# Patient Record
Sex: FEMALE | Race: WHITE | NOT HISPANIC OR LATINO
[De-identification: names, ages, dates, MRNs, and addresses within clinical notes are randomized per-mention and may not be internally consistent; named-entity substitution may affect disease eponyms.]

---

## 2019-02-20 PROBLEM — Z00.00 ENCOUNTER FOR PREVENTIVE HEALTH EXAMINATION: Status: ACTIVE | Noted: 2019-02-20

## 2019-03-01 ENCOUNTER — APPOINTMENT (OUTPATIENT)
Dept: SURGERY | Facility: CLINIC | Age: 69
End: 2019-03-01
Payer: MEDICARE

## 2019-03-01 VITALS
OXYGEN SATURATION: 98 % | SYSTOLIC BLOOD PRESSURE: 142 MMHG | TEMPERATURE: 98.1 F | HEART RATE: 87 BPM | RESPIRATION RATE: 15 BRPM | DIASTOLIC BLOOD PRESSURE: 89 MMHG | HEIGHT: 65 IN | WEIGHT: 148 LBS | BODY MASS INDEX: 24.66 KG/M2

## 2019-03-01 DIAGNOSIS — Z63.4 DISAPPEARANCE AND DEATH OF FAMILY MEMBER: ICD-10-CM

## 2019-03-01 PROCEDURE — 99203 OFFICE O/P NEW LOW 30 MIN: CPT

## 2019-03-01 SDOH — SOCIAL STABILITY - SOCIAL INSECURITY: DISSAPEARANCE AND DEATH OF FAMILY MEMBER: Z63.4

## 2019-03-01 NOTE — CONSULT LETTER
[Dear  ___] : Dear ~FABIOLA, [Sincerely,] : Sincerely, [FreeTextEntry2] : Dr. Srinath Corral [FreeTextEntry1] : At your recommendation I saw Flory Belcher in the office on March 1st for evaluation of a possible abdominal wall hernia. She stated that over the past year she has been experiencing episodic right paraumbilical pain which is usually triggered by bending over at the waist. The pain is usually rather sharp but generally alleviated quickly by the patient lying supine. At times she has also noted a right-sided paraumbilical bulge but she denied any her other abdominal symptoms or change in her usual pattern of constipation.\par \par On exam, the abdomen was soft, nondistended and nontender without palpable mass or organomegaly. No groin, umbilical, upper midline or paramedian hernia could be detected and the remainder of the exam was noncontributory.\par \par As I discussed with Ms. Belcher, her history and exam seem most suggestive of an abdominal wall muscle spasm versus a possible right-sided spigelian hernia. Given the negative exam I suggested that she undergo a CT scan of the abdomen and pelvis to search for a hernia. Should one be demonstrated by the scan, elective repair would certainly be advised and should she eventually come to surgery I will update you on her status. Thanks very much for allowing me to participate in this pleasant woman's care. [FreeTextEntry3] : \par \par Cam Fortune M.D., F.A.C.S.

## 2019-03-01 NOTE — PHYSICAL EXAM
[Normal Thyroid] : the thyroid was normal [Respiratory Effort] : normal respiratory effort [Normal Rate and Rhythm] : normal rate and rhythm [Abdominal Aorta] : Normal abdominal aorta [No Rash or Lesion] : No rash or lesion [Oriented to Person] : oriented to person [Oriented to Place] : oriented to place [Oriented to Time] : oriented to time [Anxious] : anxious [de-identified] : In no distress [de-identified] : Normocephalic, atraumatic [de-identified] : No palpable adenopathy [de-identified] : Soft, nondistended, nontender. No palpable mass or organomegaly. No palpable umbilical, upper midline or paraumbilical hernia palpable. [de-identified] : Normal gait; no deformity [de-identified] : No gross sensory or motor deficit [de-identified] : Normal mood and affect though somewhat anxious.

## 2019-03-01 NOTE — HISTORY OF PRESENT ILLNESS
[de-identified] : Flory is a 70 y/o female here for evaluation of umbilical hernia.  [de-identified] : Over the past year patient has been experiencing episodic right-sided periumbilical pain. Symptoms usually begin abruptly, often triggered by bending over at the waist. Generally resolves quickly with recumbency. At times patient has noted a right-sided para umbilical bulge. No other abdominal symptoms; long history of constipation treated with Metamucil.

## 2019-03-07 ENCOUNTER — FORM ENCOUNTER (OUTPATIENT)
Age: 69
End: 2019-03-07

## 2019-03-08 ENCOUNTER — APPOINTMENT (OUTPATIENT)
Dept: CT IMAGING | Facility: CLINIC | Age: 69
End: 2019-03-08
Payer: MEDICARE

## 2019-03-08 ENCOUNTER — OUTPATIENT (OUTPATIENT)
Dept: OUTPATIENT SERVICES | Facility: HOSPITAL | Age: 69
LOS: 1 days | End: 2019-03-08
Payer: MEDICARE

## 2019-03-08 DIAGNOSIS — Z00.8 ENCOUNTER FOR OTHER GENERAL EXAMINATION: ICD-10-CM

## 2019-03-08 PROCEDURE — 74177 CT ABD & PELVIS W/CONTRAST: CPT | Mod: 26

## 2019-03-08 PROCEDURE — 82565 ASSAY OF CREATININE: CPT

## 2019-03-08 PROCEDURE — 74177 CT ABD & PELVIS W/CONTRAST: CPT

## 2019-03-12 ENCOUNTER — APPOINTMENT (OUTPATIENT)
Dept: VASCULAR SURGERY | Facility: CLINIC | Age: 69
End: 2019-03-12
Payer: MEDICARE

## 2019-03-12 VITALS
TEMPERATURE: 98 F | WEIGHT: 148 LBS | BODY MASS INDEX: 24.66 KG/M2 | HEART RATE: 80 BPM | SYSTOLIC BLOOD PRESSURE: 160 MMHG | HEIGHT: 65 IN | DIASTOLIC BLOOD PRESSURE: 96 MMHG

## 2019-03-12 DIAGNOSIS — Z82.49 FAMILY HISTORY OF ISCHEMIC HEART DISEASE AND OTHER DISEASES OF THE CIRCULATORY SYSTEM: ICD-10-CM

## 2019-03-12 DIAGNOSIS — Z87.440 PERSONAL HISTORY OF URINARY (TRACT) INFECTIONS: ICD-10-CM

## 2019-03-12 DIAGNOSIS — Z87.891 PERSONAL HISTORY OF NICOTINE DEPENDENCE: ICD-10-CM

## 2019-03-12 PROCEDURE — 99202 OFFICE O/P NEW SF 15 MIN: CPT

## 2019-03-13 ENCOUNTER — APPOINTMENT (OUTPATIENT)
Dept: CARDIOTHORACIC SURGERY | Facility: CLINIC | Age: 69
End: 2019-03-13

## 2019-03-13 PROBLEM — Z82.49 FAMILY HISTORY OF MYOCARDIAL INFARCTION: Status: ACTIVE | Noted: 2019-03-01

## 2019-03-13 PROBLEM — Z87.891 FORMER SMOKER: Status: ACTIVE | Noted: 2019-03-01

## 2019-03-13 NOTE — PHYSICAL EXAM
[2+] : left 2+ [Ankle Swelling (On Exam)] : not present [Varicose Veins Of Lower Extremities] : not present [] : not present [Abdomen Masses] : No abdominal masses

## 2019-03-13 NOTE — ASSESSMENT
[FreeTextEntry1] : Patient  has   a 8 cm  Distal  Thoracic  aneurysm  extending  into  the  abdomen  Proximal  extent  is  not  seen  Will  need  A CTA  chest abdomen  Pelvis  befor  deciding  on  TEVAR vs  Open  repair   [Aneurysm Surgery] : aneurysm surgery

## 2019-03-15 ENCOUNTER — APPOINTMENT (OUTPATIENT)
Dept: CT IMAGING | Facility: CLINIC | Age: 69
End: 2019-03-15
Payer: MEDICARE

## 2019-03-15 ENCOUNTER — OUTPATIENT (OUTPATIENT)
Dept: OUTPATIENT SERVICES | Facility: HOSPITAL | Age: 69
LOS: 1 days | End: 2019-03-15
Payer: MEDICARE

## 2019-03-15 DIAGNOSIS — Z00.8 ENCOUNTER FOR OTHER GENERAL EXAMINATION: ICD-10-CM

## 2019-03-15 PROCEDURE — 71275 CT ANGIOGRAPHY CHEST: CPT | Mod: 26

## 2019-03-15 PROCEDURE — 71275 CT ANGIOGRAPHY CHEST: CPT

## 2019-03-15 PROCEDURE — 74174 CTA ABD&PLVS W/CONTRAST: CPT | Mod: 26

## 2019-03-15 PROCEDURE — 74174 CTA ABD&PLVS W/CONTRAST: CPT

## 2019-03-18 ENCOUNTER — RESULT REVIEW (OUTPATIENT)
Age: 69
End: 2019-03-18

## 2019-03-18 NOTE — ADDENDUM
[FreeTextEntry1] : I reviewed the CT scan  chest  abdomen and pelvis  Patient  has  a  large  ascending  aortic  aneurysm  DTA   Thoraco abdominal  aneurysm  Not  a good  TEVAR  situation  Patent  also needs  ascending  aneurysm  repaired  I  asked her  to see Dr Briseno  i left  a message  to speak to him  as well Currently he is  in the OR

## 2019-03-21 ENCOUNTER — APPOINTMENT (OUTPATIENT)
Dept: CARDIOTHORACIC SURGERY | Facility: CLINIC | Age: 69
End: 2019-03-21
Payer: MEDICARE

## 2019-03-21 VITALS
WEIGHT: 148 LBS | HEIGHT: 65 IN | HEART RATE: 74 BPM | BODY MASS INDEX: 24.66 KG/M2 | TEMPERATURE: 98.6 F | OXYGEN SATURATION: 99 % | DIASTOLIC BLOOD PRESSURE: 97 MMHG | SYSTOLIC BLOOD PRESSURE: 156 MMHG | RESPIRATION RATE: 16 BRPM

## 2019-03-21 DIAGNOSIS — Z82.49 FAMILY HISTORY OF ISCHEMIC HEART DISEASE AND OTHER DISEASES OF THE CIRCULATORY SYSTEM: ICD-10-CM

## 2019-03-21 PROCEDURE — 99205 OFFICE O/P NEW HI 60 MIN: CPT

## 2019-03-21 PROCEDURE — 99215 OFFICE O/P EST HI 40 MIN: CPT

## 2019-03-21 RX ORDER — ASCORBIC ACID 500 MG
TABLET ORAL
Refills: 0 | Status: COMPLETED | COMMUNITY
End: 2019-03-21

## 2019-03-21 RX ORDER — MULTIVITAMIN
TABLET ORAL DAILY
Refills: 0 | Status: ACTIVE | COMMUNITY

## 2019-03-21 NOTE — DATA REVIEWED
[FreeTextEntry1] : CT abdomen and pelvis with contrast 3/8/19\par \par FINDINGS:\par \par LOWER CHEST: Fusiform aneurysm of the mid and distal thoracic aorta to a \par maximal diameter of 8.1 x 7.3 cm (2:13). The proximal thoracic aorta is \par not imaged on today's study of the abdomen and pelvis. Prominent mural \par thrombus is present. There is tapering of the aorta in the abdomen: At \par the level of the celiac origin measuring 5.1 x 5.5 cm, at the origin of \par the superior mesenteric artery measuring 3.8 x 3.7 cm, at the level of \par the renal hilus measuring 3.3 x 3.2 cm and at the distal abdominal aorta \par measuring 2.8 cm.\par \par LIVER: There is a prominent Riedel's lobe of liver. No discrete hepatic \par mass.\par BILE DUCTS: Normal caliber.\par GALLBLADDER: Within normal limits.\par SPLEEN: Within normal limits.\par PANCREAS: Within normal limits.\par ADRENALS: Within normal limits.\par KIDNEYS/URETERS: Within normal limits.\par \par BLADDER: Within normal limits.\par REPRODUCTIVE ORGANS: The uterus and adnexal regions unremarkable.\par \par BOWEL: No bowel obstruction. No evidence of bowel related inflammation.\par PERITONEUM: No ascites.\par VESSELS:  Within normal limits.\par RETROPERITONEUM: No lymphadenopathy.  \par ABDOMINAL WALL: No evidence of ventral abdominal hernia.\par BONES: Mild to moderate degenerative changes of the spine. No deepti \par destructive bony process.\par \par IMPRESSION:    \par No evidence of ventral abdominal hernia.\par Large fusiform aneurysm of the mid to distal thoracic aorta with a \par maximal diameter of 8.1 x 7.3 cm.\par \par \par

## 2019-03-21 NOTE — ASSESSMENT
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 Symptoms usually begin abruptly, often triggered by bending over at the waist, resolves quickly with recumbency which she went to PCP who referred to general surgery (  ) who sent for CT Scan which revealed Large fusiform aneurysm of the mid to distal thoracic aorta with a maximal diameter of 8.1 x 7.3 cm.She was then referred to vascular surgery (  ) who did the CTA C/A/P  .She presents for evaluation and management of thoracoabdominal aortic aneurysm .Denies ant chest pain, palpitations, back pain , dizziness or pedal edema.\par \par Today on exam, Lungs are clear bilaterally, No peripheral edema noted.\par \par \par  I reviewed the cardiac imaging, medical records and reports with patient and discussed the case.I have reviewed the indications for surgery,and used our webpage [email or web address] <[email or web address]> to illustrate the aorta and anatomy of the heart. I discussed the risks , benefits and alternatives to surgery. Risks included but not limited to  bleeding , stroke, Myocardial Infarction, kidney problems,Blood transfusion ,permanent  pacemaker implantation,  infections and death. I  quoted an operative mortality and complication risks of 8 - 10%. I also discussed the various approaches in detail.I  feel that the patient will benefit and is a candidate for a Aortic root and Transverse arch replacement , Possible Aortic valve Replacement . All questions and concerns were addressed and patient agrees to proceed with  surgery \par \par I have reviewed the patient's medical records, diagnostic images during the time of this office consultation and have made the following recommendation.\par \par Plan:\par 1) Aortic root and Transverse arch replacement , Possible Aortic valve Replacement\par 2) Cardiac Catherization to evaluate coronary arteries \par 3) PFT\par 4) Carotid Duplex\par 5) PSTs\par 6) Echocardiogram\par 7) Increase Norvasc to 10 mg BID for blood pressure management\par 8) No heavy lifting more than 20 lbs.\par \par I have reviewed the risks, benefits and alternatives with the patient . I have answered all questions and the patient would like to proceed with the above plan. \par \par Written by Idris Perez NP, acting as a scribe for Dr.Derek Briseno.\par “The documentation recorded by the scribe accurately reflects the service I personally performed and the decisions made by me.” Signature Junior Briseno MD.\par  \par \par

## 2019-03-21 NOTE — REVIEW OF SYSTEMS
[Negative] : Heme/Lymph [Fever] : no fever [Chills] : no chills [Chest Pain] : no chest pain [Palpitations] : no palpitations [Lower Ext Edema] : no lower extremity edema [Shortness Of Breath] : no shortness of breath [SOB on Exertion] : no shortness of breath during exertion [Dizziness] : no dizziness [Fainting] : no fainting [Anxiety] : no anxiety [Depression] : no depression [Easy Bleeding] : no tendency for easy bleeding [FreeTextEntry9] : Bcak pain to LT lower back

## 2019-03-21 NOTE — PHYSICAL EXAM
[General Appearance - Alert] : alert [General Appearance - In No Acute Distress] : in no acute distress [General Appearance - Well Nourished] : well nourished [General Appearance - Well Developed] : well developed [Sclera] : the sclera and conjunctiva were normal [PERRL With Normal Accommodation] : pupils were equal in size, round, and reactive to light [Outer Ear] : the ears and nose were normal in appearance [Hearing Threshold Finger Rub Not Sevier] : hearing was normal [Both Tympanic Membranes Were Examined] : both tympanic membranes were normal [Neck Appearance] : the appearance of the neck was normal [] : no respiratory distress [Respiration, Rhythm And Depth] : normal respiratory rhythm and effort [Auscultation Breath Sounds / Voice Sounds] : lungs were clear to auscultation bilaterally [Apical Impulse] : the apical impulse was normal [Heart Rate And Rhythm] : heart rate was normal and rhythm regular [Heart Sounds] : normal S1 and S2 [Systolic grade ___/6] : A grade [unfilled]/6 systolic murmur was heard. [Examination Of The Chest] : the chest was normal in appearance [2+] : left 2+ [Breast Appearance] : normal in appearance [Bowel Sounds] : normal bowel sounds [Abdomen Soft] : soft [Abdomen Tenderness] : non-tender [No CVA Tenderness] : no ~M costovertebral angle tenderness [Abnormal Walk] : normal gait [Involuntary Movements] : no involuntary movements were seen [Skin Color & Pigmentation] : normal skin color and pigmentation [Skin Turgor] : normal skin turgor [No Focal Deficits] : no focal deficits [Oriented To Time, Place, And Person] : oriented to person, place, and time [Impaired Insight] : insight and judgment were intact [Affect] : the affect was normal [Mood] : the mood was normal [FreeTextEntry1] : Deferred

## 2019-03-21 NOTE — HISTORY OF PRESENT ILLNESS
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 Symptoms usually begin abruptly, often triggered by bending over at the waist, resolves quickly with recumbency which she went to PCP who referred to general surgery (  ) who sent for CT Scan which revealed Large fusiform aneurysm of the mid to distal thoracic aorta with a maximal diameter of 8.1 x 7.3 cm.She was then referred to vascular surgery (  ) who did the CTA C/A/P  .She presents for evaluation and management of thoracoabdominal aortic aneurysm .Denies ant chest pain, palpitations, back pain , dizziness or pedal edema.\par \par OFF note: She had UTI 1 week ago ,was on Cipro 500 mg BID for  3 days.\par \par CTA chest/abdomen/pelvis 3/15/19 \par Fusiform aneurysmal dilatation of the thoracic and proximal abdominal aorta\par Ascending thoracic aorta 5.8 x 5.6 cm\par Mid aortic arch 3.7 cm\par Mid descending thoracic aorta 4.9 x 4.6 cm\par Distal thoracic aorta 8 x 7.4, intramural thrombus is noted here\par at origin of the celiac artery 5.5 x 5.4 cm\par at the renal hilus 3.4 x 3.2 cm\par \par CT abdomen and pelvis with contrast 3/8/19 revealed:\par No evidence of ventral abdominal hernia.\par 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

## 2019-03-21 NOTE — CONSULT LETTER
[Dear  ___] : Dear  [unfilled], [FreeTextEntry2] : Srinath Corral MD\par [address] [place] [address]\[place] [address] [FreeTextEntry1] : \par \par I had the pleasure of seeing your patient, KENNETH SPRING,in my office today. \par \par We take a multidisciplinary team approach to patient care and consider you, the primary physician, an extension of our team. We will maintain an open line of communication with you throughout your patient's treatment course. \par \par She  is being evaluated for thoracic aortic aneurysm. I have reviewed all of the patient's medical records and diagnostic images at the time of her  office consultation. I have enclosed a copy for your records. \par \par I have reviewed the indications for surgery,and used our webpage [email or web address] <[email or web address]> to illustrate the aorta and anatomy of the heart. The patient meets criteria for surgery. I have recommended that the patient is a candidate for a  Aortic root and Transverse arch replacement , Possible Aortic valve Replacement . \par \par  I will update you on her perioperative status and disposition upon discharge. \par \par I appreciate the opportunity to care for your patient at the [facility] based at [facility]. If there are any questions or concerns, please call me directly at [phone]. \par \par \par \par Sincerely, \par \par \par \par Junior Briseno M.D.\par Professor of Cardiovascular and Thoracic Surgery\par Minimally Invasive Valve Surgeon\par Director of Aortic Surgery, [facility]\par Cell: [phone]\par Email: [email or web address] \par \par [facility]:\par [address], [place] [address]\par Office: [phone]\par Fax: [phone]\par \par [facility]:\par Department of Cardiovascular and Thoracic Surgery\par [address] [place], [place], [address]\par Office: [phone]\par Fax: [phone]\par \par Practice Manager: Ms. Indy Griffith\par Email: [email or web address]\par Phone: [phone]\par \par

## 2019-04-02 ENCOUNTER — OUTPATIENT (OUTPATIENT)
Dept: OUTPATIENT SERVICES | Facility: HOSPITAL | Age: 69
LOS: 1 days | End: 2019-04-02
Payer: MEDICARE

## 2019-04-02 VITALS
WEIGHT: 149.91 LBS | RESPIRATION RATE: 16 BRPM | DIASTOLIC BLOOD PRESSURE: 86 MMHG | TEMPERATURE: 99 F | HEART RATE: 62 BPM | HEIGHT: 65 IN | SYSTOLIC BLOOD PRESSURE: 164 MMHG | OXYGEN SATURATION: 99 %

## 2019-04-02 DIAGNOSIS — S42.302A UNSPECIFIED FRACTURE OF SHAFT OF HUMERUS, LEFT ARM, INITIAL ENCOUNTER FOR CLOSED FRACTURE: Chronic | ICD-10-CM

## 2019-04-02 DIAGNOSIS — I71.9 AORTIC ANEURYSM OF UNSPECIFIED SITE, WITHOUT RUPTURE: ICD-10-CM

## 2019-04-02 DIAGNOSIS — H26.9 UNSPECIFIED CATARACT: Chronic | ICD-10-CM

## 2019-04-02 LAB
ANION GAP SERPL CALC-SCNC: 13 MMOL/L — SIGNIFICANT CHANGE UP (ref 5–17)
BUN SERPL-MCNC: 13 MG/DL — SIGNIFICANT CHANGE UP (ref 7–23)
CALCIUM SERPL-MCNC: 9.4 MG/DL — SIGNIFICANT CHANGE UP (ref 8.4–10.5)
CHLORIDE SERPL-SCNC: 98 MMOL/L — SIGNIFICANT CHANGE UP (ref 96–108)
CO2 SERPL-SCNC: 27 MMOL/L — SIGNIFICANT CHANGE UP (ref 22–31)
CREAT SERPL-MCNC: 0.61 MG/DL — SIGNIFICANT CHANGE UP (ref 0.5–1.3)
GLUCOSE SERPL-MCNC: 96 MG/DL — SIGNIFICANT CHANGE UP (ref 70–99)
HCT VFR BLD CALC: 39.1 % — SIGNIFICANT CHANGE UP (ref 34.5–45)
HGB BLD-MCNC: 13.4 G/DL — SIGNIFICANT CHANGE UP (ref 11.5–15.5)
MCHC RBC-ENTMCNC: 31.8 PG — SIGNIFICANT CHANGE UP (ref 27–34)
MCHC RBC-ENTMCNC: 34.3 GM/DL — SIGNIFICANT CHANGE UP (ref 32–36)
MCV RBC AUTO: 92.6 FL — SIGNIFICANT CHANGE UP (ref 80–100)
PLATELET # BLD AUTO: 230 K/UL — SIGNIFICANT CHANGE UP (ref 150–400)
POTASSIUM SERPL-MCNC: 3.6 MMOL/L — SIGNIFICANT CHANGE UP (ref 3.5–5.3)
POTASSIUM SERPL-SCNC: 3.6 MMOL/L — SIGNIFICANT CHANGE UP (ref 3.5–5.3)
RBC # BLD: 4.22 M/UL — SIGNIFICANT CHANGE UP (ref 3.8–5.2)
RBC # FLD: 12.2 % — SIGNIFICANT CHANGE UP (ref 10.3–14.5)
SODIUM SERPL-SCNC: 138 MMOL/L — SIGNIFICANT CHANGE UP (ref 135–145)
WBC # BLD: 8 K/UL — SIGNIFICANT CHANGE UP (ref 3.8–10.5)
WBC # FLD AUTO: 8 K/UL — SIGNIFICANT CHANGE UP (ref 3.8–10.5)

## 2019-04-02 PROCEDURE — 99152 MOD SED SAME PHYS/QHP 5/>YRS: CPT

## 2019-04-02 PROCEDURE — 85027 COMPLETE CBC AUTOMATED: CPT

## 2019-04-02 PROCEDURE — 93456 R HRT CORONARY ARTERY ANGIO: CPT

## 2019-04-02 PROCEDURE — C1769: CPT

## 2019-04-02 PROCEDURE — 99152 MOD SED SAME PHYS/QHP 5/>YRS: CPT | Mod: GC

## 2019-04-02 PROCEDURE — 93010 ELECTROCARDIOGRAM REPORT: CPT | Mod: 59

## 2019-04-02 PROCEDURE — C1887: CPT

## 2019-04-02 PROCEDURE — 93005 ELECTROCARDIOGRAM TRACING: CPT

## 2019-04-02 PROCEDURE — 99203 OFFICE O/P NEW LOW 30 MIN: CPT

## 2019-04-02 PROCEDURE — 80048 BASIC METABOLIC PNL TOTAL CA: CPT

## 2019-04-02 PROCEDURE — 93456 R HRT CORONARY ARTERY ANGIO: CPT | Mod: 26,GC

## 2019-04-02 PROCEDURE — C1894: CPT

## 2019-04-02 NOTE — H&P CARDIOLOGY - HISTORY OF PRESENT ILLNESS
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 PT recently complained of intermittent right sided periumbilical abdominal pain on and off for 1 year. Symptoms were usually abruptly often triggered by bending over at the waist , pain resolved quickly recumbency. Pt went to PCP then referred to general surgery Dr. Fortune . Pt had CT scan which revealed large fusiform aneurysm of the mid to distal thoracic aorta with a maximal diameter of 8.1 x 7.3 cm. Referred for vascular sx with Dr. Cheema who did the CTA  C/A/P . Pt then referred to Dr. Briseno for evaluation of thoracoabdominal aortic aneurysm/dissection.   Now referred for Right and Left heart Catheterization today with Dr. Andrade. Currently no complaints of any CP no sob no palpitations no lightheadedness or dizziness noted.   < from: CT Angio Abdomen and Pelvis w/ IV Cont (03.15.19 @ 09:28) >  FINDINGS:    VESSELS: Fusiform aneurysmal dilatation of the thoracic and proximal   abdominal aorta. The following measurements are obtained:  Ascending thoracic aorta: 5.8 x 5.6 cm (3:54).  Mid aortic arch: 3.7 cm.  Mid descending thoracic aorta: 4.9 x 4.6 cm (3:61).  Distal thoracic aorta: 8 x 7.4 cm (3:88). Intramural thrombus is noted   here.  At the origin of the celiac artery: 5.5 x 5.4 cm (3:106).  At the renal hilus: 3.4 x 3.2 cm (3:117).  The distal abdominal aorta and iliac arteries are normal in caliber.  The celiac, superior mesenteric, inferior mesenteric and renal arteries   demonstrate patency.  The heart is normal in size.    CHEST:     LUNGS AND LARGE AIRWAYS: Patent central airways. 3 mm left lower lobe   nodule (3:84).  PLEURA: No pleural effusion.    HEART: Heart size is normal. No pericardial effusion.  MEDIASTINUM AND JUMA: No lymphadenopathy.  CHEST WALL AND LOWER NECK: Within normal limits.    ABDOMEN AND PELVIS:    LIVER: Within normal limits.  BILE DUCTS: Normal caliber.  GALLBLADDER: Within normal limits.  SPLEEN: Within normal limits.  PANCREAS: Within normal limits.  ADRENALS: Within normal limits.  KIDNEYS/URETERS: Within normal limits.    BLADDER: Within normal limits.  REPRODUCTIVE ORGANS: The uterus and adnexal regions unremarkable.    BOWEL: No bowel obstruction. Appendix normal. No bowel related   inflammation.  PERITONEUM: No ascites.  VESSELS:  Withinnormal limits.  RETROPERITONEUM: No lymphadenopathy.    ABDOMINAL WALL: Within normal limits.  BONES: Moderate degenerative changes especially of the lower lumbar spine.    IMPRESSION:        Fusiform aneurysmal dilatation of the thoracic and upper abdominal aorta   as noted above.  NEYDA STAPLES M.D., ATTENDING RADIOLOGIST   This document has been electronically signed. Mar 15 2019 12:37PM  < end of copied text >

## 2019-04-02 NOTE — H&P CARDIOLOGY - FAMILY HISTORY
Father  Still living? No  Family history of early CAD, Age at diagnosis: Age Unknown  Family history of coronary artery bypass graft, Age at diagnosis: Age Unknown

## 2019-04-04 ENCOUNTER — OUTPATIENT (OUTPATIENT)
Dept: OUTPATIENT SERVICES | Facility: HOSPITAL | Age: 69
LOS: 1 days | End: 2019-04-04
Payer: MEDICARE

## 2019-04-04 ENCOUNTER — APPOINTMENT (OUTPATIENT)
Dept: CV DIAGNOSITCS | Facility: HOSPITAL | Age: 69
End: 2019-04-04

## 2019-04-04 ENCOUNTER — APPOINTMENT (OUTPATIENT)
Dept: CARDIOTHORACIC SURGERY | Facility: CLINIC | Age: 69
End: 2019-04-04
Payer: MEDICARE

## 2019-04-04 VITALS
HEART RATE: 59 BPM | HEIGHT: 65 IN | DIASTOLIC BLOOD PRESSURE: 88 MMHG | OXYGEN SATURATION: 97 % | RESPIRATION RATE: 16 BRPM | TEMPERATURE: 97 F | WEIGHT: 148.15 LBS | SYSTOLIC BLOOD PRESSURE: 147 MMHG

## 2019-04-04 DIAGNOSIS — Z01.818 ENCOUNTER FOR OTHER PREPROCEDURAL EXAMINATION: ICD-10-CM

## 2019-04-04 DIAGNOSIS — I71.6 THORACOABDOMINAL AORTIC ANEURYSM, WITHOUT RUPTURE: ICD-10-CM

## 2019-04-04 DIAGNOSIS — I71.9 AORTIC ANEURYSM OF UNSPECIFIED SITE, WITHOUT RUPTURE: ICD-10-CM

## 2019-04-04 DIAGNOSIS — Z29.9 ENCOUNTER FOR PROPHYLACTIC MEASURES, UNSPECIFIED: ICD-10-CM

## 2019-04-04 DIAGNOSIS — Z98.49 CATARACT EXTRACTION STATUS, UNSPECIFIED EYE: Chronic | ICD-10-CM

## 2019-04-04 DIAGNOSIS — H26.9 UNSPECIFIED CATARACT: Chronic | ICD-10-CM

## 2019-04-04 DIAGNOSIS — I71.2 THORACIC AORTIC ANEURYSM, WITHOUT RUPTURE: ICD-10-CM

## 2019-04-04 DIAGNOSIS — I10 ESSENTIAL (PRIMARY) HYPERTENSION: ICD-10-CM

## 2019-04-04 DIAGNOSIS — S42.302A UNSPECIFIED FRACTURE OF SHAFT OF HUMERUS, LEFT ARM, INITIAL ENCOUNTER FOR CLOSED FRACTURE: Chronic | ICD-10-CM

## 2019-04-04 PROBLEM — O14.90 UNSPECIFIED PRE-ECLAMPSIA, UNSPECIFIED TRIMESTER: Chronic | Status: ACTIVE | Noted: 2019-04-02

## 2019-04-04 LAB
BLD GP AB SCN SERPL QL: NEGATIVE — SIGNIFICANT CHANGE UP
BUN SERPL-MCNC: 11 MG/DL — SIGNIFICANT CHANGE UP (ref 7–23)
CREAT SERPL-MCNC: 0.6 MG/DL — SIGNIFICANT CHANGE UP (ref 0.5–1.3)
HBA1C BLD-MCNC: 5.2 % — SIGNIFICANT CHANGE UP (ref 4–5.6)
HCT VFR BLD CALC: 40.8 % — SIGNIFICANT CHANGE UP (ref 34.5–45)
HGB BLD-MCNC: 13.5 G/DL — SIGNIFICANT CHANGE UP (ref 11.5–15.5)
MCHC RBC-ENTMCNC: 30.4 PG — SIGNIFICANT CHANGE UP (ref 27–34)
MCHC RBC-ENTMCNC: 33.1 GM/DL — SIGNIFICANT CHANGE UP (ref 32–36)
MCV RBC AUTO: 91.9 FL — SIGNIFICANT CHANGE UP (ref 80–100)
PLATELET # BLD AUTO: 246 K/UL — SIGNIFICANT CHANGE UP (ref 150–400)
RBC # BLD: 4.44 M/UL — SIGNIFICANT CHANGE UP (ref 3.8–5.2)
RBC # FLD: 12.8 % — SIGNIFICANT CHANGE UP (ref 10.3–14.5)
RH IG SCN BLD-IMP: POSITIVE — SIGNIFICANT CHANGE UP
WBC # BLD: 9.02 K/UL — SIGNIFICANT CHANGE UP (ref 3.8–10.5)
WBC # FLD AUTO: 9.02 K/UL — SIGNIFICANT CHANGE UP (ref 3.8–10.5)

## 2019-04-04 PROCEDURE — 93880 EXTRACRANIAL BILAT STUDY: CPT

## 2019-04-04 PROCEDURE — 83036 HEMOGLOBIN GLYCOSYLATED A1C: CPT

## 2019-04-04 PROCEDURE — 85027 COMPLETE CBC AUTOMATED: CPT

## 2019-04-04 PROCEDURE — 86901 BLOOD TYPING SEROLOGIC RH(D): CPT

## 2019-04-04 PROCEDURE — 82565 ASSAY OF CREATININE: CPT

## 2019-04-04 PROCEDURE — 71046 X-RAY EXAM CHEST 2 VIEWS: CPT

## 2019-04-04 PROCEDURE — 84520 ASSAY OF UREA NITROGEN: CPT

## 2019-04-04 PROCEDURE — C8929: CPT

## 2019-04-04 PROCEDURE — 86900 BLOOD TYPING SEROLOGIC ABO: CPT

## 2019-04-04 PROCEDURE — 87641 MR-STAPH DNA AMP PROBE: CPT

## 2019-04-04 PROCEDURE — 93880 EXTRACRANIAL BILAT STUDY: CPT | Mod: 26

## 2019-04-04 PROCEDURE — 71046 X-RAY EXAM CHEST 2 VIEWS: CPT | Mod: 26

## 2019-04-04 PROCEDURE — 94010 BREATHING CAPACITY TEST: CPT

## 2019-04-04 PROCEDURE — G0463: CPT

## 2019-04-04 PROCEDURE — 87640 STAPH A DNA AMP PROBE: CPT

## 2019-04-04 PROCEDURE — 93306 TTE W/DOPPLER COMPLETE: CPT | Mod: 26

## 2019-04-04 PROCEDURE — 86850 RBC ANTIBODY SCREEN: CPT

## 2019-04-04 RX ORDER — CHLORHEXIDINE GLUCONATE 213 G/1000ML
1 SOLUTION TOPICAL ONCE
Qty: 0 | Refills: 0 | Status: DISCONTINUED | OUTPATIENT
Start: 2019-04-08 | End: 2019-04-08

## 2019-04-04 RX ORDER — SODIUM CHLORIDE 9 MG/ML
3 INJECTION INTRAMUSCULAR; INTRAVENOUS; SUBCUTANEOUS EVERY 8 HOURS
Qty: 0 | Refills: 0 | Status: DISCONTINUED | OUTPATIENT
Start: 2019-04-08 | End: 2019-04-08

## 2019-04-04 RX ORDER — LIDOCAINE HCL 20 MG/ML
0.2 VIAL (ML) INJECTION ONCE
Qty: 0 | Refills: 0 | Status: DISCONTINUED | OUTPATIENT
Start: 2019-04-08 | End: 2019-04-08

## 2019-04-04 NOTE — H&P PST ADULT - NSICDXPROBLEM_GEN_ALL_CORE_FT
PROBLEM DIAGNOSES  Problem: Thoracoabdominal aortic aneurysm (TAAA) without rupture  Assessment and Plan: Aortic root and transverse arch replacement, possible aortic valve replacement    Problem: Hypertension  Assessment and Plan: continue blood pressure medications PROBLEM DIAGNOSES  Problem: Prophylactic measure  Assessment and Plan: The Caprini score indicates this patient is at risk for a VTE event (score 3-5).  Most surgical patients in this group would benefit from pharmacologic prophylaxis.  The surgical team will determine the balance between VTE risk and bleeding risk      Problem: Thoracoabdominal aortic aneurysm (TAAA) without rupture  Assessment and Plan: Aortic root and transverse arch replacement, possible aortic valve replacement    Problem: Hypertension  Assessment and Plan: continue blood pressure medications

## 2019-04-04 NOTE — H&P PST ADULT - NSICDXFAMILYHX_GEN_ALL_CORE_FT
FAMILY HISTORY:  Family history of skin cancer, mother    Father  Still living? No  Family history of coronary artery bypass graft, Age at diagnosis: Age Unknown  Family history of early CAD, Age at diagnosis: Age Unknown FAMILY HISTORY:  Family history of skin cancer, mother    Father  Still living? No  Family history of coronary artery bypass graft, Age at diagnosis: Age Unknown

## 2019-04-04 NOTE — H&P PST ADULT - NSICDXPASTMEDICALHX_GEN_ALL_CORE_FT
PAST MEDICAL HISTORY:  Abdominal hernia     Cataract left eye    HTN (hypertension)     Preeclampsia PAST MEDICAL HISTORY:  Cataract bilateral    HTN (hypertension)     Murmur, cardiac     Preeclampsia PAST MEDICAL HISTORY:  Cataract bilateral    HTN (hypertension) controlled    Murmur, cardiac     Preeclampsia

## 2019-04-04 NOTE — H&P PST ADULT - ACTIVITY
able to climb 3 flights of stairs without shortness of breath, walks 3 miles 3 times per week able to climb 3 flights of stairs without shortness of breath, walks 3 miles 3 times per week without shortness of breath or chest pain

## 2019-04-04 NOTE — H&P PST ADULT - NSANTHOSAYNRD_GEN_A_CORE
No. MARIUM screening performed.  STOP BANG Legend: 0-2 = LOW Risk; 3-4 = INTERMEDIATE Risk; 5-8 = HIGH Risk

## 2019-04-04 NOTE — H&P PST ADULT - HISTORY OF PRESENT ILLNESS
This is a 69 year old female that presents at PST for scheduled . Pt complains of 10/10 pain that lasts for 30 seconds and relieved by standing straight or sitting up. Pt went for CT scan on 3/2019 and diagnosed with with thoracoabdominal aortic aneurysm.  CT 3/2019 + aneurysm This is a 69 year old female that presents at [facility] for scheduled for surgery for thoracoabdominal aortic aneurysm . Pt complains of 10/10 abdominal pain that lasts for 30 seconds and relieved by standing straight or sitting up. Patient states pain has been present for one year. Pt went for CT scan on 3/2019 and diagnosed with thoracoabdominal aortic aneurysm. This is a 69 year old female with history of HTN, preeclampsia, c/o abdominal pain, Pt went for CT scan on 3/2019 and diagnosed with thoracoabdominal aortic aneurysm, she presents today for surgery

## 2019-04-04 NOTE — H&P PST ADULT - ASSESSMENT
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, without rupture  aortic aneurysm of unspecified site, without rupture  thoracic  aortic aneurysm without rupture  CAPRINI VTE 2.0 SCORE [CLOT updated 2019]    AGE RELATED RISK FACTORS                                                       MOBILITY RELATED FACTORS  [ ] Age 41-60 years                                            (1 Point)                    [ ] Bed rest                                                        (1 Point)  [xx ] Age: 61-74 years                                           (2 Points)                  [ ] Plaster cast                                                   (2 Points)  [ ] Age= 75 years                                              (3 Points)                    [ ] Bed bound for more than 72 hours                 (2 Points)    DISEASE RELATED RISK FACTORS                                               GENDER SPECIFIC FACTORS  [ ] Edema in the lower extremities                       (1 Point)              [ ] Pregnancy                                                     (1 Point)  [ ] Varicose veins                                               (1 Point)                     [ ] Post-partum < 6 weeks                                   (1 Point)             [ ] BMI > 25 Kg/m2                                            (1 Point)                     [ ] Hormonal therapy  or oral contraception          (1 Point)                 [ ] Sepsis (in the previous month)                        (1 Point)               [ ] History of pregnancy complications                 (1 point)  [ ] Pneumonia or serious lung disease                                               [ ] Unexplained or recurrent                     (1 Point)           (in the previous month)                               (1 Point)  [ ] Abnormal pulmonary function test                     (1 Point)                 SURGERY RELATED RISK FACTORS  [ ] Acute myocardial infarction                              (1 Point)               [ ]  Section                                             (1 Point)  [ ] Congestive heart failure (in the previous month)  (1 Point)      [ ] Minor surgery                                                  (1 Point)   [ ] Inflammatory bowel disease                             (1 Point)               [ ] Arthroscopic surgery                                        (2 Points)  [ ] Central venous access                                      (2 Points)                [xx ] General surgery lasting more than 45 minutes (2 points)  [ ] Malignancy- Present or previous                   (2 Points)                [ ] Elective arthroplasty                                         (5 points)    [ ] Stroke (in the previous month)                          (5 Points)                                                                                                                                                           HEMATOLOGY RELATED FACTORS                                                 TRAUMA RELATED RISK FACTORS  [ ] Prior episodes of VTE                                     (3 Points)                [ ] Fracture of the hip, pelvis, or leg                       (5 Points)  [ ] Positive family history for VTE                         (3 Points)             [ ] Acute spinal cord injury (in the previous month)  (5 Points)  [ ] Prothrombin 19909 A                                     (3 Points)               [ ] Paralysis  (less than 1 month)                             (5 Points)  [ ] Factor V Leiden                                             (3 Points)                  [ ] Multiple Trauma within 1 month                        (5 Points)  [ ] Lupus anticoagulants                                     (3 Points)                                                           [ ] Anticardiolipin antibodies                               (3 Points)                                                       [ ] High homocysteine in the blood                      (3 Points)                                             [ ] Other congenital or acquired thrombophilia      (3 Points)                                                [ ] Heparin induced thrombocytopenia                  (3 Points)                                     Total Score [  4        ]

## 2019-04-04 NOTE — H&P PST ADULT - NSANTHBPHIGHRD_ENT_A_CORE
[facility]  Heart Transplant  Progress Note    Patient Name: Suman Hayden  MRN: [id number]  Admission Date: 7/18/2017  Hospital Length of Stay: 17 days  Attending Physician: Sunny Downing MD  Primary Care Provider: Joe Ernst MD  Principal Problem:Acute on chronic combined systolic and diastolic heart failure    Subjective:     Interval History: patient with NG tube now for ileus, still with nausea     Continuous Infusions:   amiodarone      dextrose 5 % and 0.9 % NaCl 75 mL/hr at 08/04/17 1300    DOBUTamine 5 mcg/kg/min (08/04/17 1300)    epinephrine infusion (NON-TITRATING) Stopped (08/04/17 0900)    furosemide (LASIX) 1 mg/mL infusion (non-titrating) 15 mg/hr (08/04/17 1300)    heparin (porcine) in 5 % dex 400 Units/hr (08/04/17 1300)    nicardipine Stopped (08/02/17 2244)     Scheduled Meds:   amlodipine  5 mg Oral Daily    aspirin  300 mg Rectal Daily    docusate sodium  200 mg Oral QHS    ferrous gluconate  324 mg Oral Daily with breakfast    magnesium oxide  400 mg Oral TID    pantoprazole  40 mg Intravenous Daily    polyethylene glycol  17 g Oral BID    potassium chloride  20 mEq Oral BID    pravastatin  20 mg Oral QHS    sodium chloride 0.9%  10 mL Intravenous Q6H    sodium chloride 0.9%  3 mL Intravenous Q8H     PRN Meds:albumin human 5%, albuterol sulfate, bisacodyl, fentaNYL, hydrALAZINE, magnesium hydroxide 400 mg/5 ml, magnesium sulfate IVPB, ondansetron, oxycodone, oxycodone, potassium chloride, potassium chloride, Flushing PICC Protocol **AND** sodium chloride 0.9% **AND** sodium chloride 0.9%, sodium phosphate IVPB, sodium phosphate IVPB, sodium phosphate IVPB    Review of patient's allergies indicates:  No Known Allergies  Objective:     Vital Signs (Most Recent):  Temp: 97.4 °F (36.3 °C) (08/04/17 0700)  Pulse: 110 (08/04/17 1315)  Resp: (!) 22 (08/04/17 1315)  BP: (!) 85/0 (08/04/17 1100)  SpO2: 98 % (08/04/17 1100) Vital Signs (24h Range):  Temp:  [97.4  °F (36.3 °C)-98.5 °F (36.9 °C)] 97.4 °F (36.3 °C)  Pulse:  [] 110  Resp:  [16-34] 22  SpO2:  [96 %-100 %] 98 %  BP: (78-88)/(0) 85/0  Arterial Line BP: ()/(48-84) 80/64     Weight: 81.2 kg (179 lb 0.2 oz)  Body mass index is 27.22 kg/m².      Intake/Output Summary (Last 24 hours) at 08/04/17 1331  Last data filed at 08/04/17 1300   Gross per 24 hour   Intake             1998 ml   Output             2307 ml   Net             -309 ml       Hemodynamic Parameters:           Physical Exam   Constitutional: He appears well-developed and well-nourished. No distress.   HENT:   Head: Normocephalic and atraumatic.   Eyes: Pupils are equal, round, and reactive to light.   Neck: Normal range of motion. Neck supple. No JVD present.   Cardiovascular: Normal rate and regular rhythm.  Exam reveals no gallop and no friction rub.    No murmur heard.  VAD hum smooth   Pulmonary/Chest: Effort normal. No respiratory distress. He has no wheezes. He has no rales.   Abdominal: Soft. He exhibits distension (mild). There is no tenderness. There is no guarding.   Musculoskeletal: Normal range of motion. He exhibits no edema.   Neurological: He is alert. No cranial nerve deficit. Coordination normal.   Skin: Skin is warm. He is not diaphoretic. No erythema.   Vitals reviewed.      Significant Labs:  CBC:    Recent Labs  Lab 08/04/17  0303   WBC 11.36   RBC 3.48*   HGB 9.8*   HCT 28.4*      MCV 82   MCH 28.2   MCHC 34.5     BNP:    Recent Labs  Lab 08/04/17  0303   BNP 1,381*     CMP:    Recent Labs  Lab 08/04/17  0303 08/04/17  1049   * 94  94   CALCIUM 8.7 8.8  8.8   ALBUMIN 2.9*  --    PROT 6.5  --    * 135*  135*   K 3.7 4.2  4.2   CO2 23 21*  21*    101  101   BUN 45* 48*  48*   CREATININE 2.0* 2.1*  2.1*   ALKPHOS 57  --    ALT 16  --    AST 19  --    BILITOT 1.9*  --       Coagulation:     Recent Labs  Lab 08/04/17  0303 08/04/17  1049   INR 2.9*  --    APTT 59.6* 28.7     LDH:    Recent  Labs  Lab 08/02/17  0310 08/03/17  0400 08/04/17  0303   * 285* 289*     Microbiology:  Microbiology Results (last 7 days)     Procedure Component Value Units Date/Time    Blood culture [[id number]] Collected:  07/24/17 1300    Order Status:  Completed Specimen:  Blood from Peripheral, Hand, Right Updated:  07/29/17 1812     Blood Culture, Routine No growth after 5 days.    Urine culture [[id number]]  (Susceptibility) Collected:  07/24/17 1139    Order Status:  Completed Specimen:  Urine from Urine, Clean Catch Updated:  07/29/17 1506     Urine Culture, Routine --     ENTEROCOCCUS FAECALIS  >100,000 cfu/ml  No other significant isolate      Blood culture [[id number]] Collected:  07/24/17 1100    Order Status:  Completed Specimen:  Blood from Peripheral, Antecubital, Left Updated:  07/29/17 1412     Blood Culture, Routine No growth after 5 days.          I have reviewed all pertinent labs within the past 24 hours.    Estimated Creatinine Clearance: 33 mL/min (based on Cr of 2.1).    Diagnostic Results:  I have reviewed and interpreted all pertinent imaging results/findings within the past 24 hours.    Assessment and Plan:     Atrial fibrillation    - c/w heparin gtt  - c/w amio gtt        LVAD (left ventricular assist device) present    - LVAD HM III  - Speed 5200  - no events        Urine culture positive    - afebrile, treated with IV cefipime         Atrial tachycardia    - [id number] - 3  - c/w amiodarone  - c/w heparin gtt        AICD discharge    - appropriate in the setting of VT aggravated by underlying AT/AFL (earlier during the admission)  - 7/28 - setting of AF undersense - device reprogrammed to VVI 80  - tachy therapy turned off  - on amio gtt  - EP planning to do lead revision once INR theraputic         Hepatitis B core antibody positive since 2012    - will defer liver biopsy as CTS not requiring it  - ID consult cleared the patient for sx  - case discussed with hepatology, low suspicion for liver  involvement        V-tach    - s/p amiodarone reload - now on amio gtt        Hyperlipidemia    - c/w pravastatin        * Acute on chronic combined systolic and diastolic heart failure    - CTS Primary  - s/p LVAD HM III (7/27/17), speed @ 5200 rpm  - chest closure (7/28/17)  - extubated (7/29/17)  - on , epi, lasix 20. Creatinine rising, would consider backing off diuretics.   - off cardene, vasopressin gtt  -Anticoagulation per CTS                  MELISSA Long  Heart Transplant  [facility]   Yes

## 2019-04-05 PROBLEM — I10 ESSENTIAL (PRIMARY) HYPERTENSION: Chronic | Status: ACTIVE | Noted: 2019-04-02

## 2019-04-05 PROBLEM — H26.9 UNSPECIFIED CATARACT: Chronic | Status: ACTIVE | Noted: 2019-04-02

## 2019-04-05 PROBLEM — K46.9 UNSPECIFIED ABDOMINAL HERNIA WITHOUT OBSTRUCTION OR GANGRENE: Chronic | Status: INACTIVE | Noted: 2019-04-02 | Resolved: 2019-04-04

## 2019-04-05 LAB
MRSA PCR RESULT.: SIGNIFICANT CHANGE UP
S AUREUS DNA NOSE QL NAA+PROBE: SIGNIFICANT CHANGE UP

## 2019-04-07 ENCOUNTER — TRANSCRIPTION ENCOUNTER (OUTPATIENT)
Age: 69
End: 2019-04-07

## 2019-04-07 VITALS — HEIGHT: 65 IN | WEIGHT: 147.71 LBS

## 2019-04-08 ENCOUNTER — INPATIENT (INPATIENT)
Facility: HOSPITAL | Age: 69
LOS: 3 days | Discharge: HOME | DRG: 219 | End: 2019-04-12
Attending: THORACIC SURGERY (CARDIOTHORACIC VASCULAR SURGERY) | Admitting: THORACIC SURGERY (CARDIOTHORACIC VASCULAR SURGERY)
Payer: MEDICARE

## 2019-04-08 ENCOUNTER — APPOINTMENT (OUTPATIENT)
Dept: CARDIOTHORACIC SURGERY | Facility: HOSPITAL | Age: 69
End: 2019-04-08

## 2019-04-08 ENCOUNTER — RESULT REVIEW (OUTPATIENT)
Age: 69
End: 2019-04-08

## 2019-04-08 DIAGNOSIS — I71.9 AORTIC ANEURYSM OF UNSPECIFIED SITE, WITHOUT RUPTURE: ICD-10-CM

## 2019-04-08 DIAGNOSIS — I71.6 THORACOABDOMINAL AORTIC ANEURYSM, WITHOUT RUPTURE: ICD-10-CM

## 2019-04-08 DIAGNOSIS — S42.302A UNSPECIFIED FRACTURE OF SHAFT OF HUMERUS, LEFT ARM, INITIAL ENCOUNTER FOR CLOSED FRACTURE: Chronic | ICD-10-CM

## 2019-04-08 DIAGNOSIS — Z98.49 CATARACT EXTRACTION STATUS, UNSPECIFIED EYE: Chronic | ICD-10-CM

## 2019-04-08 LAB
ALBUMIN SERPL ELPH-MCNC: 3.3 G/DL — SIGNIFICANT CHANGE UP (ref 3.3–5)
ALP SERPL-CCNC: 51 U/L — SIGNIFICANT CHANGE UP (ref 40–120)
ALT FLD-CCNC: 17 U/L — SIGNIFICANT CHANGE UP (ref 10–45)
ANION GAP SERPL CALC-SCNC: 12 MMOL/L — SIGNIFICANT CHANGE UP (ref 5–17)
APTT BLD: 28.7 SEC — SIGNIFICANT CHANGE UP (ref 27.5–36.3)
AST SERPL-CCNC: 38 U/L — SIGNIFICANT CHANGE UP (ref 10–40)
BASOPHILS # BLD AUTO: 0 K/UL — SIGNIFICANT CHANGE UP (ref 0–0.2)
BASOPHILS NFR BLD AUTO: 0.2 % — SIGNIFICANT CHANGE UP (ref 0–2)
BILIRUB SERPL-MCNC: 0.5 MG/DL — SIGNIFICANT CHANGE UP (ref 0.2–1.2)
BUN SERPL-MCNC: 12 MG/DL — SIGNIFICANT CHANGE UP (ref 7–23)
CALCIUM SERPL-MCNC: 9.1 MG/DL — SIGNIFICANT CHANGE UP (ref 8.4–10.5)
CHLORIDE SERPL-SCNC: 106 MMOL/L — SIGNIFICANT CHANGE UP (ref 96–108)
CK MB BLD-MCNC: 13.8 % — HIGH (ref 0–3.5)
CK MB CFR SERPL CALC: 34.4 NG/ML — HIGH (ref 0–3.8)
CK SERPL-CCNC: 250 U/L — HIGH (ref 25–170)
CO2 SERPL-SCNC: 23 MMOL/L — SIGNIFICANT CHANGE UP (ref 22–31)
CREAT SERPL-MCNC: 0.53 MG/DL — SIGNIFICANT CHANGE UP (ref 0.5–1.3)
EOSINOPHIL # BLD AUTO: 0.1 K/UL — SIGNIFICANT CHANGE UP (ref 0–0.5)
EOSINOPHIL NFR BLD AUTO: 0.8 % — SIGNIFICANT CHANGE UP (ref 0–6)
FIBRINOGEN PPP-MCNC: 376 MG/DL — SIGNIFICANT CHANGE UP (ref 350–510)
GAS PNL BLDA: SIGNIFICANT CHANGE UP
GLUCOSE SERPL-MCNC: 98 MG/DL — SIGNIFICANT CHANGE UP (ref 70–99)
HCT VFR BLD CALC: 36.1 % — SIGNIFICANT CHANGE UP (ref 34.5–45)
HGB BLD-MCNC: 12 G/DL — SIGNIFICANT CHANGE UP (ref 11.5–15.5)
INR BLD: 1.19 RATIO — HIGH (ref 0.88–1.16)
LYMPHOCYTES # BLD AUTO: 1.8 K/UL — SIGNIFICANT CHANGE UP (ref 1–3.3)
LYMPHOCYTES # BLD AUTO: 13.2 % — SIGNIFICANT CHANGE UP (ref 13–44)
MCHC RBC-ENTMCNC: 30.4 PG — SIGNIFICANT CHANGE UP (ref 27–34)
MCHC RBC-ENTMCNC: 33.1 GM/DL — SIGNIFICANT CHANGE UP (ref 32–36)
MCV RBC AUTO: 91.7 FL — SIGNIFICANT CHANGE UP (ref 80–100)
MONOCYTES # BLD AUTO: 0.8 K/UL — SIGNIFICANT CHANGE UP (ref 0–0.9)
MONOCYTES NFR BLD AUTO: 5.7 % — SIGNIFICANT CHANGE UP (ref 2–14)
NEUTROPHILS # BLD AUTO: 11 K/UL — HIGH (ref 1.8–7.4)
NEUTROPHILS NFR BLD AUTO: 80.1 % — HIGH (ref 43–77)
PLATELET # BLD AUTO: 164 K/UL — SIGNIFICANT CHANGE UP (ref 150–400)
POTASSIUM SERPL-MCNC: 4 MMOL/L — SIGNIFICANT CHANGE UP (ref 3.5–5.3)
POTASSIUM SERPL-SCNC: 4 MMOL/L — SIGNIFICANT CHANGE UP (ref 3.5–5.3)
PROT SERPL-MCNC: 5 G/DL — LOW (ref 6–8.3)
PROTHROM AB SERPL-ACNC: 13.8 SEC — HIGH (ref 10–12.9)
RBC # BLD: 3.94 M/UL — SIGNIFICANT CHANGE UP (ref 3.8–5.2)
RBC # FLD: 12 % — SIGNIFICANT CHANGE UP (ref 10.3–14.5)
RH IG SCN BLD-IMP: POSITIVE — SIGNIFICANT CHANGE UP
SODIUM SERPL-SCNC: 141 MMOL/L — SIGNIFICANT CHANGE UP (ref 135–145)
TROPONIN T, HIGH SENSITIVITY RESULT: 698 NG/L — HIGH (ref 0–51)
WBC # BLD: 13.8 K/UL — HIGH (ref 3.8–10.5)
WBC # FLD AUTO: 13.8 K/UL — HIGH (ref 3.8–10.5)

## 2019-04-08 PROCEDURE — 88305 TISSUE EXAM BY PATHOLOGIST: CPT | Mod: 26

## 2019-04-08 PROCEDURE — 33875 THORACIC AORTIC GRAFT: CPT

## 2019-04-08 PROCEDURE — 88304 TISSUE EXAM BY PATHOLOGIST: CPT | Mod: 26

## 2019-04-08 PROCEDURE — 99291 CRITICAL CARE FIRST HOUR: CPT

## 2019-04-08 PROCEDURE — 88311 DECALCIFY TISSUE: CPT | Mod: 26

## 2019-04-08 PROCEDURE — 33860: CPT | Mod: 22

## 2019-04-08 PROCEDURE — 71045 X-RAY EXAM CHEST 1 VIEW: CPT | Mod: 26

## 2019-04-08 PROCEDURE — 33405 REPLACEMENT AORTIC VALVE OPN: CPT

## 2019-04-08 PROCEDURE — 33875 THORACIC AORTIC GRAFT: CPT | Mod: AS

## 2019-04-08 PROCEDURE — 93010 ELECTROCARDIOGRAM REPORT: CPT

## 2019-04-08 PROCEDURE — 33405 REPLACEMENT AORTIC VALVE OPN: CPT | Mod: AS

## 2019-04-08 PROCEDURE — 33866 AORTIC HEMIARCH GRAFT: CPT | Mod: AS

## 2019-04-08 PROCEDURE — 33860: CPT | Mod: AS

## 2019-04-08 PROCEDURE — 33866 AORTIC HEMIARCH GRAFT: CPT

## 2019-04-08 RX ORDER — CHLORHEXIDINE GLUCONATE 213 G/1000ML
5 SOLUTION TOPICAL EVERY 4 HOURS
Qty: 0 | Refills: 0 | Status: DISCONTINUED | OUTPATIENT
Start: 2019-04-08 | End: 2019-04-09

## 2019-04-08 RX ORDER — ACETAMINOPHEN 500 MG
1000 TABLET ORAL ONCE
Qty: 0 | Refills: 0 | Status: COMPLETED | OUTPATIENT
Start: 2019-04-08 | End: 2019-04-08

## 2019-04-08 RX ORDER — FENTANYL CITRATE 50 UG/ML
25 INJECTION INTRAVENOUS ONCE
Qty: 0 | Refills: 0 | Status: DISCONTINUED | OUTPATIENT
Start: 2019-04-08 | End: 2019-04-08

## 2019-04-08 RX ORDER — SODIUM CHLORIDE 9 MG/ML
1000 INJECTION, SOLUTION INTRAVENOUS ONCE
Qty: 0 | Refills: 0 | Status: COMPLETED | OUTPATIENT
Start: 2019-04-08 | End: 2019-04-08

## 2019-04-08 RX ORDER — METOCLOPRAMIDE HCL 10 MG
10 TABLET ORAL EVERY 8 HOURS
Qty: 0 | Refills: 0 | Status: COMPLETED | OUTPATIENT
Start: 2019-04-08 | End: 2019-04-10

## 2019-04-08 RX ORDER — DEXTROSE 50 % IN WATER 50 %
50 SYRINGE (ML) INTRAVENOUS
Qty: 0 | Refills: 0 | Status: DISCONTINUED | OUTPATIENT
Start: 2019-04-08 | End: 2019-04-10

## 2019-04-08 RX ORDER — MEPERIDINE HYDROCHLORIDE 50 MG/ML
25 INJECTION INTRAMUSCULAR; INTRAVENOUS; SUBCUTANEOUS ONCE
Qty: 0 | Refills: 0 | Status: DISCONTINUED | OUTPATIENT
Start: 2019-04-08 | End: 2019-04-09

## 2019-04-08 RX ORDER — OXYCODONE AND ACETAMINOPHEN 5; 325 MG/1; MG/1
1 TABLET ORAL EVERY 6 HOURS
Qty: 0 | Refills: 0 | Status: DISCONTINUED | OUTPATIENT
Start: 2019-04-08 | End: 2019-04-12

## 2019-04-08 RX ORDER — NOREPINEPHRINE BITARTRATE/D5W 8 MG/250ML
0.05 PLASTIC BAG, INJECTION (ML) INTRAVENOUS
Qty: 8 | Refills: 0 | Status: DISCONTINUED | OUTPATIENT
Start: 2019-04-08 | End: 2019-04-09

## 2019-04-08 RX ORDER — OXYCODONE AND ACETAMINOPHEN 5; 325 MG/1; MG/1
2 TABLET ORAL EVERY 6 HOURS
Qty: 0 | Refills: 0 | Status: DISCONTINUED | OUTPATIENT
Start: 2019-04-08 | End: 2019-04-12

## 2019-04-08 RX ORDER — POTASSIUM CHLORIDE 20 MEQ
10 PACKET (EA) ORAL
Qty: 0 | Refills: 0 | Status: COMPLETED | OUTPATIENT
Start: 2019-04-08 | End: 2019-04-08

## 2019-04-08 RX ORDER — POTASSIUM CHLORIDE 20 MEQ
10 PACKET (EA) ORAL
Qty: 0 | Refills: 0 | Status: DISCONTINUED | OUTPATIENT
Start: 2019-04-08 | End: 2019-04-10

## 2019-04-08 RX ORDER — INSULIN HUMAN 100 [IU]/ML
3 INJECTION, SOLUTION SUBCUTANEOUS
Qty: 100 | Refills: 0 | Status: DISCONTINUED | OUTPATIENT
Start: 2019-04-08 | End: 2019-04-09

## 2019-04-08 RX ORDER — FENTANYL CITRATE 50 UG/ML
50 INJECTION INTRAVENOUS ONCE
Qty: 0 | Refills: 0 | Status: DISCONTINUED | OUTPATIENT
Start: 2019-04-08 | End: 2019-04-08

## 2019-04-08 RX ORDER — SODIUM CHLORIDE 9 MG/ML
1000 INJECTION INTRAMUSCULAR; INTRAVENOUS; SUBCUTANEOUS
Qty: 0 | Refills: 0 | Status: DISCONTINUED | OUTPATIENT
Start: 2019-04-08 | End: 2019-04-10

## 2019-04-08 RX ORDER — DOCUSATE SODIUM 100 MG
100 CAPSULE ORAL THREE TIMES A DAY
Qty: 0 | Refills: 0 | Status: DISCONTINUED | OUTPATIENT
Start: 2019-04-08 | End: 2019-04-12

## 2019-04-08 RX ORDER — ASPIRIN/CALCIUM CARB/MAGNESIUM 324 MG
81 TABLET ORAL DAILY
Qty: 0 | Refills: 0 | Status: DISCONTINUED | OUTPATIENT
Start: 2019-04-08 | End: 2019-04-12

## 2019-04-08 RX ORDER — ALBUMIN HUMAN 25 %
250 VIAL (ML) INTRAVENOUS ONCE
Qty: 0 | Refills: 0 | Status: COMPLETED | OUTPATIENT
Start: 2019-04-08 | End: 2019-04-08

## 2019-04-08 RX ORDER — DEXTROSE 50 % IN WATER 50 %
25 SYRINGE (ML) INTRAVENOUS
Qty: 0 | Refills: 0 | Status: DISCONTINUED | OUTPATIENT
Start: 2019-04-08 | End: 2019-04-10

## 2019-04-08 RX ORDER — PANTOPRAZOLE SODIUM 20 MG/1
40 TABLET, DELAYED RELEASE ORAL DAILY
Qty: 0 | Refills: 0 | Status: DISCONTINUED | OUTPATIENT
Start: 2019-04-08 | End: 2019-04-09

## 2019-04-08 RX ORDER — METOCLOPRAMIDE HCL 10 MG
10 TABLET ORAL EVERY 8 HOURS
Qty: 0 | Refills: 0 | Status: DISCONTINUED | OUTPATIENT
Start: 2019-04-08 | End: 2019-04-08

## 2019-04-08 RX ORDER — SODIUM CHLORIDE 9 MG/ML
1000 INJECTION, SOLUTION INTRAVENOUS
Qty: 0 | Refills: 0 | Status: DISCONTINUED | OUTPATIENT
Start: 2019-04-08 | End: 2019-04-09

## 2019-04-08 RX ORDER — CEFUROXIME AXETIL 250 MG
1500 TABLET ORAL ONCE
Qty: 0 | Refills: 0 | Status: COMPLETED | OUTPATIENT
Start: 2019-04-08 | End: 2019-04-08

## 2019-04-08 RX ORDER — ASPIRIN/CALCIUM CARB/MAGNESIUM 324 MG
300 TABLET ORAL ONCE
Qty: 0 | Refills: 0 | Status: DISCONTINUED | OUTPATIENT
Start: 2019-04-08 | End: 2019-04-09

## 2019-04-08 RX ORDER — CEFUROXIME AXETIL 250 MG
1500 TABLET ORAL EVERY 8 HOURS
Qty: 0 | Refills: 0 | Status: COMPLETED | OUTPATIENT
Start: 2019-04-08 | End: 2019-04-09

## 2019-04-08 RX ORDER — NICARDIPINE HYDROCHLORIDE 30 MG/1
5 CAPSULE, EXTENDED RELEASE ORAL
Qty: 40 | Refills: 0 | Status: DISCONTINUED | OUTPATIENT
Start: 2019-04-08 | End: 2019-04-09

## 2019-04-08 RX ADMIN — Medication 6.42 MICROGRAM(S)/KG/MIN: at 14:56

## 2019-04-08 RX ADMIN — FENTANYL CITRATE 25 MICROGRAM(S): 50 INJECTION INTRAVENOUS at 15:17

## 2019-04-08 RX ADMIN — SODIUM CHLORIDE 4000 MILLILITER(S): 9 INJECTION, SOLUTION INTRAVENOUS at 14:34

## 2019-04-08 RX ADMIN — Medication 125 MILLILITER(S): at 16:06

## 2019-04-08 RX ADMIN — Medication 100 MILLIEQUIVALENT(S): at 18:00

## 2019-04-08 RX ADMIN — FENTANYL CITRATE 25 MICROGRAM(S): 50 INJECTION INTRAVENOUS at 15:47

## 2019-04-08 RX ADMIN — Medication 100 MILLIEQUIVALENT(S): at 18:31

## 2019-04-08 RX ADMIN — PANTOPRAZOLE SODIUM 40 MILLIGRAM(S): 20 TABLET, DELAYED RELEASE ORAL at 14:58

## 2019-04-08 RX ADMIN — Medication 100 MILLIEQUIVALENT(S): at 14:05

## 2019-04-08 RX ADMIN — FENTANYL CITRATE 50 MICROGRAM(S): 50 INJECTION INTRAVENOUS at 20:00

## 2019-04-08 RX ADMIN — Medication 100 MILLIGRAM(S): at 21:22

## 2019-04-08 RX ADMIN — Medication 100 MILLIEQUIVALENT(S): at 13:04

## 2019-04-08 RX ADMIN — Medication 10 MILLIGRAM(S): at 21:22

## 2019-04-08 RX ADMIN — Medication 125 MILLILITER(S): at 14:33

## 2019-04-08 RX ADMIN — FENTANYL CITRATE 25 MICROGRAM(S): 50 INJECTION INTRAVENOUS at 23:20

## 2019-04-08 RX ADMIN — NICARDIPINE HYDROCHLORIDE 25 MG/HR: 30 CAPSULE, EXTENDED RELEASE ORAL at 18:34

## 2019-04-08 RX ADMIN — SODIUM CHLORIDE 10 MILLILITER(S): 9 INJECTION INTRAMUSCULAR; INTRAVENOUS; SUBCUTANEOUS at 14:57

## 2019-04-08 RX ADMIN — Medication 81 MILLIGRAM(S): at 19:28

## 2019-04-08 RX ADMIN — Medication 400 MILLIGRAM(S): at 17:37

## 2019-04-08 RX ADMIN — Medication 100 MILLIGRAM(S): at 23:59

## 2019-04-08 RX ADMIN — FENTANYL CITRATE 50 MICROGRAM(S): 50 INJECTION INTRAVENOUS at 19:52

## 2019-04-08 RX ADMIN — Medication 100 MILLIGRAM(S): at 16:15

## 2019-04-08 RX ADMIN — CHLORHEXIDINE GLUCONATE 5 MILLILITER(S): 213 SOLUTION TOPICAL at 14:54

## 2019-04-08 RX ADMIN — Medication 1000 MILLIGRAM(S): at 18:03

## 2019-04-08 RX ADMIN — Medication 100 MILLIEQUIVALENT(S): at 13:34

## 2019-04-08 RX ADMIN — Medication 10 MILLIGRAM(S): at 14:58

## 2019-04-08 RX ADMIN — INSULIN HUMAN 3 UNIT(S)/HR: 100 INJECTION, SOLUTION SUBCUTANEOUS at 18:33

## 2019-04-08 RX ADMIN — FENTANYL CITRATE 25 MICROGRAM(S): 50 INJECTION INTRAVENOUS at 23:05

## 2019-04-08 NOTE — AIRWAY REMOVAL NOTE  ADULT & PEDS - ARTIFICAL AIRWAY REMOVAL COMMENTS
Written order for extubation verified. The patient was identified by full name and birth date compared to the identification band. Present during the procedure was ileana FRANCO

## 2019-04-08 NOTE — BRIEF OPERATIVE NOTE - NSICDXBRIEFPROCEDURE_GEN_ALL_CORE_FT
PROCEDURES:  Replacement, ascending aorta and hemiarch 08-Apr-2019 12:46:21 28 mm with 8 mm side arm, Gelweave Graft; FET with [place] TAG Stent 37 mm x 15 cm Kadi Scruggs  Replacement of aortic valve with tissue valve 08-Apr-2019 12:45:44 Magna Ease 23mm Kadi Scruggs

## 2019-04-08 NOTE — BRIEF OPERATIVE NOTE - COMMENTS
blood loss inaccurate due to the use of cell saver; moderate hypothermic circulatory arrest with antegrade cerebral perfusion utilized

## 2019-04-08 NOTE — PROGRESS NOTE ADULT - SUBJECTIVE AND OBJECTIVE BOX
KENNETH SPRING   MRN#: [id number]     The patient is a 69y Female who was seen, evaluated, & examined with the CTICU staff on rounds and later in the day with a multidisciplinary care plan formulated & implemented.  All available clinical, laboratory, radiographic, pharmacologic, and electrocardiographic data were reviewed & analyzed.      The patient was in the CTICU in critical condition secondary to persistent cardiopulmonary dysfunction and hemodynamically significant hypovolemia-shock S/P ascending tube graft and frozen elephant truck. ,     Respiratory status required full ventilatory support, the following of ABG’s with A-line monitoring, and continuous pulse oximetry monitoring, an IV Propofol infusion, an IV Dexmedetomidine infusion for support & to evaluate for & prevent further decompensation secondary to persistent cardiopulmonary dysfunction and cardiogenic shock-cardiovascular dysfunction.     Invasive hemodynamic monitoring with a PA catheter & an A-line were required for the following of serial CI’s/MVO2’s & continuous MAP/BP monitoring to ensure adequate cardiovascular support and to evaluate for & help prevent decompensation while receiving intermittent volume expansion and recent cessation of the IV Levophed drip secondary to hemodynamically significant hypovolemia-shock.       Patient required critical care management and I provided 30 minutes of non-continuous care to the patient.  Discussed at length with the CTICU staff and helped coordinate care.

## 2019-04-08 NOTE — BRIEF OPERATIVE NOTE - NSICDXBRIEFPREOP_GEN_ALL_CORE_FT
PRE-OP DIAGNOSIS:  Aortic stenosis 08-Apr-2019 12:50:55  Kadi Scruggs  Descending thoracic aortic aneurysm 08-Apr-2019 12:50:20  Kadi Scruggs  Ascending aortic aneurysm 08-Apr-2019 12:50:07  Kadi Scruggs

## 2019-04-08 NOTE — BRIEF OPERATIVE NOTE - NSICDXBRIEFPOSTOP_GEN_ALL_CORE_FT
POST-OP DIAGNOSIS:  Aortic stenosis 08-Apr-2019 12:50:45  Kadi Scruggs  Descending thoracic aortic aneurysm 08-Apr-2019 12:50:36  Kadi Scruggs  Aneurysm, ascending aorta 08-Apr-2019 12:50:28  Kadi Scruggs

## 2019-04-09 LAB
ANION GAP SERPL CALC-SCNC: 13 MMOL/L — SIGNIFICANT CHANGE UP (ref 5–17)
APTT BLD: 27.4 SEC — LOW (ref 27.5–36.3)
BASOPHILS # BLD AUTO: 0 K/UL — SIGNIFICANT CHANGE UP (ref 0–0.2)
BASOPHILS NFR BLD AUTO: 0 % — SIGNIFICANT CHANGE UP (ref 0–2)
BUN SERPL-MCNC: 11 MG/DL — SIGNIFICANT CHANGE UP (ref 7–23)
CALCIUM SERPL-MCNC: 8.5 MG/DL — SIGNIFICANT CHANGE UP (ref 8.4–10.5)
CHLORIDE SERPL-SCNC: 103 MMOL/L — SIGNIFICANT CHANGE UP (ref 96–108)
CO2 SERPL-SCNC: 23 MMOL/L — SIGNIFICANT CHANGE UP (ref 22–31)
CREAT SERPL-MCNC: 0.4 MG/DL — LOW (ref 0.5–1.3)
EOSINOPHIL # BLD AUTO: 0 K/UL — SIGNIFICANT CHANGE UP (ref 0–0.5)
EOSINOPHIL NFR BLD AUTO: 0.2 % — SIGNIFICANT CHANGE UP (ref 0–6)
GAS PNL BLDA: SIGNIFICANT CHANGE UP
GLUCOSE SERPL-MCNC: 123 MG/DL — HIGH (ref 70–99)
HCT VFR BLD CALC: 33.2 % — LOW (ref 34.5–45)
HGB BLD-MCNC: 11 G/DL — LOW (ref 11.5–15.5)
INR BLD: 1.16 RATIO — SIGNIFICANT CHANGE UP (ref 0.88–1.16)
LYMPHOCYTES # BLD AUTO: 0.6 K/UL — LOW (ref 1–3.3)
LYMPHOCYTES # BLD AUTO: 5.2 % — LOW (ref 13–44)
MCHC RBC-ENTMCNC: 30.6 PG — SIGNIFICANT CHANGE UP (ref 27–34)
MCHC RBC-ENTMCNC: 33 GM/DL — SIGNIFICANT CHANGE UP (ref 32–36)
MCV RBC AUTO: 92.6 FL — SIGNIFICANT CHANGE UP (ref 80–100)
MONOCYTES # BLD AUTO: 1 K/UL — HIGH (ref 0–0.9)
MONOCYTES NFR BLD AUTO: 8.1 % — SIGNIFICANT CHANGE UP (ref 2–14)
NEUTROPHILS # BLD AUTO: 10.6 K/UL — HIGH (ref 1.8–7.4)
NEUTROPHILS NFR BLD AUTO: 86.6 % — HIGH (ref 43–77)
PLATELET # BLD AUTO: 127 K/UL — LOW (ref 150–400)
POTASSIUM SERPL-MCNC: 4 MMOL/L — SIGNIFICANT CHANGE UP (ref 3.5–5.3)
POTASSIUM SERPL-SCNC: 4 MMOL/L — SIGNIFICANT CHANGE UP (ref 3.5–5.3)
PROTHROM AB SERPL-ACNC: 13.4 SEC — HIGH (ref 10–12.9)
RBC # BLD: 3.59 M/UL — LOW (ref 3.8–5.2)
RBC # FLD: 12.1 % — SIGNIFICANT CHANGE UP (ref 10.3–14.5)
SODIUM SERPL-SCNC: 139 MMOL/L — SIGNIFICANT CHANGE UP (ref 135–145)
WBC # BLD: 12.3 K/UL — HIGH (ref 3.8–10.5)
WBC # FLD AUTO: 12.3 K/UL — HIGH (ref 3.8–10.5)

## 2019-04-09 PROCEDURE — 99291 CRITICAL CARE FIRST HOUR: CPT

## 2019-04-09 PROCEDURE — 93010 ELECTROCARDIOGRAM REPORT: CPT

## 2019-04-09 PROCEDURE — 71045 X-RAY EXAM CHEST 1 VIEW: CPT | Mod: 26

## 2019-04-09 RX ORDER — METOPROLOL TARTRATE 50 MG
12.5 TABLET ORAL
Qty: 0 | Refills: 0 | Status: DISCONTINUED | OUTPATIENT
Start: 2019-04-09 | End: 2019-04-09

## 2019-04-09 RX ORDER — SODIUM CHLORIDE 9 MG/ML
3 INJECTION INTRAMUSCULAR; INTRAVENOUS; SUBCUTANEOUS EVERY 8 HOURS
Qty: 0 | Refills: 0 | Status: DISCONTINUED | OUTPATIENT
Start: 2019-04-10 | End: 2019-04-12

## 2019-04-09 RX ORDER — ALBUMIN HUMAN 25 %
250 VIAL (ML) INTRAVENOUS ONCE
Qty: 0 | Refills: 0 | Status: COMPLETED | OUTPATIENT
Start: 2019-04-09 | End: 2019-04-09

## 2019-04-09 RX ORDER — INSULIN LISPRO 100/ML
VIAL (ML) SUBCUTANEOUS
Qty: 0 | Refills: 0 | Status: DISCONTINUED | OUTPATIENT
Start: 2019-04-09 | End: 2019-04-10

## 2019-04-09 RX ORDER — ENOXAPARIN SODIUM 100 MG/ML
40 INJECTION SUBCUTANEOUS DAILY
Qty: 0 | Refills: 0 | Status: DISCONTINUED | OUTPATIENT
Start: 2019-04-09 | End: 2019-04-12

## 2019-04-09 RX ORDER — INSULIN LISPRO 100/ML
VIAL (ML) SUBCUTANEOUS AT BEDTIME
Qty: 0 | Refills: 0 | Status: DISCONTINUED | OUTPATIENT
Start: 2019-04-09 | End: 2019-04-10

## 2019-04-09 RX ORDER — PANTOPRAZOLE SODIUM 20 MG/1
40 TABLET, DELAYED RELEASE ORAL
Qty: 0 | Refills: 0 | Status: DISCONTINUED | OUTPATIENT
Start: 2019-04-09 | End: 2019-04-12

## 2019-04-09 RX ORDER — SODIUM CHLORIDE 9 MG/ML
500 INJECTION INTRAMUSCULAR; INTRAVENOUS; SUBCUTANEOUS ONCE
Qty: 0 | Refills: 0 | Status: COMPLETED | OUTPATIENT
Start: 2019-04-09 | End: 2019-04-09

## 2019-04-09 RX ADMIN — Medication 10 MILLIGRAM(S): at 06:13

## 2019-04-09 RX ADMIN — Medication 10 MILLIGRAM(S): at 13:34

## 2019-04-09 RX ADMIN — Medication 100 MILLIGRAM(S): at 15:33

## 2019-04-09 RX ADMIN — Medication 100 MILLIGRAM(S): at 07:44

## 2019-04-09 RX ADMIN — OXYCODONE AND ACETAMINOPHEN 2 TABLET(S): 5; 325 TABLET ORAL at 16:30

## 2019-04-09 RX ADMIN — Medication 81 MILLIGRAM(S): at 11:39

## 2019-04-09 RX ADMIN — Medication 100 MILLIGRAM(S): at 06:13

## 2019-04-09 RX ADMIN — ENOXAPARIN SODIUM 40 MILLIGRAM(S): 100 INJECTION SUBCUTANEOUS at 11:39

## 2019-04-09 RX ADMIN — OXYCODONE AND ACETAMINOPHEN 1 TABLET(S): 5; 325 TABLET ORAL at 11:00

## 2019-04-09 RX ADMIN — OXYCODONE AND ACETAMINOPHEN 2 TABLET(S): 5; 325 TABLET ORAL at 15:33

## 2019-04-09 RX ADMIN — OXYCODONE AND ACETAMINOPHEN 1 TABLET(S): 5; 325 TABLET ORAL at 09:49

## 2019-04-09 RX ADMIN — Medication 100 MILLIGRAM(S): at 13:34

## 2019-04-09 RX ADMIN — Medication 100 MILLIGRAM(S): at 21:04

## 2019-04-09 RX ADMIN — Medication 100 MILLIGRAM(S): at 23:52

## 2019-04-09 RX ADMIN — OXYCODONE AND ACETAMINOPHEN 1 TABLET(S): 5; 325 TABLET ORAL at 21:42

## 2019-04-09 RX ADMIN — SODIUM CHLORIDE 6000 MILLILITER(S): 9 INJECTION INTRAMUSCULAR; INTRAVENOUS; SUBCUTANEOUS at 20:57

## 2019-04-09 RX ADMIN — Medication 10 MILLIGRAM(S): at 21:04

## 2019-04-09 RX ADMIN — Medication 500 MILLILITER(S): at 11:09

## 2019-04-09 RX ADMIN — PANTOPRAZOLE SODIUM 40 MILLIGRAM(S): 20 TABLET, DELAYED RELEASE ORAL at 07:28

## 2019-04-09 RX ADMIN — OXYCODONE AND ACETAMINOPHEN 1 TABLET(S): 5; 325 TABLET ORAL at 20:55

## 2019-04-09 RX ADMIN — Medication 500 MILLILITER(S): at 12:34

## 2019-04-09 NOTE — PHYSICAL THERAPY INITIAL EVALUATION ADULT - PERTINENT HX OF CURRENT PROBLEM, REHAB EVAL
70 y/o PMH:  HTN, preeclampsia, c/o abdominal pain, Pt went for CT scan on 3/2019 and diagnosed with thoracoabdominal aortic aneurysm. Pt s/p replacement of ascending aorta and hemiarch, and AVR on 4/8/19. CXR 4/8: he heart is normal in size. The lungs are clear.

## 2019-04-09 NOTE — PHYSICAL THERAPY INITIAL EVALUATION ADULT - ADDITIONAL COMMENTS
Patient lives in a co-op. Pt reports there is a ramp outside and she lives on the first floor, no steps.  Patient reports that prior to admission, she was independent in ADLs and did not require assistive devices.

## 2019-04-09 NOTE — PHYSICAL THERAPY INITIAL EVALUATION ADULT - GENERAL OBSERVATIONS, REHAB EVAL
Pt received seated in chair, A&Ox3, +3L O2 via NC, +venodynes, +2 chest tube, +muñoz, +central line, +IVL, +A-line, +cardiac monitor, +external pacer, agreeable to physical therapy jorge álvarez 45 min.

## 2019-04-09 NOTE — PROGRESS NOTE ADULT - SUBJECTIVE AND OBJECTIVE BOX
KENNETH SPRING   MRN#: [id number]     The patient is a 69y Female who was seen, evaluated, & examined with the CTICU staff on rounds and later in the day with a multidisciplinary care plan formulated & implemented.  All available clinical, laboratory, radiographic, pharmacologic, and electrocardiographic data were reviewed & analyzed.      The patient was in the CTICU in critical condition secondary to persistent cardiopulmonary dysfunction and resolved hemodynamically significant hypovolemia-shock.      Respiratory status required supplemental oxygen, the following of ABG’s with A-line monitoring, and continuous pulse oximetry monitoring for support & to evaluate for & prevent further decompensation secondary to persistent cardiopulmonary dysfunction.     Invasive hemodynamic monitoring with an A-line was required for the following of continuous MAP/BP monitoring to ensure adequate cardiovascular support and to evaluate for & help prevent decompensation while receiving intermittent volume expansion secondary to hemodynamically significant hypovolemia-shock.       Patient required critical care management and I provided 30 minutes of non-continuous care to the patient.  Discussed at length with the CTICU staff and helped coordinate care.

## 2019-04-10 DIAGNOSIS — Z95.2 PRESENCE OF PROSTHETIC HEART VALVE: ICD-10-CM

## 2019-04-10 LAB
ANION GAP SERPL CALC-SCNC: 11 MMOL/L — SIGNIFICANT CHANGE UP (ref 5–17)
BASOPHILS # BLD AUTO: 0 K/UL — SIGNIFICANT CHANGE UP (ref 0–0.2)
BASOPHILS NFR BLD AUTO: 0.1 % — SIGNIFICANT CHANGE UP (ref 0–2)
BUN SERPL-MCNC: 15 MG/DL — SIGNIFICANT CHANGE UP (ref 7–23)
CALCIUM SERPL-MCNC: 8.3 MG/DL — LOW (ref 8.4–10.5)
CHLORIDE SERPL-SCNC: 96 MMOL/L — SIGNIFICANT CHANGE UP (ref 96–108)
CO2 SERPL-SCNC: 21 MMOL/L — LOW (ref 22–31)
CREAT SERPL-MCNC: 0.45 MG/DL — LOW (ref 0.5–1.3)
EOSINOPHIL # BLD AUTO: 0 K/UL — SIGNIFICANT CHANGE UP (ref 0–0.5)
EOSINOPHIL NFR BLD AUTO: 0.1 % — SIGNIFICANT CHANGE UP (ref 0–6)
GAS PNL BLDA: SIGNIFICANT CHANGE UP
GLUCOSE SERPL-MCNC: 142 MG/DL — HIGH (ref 70–99)
HCT VFR BLD CALC: 28.4 % — LOW (ref 34.5–45)
HGB BLD-MCNC: 9.8 G/DL — LOW (ref 11.5–15.5)
LYMPHOCYTES # BLD AUTO: 1.1 K/UL — SIGNIFICANT CHANGE UP (ref 1–3.3)
LYMPHOCYTES # BLD AUTO: 8.2 % — LOW (ref 13–44)
MCHC RBC-ENTMCNC: 32.4 PG — SIGNIFICANT CHANGE UP (ref 27–34)
MCHC RBC-ENTMCNC: 34.5 GM/DL — SIGNIFICANT CHANGE UP (ref 32–36)
MCV RBC AUTO: 94 FL — SIGNIFICANT CHANGE UP (ref 80–100)
MONOCYTES # BLD AUTO: 1 K/UL — HIGH (ref 0–0.9)
MONOCYTES NFR BLD AUTO: 7.2 % — SIGNIFICANT CHANGE UP (ref 2–14)
NEUTROPHILS # BLD AUTO: 11.7 K/UL — HIGH (ref 1.8–7.4)
NEUTROPHILS NFR BLD AUTO: 84.4 % — HIGH (ref 43–77)
PLATELET # BLD AUTO: 99 K/UL — LOW (ref 150–400)
POTASSIUM SERPL-MCNC: 4.5 MMOL/L — SIGNIFICANT CHANGE UP (ref 3.5–5.3)
POTASSIUM SERPL-SCNC: 4.5 MMOL/L — SIGNIFICANT CHANGE UP (ref 3.5–5.3)
RBC # BLD: 3.02 M/UL — LOW (ref 3.8–5.2)
RBC # FLD: 12.4 % — SIGNIFICANT CHANGE UP (ref 10.3–14.5)
SODIUM SERPL-SCNC: 128 MMOL/L — LOW (ref 135–145)
WBC # BLD: 13.8 K/UL — HIGH (ref 3.8–10.5)
WBC # FLD AUTO: 13.8 K/UL — HIGH (ref 3.8–10.5)

## 2019-04-10 PROCEDURE — 99233 SBSQ HOSP IP/OBS HIGH 50: CPT

## 2019-04-10 PROCEDURE — 71045 X-RAY EXAM CHEST 1 VIEW: CPT | Mod: 26

## 2019-04-10 PROCEDURE — 93010 ELECTROCARDIOGRAM REPORT: CPT

## 2019-04-10 RX ORDER — SODIUM CHLORIDE 9 MG/ML
250 INJECTION, SOLUTION INTRAVENOUS ONCE
Qty: 0 | Refills: 0 | Status: COMPLETED | OUTPATIENT
Start: 2019-04-10 | End: 2019-04-10

## 2019-04-10 RX ORDER — KETOROLAC TROMETHAMINE 30 MG/ML
15 SYRINGE (ML) INJECTION EVERY 8 HOURS
Qty: 0 | Refills: 0 | Status: DISCONTINUED | OUTPATIENT
Start: 2019-04-10 | End: 2019-04-11

## 2019-04-10 RX ORDER — METOPROLOL TARTRATE 50 MG
25 TABLET ORAL
Qty: 0 | Refills: 0 | Status: DISCONTINUED | OUTPATIENT
Start: 2019-04-10 | End: 2019-04-12

## 2019-04-10 RX ORDER — METOPROLOL TARTRATE 50 MG
12.5 TABLET ORAL EVERY 12 HOURS
Qty: 0 | Refills: 0 | Status: DISCONTINUED | OUTPATIENT
Start: 2019-04-10 | End: 2019-04-10

## 2019-04-10 RX ORDER — KETOROLAC TROMETHAMINE 30 MG/ML
15 SYRINGE (ML) INJECTION ONCE
Qty: 0 | Refills: 0 | Status: DISCONTINUED | OUTPATIENT
Start: 2019-04-10 | End: 2019-04-10

## 2019-04-10 RX ORDER — FENTANYL CITRATE 50 UG/ML
25 INJECTION INTRAVENOUS ONCE
Qty: 0 | Refills: 0 | Status: DISCONTINUED | OUTPATIENT
Start: 2019-04-10 | End: 2019-04-10

## 2019-04-10 RX ADMIN — Medication 100 MILLIGRAM(S): at 05:09

## 2019-04-10 RX ADMIN — Medication 15 MILLIGRAM(S): at 21:45

## 2019-04-10 RX ADMIN — SODIUM CHLORIDE 3 MILLILITER(S): 9 INJECTION INTRAMUSCULAR; INTRAVENOUS; SUBCUTANEOUS at 21:41

## 2019-04-10 RX ADMIN — FENTANYL CITRATE 25 MICROGRAM(S): 50 INJECTION INTRAVENOUS at 05:30

## 2019-04-10 RX ADMIN — Medication 15 MILLIGRAM(S): at 21:46

## 2019-04-10 RX ADMIN — Medication 81 MILLIGRAM(S): at 11:54

## 2019-04-10 RX ADMIN — PANTOPRAZOLE SODIUM 40 MILLIGRAM(S): 20 TABLET, DELAYED RELEASE ORAL at 05:10

## 2019-04-10 RX ADMIN — FENTANYL CITRATE 25 MICROGRAM(S): 50 INJECTION INTRAVENOUS at 05:09

## 2019-04-10 RX ADMIN — Medication 15 MILLIGRAM(S): at 15:38

## 2019-04-10 RX ADMIN — Medication 100 MILLIGRAM(S): at 21:45

## 2019-04-10 RX ADMIN — Medication 10 MILLIGRAM(S): at 05:09

## 2019-04-10 RX ADMIN — Medication 15 MILLIGRAM(S): at 09:30

## 2019-04-10 RX ADMIN — OXYCODONE AND ACETAMINOPHEN 2 TABLET(S): 5; 325 TABLET ORAL at 01:40

## 2019-04-10 RX ADMIN — Medication 15 MILLIGRAM(S): at 16:16

## 2019-04-10 RX ADMIN — ENOXAPARIN SODIUM 40 MILLIGRAM(S): 100 INJECTION SUBCUTANEOUS at 11:54

## 2019-04-10 RX ADMIN — Medication 15 MILLIGRAM(S): at 08:47

## 2019-04-10 RX ADMIN — Medication 12.5 MILLIGRAM(S): at 05:09

## 2019-04-10 RX ADMIN — SODIUM CHLORIDE 1500 MILLILITER(S): 9 INJECTION, SOLUTION INTRAVENOUS at 06:17

## 2019-04-10 RX ADMIN — OXYCODONE AND ACETAMINOPHEN 2 TABLET(S): 5; 325 TABLET ORAL at 00:54

## 2019-04-10 NOTE — PROGRESS NOTE ADULT - SUBJECTIVE AND OBJECTIVE BOX
CRITICAL CARE ATTENDING - CTICU    MEDICATIONS  (STANDING):  aspirin enteric coated 81 milliGRAM(s) Oral daily  dextrose 50% Injectable 50 milliLiter(s) IV Push every 15 minutes  dextrose 50% Injectable 25 milliLiter(s) IV Push every 15 minutes  docusate sodium 100 milliGRAM(s) Oral three times a day  enoxaparin Injectable 40 milliGRAM(s) SubCutaneous daily  insulin lispro (HumaLOG) corrective regimen sliding scale   SubCutaneous three times a day before meals  insulin lispro (HumaLOG) corrective regimen sliding scale   SubCutaneous at bedtime  pantoprazole    Tablet 40 milliGRAM(s) Oral before breakfast  potassium chloride  10 mEq/50 mL IVPB 10 milliEquivalent(s) IV Intermittent every 1 hour  potassium chloride  10 mEq/50 mL IVPB 10 milliEquivalent(s) IV Intermittent every 1 hour  potassium chloride  10 mEq/50 mL IVPB 10 milliEquivalent(s) IV Intermittent every 1 hour  sodium chloride 0.9%. 1000 milliLiter(s) (10 mL/Hr) IV Continuous <Continuous>                                    9.8    13.8  )-----------( 99       ( 10 Apr 2019 01:42 )             28.4       04-10    128<L>  |  96  |  15  ----------------------------<  142<H>  4.5   |  21<L>  |  0.45<L>    Ca    8.3<L>      10 Apr 2019 01:42    TPro  5.0<L>  /  Alb  3.3  /  TBili  0.5  /  DBili  x   /  AST  38  /  ALT  17  /  AlkPhos  51  04-08      PT/INR - ( 2019 00:51 )   PT: 13.4 sec;   INR: 1.16 ratio         PTT - ( 2019 00:51 )  PTT:27.4 sec        Daily     Daily Weight in k (10 Apr 2019 00:00)      - @ 07:01  -  04-10 @ 07:00  --------------------------------------------------------  IN: 2410 mL / OUT: 1070 mL / NET: 1340 mL        Critically Ill patient  : [ ] preoperative ,   [ x] post operative    Requires :  [ x] Arterial Line   [x ] Central Line  [ ] PA catheter  [ ] IABP  [ ] ECMO  [ ] LVAD  [ ] Ventilator  [x ] pacemaker [ ] Impella.    Bedside evaluation , monitoring , treatment of hemodynamics , fluids , IVP/ IVCD meds.        Diagnosis:     Thoraco/Abdominal Aortic Aneurysm    POD 2 AVR / Replacement Asc. Aorta / HemiArch / Elephant trunk    Hypertension    Thrombocytopenia     Hyponatremia     fluid overload     Requires bedside physical therapy, mobilization and total care home care.     CT Drainage                    Discussed with CT surgeon, Physician's Assistant - Nurse Practitioner- Critical care medicine team.   Dicussed at  AM / PM rounds.   Chart, labs , films reviewed.    Total Time: 20 min

## 2019-04-10 NOTE — PROGRESS NOTE ADULT - ASSESSMENT
69 year old female with history of HTN, preeclampsia, c/o abdominal pain, Pt went for CT scan on 3/2019 and diagnosed with thoracoabdominal aortic aneurysm now s/p AVR/AAA repair on 4/9/19  4/10 tx to floor from CTU , #CTx3 to clws,  lopressor 25mg po bid started

## 2019-04-10 NOTE — PROGRESS NOTE ADULT - SUBJECTIVE AND OBJECTIVE BOX
Seen and examined. Resting comfortable. Received from CTU.  VITAL SIGNS    Telemetry:  SR 80-90  Vital Signs Last 24 Hrs  T(C): 37.2 (04-10-19 @ 16:00), Max: 37.5 (04-10-19 @ 04:00)  T(F): 98.9 (04-10-19 @ 16:00), Max: 99.5 (04-10-19 @ 04:00)  HR: 83 (04-10-19 @ 18:00) (76 - 85)  BP: 127/58 (04-10-19 @ 18:00) (83/53 - 127/58)  RR: 32 (04-10-19 @ 18:00) (17 - 33)  SpO2: 97% (04-10-19 @ 18:00) (95% - 100%)             @ 07:01  -  04-10 @ 07:00  --------------------------------------------------------  IN: 2410 mL / OUT: 1070 mL / NET: 1340 mL    04-10 @ 07:01  -  04-10 @ 21:19  --------------------------------------------------------  IN: 310 mL / OUT: 655 mL / NET: -345 mL       Daily     Daily Weight in k (10 Apr 2019 00:00)  Admit Wt: Drug Dosing Weight  Height (cm): 161 (2019 12:30)  Weight (kg): 68.5 (2019 12:30)  BMI (kg/m2): 26.4 (2019 12:30)  BSA (m2): 1.72 (2019 12:30)      CAPILLARY BLOOD GLUCOSE      POCT Blood Glucose.: 130 mg/dL (10 Apr 2019 05:36)          MEDICATIONS  aspirin enteric coated 81 milliGRAM(s) Oral daily  docusate sodium 100 milliGRAM(s) Oral three times a day  enoxaparin Injectable 40 milliGRAM(s) SubCutaneous daily  ketorolac   Injectable 15 milliGRAM(s) IV Push every 8 hours  metoprolol tartrate 25 milliGRAM(s) Oral two times a day  oxyCODONE    5 mG/acetaminophen 325 mG 1 Tablet(s) Oral every 6 hours PRN  oxyCODONE    5 mG/acetaminophen 325 mG 2 Tablet(s) Oral every 6 hours PRN  pantoprazole    Tablet 40 milliGRAM(s) Oral before breakfast  sodium chloride 0.9% lock flush 3 milliLiter(s) IV Push every 8 hours      PHYSICAL EXAM    Subjective: " I feel ok"  Neurology: alert and oriented x 3, nonfocal, no gross deficits  CV : s1s1 reg no MRGS  Sternal Wound :  CDI , Stable to pw to epm   Lungs: CTA, equal chest expansion CTz3 to CLWS no crepitus no leak   Abdomen: soft, nontender, nondistended, positive bowel sounds, last bowel movement 4/8  :    voids  Extremities:   trace  edema,    b/l groins no hematoma, distal pulses  b/l , no calf tenderness b/l , warm and perfused b/l    LABS  0410    128<L>  |  96  |  15  ----------------------------<  142<H>  4.5   |  21<L>  |  0.45<L>    Ca    8.3<L>      10 Apr 2019 01:42                                   9.8    13.8  )-----------( 99       ( 10 Apr 2019 01:42 )             28.4          PT/INR - ( 2019 00:51 )   PT: 13.4 sec;   INR: 1.16 ratio         PTT - ( 2019 00:51 )  PTT:27.4 sec         PAST MEDICAL & SURGICAL HISTORY:  Murmur, cardiac  Cataract: bilateral  Preeclampsia  HTN (hypertension): controlled  S/P cataract surgery  Arm fracture, left:        Physical Therapy Rec:   Home  [  ]   Home w/ PT  [  x]  Rehab  [  ]

## 2019-04-10 NOTE — PROGRESS NOTE ADULT - PROBLEM SELECTOR PLAN 1
Telemetry  oral analesics , toradol for 24h for pain  keep CTx3 as per surgeon  am CXR  am cbc, bmp  lopressor 25mg po bid started   dvt ppx  GI ppx

## 2019-04-11 ENCOUNTER — TRANSCRIPTION ENCOUNTER (OUTPATIENT)
Age: 69
End: 2019-04-11

## 2019-04-11 DIAGNOSIS — Z96.89 PRESENCE OF OTHER SPECIFIED FUNCTIONAL IMPLANTS: ICD-10-CM

## 2019-04-11 LAB
ANION GAP SERPL CALC-SCNC: 12 MMOL/L — SIGNIFICANT CHANGE UP (ref 5–17)
BUN SERPL-MCNC: 11 MG/DL — SIGNIFICANT CHANGE UP (ref 7–23)
CALCIUM SERPL-MCNC: 8.2 MG/DL — LOW (ref 8.4–10.5)
CHLORIDE SERPL-SCNC: 95 MMOL/L — LOW (ref 96–108)
CO2 SERPL-SCNC: 21 MMOL/L — LOW (ref 22–31)
CREAT SERPL-MCNC: 0.38 MG/DL — LOW (ref 0.5–1.3)
GLUCOSE SERPL-MCNC: 117 MG/DL — HIGH (ref 70–99)
HCT VFR BLD CALC: 30.5 % — LOW (ref 34.5–45)
HGB BLD-MCNC: 10.4 G/DL — LOW (ref 11.5–15.5)
MCHC RBC-ENTMCNC: 32 PG — SIGNIFICANT CHANGE UP (ref 27–34)
MCHC RBC-ENTMCNC: 34.3 GM/DL — SIGNIFICANT CHANGE UP (ref 32–36)
MCV RBC AUTO: 93.3 FL — SIGNIFICANT CHANGE UP (ref 80–100)
PLATELET # BLD AUTO: 112 K/UL — LOW (ref 150–400)
POTASSIUM SERPL-MCNC: 3.9 MMOL/L — SIGNIFICANT CHANGE UP (ref 3.5–5.3)
POTASSIUM SERPL-SCNC: 3.9 MMOL/L — SIGNIFICANT CHANGE UP (ref 3.5–5.3)
RBC # BLD: 3.27 M/UL — LOW (ref 3.8–5.2)
RBC # FLD: 12.1 % — SIGNIFICANT CHANGE UP (ref 10.3–14.5)
SODIUM SERPL-SCNC: 128 MMOL/L — LOW (ref 135–145)
WBC # BLD: 12.4 K/UL — HIGH (ref 3.8–10.5)
WBC # FLD AUTO: 12.4 K/UL — HIGH (ref 3.8–10.5)

## 2019-04-11 PROCEDURE — 99024 POSTOP FOLLOW-UP VISIT: CPT

## 2019-04-11 PROCEDURE — 71045 X-RAY EXAM CHEST 1 VIEW: CPT | Mod: 26,76

## 2019-04-11 RX ADMIN — SODIUM CHLORIDE 3 MILLILITER(S): 9 INJECTION INTRAMUSCULAR; INTRAVENOUS; SUBCUTANEOUS at 06:01

## 2019-04-11 RX ADMIN — Medication 81 MILLIGRAM(S): at 12:22

## 2019-04-11 RX ADMIN — SODIUM CHLORIDE 3 MILLILITER(S): 9 INJECTION INTRAMUSCULAR; INTRAVENOUS; SUBCUTANEOUS at 17:58

## 2019-04-11 RX ADMIN — Medication 100 MILLIGRAM(S): at 12:22

## 2019-04-11 RX ADMIN — Medication 15 MILLIGRAM(S): at 05:55

## 2019-04-11 RX ADMIN — Medication 25 MILLIGRAM(S): at 18:47

## 2019-04-11 RX ADMIN — OXYCODONE AND ACETAMINOPHEN 1 TABLET(S): 5; 325 TABLET ORAL at 23:08

## 2019-04-11 RX ADMIN — Medication 100 MILLIGRAM(S): at 22:25

## 2019-04-11 RX ADMIN — Medication 15 MILLIGRAM(S): at 22:54

## 2019-04-11 RX ADMIN — Medication 15 MILLIGRAM(S): at 05:54

## 2019-04-11 RX ADMIN — SODIUM CHLORIDE 3 MILLILITER(S): 9 INJECTION INTRAMUSCULAR; INTRAVENOUS; SUBCUTANEOUS at 22:21

## 2019-04-11 RX ADMIN — ENOXAPARIN SODIUM 40 MILLIGRAM(S): 100 INJECTION SUBCUTANEOUS at 12:22

## 2019-04-11 RX ADMIN — Medication 25 MILLIGRAM(S): at 06:39

## 2019-04-11 RX ADMIN — OXYCODONE AND ACETAMINOPHEN 1 TABLET(S): 5; 325 TABLET ORAL at 22:25

## 2019-04-11 RX ADMIN — PANTOPRAZOLE SODIUM 40 MILLIGRAM(S): 20 TABLET, DELAYED RELEASE ORAL at 06:39

## 2019-04-11 RX ADMIN — Medication 15 MILLIGRAM(S): at 22:24

## 2019-04-11 RX ADMIN — Medication 100 MILLIGRAM(S): at 05:54

## 2019-04-11 NOTE — DISCHARGE NOTE NURSING/CASE MANAGEMENT/SOCIAL WORK - NSDCDPATPORTLINK_GEN_ALL_CORE
You can access the [facility] Patient Portal, offered by [facility], by registering with the following website: [email or web address]

## 2019-04-11 NOTE — PROGRESS NOTE ADULT - ASSESSMENT
69 year old female with history of HTN, preeclampsia, c/o abdominal pain, Pt went for CT scan on 3/2019 and diagnosed with thoracoabdominal aortic aneurysm now s/p AVR/AAA repair on 4/9/19  4/10 tx to floor from CTU , #CTx3 to clws,  lopressor 25mg po bid started  4/11 VSS - tolerating BB - All CT's d/c'd - ck  cxr ambulate  d/c plan home fri/sat as per dr. carranza 69 year old female with history of HTN, preeclampsia, c/o abdominal pain, Pt went for CT scan on 3/2019 and diagnosed with thoracoabdominal aortic aneurysm now s/p AVR/AAA repair on 4/9/19  4/10 tx to floor from CTU , #CTx3 to clws,  lopressor 25mg po bid started  4/11 VSS - tolerating BB - All CT's d/c'd - ck  cxr ambulate  d/c plan home fri  as per dr. carranza

## 2019-04-11 NOTE — PROGRESS NOTE ADULT - SUBJECTIVE AND OBJECTIVE BOX
VITAL SIGNS-Telemetry:  SR 80-90  Vital Signs Last 24 Hrs  T(C): 36.7 (04-11-19 @ 04:59), Max: 37.2 (04-10-19 @ 16:00)  T(F): 98.1 (04-11-19 @ 04:59), Max: 98.9 (04-10-19 @ 16:00)  HR: 89 (04-11-19 @ 04:59) (78 - 89)  BP: 123/80 (04-11-19 @ 04:59) (97/57 - 127/58)  RR: 18 (04-11-19 @ 04:59) (18 - 33)  SpO2: 97% (04-11-19 @ 04:59) (95% - 100%)         04-10 @ 07:01  -  04-11 @ 07:00  --------------------------------------------------------  IN: 430 mL / OUT: 1565 mL / NET: -1135 mL    Daily     Daily     Drains:     MS         [ x ] Drainage:  20/70cc               L Pleural  [ x ]  Drainage:  20/45         MS CT #2  [x ]  Drainage:50/60cc  Pacing Wires        [  ]   Settings:                                  Isolated  [ x ]        PHYSICAL EXAM:  Neurology: alert and oriented x 3, nonfocal, no gross deficits  CV : S1S2  Sternal Wound :  CDI , Stable  Lungs: CTA  Abdomen: soft, nontender, nondistended, positive bowel sounds, last bowel movement       pre-op  Extremities:         aspirin enteric coated 81 milliGRAM(s) Oral daily  docusate sodium 100 milliGRAM(s) Oral three times a day  enoxaparin Injectable 40 milliGRAM(s) SubCutaneous daily  ketorolac   Injectable 15 milliGRAM(s) IV Push every 8 hours  metoprolol tartrate 25 milliGRAM(s) Oral two times a day  oxyCODONE    5 mG/acetaminophen 325 mG 1 Tablet(s) Oral every 6 hours PRN  oxyCODONE    5 mG/acetaminophen 325 mG 2 Tablet(s) Oral every 6 hours PRN  pantoprazole    Tablet 40 milliGRAM(s) Oral before breakfast  sodium chloride 0.9% lock flush 3 milliLiter(s) IV Push every 8 hours    Physical Therapy Rec:   Home  [  ]   Home w/ PT  [  ]  Rehab  [  ]  Discussed with Cardiothoracic Team at AM rounds. VITAL SIGNS-Telemetry:  SR 80-90  Vital Signs Last 24 Hrs  T(C): 36.7 (04-11-19 @ 04:59), Max: 37.2 (04-10-19 @ 16:00)  T(F): 98.1 (04-11-19 @ 04:59), Max: 98.9 (04-10-19 @ 16:00)  HR: 89 (04-11-19 @ 04:59) (78 - 89)  BP: 123/80 (04-11-19 @ 04:59) (97/57 - 127/58)  RR: 18 (04-11-19 @ 04:59) (18 - 33)  SpO2: 97% (04-11-19 @ 04:59) (95% - 100%)         04-10 @ 07:01  -  04-11 @ 07:00  --------------------------------------------------------  IN: 430 mL / OUT: 1565 mL / NET: -1135 mL    Daily     Daily     Drains:     MS         [ x ] Drainage:  20/70cc               L Pleural  [ x ]  Drainage:  20/45         MS CT #2  [x ]  Drainage:50/60cc  Pacing Wires        [  ]   Settings:                                  Isolated  [ x ]        PHYSICAL EXAM:  Neurology: alert and oriented x 3, nonfocal, no gross deficits  CV : S1S2  Sternal Wound :  CDI , Stable  Lungs: CTA  Abdomen: soft, nontender, nondistended, positive bowel sounds, last bowel movement       pre-op  Extremities:    tr. edema, no calf tenderness     aspirin enteric coated 81 milliGRAM(s) Oral daily  docusate sodium 100 milliGRAM(s) Oral three times a day  enoxaparin Injectable 40 milliGRAM(s) SubCutaneous daily  ketorolac   Injectable 15 milliGRAM(s) IV Push every 8 hours  metoprolol tartrate 25 milliGRAM(s) Oral two times a day  oxyCODONE    5 mG/acetaminophen 325 mG 1 Tablet(s) Oral every 6 hours PRN  oxyCODONE    5 mG/acetaminophen 325 mG 2 Tablet(s) Oral every 6 hours PRN  pantoprazole    Tablet 40 milliGRAM(s) Oral before breakfast  sodium chloride 0.9% lock flush 3 milliLiter(s) IV Push every 8 hours    Physical Therapy Rec:   Home  [  ]   Home w/ PT  [ x ]  Rehab  [  ]  Discussed with Cardiothoracic Team at AM rounds.

## 2019-04-12 ENCOUNTER — TRANSCRIPTION ENCOUNTER (OUTPATIENT)
Age: 69
End: 2019-04-12

## 2019-04-12 VITALS — WEIGHT: 163.14 LBS

## 2019-04-12 PROBLEM — R01.1 CARDIAC MURMUR, UNSPECIFIED: Chronic | Status: ACTIVE | Noted: 2019-04-04

## 2019-04-12 LAB
ANION GAP SERPL CALC-SCNC: 11 MMOL/L — SIGNIFICANT CHANGE UP (ref 5–17)
BUN SERPL-MCNC: 9 MG/DL — SIGNIFICANT CHANGE UP (ref 7–23)
CALCIUM SERPL-MCNC: 8.1 MG/DL — LOW (ref 8.4–10.5)
CHLORIDE SERPL-SCNC: 96 MMOL/L — SIGNIFICANT CHANGE UP (ref 96–108)
CO2 SERPL-SCNC: 22 MMOL/L — SIGNIFICANT CHANGE UP (ref 22–31)
CREAT SERPL-MCNC: 0.48 MG/DL — LOW (ref 0.5–1.3)
GLUCOSE SERPL-MCNC: 104 MG/DL — HIGH (ref 70–99)
HCT VFR BLD CALC: 28.4 % — LOW (ref 34.5–45)
HGB BLD-MCNC: 10 G/DL — LOW (ref 11.5–15.5)
MCHC RBC-ENTMCNC: 32.5 PG — SIGNIFICANT CHANGE UP (ref 27–34)
MCHC RBC-ENTMCNC: 35.1 GM/DL — SIGNIFICANT CHANGE UP (ref 32–36)
MCV RBC AUTO: 92.7 FL — SIGNIFICANT CHANGE UP (ref 80–100)
PLATELET # BLD AUTO: 150 K/UL — SIGNIFICANT CHANGE UP (ref 150–400)
POTASSIUM SERPL-MCNC: 3.7 MMOL/L — SIGNIFICANT CHANGE UP (ref 3.5–5.3)
POTASSIUM SERPL-SCNC: 3.7 MMOL/L — SIGNIFICANT CHANGE UP (ref 3.5–5.3)
RBC # BLD: 3.06 M/UL — LOW (ref 3.8–5.2)
RBC # FLD: 12.2 % — SIGNIFICANT CHANGE UP (ref 10.3–14.5)
SODIUM SERPL-SCNC: 129 MMOL/L — LOW (ref 135–145)
WBC # BLD: 10.1 K/UL — SIGNIFICANT CHANGE UP (ref 3.8–10.5)
WBC # FLD AUTO: 10.1 K/UL — SIGNIFICANT CHANGE UP (ref 3.8–10.5)

## 2019-04-12 PROCEDURE — 82962 GLUCOSE BLOOD TEST: CPT

## 2019-04-12 PROCEDURE — 85384 FIBRINOGEN ACTIVITY: CPT

## 2019-04-12 PROCEDURE — 99239 HOSP IP/OBS DSCHRG MGMT >30: CPT | Mod: 24

## 2019-04-12 PROCEDURE — 80053 COMPREHEN METABOLIC PANEL: CPT

## 2019-04-12 PROCEDURE — 84484 ASSAY OF TROPONIN QUANT: CPT

## 2019-04-12 PROCEDURE — 85730 THROMBOPLASTIN TIME PARTIAL: CPT

## 2019-04-12 PROCEDURE — 97116 GAIT TRAINING THERAPY: CPT

## 2019-04-12 PROCEDURE — 82330 ASSAY OF CALCIUM: CPT

## 2019-04-12 PROCEDURE — 83605 ASSAY OF LACTIC ACID: CPT

## 2019-04-12 PROCEDURE — P9047: CPT

## 2019-04-12 PROCEDURE — 88311 DECALCIFY TISSUE: CPT

## 2019-04-12 PROCEDURE — 84132 ASSAY OF SERUM POTASSIUM: CPT

## 2019-04-12 PROCEDURE — 93005 ELECTROCARDIOGRAM TRACING: CPT

## 2019-04-12 PROCEDURE — 86891 AUTOLOGOUS BLOOD OP SALVAGE: CPT

## 2019-04-12 PROCEDURE — 97161 PT EVAL LOW COMPLEX 20 MIN: CPT

## 2019-04-12 PROCEDURE — P9045: CPT

## 2019-04-12 PROCEDURE — 85027 COMPLETE CBC AUTOMATED: CPT

## 2019-04-12 PROCEDURE — 82803 BLOOD GASES ANY COMBINATION: CPT

## 2019-04-12 PROCEDURE — 82435 ASSAY OF BLOOD CHLORIDE: CPT

## 2019-04-12 PROCEDURE — 80048 BASIC METABOLIC PNL TOTAL CA: CPT

## 2019-04-12 PROCEDURE — C1874: CPT

## 2019-04-12 PROCEDURE — 86900 BLOOD TYPING SEROLOGIC ABO: CPT

## 2019-04-12 PROCEDURE — 86901 BLOOD TYPING SEROLOGIC RH(D): CPT

## 2019-04-12 PROCEDURE — C1769: CPT

## 2019-04-12 PROCEDURE — 82947 ASSAY GLUCOSE BLOOD QUANT: CPT

## 2019-04-12 PROCEDURE — 85610 PROTHROMBIN TIME: CPT

## 2019-04-12 PROCEDURE — 71045 X-RAY EXAM CHEST 1 VIEW: CPT

## 2019-04-12 PROCEDURE — 88305 TISSUE EXAM BY PATHOLOGIST: CPT

## 2019-04-12 PROCEDURE — 97530 THERAPEUTIC ACTIVITIES: CPT

## 2019-04-12 PROCEDURE — P9016: CPT

## 2019-04-12 PROCEDURE — 94002 VENT MGMT INPAT INIT DAY: CPT

## 2019-04-12 PROCEDURE — 84295 ASSAY OF SERUM SODIUM: CPT

## 2019-04-12 PROCEDURE — C1889: CPT

## 2019-04-12 PROCEDURE — 82550 ASSAY OF CK (CPK): CPT

## 2019-04-12 PROCEDURE — 85014 HEMATOCRIT: CPT

## 2019-04-12 PROCEDURE — 86923 COMPATIBILITY TEST ELECTRIC: CPT

## 2019-04-12 PROCEDURE — 88304 TISSUE EXAM BY PATHOLOGIST: CPT

## 2019-04-12 PROCEDURE — 82553 CREATINE MB FRACTION: CPT

## 2019-04-12 PROCEDURE — 82565 ASSAY OF CREATININE: CPT

## 2019-04-12 RX ORDER — METOPROLOL TARTRATE 50 MG
1 TABLET ORAL
Qty: 60 | Refills: 0
Start: 2019-04-12 | End: 2019-05-11

## 2019-04-12 RX ORDER — ASPIRIN/CALCIUM CARB/MAGNESIUM 324 MG
1 TABLET ORAL
Qty: 30 | Refills: 0
Start: 2019-04-12 | End: 2019-05-11

## 2019-04-12 RX ORDER — AMLODIPINE BESYLATE 2.5 MG/1
1 TABLET ORAL
Qty: 0 | Refills: 0 | COMMUNITY

## 2019-04-12 RX ORDER — ATENOLOL 25 MG/1
1 TABLET ORAL
Qty: 0 | Refills: 0 | COMMUNITY

## 2019-04-12 RX ORDER — PANTOPRAZOLE SODIUM 20 MG/1
1 TABLET, DELAYED RELEASE ORAL
Qty: 30 | Refills: 0
Start: 2019-04-12 | End: 2019-05-11

## 2019-04-12 RX ORDER — DOCUSATE SODIUM 100 MG
1 CAPSULE ORAL
Qty: 21 | Refills: 0
Start: 2019-04-12 | End: 2019-04-18

## 2019-04-12 RX ADMIN — Medication 25 MILLIGRAM(S): at 06:40

## 2019-04-12 RX ADMIN — OXYCODONE AND ACETAMINOPHEN 1 TABLET(S): 5; 325 TABLET ORAL at 13:05

## 2019-04-12 RX ADMIN — Medication 81 MILLIGRAM(S): at 13:05

## 2019-04-12 RX ADMIN — PANTOPRAZOLE SODIUM 40 MILLIGRAM(S): 20 TABLET, DELAYED RELEASE ORAL at 06:40

## 2019-04-12 RX ADMIN — SODIUM CHLORIDE 3 MILLILITER(S): 9 INJECTION INTRAMUSCULAR; INTRAVENOUS; SUBCUTANEOUS at 06:39

## 2019-04-12 NOTE — DISCHARGE NOTE PROVIDER - CARE PROVIDER_API CALL
Junior Briseno (MD)  Surgery; Thoracic and Cardiac Surgery  [address] [place] [address]  Phone: [phone]  Fax: [phone]  Follow Up Time:

## 2019-04-12 NOTE — DISCHARGE NOTE PROVIDER - NSDCPNSUBOBJ_GEN_ALL_CORE
AxO x3    NSR 70-90    MTI RIVER sternum stable    Lungs CTA on room air    ab soft nontender  + Bm    Voids     B/l le warm well perfused + DP    120/74 76 18 97 36.9                            10.0     10.1  )-----------( 150      ( 12 Apr 2019 07:09 )               28.4 04-12        129<L>  |  96  |  9     ----------------------------<  104<H>    3.7   |  22  |  0.48<L>        Ca    8.1<L>      12 Apr 2019 07:09    danielle then 45 minutes spent on discharge

## 2019-04-12 NOTE — DISCHARGE NOTE PROVIDER - REASON FOR ADMISSION
4/8/19Replacement, ascending aorta and hemiarch 28 mm with 8 mm side arm, Gelweave Graft; FET with [place] TAG Stent 37 mm x 15 cm   Replacement of aortic valve with tissue valve  Magna Ease 23mm

## 2019-04-12 NOTE — DISCHARGE NOTE PROVIDER - NSDCCPCAREPLAN_GEN_ALL_CORE_FT
PRINCIPAL DISCHARGE DIAGNOSIS  Diagnosis: S/P AVR  Assessment and Plan of Treatment: Follow up with Dr Rutherford [facility] on April 24 2019 at 9am  Follow up with Dr Briseno on May 8 2019 at 8 am  at [address]  [phone]  take all medication  as prescibed  daily shower  Follow up with Dr Corral [phone] with 7-10 days of discharge call for appointment  weigh yourelf daily call our offcie for weight gain  greater then 3 lbs in 24 hours  Call our office  for fever chills or any concerns [phone]

## 2019-04-12 NOTE — DISCHARGE NOTE PROVIDER - HOSPITAL COURSE
This is a  69 year old female with history of HTN, preeclampsia, c/o abdominal pain, Pt went for CT scan on 3/2019 and diagnosed with thoracoabdominal aortic aneurysm now s/p AVR/AAA repair on 4/9/19    4/10 tx to floor from CTU , #CTx3 to clws,  lopressor 25mg po bid started    4/11 VSS - tolerating BB - All CT's d/c'd - ck  cxr ambulate    4/12 VSS stable and eager for discharge voiding + BM

## 2019-04-17 ENCOUNTER — APPOINTMENT (OUTPATIENT)
Age: 69
End: 2019-04-17
Payer: MEDICARE

## 2019-04-17 PROCEDURE — 99024 POSTOP FOLLOW-UP VISIT: CPT

## 2019-04-23 VITALS
SYSTOLIC BLOOD PRESSURE: 150 MMHG | HEART RATE: 72 BPM | DIASTOLIC BLOOD PRESSURE: 82 MMHG | OXYGEN SATURATION: 98 % | RESPIRATION RATE: 18 BRPM

## 2019-04-23 NOTE — REVIEW OF SYSTEMS
[Feeling Tired] : feeling tired [Anxiety] : anxiety [de-identified] : MTI- approximated intact [Negative] : Heme/Lymph

## 2019-04-23 NOTE — HISTORY OF PRESENT ILLNESS
[FreeTextEntry1] : FOLLOW YOUR HEART HOME VISIT-[place]\par Home Visit made Rishabh SPRING ] . A  patient of Dr Yovany Briseno   S/P replacement of ASC aorta and AVR   on 4/8.\par Visiting patient for post discharge transitional care management and post surgical follow up. \par \kamran Arrived to her home .  she was accompanied by 2 friends.  she is in no apparent distess but appears a bit nervous.  she states her legs are a little swollen.  she has pizza yesterday and cold cuts today.  she has also been eating "smart ones" which she didn’t realize had high sodium count.\par she denies other symptoms. \par Has noticed also her b/p higher 150-170 /.  \par she had follow up with her cardiologist last evening and metoprolol was inc to 50 BID from 25 BID.\par she began first dose last night.

## 2019-04-23 NOTE — PHYSICAL EXAM
[Sclera] : the sclera and conjunctiva were normal [Neck Appearance] : the appearance of the neck was normal [PERRL With Normal Accommodation] : pupils were equal in size, round, and reactive to light [Heart Sounds] : normal S1 and S2 [] : no respiratory distress [Breast Appearance] : normal in appearance [Abnormal Walk] : normal gait [No CVA Tenderness] : no ~M costovertebral angle tenderness [Bowel Sounds] : normal bowel sounds [No Focal Deficits] : no focal deficits [FreeTextEntry1] : MTI intact [Skin Color & Pigmentation] : normal skin color and pigmentation [Oriented To Time, Place, And Person] : oriented to person, place, and time [Affect] : the affect was normal

## 2019-04-24 ENCOUNTER — APPOINTMENT (OUTPATIENT)
Dept: CARDIOTHORACIC SURGERY | Facility: CLINIC | Age: 69
End: 2019-04-24

## 2019-04-24 ENCOUNTER — NON-APPOINTMENT (OUTPATIENT)
Age: 69
End: 2019-04-24

## 2019-04-24 VITALS
HEIGHT: 65 IN | TEMPERATURE: 99 F | WEIGHT: 145 LBS | OXYGEN SATURATION: 99 % | BODY MASS INDEX: 24.16 KG/M2 | DIASTOLIC BLOOD PRESSURE: 67 MMHG | RESPIRATION RATE: 13 BRPM | HEART RATE: 76 BPM | SYSTOLIC BLOOD PRESSURE: 112 MMHG

## 2019-04-24 RX ORDER — CIPROFLOXACIN HYDROCHLORIDE 250 MG/1
250 TABLET, FILM COATED ORAL
Qty: 6 | Refills: 0 | Status: COMPLETED | COMMUNITY
Start: 2019-03-12 | End: 2019-04-24

## 2019-04-24 RX ORDER — AMLODIPINE BESYLATE 10 MG/1
10 TABLET ORAL DAILY
Qty: 30 | Refills: 0 | Status: COMPLETED | COMMUNITY
End: 2019-04-24

## 2019-04-24 RX ORDER — LISINOPRIL AND HYDROCHLOROTHIAZIDE TABLETS 20; 12.5 MG/1; MG/1
20-12.5 TABLET ORAL DAILY
Refills: 0 | Status: COMPLETED | COMMUNITY
End: 2019-04-24

## 2019-04-24 RX ORDER — ASPIRIN ENTERIC COATED TABLETS 81 MG 81 MG/1
81 TABLET, DELAYED RELEASE ORAL
Refills: 0 | Status: ACTIVE | COMMUNITY
Start: 2019-04-24

## 2019-05-08 ENCOUNTER — APPOINTMENT (OUTPATIENT)
Dept: CARDIOTHORACIC SURGERY | Facility: CLINIC | Age: 69
End: 2019-05-08
Payer: MEDICARE

## 2019-05-08 VITALS
BODY MASS INDEX: 23.66 KG/M2 | WEIGHT: 142 LBS | OXYGEN SATURATION: 100 % | SYSTOLIC BLOOD PRESSURE: 134 MMHG | HEART RATE: 62 BPM | DIASTOLIC BLOOD PRESSURE: 73 MMHG | HEIGHT: 65 IN | TEMPERATURE: 98.6 F | RESPIRATION RATE: 14 BRPM

## 2019-05-08 PROCEDURE — 99024 POSTOP FOLLOW-UP VISIT: CPT

## 2019-05-08 RX ORDER — ESCITALOPRAM OXALATE 10 MG/1
10 TABLET ORAL DAILY
Refills: 0 | Status: COMPLETED | COMMUNITY
Start: 2019-04-24 | End: 2019-05-08

## 2019-05-08 RX ORDER — DOCUSATE SODIUM 100 MG/1
100 CAPSULE ORAL 3 TIMES DAILY
Qty: 30 | Refills: 0 | Status: COMPLETED | COMMUNITY
Start: 2019-04-24 | End: 2019-05-08

## 2019-05-08 RX ORDER — TRIAMTERENE AND HYDROCHLOROTHIAZIDE 37.5; 25 MG/1; MG/1
37.5-25 CAPSULE ORAL DAILY
Refills: 0 | Status: COMPLETED | COMMUNITY
Start: 2019-04-24 | End: 2019-05-08

## 2019-05-29 ENCOUNTER — OUTPATIENT (OUTPATIENT)
Dept: OUTPATIENT SERVICES | Facility: HOSPITAL | Age: 69
LOS: 1 days | End: 2019-05-29
Payer: MEDICARE

## 2019-05-29 ENCOUNTER — APPOINTMENT (OUTPATIENT)
Dept: CT IMAGING | Facility: CLINIC | Age: 69
End: 2019-05-29
Payer: MEDICARE

## 2019-05-29 DIAGNOSIS — Z98.49 CATARACT EXTRACTION STATUS, UNSPECIFIED EYE: Chronic | ICD-10-CM

## 2019-05-29 DIAGNOSIS — Z95.3 PRESENCE OF XENOGENIC HEART VALVE: ICD-10-CM

## 2019-05-29 DIAGNOSIS — Z09 ENCOUNTER FOR FOLLOW-UP EXAMINATION AFTER COMPLETED TREATMENT FOR CONDITIONS OTHER THAN MALIGNANT NEOPLASM: ICD-10-CM

## 2019-05-29 DIAGNOSIS — Z95.828 PRESENCE OF OTHER VASCULAR IMPLANTS AND GRAFTS: ICD-10-CM

## 2019-05-29 DIAGNOSIS — Z98.890 OTHER SPECIFIED POSTPROCEDURAL STATES: ICD-10-CM

## 2019-05-29 DIAGNOSIS — S42.302A UNSPECIFIED FRACTURE OF SHAFT OF HUMERUS, LEFT ARM, INITIAL ENCOUNTER FOR CLOSED FRACTURE: Chronic | ICD-10-CM

## 2019-05-29 PROCEDURE — 71275 CT ANGIOGRAPHY CHEST: CPT | Mod: 26

## 2019-05-29 PROCEDURE — 74174 CTA ABD&PLVS W/CONTRAST: CPT

## 2019-05-29 PROCEDURE — 74174 CTA ABD&PLVS W/CONTRAST: CPT | Mod: 26

## 2019-05-29 PROCEDURE — 82565 ASSAY OF CREATININE: CPT

## 2019-05-29 PROCEDURE — 71275 CT ANGIOGRAPHY CHEST: CPT

## 2019-06-12 ENCOUNTER — APPOINTMENT (OUTPATIENT)
Dept: CARDIOTHORACIC SURGERY | Facility: CLINIC | Age: 69
End: 2019-06-12
Payer: MEDICARE

## 2019-06-12 VITALS
RESPIRATION RATE: 16 BRPM | OXYGEN SATURATION: 94 % | BODY MASS INDEX: 23.66 KG/M2 | HEART RATE: 79 BPM | SYSTOLIC BLOOD PRESSURE: 152 MMHG | TEMPERATURE: 97.9 F | HEIGHT: 65 IN | DIASTOLIC BLOOD PRESSURE: 91 MMHG | WEIGHT: 142 LBS

## 2019-06-12 PROCEDURE — 99024 POSTOP FOLLOW-UP VISIT: CPT

## 2019-06-13 RX ORDER — CHROMIUM 200 MCG
1000 TABLET ORAL DAILY
Refills: 0 | Status: DISCONTINUED | COMMUNITY
Start: 2019-03-21 | End: 2019-06-13

## 2019-06-13 RX ORDER — OXYCODONE AND ACETAMINOPHEN 5; 325 MG/1; MG/1
5-325 TABLET ORAL
Qty: 15 | Refills: 0 | Status: DISCONTINUED | COMMUNITY
Start: 2019-04-24 | End: 2019-06-13

## 2019-06-13 RX ORDER — METOPROLOL TARTRATE 25 MG/1
25 TABLET, FILM COATED ORAL TWICE DAILY
Qty: 60 | Refills: 3 | Status: DISCONTINUED | COMMUNITY
Start: 2019-04-24 | End: 2019-06-13

## 2019-06-13 RX ORDER — LORATADINE 10 MG
17 TABLET,DISINTEGRATING ORAL DAILY
Qty: 1 | Refills: 1 | Status: DISCONTINUED | COMMUNITY
Start: 2019-05-08 | End: 2019-06-13

## 2019-06-13 NOTE — HISTORY OF PRESENT ILLNESS
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 The patient presents for a follow up visit with repeat diagnostic imaging and to discuss surgical planning for descending aorta replacement. \par \par CTA chest, abdomen and pelvis 5/29/19 revealed: \par Status post surgical graft repair of ascending thoracic aorta and stent graft repair of aortic arch and proximal descending thoracic aorta. The grafts are patent. No intimal dissection or mediastinal \par hematoma is present. There is contrast interposed between the stent graft in the aortic wall at both its proximal and distal margins. Aortic caliber is as follows:\par Sinuses of Valsalva 3.2 cm\par Sinotubular junction 2.8 cm\par Ascending thoracic aorta 3.1 cm, previously 5.6 cm\par Aortic arch 4.0 cm\par Descending thoracic aorta 4.6 cm\par Diaphragmatic hiatus 7.4 cm\par HEART: Heart size is normal. Status post aortic valve repair. No \par pericardial effusion.\par MEDIASTINUM AND JUMA: No lymphadenopathy.\par CHEST WALL AND LOWER NECK: Within normal limits.\par \par Patient is recuperating well from surgery. She is ambulating and increasing her activities daily. Patient denies fever, chills, dizziness, syncope, shortness of breath, chest pain, palpitations or peripheral edema.\par

## 2019-06-13 NOTE — CONSULT LETTER
[Dear  ___] : Dear  [unfilled], [DrYovany  ___] : Dr. SHAIKH [FreeTextEntry2] : Cam Fortune MD\par [address], [address]\[place] [address] [FreeTextEntry1] : I had the pleasure of seeing your patient, KENNETH SPRING,in my office today. \par \par We take a multidisciplinary team approach to patient care and consider you, the primary physician, an extension of our team. We will maintain an open line of communication with you throughout your patient's treatment course. \par \par She is being evaluated for thoracic aortic aneurysm. Patient is status post aortic valve replacement, ascending aorta and transverse arch replacement, descending thoracic aortic stent graft with partial coverage of the left subclavian artery with a frozen elephant trunk utilizing a 37 x 150 mm [place] TAG prosthesis on 4/8/19. \par \par I have reviewed all of the patient's medical records and diagnostic images at the time of her office consultation. I have enclosed a copy for your records. \par \par I have reviewed the indications for surgery,and used our webpage [email or web address] <[email or web address]> to illustrate the aorta and anatomy of the heart. The patient meets criteria for surgery. I have recommended that the patient is a candidate for a  descending aorta replacement. \par \par I will discuss with Dr. Valladares in terms of different surgical approaches. My office will contact the patient next week to discuss surgical planning. \par \par I appreciate the opportunity to care for your patient at the [facility] based at [facility]. If there are any questions or concerns, please call me directly at [phone]. \par \par Please see my note below. \par \par Sincerely, \par \par \par \par Junior Briseno M.D.\par Professor of Cardiovascular and Thoracic Surgery\par Minimally Invasive Valve Surgeon\par Director of Aortic Surgery, [facility]\par Cell: [phone]\par Email: [email or web address] \par \par [facility]:\par [address] Floor, [place] [address]\par Office: [phone]\par Fax: [phone]\par \par [facility]:\par Department of Cardiovascular and Thoracic Surgery\par [address] [place] [address], [address]\par Office: [phone]\par Fax: [phone]\par \par Practice Manager: Ms. Indy Griffith\par Email: [email or web address]\par Phone: [phone]\par

## 2019-06-13 NOTE — PROCEDURE
[FreeTextEntry1] : \par Dr. Briseno discussed activity restrictions with the patient, and would advise exercise at a moderate amount with no heavy lifting over one third of body weight, and avoiding heart rates that exceed 140 beats per minute. In addition, every patient should abstain from tobacco abuse and to avoid all illicit drug use, especially stimulants such as cocaine or methamphetamine. Dr. Briseno also counseled regarding maintaining a healthy heart diet, and losing any excessive weight as this also put undue stress on both the aorta and entire cardiovascular system. First degree family members should be screened for bicuspid valve disease, and ascending aortic aneurysms. \par \par

## 2019-06-13 NOTE — ASSESSMENT
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 The patient presents for a follow up visit with repeat diagnostic imaging and to discuss surgical planning for descending aorta replacement. \par \par CTA chest, abdomen and pelvis 5/29/19 revealed: \par Status post surgical graft repair of ascending thoracic aorta and stent graft repair of aortic arch and proximal descending thoracic aorta. The grafts are patent. No intimal dissection or mediastinal \par hematoma is present. There is contrast interposed between the stent graft in the aortic wall at both its proximal and distal margins. Aortic caliber is as follows:\par Sinuses of Valsalva 3.2 cm\par Sinotubular junction 2.8 cm\par Ascending thoracic aorta 3.1 cm, previously 5.6 cm\par Aortic arch 4.0 cm\par Descending thoracic aorta 4.6 cm\par Diaphragmatic hiatus 7.4 cm\par HEART: Heart size is normal. Status post aortic valve repair. No \par pericardial effusion.\par MEDIASTINUM AND JUMA: No lymphadenopathy.\par CHEST WALL AND LOWER NECK: Within normal limits.\par \par I have reviewed the patient's medical records, diagnostic images during the time of this office consultation and have made the following recommendation. I have reviewed the indications for surgery, and used our webpage [email or web address] <[email or web address]> to illustrate the aorta and anatomy of the heart. I have discussed the indications for surgery with the patient. Those indications are the following: size greater than 5.0 cm, symptomatic aneurysms, family history of aortic dissection or aneurysm death with a size greater than 4.5 cm, other necessary cardiac procedures such as coronary artery bypass grafting or valvular disorders with an aneurysm greater than 4.5 cm, or connective tissue disorders with an aneurysm size greater than 4.5 cm. The patient meets the indications.\par \par I had a lengthy discussion with the patient regarding her descending aortic aneurysm and progression. I have recommended that the patient is a candidate for descending aorta replacement. \par \par I have discussed the risks, benefits and alternatives to surgery. I have explained the risks of the surgery, including approximately 20% major mortality or morbidity including paralysis, stroke, infection, bleeding, death, renal failure and heart attack. All questions were addressed and patient agrees to proceed with surgery. \par \par I have recommended the patient that she should recover from recent cardiac surgery and then proceed with the repair of the descending aorta within the month. I will discuss with Dr. Valladares in terms of different surgical approaches. The patient has an appointment with Dr. Valladares next week. My office will contact the patient next week in terms of surgical planning. \par \par 1. Follow up with PCP/Cardiologist.\par 2. Continue current medication regimen.\par 3. Continue to increase activity and walk daily as tolerated. Continue to use incentive spirometer. \par 4. No driving or strenuous activity for six weeks after surgery. Avoid lifting >10 to 15lbs for first two months after surgery. \par 5. Continue to use compression stockings. Keep legs elevated above heart when resting/sitting/sleeping.\par 6. Call MD if you experience fever, fatigue, dizziness, confusion, syncope, shortness of breath, chest pain not relieved with analgesics, increased redness/drainage from incision.\par 7. My office will contact the patient next week in terms of surgical planning for descending aorta replacement.

## 2019-06-13 NOTE — PHYSICAL EXAM
[Sclera] : the sclera and conjunctiva were normal [PERRL With Normal Accommodation] : pupils were equal in size, round, and reactive to light [Neck Appearance] : the appearance of the neck was normal [] : no respiratory distress [Respiration, Rhythm And Depth] : normal respiratory rhythm and effort [Auscultation Breath Sounds / Voice Sounds] : lungs were clear to auscultation bilaterally [Apical Impulse] : the apical impulse was normal [Heart Rate And Rhythm] : heart rate was normal and rhythm regular [Heart Sounds] : normal S1 and S2 [Examination Of The Chest] : the chest was normal in appearance [Systolic grade ___/6] : A grade [unfilled]/6 systolic murmur was heard. [2+] : left 2+ [Bowel Sounds] : normal bowel sounds [Abdomen Soft] : soft [Breast Appearance] : normal in appearance [Abdomen Tenderness] : non-tender [Involuntary Movements] : no involuntary movements were seen [Abnormal Walk] : normal gait [No CVA Tenderness] : no ~M costovertebral angle tenderness [Skin Color & Pigmentation] : normal skin color and pigmentation [Skin Turgor] : normal skin turgor [Oriented To Time, Place, And Person] : oriented to person, place, and time [No Focal Deficits] : no focal deficits [Affect] : the affect was normal [Mood] : the mood was normal [Impaired Insight] : insight and judgment were intact [General Appearance - Well Nourished] : well nourished [General Appearance - In No Acute Distress] : in no acute distress [General Appearance - Alert] : alert [General Appearance - Well Developed] : well developed [Both Tympanic Membranes Were Examined] : both tympanic membranes were normal [Hearing Threshold Finger Rub Not [place]] : hearing was normal [Outer Ear] : the ears and nose were normal in appearance [FreeTextEntry1] : prior sternotomy, well healed scar to midsternum

## 2019-06-13 NOTE — DATA REVIEWED
[FreeTextEntry1] : CTA chest, abdomen and pelvis 5/29/19\par \par FINDINGS:\par \par CHEST: \par \par LUNGS AND LARGE AIRWAYS: Patent central airways. No pulmonary nodules.\par PLEURA: Trace left pleural effusion.\par VESSELS: Status post surgical graft repair of ascending thoracic aorta \par and stent graft repair of aortic arch and proximal descending thoracic \par aorta. The grafts are patent. No intimal dissection or mediastinal \par hematoma is present. There is contrast interposed between the stent graft \par in the aortic wall at both its proximal and distal margins. Aortic \par caliber is as follows:\par Sinuses of Valsalva 3.2 cm\par Sinotubular junction 2.8 cm\par Ascending thoracic aorta 3.1 cm, previously 5.6 cm\par Aortic arch 4.0 cm\par Descending thoracic aorta 4.6 cm\par Diaphragmatic hiatus 7.4 cm\par HEART: Heart size is normal. Status post aortic valve repair. No \par pericardial effusion.\par MEDIASTINUM AND JUMA: No lymphadenopathy.\par CHEST WALL AND LOWER NECK: Within normal limits.\par \par ABDOMEN AND PELVIS:\par \par LIVER: Within normal limits.\par BILE DUCTS: Normal caliber.\par GALLBLADDER: Within normal limits.\par SPLEEN: Within normal limits.\par PANCREAS: Within normal limits.\par ADRENALS: Within normal limits.\par KIDNEYS/URETERS: Within normal limits.\par \par BLADDER: Within normal limits.\par REPRODUCTIVE ORGANS: Uterus and adnexa appear unremarkable.\par \par BOWEL: No bowel obstruction. Appendix not visualized.\par PERITONEUM: No ascites.\par VESSELS:  Extension of descending thoracic aortic aneurysm into the upper \par abdomen. The suprarenal aorta measures 4.6 cm in the infrarenal aorta \par measures 3.2 cm. No intimal dissection is present. Mesenteric and renal \par arteries are patent.\par RETROPERITONEUM: No lymphadenopathy.  \par ABDOMINAL WALL: Within normal limits.\par BONES: Degenerative changes lumbar spine.\par \par IMPRESSION: \par \par Status post surgical graft repair of ascending thoracic aortic aneurysm, \par and stent graft repair of aortic arch and descending thoracic aorta.\par \par Contrast interposed between stent graft and wall of the aorta at both \par proximal and distal ends of the stent.\par \par Interval stability of descending thoracic/abdominal aortic aneurysm \par without intimal dissection.\par

## 2019-06-13 NOTE — END OF VISIT
[FreeTextEntry3] : Written by Chhaya Dickson NP, acting as a scribe for Dr. Junior Briseno.\par “The documentation recorded by the scribe accurately reflects the service I personally performed and the decisions made by me.” Signature, Junior Briseno MD\par \par

## 2019-06-20 ENCOUNTER — APPOINTMENT (OUTPATIENT)
Dept: VASCULAR SURGERY | Facility: CLINIC | Age: 69
End: 2019-06-20
Payer: MEDICARE

## 2019-06-20 VITALS
BODY MASS INDEX: 24.66 KG/M2 | WEIGHT: 148 LBS | DIASTOLIC BLOOD PRESSURE: 121 MMHG | HEIGHT: 65 IN | SYSTOLIC BLOOD PRESSURE: 208 MMHG | HEART RATE: 71 BPM | TEMPERATURE: 97.8 F

## 2019-06-20 VITALS — HEART RATE: 76 BPM | SYSTOLIC BLOOD PRESSURE: 159 MMHG | DIASTOLIC BLOOD PRESSURE: 98 MMHG

## 2019-06-20 PROCEDURE — 99213 OFFICE O/P EST LOW 20 MIN: CPT

## 2019-06-20 NOTE — PHYSICAL EXAM
[2+] : left 2+ [Varicose Veins Of Lower Extremities] : not present [Ankle Swelling (On Exam)] : not present [] : not present

## 2019-06-20 NOTE — HISTORY OF PRESENT ILLNESS
[FreeTextEntry1] : Patient  is  recovering  from  Ascending  aneurysm  repair  done  by Dr Briseno  Has  a known 7.4 cm  TAAA mostly  at the  diaphragmatic  Hiatus  She  is  here to discuss  plans  for  rpair  and options

## 2019-06-20 NOTE — ASSESSMENT
[Aneurysm Surgery] : aneurysm surgery [FreeTextEntry1] : I reviewed  different  options  1.  Debranching  of  all  4 visceral arteries  and  then TEVAR  as  a Hybrid  procedure  2. Open  repair   one  stage   3. Non  approved  FDA  Bench  made Branch  device  .  Risks  procedure   LOS recovery  all  discussed  Paraplegia  and mortality discussed  I spent   3o minutes  to discuss  Patient  and  her son  want  to  have  one  stage  Thoraco abdominal  Aortic  Aneurysm  Repair I will discuss with DR Briseno

## 2019-07-09 ENCOUNTER — APPOINTMENT (OUTPATIENT)
Dept: VASCULAR SURGERY | Facility: CLINIC | Age: 69
End: 2019-07-09

## 2019-08-14 ENCOUNTER — OUTPATIENT (OUTPATIENT)
Dept: OUTPATIENT SERVICES | Facility: HOSPITAL | Age: 69
LOS: 1 days | End: 2019-08-14
Payer: MEDICARE

## 2019-08-14 VITALS
OXYGEN SATURATION: 99 % | WEIGHT: 145.95 LBS | TEMPERATURE: 99 F | DIASTOLIC BLOOD PRESSURE: 64 MMHG | HEIGHT: 65.5 IN | RESPIRATION RATE: 16 BRPM | HEART RATE: 74 BPM | SYSTOLIC BLOOD PRESSURE: 100 MMHG

## 2019-08-14 DIAGNOSIS — I71.6 THORACOABDOMINAL AORTIC ANEURYSM, WITHOUT RUPTURE: ICD-10-CM

## 2019-08-14 DIAGNOSIS — Z98.890 OTHER SPECIFIED POSTPROCEDURAL STATES: Chronic | ICD-10-CM

## 2019-08-14 DIAGNOSIS — Z98.49 CATARACT EXTRACTION STATUS, UNSPECIFIED EYE: Chronic | ICD-10-CM

## 2019-08-14 DIAGNOSIS — I71.6 THORACOABDOMINAL AORTIC ANEURYSM, WITHOUT RUPTURE: Chronic | ICD-10-CM

## 2019-08-14 DIAGNOSIS — Z01.818 ENCOUNTER FOR OTHER PREPROCEDURAL EXAMINATION: ICD-10-CM

## 2019-08-14 DIAGNOSIS — S42.302A UNSPECIFIED FRACTURE OF SHAFT OF HUMERUS, LEFT ARM, INITIAL ENCOUNTER FOR CLOSED FRACTURE: Chronic | ICD-10-CM

## 2019-08-14 DIAGNOSIS — Z29.9 ENCOUNTER FOR PROPHYLACTIC MEASURES, UNSPECIFIED: ICD-10-CM

## 2019-08-14 LAB
ANION GAP SERPL CALC-SCNC: 14 MMOL/L — SIGNIFICANT CHANGE UP (ref 5–17)
BLD GP AB SCN SERPL QL: NEGATIVE — SIGNIFICANT CHANGE UP
BUN SERPL-MCNC: 12 MG/DL — SIGNIFICANT CHANGE UP (ref 7–23)
CALCIUM SERPL-MCNC: 9.9 MG/DL — SIGNIFICANT CHANGE UP (ref 8.4–10.5)
CHLORIDE SERPL-SCNC: 99 MMOL/L — SIGNIFICANT CHANGE UP (ref 96–108)
CO2 SERPL-SCNC: 26 MMOL/L — SIGNIFICANT CHANGE UP (ref 22–31)
CREAT SERPL-MCNC: 0.68 MG/DL — SIGNIFICANT CHANGE UP (ref 0.5–1.3)
GLUCOSE SERPL-MCNC: 69 MG/DL — LOW (ref 70–99)
HCT VFR BLD CALC: 39.7 % — SIGNIFICANT CHANGE UP (ref 34.5–45)
HGB BLD-MCNC: 12.8 G/DL — SIGNIFICANT CHANGE UP (ref 11.5–15.5)
MCHC RBC-ENTMCNC: 27.8 PG — SIGNIFICANT CHANGE UP (ref 27–34)
MCHC RBC-ENTMCNC: 32.2 GM/DL — SIGNIFICANT CHANGE UP (ref 32–36)
MCV RBC AUTO: 86.1 FL — SIGNIFICANT CHANGE UP (ref 80–100)
PLATELET # BLD AUTO: 225 K/UL — SIGNIFICANT CHANGE UP (ref 150–400)
POTASSIUM SERPL-MCNC: 4.1 MMOL/L — SIGNIFICANT CHANGE UP (ref 3.5–5.3)
POTASSIUM SERPL-SCNC: 4.1 MMOL/L — SIGNIFICANT CHANGE UP (ref 3.5–5.3)
RBC # BLD: 4.61 M/UL — SIGNIFICANT CHANGE UP (ref 3.8–5.2)
RBC # FLD: 15.2 % — HIGH (ref 10.3–14.5)
RH IG SCN BLD-IMP: POSITIVE — SIGNIFICANT CHANGE UP
SODIUM SERPL-SCNC: 139 MMOL/L — SIGNIFICANT CHANGE UP (ref 135–145)
WBC # BLD: 7.68 K/UL — SIGNIFICANT CHANGE UP (ref 3.8–10.5)
WBC # FLD AUTO: 7.68 K/UL — SIGNIFICANT CHANGE UP (ref 3.8–10.5)

## 2019-08-14 PROCEDURE — 70450 CT HEAD/BRAIN W/O DYE: CPT | Mod: 26

## 2019-08-14 PROCEDURE — 71046 X-RAY EXAM CHEST 2 VIEWS: CPT | Mod: 26

## 2019-08-14 PROCEDURE — 70450 CT HEAD/BRAIN W/O DYE: CPT

## 2019-08-14 PROCEDURE — 86900 BLOOD TYPING SEROLOGIC ABO: CPT

## 2019-08-14 PROCEDURE — 72100 X-RAY EXAM L-S SPINE 2/3 VWS: CPT | Mod: 26

## 2019-08-14 PROCEDURE — 72100 X-RAY EXAM L-S SPINE 2/3 VWS: CPT

## 2019-08-14 PROCEDURE — 86850 RBC ANTIBODY SCREEN: CPT

## 2019-08-14 PROCEDURE — G0463: CPT

## 2019-08-14 PROCEDURE — 86901 BLOOD TYPING SEROLOGIC RH(D): CPT

## 2019-08-14 PROCEDURE — 71046 X-RAY EXAM CHEST 2 VIEWS: CPT

## 2019-08-14 PROCEDURE — 80048 BASIC METABOLIC PNL TOTAL CA: CPT

## 2019-08-14 PROCEDURE — 85027 COMPLETE CBC AUTOMATED: CPT

## 2019-08-14 RX ORDER — CHLORHEXIDINE GLUCONATE 213 G/1000ML
1 SOLUTION TOPICAL DAILY
Refills: 0 | Status: DISCONTINUED | OUTPATIENT
Start: 2019-08-29 | End: 2019-09-08

## 2019-08-14 NOTE — H&P PST ADULT - HISTORY OF PRESENT ILLNESS
This is a 69 year old female with history of HTN, preeclampsia, c/o abdominal pain, Pt went for CT scan on 3/2019 S/P Ascending  thoracoabdominal aortic aneurysm repair and aortic valve replaced Bioprosthetic valve) 4/8/19. Presents reoperative thoracoabdominal aneurysm repair  (7.4CM TAAA mostly at the diaphragmatic hiatus) 8/29/19.  Pt reports she is being admitted 8/27/19 because the doctor is going to insert a lumbar drain. As per Doctors orders a CT of head without contrast , Lumbar x-ray and Chest X-ray of chest will be completed today.

## 2019-08-14 NOTE — H&P PST ADULT - NSICDXPROBLEM_GEN_ALL_CORE_FT
PROBLEM DIAGNOSES  Problem: Thoracoabdominal aortic aneurysm (TAAA) without rupture  Assessment and Plan: Reoperative thoracoabdominal aneurysm repair   Check CBC, BMP, T&S, Cxray, lumbar xray, CT head without contrast  Continue Labetalol and ASA as ordered   Pt is being admitted the day before surgery     Problem: DVT prophylaxis  Assessment and Plan: The Caprini score indicates this patient is at risk for a VTE event (score 3-5).  Most surgical patients in this group would benefit from pharmacologic prophylaxis.  The surgical team will determine the balance between VTE risk and bleeding risk

## 2019-08-14 NOTE — H&P PST ADULT - ACTIVITY
Swims, able to climb 3 flights of stairs without shortness of breath, walks 3 miles 3 times per week without shortness of breath or chest pain

## 2019-08-14 NOTE — H&P PST ADULT - NSICDXPASTMEDICALHX_GEN_ALL_CORE_FT
PAST MEDICAL HISTORY:  Cataract bilateral    HTN (hypertension) controlled    Intermittent abdominal pain periumbilical    Murmur, cardiac     Preeclampsia     Thoracoabdominal aortic aneurysm (TAAA) without rupture

## 2019-08-14 NOTE — H&P PST ADULT - NSICDXFAMILYHX_GEN_ALL_CORE_FT
FAMILY HISTORY:  Family history of skin cancer, mother    Father  Still living? No  Family history of coronary artery bypass graft, Age at diagnosis: Age Unknown

## 2019-08-14 NOTE — H&P PST ADULT - ASSESSMENT
KASEYI VTE 2.0 SCORE [CLOT updated 2019]    AGE RELATED RISK FACTORS                                                       MOBILITY RELATED FACTORS  [ ] Age 41-60 years                                            (1 Point)                    [ ] Bed rest                                                        (1 Point)  [x ] Age: 61-74 years                                           (2 Points)                  [ ] Plaster cast                                                   (2 Points)  [ ] Age= 75 years                                              (3 Points)                    [ ] Bed bound for more than 72 hours                 (2 Points)    DISEASE RELATED RISK FACTORS                                               GENDER SPECIFIC FACTORS  [ ] Edema in the lower extremities                       (1 Point)              [ ] Pregnancy                                                     (1 Point)  [ ] Varicose veins                                               (1 Point)                     [ ] Post-partum < 6 weeks                                   (1 Point)             [ x] BMI > 25 Kg/m2                                            (1 Point)                     [ ] Hormonal therapy  or oral contraception          (1 Point)                 [ ] Sepsis (in the previous month)                        (1 Point)               [ ] History of pregnancy complications                 (1 point)  [ ] Pneumonia or serious lung disease                                               [ ] Unexplained or recurrent                     (1 Point)           (in the previous month)                               (1 Point)  [ ] Abnormal pulmonary function test                     (1 Point)                 SURGERY RELATED RISK FACTORS  [ ] Acute myocardial infarction                              (1 Point)               [ ]  Section                                             (1 Point)  [ ] Congestive heart failure (in the previous month)  (1 Point)      [ ] Minor surgery                                                  (1 Point)   [ ] Inflammatory bowel disease                             (1 Point)               [ ] Arthroscopic surgery                                        (2 Points)  [ ] Central venous access                                      (2 Points)                [x ] General surgery lasting more than 45 minutes (2 points)  [ ] Malignancy- Present or previous                   (2 Points)                [ ] Elective arthroplasty                                         (5 points)    [ ] Stroke (in the previous month)                          (5 Points)                                                                                                                                                           HEMATOLOGY RELATED FACTORS                                                 TRAUMA RELATED RISK FACTORS  [ ] Prior episodes of VTE                                     (3 Points)                [ ] Fracture of the hip, pelvis, or leg                       (5 Points)  [ ] Positive family history for VTE                         (3 Points)             [ ] Acute spinal cord injury (in the previous month)  (5 Points)  [ ] Prothrombin 01457 A                                     (3 Points)               [ ] Paralysis  (less than 1 month)                             (5 Points)  [ ] Factor V Leiden                                             (3 Points)                  [ ] Multiple Trauma within 1 month                        (5 Points)  [ ] Lupus anticoagulants                                     (3 Points)                                                           [ ] Anticardiolipin antibodies                               (3 Points)                                                       [ ] High homocysteine in the blood                      (3 Points)                                             [ ] Other congenital or acquired thrombophilia      (3 Points)                                                [ ] Heparin induced thrombocytopenia                  (3 Points)                                     Total Score [  5       ]

## 2019-08-28 ENCOUNTER — INPATIENT (INPATIENT)
Facility: HOSPITAL | Age: 69
LOS: 30 days | Discharge: NURSING, REHAB OR LONG-TERM CARE | DRG: 219 | End: 2019-09-28
Attending: THORACIC SURGERY (CARDIOTHORACIC VASCULAR SURGERY) | Admitting: THORACIC SURGERY (CARDIOTHORACIC VASCULAR SURGERY)
Payer: MEDICARE

## 2019-08-28 ENCOUNTER — TRANSCRIPTION ENCOUNTER (OUTPATIENT)
Age: 69
End: 2019-08-28

## 2019-08-28 VITALS
RESPIRATION RATE: 24 BRPM | HEART RATE: 82 BPM | OXYGEN SATURATION: 99 % | DIASTOLIC BLOOD PRESSURE: 87 MMHG | SYSTOLIC BLOOD PRESSURE: 134 MMHG | TEMPERATURE: 97 F

## 2019-08-28 DIAGNOSIS — I71.6 THORACOABDOMINAL AORTIC ANEURYSM, WITHOUT RUPTURE: Chronic | ICD-10-CM

## 2019-08-28 DIAGNOSIS — S42.302A UNSPECIFIED FRACTURE OF SHAFT OF HUMERUS, LEFT ARM, INITIAL ENCOUNTER FOR CLOSED FRACTURE: Chronic | ICD-10-CM

## 2019-08-28 DIAGNOSIS — Z98.49 CATARACT EXTRACTION STATUS, UNSPECIFIED EYE: Chronic | ICD-10-CM

## 2019-08-28 DIAGNOSIS — Z98.890 OTHER SPECIFIED POSTPROCEDURAL STATES: Chronic | ICD-10-CM

## 2019-08-28 DIAGNOSIS — I71.6 THORACOABDOMINAL AORTIC ANEURYSM, WITHOUT RUPTURE: ICD-10-CM

## 2019-08-28 LAB
ALBUMIN SERPL ELPH-MCNC: 4.6 G/DL — SIGNIFICANT CHANGE UP (ref 3.3–5)
ALP SERPL-CCNC: 97 U/L — SIGNIFICANT CHANGE UP (ref 40–120)
ALT FLD-CCNC: 11 U/L — SIGNIFICANT CHANGE UP (ref 10–45)
ANION GAP SERPL CALC-SCNC: 14 MMOL/L — SIGNIFICANT CHANGE UP (ref 5–17)
APTT BLD: 27.9 SEC — SIGNIFICANT CHANGE UP (ref 27.5–36.3)
AST SERPL-CCNC: 15 U/L — SIGNIFICANT CHANGE UP (ref 10–40)
BASOPHILS # BLD AUTO: 0 K/UL — SIGNIFICANT CHANGE UP (ref 0–0.2)
BASOPHILS NFR BLD AUTO: 0.6 % — SIGNIFICANT CHANGE UP (ref 0–2)
BILIRUB DIRECT SERPL-MCNC: 0.1 MG/DL — SIGNIFICANT CHANGE UP (ref 0–0.2)
BILIRUB INDIRECT FLD-MCNC: 0.3 MG/DL — SIGNIFICANT CHANGE UP (ref 0.2–1)
BILIRUB SERPL-MCNC: 0.4 MG/DL — SIGNIFICANT CHANGE UP (ref 0.2–1.2)
BILIRUB SERPL-MCNC: 0.4 MG/DL — SIGNIFICANT CHANGE UP (ref 0.2–1.2)
BUN SERPL-MCNC: 12 MG/DL — SIGNIFICANT CHANGE UP (ref 7–23)
CALCIUM SERPL-MCNC: 9.9 MG/DL — SIGNIFICANT CHANGE UP (ref 8.4–10.5)
CHLORIDE SERPL-SCNC: 97 MMOL/L — SIGNIFICANT CHANGE UP (ref 96–108)
CHOLEST SERPL-MCNC: 178 MG/DL — SIGNIFICANT CHANGE UP (ref 10–199)
CO2 SERPL-SCNC: 26 MMOL/L — SIGNIFICANT CHANGE UP (ref 22–31)
CREAT SERPL-MCNC: 0.53 MG/DL — SIGNIFICANT CHANGE UP (ref 0.5–1.3)
EOSINOPHIL # BLD AUTO: 0.2 K/UL — SIGNIFICANT CHANGE UP (ref 0–0.5)
EOSINOPHIL NFR BLD AUTO: 3 % — SIGNIFICANT CHANGE UP (ref 0–6)
GLUCOSE SERPL-MCNC: 107 MG/DL — HIGH (ref 70–99)
HBA1C BLD-MCNC: 5.4 % — SIGNIFICANT CHANGE UP (ref 4–5.6)
HCT VFR BLD CALC: 40.5 % — SIGNIFICANT CHANGE UP (ref 34.5–45)
HDLC SERPL-MCNC: 58 MG/DL — SIGNIFICANT CHANGE UP
HGB BLD-MCNC: 13.2 G/DL — SIGNIFICANT CHANGE UP (ref 11.5–15.5)
INR BLD: 1.09 RATIO — SIGNIFICANT CHANGE UP (ref 0.88–1.16)
LIPID PNL WITH DIRECT LDL SERPL: 104 MG/DL — HIGH
LYMPHOCYTES # BLD AUTO: 1.6 K/UL — SIGNIFICANT CHANGE UP (ref 1–3.3)
LYMPHOCYTES # BLD AUTO: 23 % — SIGNIFICANT CHANGE UP (ref 13–44)
MCHC RBC-ENTMCNC: 28.5 PG — SIGNIFICANT CHANGE UP (ref 27–34)
MCHC RBC-ENTMCNC: 32.6 GM/DL — SIGNIFICANT CHANGE UP (ref 32–36)
MCV RBC AUTO: 87.3 FL — SIGNIFICANT CHANGE UP (ref 80–100)
MONOCYTES # BLD AUTO: 0.7 K/UL — SIGNIFICANT CHANGE UP (ref 0–0.9)
MONOCYTES NFR BLD AUTO: 10.9 % — SIGNIFICANT CHANGE UP (ref 2–14)
NEUTROPHILS # BLD AUTO: 4.2 K/UL — SIGNIFICANT CHANGE UP (ref 1.8–7.4)
NEUTROPHILS NFR BLD AUTO: 62.4 % — SIGNIFICANT CHANGE UP (ref 43–77)
PLATELET # BLD AUTO: 275 K/UL — SIGNIFICANT CHANGE UP (ref 150–400)
POTASSIUM SERPL-MCNC: 3.7 MMOL/L — SIGNIFICANT CHANGE UP (ref 3.5–5.3)
POTASSIUM SERPL-SCNC: 3.7 MMOL/L — SIGNIFICANT CHANGE UP (ref 3.5–5.3)
PROT SERPL-MCNC: 7.7 G/DL — SIGNIFICANT CHANGE UP (ref 6–8.3)
PROTHROM AB SERPL-ACNC: 12.6 SEC — SIGNIFICANT CHANGE UP (ref 10–12.9)
RBC # BLD: 4.64 M/UL — SIGNIFICANT CHANGE UP (ref 3.8–5.2)
RBC # FLD: 13.9 % — SIGNIFICANT CHANGE UP (ref 10.3–14.5)
SODIUM SERPL-SCNC: 137 MMOL/L — SIGNIFICANT CHANGE UP (ref 135–145)
T3 SERPL-MCNC: 117 NG/DL — SIGNIFICANT CHANGE UP (ref 80–200)
T4 AB SER-ACNC: 8.6 UG/DL — SIGNIFICANT CHANGE UP (ref 4.6–12)
TOTAL CHOLESTEROL/HDL RATIO MEASUREMENT: 3.1 RATIO — LOW (ref 3.3–7.1)
TRIGL SERPL-MCNC: 79 MG/DL — SIGNIFICANT CHANGE UP (ref 10–149)
TSH SERPL-MCNC: 1.24 UIU/ML — SIGNIFICANT CHANGE UP (ref 0.27–4.2)
WBC # BLD: 6.7 K/UL — SIGNIFICANT CHANGE UP (ref 3.8–10.5)
WBC # FLD AUTO: 6.7 K/UL — SIGNIFICANT CHANGE UP (ref 3.8–10.5)

## 2019-08-28 PROCEDURE — 99223 1ST HOSP IP/OBS HIGH 75: CPT

## 2019-08-28 PROCEDURE — 93880 EXTRACRANIAL BILAT STUDY: CPT | Mod: 26

## 2019-08-28 PROCEDURE — 99291 CRITICAL CARE FIRST HOUR: CPT

## 2019-08-28 PROCEDURE — 33877 THORACOABDOMINAL GRAFT: CPT | Mod: 62

## 2019-08-28 RX ORDER — CHLORHEXIDINE GLUCONATE 213 G/1000ML
15 SOLUTION TOPICAL ONCE
Refills: 0 | Status: COMPLETED | OUTPATIENT
Start: 2019-08-28 | End: 2019-08-29

## 2019-08-28 RX ORDER — CEFUROXIME AXETIL 250 MG
1500 TABLET ORAL ONCE
Refills: 0 | Status: COMPLETED | OUTPATIENT
Start: 2019-08-28 | End: 2019-08-29

## 2019-08-28 RX ORDER — PANTOPRAZOLE SODIUM 20 MG/1
40 TABLET, DELAYED RELEASE ORAL DAILY
Refills: 0 | Status: DISCONTINUED | OUTPATIENT
Start: 2019-08-28 | End: 2019-08-29

## 2019-08-28 RX ORDER — LABETALOL HCL 100 MG
200 TABLET ORAL THREE TIMES A DAY
Refills: 0 | Status: DISCONTINUED | OUTPATIENT
Start: 2019-08-28 | End: 2019-08-29

## 2019-08-28 RX ORDER — CHLORHEXIDINE GLUCONATE 213 G/1000ML
1 SOLUTION TOPICAL ONCE
Refills: 0 | Status: DISCONTINUED | OUTPATIENT
Start: 2019-08-28 | End: 2019-08-29

## 2019-08-28 RX ORDER — FENTANYL CITRATE 50 UG/ML
25 INJECTION INTRAVENOUS ONCE
Refills: 0 | Status: DISCONTINUED | OUTPATIENT
Start: 2019-08-28 | End: 2019-08-28

## 2019-08-28 RX ORDER — FENTANYL CITRATE 50 UG/ML
75 INJECTION INTRAVENOUS ONCE
Refills: 0 | Status: DISCONTINUED | OUTPATIENT
Start: 2019-08-28 | End: 2019-08-28

## 2019-08-28 RX ORDER — CEFAZOLIN SODIUM 1 G
2000 VIAL (EA) INJECTION ONCE
Refills: 0 | Status: DISCONTINUED | OUTPATIENT
Start: 2019-08-28 | End: 2019-08-28

## 2019-08-28 RX ORDER — CEFAZOLIN SODIUM 1 G
2000 VIAL (EA) INJECTION ONCE
Refills: 0 | Status: COMPLETED | OUTPATIENT
Start: 2019-08-28 | End: 2019-08-28

## 2019-08-28 RX ADMIN — Medication 200 MILLIGRAM(S): at 21:00

## 2019-08-28 RX ADMIN — FENTANYL CITRATE 75 MICROGRAM(S): 50 INJECTION INTRAVENOUS at 19:55

## 2019-08-28 RX ADMIN — FENTANYL CITRATE 75 MICROGRAM(S): 50 INJECTION INTRAVENOUS at 20:10

## 2019-08-28 RX ADMIN — FENTANYL CITRATE 25 MICROGRAM(S): 50 INJECTION INTRAVENOUS at 17:11

## 2019-08-28 RX ADMIN — Medication 100 MILLIGRAM(S): at 19:55

## 2019-08-28 RX ADMIN — Medication 200 MILLIGRAM(S): at 15:59

## 2019-08-28 RX ADMIN — FENTANYL CITRATE 25 MICROGRAM(S): 50 INJECTION INTRAVENOUS at 17:41

## 2019-08-28 NOTE — PROGRESS NOTE ADULT - SUBJECTIVE AND OBJECTIVE BOX
Patient is  seen  Consent  obtained  patient,s  sister is at bedside  I spoke  to patient,s  son in the  past  After  a long  discussion  about  open  vs  two stage  TEVAR and  debranching  patient  and her  son and  her sister  came  to  a decision of  one stage TAAA repair   Risks  of  paraplegia  risk of  HD dependant  renal failure  risk of  bowel ischemia  , Mortality all  extensively discussed  After the  discussion  Patient  wants to undergo  this  open  TAAA repair with spinal drain  .  Consent  obtained  for me  and Dr Briseno

## 2019-08-28 NOTE — CONSULT NOTE ADULT - ASSESSMENT
69 F admitted electively for a enlarging AAA. Neurosurgery consulted for LD placement   - Discussed with patient risks and benefits of procedure.  LD placed without complication  - Drain per CTX  - Plan for TVAR today

## 2019-08-28 NOTE — PATIENT PROFILE ADULT - NSPROEDAABILITYLEARNOTHER_GEN_A_NUR
Shot for [employer] Control: Care Instructions  Your Care Instructions  The shot is used to prevent pregnancy. You get the shot in your upper arm or rear end (buttocks). The shot gives you a dose of the hormone progestin. The shot is often called by its brand name, Depo-Provera. The shot provides birth control for 3 months at a time. You then need another shot. Follow-up care is a key part of your treatment and safety. Be sure to make and go to all appointments, and call your doctor if you are having problems. It's also a good idea to know your test results and keep a list of the medicines you take. How can you care for yourself at home? How do you use the birth control shot? · If you get the shot during the first 5 days of your normal period, use backup birth control, such as a condom, or don't have intercourse for 24 hours. · If you get the shot more than 5 days after your periods starts, use backup birth control or don't have intercourse for 5 days. · Talk to your doctor about a schedule to get the shot. You need the shot every 3 months. If you are late getting it, you'll need backup birth control. What if you miss or are late for a shot? Always read the label for specific instructions, or call your doctor. Here are some basic guidelines:  · Use backup birth control, such as a condom, or don't have intercourse. Continue using one of these methods until 5 days after you get the missed or late shot. · If you had intercourse, you can use emergency contraception, such as the morning-after pill (Plan B). You can use emergency contraception for up to 5 days after having had sex, but it works best if you take it right away. What else do you need to know? · The shot has side effects. Because the shot protects for 3 months, the side effects may last 3 months. ¨ You may have changes in your period and your period may stop. You may also have spotting or bleeding between periods.   ¨ You may have mood changes, less interest in sex, or weight gain. · The shot may cause bone loss. Talk to your doctor about this and take steps to prevent bone loss, such as getting enough calcium in your diet and exercising regularly. · Check with your doctor before you use any other medicines, including over-the-counter medicines, vitamins, herbal products, and supplements. Birth control hormones may not work as well to prevent pregnancy when combined with other medicines. · The shot doesn't protect against sexually transmitted infections (STIs), such as herpes or HIV/AIDS. If you're not sure whether your sex partner might have an STI, use a condom to protect against infection. When should you call for help? Call your doctor now or seek immediate medical care if:  · You have severe belly pain. Watch closely for changes in your health, and be sure to contact your doctor if:  · You think you may be pregnant. · You have any problems with your birth control. · You feel you may be depressed. · You regularly have spotting. · You think you may have been exposed to or have a sexually transmitted infection. Where can you learn more? Go to http://faustina-gavin.info/. Enter W885 in the search box to learn more about \"Shot for Birth Control: Care Instructions. \"  Current as of: March 16, 2017  Content Version: 11.3  © 5129-5015 [employer], [employer]. Care instructions adapted under license by [employer] (which disclaims liability or warranty for this information). If you have questions about a medical condition or this instruction, always ask your healthcare professional. [place] [address] any warranty or liability for your use of this information. none

## 2019-08-28 NOTE — CONSULT NOTE ADULT - SUBJECTIVE AND OBJECTIVE BOX
p ([id number])     HPI:  This is a 69 year old female with history of HTN, preeclampsia, c/o abdominal pain, Pt went for CT scan on 3/2019 S/P Ascending  thoracoabdominal aortic aneurysm repair and aortic valve replaced Bioprosthetic valve) 4/8/19. Presents reoperative thoracoabdominal aneurysm repair  (7.4CM TAAA mostly at the diaphragmatic hiatus) 8/29/19.  Pt reports she is being admitted 8/27/19 because the doctor is going to insert a lumbar drain. As per Doctors orders a CT of head without contrast , Lumbar x-ray and Chest X-ray of chest will be completed today. (14 Aug 2019 09:12)      Imaging:  none  Exam:  AOx3, FC, PERRL, EOMI, no facial   5/5 throughout, no drift  SILT  no clonus    --Anticoagulation: ASA (stopped 5 days ago)     =====================  PAST MEDICAL HISTORY   Intermittent abdominal pain  Thoracoabdominal aortic aneurysm (TAAA) without rupture  Murmur, cardiac  Cataract  Preeclampsia  HTN (hypertension)    PAST SURGICAL HISTORY   S/P aortic valve repair  Thoracoabdominal aortic aneurysm (TAAA) without rupture  S/P cataract surgery  Bilateral cataracts  Arm fracture, left        MEDICATIONS:  Antibiotics:  cefuroxime  IVPB 1500 milliGRAM(s) IV Intermittent once    Neuro:    Other:  labetalol 200 milliGRAM(s) Oral three times a day  pantoprazole  Injectable 40 milliGRAM(s) IV Push daily      SOCIAL HISTORY:   Occupation:   Marital Status:     FAMILY HISTORY:  Family history of skin cancer  Family history of coronary artery bypass graft (Father)  Family history of early CAD (Father)      ROS: Negative except per HPI    LABS:  PT/INR - ( 28 Aug 2019 16:11 )   PT: 12.6 sec;   INR: 1.09 ratio         PTT - ( 28 Aug 2019 16:11 )  PTT:27.9 sec                        12.3   7.8   )-----------( 266      ( 29 Aug 2019 04:41 )             37.3     08-29    134<L>  |  98  |  11  ----------------------------<  107<H>  3.8   |  23  |  0.52    Ca    9.0      29 Aug 2019 04:41  Phos  3.3     08-29  Mg     1.9     08-29    TPro  6.7  /  Alb  3.8  /  TBili  0.4  /  DBili  x   /  AST  11  /  ALT  9<L>  /  AlkPhos  90  08-29

## 2019-08-28 NOTE — PROGRESS NOTE ADULT - ASSESSMENT
69 yr old female with H/O Thoraco abdominal Aortic aneurysm, Aortic Stenosis/ regurgitation S/P AVR (T), Ascending & hemiarch replacement 4/9/19 now admitted for enlarging thoracoabdominal aortic aneurysm.      Plan Tight BP control, cont Labetalol target BP < 110, HR <60.  A-line placement.  NPO tonight, Type & Screen   Neurosurgery to placed lumbar drain for Open TAAA repair in Am.  Monitor for post spinal drain placement complications.      Hold ASA & DVT prophylaxis  Patient at risk for sudden rupture of aortic aneurysm.   I have spent 30 minutes providing critical care management to this patient.

## 2019-08-28 NOTE — PROGRESS NOTE ADULT - SUBJECTIVE AND OBJECTIVE BOX
7pm-7:30pm     On continuous ICU monitoring     OBJECTIVE:  ICU Vital Signs Last 24 Hrs  T(C): 36.3 (28 Aug 2019 13:30), Max: 36.3 (28 Aug 2019 13:30)  T(F): 97.4 (28 Aug 2019 13:30), Max: 97.4 (28 Aug 2019 13:30)  HR: 72 (28 Aug 2019 17:00) (70 - 82)  BP: 128/83 (28 Aug 2019 17:00) (115/78 - 134/87)  BP(mean): 101 (28 Aug 2019 17:00) (88 - 106)  ABP: --  ABP(mean): --  RR: 40 (28 Aug 2019 17:00) (24 - 40)  SpO2: 95% (28 Aug 2019 17:00) (95% - 100%)        08-28 @ 07:01  -  08-28 @ 19:52  --------------------------------------------------------  IN: 480 mL / OUT: 400 mL / NET: 80 mL      CAPILLARY BLOOD GLUCOSE          PHYSICAL EXAM:        General: WN/WD NAD  Neurology: A&Ox3, nonfocal, BECK x 4  Eyes: PERRLA/ EOMI, Gross vision intact  ENT/Neck: Neck supple, trachea midline, No JVD, Gross hearing intact  Respiratory: CTA B/L no  rales, rhonchi  CV: RRR, S1S2, no murmurs, rubs or gallops  Abdominal: Soft, NT, ND +BS,   Extremities: No edema, + peripheral pulses  Skin: No Rashes, Hematoma, Ecchymosis          HOSPITAL MEDICATIONS:  MEDICATIONS  (STANDING):  ceFAZolin   IVPB 2000 milliGRAM(s) IV Intermittent once  cefuroxime  IVPB 1500 milliGRAM(s) IV Intermittent once  chlorhexidine 0.12% Liquid 15 milliLiter(s) Swish and Spit once  chlorhexidine 4% Liquid 1 Application(s) Topical once  fentaNYL    Injectable 75 MICROGram(s) IV Push once  labetalol 200 milliGRAM(s) Oral three times a day  pantoprazole  Injectable 40 milliGRAM(s) IV Push daily    MEDICATIONS  (PRN):      LABS:                        13.2   6.7   )-----------( 275      ( 28 Aug 2019 14:24 )             40.5     08-28    137  |  97  |  12  ----------------------------<  107<H>  3.7   |  26  |  0.53    Ca    9.9      28 Aug 2019 14:23    TPro  x   /  Alb  x   /  TBili  0.4  /  DBili  x   /  AST  x   /  ALT  x   /  AlkPhos  x   08-28    PT/INR - ( 28 Aug 2019 16:11 )   PT: 12.6 sec;   INR: 1.09 ratio         PTT - ( 28 Aug 2019 16:11 )  PTT:27.9 sec          LIVER FUNCTIONS - ( 28 Aug 2019 14:23 )  Alb: 4.6 g/dL / Pro: 7.7 g/dL / ALK PHOS: 97 U/L / ALT: 11 U/L / AST: 15 U/L / GGT: x           MICROBIOLOGY:     RADIOLOGY:  X Reviewed and interpreted by me

## 2019-08-29 ENCOUNTER — APPOINTMENT (OUTPATIENT)
Dept: VASCULAR SURGERY | Facility: HOSPITAL | Age: 69
End: 2019-08-29

## 2019-08-29 ENCOUNTER — RESULT REVIEW (OUTPATIENT)
Age: 69
End: 2019-08-29

## 2019-08-29 LAB
ALBUMIN SERPL ELPH-MCNC: 3.5 G/DL — SIGNIFICANT CHANGE UP (ref 3.3–5)
ALBUMIN SERPL ELPH-MCNC: 3.8 G/DL — SIGNIFICANT CHANGE UP (ref 3.3–5)
ALP SERPL-CCNC: 44 U/L — SIGNIFICANT CHANGE UP (ref 40–120)
ALP SERPL-CCNC: 90 U/L — SIGNIFICANT CHANGE UP (ref 40–120)
ALT FLD-CCNC: 19 U/L — SIGNIFICANT CHANGE UP (ref 10–45)
ALT FLD-CCNC: 9 U/L — LOW (ref 10–45)
ANION GAP SERPL CALC-SCNC: 13 MMOL/L — SIGNIFICANT CHANGE UP (ref 5–17)
ANION GAP SERPL CALC-SCNC: 18 MMOL/L — HIGH (ref 5–17)
APTT BLD: 34.7 SEC — SIGNIFICANT CHANGE UP (ref 27.5–36.3)
AST SERPL-CCNC: 11 U/L — SIGNIFICANT CHANGE UP (ref 10–40)
AST SERPL-CCNC: 55 U/L — HIGH (ref 10–40)
BASOPHILS # BLD AUTO: 0 K/UL — SIGNIFICANT CHANGE UP (ref 0–0.2)
BASOPHILS NFR BLD AUTO: 0 % — SIGNIFICANT CHANGE UP (ref 0–2)
BILIRUB SERPL-MCNC: 0.4 MG/DL — SIGNIFICANT CHANGE UP (ref 0.2–1.2)
BILIRUB SERPL-MCNC: 1 MG/DL — SIGNIFICANT CHANGE UP (ref 0.2–1.2)
BLD GP AB SCN SERPL QL: NEGATIVE — SIGNIFICANT CHANGE UP
BUN SERPL-MCNC: 11 MG/DL — SIGNIFICANT CHANGE UP (ref 7–23)
BUN SERPL-MCNC: 15 MG/DL — SIGNIFICANT CHANGE UP (ref 7–23)
CALCIUM SERPL-MCNC: 8.2 MG/DL — LOW (ref 8.4–10.5)
CALCIUM SERPL-MCNC: 9 MG/DL — SIGNIFICANT CHANGE UP (ref 8.4–10.5)
CHLORIDE SERPL-SCNC: 101 MMOL/L — SIGNIFICANT CHANGE UP (ref 96–108)
CHLORIDE SERPL-SCNC: 98 MMOL/L — SIGNIFICANT CHANGE UP (ref 96–108)
CK MB BLD-MCNC: 4 % — HIGH (ref 0–3.5)
CK MB CFR SERPL CALC: 53.4 NG/ML — HIGH (ref 0–3.8)
CK SERPL-CCNC: 1324 U/L — HIGH (ref 25–170)
CO2 SERPL-SCNC: 22 MMOL/L — SIGNIFICANT CHANGE UP (ref 22–31)
CO2 SERPL-SCNC: 23 MMOL/L — SIGNIFICANT CHANGE UP (ref 22–31)
CREAT SERPL-MCNC: 0.52 MG/DL — SIGNIFICANT CHANGE UP (ref 0.5–1.3)
CREAT SERPL-MCNC: 1.02 MG/DL — SIGNIFICANT CHANGE UP (ref 0.5–1.3)
EOSINOPHIL # BLD AUTO: 0 K/UL — SIGNIFICANT CHANGE UP (ref 0–0.5)
EOSINOPHIL NFR BLD AUTO: 0.4 % — SIGNIFICANT CHANGE UP (ref 0–6)
FIBRINOGEN PPP-MCNC: 306 MG/DL — LOW (ref 350–510)
GAS PNL BLDA: SIGNIFICANT CHANGE UP
GLUCOSE SERPL-MCNC: 107 MG/DL — HIGH (ref 70–99)
GLUCOSE SERPL-MCNC: 112 MG/DL — HIGH (ref 70–99)
HCT VFR BLD CALC: 28.3 % — LOW (ref 34.5–45)
HCT VFR BLD CALC: 37.3 % — SIGNIFICANT CHANGE UP (ref 34.5–45)
HGB BLD-MCNC: 12.3 G/DL — SIGNIFICANT CHANGE UP (ref 11.5–15.5)
HGB BLD-MCNC: 9.5 G/DL — LOW (ref 11.5–15.5)
INR BLD: 1.23 RATIO — HIGH (ref 0.88–1.16)
LYMPHOCYTES # BLD AUTO: 0.3 K/UL — LOW (ref 1–3.3)
LYMPHOCYTES # BLD AUTO: 5.3 % — LOW (ref 13–44)
MAGNESIUM SERPL-MCNC: 1.8 MG/DL — SIGNIFICANT CHANGE UP (ref 1.6–2.6)
MAGNESIUM SERPL-MCNC: 1.9 MG/DL — SIGNIFICANT CHANGE UP (ref 1.6–2.6)
MCHC RBC-ENTMCNC: 28.7 PG — SIGNIFICANT CHANGE UP (ref 27–34)
MCHC RBC-ENTMCNC: 29.6 PG — SIGNIFICANT CHANGE UP (ref 27–34)
MCHC RBC-ENTMCNC: 33 GM/DL — SIGNIFICANT CHANGE UP (ref 32–36)
MCHC RBC-ENTMCNC: 33.6 GM/DL — SIGNIFICANT CHANGE UP (ref 32–36)
MCV RBC AUTO: 87 FL — SIGNIFICANT CHANGE UP (ref 80–100)
MCV RBC AUTO: 88.1 FL — SIGNIFICANT CHANGE UP (ref 80–100)
MONOCYTES # BLD AUTO: 0.1 K/UL — SIGNIFICANT CHANGE UP (ref 0–0.9)
MONOCYTES NFR BLD AUTO: 1.7 % — LOW (ref 2–14)
NEUTROPHILS # BLD AUTO: 6.1 K/UL — SIGNIFICANT CHANGE UP (ref 1.8–7.4)
NEUTROPHILS NFR BLD AUTO: 92.5 % — HIGH (ref 43–77)
PHOSPHATE SERPL-MCNC: 2.8 MG/DL — SIGNIFICANT CHANGE UP (ref 2.5–4.5)
PHOSPHATE SERPL-MCNC: 3.3 MG/DL — SIGNIFICANT CHANGE UP (ref 2.5–4.5)
PLATELET # BLD AUTO: 266 K/UL — SIGNIFICANT CHANGE UP (ref 150–400)
PLATELET # BLD AUTO: 67 K/UL — LOW (ref 150–400)
POTASSIUM SERPL-MCNC: 3.7 MMOL/L — SIGNIFICANT CHANGE UP (ref 3.5–5.3)
POTASSIUM SERPL-MCNC: 3.8 MMOL/L — SIGNIFICANT CHANGE UP (ref 3.5–5.3)
POTASSIUM SERPL-SCNC: 3.7 MMOL/L — SIGNIFICANT CHANGE UP (ref 3.5–5.3)
POTASSIUM SERPL-SCNC: 3.8 MMOL/L — SIGNIFICANT CHANGE UP (ref 3.5–5.3)
PROT SERPL-MCNC: 4.9 G/DL — LOW (ref 6–8.3)
PROT SERPL-MCNC: 6.7 G/DL — SIGNIFICANT CHANGE UP (ref 6–8.3)
PROTHROM AB SERPL-ACNC: 14.1 SEC — HIGH (ref 10–12.9)
RBC # BLD: 3.21 M/UL — LOW (ref 3.8–5.2)
RBC # BLD: 4.29 M/UL — SIGNIFICANT CHANGE UP (ref 3.8–5.2)
RBC # FLD: 13.6 % — SIGNIFICANT CHANGE UP (ref 10.3–14.5)
RBC # FLD: 13.7 % — SIGNIFICANT CHANGE UP (ref 10.3–14.5)
RH IG SCN BLD-IMP: POSITIVE — SIGNIFICANT CHANGE UP
SODIUM SERPL-SCNC: 134 MMOL/L — LOW (ref 135–145)
SODIUM SERPL-SCNC: 142 MMOL/L — SIGNIFICANT CHANGE UP (ref 135–145)
TROPONIN T, HIGH SENSITIVITY RESULT: 29 NG/L — SIGNIFICANT CHANGE UP (ref 0–51)
WBC # BLD: 6.6 K/UL — SIGNIFICANT CHANGE UP (ref 3.8–10.5)
WBC # BLD: 7.8 K/UL — SIGNIFICANT CHANGE UP (ref 3.8–10.5)
WBC # FLD AUTO: 6.6 K/UL — SIGNIFICANT CHANGE UP (ref 3.8–10.5)
WBC # FLD AUTO: 7.8 K/UL — SIGNIFICANT CHANGE UP (ref 3.8–10.5)

## 2019-08-29 PROCEDURE — 88304 TISSUE EXAM BY PATHOLOGIST: CPT | Mod: 26

## 2019-08-29 PROCEDURE — 93010 ELECTROCARDIOGRAM REPORT: CPT

## 2019-08-29 PROCEDURE — 33877 THORACOABDOMINAL GRAFT: CPT | Mod: 62

## 2019-08-29 PROCEDURE — 99291 CRITICAL CARE FIRST HOUR: CPT

## 2019-08-29 PROCEDURE — 88305 TISSUE EXAM BY PATHOLOGIST: CPT | Mod: 26

## 2019-08-29 PROCEDURE — 71045 X-RAY EXAM CHEST 1 VIEW: CPT | Mod: 26

## 2019-08-29 RX ORDER — ALBUMIN HUMAN 25 %
250 VIAL (ML) INTRAVENOUS
Refills: 0 | Status: COMPLETED | OUTPATIENT
Start: 2019-08-29 | End: 2019-08-29

## 2019-08-29 RX ORDER — LABETALOL HCL 100 MG
10 TABLET ORAL ONCE
Refills: 0 | Status: COMPLETED | OUTPATIENT
Start: 2019-08-29 | End: 2019-08-29

## 2019-08-29 RX ORDER — NOREPINEPHRINE BITARTRATE/D5W 8 MG/250ML
0.05 PLASTIC BAG, INJECTION (ML) INTRAVENOUS
Qty: 8 | Refills: 0 | Status: DISCONTINUED | OUTPATIENT
Start: 2019-08-29 | End: 2019-08-30

## 2019-08-29 RX ORDER — LABETALOL HCL 100 MG
20 TABLET ORAL ONCE
Refills: 0 | Status: COMPLETED | OUTPATIENT
Start: 2019-08-29 | End: 2019-08-29

## 2019-08-29 RX ORDER — VASOPRESSIN 20 [USP'U]/ML
0.03 INJECTION INTRAVENOUS
Qty: 50 | Refills: 0 | Status: DISCONTINUED | OUTPATIENT
Start: 2019-08-29 | End: 2019-09-02

## 2019-08-29 RX ORDER — MEPERIDINE HYDROCHLORIDE 50 MG/ML
25 INJECTION INTRAMUSCULAR; INTRAVENOUS; SUBCUTANEOUS ONCE
Refills: 0 | Status: DISCONTINUED | OUTPATIENT
Start: 2019-08-29 | End: 2019-08-29

## 2019-08-29 RX ORDER — ACETAMINOPHEN 500 MG
1000 TABLET ORAL ONCE
Refills: 0 | Status: COMPLETED | OUTPATIENT
Start: 2019-08-29 | End: 2019-08-29

## 2019-08-29 RX ORDER — HYDRALAZINE HCL 50 MG
10 TABLET ORAL ONCE
Refills: 0 | Status: COMPLETED | OUTPATIENT
Start: 2019-08-29 | End: 2019-08-29

## 2019-08-29 RX ORDER — DEXTROSE 50 % IN WATER 50 %
25 SYRINGE (ML) INTRAVENOUS
Refills: 0 | Status: DISCONTINUED | OUTPATIENT
Start: 2019-08-29 | End: 2019-09-08

## 2019-08-29 RX ORDER — CHLORHEXIDINE GLUCONATE 213 G/1000ML
5 SOLUTION TOPICAL EVERY 4 HOURS
Refills: 0 | Status: DISCONTINUED | OUTPATIENT
Start: 2019-08-29 | End: 2019-08-29

## 2019-08-29 RX ORDER — HYDROMORPHONE HYDROCHLORIDE 2 MG/ML
0.5 INJECTION INTRAMUSCULAR; INTRAVENOUS; SUBCUTANEOUS ONCE
Refills: 0 | Status: DISCONTINUED | OUTPATIENT
Start: 2019-08-29 | End: 2019-08-29

## 2019-08-29 RX ORDER — METOCLOPRAMIDE HCL 10 MG
10 TABLET ORAL EVERY 8 HOURS
Refills: 0 | Status: DISCONTINUED | OUTPATIENT
Start: 2019-08-29 | End: 2019-08-29

## 2019-08-29 RX ORDER — POTASSIUM CHLORIDE 20 MEQ
10 PACKET (EA) ORAL
Refills: 0 | Status: DISCONTINUED | OUTPATIENT
Start: 2019-08-29 | End: 2019-08-30

## 2019-08-29 RX ORDER — CHLORHEXIDINE GLUCONATE 213 G/1000ML
15 SOLUTION TOPICAL EVERY 12 HOURS
Refills: 0 | Status: DISCONTINUED | OUTPATIENT
Start: 2019-08-29 | End: 2019-08-31

## 2019-08-29 RX ORDER — MAGNESIUM SULFATE 500 MG/ML
1 VIAL (ML) INJECTION ONCE
Refills: 0 | Status: COMPLETED | OUTPATIENT
Start: 2019-08-29 | End: 2019-08-29

## 2019-08-29 RX ORDER — NICARDIPINE HYDROCHLORIDE 30 MG/1
3 CAPSULE, EXTENDED RELEASE ORAL
Qty: 40 | Refills: 0 | Status: DISCONTINUED | OUTPATIENT
Start: 2019-08-29 | End: 2019-09-02

## 2019-08-29 RX ORDER — DOCUSATE SODIUM 100 MG
100 CAPSULE ORAL THREE TIMES A DAY
Refills: 0 | Status: DISCONTINUED | OUTPATIENT
Start: 2019-08-29 | End: 2019-09-16

## 2019-08-29 RX ORDER — POTASSIUM CHLORIDE 20 MEQ
10 PACKET (EA) ORAL
Refills: 0 | Status: COMPLETED | OUTPATIENT
Start: 2019-08-29 | End: 2019-08-29

## 2019-08-29 RX ORDER — PANTOPRAZOLE SODIUM 20 MG/1
40 TABLET, DELAYED RELEASE ORAL DAILY
Refills: 0 | Status: DISCONTINUED | OUTPATIENT
Start: 2019-08-29 | End: 2019-08-31

## 2019-08-29 RX ORDER — PROPOFOL 10 MG/ML
10 INJECTION, EMULSION INTRAVENOUS
Qty: 1000 | Refills: 0 | Status: DISCONTINUED | OUTPATIENT
Start: 2019-08-29 | End: 2019-08-30

## 2019-08-29 RX ORDER — METOCLOPRAMIDE HCL 10 MG
10 TABLET ORAL EVERY 8 HOURS
Refills: 0 | Status: COMPLETED | OUTPATIENT
Start: 2019-08-29 | End: 2019-08-31

## 2019-08-29 RX ORDER — CEFUROXIME AXETIL 250 MG
1500 TABLET ORAL EVERY 8 HOURS
Refills: 0 | Status: COMPLETED | OUTPATIENT
Start: 2019-08-29 | End: 2019-08-30

## 2019-08-29 RX ORDER — ONDANSETRON 8 MG/1
4 TABLET, FILM COATED ORAL ONCE
Refills: 0 | Status: COMPLETED | OUTPATIENT
Start: 2019-08-29 | End: 2019-08-29

## 2019-08-29 RX ORDER — INSULIN HUMAN 100 [IU]/ML
3 INJECTION, SOLUTION SUBCUTANEOUS
Qty: 100 | Refills: 0 | Status: DISCONTINUED | OUTPATIENT
Start: 2019-08-29 | End: 2019-08-31

## 2019-08-29 RX ORDER — SODIUM CHLORIDE 9 MG/ML
1000 INJECTION INTRAMUSCULAR; INTRAVENOUS; SUBCUTANEOUS
Refills: 0 | Status: DISCONTINUED | OUTPATIENT
Start: 2019-08-29 | End: 2019-09-02

## 2019-08-29 RX ORDER — DEXMEDETOMIDINE HYDROCHLORIDE IN 0.9% SODIUM CHLORIDE 4 UG/ML
0.5 INJECTION INTRAVENOUS
Qty: 200 | Refills: 0 | Status: DISCONTINUED | OUTPATIENT
Start: 2019-08-29 | End: 2019-09-02

## 2019-08-29 RX ORDER — DEXTROSE 50 % IN WATER 50 %
50 SYRINGE (ML) INTRAVENOUS
Refills: 0 | Status: DISCONTINUED | OUTPATIENT
Start: 2019-08-29 | End: 2019-09-08

## 2019-08-29 RX ADMIN — Medication 125 MILLILITER(S): at 20:40

## 2019-08-29 RX ADMIN — PROPOFOL 4.06 MICROGRAM(S)/KG/MIN: 10 INJECTION, EMULSION INTRAVENOUS at 18:02

## 2019-08-29 RX ADMIN — Medication 400 MILLIGRAM(S): at 02:25

## 2019-08-29 RX ADMIN — VASOPRESSIN 2 UNIT(S)/MIN: 20 INJECTION INTRAVENOUS at 18:00

## 2019-08-29 RX ADMIN — Medication 6.34 MICROGRAM(S)/KG/MIN: at 18:00

## 2019-08-29 RX ADMIN — HYDROMORPHONE HYDROCHLORIDE 0.5 MILLIGRAM(S): 2 INJECTION INTRAMUSCULAR; INTRAVENOUS; SUBCUTANEOUS at 19:55

## 2019-08-29 RX ADMIN — Medication 10 MILLIGRAM(S): at 21:16

## 2019-08-29 RX ADMIN — Medication 200 MILLIGRAM(S): at 05:11

## 2019-08-29 RX ADMIN — CHLORHEXIDINE GLUCONATE 5 MILLILITER(S): 213 SOLUTION TOPICAL at 21:16

## 2019-08-29 RX ADMIN — ONDANSETRON 4 MILLIGRAM(S): 8 TABLET, FILM COATED ORAL at 06:36

## 2019-08-29 RX ADMIN — Medication 100 GRAM(S): at 21:30

## 2019-08-29 RX ADMIN — NICARDIPINE HYDROCHLORIDE 15 MG/HR: 30 CAPSULE, EXTENDED RELEASE ORAL at 19:41

## 2019-08-29 RX ADMIN — Medication 10 MILLIGRAM(S): at 04:29

## 2019-08-29 RX ADMIN — Medication 20 MILLIGRAM(S): at 05:47

## 2019-08-29 RX ADMIN — Medication 10 MILLIGRAM(S): at 02:25

## 2019-08-29 RX ADMIN — DEXMEDETOMIDINE HYDROCHLORIDE IN 0.9% SODIUM CHLORIDE 8.45 MICROGRAM(S)/KG/HR: 4 INJECTION INTRAVENOUS at 19:42

## 2019-08-29 RX ADMIN — Medication 10 MILLIGRAM(S): at 03:14

## 2019-08-29 RX ADMIN — Medication 100 MILLIEQUIVALENT(S): at 17:40

## 2019-08-29 RX ADMIN — CHLORHEXIDINE GLUCONATE 15 MILLILITER(S): 213 SOLUTION TOPICAL at 05:11

## 2019-08-29 RX ADMIN — Medication 1000 MILLIGRAM(S): at 02:40

## 2019-08-29 RX ADMIN — Medication 100 MILLIGRAM(S): at 18:01

## 2019-08-29 RX ADMIN — Medication 100 MILLIEQUIVALENT(S): at 20:35

## 2019-08-29 RX ADMIN — Medication 100 MILLIGRAM(S): at 22:26

## 2019-08-29 RX ADMIN — Medication 100 MILLIEQUIVALENT(S): at 18:02

## 2019-08-29 RX ADMIN — HYDROMORPHONE HYDROCHLORIDE 0.5 MILLIGRAM(S): 2 INJECTION INTRAMUSCULAR; INTRAVENOUS; SUBCUTANEOUS at 19:40

## 2019-08-29 RX ADMIN — Medication 125 MILLILITER(S): at 20:30

## 2019-08-29 RX ADMIN — CHLORHEXIDINE GLUCONATE 15 MILLILITER(S): 213 SOLUTION TOPICAL at 18:03

## 2019-08-29 NOTE — PROGRESS NOTE ADULT - SUBJECTIVE AND OBJECTIVE BOX
10pm-10:30pm   Remained critically ill on continuos ICU monitoring.        OBJECTIVE:    T(C): 37.7 (08-29-19 @ 20:00), Max: 37.7 (08-29-19 @ 20:00)  T(F): 99.9 (08-29-19 @ 20:00), Max: 99.9 (08-29-19 @ 20:00)  HR: 93 (08-29-19 @ 23:00) (70 - 95)  BP: 186/123 (08-29-19 @ 07:00) (138/85 - 186/123)  ABP: 120/51 (08-29-19 @ 23:00) (104/46 - 168/66)  ABP(mean): 77 (08-29-19 @ 23:00) (69 - 105)  RR: 7 (08-29-19 @ 23:00) (3 - 30)  SpO2: 100% (08-29-19 @ 23:00) (98% - 100%)                PHYSICAL EXAM:    Daily Weight in kG:   General: intubated & sedated   Neurology: sedated nonfocal, BECK x 4  Eyes: PERRLA/ EOMI, Gross vision intact  ENT/Neck: Neck supple, +ET trachea midline, No JVD, Gross hearing intact  Respiratory:  B/L fine  rales + Left thoracoabdominal incision  L x3 pleural chest tubes  CV: RRR, S1S2, no murmurs, rubs or gallops  Abdominal: Soft, NT, ND +BS,   + Spinal drain   Extremities: No edema, + peripheral pulses R brachial A-line, L femoral A-line   Skin: No Rashes, Hematoma, Ecchymosis          HOSPITAL MEDICATIONS:  MEDICATIONS  (STANDING):  MEDICATIONS  (STANDING):  cefuroxime  IVPB 1500 milliGRAM(s) IV Intermittent every 8 hours  chlorhexidine 0.12% Liquid 15 milliLiter(s) Oral Mucosa every 12 hours  chlorhexidine 2% Cloths 1 Application(s) Topical daily  dexmedetomidine Infusion 0.5 MICROgram(s)/kG/Hr (8.45 mL/Hr) IV Continuous <Continuous>  dextrose 50% Injectable 50 milliLiter(s) IV Push every 15 minutes  dextrose 50% Injectable 25 milliLiter(s) IV Push every 15 minutes  docusate sodium 100 milliGRAM(s) Oral three times a day  insulin regular Infusion 3 Unit(s)/Hr (3 mL/Hr) IV Continuous <Continuous>  metoclopramide Injectable 10 milliGRAM(s) IV Push every 8 hours  niCARdipine Infusion 3 mG/Hr (15 mL/Hr) IV Continuous <Continuous>  norepinephrine Infusion 0.05 MICROgram(s)/kG/Min (6.337 mL/Hr) IV Continuous <Continuous>  pantoprazole  Injectable 40 milliGRAM(s) IV Push daily  potassium chloride  10 mEq/50 mL IVPB 10 milliEquivalent(s) IV Intermittent every 1 hour  potassium chloride  10 mEq/50 mL IVPB 10 milliEquivalent(s) IV Intermittent every 1 hour  potassium chloride  10 mEq/50 mL IVPB 10 milliEquivalent(s) IV Intermittent every 1 hour  propofol Infusion 10 MICROgram(s)/kG/Min (4.056 mL/Hr) IV Continuous <Continuous>  sodium chloride 0.9%. 1000 milliLiter(s) (10 mL/Hr) IV Continuous <Continuous>  vasopressin Infusion 0.033 Unit(s)/Min (2 mL/Hr) IV Continuous <Continuous>    MEDICATIONS  (PRN):        MEDICATIONS  (PRN):      LABS:              9.5                  142  | 22   | 15           6.6   >-----------< 67      ------------------------< 112                   28.3                 3.7  | 101  | 1.02                                         Ca 8.2   Mg 1.8   Ph 2.8                       RADIOLOGY:  X Reviewed and interpreted by me            Assessment and Plan:     This is a 69 year old female with history of HTN, preeclampsia, c/o abdominal pain, Pt went for CT scan on 3/2019 S/P Ascending  thoracoabdominal aortic aneurysm repair and aortic valve replaced Bioprosthetic valve) 4/8/19.    S/P Open TAAA repair involving cardiopulmonary bypass, S/P Spinal drain placement, Bypass celiac, SMA & Spinal artery POD # 0  Intraoperative bleeding requiring 2 PRBC, 2 Platelets, & FEIBA       Plan f/u ABGs, Spo2, CXR, wean to extubate once hemodynamically stable.  SR,  monitor for post op bleeding, + chest tube outputs.   Cont invasive Hemodynamic monitoring  Serial Hct, Maintain MAP >80-90 Pressors to maintain MAP > 70 f/u perfusion indices, volume resuscitation  Monitor for any malperfusion.  Spinal drain, with CSF removal,   patient at risk for Paraplegia.    Glycemic control,   Suzanne op antibiotics.        I have spent 30 minutes providing critical care management to this patient.

## 2019-08-29 NOTE — BRIEF OPERATIVE NOTE - OPERATION/FINDINGS
Ancef, fentanyl 75 mcg and xylocaine 8 cc  injected. Placement of Lumbar Drain. Csf flow through tube confirmed at end of procedure
Thoracoabdominal aneurysm open repair with Decron graft and celiac SMA bypass, reimplantation of lumbar artery.
thoracoabdominal aneurysm

## 2019-08-29 NOTE — BRIEF OPERATIVE NOTE - NSICDXBRIEFPROCEDURE_GEN_ALL_CORE_FT
PROCEDURES:  Insertion of lumbar drain 29-Aug-2019 08:03:20  Anuel Bullard
PROCEDURES:  Repair, aneurysm, aorta, thoracoabdominal 29-Aug-2019 17:48:48 Thoracoabdominal aneurysm open repair with Decron graft and celiac SMA bypass, reimplantation of lumbar artery Niecy Fletcher

## 2019-08-29 NOTE — PROGRESS NOTE ADULT - SUBJECTIVE AND OBJECTIVE BOX
Under  GA  via  a Thoraco abdominal  incision  by  Dr Briseno  and myself  Thoraco abdominal  aneurysm  repair  done  doing  proximal  anastomosis   to  a previous  stent  graft  from  elephant  trunk  distal  anastomosis  is  don to supra renal  aorta  close to the  renals  SMA and Celiac  were perfused through  plegic  catheters  via  CP  bypass .  30x30  aortic  graft used  a 16x8  bifurcated  graft  created  as a branch from the main graft  and  the  two limbs  used  for the  Celiac  and SMA  anastomosis  done  end  to  end  fashion  Patient  has  excellent  urine output  and  intact pulses  at the  end  of the  procedure  Neurologic  exam  could not be  done  because the patient  is  still under anesthesia  intubated  Intraperitoneal contents  examined  and  small bowel and  colon all viable   Rent in the  peritoneum closed  with  3 0  vicryl    Asst  dr Masters and  Dr Wilson

## 2019-08-30 LAB
ALBUMIN SERPL ELPH-MCNC: 3.7 G/DL — SIGNIFICANT CHANGE UP (ref 3.3–5)
ALBUMIN SERPL ELPH-MCNC: 3.9 G/DL — SIGNIFICANT CHANGE UP (ref 3.3–5)
ALBUMIN SERPL ELPH-MCNC: 4.1 G/DL — SIGNIFICANT CHANGE UP (ref 3.3–5)
ALP SERPL-CCNC: 33 U/L — LOW (ref 40–120)
ALP SERPL-CCNC: 38 U/L — LOW (ref 40–120)
ALP SERPL-CCNC: 40 U/L — SIGNIFICANT CHANGE UP (ref 40–120)
ALT FLD-CCNC: 34 U/L — SIGNIFICANT CHANGE UP (ref 10–45)
ALT FLD-CCNC: 35 U/L — SIGNIFICANT CHANGE UP (ref 10–45)
ALT FLD-CCNC: 66 U/L — HIGH (ref 10–45)
ANION GAP SERPL CALC-SCNC: 16 MMOL/L — SIGNIFICANT CHANGE UP (ref 5–17)
ANION GAP SERPL CALC-SCNC: 16 MMOL/L — SIGNIFICANT CHANGE UP (ref 5–17)
ANION GAP SERPL CALC-SCNC: 17 MMOL/L — SIGNIFICANT CHANGE UP (ref 5–17)
APTT BLD: 27.5 SEC — SIGNIFICANT CHANGE UP (ref 27.5–36.3)
AST SERPL-CCNC: 122 U/L — HIGH (ref 10–40)
AST SERPL-CCNC: 132 U/L — HIGH (ref 10–40)
AST SERPL-CCNC: 175 U/L — HIGH (ref 10–40)
BILIRUB SERPL-MCNC: 0.8 MG/DL — SIGNIFICANT CHANGE UP (ref 0.2–1.2)
BILIRUB SERPL-MCNC: 1 MG/DL — SIGNIFICANT CHANGE UP (ref 0.2–1.2)
BILIRUB SERPL-MCNC: 1.2 MG/DL — SIGNIFICANT CHANGE UP (ref 0.2–1.2)
BUN SERPL-MCNC: 20 MG/DL — SIGNIFICANT CHANGE UP (ref 7–23)
BUN SERPL-MCNC: 22 MG/DL — SIGNIFICANT CHANGE UP (ref 7–23)
BUN SERPL-MCNC: 27 MG/DL — HIGH (ref 7–23)
CALCIUM SERPL-MCNC: 7.9 MG/DL — LOW (ref 8.4–10.5)
CALCIUM SERPL-MCNC: 8 MG/DL — LOW (ref 8.4–10.5)
CALCIUM SERPL-MCNC: 8.5 MG/DL — SIGNIFICANT CHANGE UP (ref 8.4–10.5)
CHLORIDE SERPL-SCNC: 101 MMOL/L — SIGNIFICANT CHANGE UP (ref 96–108)
CHLORIDE SERPL-SCNC: 102 MMOL/L — SIGNIFICANT CHANGE UP (ref 96–108)
CHLORIDE SERPL-SCNC: 103 MMOL/L — SIGNIFICANT CHANGE UP (ref 96–108)
CK MB BLD-MCNC: 1.9 % — SIGNIFICANT CHANGE UP (ref 0–3.5)
CK MB CFR SERPL CALC: 25.2 NG/ML — HIGH (ref 0–3.8)
CK SERPL-CCNC: 1358 U/L — HIGH (ref 25–170)
CO2 SERPL-SCNC: 22 MMOL/L — SIGNIFICANT CHANGE UP (ref 22–31)
CO2 SERPL-SCNC: 22 MMOL/L — SIGNIFICANT CHANGE UP (ref 22–31)
CO2 SERPL-SCNC: 23 MMOL/L — SIGNIFICANT CHANGE UP (ref 22–31)
CREAT SERPL-MCNC: 1.52 MG/DL — HIGH (ref 0.5–1.3)
CREAT SERPL-MCNC: 1.71 MG/DL — HIGH (ref 0.5–1.3)
CREAT SERPL-MCNC: 2.14 MG/DL — HIGH (ref 0.5–1.3)
GAS PNL BLDA: SIGNIFICANT CHANGE UP
GLUCOSE SERPL-MCNC: 119 MG/DL — HIGH (ref 70–99)
GLUCOSE SERPL-MCNC: 141 MG/DL — HIGH (ref 70–99)
GLUCOSE SERPL-MCNC: 151 MG/DL — HIGH (ref 70–99)
HCT VFR BLD CALC: 27.5 % — LOW (ref 34.5–45)
HCT VFR BLD CALC: 28.4 % — LOW (ref 34.5–45)
HEPARIN-PF4 AB RESULT: <0.6 U/ML — SIGNIFICANT CHANGE UP (ref 0–0.9)
HGB BLD-MCNC: 9.1 G/DL — LOW (ref 11.5–15.5)
HGB BLD-MCNC: 9.5 G/DL — LOW (ref 11.5–15.5)
INR BLD: 1.2 RATIO — HIGH (ref 0.88–1.16)
MAGNESIUM SERPL-MCNC: 2.1 MG/DL — SIGNIFICANT CHANGE UP (ref 1.6–2.6)
MCHC RBC-ENTMCNC: 28.6 PG — SIGNIFICANT CHANGE UP (ref 27–34)
MCHC RBC-ENTMCNC: 30.6 PG — SIGNIFICANT CHANGE UP (ref 27–34)
MCHC RBC-ENTMCNC: 32.1 GM/DL — SIGNIFICANT CHANGE UP (ref 32–36)
MCHC RBC-ENTMCNC: 34.5 GM/DL — SIGNIFICANT CHANGE UP (ref 32–36)
MCV RBC AUTO: 88.6 FL — SIGNIFICANT CHANGE UP (ref 80–100)
MCV RBC AUTO: 89.1 FL — SIGNIFICANT CHANGE UP (ref 80–100)
PF4 HEPARIN CMPLX AB SER-ACNC: NEGATIVE — SIGNIFICANT CHANGE UP
PHOSPHATE SERPL-MCNC: 3.5 MG/DL — SIGNIFICANT CHANGE UP (ref 2.5–4.5)
PLATELET # BLD AUTO: 60 K/UL — LOW (ref 150–400)
PLATELET # BLD AUTO: 63 K/UL — LOW (ref 150–400)
POTASSIUM SERPL-MCNC: 4.3 MMOL/L — SIGNIFICANT CHANGE UP (ref 3.5–5.3)
POTASSIUM SERPL-MCNC: 4.4 MMOL/L — SIGNIFICANT CHANGE UP (ref 3.5–5.3)
POTASSIUM SERPL-MCNC: 4.6 MMOL/L — SIGNIFICANT CHANGE UP (ref 3.5–5.3)
POTASSIUM SERPL-SCNC: 4.3 MMOL/L — SIGNIFICANT CHANGE UP (ref 3.5–5.3)
POTASSIUM SERPL-SCNC: 4.4 MMOL/L — SIGNIFICANT CHANGE UP (ref 3.5–5.3)
POTASSIUM SERPL-SCNC: 4.6 MMOL/L — SIGNIFICANT CHANGE UP (ref 3.5–5.3)
PROT SERPL-MCNC: 5.2 G/DL — LOW (ref 6–8.3)
PROT SERPL-MCNC: 5.2 G/DL — LOW (ref 6–8.3)
PROT SERPL-MCNC: 5.5 G/DL — LOW (ref 6–8.3)
PROTHROM AB SERPL-ACNC: 13.8 SEC — HIGH (ref 10–12.9)
RBC # BLD: 3.1 M/UL — LOW (ref 3.8–5.2)
RBC # BLD: 3.19 M/UL — LOW (ref 3.8–5.2)
RBC # FLD: 13.8 % — SIGNIFICANT CHANGE UP (ref 10.3–14.5)
RBC # FLD: 14.1 % — SIGNIFICANT CHANGE UP (ref 10.3–14.5)
SODIUM SERPL-SCNC: 140 MMOL/L — SIGNIFICANT CHANGE UP (ref 135–145)
SODIUM SERPL-SCNC: 141 MMOL/L — SIGNIFICANT CHANGE UP (ref 135–145)
SODIUM SERPL-SCNC: 141 MMOL/L — SIGNIFICANT CHANGE UP (ref 135–145)
TROPONIN T, HIGH SENSITIVITY RESULT: 35 NG/L — SIGNIFICANT CHANGE UP (ref 0–51)
WBC # BLD: 11.5 K/UL — HIGH (ref 3.8–10.5)
WBC # BLD: 11.7 K/UL — HIGH (ref 3.8–10.5)
WBC # FLD AUTO: 11.5 K/UL — HIGH (ref 3.8–10.5)
WBC # FLD AUTO: 11.7 K/UL — HIGH (ref 3.8–10.5)

## 2019-08-30 PROCEDURE — 72131 CT LUMBAR SPINE W/O DYE: CPT | Mod: 26

## 2019-08-30 PROCEDURE — 99291 CRITICAL CARE FIRST HOUR: CPT

## 2019-08-30 PROCEDURE — 93010 ELECTROCARDIOGRAM REPORT: CPT

## 2019-08-30 PROCEDURE — 71045 X-RAY EXAM CHEST 1 VIEW: CPT | Mod: 26

## 2019-08-30 PROCEDURE — 72148 MRI LUMBAR SPINE W/O DYE: CPT | Mod: 26

## 2019-08-30 PROCEDURE — 72146 MRI CHEST SPINE W/O DYE: CPT | Mod: 26

## 2019-08-30 RX ORDER — ALBUMIN HUMAN 25 %
250 VIAL (ML) INTRAVENOUS
Refills: 0 | Status: COMPLETED | OUTPATIENT
Start: 2019-08-30 | End: 2019-08-30

## 2019-08-30 RX ORDER — HYDROMORPHONE HYDROCHLORIDE 2 MG/ML
0.5 INJECTION INTRAMUSCULAR; INTRAVENOUS; SUBCUTANEOUS ONCE
Refills: 0 | Status: DISCONTINUED | OUTPATIENT
Start: 2019-08-30 | End: 2019-08-30

## 2019-08-30 RX ORDER — ALBUMIN HUMAN 25 %
250 VIAL (ML) INTRAVENOUS ONCE
Refills: 0 | Status: COMPLETED | OUTPATIENT
Start: 2019-08-30 | End: 2019-08-30

## 2019-08-30 RX ORDER — DEXMEDETOMIDINE HYDROCHLORIDE IN 0.9% SODIUM CHLORIDE 4 UG/ML
0.3 INJECTION INTRAVENOUS
Qty: 200 | Refills: 0 | Status: DISCONTINUED | OUTPATIENT
Start: 2019-08-30 | End: 2019-09-01

## 2019-08-30 RX ORDER — NOREPINEPHRINE BITARTRATE/D5W 8 MG/250ML
0.05 PLASTIC BAG, INJECTION (ML) INTRAVENOUS
Qty: 8 | Refills: 0 | Status: DISCONTINUED | OUTPATIENT
Start: 2019-08-30 | End: 2019-09-02

## 2019-08-30 RX ADMIN — Medication 100 MILLIGRAM(S): at 14:55

## 2019-08-30 RX ADMIN — Medication 125 MILLILITER(S): at 02:00

## 2019-08-30 RX ADMIN — INSULIN HUMAN 3 UNIT(S)/HR: 100 INJECTION, SOLUTION SUBCUTANEOUS at 08:37

## 2019-08-30 RX ADMIN — CHLORHEXIDINE GLUCONATE 15 MILLILITER(S): 213 SOLUTION TOPICAL at 05:08

## 2019-08-30 RX ADMIN — DEXMEDETOMIDINE HYDROCHLORIDE IN 0.9% SODIUM CHLORIDE 8.45 MICROGRAM(S)/KG/HR: 4 INJECTION INTRAVENOUS at 08:36

## 2019-08-30 RX ADMIN — INSULIN HUMAN 3 UNIT(S)/HR: 100 INJECTION, SOLUTION SUBCUTANEOUS at 03:19

## 2019-08-30 RX ADMIN — HYDROMORPHONE HYDROCHLORIDE 0.5 MILLIGRAM(S): 2 INJECTION INTRAMUSCULAR; INTRAVENOUS; SUBCUTANEOUS at 21:30

## 2019-08-30 RX ADMIN — Medication 125 MILLILITER(S): at 07:16

## 2019-08-30 RX ADMIN — Medication 125 MILLILITER(S): at 03:18

## 2019-08-30 RX ADMIN — CHLORHEXIDINE GLUCONATE 1 APPLICATION(S): 213 SOLUTION TOPICAL at 14:45

## 2019-08-30 RX ADMIN — Medication 10 MILLIGRAM(S): at 21:36

## 2019-08-30 RX ADMIN — PANTOPRAZOLE SODIUM 40 MILLIGRAM(S): 20 TABLET, DELAYED RELEASE ORAL at 14:50

## 2019-08-30 RX ADMIN — VASOPRESSIN 2 UNIT(S)/MIN: 20 INJECTION INTRAVENOUS at 03:19

## 2019-08-30 RX ADMIN — VASOPRESSIN 2 UNIT(S)/MIN: 20 INJECTION INTRAVENOUS at 08:37

## 2019-08-30 RX ADMIN — Medication 100 MILLIEQUIVALENT(S): at 00:00

## 2019-08-30 RX ADMIN — Medication 10 MILLIGRAM(S): at 05:08

## 2019-08-30 RX ADMIN — HYDROMORPHONE HYDROCHLORIDE 0.5 MILLIGRAM(S): 2 INJECTION INTRAMUSCULAR; INTRAVENOUS; SUBCUTANEOUS at 08:32

## 2019-08-30 RX ADMIN — HYDROMORPHONE HYDROCHLORIDE 0.5 MILLIGRAM(S): 2 INJECTION INTRAMUSCULAR; INTRAVENOUS; SUBCUTANEOUS at 21:00

## 2019-08-30 RX ADMIN — DEXMEDETOMIDINE HYDROCHLORIDE IN 0.9% SODIUM CHLORIDE 8.45 MICROGRAM(S)/KG/HR: 4 INJECTION INTRAVENOUS at 03:20

## 2019-08-30 RX ADMIN — Medication 125 MILLILITER(S): at 02:05

## 2019-08-30 RX ADMIN — CHLORHEXIDINE GLUCONATE 15 MILLILITER(S): 213 SOLUTION TOPICAL at 17:58

## 2019-08-30 RX ADMIN — Medication 6.34 MICROGRAM(S)/KG/MIN: at 08:38

## 2019-08-30 RX ADMIN — Medication 100 MILLIGRAM(S): at 05:08

## 2019-08-30 RX ADMIN — Medication 100 MILLIGRAM(S): at 21:37

## 2019-08-30 RX ADMIN — Medication 10 MILLIGRAM(S): at 14:55

## 2019-08-30 RX ADMIN — HYDROMORPHONE HYDROCHLORIDE 0.5 MILLIGRAM(S): 2 INJECTION INTRAMUSCULAR; INTRAVENOUS; SUBCUTANEOUS at 08:47

## 2019-08-30 RX ADMIN — Medication 500 MILLILITER(S): at 08:36

## 2019-08-30 RX ADMIN — Medication 125 MILLILITER(S): at 07:00

## 2019-08-30 NOTE — PROGRESS NOTE ADULT - SUBJECTIVE AND OBJECTIVE BOX
Patient is  seen she  has excellent  pedal pulses   Abdomen soft  she  has  borderline  urine output    Patient  developed  a new  RLE  weakness LLE is  intact  Suggest  keeping  the MAP over  90  IV fluid  hydration  to increase the  intravascular volume  Possible  MRI  to rule  out  Epidural hematoma   If this  all ischemic  then  drainage  of  spinal fluid  and  raising  the MAP  and observe  Creatinine  bump  as expected

## 2019-08-30 NOTE — CHART NOTE - NSCHARTNOTEFT_GEN_A_CORE
This morning patient was noted to not be moving right leg, per night nurses started around 650am. MD Briseno and Barbie made aware, neurosurgery at bedside. Drainage from lumbar drain increased to -15cc/hr (from -10hr) and MAP goal increased to 110 with albumin and vasopressin. CT spine down to r/o epidural hematoma followed by MRI lumbar/thoracic which also revealed no epidural hematoma, multilevel spinal stenosis unchanged from previous scan.   MD Valentin at bedside aware of results of scan and given no compression and ICP This morning patient was noted to not be moving right leg, per night nurses started around 650am. MD Briseno and Barbie made aware, neurosurgery at bedside. Drainage from lumbar drain increased to -15cc/hr (from -10hr) and MAP goal increased to 110 with albumin and vasopressin. CT spine down to r/o epidural hematoma followed by MRI lumbar/thoracic which also revealed no epidural hematoma, multilevel spinal stenosis unchanged from previous scan.   MD Valentin at bedside aware of results of scan and given no compression and ICP <10 would like to no longer drain unless ICP >10.  Per discussions with MD Briseno (primary surgeon) and would like to continue to drain 10cc/hr to prevent any further injury. Also spoke with neurosurgery that recommends 5cc/hr. MD Gamino spoke with MD Briseno and decision was made to continue 10cc/hr lumbar drain removal, will continue to monitor ICP and neurostatus. Per discussions with MD Briseno as well, will not extubate tonight, will leave sedated and re-assess in the morning.

## 2019-08-30 NOTE — PROGRESS NOTE ADULT - SUBJECTIVE AND OBJECTIVE BOX
CRITICAL CARE ATTENDING - CTICU    MEDICATIONS  (STANDING):  cefuroxime  IVPB 1500 milliGRAM(s) IV Intermittent every 8 hours  chlorhexidine 0.12% Liquid 15 milliLiter(s) Oral Mucosa every 12 hours  chlorhexidine 2% Cloths 1 Application(s) Topical daily  dexmedetomidine Infusion 0.3 MICROgram(s)/kG/Hr (5.07 mL/Hr) IV Continuous <Continuous>  dexmedetomidine Infusion 0.5 MICROgram(s)/kG/Hr (8.45 mL/Hr) IV Continuous <Continuous>  dextrose 50% Injectable 50 milliLiter(s) IV Push every 15 minutes  dextrose 50% Injectable 25 milliLiter(s) IV Push every 15 minutes  docusate sodium 100 milliGRAM(s) Oral three times a day  insulin regular Infusion 3 Unit(s)/Hr (3 mL/Hr) IV Continuous <Continuous>  metoclopramide Injectable 10 milliGRAM(s) IV Push every 8 hours  niCARdipine Infusion 3 mG/Hr (15 mL/Hr) IV Continuous <Continuous>  norepinephrine Infusion 0.05 MICROgram(s)/kG/Min (6.337 mL/Hr) IV Continuous <Continuous>  pantoprazole  Injectable 40 milliGRAM(s) IV Push daily  sodium chloride 0.9%. 1000 milliLiter(s) (10 mL/Hr) IV Continuous <Continuous>  vasopressin Infusion 0.033 Unit(s)/Min (2 mL/Hr) IV Continuous <Continuous>                                    9.1    11.7  )-----------( 63       ( 30 Aug 2019 00:33 )             28.4       08-30    141  |  103  |  22  ----------------------------<  119<H>  4.4   |  22  |  1.71<H>    Ca    7.9<L>      30 Aug 2019 03:51  Phos  3.5     08-30  Mg     2.1     08-30    TPro  5.2<L>  /  Alb  3.9  /  TBili  0.8  /  DBili  x   /  AST  132<H>  /  ALT  35  /  AlkPhos  33<L>  08-30      PT/INR - ( 30 Aug 2019 00:33 )   PT: 13.8 sec;   INR: 1.20 ratio         PTT - ( 30 Aug 2019 00:33 )  PTT:27.5 sec    Mode: AC/ CMV (Assist Control/ Continuous Mandatory Ventilation)  RR (machine): 18  TV (machine): 500  FiO2: 50  PEEP: 5  ITime: 1  MAP: 9  PIP: 20      Daily     Daily Weight in k.8 (30 Aug 2019 00:00)       @ 07:  -   @ 07:00  --------------------------------------------------------  IN: 2919.2 mL / OUT: 2165 mL / NET: 754.2 mL     @ 07:  -   @ 12:35  --------------------------------------------------------  IN: 337.4 mL / OUT: 175 mL / NET: 162.4 mL        Critically Ill patient  : [ ] preoperative ,   [x ] post operative    Requires :  [ x] Arterial Line   [x ] Central Line  [ ] PA catheter  [ ] IABP  [ ] ECMO  [ ] LVAD  [x ] Ventilator  [ ] pacemaker [ ] Impella. [x] spinal drain                      [ x] ABG's     x[ ] Pulse Oxymetry Monitoring  Bedside evaluation , monitoring , treatment of hemodynamics , fluids , IVP/ IVCD meds.        Diagnosis:     POD 1 Re Op - Thoraco-abdominal aneurysm repair - L    respiratory failure     Requires chest PT, pulmonary toilet, ambu bagging, suctioning to maintain SaO2,  patent airway and treat atelectasis.     Ventilator Management:  [x ]AC-rest    [ ]CPAP-PS Wean    [ ]Trach Collar     [ ]Extubate    [ ] T-Piece  [ ]peep>5     Difficult weaning process - multiple organ system involvement in critically ill patient     Spinal Drain    Thrombocytopenia     R Foot Paresis - MRI / CT -  r/o  spinal epidural Hematoma    Hemodynamic lability,instability. Requires IVCD [ x] vasopressors [ ] inotropes  [ ] vasodilator  [ ]IVSS fluid  to maintain MAP, perfusion, C.I.     h/o Hypertension - drive MAP > 100      Renal Failure - Acute Kidney Injury                         Discussed with CT surgeon, Physician's Assistant - Nurse Practitioner- Critical care medicine team.   Dicussed at  AM / PM rounds.   Chart, labs , films reviewed.    Total Time: 30 min

## 2019-08-30 NOTE — PROGRESS NOTE ADULT - ASSESSMENT
69F s/p aortic repair w/ lumbar drain ~2hr clamp time now with BLE weakness R > L  - mri t and L spine when stable  - will DC LD per CTU preference

## 2019-08-30 NOTE — CONSULT NOTE ADULT - ASSESSMENT
69 yr old female with H/O Thoraco abdominal Aortic aneurysm, Aortic Stenosis/ regurgitation S/P AVR (T), Ascending & hemiarch replacement 4/9/19  now sp POD #1 thoracoabdominal aortic aneurysm.  DURGA-Non oliguric.  This may be ATN   LE weakness  Rhabdo  Thrombocytopenia     1 Renal- The CPK are not significant at present;  Simple trend the serum creatinine.  No need for renal sono.  All likely hemodynamic and related to the surgery  2 Neuro-CT without contrast to be done to ro hematoma at present   3 CVS-Higher MAP to maintain spinal perfusion and continues IV hydration   4 Heme-Monitor plt

## 2019-08-30 NOTE — CONSULT NOTE ADULT - SUBJECTIVE AND OBJECTIVE BOX
NEPHROLOGY - NSN    Patient seen and examined.    HPI:  This is a 69 year old female with history of HTN, preeclampsia, c/o abdominal pain, Pt went for CT scan on 3/2019 S/P Ascending  thoracoabdominal aortic aneurysm repair and aortic valve replaced Bioprosthetic valve) 4/8/19. Presents reoperative thoracoabdominal aneurysm repair  (7.4CM TAAA mostly at the diaphragmatic hiatus) 8/29/19.    Pt is now POD # 1.  Today she had loss of strength on the LE R>L.  She is seen by neurosurgery and there is plan for CT scan without contrast  Pts only co-morbidity is HTN and she has not had a CVA.  She quit smoking about a year ago and there is no COPD  No renal issues noted.  There is no hematuria or bubbles in the urine.  No history of NSAIDS or nephrolithisis.  The patient urinates once or twice in the night and there is no incontinence.  No family hx or renal disease or back pain.    No recent abx use.  No alleviating or aggravating factors with respect to the kidneys. Todays creatinine was 1.7 and renal consult was called.  She is on pressors to maintain a higher MAP  SHe is intubated and all hx is from the chart       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: s/p repair  Asceding aortic aneurysm repair 4/8/2019  S/P cataract surgery  Arm fracture, left: 2005      MEDICATIONS  (STANDING):  cefuroxime  IVPB 1500 milliGRAM(s) IV Intermittent every 8 hours  chlorhexidine 0.12% Liquid 15 milliLiter(s) Oral Mucosa every 12 hours  chlorhexidine 2% Cloths 1 Application(s) Topical daily  dexmedetomidine Infusion 0.3 MICROgram(s)/kG/Hr (5.07 mL/Hr) IV Continuous <Continuous>  dexmedetomidine Infusion 0.5 MICROgram(s)/kG/Hr (8.45 mL/Hr) IV Continuous <Continuous>  dextrose 50% Injectable 50 milliLiter(s) IV Push every 15 minutes  dextrose 50% Injectable 25 milliLiter(s) IV Push every 15 minutes  docusate sodium 100 milliGRAM(s) Oral three times a day  insulin regular Infusion 3 Unit(s)/Hr (3 mL/Hr) IV Continuous <Continuous>  metoclopramide Injectable 10 milliGRAM(s) IV Push every 8 hours  niCARdipine Infusion 3 mG/Hr (15 mL/Hr) IV Continuous <Continuous>  norepinephrine Infusion 0.05 MICROgram(s)/kG/Min (6.337 mL/Hr) IV Continuous <Continuous>  pantoprazole  Injectable 40 milliGRAM(s) IV Push daily  sodium chloride 0.9%. 1000 milliLiter(s) (10 mL/Hr) IV Continuous <Continuous>  vasopressin Infusion 0.033 Unit(s)/Min (2 mL/Hr) IV Continuous <Continuous>      Allergies    No Known Allergies    Intolerances        SOCIAL HISTORY:  Denies alcohol abuse, drug abuse or tobacco usage.     FAMILY HISTORY:  Family history of skin cancer: mother  Family history of coronary artery bypass graft (Father): with valve disease      VITALS:  T(C): 37.4 (08-30-19 @ 08:00), Max: 38.7 (08-30-19 @ 00:00)  HR: 63 (08-30-19 @ 09:15) (60 - 95)  BP: --  RR: 0 (08-30-19 @ 09:15) (0 - 35)  SpO2: 100% (08-30-19 @ 09:15) (100% - 100%)    REVIEW OF SYSTEMS:  unable     PHYSICAL EXAM:  Constitutional: NAD;  intubated   HEENT: EOMI  Neck:  No JVD, supple   Respiratory: transmitted upper   Cardiovascular: S1 and S2, RRR  Gastrointestinal: + BS, soft, NT, ND  Extremities: No peripheral edema, + peripheral pulses  Neurological: A/O x 3, weakness in the LE R>L  Psychiatric: Normal mood, normal affect  : +  [place]  Skin: No rashes, C/D/I  Access: Not applicable    I and O's:    08-28 @ 07:01  -  08-29 @ 07:00  --------------------------------------------------------  IN: 540 mL / OUT: 1245 mL / NET: -705 mL    08-29 @ 07:01 - 08-30 @ 07:00  --------------------------------------------------------  IN: 2919.2 mL / OUT: 2165 mL / NET: 754.2 mL    08-30 @ 07:01  - 08-30 @ 09:17  --------------------------------------------------------  IN: 298.2 mL / OUT: 75 mL / NET: 223.2 mL          LABS:                        9.1    11.7  )-----------( 63       ( 30 Aug 2019 00:33 )             28.4     08-30    141  |  103  |  22  ----------------------------<  119<H>  4.4   |  22  |  1.71<H>    Ca    7.9<L>      30 Aug 2019 03:51  Phos  3.5     08-30  Mg     2.1     08-30    TPro  5.2<L>  /  Alb  3.9  /  TBili  0.8  /  DBili  x   /  AST  132<H>  /  ALT  35  /  AlkPhos  33<L>  08-30         RADIOLOGY & ADDITIONAL STUDIES:  < from: [facility] (08.28.19 @ 16:22) >    EXAM:  CAROTID DUPLEX BILATERAL                            PROCEDURE DATE:  08/28/2019            INTERPRETATION:  Technique: Grayscale, color and spectral Doppler   examination of both carotid arteries was performed.     HISTORY:  Hypertension, thoracic aortic aneurysm, evaluate for carotid   artery disease.    A prior examination, dated 4/4/2019 showed no hemodynamically significant   carotid artery stenoses.    A delayed upstroke of the systolic waveform of the carotid and vertebral   arteries is probably related to the presence of the thoracic aortic   aneurysm.    As before moderate calcified atheromatous plaque is identified at the   bifurcations of both the right and left common carotid arteries into   internal and external branches.    Blood flow velocities are as follows:    RIGHT:    PROX CCA = 34 ;  DIST CCA = 27 ;  PROX ICA = 46 ;  DIST ICA =   96 ;  ECA = 59    LEFT   :    PROX CCA = 36 ;  DIST CCA = 32 ;  PROX ICA = 64 ;  DIST ICA =   79 ;  ECA = 44    There is antegrade flow through both vertebral arteries.    IMPRESSION: No hemodynamically significant carotid artery stenoses.    Measurement of carotid stenosis is based on velocity parameters that   correlate the residual internal carotid diameter with that of the more   distal vessel in accordance with a method such as the [place] American   Symptomatic Carotid Endarterectomy Trial (NASCET).                            LALITHA TINEO M.D., ATTENDING RADIOLOGIST  This document has been electronically signed. Aug 28 2019  5:07PM

## 2019-08-30 NOTE — PROGRESS NOTE ADULT - SUBJECTIVE AND OBJECTIVE BOX
Patient seen and examined at bedside.    --Anticoagulation--    T(C): 37.8 (08-30-19 @ 04:00), Max: 38.7 (08-30-19 @ 00:00)  HR: 67 (08-30-19 @ 06:37) (61 - 95)  BP: 186/123 (08-29-19 @ 07:00) (186/123 - 186/123)  RR: 33 (08-30-19 @ 06:30) (0 - 35)  SpO2: 100% (08-30-19 @ 06:37) (98% - 100%)  Wt(kg): --    Exam:  Intubated, EO to voice, FC,  RLE 0-1/5, LLE at least 3/5  SILT  no clonus

## 2019-08-31 LAB
ALBUMIN SERPL ELPH-MCNC: 3.7 G/DL — SIGNIFICANT CHANGE UP (ref 3.3–5)
ALP SERPL-CCNC: 48 U/L — SIGNIFICANT CHANGE UP (ref 40–120)
ALT FLD-CCNC: 77 U/L — HIGH (ref 10–45)
ANION GAP SERPL CALC-SCNC: 19 MMOL/L — HIGH (ref 5–17)
APPEARANCE UR: CLEAR — SIGNIFICANT CHANGE UP
AST SERPL-CCNC: 139 U/L — HIGH (ref 10–40)
BACTERIA # UR AUTO: NEGATIVE — SIGNIFICANT CHANGE UP
BILIRUB SERPL-MCNC: 0.8 MG/DL — SIGNIFICANT CHANGE UP (ref 0.2–1.2)
BILIRUB UR-MCNC: NEGATIVE — SIGNIFICANT CHANGE UP
BUN SERPL-MCNC: 34 MG/DL — HIGH (ref 7–23)
CALCIUM SERPL-MCNC: 8.3 MG/DL — LOW (ref 8.4–10.5)
CHLORIDE SERPL-SCNC: 104 MMOL/L — SIGNIFICANT CHANGE UP (ref 96–108)
CO2 SERPL-SCNC: 19 MMOL/L — LOW (ref 22–31)
COLOR SPEC: YELLOW — SIGNIFICANT CHANGE UP
CREAT ?TM UR-MCNC: 78 MG/DL — SIGNIFICANT CHANGE UP
CREAT SERPL-MCNC: 2.37 MG/DL — HIGH (ref 0.5–1.3)
DIFF PNL FLD: ABNORMAL
EPI CELLS # UR: 4 /HPF — SIGNIFICANT CHANGE UP
GAS PNL BLDA: SIGNIFICANT CHANGE UP
GLUCOSE SERPL-MCNC: 146 MG/DL — HIGH (ref 70–99)
GLUCOSE UR QL: NEGATIVE — SIGNIFICANT CHANGE UP
HCT VFR BLD CALC: 29.6 % — LOW (ref 34.5–45)
HGB BLD-MCNC: 9.5 G/DL — LOW (ref 11.5–15.5)
HYALINE CASTS # UR AUTO: 4 /LPF — HIGH (ref 0–2)
KETONES UR-MCNC: ABNORMAL
LEUKOCYTE ESTERASE UR-ACNC: NEGATIVE — SIGNIFICANT CHANGE UP
MAGNESIUM SERPL-MCNC: 2.2 MG/DL — SIGNIFICANT CHANGE UP (ref 1.6–2.6)
MCHC RBC-ENTMCNC: 28.8 PG — SIGNIFICANT CHANGE UP (ref 27–34)
MCHC RBC-ENTMCNC: 32.2 GM/DL — SIGNIFICANT CHANGE UP (ref 32–36)
MCV RBC AUTO: 89.6 FL — SIGNIFICANT CHANGE UP (ref 80–100)
NITRITE UR-MCNC: NEGATIVE — SIGNIFICANT CHANGE UP
PH UR: 6 — SIGNIFICANT CHANGE UP (ref 5–8)
PHOSPHATE SERPL-MCNC: 6.3 MG/DL — HIGH (ref 2.5–4.5)
PLATELET # BLD AUTO: 75 K/UL — LOW (ref 150–400)
POTASSIUM SERPL-MCNC: 4.3 MMOL/L — SIGNIFICANT CHANGE UP (ref 3.5–5.3)
POTASSIUM SERPL-SCNC: 4.3 MMOL/L — SIGNIFICANT CHANGE UP (ref 3.5–5.3)
PROT SERPL-MCNC: 5.5 G/DL — LOW (ref 6–8.3)
PROT UR-MCNC: ABNORMAL
RBC # BLD: 3.3 M/UL — LOW (ref 3.8–5.2)
RBC # FLD: 14.2 % — SIGNIFICANT CHANGE UP (ref 10.3–14.5)
RBC CASTS # UR COMP ASSIST: 7 /HPF — HIGH (ref 0–4)
SODIUM SERPL-SCNC: 142 MMOL/L — SIGNIFICANT CHANGE UP (ref 135–145)
SODIUM UR-SCNC: 54 MMOL/L — SIGNIFICANT CHANGE UP
SP GR SPEC: 1.02 — SIGNIFICANT CHANGE UP (ref 1.01–1.02)
UROBILINOGEN FLD QL: NEGATIVE — SIGNIFICANT CHANGE UP
WBC # BLD: 11.9 K/UL — HIGH (ref 3.8–10.5)
WBC # FLD AUTO: 11.9 K/UL — HIGH (ref 3.8–10.5)
WBC UR QL: 7 /HPF — HIGH (ref 0–5)

## 2019-08-31 PROCEDURE — 71045 X-RAY EXAM CHEST 1 VIEW: CPT | Mod: 26

## 2019-08-31 PROCEDURE — 93010 ELECTROCARDIOGRAM REPORT: CPT

## 2019-08-31 PROCEDURE — 99291 CRITICAL CARE FIRST HOUR: CPT

## 2019-08-31 RX ORDER — SODIUM CHLORIDE 9 MG/ML
1000 INJECTION, SOLUTION INTRAVENOUS
Refills: 0 | Status: DISCONTINUED | OUTPATIENT
Start: 2019-08-31 | End: 2019-08-31

## 2019-08-31 RX ORDER — FAMOTIDINE 10 MG/ML
20 INJECTION INTRAVENOUS DAILY
Refills: 0 | Status: DISCONTINUED | OUTPATIENT
Start: 2019-08-31 | End: 2019-09-02

## 2019-08-31 RX ORDER — SODIUM CHLORIDE 9 MG/ML
500 INJECTION, SOLUTION INTRAVENOUS ONCE
Refills: 0 | Status: COMPLETED | OUTPATIENT
Start: 2019-08-31 | End: 2019-08-31

## 2019-08-31 RX ORDER — SODIUM CHLORIDE 9 MG/ML
1000 INJECTION INTRAMUSCULAR; INTRAVENOUS; SUBCUTANEOUS
Refills: 0 | Status: DISCONTINUED | OUTPATIENT
Start: 2019-08-31 | End: 2019-09-02

## 2019-08-31 RX ORDER — HYDROMORPHONE HYDROCHLORIDE 2 MG/ML
0.5 INJECTION INTRAMUSCULAR; INTRAVENOUS; SUBCUTANEOUS ONCE
Refills: 0 | Status: DISCONTINUED | OUTPATIENT
Start: 2019-08-31 | End: 2019-08-31

## 2019-08-31 RX ORDER — HYDROMORPHONE HYDROCHLORIDE 2 MG/ML
0.25 INJECTION INTRAMUSCULAR; INTRAVENOUS; SUBCUTANEOUS ONCE
Refills: 0 | Status: DISCONTINUED | OUTPATIENT
Start: 2019-08-31 | End: 2019-08-31

## 2019-08-31 RX ORDER — SEVELAMER CARBONATE 2400 MG/1
800 POWDER, FOR SUSPENSION ORAL
Refills: 0 | Status: DISCONTINUED | OUTPATIENT
Start: 2019-08-31 | End: 2019-09-02

## 2019-08-31 RX ORDER — INSULIN LISPRO 100/ML
VIAL (ML) SUBCUTANEOUS
Refills: 0 | Status: DISCONTINUED | OUTPATIENT
Start: 2019-08-31 | End: 2019-09-08

## 2019-08-31 RX ORDER — POTASSIUM CHLORIDE 20 MEQ
10 PACKET (EA) ORAL ONCE
Refills: 0 | Status: COMPLETED | OUTPATIENT
Start: 2019-08-31 | End: 2019-08-31

## 2019-08-31 RX ADMIN — Medication 10 MILLIGRAM(S): at 16:30

## 2019-08-31 RX ADMIN — HYDROMORPHONE HYDROCHLORIDE 0.5 MILLIGRAM(S): 2 INJECTION INTRAMUSCULAR; INTRAVENOUS; SUBCUTANEOUS at 16:40

## 2019-08-31 RX ADMIN — SODIUM CHLORIDE 3000 MILLILITER(S): 9 INJECTION, SOLUTION INTRAVENOUS at 04:36

## 2019-08-31 RX ADMIN — Medication 10 MILLIGRAM(S): at 06:13

## 2019-08-31 RX ADMIN — HYDROMORPHONE HYDROCHLORIDE 0.5 MILLIGRAM(S): 2 INJECTION INTRAMUSCULAR; INTRAVENOUS; SUBCUTANEOUS at 20:00

## 2019-08-31 RX ADMIN — CHLORHEXIDINE GLUCONATE 1 APPLICATION(S): 213 SOLUTION TOPICAL at 11:15

## 2019-08-31 RX ADMIN — HYDROMORPHONE HYDROCHLORIDE 0.5 MILLIGRAM(S): 2 INJECTION INTRAMUSCULAR; INTRAVENOUS; SUBCUTANEOUS at 21:00

## 2019-08-31 RX ADMIN — SEVELAMER CARBONATE 800 MILLIGRAM(S): 2400 POWDER, FOR SUSPENSION ORAL at 23:01

## 2019-08-31 RX ADMIN — HYDROMORPHONE HYDROCHLORIDE 0.5 MILLIGRAM(S): 2 INJECTION INTRAMUSCULAR; INTRAVENOUS; SUBCUTANEOUS at 17:00

## 2019-08-31 RX ADMIN — Medication 50 MILLIEQUIVALENT(S): at 19:55

## 2019-08-31 RX ADMIN — HYDROMORPHONE HYDROCHLORIDE 0.25 MILLIGRAM(S): 2 INJECTION INTRAMUSCULAR; INTRAVENOUS; SUBCUTANEOUS at 11:15

## 2019-08-31 RX ADMIN — FAMOTIDINE 20 MILLIGRAM(S): 10 INJECTION INTRAVENOUS at 13:24

## 2019-08-31 RX ADMIN — CHLORHEXIDINE GLUCONATE 15 MILLILITER(S): 213 SOLUTION TOPICAL at 06:13

## 2019-08-31 RX ADMIN — HYDROMORPHONE HYDROCHLORIDE 0.25 MILLIGRAM(S): 2 INJECTION INTRAMUSCULAR; INTRAVENOUS; SUBCUTANEOUS at 11:30

## 2019-08-31 NOTE — PROGRESS NOTE ADULT - ASSESSMENT
69F s/p aortic repair w/ lumbar drain ~2hr clamp time now with BLE weakness R > L  - LD per CT surgery draining 10cc/hr, discussed risk of overdrainage and SDH with primary team who demonstrated understanding of risk  - Recommend stroke neurology consult for RLE weakness r/o intracranial stroke  - Will plan to DC LD in next 1-2 days

## 2019-08-31 NOTE — AIRWAY REMOVAL NOTE  ADULT & PEDS - ARTIFICAL AIRWAY REMOVAL COMMENTS
Written order for extubation verified. Pt was identified by full name and birthday compared to ID band.  Present during the procedure was Mariola FRANCO

## 2019-08-31 NOTE — PROGRESS NOTE ADULT - SUBJECTIVE AND OBJECTIVE BOX
PO day 2   right  foot  is  able to  move the toes   Sensation intact     .  Urine output is  OK  Vreatinine  2.7   Expected ATN  I believ  she  would recover from this She is  alert  following  commands   Excellent  peripheral pulses  No abdominal pain soft   Lactate  wnl  Chest tube  drainage  as expected   Plan  to extubate  later  today  i spoke  to  the patient,s  son at bedside  Plan  continue  CSF drainage  at  10 cc per hour  and keep the MAP  over  100  I discussed  with Dr Briseno  about  this  yesterday MRI does not  show  any  infarcts

## 2019-08-31 NOTE — PROGRESS NOTE ADULT - SUBJECTIVE AND OBJECTIVE BOX
Patient seen and examined at bedside.    --Anticoagulation--    T(C): 37.2 (08-31-19 @ 08:00), Max: 37.3 (08-30-19 @ 20:00)  HR: 61 (08-31-19 @ 08:40) (57 - 72)  BP: --  RR: 19 (08-31-19 @ 08:30) (0 - 27)  SpO2: 100% (08-31-19 @ 08:40) (100% - 100%)  Wt(kg): --    Exam:  Intubated, EO to voice, FC,  RLE 0-1/5, LLE at least 3/5  SILT  no clonus

## 2019-08-31 NOTE — PROGRESS NOTE ADULT - SUBJECTIVE AND OBJECTIVE BOX
Patient seen and examined  Extubated  no SOB  + LE weakness    REVIEW OF SYSTEMS:  As per HPI, otherwise 8 full 10 ROS were unremarkable    MEDICATIONS  (STANDING):  chlorhexidine 2% Cloths 1 Application(s) Topical daily  dexmedetomidine Infusion 0.3 MICROgram(s)/kG/Hr (5.07 mL/Hr) IV Continuous <Continuous>  dexmedetomidine Infusion 0.5 MICROgram(s)/kG/Hr (8.45 mL/Hr) IV Continuous <Continuous>  dextrose 50% Injectable 50 milliLiter(s) IV Push every 15 minutes  dextrose 50% Injectable 25 milliLiter(s) IV Push every 15 minutes  docusate sodium 100 milliGRAM(s) Oral three times a day  famotidine    Tablet 20 milliGRAM(s) Oral daily  insulin regular Infusion 3 Unit(s)/Hr (3 mL/Hr) IV Continuous <Continuous>  lactated ringers. 1000 milliLiter(s) (75 mL/Hr) IV Continuous <Continuous>  metoclopramide Injectable 10 milliGRAM(s) IV Push every 8 hours  niCARdipine Infusion 3 mG/Hr (15 mL/Hr) IV Continuous <Continuous>  norepinephrine Infusion 0.05 MICROgram(s)/kG/Min (6.337 mL/Hr) IV Continuous <Continuous>  sodium chloride 0.9%. 1000 milliLiter(s) (10 mL/Hr) IV Continuous <Continuous>  vasopressin Infusion 0.033 Unit(s)/Min (2 mL/Hr) IV Continuous <Continuous>      VITAL:  T(C): , Max: 37.3 (08-30-19 @ 20:00)  T(F): , Max: 99.1 (08-30-19 @ 20:00)  HR: 74 (08-31-19 @ 11:00)  BP: --  BP(mean): --  RR: 20 (08-31-19 @ 11:00)  SpO2: 100% (08-31-19 @ 11:00)  Wt(kg): --    I and O's:    08-30 @ 07:01  -  08-31 @ 07:00  --------------------------------------------------------  IN: 1758.8 mL / OUT: 1540 mL / NET: 218.8 mL    08-31 @ 07:01  -  08-31 @ 11:19  --------------------------------------------------------  IN: 409.4 mL / OUT: 360 mL / NET: 49.4 mL          PHYSICAL EXAM:    Constitutional: NAD  HEENT: PERRLA, EOMI,  MMM  Neck: No LAD, No JVD  Respiratory: CTAB  Cardiovascular: S1 and S2  Gastrointestinal: BS+, soft, NT/ND  Extremities: No peripheral edema  Neurological: A/O x 3, no focal deficits  Psychiatric: Normal mood, normal affect  : + [place]      LABS:                        9.5    11.9  )-----------( 75       ( 31 Aug 2019 01:46 )             29.6     08-31    142  |  104  |  34<H>  ----------------------------<  146<H>  4.3   |  19<L>  |  2.37<H>    Ca    8.3<L>      31 Aug 2019 01:46  Phos  6.3     08-31  Mg     2.2     08-31    TPro  5.5<L>  /  Alb  3.7  /  TBili  0.8  /  DBili  x   /  AST  139<H>  /  ALT  77<H>  /  AlkPhos  48  08-31      Assessment and Recommendation:   · Assessment		  69 yr old female with H/O Thoraco abdominal Aortic aneurysm, Aortic Stenosis/ regurgitation S/P AVR (T), Ascending & hemiarch replacement 4/9/19  now sp POD #2 thoracoabdominal aortic aneurysm.    - DURGA- Non oliguric.  This may be ATN- UOP is decreasing   - Volume status is acceptable  - Hypotension- pressor requirement is improving, now on vaso gtt  - HAGMA + metabolic alkalosis  - Hyperphosphatemia      Recommendations    Change IVF to NS at 50 cc/hr  Phos level daily  Start Renvela 800 mg po TID  DC PPI and switch to Pepcid instead/ possible AIN cannot be ruled out  Check UA and Urine lytes  PRBC as needed  Strict I/O  Renal dosing of meds to creatinine clearance of 20 ml/min     d/w CTU PA    Elysia Ruiz MD  [facility]  [phone]

## 2019-08-31 NOTE — PROGRESS NOTE ADULT - SUBJECTIVE AND OBJECTIVE BOX
CRITICAL CARE ATTENDING - CTICU    MEDICATIONS  (STANDING):  chlorhexidine 0.12% Liquid 15 milliLiter(s) Oral Mucosa every 12 hours  chlorhexidine 2% Cloths 1 Application(s) Topical daily  dexmedetomidine Infusion 0.3 MICROgram(s)/kG/Hr (5.07 mL/Hr) IV Continuous <Continuous>  dexmedetomidine Infusion 0.5 MICROgram(s)/kG/Hr (8.45 mL/Hr) IV Continuous <Continuous>  dextrose 50% Injectable 50 milliLiter(s) IV Push every 15 minutes  dextrose 50% Injectable 25 milliLiter(s) IV Push every 15 minutes  docusate sodium 100 milliGRAM(s) Oral three times a day  insulin regular Infusion 3 Unit(s)/Hr (3 mL/Hr) IV Continuous <Continuous>  lactated ringers. 1000 milliLiter(s) (75 mL/Hr) IV Continuous <Continuous>  metoclopramide Injectable 10 milliGRAM(s) IV Push every 8 hours  niCARdipine Infusion 3 mG/Hr (15 mL/Hr) IV Continuous <Continuous>  norepinephrine Infusion 0.05 MICROgram(s)/kG/Min (6.337 mL/Hr) IV Continuous <Continuous>  pantoprazole  Injectable 40 milliGRAM(s) IV Push daily  sodium chloride 0.9%. 1000 milliLiter(s) (10 mL/Hr) IV Continuous <Continuous>  vasopressin Infusion 0.033 Unit(s)/Min (2 mL/Hr) IV Continuous <Continuous>                                    9.5    11.9  )-----------( 75       ( 31 Aug 2019 01:46 )             29.6       08-31    142  |  104  |  34<H>  ----------------------------<  146<H>  4.3   |  19<L>  |  2.37<H>    Ca    8.3<L>      31 Aug 2019 01:46  Phos  6.3     08-31  Mg     2.2     08-31    TPro  5.5<L>  /  Alb  3.7  /  TBili  0.8  /  DBili  x   /  AST  139<H>  /  ALT  77<H>  /  AlkPhos  48  08-31      PT/INR - ( 30 Aug 2019 00:33 )   PT: 13.8 sec;   INR: 1.20 ratio         PTT - ( 30 Aug 2019 00:33 )  PTT:27.5 sec    Mode: CPAP with PS  FiO2: 40  PEEP: 5  PS: 10  MAP: 9  PIP: 15      Daily     Daily       08-30 @ 07:01  -  08-31 @ 07:00  --------------------------------------------------------  IN: 1758.8 mL / OUT: 1540 mL / NET: 218.8 mL    08-31 @ 07:01  -  08-31 @ 09:17  --------------------------------------------------------  IN: 95.9 mL / OUT: 100 mL / NET: -4.1 mL        Critically Ill patient  : [ ] preoperative ,   [ x] post operative    Requires :  [ x] Arterial Line   [x ] Central Line  [ ] PA catheter  [ ] IABP  [ ] ECMO  [ ] LVAD  [x ] Ventilator  [ ] pacemaker [ ] Impella. [x] Spinal Drain                       [ x] ABG's     [x Pulse Oxymetry Monitoring  Bedside evaluation , monitoring , treatment of hemodynamics , fluids , IVP/ IVCD meds.        Diagnosis:     POD 8/29 - Re Op ThoracoAbdominal Aneurysm Repair / Ceiliac-spinal-SMA bypass    Spinal Drain - 10 cc/hr    R Foot Hemiparesis - improved / probable spinal cord ischemia     respiratory failure     Requires chest PT, pulmonary toilet, ambu bagging, suctioning to maintain SaO2,  patent airway and treat atelectasis.     Ventilator Management:  [x ]AC-rest    [x ]CPAP-PS Wean    [ ]Trach Collar     [ ]Extubate    [ ] T-Piece  [ ]peep>5     Difficult weaning process - multiple organ system involvement in critically ill patient     fluid overload     Hypotension a/w Hypertension, requiring intravenous medication.     Hemodynamic lability ,instability. Requires IVCD [x ] vasopressors [ ] inotropes  [x ] vasodilator  [ ]IVSS fluid  to maintain MAP, perfusion, C.I.     Keeping  - spinal / renal perfusion     Renal Failure - Acute Kidney Injury     Thrombocytopenia     IVCD Insulin     Requires bedside physical therapy, mobilization and total [facility] care.     s/p Asc. Aortic Hemiarch repair                         Discussed with CT surgeon, Physician's Assistant - Nurse Practitioner- Critical care medicine team.   Dicussed at  AM / PM rounds.   Chart, labs , films reviewed.    Total Time: 30 min

## 2019-08-31 NOTE — PROGRESS NOTE ADULT - ASSESSMENT
A/P: 68yo F POD2 ThoracoAbdominal Aneurysm Repair / Ceiliac-spinal-SMA bypass  -Plan  to extubate  later  today    -Per Dr. Valladares, continue CSF drainage at 10 cc per hour and keep the MAP over 100

## 2019-08-31 NOTE — PROGRESS NOTE ADULT - SUBJECTIVE AND OBJECTIVE BOX
Surgery Progress Note     Subjective/24hour Events:   Patient seen and examined, currently intubated. No acute events overnight. Pain controlled.     Vital Signs:  Vital Signs Last 24 Hrs  T(C): 37.2 (31 Aug 2019 08:00), Max: 37.3 (30 Aug 2019 20:00)  T(F): 99 (31 Aug 2019 08:00), Max: 99.1 (30 Aug 2019 20:00)  HR: 66 (31 Aug 2019 11:30) (57 - 74)  BP: --  BP(mean): --  RR: 14 (31 Aug 2019 11:30) (8 - 34)  SpO2: 100% (31 Aug 2019 11:30) (100% - 100%)    CAPILLARY BLOOD GLUCOSE      POCT Blood Glucose.: 119 mg/dL (31 Aug 2019 02:55)  POCT Blood Glucose.: 138 mg/dL (31 Aug 2019 02:11)  POCT Blood Glucose.: 115 mg/dL (30 Aug 2019 21:09)  POCT Blood Glucose.: 143 mg/dL (30 Aug 2019 18:58)  POCT Blood Glucose.: 147 mg/dL (30 Aug 2019 18:10)      I&O's Detail    30 Aug 2019 07:01  -  31 Aug 2019 07:00  --------------------------------------------------------  IN:    Albumin 5%  - 250 mL: 250 mL    dexmedetomidine Infusion: 456.2 mL    insulin regular Infusion: 12 mL    IV PiggyBack: 550 mL    lactated ringers.: 150 mL    norepinephrine Infusion: 26 mL    sodium chloride 0.9%.: 210 mL    vasopressin Infusion: 104.6 mL  Total IN: 1758.8 mL    OUT:    Chest Tube: 110 mL    Chest Tube: 190 mL    Chest Tube: 120 mL    Indwelling Catheter - Urethral: 910 mL    Other: 210 mL  Total OUT: 1540 mL    Total NET: 218.8 mL      31 Aug 2019 07:01  -  31 Aug 2019 12:34  --------------------------------------------------------  IN:    dexmedetomidine Infusion: 57.4 mL    lactated ringers.: 300 mL    sodium chloride 0.9%.: 40 mL    vasopressin Infusion: 12 mL  Total IN: 409.4 mL    OUT:    Chest Tube: 10 mL    Chest Tube: 30 mL    Chest Tube: 70 mL    Indwelling Catheter - Urethral: 210 mL    Other: 40 mL  Total OUT: 360 mL    Total NET: 49.4 mL          MEDICATIONS  (STANDING):  chlorhexidine 2% Cloths 1 Application(s) Topical daily  dexmedetomidine Infusion 0.3 MICROgram(s)/kG/Hr (5.07 mL/Hr) IV Continuous <Continuous>  dexmedetomidine Infusion 0.5 MICROgram(s)/kG/Hr (8.45 mL/Hr) IV Continuous <Continuous>  dextrose 50% Injectable 50 milliLiter(s) IV Push every 15 minutes  dextrose 50% Injectable 25 milliLiter(s) IV Push every 15 minutes  docusate sodium 100 milliGRAM(s) Oral three times a day  famotidine    Tablet 20 milliGRAM(s) Oral daily  insulin regular Infusion 3 Unit(s)/Hr (3 mL/Hr) IV Continuous <Continuous>  lactated ringers. 1000 milliLiter(s) (75 mL/Hr) IV Continuous <Continuous>  metoclopramide Injectable 10 milliGRAM(s) IV Push every 8 hours  niCARdipine Infusion 3 mG/Hr (15 mL/Hr) IV Continuous <Continuous>  norepinephrine Infusion 0.05 MICROgram(s)/kG/Min (6.337 mL/Hr) IV Continuous <Continuous>  sevelamer carbonate 800 milliGRAM(s) Oral three times a day with meals  sodium chloride 0.9%. 1000 milliLiter(s) (10 mL/Hr) IV Continuous <Continuous>  vasopressin Infusion 0.033 Unit(s)/Min (2 mL/Hr) IV Continuous <Continuous>    MEDICATIONS  (PRN):      Physical Exam:  Gen: NAD.  Lungs: Non labored breathing.   Ab: Soft, nontender, nondistended.   Ext: Moves all 4 spontaneously. SILT. +EHL/FHL/TA/GS     Labs:    08-31    142  |  104  |  34<H>  ----------------------------<  146<H>  4.3   |  19<L>  |  2.37<H>    Ca    8.3<L>      31 Aug 2019 01:46  Phos  6.3     08-31  Mg     2.2     08-31    TPro  5.5<L>  /  Alb  3.7  /  TBili  0.8  /  DBili  x   /  AST  139<H>  /  ALT  77<H>  /  AlkPhos  48  08-31    LIVER FUNCTIONS - ( 31 Aug 2019 01:46 )  Alb: 3.7 g/dL / Pro: 5.5 g/dL / ALK PHOS: 48 U/L / ALT: 77 U/L / AST: 139 U/L / GGT: x                                 9.5    11.9  )-----------( 75       ( 31 Aug 2019 01:46 )             29.6     PT/INR - ( 30 Aug 2019 00:33 )   PT: 13.8 sec;   INR: 1.20 ratio         PTT - ( 30 Aug 2019 00:33 )  PTT:27.5 sec    Imaging:

## 2019-09-01 LAB
ALBUMIN SERPL ELPH-MCNC: 3.4 G/DL — SIGNIFICANT CHANGE UP (ref 3.3–5)
ALBUMIN SERPL ELPH-MCNC: 3.6 G/DL — SIGNIFICANT CHANGE UP (ref 3.3–5)
ALP SERPL-CCNC: 51 U/L — SIGNIFICANT CHANGE UP (ref 40–120)
ALP SERPL-CCNC: 58 U/L — SIGNIFICANT CHANGE UP (ref 40–120)
ALT FLD-CCNC: 87 U/L — HIGH (ref 10–45)
ALT FLD-CCNC: 90 U/L — HIGH (ref 10–45)
ANION GAP SERPL CALC-SCNC: 13 MMOL/L — SIGNIFICANT CHANGE UP (ref 5–17)
ANION GAP SERPL CALC-SCNC: 16 MMOL/L — SIGNIFICANT CHANGE UP (ref 5–17)
AST SERPL-CCNC: 77 U/L — HIGH (ref 10–40)
AST SERPL-CCNC: 85 U/L — HIGH (ref 10–40)
BILIRUB SERPL-MCNC: 0.6 MG/DL — SIGNIFICANT CHANGE UP (ref 0.2–1.2)
BILIRUB SERPL-MCNC: 0.8 MG/DL — SIGNIFICANT CHANGE UP (ref 0.2–1.2)
BUN SERPL-MCNC: 41 MG/DL — HIGH (ref 7–23)
BUN SERPL-MCNC: 42 MG/DL — HIGH (ref 7–23)
CALCIUM SERPL-MCNC: 8.5 MG/DL — SIGNIFICANT CHANGE UP (ref 8.4–10.5)
CALCIUM SERPL-MCNC: 8.9 MG/DL — SIGNIFICANT CHANGE UP (ref 8.4–10.5)
CHLORIDE SERPL-SCNC: 107 MMOL/L — SIGNIFICANT CHANGE UP (ref 96–108)
CHLORIDE SERPL-SCNC: 110 MMOL/L — HIGH (ref 96–108)
CO2 SERPL-SCNC: 21 MMOL/L — LOW (ref 22–31)
CO2 SERPL-SCNC: 22 MMOL/L — SIGNIFICANT CHANGE UP (ref 22–31)
CREAT SERPL-MCNC: 2.15 MG/DL — HIGH (ref 0.5–1.3)
CREAT SERPL-MCNC: 2.43 MG/DL — HIGH (ref 0.5–1.3)
GAS PNL BLDA: SIGNIFICANT CHANGE UP
GLUCOSE SERPL-MCNC: 120 MG/DL — HIGH (ref 70–99)
GLUCOSE SERPL-MCNC: 135 MG/DL — HIGH (ref 70–99)
HCT VFR BLD CALC: 24.8 % — LOW (ref 34.5–45)
HCT VFR BLD CALC: 29.1 % — LOW (ref 34.5–45)
HGB BLD-MCNC: 8.4 G/DL — LOW (ref 11.5–15.5)
HGB BLD-MCNC: 9.4 G/DL — LOW (ref 11.5–15.5)
MAGNESIUM SERPL-MCNC: 2.4 MG/DL — SIGNIFICANT CHANGE UP (ref 1.6–2.6)
MCHC RBC-ENTMCNC: 29.2 PG — SIGNIFICANT CHANGE UP (ref 27–34)
MCHC RBC-ENTMCNC: 30.7 PG — SIGNIFICANT CHANGE UP (ref 27–34)
MCHC RBC-ENTMCNC: 32.4 GM/DL — SIGNIFICANT CHANGE UP (ref 32–36)
MCHC RBC-ENTMCNC: 33.8 GM/DL — SIGNIFICANT CHANGE UP (ref 32–36)
MCV RBC AUTO: 90.1 FL — SIGNIFICANT CHANGE UP (ref 80–100)
MCV RBC AUTO: 90.9 FL — SIGNIFICANT CHANGE UP (ref 80–100)
PHOSPHATE SERPL-MCNC: 4.4 MG/DL — SIGNIFICANT CHANGE UP (ref 2.5–4.5)
PLATELET # BLD AUTO: 105 K/UL — LOW (ref 150–400)
PLATELET # BLD AUTO: 82 K/UL — LOW (ref 150–400)
POTASSIUM SERPL-MCNC: 3.7 MMOL/L — SIGNIFICANT CHANGE UP (ref 3.5–5.3)
POTASSIUM SERPL-MCNC: 4 MMOL/L — SIGNIFICANT CHANGE UP (ref 3.5–5.3)
POTASSIUM SERPL-SCNC: 3.7 MMOL/L — SIGNIFICANT CHANGE UP (ref 3.5–5.3)
POTASSIUM SERPL-SCNC: 4 MMOL/L — SIGNIFICANT CHANGE UP (ref 3.5–5.3)
PROT SERPL-MCNC: 5.5 G/DL — LOW (ref 6–8.3)
PROT SERPL-MCNC: 6.1 G/DL — SIGNIFICANT CHANGE UP (ref 6–8.3)
RBC # BLD: 2.73 M/UL — LOW (ref 3.8–5.2)
RBC # BLD: 3.23 M/UL — LOW (ref 3.8–5.2)
RBC # FLD: 14.1 % — SIGNIFICANT CHANGE UP (ref 10.3–14.5)
RBC # FLD: 14.5 % — SIGNIFICANT CHANGE UP (ref 10.3–14.5)
SODIUM SERPL-SCNC: 142 MMOL/L — SIGNIFICANT CHANGE UP (ref 135–145)
SODIUM SERPL-SCNC: 147 MMOL/L — HIGH (ref 135–145)
WBC # BLD: 10.7 K/UL — HIGH (ref 3.8–10.5)
WBC # BLD: 12.2 K/UL — HIGH (ref 3.8–10.5)
WBC # FLD AUTO: 10.7 K/UL — HIGH (ref 3.8–10.5)
WBC # FLD AUTO: 12.2 K/UL — HIGH (ref 3.8–10.5)

## 2019-09-01 PROCEDURE — 71045 X-RAY EXAM CHEST 1 VIEW: CPT | Mod: 26

## 2019-09-01 PROCEDURE — 99291 CRITICAL CARE FIRST HOUR: CPT

## 2019-09-01 RX ORDER — HYDROMORPHONE HYDROCHLORIDE 2 MG/ML
0.5 INJECTION INTRAMUSCULAR; INTRAVENOUS; SUBCUTANEOUS ONCE
Refills: 0 | Status: DISCONTINUED | OUTPATIENT
Start: 2019-09-01 | End: 2019-09-01

## 2019-09-01 RX ORDER — CALCIUM GLUCONATE 100 MG/ML
1 VIAL (ML) INTRAVENOUS ONCE
Refills: 0 | Status: COMPLETED | OUTPATIENT
Start: 2019-09-01 | End: 2019-09-01

## 2019-09-01 RX ORDER — HYDROMORPHONE HYDROCHLORIDE 2 MG/ML
0.5 INJECTION INTRAMUSCULAR; INTRAVENOUS; SUBCUTANEOUS EVERY 4 HOURS
Refills: 0 | Status: DISCONTINUED | OUTPATIENT
Start: 2019-09-01 | End: 2019-09-06

## 2019-09-01 RX ORDER — POTASSIUM CHLORIDE 20 MEQ
10 PACKET (EA) ORAL
Refills: 0 | Status: COMPLETED | OUTPATIENT
Start: 2019-09-01 | End: 2019-09-01

## 2019-09-01 RX ORDER — HYDROMORPHONE HYDROCHLORIDE 2 MG/ML
0.5 INJECTION INTRAMUSCULAR; INTRAVENOUS; SUBCUTANEOUS ONCE
Refills: 0 | Status: DISCONTINUED | OUTPATIENT
Start: 2019-08-31 | End: 2019-08-31

## 2019-09-01 RX ORDER — ACETAMINOPHEN 500 MG
1000 TABLET ORAL ONCE
Refills: 0 | Status: COMPLETED | OUTPATIENT
Start: 2019-09-01 | End: 2019-09-01

## 2019-09-01 RX ORDER — POTASSIUM CHLORIDE 20 MEQ
10 PACKET (EA) ORAL ONCE
Refills: 0 | Status: COMPLETED | OUTPATIENT
Start: 2019-09-01 | End: 2019-09-01

## 2019-09-01 RX ADMIN — HYDROMORPHONE HYDROCHLORIDE 0.5 MILLIGRAM(S): 2 INJECTION INTRAMUSCULAR; INTRAVENOUS; SUBCUTANEOUS at 22:35

## 2019-09-01 RX ADMIN — Medication 50 MILLIEQUIVALENT(S): at 18:44

## 2019-09-01 RX ADMIN — Medication 50 MILLIEQUIVALENT(S): at 22:04

## 2019-09-01 RX ADMIN — Medication 400 MILLIGRAM(S): at 20:30

## 2019-09-01 RX ADMIN — NICARDIPINE HYDROCHLORIDE 15 MG/HR: 30 CAPSULE, EXTENDED RELEASE ORAL at 16:25

## 2019-09-01 RX ADMIN — HYDROMORPHONE HYDROCHLORIDE 0.5 MILLIGRAM(S): 2 INJECTION INTRAMUSCULAR; INTRAVENOUS; SUBCUTANEOUS at 09:30

## 2019-09-01 RX ADMIN — HYDROMORPHONE HYDROCHLORIDE 0.5 MILLIGRAM(S): 2 INJECTION INTRAMUSCULAR; INTRAVENOUS; SUBCUTANEOUS at 13:00

## 2019-09-01 RX ADMIN — HYDROMORPHONE HYDROCHLORIDE 0.5 MILLIGRAM(S): 2 INJECTION INTRAMUSCULAR; INTRAVENOUS; SUBCUTANEOUS at 04:53

## 2019-09-01 RX ADMIN — HYDROMORPHONE HYDROCHLORIDE 0.5 MILLIGRAM(S): 2 INJECTION INTRAMUSCULAR; INTRAVENOUS; SUBCUTANEOUS at 02:30

## 2019-09-01 RX ADMIN — CHLORHEXIDINE GLUCONATE 1 APPLICATION(S): 213 SOLUTION TOPICAL at 11:52

## 2019-09-01 RX ADMIN — HYDROMORPHONE HYDROCHLORIDE 0.5 MILLIGRAM(S): 2 INJECTION INTRAMUSCULAR; INTRAVENOUS; SUBCUTANEOUS at 04:38

## 2019-09-01 RX ADMIN — Medication 50 MILLIEQUIVALENT(S): at 22:30

## 2019-09-01 RX ADMIN — HYDROMORPHONE HYDROCHLORIDE 0.5 MILLIGRAM(S): 2 INJECTION INTRAMUSCULAR; INTRAVENOUS; SUBCUTANEOUS at 17:10

## 2019-09-01 RX ADMIN — HYDROMORPHONE HYDROCHLORIDE 0.5 MILLIGRAM(S): 2 INJECTION INTRAMUSCULAR; INTRAVENOUS; SUBCUTANEOUS at 13:15

## 2019-09-01 RX ADMIN — HYDROMORPHONE HYDROCHLORIDE 0.5 MILLIGRAM(S): 2 INJECTION INTRAMUSCULAR; INTRAVENOUS; SUBCUTANEOUS at 03:00

## 2019-09-01 RX ADMIN — Medication 1000 MILLIGRAM(S): at 20:45

## 2019-09-01 RX ADMIN — Medication 200 GRAM(S): at 03:36

## 2019-09-01 RX ADMIN — FAMOTIDINE 20 MILLIGRAM(S): 10 INJECTION INTRAVENOUS at 13:03

## 2019-09-01 RX ADMIN — HYDROMORPHONE HYDROCHLORIDE 0.5 MILLIGRAM(S): 2 INJECTION INTRAMUSCULAR; INTRAVENOUS; SUBCUTANEOUS at 17:25

## 2019-09-01 RX ADMIN — HYDROMORPHONE HYDROCHLORIDE 0.5 MILLIGRAM(S): 2 INJECTION INTRAMUSCULAR; INTRAVENOUS; SUBCUTANEOUS at 09:15

## 2019-09-01 RX ADMIN — HYDROMORPHONE HYDROCHLORIDE 0.5 MILLIGRAM(S): 2 INJECTION INTRAMUSCULAR; INTRAVENOUS; SUBCUTANEOUS at 22:50

## 2019-09-01 NOTE — PROGRESS NOTE ADULT - SUBJECTIVE AND OBJECTIVE BOX
CRITICAL CARE ATTENDING - CTICU    MEDICATIONS  (STANDING):  chlorhexidine 2% Cloths 1 Application(s) Topical daily  dexmedetomidine Infusion 0.5 MICROgram(s)/kG/Hr (8.45 mL/Hr) IV Continuous <Continuous>  dextrose 50% Injectable 50 milliLiter(s) IV Push every 15 minutes  dextrose 50% Injectable 25 milliLiter(s) IV Push every 15 minutes  docusate sodium 100 milliGRAM(s) Oral three times a day  famotidine    Tablet 20 milliGRAM(s) Oral daily  insulin lispro (HumaLOG) corrective regimen sliding scale   SubCutaneous Before meals and at bedtime  niCARdipine Infusion 3 mG/Hr (15 mL/Hr) IV Continuous <Continuous>  norepinephrine Infusion 0.05 MICROgram(s)/kG/Min (6.337 mL/Hr) IV Continuous <Continuous>  sevelamer carbonate 800 milliGRAM(s) Oral three times a day with meals  sodium chloride 0.9%. 1000 milliLiter(s) (50 mL/Hr) IV Continuous <Continuous>  sodium chloride 0.9%. 1000 milliLiter(s) (10 mL/Hr) IV Continuous <Continuous>  vasopressin Infusion 0.033 Unit(s)/Min (2 mL/Hr) IV Continuous <Continuous>                                    8.4    10.7  )-----------( 82       ( 01 Sep 2019 02:56 )             24.8           142  |  107  |  42<H>  ----------------------------<  135<H>  4.0   |  22  |  2.43<H>    Ca    8.5      01 Sep 2019 02:56  Phos  4.4       Mg     2.4         TPro  5.5<L>  /  Alb  3.4  /  TBili  0.6  /  DBili  x   /  AST  85<H>  /  ALT  90<H>  /  AlkPhos  51                Daily     Daily Weight in k (01 Sep 2019 02:00)      08- @ 07:01  -   @ 07:00  --------------------------------------------------------  IN: 1689.2 mL / OUT: 1890 mL / NET: -200.8 mL        Critically Ill patient  : [ ] preoperative ,   [ x] post operative    Requires :  [x ] Arterial Line   [x ] Central Line  [ ] PA catheter  [ ] IABP  [ ] ECMO  [ ] LVAD  [ ] Ventilator  [ ] pacemaker [ ] Impella. [x] BIPAP [x] Spinal Drain                      [ x] ABG's     [x ] Pulse Oxymetry Monitoring  Bedside evaluation , monitoring , treatment of hemodynamics , fluids , IVP/ IVCD meds.        Diagnosis:     POD  Re Op - ThoracoAbdominal  Aneurysm Repair / Bypass Celiac  / SMA    respiratory failure - BIPAP    L Diaphragmatic Dysfunction    Requires chest PT, pulmonary toilet,suctioning to maintain SaO2,  patent airway and treat atelectasis.     Difficult weaning process - multiple organ system involvement in critically ill patient     Hemodynamic lability,instability. Requires IVCD [x ] vasopressors [ ] inotropes  [ ] vasodilator  [ ]IVSS fluid  to maintain MAP, perfusion, C.I.     Keep  - Spinal / Renal Perfusion    R Foot hemiparesis - spinal cord ischemia    Thrombocytopenia     Requires bedside physical therapy, mobilization and total assisted care.     CT Drainage     ECG     Hypotension a/w Hypertension, requiring intravenous medication.     Spinal Drainage - 10cc/hr                             Discussed with CT surgeon, Physician's Assistant - Nurse Practitioner- Critical care medicine team.   Dicussed at  AM / PM rounds.   Chart, labs , films reviewed.    Total Time: 30 min

## 2019-09-01 NOTE — PROGRESS NOTE ADULT - SUBJECTIVE AND OBJECTIVE BOX
Patient seen and examined at bedside.    --Anticoagulation--    T(C): 37.2 (08-31-19 @ 08:00), Max: 37.3 (08-30-19 @ 20:00)  HR: 61 (08-31-19 @ 08:40) (57 - 72)  BP: --  RR: 19 (08-31-19 @ 08:30) (0 - 27)  SpO2: 100% (08-31-19 @ 08:40) (100% - 100%)  Wt(kg): --    Exam:  CPAP, EO, FC,  RLE prox 2/5, distally 3/5, LLE 4/5  SILT  no clonus

## 2019-09-01 NOTE — PROGRESS NOTE ADULT - ASSESSMENT
A/P: 68yo F POD2 ThoracoAbdominal Aneurysm Repair / Ceiliac-spinal-SMA bypass  -Per Dr. Valladares, continue CSF drainage at 10 cc per hour and keep the MAP over 100

## 2019-09-01 NOTE — PROGRESS NOTE ADULT - SUBJECTIVE AND OBJECTIVE BOX
Patient seen and examined  On bipap  Levo and vaso gtt  + weakness    REVIEW OF SYSTEMS:  As per HPI, otherwise 8 full 10 ROS were unremarkable    MEDICATIONS  (STANDING):  chlorhexidine 2% Cloths 1 Application(s) Topical daily  dexmedetomidine Infusion 0.5 MICROgram(s)/kG/Hr (8.45 mL/Hr) IV Continuous <Continuous>  dextrose 50% Injectable 50 milliLiter(s) IV Push every 15 minutes  dextrose 50% Injectable 25 milliLiter(s) IV Push every 15 minutes  docusate sodium 100 milliGRAM(s) Oral three times a day  famotidine    Tablet 20 milliGRAM(s) Oral daily  insulin lispro (HumaLOG) corrective regimen sliding scale   SubCutaneous Before meals and at bedtime  niCARdipine Infusion 3 mG/Hr (15 mL/Hr) IV Continuous <Continuous>  norepinephrine Infusion 0.05 MICROgram(s)/kG/Min (6.337 mL/Hr) IV Continuous <Continuous>  sevelamer carbonate 800 milliGRAM(s) Oral three times a day with meals  sodium chloride 0.9%. 1000 milliLiter(s) (50 mL/Hr) IV Continuous <Continuous>  sodium chloride 0.9%. 1000 milliLiter(s) (10 mL/Hr) IV Continuous <Continuous>  vasopressin Infusion 0.033 Unit(s)/Min (2 mL/Hr) IV Continuous <Continuous>      VITAL:  T(C): , Max: 37.3 (19 @ 12:00)  T(F): , Max: 99.1 (19 @ 12:00)  HR: 81 (19 @ 10:45)  BP: --  BP(mean): --  RR: 13 (19 @ 10:45)  SpO2: 99% (19 @ 10:45)  Wt(kg): --    I and O's:     @ :  -   @ 07:00  --------------------------------------------------------  IN: 1689.2 mL / OUT: 1955 mL / NET: -265.8 mL     @ 07:  -   @ 11:03  --------------------------------------------------------  IN: 177.5 mL / OUT: 380 mL / NET: -202.5 mL          PHYSICAL EXAM:    Constitutional: NAD  HEENT: PERRLA, EOMI,  MMM  Neck: No LAD, No JVD  Respiratory: CTAB  Cardiovascular: S1 and S2  Gastrointestinal: BS+, soft, NT/ND  Extremities: No peripheral edema  Neurological: Limited   : + [place]    LABS:                        8.4    10.7  )-----------( 82       ( 01 Sep 2019 02:56 )             24.8         142  |  107  |  42<H>  ----------------------------<  135<H>  4.0   |  22  |  2.43<H>    Ca    8.5      01 Sep 2019 02:56  Phos  4.4       Mg     2.4         TPro  5.5<L>  /  Alb  3.4  /  TBili  0.6  /  DBili  x   /  AST  85<H>  /  ALT  90<H>  /  AlkPhos  51        Urine Studies:  Urinalysis Basic - ( 31 Aug 2019 15:24 )    Color: Yellow / Appearance: Clear / S.018 / pH: x  Gluc: x / Ketone: Small  / Bili: Negative / Urobili: Negative   Blood: x / Protein: 100 mg/dL / Nitrite: Negative   Leuk Esterase: Negative / RBC: 7 /hpf / WBC 7 /HPF   Sq Epi: x / Non Sq Epi: 4 /hpf / Bacteria: Negative      Sodium, Random Urine: 54 mmol/L ( @ 15:24)  Creatinine, Random Urine: 78 mg/dL ( @ 15:24)        Assessment and Recommendation:   · Assessment		  69 yr old female with H/O Thoraco abdominal Aortic aneurysm, Aortic Stenosis/ regurgitation S/P AVR (T), Ascending & hemiarch replacement 19  now sp POD #2 thoracoabdominal aortic aneurysm.    - DURGA- Non oliguric.  This may be ATN- UOP is improving, azotemia is higher - FeNa 1.2% rules out pre-renal   - Volume status is acceptable  - HTN- on levo and vaso- to keep MAP ~ 100 per Neurovascular recommendations  - Hyperphosphatemia      Recommendations    - c/w NS at 50 cc/hr   - Phos level daily  - c/w Renvela 800 mg po TID  - PPI on hold   - PRBC as needed  - Strict I/O  - Renal dosing of meds to creatinine clearance of 20 ml/min     d/w CTU JADE Ruiz MD  [facility]  [phone]

## 2019-09-01 NOTE — PROGRESS NOTE ADULT - SUBJECTIVE AND OBJECTIVE BOX
Surgery Progress Note     Subjective/24hour Events:   Patient seen and examined at the bedside. She has been extubated, tolerating NIPPV well. No acute events overnight. Pain controlled.       Objective:  Vital Signs Last 24 Hrs  T(C): 36.7 (01 Sep 2019 08:00), Max: 37.3 (31 Aug 2019 12:00)  T(F): 98.1 (01 Sep 2019 08:00), Max: 99.1 (31 Aug 2019 12:00)  HR: 81 (01 Sep 2019 10:00) (64 - 92)  BP: --  BP(mean): --  RR: 13 (01 Sep 2019 10:00) (11 - 48)  SpO2: 99% (01 Sep 2019 10:00) (89% - 100%)    I&O's Detail    31 Aug 2019 07:01  -  01 Sep 2019 07:00  --------------------------------------------------------  IN:    dexmedetomidine Infusion: 247.2 mL    lactated ringers.: 375 mL    sodium chloride 0.9%.: 950 mL    sodium chloride 0.9%.: 50 mL    vasopressin Infusion: 67 mL  Total IN: 1689.2 mL    OUT:    Chest Tube: 155 mL    Chest Tube: 225 mL    Chest Tube: 65 mL    Indwelling Catheter - Urethral: 1270 mL    Other: 240 mL  Total OUT: 1955 mL    Total NET: -265.8 mL      01 Sep 2019 07:01  -  01 Sep 2019 10:44  --------------------------------------------------------  IN:    dexmedetomidine Infusion: 25.5 mL    sodium chloride 0.9%.: 150 mL    vasopressin Infusion: 2 mL  Total IN: 177.5 mL    OUT:    Chest Tube: 35 mL    Chest Tube: 5 mL    Chest Tube: 35 mL    Indwelling Catheter - Urethral: 275 mL    Other: 30 mL  Total OUT: 380 mL    Total NET: -202.5 mL          MEDICATIONS  (STANDING):  chlorhexidine 2% Cloths 1 Application(s) Topical daily  dexmedetomidine Infusion 0.5 MICROgram(s)/kG/Hr (8.45 mL/Hr) IV Continuous <Continuous>  dextrose 50% Injectable 50 milliLiter(s) IV Push every 15 minutes  dextrose 50% Injectable 25 milliLiter(s) IV Push every 15 minutes  docusate sodium 100 milliGRAM(s) Oral three times a day  famotidine    Tablet 20 milliGRAM(s) Oral daily  insulin lispro (HumaLOG) corrective regimen sliding scale   SubCutaneous Before meals and at bedtime  niCARdipine Infusion 3 mG/Hr (15 mL/Hr) IV Continuous <Continuous>  norepinephrine Infusion 0.05 MICROgram(s)/kG/Min (6.337 mL/Hr) IV Continuous <Continuous>  sevelamer carbonate 800 milliGRAM(s) Oral three times a day with meals  sodium chloride 0.9%. 1000 milliLiter(s) (50 mL/Hr) IV Continuous <Continuous>  sodium chloride 0.9%. 1000 milliLiter(s) (10 mL/Hr) IV Continuous <Continuous>  vasopressin Infusion 0.033 Unit(s)/Min (2 mL/Hr) IV Continuous <Continuous>    MEDICATIONS  (PRN):                            8.4    10.7  )-----------( 82       ( 01 Sep 2019 02:56 )             24.8       09-01    142  |  107  |  42<H>  ----------------------------<  135<H>  4.0   |  22  |  2.43<H>    Ca    8.5      01 Sep 2019 02:56  Phos  4.4     09-01  Mg     2.4     09-01    TPro  5.5<L>  /  Alb  3.4  /  TBili  0.6  /  DBili  x   /  AST  85<H>  /  ALT  90<H>  /  AlkPhos  51  09-01        Physical Exam:  Gen: NAD.  Lungs: Non labored breathing.   Ab: Soft, nontender, nondistended. dressing c/d/i  Ext: Moves all 4 spontaneously. 3/5 strength RLE, 4/5 strength LLE

## 2019-09-01 NOTE — PROGRESS NOTE ADULT - SUBJECTIVE AND OBJECTIVE BOX
Patient is  doing  better overall  RLE  is  able  to move her  foot  and the  toes  she  is  able to bend the  knee  Still  weaker  compared to left  but  much improved  compared to Friday VS stable   Urine out put  adequate  creatinine  plateauing   Hemoglobin  shows no evidence of bleeding  Excellent  peripheral pulses  in my opinion  NGT  can be  removed  patient  is thirsty  and hungry as well  CSF  drainage  10 cc per hour  MAP is  excellent Extubated  tolerating  well

## 2019-09-01 NOTE — PROGRESS NOTE ADULT - ASSESSMENT
69F s/p aortic repair w/ lumbar drain BLE weakness R > L  - LD per CT surgery draining 10cc/hr draining easily ICPs 6-12, discussed risk of overdrainage and SDH with primary team who demonstrated understanding of risk  - Will plan to DC LD in next 1-2 days

## 2019-09-01 NOTE — PROGRESS NOTE ADULT - ASSESSMENT
A/P: 68yo F POD2 ThoracoAbdominal Aneurysm Repair / Ceiliac-spinal-SMA bypass    -Extubated yesterday to NIPPV  -Per Dr. Valladares, continue CSF drainage at 10 cc per hour and keep the MAP over 100  -Continue to monitor b/l LE neurovascular exams  -Abdominal dressing care  -NGT  can be  removed per Dr. Valladares. patient  is thirsty and hungry

## 2019-09-02 LAB
ALBUMIN SERPL ELPH-MCNC: 3.2 G/DL — LOW (ref 3.3–5)
ALP SERPL-CCNC: 57 U/L — SIGNIFICANT CHANGE UP (ref 40–120)
ALT FLD-CCNC: 78 U/L — HIGH (ref 10–45)
ANION GAP SERPL CALC-SCNC: 15 MMOL/L — SIGNIFICANT CHANGE UP (ref 5–17)
AST SERPL-CCNC: 67 U/L — HIGH (ref 10–40)
BILIRUB SERPL-MCNC: 0.9 MG/DL — SIGNIFICANT CHANGE UP (ref 0.2–1.2)
BUN SERPL-MCNC: 39 MG/DL — HIGH (ref 7–23)
CALCIUM SERPL-MCNC: 8.7 MG/DL — SIGNIFICANT CHANGE UP (ref 8.4–10.5)
CHLORIDE SERPL-SCNC: 111 MMOL/L — HIGH (ref 96–108)
CO2 SERPL-SCNC: 18 MMOL/L — LOW (ref 22–31)
CREAT SERPL-MCNC: 2.04 MG/DL — HIGH (ref 0.5–1.3)
GAS PNL BLDA: SIGNIFICANT CHANGE UP
GLUCOSE SERPL-MCNC: 110 MG/DL — HIGH (ref 70–99)
HCT VFR BLD CALC: 28.9 % — LOW (ref 34.5–45)
HGB BLD-MCNC: 9.7 G/DL — LOW (ref 11.5–15.5)
MAGNESIUM SERPL-MCNC: 2.4 MG/DL — SIGNIFICANT CHANGE UP (ref 1.6–2.6)
MCHC RBC-ENTMCNC: 29.8 PG — SIGNIFICANT CHANGE UP (ref 27–34)
MCHC RBC-ENTMCNC: 33.5 GM/DL — SIGNIFICANT CHANGE UP (ref 32–36)
MCV RBC AUTO: 89.1 FL — SIGNIFICANT CHANGE UP (ref 80–100)
PHOSPHATE SERPL-MCNC: 2.4 MG/DL — LOW (ref 2.5–4.5)
PLATELET # BLD AUTO: 115 K/UL — LOW (ref 150–400)
POTASSIUM SERPL-MCNC: 3.9 MMOL/L — SIGNIFICANT CHANGE UP (ref 3.5–5.3)
POTASSIUM SERPL-SCNC: 3.9 MMOL/L — SIGNIFICANT CHANGE UP (ref 3.5–5.3)
PROT SERPL-MCNC: 5.7 G/DL — LOW (ref 6–8.3)
RBC # BLD: 3.24 M/UL — LOW (ref 3.8–5.2)
RBC # FLD: 14.5 % — SIGNIFICANT CHANGE UP (ref 10.3–14.5)
SODIUM SERPL-SCNC: 144 MMOL/L — SIGNIFICANT CHANGE UP (ref 135–145)
WBC # BLD: 11.7 K/UL — HIGH (ref 3.8–10.5)
WBC # FLD AUTO: 11.7 K/UL — HIGH (ref 3.8–10.5)

## 2019-09-02 PROCEDURE — 71045 X-RAY EXAM CHEST 1 VIEW: CPT | Mod: 26

## 2019-09-02 PROCEDURE — 99291 CRITICAL CARE FIRST HOUR: CPT

## 2019-09-02 PROCEDURE — 93010 ELECTROCARDIOGRAM REPORT: CPT

## 2019-09-02 RX ORDER — HYDROMORPHONE HYDROCHLORIDE 2 MG/ML
0.5 INJECTION INTRAMUSCULAR; INTRAVENOUS; SUBCUTANEOUS ONCE
Refills: 0 | Status: DISCONTINUED | OUTPATIENT
Start: 2019-09-02 | End: 2019-09-02

## 2019-09-02 RX ORDER — ESMOLOL HCL 100MG/10ML
12.33 VIAL (ML) INTRAVENOUS
Qty: 2500 | Refills: 0 | Status: DISCONTINUED | OUTPATIENT
Start: 2019-09-02 | End: 2019-09-03

## 2019-09-02 RX ORDER — PANTOPRAZOLE SODIUM 20 MG/1
40 TABLET, DELAYED RELEASE ORAL DAILY
Refills: 0 | Status: DISCONTINUED | OUTPATIENT
Start: 2019-09-02 | End: 2019-09-05

## 2019-09-02 RX ORDER — POTASSIUM CHLORIDE 20 MEQ
10 PACKET (EA) ORAL
Refills: 0 | Status: COMPLETED | OUTPATIENT
Start: 2019-09-02 | End: 2019-09-02

## 2019-09-02 RX ORDER — MINERAL OIL
30 OIL (ML) MISCELLANEOUS
Refills: 0 | Status: DISCONTINUED | OUTPATIENT
Start: 2019-09-02 | End: 2019-09-28

## 2019-09-02 RX ORDER — SODIUM CHLORIDE 9 MG/ML
1000 INJECTION, SOLUTION INTRAVENOUS
Refills: 0 | Status: DISCONTINUED | OUTPATIENT
Start: 2019-09-02 | End: 2019-09-04

## 2019-09-02 RX ORDER — POTASSIUM PHOSPHATE, MONOBASIC POTASSIUM PHOSPHATE, DIBASIC 236; 224 MG/ML; MG/ML
15 INJECTION, SOLUTION INTRAVENOUS ONCE
Refills: 0 | Status: COMPLETED | OUTPATIENT
Start: 2019-09-02 | End: 2019-09-02

## 2019-09-02 RX ORDER — HYDRALAZINE HCL 50 MG
5 TABLET ORAL ONCE
Refills: 0 | Status: COMPLETED | OUTPATIENT
Start: 2019-09-02 | End: 2019-09-02

## 2019-09-02 RX ORDER — POTASSIUM CHLORIDE 20 MEQ
10 PACKET (EA) ORAL ONCE
Refills: 0 | Status: COMPLETED | OUTPATIENT
Start: 2019-09-02 | End: 2019-09-02

## 2019-09-02 RX ADMIN — DEXMEDETOMIDINE HYDROCHLORIDE IN 0.9% SODIUM CHLORIDE 8.45 MICROGRAM(S)/KG/HR: 4 INJECTION INTRAVENOUS at 01:02

## 2019-09-02 RX ADMIN — Medication 50 MILLIEQUIVALENT(S): at 14:17

## 2019-09-02 RX ADMIN — Medication 10.14 MICROGRAM(S)/KG/MIN: at 11:26

## 2019-09-02 RX ADMIN — HYDROMORPHONE HYDROCHLORIDE 0.5 MILLIGRAM(S): 2 INJECTION INTRAMUSCULAR; INTRAVENOUS; SUBCUTANEOUS at 19:15

## 2019-09-02 RX ADMIN — Medication 50 MILLIEQUIVALENT(S): at 23:14

## 2019-09-02 RX ADMIN — Medication 50 MILLIEQUIVALENT(S): at 06:48

## 2019-09-02 RX ADMIN — HYDROMORPHONE HYDROCHLORIDE 0.5 MILLIGRAM(S): 2 INJECTION INTRAMUSCULAR; INTRAVENOUS; SUBCUTANEOUS at 11:55

## 2019-09-02 RX ADMIN — Medication 20.28 MICROGRAM(S)/KG/MIN: at 22:51

## 2019-09-02 RX ADMIN — CHLORHEXIDINE GLUCONATE 1 APPLICATION(S): 213 SOLUTION TOPICAL at 12:09

## 2019-09-02 RX ADMIN — HYDROMORPHONE HYDROCHLORIDE 0.5 MILLIGRAM(S): 2 INJECTION INTRAMUSCULAR; INTRAVENOUS; SUBCUTANEOUS at 16:00

## 2019-09-02 RX ADMIN — HYDROMORPHONE HYDROCHLORIDE 0.5 MILLIGRAM(S): 2 INJECTION INTRAMUSCULAR; INTRAVENOUS; SUBCUTANEOUS at 19:00

## 2019-09-02 RX ADMIN — Medication 50 MILLIEQUIVALENT(S): at 06:30

## 2019-09-02 RX ADMIN — HYDROMORPHONE HYDROCHLORIDE 0.5 MILLIGRAM(S): 2 INJECTION INTRAMUSCULAR; INTRAVENOUS; SUBCUTANEOUS at 02:40

## 2019-09-02 RX ADMIN — PANTOPRAZOLE SODIUM 40 MILLIGRAM(S): 20 TABLET, DELAYED RELEASE ORAL at 12:12

## 2019-09-02 RX ADMIN — Medication 50 MILLIEQUIVALENT(S): at 18:23

## 2019-09-02 RX ADMIN — Medication 50 MILLIEQUIVALENT(S): at 05:13

## 2019-09-02 RX ADMIN — HYDROMORPHONE HYDROCHLORIDE 0.5 MILLIGRAM(S): 2 INJECTION INTRAMUSCULAR; INTRAVENOUS; SUBCUTANEOUS at 02:55

## 2019-09-02 RX ADMIN — Medication 30 MILLILITER(S): at 15:44

## 2019-09-02 RX ADMIN — Medication 50 MILLIEQUIVALENT(S): at 19:20

## 2019-09-02 RX ADMIN — HYDROMORPHONE HYDROCHLORIDE 0.5 MILLIGRAM(S): 2 INJECTION INTRAMUSCULAR; INTRAVENOUS; SUBCUTANEOUS at 16:15

## 2019-09-02 RX ADMIN — Medication 50 MILLIEQUIVALENT(S): at 01:13

## 2019-09-02 RX ADMIN — HYDROMORPHONE HYDROCHLORIDE 0.5 MILLIGRAM(S): 2 INJECTION INTRAMUSCULAR; INTRAVENOUS; SUBCUTANEOUS at 11:40

## 2019-09-02 RX ADMIN — HYDROMORPHONE HYDROCHLORIDE 0.5 MILLIGRAM(S): 2 INJECTION INTRAMUSCULAR; INTRAVENOUS; SUBCUTANEOUS at 23:00

## 2019-09-02 RX ADMIN — Medication 50 MILLIEQUIVALENT(S): at 01:30

## 2019-09-02 RX ADMIN — Medication 5 MILLIGRAM(S): at 05:38

## 2019-09-02 RX ADMIN — HYDROMORPHONE HYDROCHLORIDE 0.5 MILLIGRAM(S): 2 INJECTION INTRAMUSCULAR; INTRAVENOUS; SUBCUTANEOUS at 06:45

## 2019-09-02 RX ADMIN — NICARDIPINE HYDROCHLORIDE 15 MG/HR: 30 CAPSULE, EXTENDED RELEASE ORAL at 01:02

## 2019-09-02 RX ADMIN — HYDROMORPHONE HYDROCHLORIDE 0.5 MILLIGRAM(S): 2 INJECTION INTRAMUSCULAR; INTRAVENOUS; SUBCUTANEOUS at 22:45

## 2019-09-02 RX ADMIN — SODIUM CHLORIDE 50 MILLILITER(S): 9 INJECTION, SOLUTION INTRAVENOUS at 22:51

## 2019-09-02 RX ADMIN — SODIUM CHLORIDE 50 MILLILITER(S): 9 INJECTION, SOLUTION INTRAVENOUS at 10:21

## 2019-09-02 RX ADMIN — POTASSIUM PHOSPHATE, MONOBASIC POTASSIUM PHOSPHATE, DIBASIC 62.5 MILLIMOLE(S): 236; 224 INJECTION, SOLUTION INTRAVENOUS at 11:10

## 2019-09-02 RX ADMIN — VASOPRESSIN 2 UNIT(S)/MIN: 20 INJECTION INTRAVENOUS at 01:02

## 2019-09-02 RX ADMIN — Medication 50 MILLIEQUIVALENT(S): at 22:45

## 2019-09-02 RX ADMIN — HYDROMORPHONE HYDROCHLORIDE 0.5 MILLIGRAM(S): 2 INJECTION INTRAMUSCULAR; INTRAVENOUS; SUBCUTANEOUS at 07:00

## 2019-09-02 NOTE — PROGRESS NOTE ADULT - SUBJECTIVE AND OBJECTIVE BOX
Patient seen and examined  Feels better    REVIEW OF SYSTEMS:  As per HPI, otherwise 8 full 10 ROS were unremarkable    MEDICATIONS  (STANDING):  chlorhexidine 2% Cloths 1 Application(s) Topical daily  dextrose 50% Injectable 50 milliLiter(s) IV Push every 15 minutes  dextrose 50% Injectable 25 milliLiter(s) IV Push every 15 minutes  docusate sodium 100 milliGRAM(s) Oral three times a day  esMOLOL  Infusion 25 MICROgram(s)/kG/Min (10.14 mL/Hr) IV Continuous <Continuous>  famotidine    Tablet 20 milliGRAM(s) Oral daily  insulin lispro (HumaLOG) corrective regimen sliding scale   SubCutaneous Before meals and at bedtime  lactated ringers. 1000 milliLiter(s) (50 mL/Hr) IV Continuous <Continuous>  potassium phosphate IVPB 15 milliMole(s) IV Intermittent once  sevelamer carbonate 800 milliGRAM(s) Oral three times a day with meals      VITAL:  T(C): , Max: 37.3 (19 @ 20:00)  T(F): , Max: 99.1 (19 @ 20:00)  HR: 91 (19 @ 10:00)  BP: --  BP(mean): --  RR: 22 (19 @ 10:00)  SpO2: 96% (19 @ 10:00)  Wt(kg): --    I and O's:     @ :  -   @ 07:00  --------------------------------------------------------  IN: 2038.4 mL / OUT: 3735 mL / NET: -1696.6 mL     @ 07:  -   @ 10:29  --------------------------------------------------------  IN: 190.6 mL / OUT: 498 mL / NET: -307.4 mL          PHYSICAL EXAM:    Constitutional: NAD  HEENT: PERRLA, EOMI,  MMM  Neck: No LAD, No JVD  Respiratory: CTAB  Cardiovascular: S1 and S2  Gastrointestinal: BS+, soft, NT/ND  Extremities: No peripheral edema  Neurological: Limited   : + [place]      LABS:                        9.7    11.7  )-----------( 115      ( 02 Sep 2019 01:18 )             28.9     09-02    144  |  111<H>  |  39<H>  ----------------------------<  110<H>  3.9   |  18<L>  |  2.04<H>    Ca    8.7      02 Sep 2019 01:18  Phos  2.4       Mg     2.4         TPro  5.7<L>  /  Alb  3.2<L>  /  TBili  0.9  /  DBili  x   /  AST  67<H>  /  ALT  78<H>  /  AlkPhos  57        Urine Studies:  Urinalysis Basic - ( 31 Aug 2019 15:24 )    Color: Yellow / Appearance: Clear / S.018 / pH: x  Gluc: x / Ketone: Small  / Bili: Negative / Urobili: Negative   Blood: x / Protein: 100 mg/dL / Nitrite: Negative   Leuk Esterase: Negative / RBC: 7 /hpf / WBC 7 /HPF   Sq Epi: x / Non Sq Epi: 4 /hpf / Bacteria: Negative      Sodium, Random Urine: 54 mmol/L ( @ 15:24)  Creatinine, Random Urine: 78 mg/dL ( @ 15:24)      Assessment and Recommendation:   · Assessment		  69 yr old female with H/O Thoraco abdominal Aortic aneurysm, Aortic Stenosis/ regurgitation S/P AVR (T), Ascending & hemiarch replacement 19  now sp POD #2 thoracoabdominal aortic aneurysm.    - DURGA- Non oliguric.  This may be ATN- UOP is improving, azotemia is improving   - Volume status is acceptable  - HTN- controlled   - Hypophosphatemia       Recommendations    - change IVF to LR at 50 cc/hr  - Start diet  - Dc Renvela  - Start k phos 15 10 mmol IV x 1   - Phos level in Am   - Strict I/O  - Renal dosing of meds to creatinine clearance of 20 ml/min     d/w CTU JADE Ruiz MD  [facility]  [phone]

## 2019-09-02 NOTE — PROGRESS NOTE ADULT - SUBJECTIVE AND OBJECTIVE BOX
KENNETH SPRING   MRN#: [id number]     The patient is a 69y Female who was seen, evaluated, & examined with the CTICU staff on rounds and later in the day with a multidisciplinary care plan formulated & implemented.  All available clinical, laboratory, radiographic, pharmacologic, and electrocardiographic data were reviewed & analyzed.      The patient was in the CTICU in critical condition secondary to persistent cardiopulmonary dysfunction, right-sided lower extremity weakness, and permissive hypertension.     Respiratory status required supplemental oxygen, the following of ABG’s with A-line monitoring, continuous pulse oximetry monitoring, and an IV Precedex drip for support & to evaluate for & prevent further decompensation secondary to persistent cardiopulmonary dysfunction.     Invasive hemodynamic monitoring with an A-line was required for the following of continuous MAP/BP monitoring to ensure adequate cardiovascular support and to evaluate for & help prevent decompensation while receiving an IV Esmolol drip Kings secondary to paroxysmal atrial fibrillation and permissive hypertension        The spinal drain was removed as per Neurosurgery with neurology exam being closely followed.    Patient required critical care management and I provided 30 minutes of non-continuous care to the patient.  Discussed at length with the CTICU staff and helped coordinate care.

## 2019-09-02 NOTE — CHART NOTE - NSCHARTNOTEFT_GEN_A_CORE
POD#3 from aortic repair, will DC LD today POD#3 from aortic repair  - LD removed today  - Lay flat for 2h post removal  - q4h neurochecks

## 2019-09-02 NOTE — PROGRESS NOTE ADULT - ASSESSMENT
Flory is a very pleasant 69 Year-Old Lady s/p 8/29 ThoracoAbdominal Aneurysm Repair / Ceiliac-spinal-SMA bypass    - Appreciate Excellent Care per CTICU.   - Okay for advancement of diet from Vascular Surgery perspective.   - Continue neurovascular checks.     Vascular Surgery Pager #[phone]

## 2019-09-02 NOTE — PROGRESS NOTE ADULT - SUBJECTIVE AND OBJECTIVE BOX
Vascular Surgery Progress Note     Subjective/24hour Events:   Patient seen and examined.   Lumbar drain removed.   Still having some weakness on RLE.     Vital Signs:  Vital Signs Last 24 Hrs  T(C): 36.7 (02 Sep 2019 08:00), Max: 37.3 (01 Sep 2019 20:00)  T(F): 98.1 (02 Sep 2019 08:00), Max: 99.1 (01 Sep 2019 20:00)  HR: 116 (02 Sep 2019 08:15) (68 - 132)  BP: --  BP(mean): --  RR: 35 (02 Sep 2019 08:15) (10 - 35)  SpO2: 94% (02 Sep 2019 08:15) (85% - 100%)    CAPILLARY BLOOD GLUCOSE          I&O's Detail    01 Sep 2019 07:01  -  02 Sep 2019 07:00  --------------------------------------------------------  IN:    dexmedetomidine Infusion: 284.4 mL    IV PiggyBack: 450 mL    niCARdipine Infusion: 100 mL    sodium chloride 0.9%.: 1200 mL    vasopressin Infusion: 4 mL  Total IN: 2038.4 mL    OUT:    Chest Tube: 155 mL    Chest Tube: 195 mL    Chest Tube: 95 mL    Indwelling Catheter - Urethral: 3065 mL    Other: 225 mL  Total OUT: 3735 mL    Total NET: -1696.6 mL      02 Sep 2019 07:01  -  02 Sep 2019 08:42  --------------------------------------------------------  IN:    dexmedetomidine Infusion: 20.3 mL    sodium chloride 0.9%.: 50 mL  Total IN: 70.3 mL    OUT:    Chest Tube: 20 mL    Chest Tube: 10 mL  Total OUT: 30 mL    Total NET: 40.3 mL          MEDICATIONS  (STANDING):  chlorhexidine 2% Cloths 1 Application(s) Topical daily  dexmedetomidine Infusion 0.5 MICROgram(s)/kG/Hr (8.45 mL/Hr) IV Continuous <Continuous>  dextrose 50% Injectable 50 milliLiter(s) IV Push every 15 minutes  dextrose 50% Injectable 25 milliLiter(s) IV Push every 15 minutes  docusate sodium 100 milliGRAM(s) Oral three times a day  famotidine    Tablet 20 milliGRAM(s) Oral daily  insulin lispro (HumaLOG) corrective regimen sliding scale   SubCutaneous Before meals and at bedtime  sevelamer carbonate 800 milliGRAM(s) Oral three times a day with meals  sodium chloride 0.9%. 1000 milliLiter(s) (50 mL/Hr) IV Continuous <Continuous>  sodium chloride 0.9%. 1000 milliLiter(s) (10 mL/Hr) IV Continuous <Continuous>    MEDICATIONS  (PRN):  HYDROmorphone  Injectable 0.5 milliGRAM(s) IV Push every 4 hours PRN Moderate Pain (4 - 6)      Physical Exam:  Gen: NAD. NGT in place.   Lungs: Non labored breathing.   Ab: Soft, nontender, nondistended.   Ext: RLE strength 2/5, LLE 5/5. Distal pulses palpable. LL abdominal drains SS.     Labs:    09-02    144  |  111<H>  |  39<H>  ----------------------------<  110<H>  3.9   |  18<L>  |  2.04<H>    Ca    8.7      02 Sep 2019 01:18  Phos  2.4     09-02  Mg     2.4     09-02    TPro  5.7<L>  /  Alb  3.2<L>  /  TBili  0.9  /  DBili  x   /  AST  67<H>  /  ALT  78<H>  /  AlkPhos  57  09-02    LIVER FUNCTIONS - ( 02 Sep 2019 01:18 )  Alb: 3.2 g/dL / Pro: 5.7 g/dL / ALK PHOS: 57 U/L / ALT: 78 U/L / AST: 67 U/L / GGT: x                                 9.7    11.7  )-----------( 115      ( 02 Sep 2019 01:18 )             28.9

## 2019-09-02 NOTE — PROVIDER CONTACT NOTE (OTHER) - ACTION/TREATMENT ORDERED:
Abg sent, results reviewed by MD Torres. No action necessary at current time. Will continue to monitor

## 2019-09-03 DIAGNOSIS — R13.10 DYSPHAGIA, UNSPECIFIED: ICD-10-CM

## 2019-09-03 DIAGNOSIS — I48.91 UNSPECIFIED ATRIAL FIBRILLATION: ICD-10-CM

## 2019-09-03 DIAGNOSIS — N17.9 ACUTE KIDNEY FAILURE, UNSPECIFIED: ICD-10-CM

## 2019-09-03 LAB
ALBUMIN SERPL ELPH-MCNC: 3.2 G/DL — LOW (ref 3.3–5)
ALP SERPL-CCNC: 64 U/L — SIGNIFICANT CHANGE UP (ref 40–120)
ALT FLD-CCNC: 63 U/L — HIGH (ref 10–45)
ANION GAP SERPL CALC-SCNC: 16 MMOL/L — SIGNIFICANT CHANGE UP (ref 5–17)
AST SERPL-CCNC: 46 U/L — HIGH (ref 10–40)
BILIRUB SERPL-MCNC: 0.8 MG/DL — SIGNIFICANT CHANGE UP (ref 0.2–1.2)
BUN SERPL-MCNC: 44 MG/DL — HIGH (ref 7–23)
CALCIUM SERPL-MCNC: 9.2 MG/DL — SIGNIFICANT CHANGE UP (ref 8.4–10.5)
CHLORIDE SERPL-SCNC: 117 MMOL/L — HIGH (ref 96–108)
CO2 SERPL-SCNC: 16 MMOL/L — LOW (ref 22–31)
CREAT SERPL-MCNC: 1.93 MG/DL — HIGH (ref 0.5–1.3)
GAS PNL BLDA: SIGNIFICANT CHANGE UP
GLUCOSE SERPL-MCNC: 109 MG/DL — HIGH (ref 70–99)
HCT VFR BLD CALC: 30.6 % — LOW (ref 34.5–45)
HGB BLD-MCNC: 9.9 G/DL — LOW (ref 11.5–15.5)
MAGNESIUM SERPL-MCNC: 2.4 MG/DL — SIGNIFICANT CHANGE UP (ref 1.6–2.6)
MCHC RBC-ENTMCNC: 28.8 PG — SIGNIFICANT CHANGE UP (ref 27–34)
MCHC RBC-ENTMCNC: 32.5 GM/DL — SIGNIFICANT CHANGE UP (ref 32–36)
MCV RBC AUTO: 88.8 FL — SIGNIFICANT CHANGE UP (ref 80–100)
PHOSPHATE SERPL-MCNC: 2.5 MG/DL — SIGNIFICANT CHANGE UP (ref 2.5–4.5)
PLATELET # BLD AUTO: 150 K/UL — SIGNIFICANT CHANGE UP (ref 150–400)
POTASSIUM SERPL-MCNC: 4.5 MMOL/L — SIGNIFICANT CHANGE UP (ref 3.5–5.3)
POTASSIUM SERPL-SCNC: 4.5 MMOL/L — SIGNIFICANT CHANGE UP (ref 3.5–5.3)
PROT SERPL-MCNC: 6 G/DL — SIGNIFICANT CHANGE UP (ref 6–8.3)
RBC # BLD: 3.45 M/UL — LOW (ref 3.8–5.2)
RBC # FLD: 14.6 % — HIGH (ref 10.3–14.5)
SODIUM SERPL-SCNC: 149 MMOL/L — HIGH (ref 135–145)
WBC # BLD: 13 K/UL — HIGH (ref 3.8–10.5)
WBC # FLD AUTO: 13 K/UL — HIGH (ref 3.8–10.5)

## 2019-09-03 PROCEDURE — 99291 CRITICAL CARE FIRST HOUR: CPT

## 2019-09-03 PROCEDURE — 71045 X-RAY EXAM CHEST 1 VIEW: CPT | Mod: 26

## 2019-09-03 RX ORDER — HYDRALAZINE HCL 50 MG
10 TABLET ORAL ONCE
Refills: 0 | Status: COMPLETED | OUTPATIENT
Start: 2019-09-03 | End: 2019-09-03

## 2019-09-03 RX ORDER — METOPROLOL TARTRATE 50 MG
25 TABLET ORAL EVERY 8 HOURS
Refills: 0 | Status: DISCONTINUED | OUTPATIENT
Start: 2019-09-03 | End: 2019-09-03

## 2019-09-03 RX ORDER — POTASSIUM CHLORIDE 20 MEQ
10 PACKET (EA) ORAL
Refills: 0 | Status: COMPLETED | OUTPATIENT
Start: 2019-09-03 | End: 2019-09-03

## 2019-09-03 RX ORDER — ASPIRIN/CALCIUM CARB/MAGNESIUM 324 MG
81 TABLET ORAL DAILY
Refills: 0 | Status: DISCONTINUED | OUTPATIENT
Start: 2019-09-03 | End: 2019-09-28

## 2019-09-03 RX ORDER — MAGNESIUM SULFATE 500 MG/ML
2 VIAL (ML) INJECTION ONCE
Refills: 0 | Status: COMPLETED | OUTPATIENT
Start: 2019-09-03 | End: 2019-09-03

## 2019-09-03 RX ORDER — ASPIRIN/CALCIUM CARB/MAGNESIUM 324 MG
300 TABLET ORAL DAILY
Refills: 0 | Status: DISCONTINUED | OUTPATIENT
Start: 2019-09-03 | End: 2019-09-03

## 2019-09-03 RX ORDER — ALBUMIN HUMAN 25 %
250 VIAL (ML) INTRAVENOUS ONCE
Refills: 0 | Status: COMPLETED | OUTPATIENT
Start: 2019-09-03 | End: 2019-09-03

## 2019-09-03 RX ORDER — METOPROLOL TARTRATE 50 MG
5 TABLET ORAL EVERY 6 HOURS
Refills: 0 | Status: DISCONTINUED | OUTPATIENT
Start: 2019-09-03 | End: 2019-09-03

## 2019-09-03 RX ORDER — ACETAMINOPHEN 500 MG
1000 TABLET ORAL ONCE
Refills: 0 | Status: COMPLETED | OUTPATIENT
Start: 2019-09-03 | End: 2019-09-03

## 2019-09-03 RX ORDER — HEPARIN SODIUM 5000 [USP'U]/ML
5000 INJECTION INTRAVENOUS; SUBCUTANEOUS EVERY 8 HOURS
Refills: 0 | Status: DISCONTINUED | OUTPATIENT
Start: 2019-09-03 | End: 2019-09-28

## 2019-09-03 RX ORDER — ALPRAZOLAM 0.25 MG
0.25 TABLET ORAL EVERY 8 HOURS
Refills: 0 | Status: DISCONTINUED | OUTPATIENT
Start: 2019-09-03 | End: 2019-09-10

## 2019-09-03 RX ADMIN — HEPARIN SODIUM 5000 UNIT(S): 5000 INJECTION INTRAVENOUS; SUBCUTANEOUS at 21:15

## 2019-09-03 RX ADMIN — HYDROMORPHONE HYDROCHLORIDE 0.5 MILLIGRAM(S): 2 INJECTION INTRAMUSCULAR; INTRAVENOUS; SUBCUTANEOUS at 10:15

## 2019-09-03 RX ADMIN — Medication 125 MILLILITER(S): at 12:21

## 2019-09-03 RX ADMIN — Medication 100 MILLIGRAM(S): at 21:15

## 2019-09-03 RX ADMIN — HYDROMORPHONE HYDROCHLORIDE 0.5 MILLIGRAM(S): 2 INJECTION INTRAMUSCULAR; INTRAVENOUS; SUBCUTANEOUS at 20:15

## 2019-09-03 RX ADMIN — Medication 10 MILLIGRAM(S): at 23:04

## 2019-09-03 RX ADMIN — HEPARIN SODIUM 5000 UNIT(S): 5000 INJECTION INTRAVENOUS; SUBCUTANEOUS at 15:04

## 2019-09-03 RX ADMIN — Medication 50 MILLIEQUIVALENT(S): at 05:46

## 2019-09-03 RX ADMIN — Medication 50 MILLIEQUIVALENT(S): at 04:50

## 2019-09-03 RX ADMIN — Medication 5 MILLIGRAM(S): at 08:55

## 2019-09-03 RX ADMIN — HYDROMORPHONE HYDROCHLORIDE 0.5 MILLIGRAM(S): 2 INJECTION INTRAMUSCULAR; INTRAVENOUS; SUBCUTANEOUS at 10:30

## 2019-09-03 RX ADMIN — Medication 400 MILLIGRAM(S): at 23:05

## 2019-09-03 RX ADMIN — Medication 81 MILLIGRAM(S): at 15:05

## 2019-09-03 RX ADMIN — HYDROMORPHONE HYDROCHLORIDE 0.5 MILLIGRAM(S): 2 INJECTION INTRAMUSCULAR; INTRAVENOUS; SUBCUTANEOUS at 19:45

## 2019-09-03 RX ADMIN — Medication 50 GRAM(S): at 08:55

## 2019-09-03 RX ADMIN — Medication 100 MILLIGRAM(S): at 15:05

## 2019-09-03 RX ADMIN — HYDROMORPHONE HYDROCHLORIDE 0.5 MILLIGRAM(S): 2 INJECTION INTRAMUSCULAR; INTRAVENOUS; SUBCUTANEOUS at 04:00

## 2019-09-03 RX ADMIN — HYDROMORPHONE HYDROCHLORIDE 0.5 MILLIGRAM(S): 2 INJECTION INTRAMUSCULAR; INTRAVENOUS; SUBCUTANEOUS at 15:20

## 2019-09-03 RX ADMIN — Medication 1000 MILLIGRAM(S): at 23:35

## 2019-09-03 RX ADMIN — Medication 0.25 MILLIGRAM(S): at 12:53

## 2019-09-03 RX ADMIN — PANTOPRAZOLE SODIUM 40 MILLIGRAM(S): 20 TABLET, DELAYED RELEASE ORAL at 12:22

## 2019-09-03 RX ADMIN — HYDROMORPHONE HYDROCHLORIDE 0.5 MILLIGRAM(S): 2 INJECTION INTRAMUSCULAR; INTRAVENOUS; SUBCUTANEOUS at 15:05

## 2019-09-03 RX ADMIN — HYDROMORPHONE HYDROCHLORIDE 0.5 MILLIGRAM(S): 2 INJECTION INTRAMUSCULAR; INTRAVENOUS; SUBCUTANEOUS at 04:15

## 2019-09-03 RX ADMIN — Medication 1: at 12:52

## 2019-09-03 NOTE — PROGRESS NOTE ADULT - ASSESSMENT
69 yr old female with H/O Thoraco abdominal Aortic aneurysm, Aortic Stenosis/ regurgitation S/P AVR (T), Ascending & hemiarch replacement 4/9/19  now sp  thoracoabdominal aortic aneurysm.    - DURGA- Non oliguric that is likely ATN-  azotemia is improving   - Volume status is acceptable  - HTN  -Rapid afib    -Hypernatremia  -Presumed metabolic acidosis    Recommendations    - Awaiting specch and swallow before allowing her to drink   - Off Renvela and phos is normal this am   - Vas follow up re weakness of the legs;  avoid hypotension   - Strict I/O  - Renal dosing of meds to creatinine clearance of 25 ml/min        [facility]  [phone]

## 2019-09-03 NOTE — DIETITIAN INITIAL EVALUATION ADULT. - REASON INDICATOR FOR ASSESSMENT
Nutrition Assessment warranted for length of stay.  Information obtained from: RN, comprehensive chart review, patient, family at bedside

## 2019-09-03 NOTE — DIETITIAN INITIAL EVALUATION ADULT. - ADD RECOMMEND
1) Follow up with medical team, SLP for ? diet advancement (pt reports dislike of current diet prescription). 2) RD to add health shakes twice daily to meal trays. 3) Encourage and monitor PO intake. Encourage use of daily menus to promote optimal PO intake potential. 4) Monitor wt trends, nutrition related labs, skin integrity, hydration status, bowel regularity.

## 2019-09-03 NOTE — OCCUPATIONAL THERAPY INITIAL EVALUATION ADULT - LIVES WITH, PROFILE
alone/Pt lives in apartment with elevator and 0 steps to enter. Pt has tub shower with shower chair. Pt was independent with all aspects of ADLs and IADLs PTA. Pt does not own a cane or RW.

## 2019-09-03 NOTE — OCCUPATIONAL THERAPY INITIAL EVALUATION ADULT - RANGE OF MOTION EXAMINATION, LOWER EXTREMITY
bilateral LE Passive ROM was WFL  (within functional limits)/LLE AROM WFL. RLE no AROM except inconsistently wigglies toes.

## 2019-09-03 NOTE — DIETITIAN INITIAL EVALUATION ADULT. - ENERGY NEEDS
Ht: 66"   Wt: 149 lbs   BMI: 24.1 kg/m2   IBW: 130 lbs (+/-10%)     115 % IBW  Edema: 1+ generalized           Skin: surgical incision left chest

## 2019-09-03 NOTE — OCCUPATIONAL THERAPY INITIAL EVALUATION ADULT - PLANNED THERAPY INTERVENTIONS, OT EVAL
bed mobility training/orthotic fitting/training/strengthening/neuromuscular re-education/ADL retraining/transfer training/balance training/ROM

## 2019-09-03 NOTE — OCCUPATIONAL THERAPY INITIAL EVALUATION ADULT - LEVEL OF INDEPENDENCE: SIT/STAND, REHAB EVAL
Deferred at this time. Pt just completed standing with PT and reports fatigue. Per PT, able to stand with mod/max A x1 and R knee block.

## 2019-09-03 NOTE — PROGRESS NOTE ADULT - PROBLEM SELECTOR PLAN 1
MAPs 110  PT/OT  serial neurovascular exams  Dysphagia level 1 nectar thick diet  [place] in place, Strict I/Os  DVT ppx: Heparin

## 2019-09-03 NOTE — PROGRESS NOTE ADULT - SUBJECTIVE AND OBJECTIVE BOX
_____ FLOOR TRANSFER NOTE    KENNETH SPRING | [id number]    19 @ 17:30    69y Female transferred to SDU from CTU.     SUBJECTIVE:  Patient seen and examined at bedside. She is doing well overall and has new onset lower extremity weakness R>L. She has a [place] in place and is passing flatus but no bowel movements. She denies CP, SOB, N/V, fever, chills    OBJECTIVE:  ICU Vital Signs Last 24 Hrs  T(C): 37.5 (03 Sep 2019 16:00), Max: 37.8 (03 Sep 2019 08:00)  T(F): 99.5 (03 Sep 2019 16:00), Max: 100 (03 Sep 2019 08:00)  HR: 90 (03 Sep 2019 16:00) (82 - 106)  BP: --  BP(mean): --  ABP: 162/62 (03 Sep 2019 16:00) (116/45 - 198/83)  ABP(mean): 104 (03 Sep 2019 16:00) (74 - 134)  RR: 23 (03 Sep 2019 16:00) (20 - 48)  SpO2: 95% (03 Sep 2019 16:00) (91% - 98%)      CAPILLARY BLOOD GLUCOSE      POCT Blood Glucose.: 163 mg/dL (03 Sep 2019 12:51)    Daily     Daily Weight in k.5 (03 Sep 2019 14:07)    19 @ 07:  -  19 @ 07:00  --------------------------------------------------------  IN:    dexmedetomidine Infusion: 40.6 mL    esmolol Infusion: 30.3 mL    esmolol Infusion: 182.9 mL    esmolol Infusion: 48.8 mL    IV PiggyBack: 400 mL    lactated ringers.: 850 mL    sodium chloride 0.9%: 100 mL  Total IN: 1652.6 mL    OUT:    Chest Tube: 170 mL    Chest Tube: 110 mL    Chest Tube: 80 mL    Indwelling Catheter - Urethral: 2555 mL    Other: 3 mL  Total OUT: 2918 mL    Total NET: -1265.4 mL      19 @ 07:01  -  19 @ 17:30  --------------------------------------------------------  IN:    Albumin 5%  - 250 mL: 250 mL    esmolol Infusion: 36.6 mL    esmolol Infusion: 18.4 mL    IV PiggyBack: 50 mL    lactated ringers.: 350 mL    Oral Fluid: 360 mL  Total IN: 1065 mL    OUT:    Indwelling Catheter - Urethral: 480 mL  Total OUT: 480 mL    Total NET: 585 mL          PHYSICAL EXAM:  PHYSICAL EXAM:      Constitutional: Mild distress, appears anxious and has motor tic (head bobbing)  Eyes: No scleral icterus or conjunctivits  Neck: Supple, trachea midline, no JVD  Respiratory: Diminished at bases, no wheezes/rhonchi/rales  Cardiovascular: RRR, normal S1S2, 2/6 systolic murmur, no rubs or gallops, posterolateral thoracotomy dressing c/d/i, CT drainage serosang  Gastrointestinal: soft, nontender, nondistended  Genitourinary: [place] in place  Extremities: 1+ pitting edema to mid shin, warm well perfused, boot on right foot  Vascular: Radial pulses 2+ bilaterally, DP pulses 2+ bilaterally  Neurological: A&O x3, 3/5 strength for left leg elevation, 2/5 strength for right leg elevation, sensation to light touch intact bilaterally    Skin: Warm well perfused      LAB VALUES:                        9.9    13.0  )-----------( 150      ( 03 Sep 2019 01:09 )             30.6         149<H>  |  117<H>  |  44<H>  ----------------------------<  109<H>  4.5   |  16<L>  |  1.93<H>    Ca    9.2      03 Sep 2019 01:09  Phos  2.5       Mg     2.4         TPro  6.0  /  Alb  3.2<L>  /  TBili  0.8  /  DBili  x   /  AST  46<H>  /  ALT  63<H>  /  AlkPhos  64      LIVER FUNCTIONS - ( 03 Sep 2019 01:09 )  Alb: 3.2 g/dL / Pro: 6.0 g/dL / ALK PHOS: 64 U/L / ALT: 63 U/L / AST: 46 U/L / GGT: x                     MICROBIOLOGY:      No new microbiology data for review.    RADIOLOGY:    Xray Chest 1 View- PORTABLE-Routine (19 @ 02:54)   Impression:    The heart is normal in size. Left lower lobe pneumonia and/or   atelectasis. The right lung is clear. NG tube was removed. Otherwise all   life supporting devices are in good position. No pneumothorax. A metallic   Wallstent is seen in the descending aorta. Status post sternotomy.        ALPRAZolam 0.25 milliGRAM(s) Oral every 8 hours PRN  aspirin  chewable 81 milliGRAM(s) Oral daily  chlorhexidine 2% Cloths 1 Application(s) Topical daily  dextrose 50% Injectable 50 milliLiter(s) IV Push every 15 minutes  dextrose 50% Injectable 25 milliLiter(s) IV Push every 15 minutes  docusate sodium 100 milliGRAM(s) Oral three times a day  heparin  Injectable 5000 Unit(s) SubCutaneous every 8 hours  HYDROmorphone  Injectable 0.5 milliGRAM(s) IV Push every 4 hours PRN  insulin lispro (HumaLOG) corrective regimen sliding scale   SubCutaneous Before meals and at bedtime  lactated ringers. 1000 milliLiter(s) IV Continuous <Continuous>  mineral oil 30 milliLiter(s) Oral two times a day PRN  pantoprazole  Injectable 40 milliGRAM(s) IV Push daily

## 2019-09-03 NOTE — PROGRESS NOTE ADULT - ASSESSMENT
Flory is a very pleasant 69 Year-Old Lady s/p 8/29 ThoracoAbdominal Aneurysm Repair / Ceiliac-spinal-SMA bypass.     - Appreciate Excellent Care per CTICU   Librado Johnson for advancement of diet from Vascular Surgery perspective.   - Continue neurovascular checks.     Vascular Surgery Pager #[phone]

## 2019-09-03 NOTE — OCCUPATIONAL THERAPY INITIAL EVALUATION ADULT - GENERAL OBSERVATIONS, REHAB EVAL
Pt presents seated OOB in chair in NAD. Pt presents semi-supine in NAD +central line, +R static foot drop splint, +muñoz, +ICU monitoring and agreeable to OT session.

## 2019-09-03 NOTE — DIETITIAN INITIAL EVALUATION ADULT. - PERTINENT LABORATORY DATA
(9/3): Hgb 9.9, Hct 30.6, POCT blood glucose: 163, 104, Na 149, Cl 117, BUN 44, Cr 1.93, Glu 109, Alb 3.2, GFR 26; (8/28): A1c 5.4%

## 2019-09-03 NOTE — PROGRESS NOTE ADULT - ASSESSMENT
A/P:  69yF now transferred to floor from SICU.  Patient is hemodynamically stable, with good pain control now recovering on floor without continued critical care needs at this time.         8/29: Neurosurgery placed lumbar drain with good return of CSF without complication, Reop Thoracoabdominal aneurysm open repair with Decron graft and celiac SMA bypass, reimplantation of lumbar artery, Intraop 2 PRBC, 2 Platelets, pressors to maintain MAP > 70, IV Esmolol drip and nicardipine for PAF and HTN  8/30 BLE weakness R > L, Levo and Vaso started, MAPs at 110, 20 cc's CSF drained, RLE strength improved, Low UOP, Renal consult 8/31 Extubated, high anion gap metabolic acidosis + metabolic alkalosis, PPI switched to pepcid, Renvela started 9/1 RLE improving, NGT removed, BIPAP  9/2 Lumbar drain removed, Afib RVR converted without intervention  9/3 S&S eval recommend Dysphagia 1 nectar thick diet, 3L NC A/P:  69yF with a past medical history of hypertension, pre-eclampsia, 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 She is now s/p Reop Thoracoabdominal aneurysm open repair with Decron graft and celiac SMA bypass, reimplantation of lumbar artery with Dr. Briseno on 8/29. Her postoperative course was complicated by new onset BLE weakness R>L for which she was placed on MAPs of 110 and CSF drainage of 20cc. She was extubated POD2 and required BIPAP on POD3. She went into afib with RVR on POD4 but converted back to NSR without treatment. She is now transferred from the CTU to SDU. She no longer has any critical care needs at this time and is stable enough for transfer to SDU.            8/29: Neurosurgery placed lumbar drain with good return of CSF without complication, Reop Thoracoabdominal aneurysm open repair with Decron graft and celiac SMA bypass, reimplantation of lumbar artery, Intraop 2 PRBC, 2 Platelets, Vaso to maintain MAP > 70, Nicardipine for HTN  8/30 BLE weakness R > L, Levo started for MAPs at 110 and then weaned off, 20 cc's CSF drained, RLE strength improved, Low UOP Cr 1.52 from 1.02, Renal consulted likely ATN 8/31 Extubated, high anion gap metabolic acidosis + metabolic alkalosis per Renal, PPI switched to pepcid, Renvela started 9/1 RLE improving, NGT removed, BIPAP, nicardipine restarted briefly for HTN, Cr 2.43 9/2 Lumbar drain removed, Afib RVR converted without intervention, Vaso weaned off today 9/3 S&S eval recommend Dysphagia 1 nectar thick diet, 3L NC, PT recommending acute rehab, Cr improving 1.93 from 2.04 A/P:  69yF with a past medical history of hypertension, pre-eclampsia, 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 She is now   s/p Reop Thoracoabdominal aneurysm open repair with Dacron graft and celiac SMA bypass, reimplantation of lumbar artery with Dr. Briseno on 8/29. Intraop 2 PRBC, 2 Platelets,Her postoperative course was complicated by new onset BLE weakness R>L for which she was placed on MAPs of 110 and CSF drainage of 20cc. She was extubated POD2 and required BIPAP on POD3. She went into afib with RVR, placed on esmolol-weaned off- on POD4 but converted back to NSR without treatment.  9/1 RLE improving, NGT removed, BIPAP, nicardipine restarted briefly for HTN, Cr 2.43  9/2 Lumbar drain removed, Afib RVR converted without intervention, Vaso weaned off today  9/3 S&S eval recommend Dysphagia 1 nectar thick diet, 3L NC, PT recommending acute rehab, Cr improving 1.93 from 2.04  She is now transferred from the CTU to SDU. She no longer has any critical care needs at this time and is stable enough for transfer to SDU. 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 She is now   s/p Reop Thoracoabdominal aneurysm open repair with Dacron graft and celiac SMA bypass, reimplantation of lumbar artery with Dr. Briseno on 8/29. Intraop 2 PRBC, 2 Platelets,Her postoperative course was complicated by new onset BLE weakness R>L for which she was placed on MAPs of 110 and CSF drainage of 20cc. She was extubated POD2 ,and required BIPAP on POD3. She went into afib with RVR, placed on esmolol-weaned off- on POD4 but converted back to NSR without treatment.  9/1 RLE improving, NGT removed, BIPAP, nicardipine restarted briefly for HTN, Cr 2.43  9/2 Lumbar drain removed, Afib RVR converted without intervention, Vaso weaned off today  9/3 S&S eval recommend Dysphagia 1 nectar thick diet, 3L NC, PT recommending acute rehab, Cr improving 1.93 from 2.04  She is now transferred from the CTU to SDU. She no longer has any critical care needs at this time and is stable enough for transfer to SDU.

## 2019-09-03 NOTE — PROGRESS NOTE ADULT - SUBJECTIVE AND OBJECTIVE BOX
NEPHROLOGY-[facility] [phone]        Patient seen and examined in the chair.  She states that the right leg is still weak  Some cough this am  NPO and awaiting speech and swallow         MEDICATIONS  (STANDING):  chlorhexidine 2% Cloths 1 Application(s) Topical daily  dextrose 50% Injectable 50 milliLiter(s) IV Push every 15 minutes  dextrose 50% Injectable 25 milliLiter(s) IV Push every 15 minutes  docusate sodium 100 milliGRAM(s) Oral three times a day  esMOLOL  Infusion 30 MICROgram(s)/kG/Min (12.168 mL/Hr) IV Continuous <Continuous>  insulin lispro (HumaLOG) corrective regimen sliding scale   SubCutaneous Before meals and at bedtime  lactated ringers. 1000 milliLiter(s) (50 mL/Hr) IV Continuous <Continuous>  metoprolol tartrate 25 milliGRAM(s) Oral every 8 hours  pantoprazole  Injectable 40 milliGRAM(s) IV Push daily      VITAL:  T(C): , Max: 37.8 (09-03-19 @ 08:00)  T(F): , Max: 100 (09-03-19 @ 08:00)  HR: 98 (09-03-19 @ 08:00)  BP: --  BP(mean): --  RR: 32 (09-03-19 @ 08:00)  SpO2: 97% (09-03-19 @ 08:00)  Wt(kg): --    I and O's:    09-02 @ 07:01  -  09-03 @ 07:00  --------------------------------------------------------  IN: 1652.6 mL / OUT: 2918 mL / NET: -1265.4 mL    09-03 @ 07:01  -  09-03 @ 08:29  --------------------------------------------------------  IN: 62.2 mL / OUT: 90 mL / NET: -27.8 mL          PHYSICAL EXAM:    Constitutional: NAD  Neck:  No JVD  Respiratory: CTAB/L  Cardiovascular: S1 and S2  Gastrointestinal: BS+, soft, NT/ND  Extremities: No peripheral edema  Neurological: A/O x 3, right leg weakness  Psychiatric: Normal mood, normal affect  : +  [place]  Skin: No rashes  Access: Not applicable    LABS:                        9.9    13.0  )-----------( 150      ( 03 Sep 2019 01:09 )             30.6     09-03    149<H>  |  117<H>  |  44<H>  ----------------------------<  109<H>  4.5   |  16<L>  |  1.93<H>    Ca    9.2      03 Sep 2019 01:09  Phos  2.5     09-03  Mg     2.4     09-03    TPro  6.0  /  Alb  3.2<L>  /  TBili  0.8  /  DBili  x   /  AST  46<H>  /  ALT  63<H>  /  AlkPhos  64  09-03          Urine Studies:          RADIOLOGY & ADDITIONAL STUDIES:

## 2019-09-03 NOTE — PHYSICAL THERAPY INITIAL EVALUATION ADULT - DID THE PATIENT HAVE SURGERY?
yes/Thoracoabdominal aneurysm open repair with Decron graft and celiac SMA bypass, reimplantation of lumbar artery.

## 2019-09-03 NOTE — DIETITIAN INITIAL EVALUATION ADULT. - OTHER INFO
Pt is a 70 yo F with PMH: thoracic abdominal aortic aneurysm, aortic stenosis/regurgitation s/p AVR (T), ascending and hemiarch replacement 4/9/19. Pt now s/p abdominal aortic aneurysm repair-ceiliac-spinal-SMA bypass on 8/28.     INFORMATION PTA    ·Diet PTA: Pt reports excellent appetite PTA; consumed on average 3 meals/day. Denied prior history of intolerance to chewing or swallowing. Reports that she prepares foods by self; typically enjoys grilled chicken, fish (shellfish, salmon), hamburgers, pork. Tries to adhere to "sugar-free" diet. Denies history of food allergies. Reports taking multivitamin and Vit D supplement PTA.     ·Weight History PTA: Pt reports UBW ~145 lbs. Stated wt at PST was 148 lbs. Dosing wt (8/29): 149 lbs. Appears consistent with reported UBW. Pt reports wt gain in-house 2/2 fluid accumulation. Will monitor.     ·Other Subjective Information: Pt reports hx of constipation PTA.     INFORMATION THIS ADMISSION    ·Last BM: No BM's recorded in-house at this time; on bowel regimen as ordered.     ·Other Subjective Information: PO diet resumed today s/p SLP evaluation with recommendations for pt to receive a puree dysphagia 1 diet with nectar thick liquids. Pt expressed dissatisfaction of current diet order and denied hunger 2/2 multiple food items administered during swallow evaluation. Will alert medical team.

## 2019-09-03 NOTE — PROGRESS NOTE ADULT - SUBJECTIVE AND OBJECTIVE BOX
Vascular Surgery Progress Note     Subjective/24hour Events:   Patient seen and examined.   Lumbar drain removed. NGT removed.  Still having some weakness on RLE.       Objective:  Vital Signs Last 24 Hrs  T(C): 37.8 (03 Sep 2019 08:00), Max: 37.8 (03 Sep 2019 08:00)  T(F): 100 (03 Sep 2019 08:00), Max: 100 (03 Sep 2019 08:00)  HR: 82 (03 Sep 2019 10:15) (82 - 115)  BP: --  BP(mean): --  RR: 28 (03 Sep 2019 10:15) (4 - 48)  SpO2: 98% (03 Sep 2019 10:15) (91% - 99%)    I&O's Detail    02 Sep 2019 07:01  -  03 Sep 2019 07:00  --------------------------------------------------------  IN:    dexmedetomidine Infusion: 40.6 mL    esmolol Infusion: 30.3 mL    esmolol Infusion: 182.9 mL    esmolol Infusion: 48.8 mL    IV PiggyBack: 400 mL    lactated ringers.: 850 mL    sodium chloride 0.9%: 100 mL  Total IN: 1652.6 mL    OUT:    Chest Tube: 170 mL    Chest Tube: 110 mL    Chest Tube: 80 mL    Indwelling Catheter - Urethral: 2555 mL    Other: 3 mL  Total OUT: 2918 mL    Total NET: -1265.4 mL      03 Sep 2019 07:01  -  03 Sep 2019 10:45  --------------------------------------------------------  IN:    esmolol Infusion: 36.6 mL    IV PiggyBack: 50 mL    lactated ringers.: 150 mL  Total IN: 236.6 mL    OUT:    Indwelling Catheter - Urethral: 210 mL  Total OUT: 210 mL    Total NET: 26.6 mL          MEDICATIONS  (STANDING):  aspirin Suppository 300 milliGRAM(s) Rectal daily  chlorhexidine 2% Cloths 1 Application(s) Topical daily  dextrose 50% Injectable 50 milliLiter(s) IV Push every 15 minutes  dextrose 50% Injectable 25 milliLiter(s) IV Push every 15 minutes  docusate sodium 100 milliGRAM(s) Oral three times a day  esMOLOL  Infusion 12.327 MICROgram(s)/kG/Min (5 mL/Hr) IV Continuous <Continuous>  heparin  Injectable 5000 Unit(s) SubCutaneous every 8 hours  insulin lispro (HumaLOG) corrective regimen sliding scale   SubCutaneous Before meals and at bedtime  lactated ringers. 1000 milliLiter(s) (50 mL/Hr) IV Continuous <Continuous>  metoprolol tartrate Injectable 5 milliGRAM(s) IV Push every 6 hours  pantoprazole  Injectable 40 milliGRAM(s) IV Push daily    MEDICATIONS  (PRN):  HYDROmorphone  Injectable 0.5 milliGRAM(s) IV Push every 4 hours PRN Moderate Pain (4 - 6)  mineral oil 30 milliLiter(s) Oral two times a day PRN dry mouth                            9.9    13.0  )-----------( 150      ( 03 Sep 2019 01:09 )             30.6       09-03    149<H>  |  117<H>  |  44<H>  ----------------------------<  109<H>  4.5   |  16<L>  |  1.93<H>    Ca    9.2      03 Sep 2019 01:09  Phos  2.5     09-03  Mg     2.4     09-03    TPro  6.0  /  Alb  3.2<L>  /  TBili  0.8  /  DBili  x   /  AST  46<H>  /  ALT  63<H>  /  AlkPhos  64  09-03    Physical Exam:  Gen: NAD.  Lungs: Non labored breathing on supp. O2 via NC  Ab: Soft, nontender, nondistended.   Ext: RLE strength 2/5, LLE 5/5. Distal pulses palpable. LL abdominal drains SS.

## 2019-09-03 NOTE — SWALLOW BEDSIDE ASSESSMENT ADULT - SWALLOW EVAL: DIAGNOSIS
Patient presents with sasha-pharyngeal dysphagia with impaired oral management (solids>other textures) and reports suggestive of laryngeal penetration/aspiration of thin liquids Patient presents with: 1) sasha-pharyngeal dysphagia with impaired oral management (solids>other textures) and reports suggestive of laryngeal penetration/aspiration of thin liquids 2) Baseline vocal tremor

## 2019-09-03 NOTE — PROGRESS NOTE ADULT - SUBJECTIVE AND OBJECTIVE BOX
KENNETH SPRING   MRN#: [id number]     The patient is a 69y Female who was seen, evaluated, & examined with the CTICU staff on rounds and later in the day with a multidisciplinary care plan formulated & implemented.  All available clinical, laboratory, radiographic, pharmacologic, and electrocardiographic data were reviewed & analyzed.      The patient was in the CTICU in critical condition secondary to persistent cardiopulmonary dysfunction, right-sided lower extremity weakness, and permissive hypertension.     Respiratory status required supplemental oxygen, the following of ABG’s with A-line monitoring, continuous pulse oximetry monitoring, and an IV Precedex drip for support & to evaluate for & prevent further decompensation secondary to persistent cardiopulmonary dysfunction.     Invasive hemodynamic monitoring with an A-line was required for the following of continuous MAP/BP monitoring to ensure adequate cardiovascular support and to evaluate for & help prevent decompensation while transitioning from an IV Esmolol drip to intermittent Lopressor secondary to paroxysmal atrial fibrillation and permissive hypertension        Patient required critical care management and I provided 30 minutes of non-continuous care to the patient.  Discussed at length with the CTICU staff and helped coordinate care. KENNETH SPRING   MRN#: [id number]     The patient is a 69y Female who was seen, evaluated, & examined with the CTICU staff on rounds and later in the day with a multidisciplinary care plan formulated & implemented.  All available clinical, laboratory, radiographic, pharmacologic, and electrocardiographic data were reviewed & analyzed.      The patient was in the CTICU in critical condition secondary to persistent cardiopulmonary dysfunction, right-sided lower extremity weakness, and permissive hypertension.     Respiratory status required supplemental oxygen, the following of ABG’s with A-line monitoring, continuous pulse oximetry monitoring, and an IV Precedex drip for support & to evaluate for & prevent further decompensation secondary to persistent cardiopulmonary dysfunction.     Invasive hemodynamic monitoring with an A-line was required for the following of continuous MAP/BP monitoring to ensure adequate cardiovascular support and to evaluate for & help prevent decompensation while transitioning from an IV Esmolol drip to intermittent Lopressor secondary to paroxysmal atrial fibrillation and permissive hypertension        Patient required critical care management and I provided 30 minutes of non-continuous care to the patient.

## 2019-09-03 NOTE — OCCUPATIONAL THERAPY INITIAL EVALUATION ADULT - BALANCE TRAINING, PT EVAL
Pt will improve static standing balance by 1/2 grade to improve functional performance with ADLs within 4 weeks.

## 2019-09-03 NOTE — SWALLOW BEDSIDE ASSESSMENT ADULT - SLP GENERAL OBSERVATIONS
Pt OOB to chair, + line in R neck, on 3L NC. Pt alert, oriented, able to follow commands. Noted to have + vocal tremor, + tremor or head/neck region. Pt stated that onset was approximately 15 years prior. Pt OOB to chair, + line in R neck, on 3L NC. Pt alert, oriented, able to follow commands. Noted to have + vocal tremor, + tremor of head/neck region. Pt stated that onset was approximately 15 years prior (no neuro or ENT w/u). Denied dysphagia PTA, but later did indicate that chewing of some solids (ie steak) has become affected due to dentures. Reported that she has noted + cough with water when consumed via swab. Pt OOB to chair, + line in R neck, on 3L NC. Pt alert, oriented, able to follow commands. Noted to have + vocal tremor, + tremor of head/neck region. Pt stated that onset was approximately 15 years prior (no neuro or ENT w/u. Seen by PMD and confirmed essential tremor and stated that it is worse during times of stress). Denied dysphagia PTA, but later did indicate that chewing of some solids (ie steak) has become affected due to dentures. Reported that she has noted + cough with water when consumed via swab. Pt OOB to chair, + line in R neck, on 3L NC. + 2 chest tubes. Pt alert, oriented, able to follow commands. Noted to have + vocal tremor, + tremor of head/neck region. Pt stated that onset was approximately 15 years prior (no neuro or ENT w/u. Seen by PMD and confirmed essential tremor and stated that it is worse during times of stress). Denied dysphagia PTA, but later did indicate that chewing of some solids (ie steak) has become affected due to dentures. Reported that she has noted + cough with water when consumed via swab.

## 2019-09-03 NOTE — SWALLOW BEDSIDE ASSESSMENT ADULT - ORAL PHASE
Oral transit time noted to be 4-6  seconds./Delayed oral transit time Delayed oral transit time/Oral transit time noted to be 2  seconds. Oral prep and transit time noted to be >60  seconds. Pt stated "this is too dry for me to swallow", requested water to aid in same, but deferred due to reports suggestive of aspiration/laryngeal penetration. Pt given puree bolus to aid in same/Delayed oral transit time

## 2019-09-03 NOTE — OCCUPATIONAL THERAPY INITIAL EVALUATION ADULT - PERTINENT HX OF CURRENT PROBLEM, REHAB EVAL
69F with history of HTN, preeclampsia, c/o abdominal pain, Pt went for CT scan on 3/2019 S/P Ascending  thoracoabdominal aortic aneurysm repair and aortic valve replaced Bioprosthetic valve) 4/8/19. Pt is s/p Thoracoabdominal aneurysm open repair with Decron graft and celiac SMA bypass, reimplantation of lumbar artery on 8/29

## 2019-09-04 LAB
ANION GAP SERPL CALC-SCNC: 15 MMOL/L — SIGNIFICANT CHANGE UP (ref 5–17)
BUN SERPL-MCNC: 44 MG/DL — HIGH (ref 7–23)
CALCIUM SERPL-MCNC: 9.1 MG/DL — SIGNIFICANT CHANGE UP (ref 8.4–10.5)
CHLORIDE SERPL-SCNC: 113 MMOL/L — HIGH (ref 96–108)
CO2 SERPL-SCNC: 18 MMOL/L — LOW (ref 22–31)
CREAT SERPL-MCNC: 1.76 MG/DL — HIGH (ref 0.5–1.3)
GAS PNL BLDA: SIGNIFICANT CHANGE UP
GLUCOSE SERPL-MCNC: 122 MG/DL — HIGH (ref 70–99)
HCT VFR BLD CALC: 31.8 % — LOW (ref 34.5–45)
HGB BLD-MCNC: 10.1 G/DL — LOW (ref 11.5–15.5)
MCHC RBC-ENTMCNC: 28.2 PG — SIGNIFICANT CHANGE UP (ref 27–34)
MCHC RBC-ENTMCNC: 32 GM/DL — SIGNIFICANT CHANGE UP (ref 32–36)
MCV RBC AUTO: 88.3 FL — SIGNIFICANT CHANGE UP (ref 80–100)
PLATELET # BLD AUTO: 189 K/UL — SIGNIFICANT CHANGE UP (ref 150–400)
POTASSIUM SERPL-MCNC: 3.8 MMOL/L — SIGNIFICANT CHANGE UP (ref 3.5–5.3)
POTASSIUM SERPL-SCNC: 3.8 MMOL/L — SIGNIFICANT CHANGE UP (ref 3.5–5.3)
RBC # BLD: 3.6 M/UL — LOW (ref 3.8–5.2)
RBC # FLD: 14.7 % — HIGH (ref 10.3–14.5)
SODIUM SERPL-SCNC: 146 MMOL/L — HIGH (ref 135–145)
WBC # BLD: 14.5 K/UL — HIGH (ref 3.8–10.5)
WBC # FLD AUTO: 14.5 K/UL — HIGH (ref 3.8–10.5)

## 2019-09-04 PROCEDURE — 71045 X-RAY EXAM CHEST 1 VIEW: CPT | Mod: 26

## 2019-09-04 PROCEDURE — 93010 ELECTROCARDIOGRAM REPORT: CPT

## 2019-09-04 PROCEDURE — 99291 CRITICAL CARE FIRST HOUR: CPT

## 2019-09-04 PROCEDURE — 99222 1ST HOSP IP/OBS MODERATE 55: CPT

## 2019-09-04 RX ORDER — POTASSIUM CHLORIDE 20 MEQ
10 PACKET (EA) ORAL ONCE
Refills: 0 | Status: COMPLETED | OUTPATIENT
Start: 2019-09-04 | End: 2019-09-04

## 2019-09-04 RX ORDER — AMIODARONE HYDROCHLORIDE 400 MG/1
1 TABLET ORAL
Qty: 900 | Refills: 0 | Status: DISCONTINUED | OUTPATIENT
Start: 2019-09-04 | End: 2019-09-05

## 2019-09-04 RX ORDER — ALBUMIN HUMAN 25 %
250 VIAL (ML) INTRAVENOUS ONCE
Refills: 0 | Status: COMPLETED | OUTPATIENT
Start: 2019-09-04 | End: 2019-09-04

## 2019-09-04 RX ORDER — DIGOXIN 250 MCG
0.5 TABLET ORAL ONCE
Refills: 0 | Status: COMPLETED | OUTPATIENT
Start: 2019-09-04 | End: 2019-09-04

## 2019-09-04 RX ORDER — NOREPINEPHRINE BITARTRATE/D5W 8 MG/250ML
0.05 PLASTIC BAG, INJECTION (ML) INTRAVENOUS
Qty: 8 | Refills: 0 | Status: DISCONTINUED | OUTPATIENT
Start: 2019-09-04 | End: 2019-09-04

## 2019-09-04 RX ORDER — ACETAMINOPHEN 500 MG
1000 TABLET ORAL ONCE
Refills: 0 | Status: COMPLETED | OUTPATIENT
Start: 2019-09-04 | End: 2019-09-04

## 2019-09-04 RX ORDER — POTASSIUM CHLORIDE 20 MEQ
10 PACKET (EA) ORAL
Refills: 0 | Status: COMPLETED | OUTPATIENT
Start: 2019-09-04 | End: 2019-09-04

## 2019-09-04 RX ORDER — VASOPRESSIN 20 [USP'U]/ML
0.1 INJECTION INTRAVENOUS
Qty: 50 | Refills: 0 | Status: DISCONTINUED | OUTPATIENT
Start: 2019-09-04 | End: 2019-09-04

## 2019-09-04 RX ORDER — ESMOLOL HCL 100MG/10ML
75 VIAL (ML) INTRAVENOUS
Qty: 2500 | Refills: 0 | Status: DISCONTINUED | OUTPATIENT
Start: 2019-09-04 | End: 2019-09-05

## 2019-09-04 RX ORDER — AMIODARONE HYDROCHLORIDE 400 MG/1
150 TABLET ORAL ONCE
Refills: 0 | Status: COMPLETED | OUTPATIENT
Start: 2019-09-04 | End: 2019-09-04

## 2019-09-04 RX ORDER — METOPROLOL TARTRATE 50 MG
2.5 TABLET ORAL ONCE
Refills: 0 | Status: COMPLETED | OUTPATIENT
Start: 2019-09-04 | End: 2019-09-04

## 2019-09-04 RX ORDER — LIDOCAINE 4 G/100G
1 CREAM TOPICAL DAILY
Refills: 0 | Status: DISCONTINUED | OUTPATIENT
Start: 2019-09-04 | End: 2019-09-28

## 2019-09-04 RX ORDER — MAGNESIUM SULFATE 500 MG/ML
2 VIAL (ML) INJECTION ONCE
Refills: 0 | Status: COMPLETED | OUTPATIENT
Start: 2019-09-04 | End: 2019-09-04

## 2019-09-04 RX ADMIN — Medication 6.34 MICROGRAM(S)/KG/MIN: at 06:10

## 2019-09-04 RX ADMIN — Medication 50 MILLIEQUIVALENT(S): at 06:11

## 2019-09-04 RX ADMIN — Medication 81 MILLIGRAM(S): at 12:04

## 2019-09-04 RX ADMIN — Medication 1: at 07:27

## 2019-09-04 RX ADMIN — HYDROMORPHONE HYDROCHLORIDE 0.5 MILLIGRAM(S): 2 INJECTION INTRAMUSCULAR; INTRAVENOUS; SUBCUTANEOUS at 04:53

## 2019-09-04 RX ADMIN — Medication 2.5 MILLIGRAM(S): at 18:45

## 2019-09-04 RX ADMIN — Medication 50 MILLIEQUIVALENT(S): at 18:03

## 2019-09-04 RX ADMIN — AMIODARONE HYDROCHLORIDE 100 MILLIGRAM(S): 400 TABLET ORAL at 06:22

## 2019-09-04 RX ADMIN — HYDROMORPHONE HYDROCHLORIDE 0.5 MILLIGRAM(S): 2 INJECTION INTRAMUSCULAR; INTRAVENOUS; SUBCUTANEOUS at 10:15

## 2019-09-04 RX ADMIN — PANTOPRAZOLE SODIUM 40 MILLIGRAM(S): 20 TABLET, DELAYED RELEASE ORAL at 12:04

## 2019-09-04 RX ADMIN — LIDOCAINE 1 PATCH: 4 CREAM TOPICAL at 17:53

## 2019-09-04 RX ADMIN — Medication 0.25 MILLIGRAM(S): at 00:17

## 2019-09-04 RX ADMIN — LIDOCAINE 1 PATCH: 4 CREAM TOPICAL at 08:04

## 2019-09-04 RX ADMIN — Medication 1000 MILLIGRAM(S): at 06:45

## 2019-09-04 RX ADMIN — HEPARIN SODIUM 5000 UNIT(S): 5000 INJECTION INTRAVENOUS; SUBCUTANEOUS at 14:27

## 2019-09-04 RX ADMIN — Medication 10 MILLIGRAM(S): at 01:36

## 2019-09-04 RX ADMIN — AMIODARONE HYDROCHLORIDE 100 MILLIGRAM(S): 400 TABLET ORAL at 08:25

## 2019-09-04 RX ADMIN — Medication 400 MILLIGRAM(S): at 06:30

## 2019-09-04 RX ADMIN — HYDROMORPHONE HYDROCHLORIDE 0.5 MILLIGRAM(S): 2 INJECTION INTRAMUSCULAR; INTRAVENOUS; SUBCUTANEOUS at 01:04

## 2019-09-04 RX ADMIN — HEPARIN SODIUM 5000 UNIT(S): 5000 INJECTION INTRAVENOUS; SUBCUTANEOUS at 06:22

## 2019-09-04 RX ADMIN — Medication 500 MILLILITER(S): at 04:46

## 2019-09-04 RX ADMIN — Medication 50 GRAM(S): at 04:42

## 2019-09-04 RX ADMIN — Medication 0.5 MILLIGRAM(S): at 09:44

## 2019-09-04 RX ADMIN — VASOPRESSIN 6 UNIT(S)/MIN: 20 INJECTION INTRAVENOUS at 06:10

## 2019-09-04 RX ADMIN — HYDROMORPHONE HYDROCHLORIDE 0.5 MILLIGRAM(S): 2 INJECTION INTRAMUSCULAR; INTRAVENOUS; SUBCUTANEOUS at 00:34

## 2019-09-04 RX ADMIN — HYDROMORPHONE HYDROCHLORIDE 0.5 MILLIGRAM(S): 2 INJECTION INTRAMUSCULAR; INTRAVENOUS; SUBCUTANEOUS at 09:42

## 2019-09-04 RX ADMIN — Medication 2.5 MILLIGRAM(S): at 04:41

## 2019-09-04 RX ADMIN — Medication 1: at 12:02

## 2019-09-04 RX ADMIN — Medication 100 MILLIGRAM(S): at 21:13

## 2019-09-04 RX ADMIN — HEPARIN SODIUM 5000 UNIT(S): 5000 INJECTION INTRAVENOUS; SUBCUTANEOUS at 21:13

## 2019-09-04 RX ADMIN — Medication 50 MILLIEQUIVALENT(S): at 06:36

## 2019-09-04 RX ADMIN — Medication 50 MILLIEQUIVALENT(S): at 17:45

## 2019-09-04 RX ADMIN — Medication 50 MILLIEQUIVALENT(S): at 06:09

## 2019-09-04 RX ADMIN — AMIODARONE HYDROCHLORIDE 33.33 MG/MIN: 400 TABLET ORAL at 09:44

## 2019-09-04 RX ADMIN — LIDOCAINE 1 PATCH: 4 CREAM TOPICAL at 06:30

## 2019-09-04 RX ADMIN — CHLORHEXIDINE GLUCONATE 1 APPLICATION(S): 213 SOLUTION TOPICAL at 14:27

## 2019-09-04 RX ADMIN — HYDROMORPHONE HYDROCHLORIDE 0.5 MILLIGRAM(S): 2 INJECTION INTRAMUSCULAR; INTRAVENOUS; SUBCUTANEOUS at 05:08

## 2019-09-04 NOTE — SWALLOW BEDSIDE ASSESSMENT ADULT - MODE OF PRESENTATION
self fed straw/self fed consumed 3/4  of water pitcher (first single sips and then rapid consecutive sips)

## 2019-09-04 NOTE — SWALLOW BEDSIDE ASSESSMENT ADULT - SWALLOW EVAL: RECOMMENDED DIET
Dysphagia I with thin liquids. CRUSH meds
Dysphagia I with nectar thickened liquids. Crush meds of provide via alternate source.

## 2019-09-04 NOTE — SWALLOW BEDSIDE ASSESSMENT ADULT - ORAL PHASE
Oral transit time noted to be 4  seconds./Delayed oral transit time Oral transit time noted to be 2  seconds./Delayed oral transit time/Within functional limits Within functional limits

## 2019-09-04 NOTE — SWALLOW BEDSIDE ASSESSMENT ADULT - SWALLOW EVAL: DIAGNOSIS
Patient presents with sasha-pharyngeal dysphagia with reports suggestive of pharyngo-esophageal retention for solids.

## 2019-09-04 NOTE — PROGRESS NOTE ADULT - SUBJECTIVE AND OBJECTIVE BOX
Vascular Surgery Progress Note     Subjective/24hour Events:   Patient seen and examined at bedside. She is doing well overall and has new onset lower extremity weakness R>L. She has a [place] in place and is passing flatus but no bowel movements. She denies CP, SOB, N/V, fever, chills      Objective:  Vital Signs Last 24 Hrs  T(C): 37.6 (04 Sep 2019 08:00), Max: 37.8 (03 Sep 2019 12:00)  T(F): 99.7 (04 Sep 2019 08:00), Max: 100 (03 Sep 2019 12:00)  HR: 134 (04 Sep 2019 08:00) (82 - 154)  BP: 142/68 (04 Sep 2019 03:15) (142/68 - 190/96)  BP(mean): 111 (04 Sep 2019 01:35) (111 - 138)  RR: 18 (04 Sep 2019 08:00) (16 - 42)  SpO2: 98% (04 Sep 2019 08:00) (93% - 100%)    I&O's Detail    03 Sep 2019 07:01  -  04 Sep 2019 07:00  --------------------------------------------------------  IN:    Albumin 5%  - 250 mL: 500 mL    esmolol Infusion: 36.6 mL    esmolol Infusion: 18.4 mL    IV PiggyBack: 300 mL    lactated ringers.: 350 mL    Oral Fluid: 680 mL    vasopressin Infusion: 8 mL  Total IN: 1893 mL    OUT:    Chest Tube: 80 mL    Chest Tube: 55 mL    Chest Tube: 100 mL    Indwelling Catheter - Urethral: 1920 mL  Total OUT: 2155 mL    Total NET: -262 mL      04 Sep 2019 07:01  -  04 Sep 2019 08:19  --------------------------------------------------------  IN:    vasopressin Infusion: 6 mL  Total IN: 6 mL    OUT:    Chest Tube: 5 mL    Chest Tube: 5 mL    Chest Tube: 5 mL    Indwelling Catheter - Urethral: 175 mL  Total OUT: 190 mL    Total NET: -184 mL          MEDICATIONS  (STANDING):  amiodarone IVPB 150 milliGRAM(s) IV Intermittent once  aspirin  chewable 81 milliGRAM(s) Oral daily  chlorhexidine 2% Cloths 1 Application(s) Topical daily  dextrose 50% Injectable 50 milliLiter(s) IV Push every 15 minutes  dextrose 50% Injectable 25 milliLiter(s) IV Push every 15 minutes  docusate sodium 100 milliGRAM(s) Oral three times a day  heparin  Injectable 5000 Unit(s) SubCutaneous every 8 hours  insulin lispro (HumaLOG) corrective regimen sliding scale   SubCutaneous Before meals and at bedtime  lactated ringers. 1000 milliLiter(s) (50 mL/Hr) IV Continuous <Continuous>  lidocaine   Patch 1 Patch Transdermal daily  pantoprazole  Injectable 40 milliGRAM(s) IV Push daily  vasopressin Infusion 0.1 Unit(s)/Min (6 mL/Hr) IV Continuous <Continuous>    MEDICATIONS  (PRN):  ALPRAZolam 0.25 milliGRAM(s) Oral every 8 hours PRN anxiety  HYDROmorphone  Injectable 0.5 milliGRAM(s) IV Push every 4 hours PRN Moderate Pain (4 - 6)  mineral oil 30 milliLiter(s) Oral two times a day PRN dry mouth                            10.1   14.5  )-----------( 189      ( 04 Sep 2019 04:46 )             31.8       09-04    146<H>  |  113<H>  |  44<H>  ----------------------------<  122<H>  3.8   |  18<L>  |  1.76<H>    Ca    9.1      04 Sep 2019 04:46  Phos  2.5     09-03  Mg     2.4     09-03    TPro  6.0  /  Alb  3.2<L>  /  TBili  0.8  /  DBili  x   /  AST  46<H>  /  ALT  63<H>  /  AlkPhos  64  09-03        Physical Exam:  Gen: NAD., tachycardic to the 120s  Lungs: Non labored breathing on supp. O2 via NC, Chest tubes have air leak  Ab: Soft, nontender, nondistended.   Ext: RLE strength 2/5, LLE 5/5. Distal pulses palpable.

## 2019-09-04 NOTE — SWALLOW BEDSIDE ASSESSMENT ADULT - ASR SWALLOW RECOMMEND DIAG
VFSS/MBS/MD, PLEASE ENTER ORDER FOR MODIFIED BARIUM SWALLOW STUDY (ENTER UNDER RADIOLOGY EXAMS THE FOLLOWING WAY: GO TO THE BROWSER AND TYPE IN XRAY, THEN HIT THE SPACEBAR, AND TYPE IN THE LETTER M.)  WILL SCHEDULE EXAM UPON RECEIPT IN RADIOLOGY.
Will consider objective swallow study

## 2019-09-04 NOTE — CONSULT NOTE ADULT - SUBJECTIVE AND OBJECTIVE BOX
Patient is a 69y old  Female who presents with a chief complaint of Aortic Dissection (03 Sep 2019 17:30)    Admission HPI:  This is a 69 year old female with history of HTN, preeclampsia, c/o abdominal pain, Pt went for CT scan on 3/2019 S/P Ascending  thoracoabdominal aortic aneurysm repair and aortic valve replaced Bioprosthetic valve) 4/8/19. Presents reoperative thoracoabdominal aneurysm repair  (7.4CM TAAA mostly at the diaphragmatic hiatus) 8/29/19.  Pt reports she is being admitted 8/27/19 because the doctor is going to insert a lumbar drain. As per Doctors orders a CT of head without contrast , Lumbar x-ray and Chest X-ray of chest will be completed today. (14 Aug 2019 09:12)    Interval History:  Patient underwent a thoracoabd aneurysm open repair, celiac-spinal-SMA bypass.   Post op course complicated by Afib/ hypotension. Also had LE weakness which has improved.       REVIEW OF SYSTEMS: No chest pain, shortness of breath, nausea, vomiting or diarhea; other ROS neg     PAST MEDICAL & SURGICAL HISTORY  Intermittent abdominal pain  Thoracoabdominal aortic aneurysm (TAAA) without rupture  Murmur, cardiac  Abdominal hernia  Cataract  Preeclampsia  HTN (hypertension)  S/P aortic valve repair  Thoracoabdominal aortic aneurysm (TAAA) without rupture  S/P cataract surgery  Bilateral cataracts  Arm fracture, left    FUNCTIONAL HISTORY:   Lives in apartment w elevator access.  .  PTA Independent    CURRENT FUNCTIONAL STATUS:  Mod A transfers and gait.     FAMILY HISTORY   Family history of skin cancer  Family history of coronary artery bypass graft (Father)  Family history of early CAD (Father)    RECENT LABS/IMAGING  CBC Full  -  ( 04 Sep 2019 04:46 )  WBC Count : 14.5 K/uL  RBC Count : 3.60 M/uL  Hemoglobin : 10.1 g/dL  Hematocrit : 31.8 %  Platelet Count - Automated : 189 K/uL  Mean Cell Volume : 88.3 fl  Mean Cell Hemoglobin : 28.2 pg  Mean Cell Hemoglobin Concentration : 32.0 gm/dL  Auto Neutrophil # : x  Auto Lymphocyte # : x  Auto Monocyte # : x  Auto Eosinophil # : x  Auto Basophil # : x  Auto Neutrophil % : x  Auto Lymphocyte % : x  Auto Monocyte % : x  Auto Eosinophil % : x  Auto Basophil % : x    09-04    146<H>  |  113<H>  |  44<H>  ----------------------------<  122<H>  3.8   |  18<L>  |  1.76<H>    Ca    9.1      04 Sep 2019 04:46  Phos  2.5     09-03  Mg     2.4     09-03    TPro  6.0  /  Alb  3.2<L>  /  TBili  0.8  /  DBili  x   /  AST  46<H>  /  ALT  63<H>  /  AlkPhos  64  09-03    VITALS  T(C): 37.6 (09-04-19 @ 08:00), Max: 37.8 (09-03-19 @ 12:00)  HR: 134 (09-04-19 @ 08:00) (82 - 154)  BP: 142/68 (09-04-19 @ 03:15) (142/68 - 190/96)  RR: 18 (09-04-19 @ 08:00) (16 - 42)  SpO2: 98% (09-04-19 @ 08:00) (93% - 100%)  Wt(kg): --    ALLERGIES  No Known Allergies    MEDICATIONS   ALPRAZolam 0.25 milliGRAM(s) Oral every 8 hours PRN  amiodarone Infusion 1 mG/Min IV Continuous <Continuous>  amiodarone IVPB 150 milliGRAM(s) IV Intermittent once  aspirin  chewable 81 milliGRAM(s) Oral daily  chlorhexidine 2% Cloths 1 Application(s) Topical daily  dextrose 50% Injectable 50 milliLiter(s) IV Push every 15 minutes  dextrose 50% Injectable 25 milliLiter(s) IV Push every 15 minutes  digoxin  Injectable 0.5 milliGRAM(s) IV Push once  docusate sodium 100 milliGRAM(s) Oral three times a day  heparin  Injectable 5000 Unit(s) SubCutaneous every 8 hours  HYDROmorphone  Injectable 0.5 milliGRAM(s) IV Push every 4 hours PRN  insulin lispro (HumaLOG) corrective regimen sliding scale   SubCutaneous Before meals and at bedtime  lactated ringers. 1000 milliLiter(s) IV Continuous <Continuous>  lidocaine   Patch 1 Patch Transdermal daily  mineral oil 30 milliLiter(s) Oral two times a day PRN  pantoprazole  Injectable 40 milliGRAM(s) IV Push daily  vasopressin Infusion 0.1 Unit(s)/Min IV Continuous <Continuous>      ----------------------------------------------------------------------------------------  PHYSICAL EXAM  Constitutional - NAD, Comfortable  HEENT - NCAT, EOMI  Neck - Supple, No limited ROM  Chest - CTA bilaterally, No wheeze, No rhonchi, No crackles  Cardiovascular - RRR, S1S2, No murmurs  Abdomen - BS+, Soft, NTND  Extremities - No C/C/E, No calf tenderness   Neurologic Exam -                    Cognitive - Awake, Alert, AAO to self, place, date, year, situation     Communication - Fluent, No dysarthria, no aphasia     Cranial Nerves - CN 2-12 intact     Motor - No focal deficits      Sensory - Intact to LT     Reflexes - DTR Intact, No primitive reflexive       Psychiatric - Mood stable, Affect WNL      Impression:   70 yo with functional deficits secondary to diagnosis of debility.    Plan:  PT- ROM, Bed Mob, Transfers, Amb w AD   OT- ADLs  Prec- Falls, Cardiac  DVT Prophylaxis- On Heparin  Skin- Turn q2 h  Dispo- Patient is a 69y old  Female who presents with a chief complaint of Aortic Dissection (03 Sep 2019 17:30)    Admission HPI:  This is a 69 year old female with history of HTN, preeclampsia, c/o abdominal pain, Pt went for CT scan on 3/2019 S/P Ascending  thoracoabdominal aortic aneurysm repair and aortic valve replaced Bioprosthetic valve) 4/8/19. Presents reoperative thoracoabdominal aneurysm repair  (7.4CM TAAA mostly at the diaphragmatic hiatus) 8/29/19.  Pt reports she is being admitted 8/27/19 because the doctor is going to insert a lumbar drain. As per Doctors orders a CT of head without contrast , Lumbar x-ray and Chest X-ray of chest will be completed today. (14 Aug 2019 09:12)    Interval History:  Patient underwent a thoracoabd aneurysm open repair, celiac-spinal-SMA bypass.   Post op course complicated by Afib/ hypotension. Also had LE weakness which has improved.     REVIEW OF SYSTEMS: RLE weakness, + fatigue, No chest pain, shortness of breath, nausea, vomiting or diarhea; other ROS neg     PAST MEDICAL & SURGICAL HISTORY  Intermittent abdominal pain  Thoracoabdominal aortic aneurysm (TAAA) without rupture  Murmur, cardiac  Abdominal hernia  Cataract  Preeclampsia  HTN (hypertension)  S/P aortic valve repair  Thoracoabdominal aortic aneurysm (TAAA) without rupture  S/P cataract surgery  Bilateral cataracts  Arm fracture, left    FUNCTIONAL HISTORY:   Lives in apartment w elevator access.  .  PTA Independent    CURRENT FUNCTIONAL STATUS:  Mod A transfers and gait.     FAMILY HISTORY   Family history of skin cancer  Family history of coronary artery bypass graft (Father)  Family history of early CAD (Father)    RECENT LABS/IMAGING  CBC Full  -  ( 04 Sep 2019 04:46 )  WBC Count : 14.5 K/uL  RBC Count : 3.60 M/uL  Hemoglobin : 10.1 g/dL  Hematocrit : 31.8 %  Platelet Count - Automated : 189 K/uL  Mean Cell Volume : 88.3 fl  Mean Cell Hemoglobin : 28.2 pg  Mean Cell Hemoglobin Concentration : 32.0 gm/dL  Auto Neutrophil # : x  Auto Lymphocyte # : x  Auto Monocyte # : x  Auto Eosinophil # : x  Auto Basophil # : x  Auto Neutrophil % : x  Auto Lymphocyte % : x  Auto Monocyte % : x  Auto Eosinophil % : x  Auto Basophil % : x    09-04    146<H>  |  113<H>  |  44<H>  ----------------------------<  122<H>  3.8   |  18<L>  |  1.76<H>    Ca    9.1      04 Sep 2019 04:46  Phos  2.5     09-03  Mg     2.4     09-03    TPro  6.0  /  Alb  3.2<L>  /  TBili  0.8  /  DBili  x   /  AST  46<H>  /  ALT  63<H>  /  AlkPhos  64  09-03    VITALS  T(C): 37.6 (09-04-19 @ 08:00), Max: 37.8 (09-03-19 @ 12:00)  HR: 134 (09-04-19 @ 08:00) (82 - 154)  BP: 142/68 (09-04-19 @ 03:15) (142/68 - 190/96)  RR: 18 (09-04-19 @ 08:00) (16 - 42)  SpO2: 98% (09-04-19 @ 08:00) (93% - 100%)  Wt(kg): --    ALLERGIES  No Known Allergies    MEDICATIONS   ALPRAZolam 0.25 milliGRAM(s) Oral every 8 hours PRN  amiodarone Infusion 1 mG/Min IV Continuous <Continuous>  amiodarone IVPB 150 milliGRAM(s) IV Intermittent once  aspirin  chewable 81 milliGRAM(s) Oral daily  chlorhexidine 2% Cloths 1 Application(s) Topical daily  dextrose 50% Injectable 50 milliLiter(s) IV Push every 15 minutes  dextrose 50% Injectable 25 milliLiter(s) IV Push every 15 minutes  digoxin  Injectable 0.5 milliGRAM(s) IV Push once  docusate sodium 100 milliGRAM(s) Oral three times a day  heparin  Injectable 5000 Unit(s) SubCutaneous every 8 hours  HYDROmorphone  Injectable 0.5 milliGRAM(s) IV Push every 4 hours PRN  insulin lispro (HumaLOG) corrective regimen sliding scale   SubCutaneous Before meals and at bedtime  lactated ringers. 1000 milliLiter(s) IV Continuous <Continuous>  lidocaine   Patch 1 Patch Transdermal daily  mineral oil 30 milliLiter(s) Oral two times a day PRN  pantoprazole  Injectable 40 milliGRAM(s) IV Push daily  vasopressin Infusion 0.1 Unit(s)/Min IV Continuous <Continuous>      ----------------------------------------------------------------------------------------  PHYSICAL EXAM  Constitutional - NAD, Comfortable  HEENT - NCAT, EOMI  Neck - Supple, No limited ROM  Chest - CTA bilaterally, No wheeze, No rhonchi, No crackles  Cardiovascular - RRR, S1S2, No murmurs  Abdomen - BS+, Soft, NTND  Extremities - No C/C/E, No calf tenderness   Neurologic Exam -                  RLE trace motor, RUE and L side 5/5   Sensation intact   DTRs trace throughout   No clonus     Psychiatric - Mood stable, Affect WNL    Impression:   68 yo with functional deficits secondary to diagnosis of debility, R leg weakness.    Plan:  PT- ROM, Bed Mob, Transfers, Amb w AD  and bracing as needed  OT- ADLs  Prec- Falls, Cardiac  DVT Prophylaxis- On Heparin  Skin- Turn q2 h  Dispo- Acute Rehab- can tolerate 3h/d PT/OT/SLP and requires daily physician visits

## 2019-09-04 NOTE — PROGRESS NOTE ADULT - SUBJECTIVE AND OBJECTIVE BOX
KENNETH SPRING  MRN#: [id number]     The patient is a 69y Female who was seen, evaluated, & examined with the CTICU staff post-operatively with a multidisciplinary care plan formulated & implemented.  All available clinical, laboratory, radiographic, pharmacologic, and electrocardiographic data were reviewed & analyzed.      The patient was in the CTICU in critical condition secondary to:     persistent cardiopulmonary dysfunction  atrial fibrillation with rapid ventricular response-cardiovascular dysfunction  vasodilatory shock    For support and evaluation & prevention of further decompensation secondary to persistent cardiopulmonary dysfunction and atrial fibrillation with rapid ventricular response-cardiovascular dysfunction, respiratory status required:     supplemental oxygen with nasal cannula  continuous pulse oximetry monitoring  following ABGs with A-line monitoring    Invasive hemodynamic monitoring with     an A-line was required for continuous MAP/BP monitoring     to ensure adequate cardiovascular support and to evaluate for & help prevent decompensation while receiving     intermittent volume expansion  IV Amiodarone infusion  IV Vasopressin infusion    secondary to     atrial fibrillation with rapid ventricular response-cardiovascular dysfunction  vasodilatory shock    In addition:  Atrial fibrillation with rapid ventricular response post AAA repair requiring Amiodarone infusion and return to ICU  Unclear etiology for sudden afib - patient does appear uncomfortable and in pain  Pain at chest tube site, will remove chest tubes - Dilaudid for pain control  Added Digoxin, will continue Amiodarone infusion  Creatinine downtrending, negative 1L without diuresis - will diurese if needed to target 500 cc to 1L overall      The patient required critical care management and I personally provided 30 minutes of non-continuous care to the patient, excluding separate procedures, in addition to  discussing the patient and plan at length with the CTICU staff and helping coordinate care. KENNETH SPRING  MRN#: [id number]     The patient is a 69y Female who was seen, evaluated, & examined with the CTICU staff post-operatively with a multidisciplinary care plan formulated & implemented.  All available clinical, laboratory, radiographic, pharmacologic, and electrocardiographic data were reviewed & analyzed.      The patient was in the CTICU in critical condition secondary to:     persistent cardiopulmonary dysfunction  atrial fibrillation with rapid ventricular response-cardiovascular dysfunction  vasodilatory shock    For support and evaluation & prevention of further decompensation secondary to persistent cardiopulmonary dysfunction and atrial fibrillation with rapid ventricular response-cardiovascular dysfunction, respiratory status required:     supplemental oxygen with nasal cannula  continuous pulse oximetry monitoring  following ABGs with A-line monitoring    Invasive hemodynamic monitoring with     an A-line was required for continuous MAP/BP monitoring     to ensure adequate cardiovascular support and to evaluate for & help prevent decompensation while receiving     intermittent volume expansion  IV Amiodarone infusion  IV Vasopressin infusion    secondary to     atrial fibrillation with rapid ventricular response-cardiovascular dysfunction  vasodilatory shock    In addition:  Atrial fibrillation with rapid ventricular response post AAA repair requiring Amiodarone infusion and return to ICU  Unclear etiology for sudden afib - patient does appear uncomfortable and in pain  Pain at chest tube site, will remove chest tubes - Dilaudid for pain control  Added Digoxin, will continue Amiodarone infusion  Also with one episode of ectopic atrial rhythm in 40s - will monitor but if this re-occurs she may need temp wire and EP consult  Creatinine downtrending, negative 1L without diuresis - will diurese if needed to target 500 cc to 1L overall      The patient required critical care management and I personally provided 30 minutes of non-continuous care to the patient, excluding separate procedures, in addition to  discussing the patient and plan at length with the CTICU staff and helping coordinate care. KENNETH SPRING  MRN#: [id number]     The patient is a 69y Female who was seen, evaluated, & examined with the CTICU staff post-operatively with a multidisciplinary care plan formulated & implemented.  All available clinical, laboratory, radiographic, pharmacologic, and electrocardiographic data were reviewed & analyzed.      The patient was in the CTICU in critical condition secondary to:     persistent cardiopulmonary dysfunction  atrial fibrillation with rapid ventricular response-cardiovascular dysfunction  vasodilatory shock    For support and evaluation & prevention of further decompensation secondary to persistent cardiopulmonary dysfunction and atrial fibrillation with rapid ventricular response-cardiovascular dysfunction, respiratory status required:     supplemental oxygen with nasal cannula  continuous pulse oximetry monitoring  following ABGs with A-line monitoring    Invasive hemodynamic monitoring with     an A-line was required for continuous MAP/BP monitoring     to ensure adequate cardiovascular support and to evaluate for & help prevent decompensation while receiving     intermittent volume expansion  IV Amiodarone infusion  IV Vasopressin infusion    secondary to     atrial fibrillation with rapid ventricular response-cardiovascular dysfunction  vasodilatory shock    In addition:  Atrial fibrillation with rapid ventricular response and hypotension/shock post AAA repair requiring Amiodarone and Vasopressin infusions and return to ICU  Unclear etiology for sudden afib - patient does appear uncomfortable and in pain  Pain at chest tube site, will remove chest tubes - Dilaudid for pain control  Added Digoxin, will continue Amiodarone infusion  Also with one episode of ectopic atrial rhythm in 40s - will monitor but if this re-occurs she may need temp wire and EP consult  Will likely require pressor until rhythm has returned to sinus  Creatinine downtrending, negative 1L without diuresis - will diurese if needed to target 500 cc to 1L overall      The patient required critical care management and I personally provided 30 minutes of non-continuous care to the patient, excluding separate procedures, in addition to  discussing the patient and plan at length with the CTICU staff and helping coordinate care.

## 2019-09-04 NOTE — PROGRESS NOTE ADULT - SUBJECTIVE AND OBJECTIVE BOX
Patient is  seen She  had rapid AFib  overnite   She  is  on Amiodarone   Extubated   She  has  excellent  urine  output  Creatinine  normalizing RLE still  weak  with  waxing  and  waning  symptoms  and  weakness LLE  strong   good motor  function RLE  sensation  intact Excellent  peripheral pulses   Pending  repeat  speech an d swallow  test  before  going  onto  regular diet  Abdomen  soft  BS present  Lactate  wnl

## 2019-09-04 NOTE — PROGRESS NOTE ADULT - ASSESSMENT
69 yr old female with H/O Thoraco abdominal Aortic aneurysm, Aortic Stenosis/ regurgitation S/P AVR (T), Ascending & hemiarch replacement 4/9/19  now sp  thoracoabdominal aortic aneurysm.    - DURGA- Non oliguric that is likely ATN-  azotemia is improving   - Volume status is acceptable  - HTN  -Rapid afib AND HYPOTENSION   -Hypernatremia  -Presumed metabolic acidosis    Recommendations    - Pt request specch and swallow re-eval;  Either way encourage more  free water intake   - Taper vaso as heart rate improves and BP improves   - Vasc follow up re weakness of the legs;  avoid hypotension   - SP amio load and loressor;    - Renal dosing of meds to creatinine clearance of 25 ml/min        [facility]  [phone]

## 2019-09-04 NOTE — PROGRESS NOTE ADULT - ASSESSMENT
Flory is a very pleasant 69 Year-Old Lady s/p 8/29 ThoracoAbdominal Aneurysm Repair / Ceiliac-spinal-SMA bypass. Pt. tx to floor and then re-admitted to CTCU.    - Appreciate Excellent Care per CTICU   - Okay for advancement of diet from Vascular Surgery perspective.   - Continue neurovascular checks.   - F/u CXR  - Identify and fix source of chest tube air leak    Vascular Surgery Pager #[phone]

## 2019-09-04 NOTE — SWALLOW BEDSIDE ASSESSMENT ADULT - ASR SWALLOW ASPIRATION MONITOR
change of breathing pattern/gurgly voice/pneumonia/Monitor for s/s aspiration/laryngeal penetration. If noted:  D/C p.o. intake, provide non-oral nutrition/hydration/meds, and contact this service @ [phone]/fever/upper respiratory infection
change of breathing pattern/gurgly voice/Monitor for s/s aspiration/laryngeal penetration. If noted:  D/C p.o. intake, provide non-oral nutrition/hydration/meds, and contact this service @ [phone]/fever/pneumonia/throat clearing/upper respiratory infection/cough

## 2019-09-04 NOTE — SWALLOW BEDSIDE ASSESSMENT ADULT - SLP GENERAL OBSERVATIONS
Chronic. Stable.      Pt Aox4, able to follow commands and answer questions. Distraught that she can't drink water and stated "I can do it. I drank 4 cups without coughing... and my sister (who is an RN) told me that I did myself a disservice and misrepresented myself " (Pt referring to when she reported + cough with water via swab on 9/3). Pt stated that she was upset when a staff member removed thin liqudis that family brought for her. Counseled pt that this was for safety, with pt comprehension. Pt endorsed no s/s aspiration on current Dysphagia I with nectar thickened liquid diet, but stating " I can't drink that" (referring to thick fluids). Pt did endorse sensation of pill retention (pointed to the distal pharyngeal region v. cervical esophageal region) when pill was provided with applesauce earlier. Vocal tremor persisted, but pt stated improvement (although appeared to be same as day prior)

## 2019-09-04 NOTE — PROGRESS NOTE ADULT - SUBJECTIVE AND OBJECTIVE BOX
[facility] [phone]        Patient seen and examined in bed.  She was transferred to the step down then xfer back for rapid afib and hypotension.  She is on thickened diet and wants to have regular diet  SP amio load and lopressors   right leg weakness remains the same     ROS-+weakness + fatigue; all other ros were reviewed and were negative         MEDICATIONS  (STANDING):  amiodarone IVPB 150 milliGRAM(s) IV Intermittent once  aspirin  chewable 81 milliGRAM(s) Oral daily  chlorhexidine 2% Cloths 1 Application(s) Topical daily  dextrose 50% Injectable 50 milliLiter(s) IV Push every 15 minutes  dextrose 50% Injectable 25 milliLiter(s) IV Push every 15 minutes  docusate sodium 100 milliGRAM(s) Oral three times a day  heparin  Injectable 5000 Unit(s) SubCutaneous every 8 hours  insulin lispro (HumaLOG) corrective regimen sliding scale   SubCutaneous Before meals and at bedtime  lactated ringers. 1000 milliLiter(s) (50 mL/Hr) IV Continuous <Continuous>  lidocaine   Patch 1 Patch Transdermal daily  pantoprazole  Injectable 40 milliGRAM(s) IV Push daily  vasopressin Infusion 0.1 Unit(s)/Min (6 mL/Hr) IV Continuous <Continuous>      VITAL:  T(C): , Max: 37.8 (09-03-19 @ 12:00)  T(F): , Max: 100 (09-03-19 @ 12:00)  HR: 134 (09-04-19 @ 08:00)  BP: 142/68 (09-04-19 @ 03:15)  BP(mean): 111 (09-04-19 @ 01:35)  RR: 18 (09-04-19 @ 08:00)  SpO2: 98% (09-04-19 @ 08:00)  Wt(kg): --    I and O's:    09-03 @ 07:01  -  09-04 @ 07:00  --------------------------------------------------------  IN: 1893 mL / OUT: 2155 mL / NET: -262 mL    09-04 @ 07:01  -  09-04 @ 08:26  --------------------------------------------------------  IN: 6 mL / OUT: 190 mL / NET: -184 mL          PHYSICAL EXAM:    Constitutional: NAD  Neck:  No JVD  Respiratory: CTAB/L  Cardiovascular: S1 and S2 tachycardic   Gastrointestinal: BS+, soft, NT/ND  Extremities: No peripheral edema  Neurological: A/O x 3, right leg weakness   Psychiatric: Normal mood, normal affect  : No Galicia  Skin: No rashes  Access: Not applicable    LABS:                        10.1   14.5  )-----------( 189      ( 04 Sep 2019 04:46 )             31.8     09-04    146<H>  |  113<H>  |  44<H>  ----------------------------<  122<H>  3.8   |  18<L>  |  1.76<H>    Ca    9.1      04 Sep 2019 04:46  Phos  2.5     09-03  Mg     2.4     09-03    TPro  6.0  /  Alb  3.2<L>  /  TBili  0.8  /  DBili  x   /  AST  46<H>  /  ALT  63<H>  /  AlkPhos  64  09-03          Urine Studies:          RADIOLOGY & ADDITIONAL STUDIES:        < from: Xray Chest 1 View- PORTABLE-Routine (09.03.19 @ 02:54) >    EXAM:  XR CHEST PORTABLE ROUTINE 1V                            PROCEDURE DATE:  09/03/2019            INTERPRETATION:  A single chest x-ray was obtained on September 5, 2019.    Indication: Status post cardiac surgery.    Impression:    The heart is normal in size. Left lower lobe pneumonia and/or   atelectasis. The right lung is clear. NG tube was removed. Otherwise all   life supporting devices are in good position. No pneumothorax. A metallic   Wallstent is seen in the descending aorta. Status post sternotomy.                    HE ALBA M.D., ATTENDING RADIOLOGIST  This document has been electronically signed. Sep  3 2019  8:50AM                < end of copied text >

## 2019-09-05 LAB
ALBUMIN SERPL ELPH-MCNC: 3.2 G/DL — LOW (ref 3.3–5)
ALP SERPL-CCNC: 68 U/L — SIGNIFICANT CHANGE UP (ref 40–120)
ALT FLD-CCNC: 46 U/L — HIGH (ref 10–45)
ANION GAP SERPL CALC-SCNC: 13 MMOL/L — SIGNIFICANT CHANGE UP (ref 5–17)
AST SERPL-CCNC: 29 U/L — SIGNIFICANT CHANGE UP (ref 10–40)
BILIRUB SERPL-MCNC: 0.7 MG/DL — SIGNIFICANT CHANGE UP (ref 0.2–1.2)
BUN SERPL-MCNC: 46 MG/DL — HIGH (ref 7–23)
CALCIUM SERPL-MCNC: 8.6 MG/DL — SIGNIFICANT CHANGE UP (ref 8.4–10.5)
CHLORIDE SERPL-SCNC: 109 MMOL/L — HIGH (ref 96–108)
CO2 SERPL-SCNC: 18 MMOL/L — LOW (ref 22–31)
CREAT SERPL-MCNC: 1.78 MG/DL — HIGH (ref 0.5–1.3)
GAS PNL BLDA: SIGNIFICANT CHANGE UP
GLUCOSE SERPL-MCNC: 117 MG/DL — HIGH (ref 70–99)
HCT VFR BLD CALC: 30.6 % — LOW (ref 34.5–45)
HGB BLD-MCNC: 9.8 G/DL — LOW (ref 11.5–15.5)
MAGNESIUM SERPL-MCNC: 2.4 MG/DL — SIGNIFICANT CHANGE UP (ref 1.6–2.6)
MCHC RBC-ENTMCNC: 28.8 PG — SIGNIFICANT CHANGE UP (ref 27–34)
MCHC RBC-ENTMCNC: 32 GM/DL — SIGNIFICANT CHANGE UP (ref 32–36)
MCV RBC AUTO: 90.2 FL — SIGNIFICANT CHANGE UP (ref 80–100)
PHOSPHATE SERPL-MCNC: 2.5 MG/DL — SIGNIFICANT CHANGE UP (ref 2.5–4.5)
PLATELET # BLD AUTO: 209 K/UL — SIGNIFICANT CHANGE UP (ref 150–400)
POTASSIUM SERPL-MCNC: 4.1 MMOL/L — SIGNIFICANT CHANGE UP (ref 3.5–5.3)
POTASSIUM SERPL-SCNC: 4.1 MMOL/L — SIGNIFICANT CHANGE UP (ref 3.5–5.3)
PROT SERPL-MCNC: 5.9 G/DL — LOW (ref 6–8.3)
RBC # BLD: 3.39 M/UL — LOW (ref 3.8–5.2)
RBC # FLD: 14.5 % — SIGNIFICANT CHANGE UP (ref 10.3–14.5)
SODIUM SERPL-SCNC: 140 MMOL/L — SIGNIFICANT CHANGE UP (ref 135–145)
WBC # BLD: 14 K/UL — HIGH (ref 3.8–10.5)
WBC # FLD AUTO: 14 K/UL — HIGH (ref 3.8–10.5)

## 2019-09-05 PROCEDURE — 99291 CRITICAL CARE FIRST HOUR: CPT

## 2019-09-05 PROCEDURE — 71045 X-RAY EXAM CHEST 1 VIEW: CPT | Mod: 26

## 2019-09-05 RX ORDER — LABETALOL HCL 100 MG
5 TABLET ORAL ONCE
Refills: 0 | Status: COMPLETED | OUTPATIENT
Start: 2019-09-05 | End: 2019-09-05

## 2019-09-05 RX ORDER — LABETALOL HCL 100 MG
200 TABLET ORAL EVERY 6 HOURS
Refills: 0 | Status: DISCONTINUED | OUTPATIENT
Start: 2019-09-05 | End: 2019-09-05

## 2019-09-05 RX ORDER — LACTULOSE 10 G/15ML
20 SOLUTION ORAL ONCE
Refills: 0 | Status: COMPLETED | OUTPATIENT
Start: 2019-09-05 | End: 2019-09-05

## 2019-09-05 RX ORDER — AMIODARONE HYDROCHLORIDE 400 MG/1
200 TABLET ORAL DAILY
Refills: 0 | Status: DISCONTINUED | OUTPATIENT
Start: 2019-09-08 | End: 2019-09-28

## 2019-09-05 RX ORDER — HYDROMORPHONE HYDROCHLORIDE 2 MG/ML
0.5 INJECTION INTRAMUSCULAR; INTRAVENOUS; SUBCUTANEOUS ONCE
Refills: 0 | Status: DISCONTINUED | OUTPATIENT
Start: 2019-09-05 | End: 2019-09-05

## 2019-09-05 RX ORDER — LABETALOL HCL 100 MG
200 TABLET ORAL EVERY 8 HOURS
Refills: 0 | Status: DISCONTINUED | OUTPATIENT
Start: 2019-09-05 | End: 2019-09-05

## 2019-09-05 RX ORDER — AMIODARONE HYDROCHLORIDE 400 MG/1
TABLET ORAL
Refills: 0 | Status: DISCONTINUED | OUTPATIENT
Start: 2019-09-08 | End: 2019-09-28

## 2019-09-05 RX ORDER — AMIODARONE HYDROCHLORIDE 400 MG/1
400 TABLET ORAL EVERY 8 HOURS
Refills: 0 | Status: COMPLETED | OUTPATIENT
Start: 2019-09-05 | End: 2019-09-08

## 2019-09-05 RX ORDER — OXYCODONE AND ACETAMINOPHEN 5; 325 MG/1; MG/1
1 TABLET ORAL EVERY 6 HOURS
Refills: 0 | Status: DISCONTINUED | OUTPATIENT
Start: 2019-09-05 | End: 2019-09-09

## 2019-09-05 RX ORDER — LABETALOL HCL 100 MG
100 TABLET ORAL EVERY 8 HOURS
Refills: 0 | Status: DISCONTINUED | OUTPATIENT
Start: 2019-09-05 | End: 2019-09-09

## 2019-09-05 RX ORDER — METOPROLOL TARTRATE 50 MG
12.5 TABLET ORAL EVERY 8 HOURS
Refills: 0 | Status: DISCONTINUED | OUTPATIENT
Start: 2019-09-05 | End: 2019-09-05

## 2019-09-05 RX ORDER — ALBUMIN HUMAN 25 %
250 VIAL (ML) INTRAVENOUS ONCE
Refills: 0 | Status: COMPLETED | OUTPATIENT
Start: 2019-09-05 | End: 2019-09-05

## 2019-09-05 RX ORDER — OXYCODONE AND ACETAMINOPHEN 5; 325 MG/1; MG/1
2 TABLET ORAL EVERY 6 HOURS
Refills: 0 | Status: DISCONTINUED | OUTPATIENT
Start: 2019-09-05 | End: 2019-09-06

## 2019-09-05 RX ORDER — POTASSIUM CHLORIDE 20 MEQ
10 PACKET (EA) ORAL
Refills: 0 | Status: COMPLETED | OUTPATIENT
Start: 2019-09-05 | End: 2019-09-05

## 2019-09-05 RX ORDER — PANTOPRAZOLE SODIUM 20 MG/1
40 TABLET, DELAYED RELEASE ORAL DAILY
Refills: 0 | Status: DISCONTINUED | OUTPATIENT
Start: 2019-09-05 | End: 2019-09-23

## 2019-09-05 RX ORDER — POTASSIUM CHLORIDE 20 MEQ
20 PACKET (EA) ORAL ONCE
Refills: 0 | Status: COMPLETED | OUTPATIENT
Start: 2019-09-05 | End: 2019-09-05

## 2019-09-05 RX ADMIN — Medication 0.25 MILLIGRAM(S): at 22:31

## 2019-09-05 RX ADMIN — Medication 5 MILLIGRAM(S): at 09:09

## 2019-09-05 RX ADMIN — Medication 81 MILLIGRAM(S): at 12:13

## 2019-09-05 RX ADMIN — AMIODARONE HYDROCHLORIDE 400 MILLIGRAM(S): 400 TABLET ORAL at 07:56

## 2019-09-05 RX ADMIN — Medication 50 MILLIEQUIVALENT(S): at 02:00

## 2019-09-05 RX ADMIN — Medication 50 MILLIEQUIVALENT(S): at 04:02

## 2019-09-05 RX ADMIN — Medication 200 MILLIGRAM(S): at 09:10

## 2019-09-05 RX ADMIN — Medication 100 MILLIGRAM(S): at 22:27

## 2019-09-05 RX ADMIN — Medication 12.5 MILLIGRAM(S): at 07:56

## 2019-09-05 RX ADMIN — HYDROMORPHONE HYDROCHLORIDE 0.5 MILLIGRAM(S): 2 INJECTION INTRAMUSCULAR; INTRAVENOUS; SUBCUTANEOUS at 10:22

## 2019-09-05 RX ADMIN — OXYCODONE AND ACETAMINOPHEN 1 TABLET(S): 5; 325 TABLET ORAL at 11:38

## 2019-09-05 RX ADMIN — HEPARIN SODIUM 5000 UNIT(S): 5000 INJECTION INTRAVENOUS; SUBCUTANEOUS at 13:50

## 2019-09-05 RX ADMIN — HYDROMORPHONE HYDROCHLORIDE 0.5 MILLIGRAM(S): 2 INJECTION INTRAMUSCULAR; INTRAVENOUS; SUBCUTANEOUS at 02:50

## 2019-09-05 RX ADMIN — CHLORHEXIDINE GLUCONATE 1 APPLICATION(S): 213 SOLUTION TOPICAL at 11:41

## 2019-09-05 RX ADMIN — Medication 1: at 07:56

## 2019-09-05 RX ADMIN — Medication 30.42 MICROGRAM(S)/KG/MIN: at 07:03

## 2019-09-05 RX ADMIN — Medication 20 MILLIEQUIVALENT(S): at 09:10

## 2019-09-05 RX ADMIN — HEPARIN SODIUM 5000 UNIT(S): 5000 INJECTION INTRAVENOUS; SUBCUTANEOUS at 05:32

## 2019-09-05 RX ADMIN — PANTOPRAZOLE SODIUM 40 MILLIGRAM(S): 20 TABLET, DELAYED RELEASE ORAL at 12:05

## 2019-09-05 RX ADMIN — HEPARIN SODIUM 5000 UNIT(S): 5000 INJECTION INTRAVENOUS; SUBCUTANEOUS at 22:27

## 2019-09-05 RX ADMIN — Medication 12.5 MILLIGRAM(S): at 00:16

## 2019-09-05 RX ADMIN — Medication 500 MILLILITER(S): at 12:39

## 2019-09-05 RX ADMIN — LACTULOSE 20 GRAM(S): 10 SOLUTION ORAL at 11:14

## 2019-09-05 RX ADMIN — HYDROMORPHONE HYDROCHLORIDE 0.5 MILLIGRAM(S): 2 INJECTION INTRAMUSCULAR; INTRAVENOUS; SUBCUTANEOUS at 11:00

## 2019-09-05 RX ADMIN — OXYCODONE AND ACETAMINOPHEN 1 TABLET(S): 5; 325 TABLET ORAL at 12:08

## 2019-09-05 RX ADMIN — Medication 100 MILLIGRAM(S): at 05:32

## 2019-09-05 RX ADMIN — AMIODARONE HYDROCHLORIDE 400 MILLIGRAM(S): 400 TABLET ORAL at 00:19

## 2019-09-05 RX ADMIN — HYDROMORPHONE HYDROCHLORIDE 0.5 MILLIGRAM(S): 2 INJECTION INTRAMUSCULAR; INTRAVENOUS; SUBCUTANEOUS at 10:37

## 2019-09-05 RX ADMIN — AMIODARONE HYDROCHLORIDE 400 MILLIGRAM(S): 400 TABLET ORAL at 17:28

## 2019-09-05 RX ADMIN — Medication 100 MILLIGRAM(S): at 13:50

## 2019-09-05 RX ADMIN — HYDROMORPHONE HYDROCHLORIDE 0.5 MILLIGRAM(S): 2 INJECTION INTRAMUSCULAR; INTRAVENOUS; SUBCUTANEOUS at 02:55

## 2019-09-05 RX ADMIN — HYDROMORPHONE HYDROCHLORIDE 0.5 MILLIGRAM(S): 2 INJECTION INTRAMUSCULAR; INTRAVENOUS; SUBCUTANEOUS at 10:45

## 2019-09-05 RX ADMIN — LIDOCAINE 1 PATCH: 4 CREAM TOPICAL at 19:10

## 2019-09-05 RX ADMIN — LIDOCAINE 1 PATCH: 4 CREAM TOPICAL at 12:06

## 2019-09-05 NOTE — PROVIDER CONTACT NOTE (CHANGE IN STATUS NOTIFICATION) - BACKGROUND
Pt s/p cardiac surgery.
April 2019- s/p AVR/ascending aorta /transverse arch replacement  8/29 s/p descending aortic aneurysm repair with reimplantation of lumbar artery.  8/30 B/L LE weakness --> CT/MRI (-)  9/4 ERIN

## 2019-09-05 NOTE — PROGRESS NOTE ADULT - ASSESSMENT
69 yr old female with H/O Thoraco abdominal Aortic aneurysm, Aortic Stenosis/ regurgitation S/P AVR (T), Ascending & hemiarch replacement 4/9/19  now sp  thoracoabdominal aortic aneurysm.    - DURGA- Non oliguric that is likely ATN-  azotemia is improving   - Euvolemic   - HTN- Worse   -SP Hypernatremia  -Presumed metabolic acidosis    Recommendations    - Off all pressors and the BP is elevated at present.  Transition of the lopressor and eventual dc of the esmolol gtt;    - Vasc follow up re weakness of the legs;  avoid hypotension and still tolerating a higher MAP  - Amio load     - Renal dosing of meds to creatinine clearance of 25 ml/min   -OT and PT  -Will need to dc the [place] as well         [facility]  [phone]

## 2019-09-05 NOTE — PROGRESS NOTE ADULT - SUBJECTIVE AND OBJECTIVE BOX
[facility] [phone]        Patient seen and examined in chair.  She was about the amada.  On esmolol gtt but in NSR at present         MEDICATIONS  (STANDING):  amiodarone    Tablet 400 milliGRAM(s) Oral every 8 hours  amiodarone    Tablet   Oral   aspirin  chewable 81 milliGRAM(s) Oral daily  chlorhexidine 2% Cloths 1 Application(s) Topical daily  dextrose 50% Injectable 50 milliLiter(s) IV Push every 15 minutes  dextrose 50% Injectable 25 milliLiter(s) IV Push every 15 minutes  docusate sodium 100 milliGRAM(s) Oral three times a day  esMOLOL  Infusion 75 MICROgram(s)/kG/Min (30.42 mL/Hr) IV Continuous <Continuous>  heparin  Injectable 5000 Unit(s) SubCutaneous every 8 hours  insulin lispro (HumaLOG) corrective regimen sliding scale   SubCutaneous Before meals and at bedtime  lidocaine   Patch 1 Patch Transdermal daily  metoprolol tartrate 12.5 milliGRAM(s) Oral every 8 hours  pantoprazole  Injectable 40 milliGRAM(s) IV Push daily      VITAL:  T(C): , Max: 38 (09-05-19 @ 04:00)  T(F): , Max: 100.4 (09-05-19 @ 04:00)  HR: 85 (09-05-19 @ 08:00)  BP: 184/84 (09-05-19 @ 02:45)  BP(mean): 120 (09-05-19 @ 02:45)  RR: 18 (09-05-19 @ 08:00)  SpO2: 97% (09-05-19 @ 08:00)  Wt(kg): --    I and O's:    09-04 @ 07:01  -  09-05 @ 07:00  --------------------------------------------------------  IN: 1461.8 mL / OUT: 2705 mL / NET: -1243.2 mL          PHYSICAL EXAM:    Constitutional: NAD  Neck:  No JVD  Respiratory: CTAB/L  Cardiovascular: S1 and S2  Gastrointestinal: BS+, soft, NT/ND  Extremities: No peripheral edema  Neurological: A/O x 3,   Psychiatric: Normal mood, normal affect  : + Gal[place]  Skin: No rashes  Access: Not applicable    LABS:                        9.8    14.0  )-----------( 209      ( 05 Sep 2019 01:26 )             30.6     09-05    140  |  109<H>  |  46<H>  ----------------------------<  117<H>  4.1   |  18<L>  |  1.78<H>    Ca    8.6      05 Sep 2019 01:26  Phos  2.5     09-05  Mg     2.4     09-05    TPro  5.9<L>  /  Alb  3.2<L>  /  TBili  0.7  /  DBili  x   /  AST  29  /  ALT  46<H>  /  AlkPhos  68  09-05          Urine Studies:          RADIOLOGY & ADDITIONAL STUDIES:          < from: Xray Chest 1 View- PORTABLE-Routine (09.05.19 @ 02:50) >    EXAM:  XR CHEST PORTABLE ROUTINE 1V                            PROCEDURE DATE:  09/05/2019            INTERPRETATION:    Portable chest xray: s/p cardiac surgery    Comparison: Most recent prior     FINDINGS:    Lines/Tubes: Unchanged    Heart and mediastinum:  Aortic stent graft in place. Post valve   replacement..    Lungs, pleura, and airways: Unchanged left pleural effusion. No   pneumothorax    Bones and soft tissues: The bones and soft tissues are unchanged.    Impression:    Unchanged left pleural effusion.                    BRAN CLINTON M.D., ATTENDING RADIOLOGIST  This document has been electronically signed. Sep  5 2019  8:11AM                < end of copied text >

## 2019-09-05 NOTE — PROVIDER CONTACT NOTE (CHANGE IN STATUS NOTIFICATION) - SITUATION
Pt had Muñoz removed around 12 noon today. DTV by 2000. Patient is unable to empty bladder s/p muñoz removal after several attempts. She states " I can't feel the urge to go"
Pt has a change in right lower extremity strength.

## 2019-09-05 NOTE — PROGRESS NOTE ADULT - SUBJECTIVE AND OBJECTIVE BOX
KENNETH SPRING  MRN#: [id number]     The patient is a 69y Female who was seen, evaluated, & examined with the CTICU staff post-operatively with a multidisciplinary care plan formulated & implemented.  All available clinical, laboratory, radiographic, pharmacologic, and electrocardiographic data were reviewed & analyzed.      The patient was in the CTICU in critical condition secondary to:     persistent cardiopulmonary dysfunction  atrial fibrillation with rapid ventricular response-cardiovascular dysfunction  malignant hypertension    For support and evaluation & prevention of further decompensation secondary to persistent cardiopulmonary dysfunction and atrial fibrillation with rapid ventricular response-cardiovascular dysfunction, respiratory status required:     supplemental oxygen with nasal cannula  continuous pulse oximetry monitoring  following ABGs with A-line monitoring    Invasive hemodynamic monitoring with     an A-line was required for continuous MAP/BP monitoring     to ensure adequate cardiovascular support and to evaluate for & help prevent decompensation while receiving     intermittent volume expansion  IV Amiodarone infusion  IV Esmolol infusion    secondary to     atrial fibrillation with rapid ventricular response-cardiovascular dysfunction  malignant hypertension    In addition:  Atrial fibrillation with rapid ventricular response and hypotension/shock post AAA repair requiring Amiodarone and Vasopressin infusions and return to ICU  Unclear etiology for sudden afib - patient does appear uncomfortable and in pain from chest tubes  Chest tubes now out and patient is back in sinus  Gave Digoxin, converted Amiodarone infusion to PO  Transitioning off of Esmolol with Labetalol PO  Dysphagia diet  RLE weakness is stable - targeting MAPs 70-80  Creatinine downtrending, negative 1L without diuresis - will diurese if needed to target 500 cc to 1L overall  Low-grade fevers - will remove Galicia and IJ and trend fever curve  No bowel movement since surgery - will increase regimen    The patient required critical care management and I personally provided 30 minutes of non-continuous care to the patient, excluding separate procedures, in addition to  discussing the patient and plan at length with the CTICU staff and helping coordinate care.

## 2019-09-05 NOTE — PROGRESS NOTE ADULT - SUBJECTIVE AND OBJECTIVE BOX
Afebrile  VS stable  H& H stable  abdomen soft  tolerating  diet   renal  function  stable   Wound  clean  Chest tubes  removed   RLE  some  improvement  Still  weak  LLE  sensory motor  function  normal   She  needs to go to  Rehab  Right neck  central line  can be removed

## 2019-09-05 NOTE — PROGRESS NOTE ADULT - ASSESSMENT
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 She is now   s/p Reop Thoracoabdominal aneurysm open repair with Dacron graft and celiac SMA bypass, reimplantation of lumbar artery with Dr. Briseno on 8/29. Intraop 2 PRBC, 2 Platelets,Her postoperative course was complicated by new onset BLE weakness R>L for which she was placed on MAPs of 110 and CSF drainage of 20cc. She was extubated POD2 ,and required BIPAP on POD3. She went into afib with RVR, placed on esmolol-weaned off- on POD4 but converted back to NSR without treatment.  9/1 RLE improving, NGT removed, BIPAP, nicardipine restarted briefly for HTN, Cr 2.43  9/2 Lumbar drain removed, Afib RVR converted without intervention, Vaso weaned off today  9/3 S&S eval recommend Dysphagia 1 nectar thick diet, 3L NC, PT recommending acute rehab, Cr improving 1.93 from 2.04  9/4 Afib with RVR, transferred to CTU, metoprolol 2.5 IV x2, amiodarone drip started, vasopressin for hypotension, esmolol drip for rate control, Digoxin IV x1, converted to NSR, Cr 1.76 DURGA improving, targeting MAPs of 70-80, L pleural chest tubes x3 removed, S&S recommend dysphagia 1 thin liquids, bradycardic at night and amio decreased to 0.5 from 1  9/5 Transitioned to Labetalol from esmolol, PO amio taper, A-line/introducer/and muñoz discontinued, transferred to SDU  She is now transferred from the CTU to SDU. She no longer has any critical care needs at this time and is stable enough for transfer to SDU.

## 2019-09-05 NOTE — PROVIDER CONTACT NOTE (CHANGE IN STATUS NOTIFICATION) - ACTION/TREATMENT ORDERED:
Orders received. Reinsert Galicia secondary to urine retention.
Per MD Enriquez, Levo started in addition to Vaso. MAPs to be maintained at 110. 20 cc's of CSF fluid drained from lumbar drain. Pt put back on Full vent support.     Pt's right lower extremity strength improved after interventions. JADE Woo aware and at bedside.

## 2019-09-05 NOTE — PROVIDER CONTACT NOTE (CHANGE IN STATUS NOTIFICATION) - ASSESSMENT
Denies any pain or ill feelings. Bladder scan reveals ~450mL urine.
Vitals stable. H/H stable. Pt on Vaso. CPAP'ing

## 2019-09-05 NOTE — PROGRESS NOTE ADULT - SUBJECTIVE AND OBJECTIVE BOX
_____ FLOOR TRANSFER NOTE    KENNETH SPRING | [id number]    19 @ 14:59    69y Female transferred to SDU from CTU.    SUBJECTIVE:  Patient seen and examined at bedside. She is doing well and is fatigued after working with PT. Her Gal[place] was removed and has yet to void. She is passing flatus but no bowel movement. She denies SOB, CP, palpitations, fever, chills, N/V, and peripheral numbness    OBJECTIVE:  T(C): 36.7 (19 @ 14:10), Max: 38 (19 @ 04:00)  HR: 66 (19 @ 14:10) (62 - 98)  BP: 111/54 (19 @ 14:10) (95/50 - 184/84)  RR: 18 (19 @ 14:10) (17 - 18)  SpO2: 95% (19 @ 14:10) (93% - 100%)    CAPILLARY BLOOD GLUCOSE      POCT Blood Glucose.: 127 mg/dL (04 Sep 2019 17:50)    Daily     Daily Weight in k.3 (05 Sep 2019 00:00)    19 @ 07:01  -  19 @ 07:00  --------------------------------------------------------  IN:    amiodarone Infusion: 250.2 mL    esmolol Infusion: 267.6 mL    IV PiggyBack: 200 mL    Oral Fluid: 720 mL    vasopressin Infusion: 24 mL  Total IN: 1461.8 mL    OUT:    Chest Tube: 15 mL    Chest Tube: 15 mL    Chest Tube: 10 mL    Indwelling Catheter - Urethral: 2665 mL  Total OUT: 2705 mL    Total NET: -1243.2 mL      19 @ 07:01  -  19 @ 15:00  --------------------------------------------------------  IN:    Albumin 5%  - 250 mL: 250 mL    esmolol Infusion: 30.4 mL    Oral Fluid: 400 mL  Total IN: 680.4 mL    OUT:    Indwelling Catheter - Urethral: 365 mL  Total OUT: 365 mL    Total NET: 315.4 mL          PHYSICAL EXAM:  Constitutional: NAD, sitting in chair comfortably  Eyes: No scleral icterus or conjunctivits  Neck: Supple, trachea midline, no JVD  Respiratory: LLL crackles, diminished at bases, no wheezes/rhonchi  Cardiovascular: RRR, normal S1S2, 2/6 systolic murmur, no rubs or gallops, posterolateral thoracotomy dressing c/d/i  Gastrointestinal: soft, nontender, nondistended  Genitourinary: Gal[place] removed awaiting DTV  Extremities: no pitting edema, warm well perfused, boot on right foot  Vascular: Radial pulses 2+ bilaterally, DP pulses 2+ bilaterally  Neurological: A&O x3, 5/5  strength bilaterally, lower extremity strength deferred due to fatigue, sensation to light touch intact bilaterally    Skin: Warm well perfused        LAB VALUES:                        9.8    14.0  )-----------( 209      ( 05 Sep 2019 01:26 )             30.6     09-    140  |  109<H>  |  46<H>  ----------------------------<  117<H>  4.1   |  18<L>  |  1.78<H>    Ca    8.6      05 Sep 2019 01:26  Phos  2.5       Mg     2.4         TPro  5.9<L>  /  Alb  3.2<L>  /  TBili  0.7  /  DBili  x   /  AST  29  /  ALT  46<H>  /  AlkPhos  68  -05    LIVER FUNCTIONS - ( 05 Sep 2019 01:26 )  Alb: 3.2 g/dL / Pro: 5.9 g/dL / ALK PHOS: 68 U/L / ALT: 46 U/L / AST: 29 U/L / GGT: x             Blood Gas Arterial, Lactate (19 @ 11:48)    Blood Gas Arterial, Lactate: 1.0 mmol/L            MICROBIOLOGY:    No new microbiology data for review.      RADIOLOGY:    Xray Chest 1 View- PORTABLE-Routine (19 @ 02:50)  FINDINGS:    Lines/Tubes: Unchanged    Heart and mediastinum:  Aortic stent graft in place. Post valve   replacement..    Lungs, pleura, and airways: Unchanged left pleural effusion. No   pneumothorax    Bones and soft tissues: The bones and soft tissues are unchanged.    Impression:    Unchanged left pleural effusion.      ALPRAZolam 0.25 milliGRAM(s) Oral every 8 hours PRN  amiodarone    Tablet 400 milliGRAM(s) Oral every 8 hours  amiodarone    Tablet   Oral   aspirin  chewable 81 milliGRAM(s) Oral daily  chlorhexidine 2% Cloths 1 Application(s) Topical daily  dextrose 50% Injectable 50 milliLiter(s) IV Push every 15 minutes  dextrose 50% Injectable 25 milliLiter(s) IV Push every 15 minutes  docusate sodium 100 milliGRAM(s) Oral three times a day  heparin  Injectable 5000 Unit(s) SubCutaneous every 8 hours  HYDROmorphone  Injectable 0.5 milliGRAM(s) IV Push every 4 hours PRN  insulin lispro (HumaLOG) corrective regimen sliding scale   SubCutaneous Before meals and at bedtime  labetalol 100 milliGRAM(s) Oral every 8 hours  lidocaine   Patch 1 Patch Transdermal daily  mineral oil 30 milliLiter(s) Oral two times a day PRN  oxyCODONE    5 mG/acetaminophen 325 mG 1 Tablet(s) Oral every 6 hours PRN  oxyCODONE    5 mG/acetaminophen 325 mG 2 Tablet(s) Oral every 6 hours PRN  pantoprazole   Suspension 40 milliGRAM(s) Oral daily

## 2019-09-06 LAB
ANION GAP SERPL CALC-SCNC: 14 MMOL/L — SIGNIFICANT CHANGE UP (ref 5–17)
BUN SERPL-MCNC: 40 MG/DL — HIGH (ref 7–23)
CALCIUM SERPL-MCNC: 8.2 MG/DL — LOW (ref 8.4–10.5)
CHLORIDE SERPL-SCNC: 102 MMOL/L — SIGNIFICANT CHANGE UP (ref 96–108)
CO2 SERPL-SCNC: 17 MMOL/L — LOW (ref 22–31)
CREAT SERPL-MCNC: 1.91 MG/DL — HIGH (ref 0.5–1.3)
GLUCOSE SERPL-MCNC: 118 MG/DL — HIGH (ref 70–99)
HCT VFR BLD CALC: 27.1 % — LOW (ref 34.5–45)
HGB BLD-MCNC: 8.7 G/DL — LOW (ref 11.5–15.5)
MCHC RBC-ENTMCNC: 29 PG — SIGNIFICANT CHANGE UP (ref 27–34)
MCHC RBC-ENTMCNC: 32.2 GM/DL — SIGNIFICANT CHANGE UP (ref 32–36)
MCV RBC AUTO: 89.9 FL — SIGNIFICANT CHANGE UP (ref 80–100)
PLATELET # BLD AUTO: 235 K/UL — SIGNIFICANT CHANGE UP (ref 150–400)
POTASSIUM SERPL-MCNC: 4.2 MMOL/L — SIGNIFICANT CHANGE UP (ref 3.5–5.3)
POTASSIUM SERPL-SCNC: 4.2 MMOL/L — SIGNIFICANT CHANGE UP (ref 3.5–5.3)
RBC # BLD: 3.01 M/UL — LOW (ref 3.8–5.2)
RBC # FLD: 14.3 % — SIGNIFICANT CHANGE UP (ref 10.3–14.5)
SODIUM SERPL-SCNC: 133 MMOL/L — LOW (ref 135–145)
WBC # BLD: 16.4 K/UL — HIGH (ref 3.8–10.5)
WBC # FLD AUTO: 16.4 K/UL — HIGH (ref 3.8–10.5)

## 2019-09-06 PROCEDURE — 74230 X-RAY XM SWLNG FUNCJ C+: CPT | Mod: 26

## 2019-09-06 PROCEDURE — 71045 X-RAY EXAM CHEST 1 VIEW: CPT | Mod: 26

## 2019-09-06 RX ORDER — HYDROMORPHONE HYDROCHLORIDE 2 MG/ML
2 INJECTION INTRAMUSCULAR; INTRAVENOUS; SUBCUTANEOUS
Refills: 0 | Status: DISCONTINUED | OUTPATIENT
Start: 2019-09-06 | End: 2019-09-07

## 2019-09-06 RX ORDER — LABETALOL HCL 100 MG
10 TABLET ORAL ONCE
Refills: 0 | Status: COMPLETED | OUTPATIENT
Start: 2019-09-06 | End: 2019-09-06

## 2019-09-06 RX ADMIN — LIDOCAINE 1 PATCH: 4 CREAM TOPICAL at 18:39

## 2019-09-06 RX ADMIN — HEPARIN SODIUM 5000 UNIT(S): 5000 INJECTION INTRAVENOUS; SUBCUTANEOUS at 13:03

## 2019-09-06 RX ADMIN — Medication 81 MILLIGRAM(S): at 12:58

## 2019-09-06 RX ADMIN — HEPARIN SODIUM 5000 UNIT(S): 5000 INJECTION INTRAVENOUS; SUBCUTANEOUS at 05:26

## 2019-09-06 RX ADMIN — HYDROMORPHONE HYDROCHLORIDE 0.5 MILLIGRAM(S): 2 INJECTION INTRAMUSCULAR; INTRAVENOUS; SUBCUTANEOUS at 03:49

## 2019-09-06 RX ADMIN — Medication 100 MILLIGRAM(S): at 05:26

## 2019-09-06 RX ADMIN — HYDROMORPHONE HYDROCHLORIDE 2 MILLIGRAM(S): 2 INJECTION INTRAMUSCULAR; INTRAVENOUS; SUBCUTANEOUS at 12:58

## 2019-09-06 RX ADMIN — OXYCODONE AND ACETAMINOPHEN 1 TABLET(S): 5; 325 TABLET ORAL at 08:47

## 2019-09-06 RX ADMIN — Medication 100 MILLIGRAM(S): at 21:09

## 2019-09-06 RX ADMIN — AMIODARONE HYDROCHLORIDE 400 MILLIGRAM(S): 400 TABLET ORAL at 13:03

## 2019-09-06 RX ADMIN — LIDOCAINE 1 PATCH: 4 CREAM TOPICAL at 00:30

## 2019-09-06 RX ADMIN — HEPARIN SODIUM 5000 UNIT(S): 5000 INJECTION INTRAVENOUS; SUBCUTANEOUS at 21:09

## 2019-09-06 RX ADMIN — AMIODARONE HYDROCHLORIDE 400 MILLIGRAM(S): 400 TABLET ORAL at 01:04

## 2019-09-06 RX ADMIN — AMIODARONE HYDROCHLORIDE 400 MILLIGRAM(S): 400 TABLET ORAL at 08:17

## 2019-09-06 RX ADMIN — PANTOPRAZOLE SODIUM 40 MILLIGRAM(S): 20 TABLET, DELAYED RELEASE ORAL at 12:59

## 2019-09-06 RX ADMIN — LIDOCAINE 1 PATCH: 4 CREAM TOPICAL at 12:58

## 2019-09-06 RX ADMIN — HYDROMORPHONE HYDROCHLORIDE 2 MILLIGRAM(S): 2 INJECTION INTRAMUSCULAR; INTRAVENOUS; SUBCUTANEOUS at 13:28

## 2019-09-06 RX ADMIN — Medication 100 MILLIGRAM(S): at 13:03

## 2019-09-06 RX ADMIN — OXYCODONE AND ACETAMINOPHEN 1 TABLET(S): 5; 325 TABLET ORAL at 08:17

## 2019-09-06 RX ADMIN — LIDOCAINE 1 PATCH: 4 CREAM TOPICAL at 22:39

## 2019-09-06 RX ADMIN — HYDROMORPHONE HYDROCHLORIDE 0.5 MILLIGRAM(S): 2 INJECTION INTRAMUSCULAR; INTRAVENOUS; SUBCUTANEOUS at 04:19

## 2019-09-06 RX ADMIN — AMIODARONE HYDROCHLORIDE 400 MILLIGRAM(S): 400 TABLET ORAL at 21:09

## 2019-09-06 RX ADMIN — Medication 10 MILLIGRAM(S): at 01:03

## 2019-09-06 RX ADMIN — Medication 0.25 MILLIGRAM(S): at 21:09

## 2019-09-06 RX ADMIN — CHLORHEXIDINE GLUCONATE 1 APPLICATION(S): 213 SOLUTION TOPICAL at 11:52

## 2019-09-06 NOTE — SWALLOW VFSS/MBS ASSESSMENT ADULT - DIAGNOSTIC IMPRESSIONS
Patient presents with phu-qutjeklg-qwkzivjygx dysphagia with impaired oral management (solids > other textures), delay in trigger of the swallow reflex with premature spillage of fluids and puree consistencies to the pharynx prior to onset of airway closure, impaired pharyngeal propulsion with post swallow residue, presence of a cricopharyngeal bar that affected bolus clearance, and impaired airway protection. There was silent aspiration of thin liquids. Safe swallow strategies had to be employed to aid in a/w protection and transfer.

## 2019-09-06 NOTE — PROGRESS NOTE ADULT - SUBJECTIVE AND OBJECTIVE BOX
[facility]OLOGY[facility] [phone]        Patient seen and examined in bed.  He was in good spirits and offered no complaints         MEDICATIONS  (STANDING):  amiodarone    Tablet 400 milliGRAM(s) Oral every 8 hours  amiodarone    Tablet   Oral   aspirin  chewable 81 milliGRAM(s) Oral daily  chlorhexidine 2% Cloths 1 Application(s) Topical daily  dextrose 50% Injectable 50 milliLiter(s) IV Push every 15 minutes  dextrose 50% Injectable 25 milliLiter(s) IV Push every 15 minutes  docusate sodium 100 milliGRAM(s) Oral three times a day  heparin  Injectable 5000 Unit(s) SubCutaneous every 8 hours  insulin lispro (HumaLOG) corrective regimen sliding scale   SubCutaneous Before meals and at bedtime  labetalol 100 milliGRAM(s) Oral every 8 hours  lidocaine   Patch 1 Patch Transdermal daily  pantoprazole   Suspension 40 milliGRAM(s) Oral daily      VITAL:  T(C): , Max: 37.3 (09-05-19 @ 12:00)  T(F): , Max: 99.1 (09-05-19 @ 12:00)  HR: 70 (09-06-19 @ 05:22)  BP: 157/68 (09-06-19 @ 05:22)  BP(mean): 95 (09-06-19 @ 03:40)  RR: 18 (09-06-19 @ 03:40)  SpO2: 96% (09-06-19 @ 03:40)  Wt(kg): --    I and O's:    09-05 @ 07:01  -  09-06 @ 07:00  --------------------------------------------------------  IN: 840.4 mL / OUT: 1415 mL / NET: -574.6 mL          PHYSICAL EXAM:    Constitutional: NAD  Neck:  No JVD  Respiratory: CTAB/L  Cardiovascular: S1 and S2  Gastrointestinal: BS+, soft, NT/ND  Extremities: No peripheral edema  Neurological: A/O x 3,    Psychiatric: Normal mood, normal affect  : No Gal[place]  Skin: No rashes  Access: Not applicable    LABS:                        8.7    16.4  )-----------( 235      ( 06 Sep 2019 05:44 )             27.1     09-06    133<L>  |  102  |  40<H>  ----------------------------<  118<H>  4.2   |  17<L>  |  1.91<H>    Ca    8.2<L>      06 Sep 2019 05:44  Phos  2.5     09-05  Mg     2.4     09-05    TPro  5.9<L>  /  Alb  3.2<L>  /  TBili  0.7  /  DBili  x   /  AST  29  /  ALT  46<H>  /  AlkPhos  68  09-05          Urine Studies:          RADIOLOGY & ADDITIONAL STUDIES:

## 2019-09-06 NOTE — PROGRESS NOTE ADULT - SUBJECTIVE AND OBJECTIVE BOX
Vascular Surgery Progress Note     Subjective/24hour Events:   Patient seen and examined.   Galicia replaced for retention.   With Fever to 38.   Tolerating diet.     Vital Signs:  Vital Signs Last 24 Hrs  T(C): 36.8 (06 Sep 2019 03:40), Max: 38 (05 Sep 2019 08:00)  T(F): 98.2 (06 Sep 2019 03:40), Max: 100.4 (05 Sep 2019 08:00)  HR: 70 (06 Sep 2019 05:22) (62 - 85)  BP: 157/68 (06 Sep 2019 05:22) (95/50 - 182/81)  BP(mean): 95 (06 Sep 2019 03:40) (70 - 117)  RR: 18 (06 Sep 2019 03:40) (17 - 18)  SpO2: 96% (06 Sep 2019 03:40) (94% - 99%)    CAPILLARY BLOOD GLUCOSE      POCT Blood Glucose.: 116 mg/dL (05 Sep 2019 21:33)  POCT Blood Glucose.: 111 mg/dL (05 Sep 2019 18:39)      I&O's Detail    05 Sep 2019 07:01  -  06 Sep 2019 07:00  --------------------------------------------------------  IN:    Albumin 5%  - 250 mL: 250 mL    esmolol Infusion: 30.4 mL    Oral Fluid: 560 mL  Total IN: 840.4 mL    OUT:    Indwelling Catheter - Urethral: 1415 mL  Total OUT: 1415 mL    Total NET: -574.6 mL          MEDICATIONS  (STANDING):  amiodarone    Tablet 400 milliGRAM(s) Oral every 8 hours  amiodarone    Tablet   Oral   aspirin  chewable 81 milliGRAM(s) Oral daily  chlorhexidine 2% Cloths 1 Application(s) Topical daily  dextrose 50% Injectable 50 milliLiter(s) IV Push every 15 minutes  dextrose 50% Injectable 25 milliLiter(s) IV Push every 15 minutes  docusate sodium 100 milliGRAM(s) Oral three times a day  heparin  Injectable 5000 Unit(s) SubCutaneous every 8 hours  insulin lispro (HumaLOG) corrective regimen sliding scale   SubCutaneous Before meals and at bedtime  labetalol 100 milliGRAM(s) Oral every 8 hours  lidocaine   Patch 1 Patch Transdermal daily  pantoprazole   Suspension 40 milliGRAM(s) Oral daily    MEDICATIONS  (PRN):  ALPRAZolam 0.25 milliGRAM(s) Oral every 8 hours PRN anxiety  HYDROmorphone  Injectable 0.5 milliGRAM(s) IV Push every 4 hours PRN Moderate Pain (4 - 6)  mineral oil 30 milliLiter(s) Oral two times a day PRN dry mouth  oxyCODONE    5 mG/acetaminophen 325 mG 1 Tablet(s) Oral every 6 hours PRN Moderate Pain (4 - 6)  oxyCODONE    5 mG/acetaminophen 325 mG 2 Tablet(s) Oral every 6 hours PRN Severe Pain (7 - 10)      Physical Exam:  Gen: NAD.  Lungs: Non labored breathing.   Ab: Soft, nontender, nondistended. Incisions clean/dry/intact.   Ext: Continued RLE weakness, improving.     Labs:    09-06    133<L>  |  102  |  40<H>  ----------------------------<  118<H>  4.2   |  17<L>  |  1.91<H>    Ca    8.2<L>      06 Sep 2019 05:44  Phos  2.5     09-05  Mg     2.4     09-05    TPro  5.9<L>  /  Alb  3.2<L>  /  TBili  0.7  /  DBili  x   /  AST  29  /  ALT  46<H>  /  AlkPhos  68  09-05    LIVER FUNCTIONS - ( 05 Sep 2019 01:26 )  Alb: 3.2 g/dL / Pro: 5.9 g/dL / ALK PHOS: 68 U/L / ALT: 46 U/L / AST: 29 U/L / GGT: x                                 8.7    16.4  )-----------( 235      ( 06 Sep 2019 05:44 )             27.1         Imaging:    < from: Xray Chest 1 View- PORTABLE-Routine (09.05.19 @ 02:50) >  FINDINGS:    Lines/Tubes: Unchanged    Heart and mediastinum:  Aortic stent graft in place. Post valve   replacement..    Lungs, pleura, and airways: Unchanged left pleural effusion. No   pneumothorax    Bones and soft tissues: The bones and soft tissues are unchanged.    Impression:    Unchanged left pleural effusion.    < end of copied text >

## 2019-09-06 NOTE — PROGRESS NOTE ADULT - SUBJECTIVE AND OBJECTIVE BOX
VITAL SIGNS    Telemetry:    Vital Signs Last 24 Hrs  T(C): 36.8 (09-06-19 @ 03:40), Max: 36.8 (09-06-19 @ 03:40)  T(F): 98.2 (09-06-19 @ 03:40), Max: 98.2 (09-06-19 @ 03:40)  HR: 70 (09-06-19 @ 05:22) (62 - 75)  BP: 157/68 (09-06-19 @ 05:22) (105/54 - 182/81)  RR: 18 (09-06-19 @ 03:40) (18 - 18)  SpO2: 96% (09-06-19 @ 03:40) (94% - 98%)                       8.7<L>                133<L>| 17<L>| 40<H>        16.4<H> >-----------< 235     ------------------------< 118<H>                 27.1<L>                4.2  | 102  | 1.91<H>                                                                     Ca 8.2<L> Mg x     Ph x        ,             9.8<L>                140  | 18<L>| 46<H>        14.0<H> >-----------< 209     ------------------------< 117<H>                 30.6<L>                4.1  | 109<H>| 1.78<H>                                                                     Ca 8.6   Mg 2.4   Ph 2.5              09-05-19 @ 07:01  -  09-06-19 @ 07:00  --------------------------------------------------------  IN: 840.4 mL / OUT: 1415 mL / NET: -574.6 mL        Daily     Daily         CAPILLARY BLOOD GLUCOSE      POCT Blood Glucose.: 104 mg/dL (06 Sep 2019 12:36)  POCT Blood Glucose.: 109 mg/dL (06 Sep 2019 08:07)  POCT Blood Glucose.: 116 mg/dL (05 Sep 2019 21:33)  POCT Blood Glucose.: 111 mg/dL (05 Sep 2019 18:39)                Coumadin    [ ] YES          [  ]      NO                                   PHYSICAL EXAM        Neurology: alert and oriented x 3, nonfocal, no gross deficits  CV : .S1S2 RRR  Sternal Wound :  CDI , Stable  Pacing Wires        [  ]   Settings:                                  Isolated  [  ]  Lungs: bibasilar crackles   Drains:     MS         [  ] Drainage:                 L Pleural  [  ]  Drainage:                R Pleural  [  ]  Drainage:  Abdomen: soft, nontender, nondistended, positive bowel sounds, last bowel movement   :         voiding    Extremities:     __ edema, ___calf tenderness.                            __svg site cdi          ALPRAZolam 0.25 milliGRAM(s) Oral every 8 hours PRN  amiodarone    Tablet 400 milliGRAM(s) Oral every 8 hours  amiodarone    Tablet   Oral   aspirin  chewable 81 milliGRAM(s) Oral daily  chlorhexidine 2% Cloths 1 Application(s) Topical daily  dextrose 50% Injectable 50 milliLiter(s) IV Push every 15 minutes  dextrose 50% Injectable 25 milliLiter(s) IV Push every 15 minutes  docusate sodium 100 milliGRAM(s) Oral three times a day  heparin  Injectable 5000 Unit(s) SubCutaneous every 8 hours  HYDROmorphone   Tablet 2 milliGRAM(s) Oral every 3 hours PRN  HYDROmorphone  Injectable 0.5 milliGRAM(s) IV Push every 4 hours PRN  insulin lispro (HumaLOG) corrective regimen sliding scale   SubCutaneous Before meals and at bedtime  labetalol 100 milliGRAM(s) Oral every 8 hours  lidocaine   Patch 1 Patch Transdermal daily  mineral oil 30 milliLiter(s) Oral two times a day PRN  oxyCODONE    5 mG/acetaminophen 325 mG 1 Tablet(s) Oral every 6 hours PRN  pantoprazole   Suspension 40 milliGRAM(s) Oral daily                    Physical Therapy Rec:   Home  [  ]   Home w/ PT  [  ]  Rehab  [  ]  Discussed with Cardiothoracic Team at AM rounds. im ok    VITAL SIGNS    Telemetry:  nsr 60  Vital Signs Last 24 Hrs  T(C): 36.8 (09-06-19 @ 03:40), Max: 36.8 (09-06-19 @ 03:40)  T(F): 98.2 (09-06-19 @ 03:40), Max: 98.2 (09-06-19 @ 03:40)  HR: 70 (09-06-19 @ 05:22) (62 - 75)  BP: 157/68 (09-06-19 @ 05:22) (105/54 - 182/81)  RR: 18 (09-06-19 @ 03:40) (18 - 18)  SpO2: 96% (09-06-19 @ 03:40) (94% - 98%)                       8.7<L>                133<L>| 17<L>| 40<H>        16.4<H> >-----------< 235     ------------------------< 118<H>                 27.1<L>                4.2  | 102  | 1.91<H>                                                                     Ca 8.2<L> Mg x     Ph x        ,             9.8<L>                140  | 18<L>| 46<H>        14.0<H> >-----------< 209     ------------------------< 117<H>                 30.6<L>                4.1  | 109<H>| 1.78<H>                                                                     Ca 8.6   Mg 2.4   Ph 2.5              09-05-19 @ 07:01  -  09-06-19 @ 07:00  --------------------------------------------------------  IN: 840.4 mL / OUT: 1415 mL / NET: -574.6 mL        Daily     Daily         CAPILLARY BLOOD GLUCOSE      POCT Blood Glucose.: 104 mg/dL (06 Sep 2019 12:36)  POCT Blood Glucose.: 109 mg/dL (06 Sep 2019 08:07)  POCT Blood Glucose.: 116 mg/dL (05 Sep 2019 21:33)  POCT Blood Glucose.: 111 mg/dL (05 Sep 2019 18:39)                Coumadin    [ ] YES          [ x ]      NO                                   PHYSICAL EXAM        Neurology: alert and oriented x 3, nonfocal, no gross deficits  CV : .S1S2 RRR  L flank thoracotomy wd, dressing cdi  Lungs: bibasilar crackles   Abdomen: soft, nontender, nondistended, positive bowel sounds  :       [place] reinserted, last isacc, sbd   Extremities:     _trace _ edema, _-__calf tenderness.                                    ALPRAZolam 0.25 milliGRAM(s) Oral every 8 hours PRN  amiodarone    Tablet 400 milliGRAM(s) Oral every 8 hours  amiodarone    Tablet   Oral   aspirin  chewable 81 milliGRAM(s) Oral daily  chlorhexidine 2% Cloths 1 Application(s) Topical daily  dextrose 50% Injectable 50 milliLiter(s) IV Push every 15 minutes  dextrose 50% Injectable 25 milliLiter(s) IV Push every 15 minutes  docusate sodium 100 milliGRAM(s) Oral three times a day  heparin  Injectable 5000 Unit(s) SubCutaneous every 8 hours  HYDROmorphone   Tablet 2 milliGRAM(s) Oral every 3 hours PRN  HYDROmorphone  Injectable 0.5 milliGRAM(s) IV Push every 4 hours PRN  insulin lispro (HumaLOG) corrective regimen sliding scale   SubCutaneous Before meals and at bedtime  labetalol 100 milliGRAM(s) Oral every 8 hours  lidocaine   Patch 1 Patch Transdermal daily  mineral oil 30 milliLiter(s) Oral two times a day PRN  oxyCODONE    5 mG/acetaminophen 325 mG 1 Tablet(s) Oral every 6 hours PRN  pantoprazole   Suspension 40 milliGRAM(s) Oral daily                    Physical Therapy Rec:   Home  [  ]   Home w/ PT  [  ]  Rehab  [  ]  Discussed with Cardiothoracic Team at AM rounds. im ok    VITAL SIGNS    Telemetry:  nsr 60  Vital Signs Last 24 Hrs  T(C): 36.8 (09-06-19 @ 03:40), Max: 36.8 (09-06-19 @ 03:40)  T(F): 98.2 (09-06-19 @ 03:40), Max: 98.2 (09-06-19 @ 03:40)  HR: 70 (09-06-19 @ 05:22) (62 - 75)  BP: 157/68 (09-06-19 @ 05:22) (105/54 - 182/81)  RR: 18 (09-06-19 @ 03:40) (18 - 18)  SpO2: 96% (09-06-19 @ 03:40) (94% - 98%)                       8.7<L>                133<L>| 17<L>| 40<H>        16.4<H> >-----------< 235     ------------------------< 118<H>                 27.1<L>                4.2  | 102  | 1.91<H>                                                                     Ca 8.2<L> Mg x     Ph x        ,             9.8<L>                140  | 18<L>| 46<H>        14.0<H> >-----------< 209     ------------------------< 117<H>                 30.6<L>                4.1  | 109<H>| 1.78<H>                                                                     Ca 8.6   Mg 2.4   Ph 2.5              09-05-19 @ 07:01  -  09-06-19 @ 07:00  --------------------------------------------------------  IN: 840.4 mL / OUT: 1415 mL / NET: -574.6 mL        Daily     Daily         CAPILLARY BLOOD GLUCOSE      POCT Blood Glucose.: 104 mg/dL (06 Sep 2019 12:36)  POCT Blood Glucose.: 109 mg/dL (06 Sep 2019 08:07)  POCT Blood Glucose.: 116 mg/dL (05 Sep 2019 21:33)  POCT Blood Glucose.: 111 mg/dL (05 Sep 2019 18:39)                Coumadin    [ ] YES          [ x ]      NO                                   PHYSICAL EXAM    Neurological: A&O x3, 3/5 strength for left leg elevation, 2/5 strength for right leg elevation, sensation to light touch intact bilaterally    Cardiovascular: RRR, normal S1S2, 2/6 systolic murmur, no rubs or gallops, posterolateral thoracotomy dressing c/d/i  Respiratory: Diminished at bases, no wheezes/rhonchi/rales  Gastrointestinal: soft, nontender, nondistended  Genitourinary: [place] in place  Extremities: 1+ pitting edema to mid shin, warm well perfused, boot on right foot  Vascular: Radial pulses 2+ bilaterally, DP pulses 2+ bilaterally                                        ALPRAZolam 0.25 milliGRAM(s) Oral every 8 hours PRN  amiodarone    Tablet 400 milliGRAM(s) Oral every 8 hours  amiodarone    Tablet   Oral   aspirin  chewable 81 milliGRAM(s) Oral daily  chlorhexidine 2% Cloths 1 Application(s) Topical daily  dextrose 50% Injectable 50 milliLiter(s) IV Push every 15 minutes  dextrose 50% Injectable 25 milliLiter(s) IV Push every 15 minutes  docusate sodium 100 milliGRAM(s) Oral three times a day  heparin  Injectable 5000 Unit(s) SubCutaneous every 8 hours  HYDROmorphone   Tablet 2 milliGRAM(s) Oral every 3 hours PRN  HYDROmorphone  Injectable 0.5 milliGRAM(s) IV Push every 4 hours PRN  insulin lispro (HumaLOG) corrective regimen sliding scale   SubCutaneous Before meals and at bedtime  labetalol 100 milliGRAM(s) Oral every 8 hours  lidocaine   Patch 1 Patch Transdermal daily  mineral oil 30 milliLiter(s) Oral two times a day PRN  oxyCODONE    5 mG/acetaminophen 325 mG 1 Tablet(s) Oral every 6 hours PRN  pantoprazole   Suspension 40 milliGRAM(s) Oral daily                    Physical Therapy Rec:   Home  [  ]   Home w/ PT  [  ]  Rehab  [  ]  Discussed with Cardiothoracic Team at AM rounds.

## 2019-09-06 NOTE — PROVIDER CONTACT NOTE (OTHER) - SITUATION
/81 ()  Pt received Labetalol 100mg PO tonight as ordered /81 ()  Pt received Labetalol 100mg PO tonight as ordered at 2227.

## 2019-09-06 NOTE — PROGRESS NOTE ADULT - ASSESSMENT
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 She is now   s/p Reop Thoracoabdominal aneurysm open repair with Dacron graft and celiac SMA bypass, reimplantation of lumbar artery with Dr. Briseno on 8/29. Intraop 2 PRBC, 2 Platelets,Her postoperative course was complicated by new onset BLE weakness R>L for which she was placed on MAPs of 110 and CSF drainage of 20cc. She was extubated POD2 ,and required BIPAP on POD3. She went into afib with RVR, placed on esmolol-weaned off- on POD4 but converted back to NSR without treatment.  9/1 RLE improving, NGT removed, BIPAP, nicardipine restarted briefly for HTN, Cr 2.43  9/2 Lumbar drain removed, Afib RVR converted without intervention, Vaso weaned off today  9/3 S&S eval recommend Dysphagia 1 nectar thick diet, 3L NC, PT recommending acute rehab, Cr improving 1.93 from 2.04  9/4 Afib with RVR, transferred to CTU, metoprolol 2.5 IV x2, amiodarone drip started, vasopressin for hypotension, esmolol drip for rate control, Digoxin IV x1, converted to NSR, Cr 1.76 DURGA improving, targeting MAPs of 70-80, L pleural chest tubes x3 removed, S&S recommend dysphagia 1 thin liquids, bradycardic at night and amio decreased to 0.5 from 1  9/5 Transitioned to Labetalol from esmolol, PO amio taper, A-line/introducer/and muñoz discontinued, transferred to SDU  She is now transferred from the CTU to SDU. She no longer has any critical care needs at this time and is stable enough for transfer to SDU.   9/6 Increase PT/ ambulation.  Dys 1 diet, speech/swallow f/u, repeat mbs today.  Maintaining nsr

## 2019-09-06 NOTE — PROGRESS NOTE ADULT - ASSESSMENT
Flory is a very pleasant 69 Year-Old Lady s/p 8/29 ThoracoAbdominal Aneurysm Repair / Ceiliac-spinal-SMA bypass.    - Appreciate Excellent Care per CT.   - F/u CXR in AM.   - Diet as tolerated.     Vascular Surgery Pager #[phone]

## 2019-09-06 NOTE — PROVIDER CONTACT NOTE (OTHER) - BACKGROUND
April 2019- s/p AVR/ascending aorta /transverse arch replacement April 2019- s/p AVR/ascending aorta /transverse arch replacement  8/29 s/p descending aortic aneurysm repair with reimplantation of lumbar artery.  8/30 B/L LE weakness --> CT/MRI (-)  9/4 ERIN

## 2019-09-06 NOTE — PROGRESS NOTE ADULT - ASSESSMENT
69 yr old female with H/O Thoraco abdominal Aortic aneurysm, Aortic Stenosis/ regurgitation S/P AVR (T), Ascending & hemiarch replacement 4/9/19  now sp  thoracoabdominal aortic aneurysm.    - DURGA- Non oliguric that is likely ATN-    - Euvolemic   - HTN- Worse   -SP Hypernatremia and now hyponatremia   -Presumed metabolic acidosis    Recommendations    - Off all pressors and the BP is elevated at present.  Labetolol for BP control    - Vasc follow up    - Amio load     - Renal dosing of meds to creatinine clearance of 25 ml/min   -OT and PT  -She can scale back on the water intake given the serum sodium which is dropping   -Hold off on NaHCO3 tabs          [facility]  [phone] 69 yr old female with H/O Thoraco abdominal Aortic aneurysm, Aortic Stenosis/ regurgitation S/P AVR (T), Ascending & hemiarch replacement 4/9/19  now sp  thoracoabdominal aortic aneurysm.    - DURGA- Non oliguric that is likely ATN-    - Euvolemic   - HTN- Worse   -SP Hypernatremia and now hyponatremia   -Presumed metabolic acidosis    Recommendations    - Off all pressors and the BP is elevated at present.  Labetolol for BP control    - Vasc follow up    - Amio load     - Renal dosing of meds to creatinine clearance of 25 ml/min   -OT and PT  -She can scale back on the water intake given the serum sodium which is dropping   -Hold off on NaHCO3 tabs   -[place] had to be reinserted          [facility]  [phone]

## 2019-09-07 LAB
ANION GAP SERPL CALC-SCNC: 16 MMOL/L — SIGNIFICANT CHANGE UP (ref 5–17)
APPEARANCE UR: CLEAR — SIGNIFICANT CHANGE UP
BACTERIA # UR AUTO: ABNORMAL
BILIRUB UR-MCNC: NEGATIVE — SIGNIFICANT CHANGE UP
BUN SERPL-MCNC: 43 MG/DL — HIGH (ref 7–23)
CALCIUM SERPL-MCNC: 8.3 MG/DL — LOW (ref 8.4–10.5)
CHLORIDE SERPL-SCNC: 99 MMOL/L — SIGNIFICANT CHANGE UP (ref 96–108)
CO2 SERPL-SCNC: 17 MMOL/L — LOW (ref 22–31)
COLOR SPEC: SIGNIFICANT CHANGE UP
CREAT SERPL-MCNC: 1.93 MG/DL — HIGH (ref 0.5–1.3)
DIFF PNL FLD: ABNORMAL
EPI CELLS # UR: 2 /HPF — SIGNIFICANT CHANGE UP
GLUCOSE SERPL-MCNC: 114 MG/DL — HIGH (ref 70–99)
GLUCOSE UR QL: ABNORMAL
HCT VFR BLD CALC: 28.3 % — LOW (ref 34.5–45)
HGB BLD-MCNC: 9 G/DL — LOW (ref 11.5–15.5)
HYALINE CASTS # UR AUTO: 1 /LPF — SIGNIFICANT CHANGE UP (ref 0–2)
KETONES UR-MCNC: NEGATIVE — SIGNIFICANT CHANGE UP
LEUKOCYTE ESTERASE UR-ACNC: ABNORMAL
MCHC RBC-ENTMCNC: 28.4 PG — SIGNIFICANT CHANGE UP (ref 27–34)
MCHC RBC-ENTMCNC: 31.7 GM/DL — LOW (ref 32–36)
MCV RBC AUTO: 89.6 FL — SIGNIFICANT CHANGE UP (ref 80–100)
NITRITE UR-MCNC: NEGATIVE — SIGNIFICANT CHANGE UP
PH UR: 5.5 — SIGNIFICANT CHANGE UP (ref 5–8)
PHOSPHATE SERPL-MCNC: 3.6 MG/DL — SIGNIFICANT CHANGE UP (ref 2.5–4.5)
PLATELET # BLD AUTO: 295 K/UL — SIGNIFICANT CHANGE UP (ref 150–400)
POTASSIUM SERPL-MCNC: 3.8 MMOL/L — SIGNIFICANT CHANGE UP (ref 3.5–5.3)
POTASSIUM SERPL-SCNC: 3.8 MMOL/L — SIGNIFICANT CHANGE UP (ref 3.5–5.3)
PROT UR-MCNC: ABNORMAL
RBC # BLD: 3.16 M/UL — LOW (ref 3.8–5.2)
RBC # FLD: 14.3 % — SIGNIFICANT CHANGE UP (ref 10.3–14.5)
RBC CASTS # UR COMP ASSIST: 16 /HPF — HIGH (ref 0–4)
SODIUM SERPL-SCNC: 132 MMOL/L — LOW (ref 135–145)
SP GR SPEC: 1.01 — SIGNIFICANT CHANGE UP (ref 1.01–1.02)
UROBILINOGEN FLD QL: NEGATIVE — SIGNIFICANT CHANGE UP
WBC # BLD: 21.4 K/UL — HIGH (ref 3.8–10.5)
WBC # FLD AUTO: 21.4 K/UL — HIGH (ref 3.8–10.5)
WBC UR QL: 8 /HPF — HIGH (ref 0–5)

## 2019-09-07 PROCEDURE — 71045 X-RAY EXAM CHEST 1 VIEW: CPT | Mod: 26

## 2019-09-07 RX ORDER — ONDANSETRON 8 MG/1
4 TABLET, FILM COATED ORAL THREE TIMES A DAY
Refills: 0 | Status: DISCONTINUED | OUTPATIENT
Start: 2019-09-07 | End: 2019-09-28

## 2019-09-07 RX ADMIN — Medication 0.25 MILLIGRAM(S): at 21:27

## 2019-09-07 RX ADMIN — PANTOPRAZOLE SODIUM 40 MILLIGRAM(S): 20 TABLET, DELAYED RELEASE ORAL at 12:18

## 2019-09-07 RX ADMIN — Medication 81 MILLIGRAM(S): at 12:18

## 2019-09-07 RX ADMIN — Medication 100 MILLIGRAM(S): at 05:17

## 2019-09-07 RX ADMIN — Medication 100 MILLIGRAM(S): at 13:28

## 2019-09-07 RX ADMIN — HYDROMORPHONE HYDROCHLORIDE 2 MILLIGRAM(S): 2 INJECTION INTRAMUSCULAR; INTRAVENOUS; SUBCUTANEOUS at 19:14

## 2019-09-07 RX ADMIN — AMIODARONE HYDROCHLORIDE 400 MILLIGRAM(S): 400 TABLET ORAL at 05:17

## 2019-09-07 RX ADMIN — LIDOCAINE 1 PATCH: 4 CREAM TOPICAL at 19:01

## 2019-09-07 RX ADMIN — LIDOCAINE 1 PATCH: 4 CREAM TOPICAL at 23:04

## 2019-09-07 RX ADMIN — AMIODARONE HYDROCHLORIDE 400 MILLIGRAM(S): 400 TABLET ORAL at 13:27

## 2019-09-07 RX ADMIN — HYDROMORPHONE HYDROCHLORIDE 2 MILLIGRAM(S): 2 INJECTION INTRAMUSCULAR; INTRAVENOUS; SUBCUTANEOUS at 19:44

## 2019-09-07 RX ADMIN — ONDANSETRON 4 MILLIGRAM(S): 8 TABLET, FILM COATED ORAL at 21:20

## 2019-09-07 RX ADMIN — HEPARIN SODIUM 5000 UNIT(S): 5000 INJECTION INTRAVENOUS; SUBCUTANEOUS at 13:28

## 2019-09-07 RX ADMIN — HEPARIN SODIUM 5000 UNIT(S): 5000 INJECTION INTRAVENOUS; SUBCUTANEOUS at 21:27

## 2019-09-07 RX ADMIN — HYDROMORPHONE HYDROCHLORIDE 2 MILLIGRAM(S): 2 INJECTION INTRAMUSCULAR; INTRAVENOUS; SUBCUTANEOUS at 05:47

## 2019-09-07 RX ADMIN — CHLORHEXIDINE GLUCONATE 1 APPLICATION(S): 213 SOLUTION TOPICAL at 12:19

## 2019-09-07 RX ADMIN — HYDROMORPHONE HYDROCHLORIDE 2 MILLIGRAM(S): 2 INJECTION INTRAMUSCULAR; INTRAVENOUS; SUBCUTANEOUS at 05:17

## 2019-09-07 RX ADMIN — AMIODARONE HYDROCHLORIDE 400 MILLIGRAM(S): 400 TABLET ORAL at 21:27

## 2019-09-07 RX ADMIN — Medication 100 MILLIGRAM(S): at 21:27

## 2019-09-07 RX ADMIN — LIDOCAINE 1 PATCH: 4 CREAM TOPICAL at 12:18

## 2019-09-07 RX ADMIN — HEPARIN SODIUM 5000 UNIT(S): 5000 INJECTION INTRAVENOUS; SUBCUTANEOUS at 05:17

## 2019-09-07 NOTE — PROGRESS NOTE ADULT - SUBJECTIVE AND OBJECTIVE BOX
Vascular Surgery Progress Note     Subjective/24hour Events:   Patient seen and examined.   Speech and swallow yesterday, continued on Dysphagia II diet with thin liquids.   No acute events overnight.   Reports being able to move better.   Stands with assistance.     Vital Signs:  Vital Signs Last 24 Hrs  T(C): 36.6 (07 Sep 2019 08:51), Max: 37.1 (07 Sep 2019 03:08)  T(F): 97.8 (07 Sep 2019 08:51), Max: 98.8 (07 Sep 2019 03:08)  HR: 66 (07 Sep 2019 08:51) (61 - 72)  BP: 139/69 (07 Sep 2019 08:51) (108/54 - 148/66)  BP(mean): 97 (07 Sep 2019 05:07) (76 - 97)  RR: 18 (07 Sep 2019 08:51) (18 - 18)  SpO2: 95% (07 Sep 2019 08:51) (95% - 98%)    CAPILLARY BLOOD GLUCOSE      POCT Blood Glucose.: 122 mg/dL (07 Sep 2019 08:16)  POCT Blood Glucose.: 120 mg/dL (06 Sep 2019 21:22)  POCT Blood Glucose.: 137 mg/dL (06 Sep 2019 16:36)  POCT Blood Glucose.: 104 mg/dL (06 Sep 2019 12:36)      I&O's Detail    06 Sep 2019 07:01  -  07 Sep 2019 07:00  --------------------------------------------------------  IN:    Oral Fluid: 800 mL  Total IN: 800 mL    OUT:    Indwelling Catheter - Urethral: 3740 mL  Total OUT: 3740 mL    Total NET: -2940 mL      07 Sep 2019 07:01  -  07 Sep 2019 09:01  --------------------------------------------------------  IN:    Oral Fluid: 120 mL  Total IN: 120 mL    OUT:  Total OUT: 0 mL    Total NET: 120 mL          MEDICATIONS  (STANDING):  amiodarone    Tablet 400 milliGRAM(s) Oral every 8 hours  amiodarone    Tablet   Oral   aspirin  chewable 81 milliGRAM(s) Oral daily  chlorhexidine 2% Cloths 1 Application(s) Topical daily  dextrose 50% Injectable 50 milliLiter(s) IV Push every 15 minutes  dextrose 50% Injectable 25 milliLiter(s) IV Push every 15 minutes  docusate sodium 100 milliGRAM(s) Oral three times a day  heparin  Injectable 5000 Unit(s) SubCutaneous every 8 hours  insulin lispro (HumaLOG) corrective regimen sliding scale   SubCutaneous Before meals and at bedtime  labetalol 100 milliGRAM(s) Oral every 8 hours  lidocaine   Patch 1 Patch Transdermal daily  pantoprazole   Suspension 40 milliGRAM(s) Oral daily    MEDICATIONS  (PRN):  ALPRAZolam 0.25 milliGRAM(s) Oral every 8 hours PRN anxiety  HYDROmorphone   Tablet 2 milliGRAM(s) Oral every 3 hours PRN Moderate Pain (4 - 6)  HYDROmorphone  Injectable 0.5 milliGRAM(s) IV Push every 4 hours PRN Moderate Pain (4 - 6)  mineral oil 30 milliLiter(s) Oral two times a day PRN dry mouth  oxyCODONE    5 mG/acetaminophen 325 mG 1 Tablet(s) Oral every 6 hours PRN Moderate Pain (4 - 6)      Physical Exam:  Gen: NAD.  Lungs: Non labored breathing.   Ab: Soft, nontender, nondistended. Incisions clean/dry/intact.   Ext: Continued RLE weakness, improving.     Labs:    09-07    132<L>  |  99  |  43<H>  ----------------------------<  114<H>  3.8   |  17<L>  |  1.93<H>    Ca    8.3<L>      07 Sep 2019 06:39  Phos  3.6     09-07                              9.0    21.4  )-----------( 295      ( 07 Sep 2019 06:39 )             28.3 Vascular Surgery Progress Note     Subjective/24hour Events:   Patient seen and examined.   Speech and swallow yesterday, continued on Dysphagia II diet with thin liquids.   No acute events overnight.   Reports being able to move better.   Stands with assistance.   WBC increasing to 21.4 today.     Vital Signs:  Vital Signs Last 24 Hrs  T(C): 36.6 (07 Sep 2019 08:51), Max: 37.1 (07 Sep 2019 03:08)  T(F): 97.8 (07 Sep 2019 08:51), Max: 98.8 (07 Sep 2019 03:08)  HR: 66 (07 Sep 2019 08:51) (61 - 72)  BP: 139/69 (07 Sep 2019 08:51) (108/54 - 148/66)  BP(mean): 97 (07 Sep 2019 05:07) (76 - 97)  RR: 18 (07 Sep 2019 08:51) (18 - 18)  SpO2: 95% (07 Sep 2019 08:51) (95% - 98%)    CAPILLARY BLOOD GLUCOSE      POCT Blood Glucose.: 122 mg/dL (07 Sep 2019 08:16)  POCT Blood Glucose.: 120 mg/dL (06 Sep 2019 21:22)  POCT Blood Glucose.: 137 mg/dL (06 Sep 2019 16:36)  POCT Blood Glucose.: 104 mg/dL (06 Sep 2019 12:36)      I&O's Detail    06 Sep 2019 07:01  -  07 Sep 2019 07:00  --------------------------------------------------------  IN:    Oral Fluid: 800 mL  Total IN: 800 mL    OUT:    Indwelling Catheter - Urethral: 3740 mL  Total OUT: 3740 mL    Total NET: -2940 mL      07 Sep 2019 07:01  -  07 Sep 2019 09:01  --------------------------------------------------------  IN:    Oral Fluid: 120 mL  Total IN: 120 mL    OUT:  Total OUT: 0 mL    Total NET: 120 mL          MEDICATIONS  (STANDING):  amiodarone    Tablet 400 milliGRAM(s) Oral every 8 hours  amiodarone    Tablet   Oral   aspirin  chewable 81 milliGRAM(s) Oral daily  chlorhexidine 2% Cloths 1 Application(s) Topical daily  dextrose 50% Injectable 50 milliLiter(s) IV Push every 15 minutes  dextrose 50% Injectable 25 milliLiter(s) IV Push every 15 minutes  docusate sodium 100 milliGRAM(s) Oral three times a day  heparin  Injectable 5000 Unit(s) SubCutaneous every 8 hours  insulin lispro (HumaLOG) corrective regimen sliding scale   SubCutaneous Before meals and at bedtime  labetalol 100 milliGRAM(s) Oral every 8 hours  lidocaine   Patch 1 Patch Transdermal daily  pantoprazole   Suspension 40 milliGRAM(s) Oral daily    MEDICATIONS  (PRN):  ALPRAZolam 0.25 milliGRAM(s) Oral every 8 hours PRN anxiety  HYDROmorphone   Tablet 2 milliGRAM(s) Oral every 3 hours PRN Moderate Pain (4 - 6)  HYDROmorphone  Injectable 0.5 milliGRAM(s) IV Push every 4 hours PRN Moderate Pain (4 - 6)  mineral oil 30 milliLiter(s) Oral two times a day PRN dry mouth  oxyCODONE    5 mG/acetaminophen 325 mG 1 Tablet(s) Oral every 6 hours PRN Moderate Pain (4 - 6)      Physical Exam:  Gen: NAD.  Lungs: Non labored breathing.   Ab: Soft, nontender, nondistended. Incisions clean/dry/intact.   Ext: Continued RLE weakness, improving.     Labs:    09-07    132<L>  |  99  |  43<H>  ----------------------------<  114<H>  3.8   |  17<L>  |  1.93<H>    Ca    8.3<L>      07 Sep 2019 06:39  Phos  3.6     09-07                              9.0    21.4  )-----------( 295      ( 07 Sep 2019 06:39 )             28.3

## 2019-09-07 NOTE — PROGRESS NOTE ADULT - ASSESSMENT
Flory is a very pleasant 69 Year-Old Lady s/p 8/29 ThoracoAbdominal Aneurysm Repair / Ceiliac-spinal-SMA bypass.    - Appreciate Excellent Care per CT.   - F/u CXR in AM.   - Diet as tolerated.     Vascular Surgery Pager #[phone] Flory is a very pleasant 69 Year-Old Lady s/p 8/29 ThoracoAbdominal Aneurysm Repair / Ceiliac-spinal-SMA bypass.    - Would workup leukocytosis to 21, recommend at least UA/UCx as patient has muñoz in place.   - Care per Primary Team appreciated.     Vascular Surgery Pager #[phone]

## 2019-09-07 NOTE — PROGRESS NOTE ADULT - SUBJECTIVE AND OBJECTIVE BOX
NEPHROLOGY    Patient seen and examined.    MEDICATIONS  (STANDING):  amiodarone    Tablet 400 milliGRAM(s) Oral every 8 hours  amiodarone    Tablet   Oral   aspirin  chewable 81 milliGRAM(s) Oral daily  chlorhexidine 2% Cloths 1 Application(s) Topical daily  dextrose 50% Injectable 50 milliLiter(s) IV Push every 15 minutes  dextrose 50% Injectable 25 milliLiter(s) IV Push every 15 minutes  docusate sodium 100 milliGRAM(s) Oral three times a day  heparin  Injectable 5000 Unit(s) SubCutaneous every 8 hours  insulin lispro (HumaLOG) corrective regimen sliding scale   SubCutaneous Before meals and at bedtime  labetalol 100 milliGRAM(s) Oral every 8 hours  lidocaine   Patch 1 Patch Transdermal daily  pantoprazole   Suspension 40 milliGRAM(s) Oral daily    VITALS:  T(C): , Max: 37.1 (19 @ 03:08)  T(F): , Max: 98.8 (19 @ 03:08)  HR: 65 (19 @ 15:11)  BP: 125/58 (19 @ 15:11)  BP(mean): 84 (19 @ 15:11)  RR: 18 (19 @ 15:11)  SpO2: 96% (19 @ 15:11)  Wt(kg): --  I and O's:     @ :  -   @ 07:00  --------------------------------------------------------  IN: 800 mL / OUT: 3740 mL / NET: -2940 mL     @ 07:01  -   @ 15:54  --------------------------------------------------------  IN: 720 mL / OUT: 785 mL / NET: -65 mL          REVIEW OF SYSTEMS:  Full ROS done and were negative unless otherwise indicated in HPI/assessment.     PHYSICAL EXAM:  Constitutional: NAD  Respiratory: CTA B/L  Cardiovascular: S1 and S2  Gastrointestinal: + BS, soft, NT, ND  Extremities: no peripheral edema  Neurological: AAO x 3  : no muñoz  Access: HD tunneled catheter, temp HD catheter, AVG/AVF    LABS:                        9.0    21.4  )-----------( 295      ( 07 Sep 2019 06:39 )             28.3     -    132<L>  |  99  |  43<H>  ----------------------------<  114<H>  3.8   |  17<L>  |  1.93<H>    Ca    8.3<L>      07 Sep 2019 06:39  Phos  3.6     -07        Urine Studies:  Urinalysis Basic - ( 07 Sep 2019 10:34 )    Color: Light Yellow / Appearance: Clear / S.010 / pH: x  Gluc: x / Ketone: Negative  / Bili: Negative / Urobili: Negative   Blood: x / Protein: 30 mg/dL / Nitrite: Negative   Leuk Esterase: Small / RBC: 16 /hpf / WBC 8 /HPF   Sq Epi: x / Non Sq Epi: 2 /hpf / Bacteria: Many          RADIOLOGY & ADDITIONAL STUDIES:      ASSESSMENT    IAN Langley  [facility]  [phone] NEPHROLOGY    Patient seen and examined.    MEDICATIONS  (STANDING):  amiodarone    Tablet 400 milliGRAM(s) Oral every 8 hours  amiodarone    Tablet   Oral   aspirin  chewable 81 milliGRAM(s) Oral daily  chlorhexidine 2% Cloths 1 Application(s) Topical daily  dextrose 50% Injectable 50 milliLiter(s) IV Push every 15 minutes  dextrose 50% Injectable 25 milliLiter(s) IV Push every 15 minutes  docusate sodium 100 milliGRAM(s) Oral three times a day  heparin  Injectable 5000 Unit(s) SubCutaneous every 8 hours  insulin lispro (HumaLOG) corrective regimen sliding scale   SubCutaneous Before meals and at bedtime  labetalol 100 milliGRAM(s) Oral every 8 hours  lidocaine   Patch 1 Patch Transdermal daily  pantoprazole   Suspension 40 milliGRAM(s) Oral daily    VITALS:  T(C): , Max: 37.1 (19 @ 03:08)  T(F): , Max: 98.8 (19 @ 03:08)  HR: 65 (19 @ 15:11)  BP: 125/58 (19 @ 15:11)  BP(mean): 84 (19 @ 15:11)  RR: 18 (19 @ 15:11)  SpO2: 96% (19 @ 15:11)  Wt(kg): --  I and O's:     @ :  -   @ 07:00  --------------------------------------------------------  IN: 800 mL / OUT: 3740 mL / NET: -2940 mL     @ 07:01  -   @ 15:54  --------------------------------------------------------  IN: 720 mL / OUT: 785 mL / NET: -65 mL          REVIEW OF SYSTEMS:  Full ROS done and were negative unless otherwise indicated in HPI/assessment.     PHYSICAL EXAM:  Constitutional: NAD  Neck:  No JVD  Respiratory: CTAB/L  Cardiovascular: S1 and S2  Gastrointestinal: BS+, soft, NT/ND  Extremities: + peripheral edema  Neurological: A/O x 3,    Psychiatric: Normal mood, normal affect  : No [place]  Skin: No rashes  Access: Not applicable        LABS:                        9.0    21.4  )-----------( 295      ( 07 Sep 2019 06:39 )             28.3     -    132<L>  |  99  |  43<H>  ----------------------------<  114<H>  3.8   |  17<L>  |  1.93<H>    Ca    8.3<L>      07 Sep 2019 06:39  Phos  3.6     -        Urine Studies:  Urinalysis Basic - ( 07 Sep 2019 10:34 )    Color: Light Yellow / Appearance: Clear / S.010 / pH: x  Gluc: x / Ketone: Negative  / Bili: Negative / Urobili: Negative   Blood: x / Protein: 30 mg/dL / Nitrite: Negative   Leuk Esterase: Small / RBC: 16 /hpf / WBC 8 /HPF   Sq Epi: x / Non Sq Epi: 2 /hpf / Bacteria: Many          RADIOLOGY & ADDITIONAL STUDIES:      ASSESSMENT  69 yr old female with H/O Thoraco abdominal Aortic aneurysm, Aortic Stenosis/ regurgitation S/P AVR (T), Ascending & hemiarch replacement 19  now sp  thoracoabdominal aortic aneurysm.  - DURGA- Non oliguric that is likely ATN-    - Euvolemic   - HTN- Worse   -SP Hypernatremia and now hyponatremia   -Presumed metabolic acidosis    RECOMMENDATION    -Continue Labetalol  -Continue Amiodarone as ordered  -Renal dosing of meds to creatinine clearance of 25 ml/min   -Can benefit from OT and PT  -She can scale back on the water intake given the serum sodium which is dropping   -Hold off on NaHCO3 tabs       FELIBERTO LangleyC  [facility]  [phone]

## 2019-09-07 NOTE — PROGRESS NOTE ADULT - ASSESSMENT
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 She is now   s/p Reop Thoracoabdominal aneurysm open repair with Dacron graft and celiac SMA bypass, reimplantation of lumbar artery with Dr. Briseno on 8/29. Intraop 2 PRBC, 2 Platelets,Her postoperative course was complicated by new onset BLE weakness R>L for which she was placed on MAPs of 110 and CSF drainage of 20cc. She was extubated POD2 ,and required BIPAP on POD3. She went into afib with RVR, placed on esmolol-weaned off- on POD4 but converted back to NSR without treatment.  9/1 RLE improving, NGT removed, BIPAP, nicardipine restarted briefly for HTN, Cr 2.43  9/2 Lumbar drain removed, Afib RVR converted without intervention, Vaso weaned off today  9/3 S&S eval recommend Dysphagia 1 nectar thick diet, 3L NC, PT recommending acute rehab, Cr improving 1.93 from 2.04  9/4 Afib with RVR, transferred to CTU, metoprolol 2.5 IV x2, amiodarone drip started, vasopressin for hypotension, esmolol drip for rate control, Digoxin IV x1, converted to NSR, Cr 1.76 DURGA improving, targeting MAPs of 70-80, L pleural chest tubes x3 removed, S&S recommend dysphagia 1 thin liquids, bradycardic at night and amio decreased to 0.5 from 1  9/5 Transitioned to Labetalol from esmolol, PO amio taper, A-line/introducer/and muñoz discontinued, transferred to SDU  She is now transferred from the CTU to SDU. She no longer has any critical care needs at this time and is stable enough for transfer to SDU.   9/6 Increase PT/ ambulation. Dys 1 diet, speech/swallow f/u, repeat mbs today. Maintaining nsr  9/7 Tolerating Dys II diet, check urinalysis, for leukocytosis. CXR negative for infiltrate. Creat 1,9

## 2019-09-07 NOTE — PROGRESS NOTE ADULT - SUBJECTIVE AND OBJECTIVE BOX
VITAL SIGNS    Telemetry:  SR 60-70  Vital Signs Last 24 Hrs  T(C): 36.6 (19 @ 08:51), Max: 37.1 (19 @ 03:08)  T(F): 97.8 (19 @ 08:51), Max: 98.8 (19 @ 03:08)  HR: 66 (19 @ 08:51) (61 - 72)  BP: 139/69 (19 @ 08:51) (108/54 - 148/66)  RR: 18 (19 @ 08:51) (18 - 18)  SpO2: 95% (19 @ 08:51) (95% - 98%)             @ 07:01  -   @ 07:00  --------------------------------------------------------  IN: 800 mL / OUT: 3740 mL / NET: -2940 mL     @ 07:01  -   @ 10:02  --------------------------------------------------------  IN: 120 mL / OUT: 200 mL / NET: -80 mL       Daily     Daily Weight in k.6 (07 Sep 2019 05:07)  Admit Wt: Drug Dosing Weight  Height (cm): 167.64 (29 Aug 2019 06:40)  Weight (kg): 67.6 (29 Aug 2019 06:40)  BMI (kg/m2): 24.1 (29 Aug 2019 06:40)  BSA (m2): 1.77 (29 Aug 2019 06:40)      CAPILLARY BLOOD GLUCOSE      POCT Blood Glucose.: 122 mg/dL (07 Sep 2019 08:16)  POCT Blood Glucose.: 120 mg/dL (06 Sep 2019 21:22)  POCT Blood Glucose.: 137 mg/dL (06 Sep 2019 16:36)  POCT Blood Glucose.: 104 mg/dL (06 Sep 2019 12:36)          MEDICATIONS  ALPRAZolam 0.25 milliGRAM(s) Oral every 8 hours PRN  amiodarone    Tablet 400 milliGRAM(s) Oral every 8 hours  amiodarone    Tablet   Oral   aspirin  chewable 81 milliGRAM(s) Oral daily  chlorhexidine 2% Cloths 1 Application(s) Topical daily  dextrose 50% Injectable 50 milliLiter(s) IV Push every 15 minutes  dextrose 50% Injectable 25 milliLiter(s) IV Push every 15 minutes  docusate sodium 100 milliGRAM(s) Oral three times a day  heparin  Injectable 5000 Unit(s) SubCutaneous every 8 hours  HYDROmorphone   Tablet 2 milliGRAM(s) Oral every 3 hours PRN  HYDROmorphone  Injectable 0.5 milliGRAM(s) IV Push every 4 hours PRN  insulin lispro (HumaLOG) corrective regimen sliding scale   SubCutaneous Before meals and at bedtime  labetalol 100 milliGRAM(s) Oral every 8 hours  lidocaine   Patch 1 Patch Transdermal daily  mineral oil 30 milliLiter(s) Oral two times a day PRN  oxyCODONE    5 mG/acetaminophen 325 mG 1 Tablet(s) Oral every 6 hours PRN  pantoprazole   Suspension 40 milliGRAM(s) Oral daily      >>> <<<  PHYSICAL EXAM  Subjective: NAD  Neurology: alert and oriented x 3, nonfocal, no gross deficits  CV : S1S2  Sternal Wound :  CDI , Stable  Lungs: CTA b/l  Abdomen: soft, NT,ND, ( +)BM  :  voiding  Extremities:   -c/c/e rle 4/5 pedal pulses    LABS      132<L>  |  99  |  43<H>  ----------------------------<  114<H>  3.8   |  17<L>  |  1.93<H>    Ca    8.3<L>      07 Sep 2019 06:39  Phos  3.6     -                                   9.0    21.4  )-----------( 295      ( 07 Sep 2019 06:39 )             28.3                 PAST MEDICAL & SURGICAL HISTORY:  Intermittent abdominal pain: periumbilical  Thoracoabdominal aortic aneurysm (TAAA) without rupture  Murmur, cardiac  Cataract: bilateral  Preeclampsia  HTN (hypertension): controlled  S/P aortic valve repair: 19 with bioprostetic valve  Thoracoabdominal aortic aneurysm (TAAA) without rupture: s/p repair  Asceding aortic aneurysm repair 2019  S/P cataract surgery  Arm fracture, left:  VITAL SIGNS    Telemetry:  SR 60-70  Vital Signs Last 24 Hrs  T(C): 36.6 (19 @ 08:51), Max: 37.1 (19 @ 03:08)  T(F): 97.8 (19 @ 08:51), Max: 98.8 (19 @ 03:08)  HR: 66 (19 @ 08:51) (61 - 72)  BP: 139/69 (19 @ 08:51) (108/54 - 148/66)  RR: 18 (19 @ 08:51) (18 - 18)  SpO2: 95% (19 @ 08:51) (95% - 98%)             @ 07:01  -   @ 07:00  --------------------------------------------------------  IN: 800 mL / OUT: 3740 mL / NET: -2940 mL     @ 07:01  -   @ 10:02  --------------------------------------------------------  IN: 120 mL / OUT: 200 mL / NET: -80 mL       Daily     Daily Weight in k.6 (07 Sep 2019 05:07)  Admit Wt: Drug Dosing Weight  Height (cm): 167.64 (29 Aug 2019 06:40)  Weight (kg): 67.6 (29 Aug 2019 06:40)  BMI (kg/m2): 24.1 (29 Aug 2019 06:40)  BSA (m2): 1.77 (29 Aug 2019 06:40)      CAPILLARY BLOOD GLUCOSE      POCT Blood Glucose.: 122 mg/dL (07 Sep 2019 08:16)  POCT Blood Glucose.: 120 mg/dL (06 Sep 2019 21:22)  POCT Blood Glucose.: 137 mg/dL (06 Sep 2019 16:36)  POCT Blood Glucose.: 104 mg/dL (06 Sep 2019 12:36)          MEDICATIONS  ALPRAZolam 0.25 milliGRAM(s) Oral every 8 hours PRN  amiodarone    Tablet 400 milliGRAM(s) Oral every 8 hours  amiodarone    Tablet   Oral   aspirin  chewable 81 milliGRAM(s) Oral daily  chlorhexidine 2% Cloths 1 Application(s) Topical daily  dextrose 50% Injectable 50 milliLiter(s) IV Push every 15 minutes  dextrose 50% Injectable 25 milliLiter(s) IV Push every 15 minutes  docusate sodium 100 milliGRAM(s) Oral three times a day  heparin  Injectable 5000 Unit(s) SubCutaneous every 8 hours  HYDROmorphone   Tablet 2 milliGRAM(s) Oral every 3 hours PRN  HYDROmorphone  Injectable 0.5 milliGRAM(s) IV Push every 4 hours PRN  insulin lispro (HumaLOG) corrective regimen sliding scale   SubCutaneous Before meals and at bedtime  labetalol 100 milliGRAM(s) Oral every 8 hours  lidocaine   Patch 1 Patch Transdermal daily  mineral oil 30 milliLiter(s) Oral two times a day PRN  oxyCODONE    5 mG/acetaminophen 325 mG 1 Tablet(s) Oral every 6 hours PRN  pantoprazole   Suspension 40 milliGRAM(s) Oral daily      >>> <<<  PHYSICAL EXAM  Subjective: NAD  Neurology: alert and oriented x 3, nonfocal, no gross deficits  CV : S1S2  left thoracic incision CDI ,no drainage, mild erythema  Lungs: CTA b/l  Abdomen: soft, NT,ND, ( +)BM  :  voiding  Extremities:   -c/c/e rle 1/5 pedal pulses unable to flex or dorsiflex right foot    LABS      132<L>  |  99  |  43<H>  ----------------------------<  114<H>  3.8   |  17<L>  |  1.93<H>    Ca    8.3<L>      07 Sep 2019 06:39  Phos  3.6                                        9.0    21.4  )-----------( 295      ( 07 Sep 2019 06:39 )             28.3                 PAST MEDICAL & SURGICAL HISTORY:  Intermittent abdominal pain: periumbilical  Thoracoabdominal aortic aneurysm (TAAA) without rupture  Murmur, cardiac  Cataract: bilateral  Preeclampsia  HTN (hypertension): controlled  S/P aortic valve repair: 19 with bioprostetic valve  Thoracoabdominal aortic aneurysm (TAAA) without rupture: s/p repair  Asceding aortic aneurysm repair 2019  S/P cataract surgery  Arm fracture, left:

## 2019-09-08 LAB
ALBUMIN SERPL ELPH-MCNC: 2.4 G/DL — LOW (ref 3.3–5)
ALP SERPL-CCNC: 75 U/L — SIGNIFICANT CHANGE UP (ref 40–120)
ALT FLD-CCNC: 28 U/L — SIGNIFICANT CHANGE UP (ref 10–45)
ANION GAP SERPL CALC-SCNC: 11 MMOL/L — SIGNIFICANT CHANGE UP (ref 5–17)
AST SERPL-CCNC: 21 U/L — SIGNIFICANT CHANGE UP (ref 10–40)
BILIRUB SERPL-MCNC: 0.6 MG/DL — SIGNIFICANT CHANGE UP (ref 0.2–1.2)
BUN SERPL-MCNC: 42 MG/DL — HIGH (ref 7–23)
CALCIUM SERPL-MCNC: 8 MG/DL — LOW (ref 8.4–10.5)
CHLORIDE SERPL-SCNC: 99 MMOL/L — SIGNIFICANT CHANGE UP (ref 96–108)
CO2 SERPL-SCNC: 19 MMOL/L — LOW (ref 22–31)
CREAT SERPL-MCNC: 1.87 MG/DL — HIGH (ref 0.5–1.3)
GLUCOSE SERPL-MCNC: 117 MG/DL — HIGH (ref 70–99)
HCT VFR BLD CALC: 27.7 % — LOW (ref 34.5–45)
HGB BLD-MCNC: 8.8 G/DL — LOW (ref 11.5–15.5)
MAGNESIUM SERPL-MCNC: 1.9 MG/DL — SIGNIFICANT CHANGE UP (ref 1.6–2.6)
MCHC RBC-ENTMCNC: 28.6 PG — SIGNIFICANT CHANGE UP (ref 27–34)
MCHC RBC-ENTMCNC: 31.7 GM/DL — LOW (ref 32–36)
MCV RBC AUTO: 90.1 FL — SIGNIFICANT CHANGE UP (ref 80–100)
PHOSPHATE SERPL-MCNC: 3.7 MG/DL — SIGNIFICANT CHANGE UP (ref 2.5–4.5)
PLATELET # BLD AUTO: 349 K/UL — SIGNIFICANT CHANGE UP (ref 150–400)
POTASSIUM SERPL-MCNC: 3.6 MMOL/L — SIGNIFICANT CHANGE UP (ref 3.5–5.3)
POTASSIUM SERPL-SCNC: 3.6 MMOL/L — SIGNIFICANT CHANGE UP (ref 3.5–5.3)
PROT SERPL-MCNC: 5.3 G/DL — LOW (ref 6–8.3)
RBC # BLD: 3.08 M/UL — LOW (ref 3.8–5.2)
RBC # FLD: 14.2 % — SIGNIFICANT CHANGE UP (ref 10.3–14.5)
SODIUM SERPL-SCNC: 129 MMOL/L — LOW (ref 135–145)
WBC # BLD: 21.8 K/UL — HIGH (ref 3.8–10.5)
WBC # FLD AUTO: 21.8 K/UL — HIGH (ref 3.8–10.5)

## 2019-09-08 PROCEDURE — 71250 CT THORAX DX C-: CPT | Mod: 26

## 2019-09-08 PROCEDURE — 71045 X-RAY EXAM CHEST 1 VIEW: CPT | Mod: 26

## 2019-09-08 PROCEDURE — 99222 1ST HOSP IP/OBS MODERATE 55: CPT | Mod: GC

## 2019-09-08 RX ORDER — PIPERACILLIN AND TAZOBACTAM 4; .5 G/20ML; G/20ML
3.38 INJECTION, POWDER, LYOPHILIZED, FOR SOLUTION INTRAVENOUS EVERY 8 HOURS
Refills: 0 | Status: COMPLETED | OUTPATIENT
Start: 2019-09-08 | End: 2019-09-15

## 2019-09-08 RX ORDER — SODIUM CHLORIDE 5 G/100ML
500 INJECTION, SOLUTION INTRAVENOUS
Refills: 0 | Status: DISCONTINUED | OUTPATIENT
Start: 2019-09-08 | End: 2019-09-08

## 2019-09-08 RX ORDER — PIPERACILLIN AND TAZOBACTAM 4; .5 G/20ML; G/20ML
3.38 INJECTION, POWDER, LYOPHILIZED, FOR SOLUTION INTRAVENOUS ONCE
Refills: 0 | Status: COMPLETED | OUTPATIENT
Start: 2019-09-08 | End: 2019-09-08

## 2019-09-08 RX ORDER — CIPROFLOXACIN LACTATE 400MG/40ML
500 VIAL (ML) INTRAVENOUS EVERY 12 HOURS
Refills: 0 | Status: DISCONTINUED | OUTPATIENT
Start: 2019-09-08 | End: 2019-09-08

## 2019-09-08 RX ORDER — POLYETHYLENE GLYCOL 3350 17 G/17G
17 POWDER, FOR SOLUTION ORAL DAILY
Refills: 0 | Status: DISCONTINUED | OUTPATIENT
Start: 2019-09-08 | End: 2019-09-16

## 2019-09-08 RX ADMIN — HEPARIN SODIUM 5000 UNIT(S): 5000 INJECTION INTRAVENOUS; SUBCUTANEOUS at 21:11

## 2019-09-08 RX ADMIN — Medication 0.25 MILLIGRAM(S): at 21:11

## 2019-09-08 RX ADMIN — OXYCODONE AND ACETAMINOPHEN 1 TABLET(S): 5; 325 TABLET ORAL at 12:58

## 2019-09-08 RX ADMIN — LIDOCAINE 1 PATCH: 4 CREAM TOPICAL at 19:01

## 2019-09-08 RX ADMIN — Medication 81 MILLIGRAM(S): at 11:01

## 2019-09-08 RX ADMIN — AMIODARONE HYDROCHLORIDE 400 MILLIGRAM(S): 400 TABLET ORAL at 13:11

## 2019-09-08 RX ADMIN — AMIODARONE HYDROCHLORIDE 400 MILLIGRAM(S): 400 TABLET ORAL at 05:03

## 2019-09-08 RX ADMIN — OXYCODONE AND ACETAMINOPHEN 1 TABLET(S): 5; 325 TABLET ORAL at 23:47

## 2019-09-08 RX ADMIN — HEPARIN SODIUM 5000 UNIT(S): 5000 INJECTION INTRAVENOUS; SUBCUTANEOUS at 13:11

## 2019-09-08 RX ADMIN — OXYCODONE AND ACETAMINOPHEN 1 TABLET(S): 5; 325 TABLET ORAL at 12:01

## 2019-09-08 RX ADMIN — Medication 100 MILLIGRAM(S): at 21:11

## 2019-09-08 RX ADMIN — LIDOCAINE 1 PATCH: 4 CREAM TOPICAL at 11:01

## 2019-09-08 RX ADMIN — PANTOPRAZOLE SODIUM 40 MILLIGRAM(S): 20 TABLET, DELAYED RELEASE ORAL at 11:01

## 2019-09-08 RX ADMIN — LIDOCAINE 1 PATCH: 4 CREAM TOPICAL at 21:11

## 2019-09-08 RX ADMIN — PIPERACILLIN AND TAZOBACTAM 200 GRAM(S): 4; .5 INJECTION, POWDER, LYOPHILIZED, FOR SOLUTION INTRAVENOUS at 13:38

## 2019-09-08 RX ADMIN — PIPERACILLIN AND TAZOBACTAM 25 GRAM(S): 4; .5 INJECTION, POWDER, LYOPHILIZED, FOR SOLUTION INTRAVENOUS at 21:11

## 2019-09-08 RX ADMIN — Medication 100 MILLIGRAM(S): at 13:11

## 2019-09-08 RX ADMIN — Medication 100 MILLIGRAM(S): at 05:03

## 2019-09-08 RX ADMIN — OXYCODONE AND ACETAMINOPHEN 1 TABLET(S): 5; 325 TABLET ORAL at 05:52

## 2019-09-08 RX ADMIN — HEPARIN SODIUM 5000 UNIT(S): 5000 INJECTION INTRAVENOUS; SUBCUTANEOUS at 05:03

## 2019-09-08 RX ADMIN — OXYCODONE AND ACETAMINOPHEN 1 TABLET(S): 5; 325 TABLET ORAL at 06:22

## 2019-09-08 RX ADMIN — Medication 0.25 MILLIGRAM(S): at 13:11

## 2019-09-08 NOTE — CONSULT NOTE ADULT - SUBJECTIVE AND OBJECTIVE BOX
Patient is a 69y old  Female who presents with a chief complaint of Thoraco abd aneurysm (08 Sep 2019 09:59)      HPI:  This is a 69 year old female with history of HTN, preeclampsia, c/o abdominal pain, Pt went for CT scan on 3/2019 S/P Ascending  thoracoabdominal aortic aneurysm repair and aortic valve replaced Bioprosthetic valve) 19. Presents reoperative thoracoabdominal aneurysm repair  (7.4CM TAAA mostly at the diaphragmatic hiatus) 19.  Pt reports she is being admitted 19 because the doctor is going to insert a lumbar drain. As per Doctors orders a CT of head without contrast , Lumbar x-ray and Chest X-ray of chest will be completed today. (14 Aug 2019 09:12)    Patient came in for AAA repair and course complicated by DURGA and afib with RVR, She has been improving but now has a rising leukocytosis of unknown etiology.   ID consulted for workup and antibiotic management     PAST MEDICAL & SURGICAL HISTORY:  Intermittent abdominal pain: periumbilical  Thoracoabdominal aortic aneurysm (TAAA) without rupture  Murmur, cardiac  Cataract: bilateral  Preeclampsia  HTN (hypertension): controlled  S/P aortic valve repair: 19 with bioprostetic valve  Thoracoabdominal aortic aneurysm (TAAA) without rupture: s/p repair  Asceding aortic aneurysm repair 2019  S/P cataract surgery  Arm fracture, left:       Allergies  No Known Allergies        ANTIMICROBIALS:      MEDICATIONS  (STANDING):    ceFAZolin   IVPB   100 mL/Hr IV Intermittent (19 @ 19:55)    cefuroxime  IVPB   100 mL/Hr IV Intermittent (19 @ 21:37)   100 mL/Hr IV Intermittent (19 @ 14:55)   100 mL/Hr IV Intermittent (19 @ 05:08)   100 mL/Hr IV Intermittent (19 @ 22:26)   100 mL/Hr IV Intermittent (19 @ 18:01)        OTHER MEDS: MEDICATIONS  (STANDING):  ALPRAZolam 0.25 every 8 hours PRN  amiodarone    Tablet 400 every 8 hours  amiodarone    Tablet 200 daily  amiodarone    Tablet    aspirin  chewable 81 daily  docusate sodium 100 three times a day  heparin  Injectable 5000 every 8 hours  HYDROmorphone  Injectable 0.5 every 4 hours PRN  labetalol 100 every 8 hours  mineral oil 30 two times a day PRN  ondansetron Injectable 4 three times a day PRN  oxyCODONE    5 mG/acetaminophen 325 mG 1 every 6 hours PRN  pantoprazole   Suspension 40 daily  polyethylene glycol 3350 17 daily      SOCIAL HISTORY:     former smoker quit 2019 50 pack year   social etoh   no drugs     FAMILY HISTORY:  Family history of skin cancer: mother  Family history of coronary artery bypass graft (Father): with valve disease      REVIEW OF SYSTEMS  [  ] ROS unobtainable because:    [  ] All other systems negative except as noted below:	    Constitutional:  [ ] fever [ ] chills  [ ] weight loss  [ ] weakness  Skin:  [ ] rash [ ] phlebitis	  Eyes: [ ] icterus [ ] pain  [ ] discharge	  ENMT: [ ] sore throat  [ ] thrush [ ] ulcers [ ] exudates  Respiratory: [ ] dyspnea [ ] hemoptysis [ ] cough [ ] sputum	  Cardiovascular:  [ ] chest pain [ ] palpitations [ ] edema	  Gastrointestinal:  [ ] nausea [ ] vomiting [ ] diarrhea [ ] constipation [ ] pain	  Genitourinary:  [ ] dysuria [ ] frequency [ ] hematuria [ ] discharge [ ] flank pain  [ ] incontinence  Musculoskeletal:  [ ] myalgias [ ] arthralgias [ ] arthritis  [ ] back pain  Neurological:  [ ] headache [ ] seizures  [ ] confusion/altered mental status  Psychiatric:  [ ] anxiety [ ] depression	  Hematology/Lymphatics:  [ ] lymphadenopathy  Endocrine:  [ ] adrenal [ ] thyroid  Allergic/Immunologic:	 [ ] transplant [ ] seasonal    Vital Signs Last 24 Hrs  T(F): 97.8 (19 @ 07:00), Max: 100.4 (19 @ 04:00)    Vital Signs Last 24 Hrs  HR: 57 (19 @ 07:00) (57 - 68)  BP: 102/50 (19 @ 07:00) (102/50 - 155/70)  RR: 17 (19 @ 07:00)  SpO2: 95% (19 @ 07:00) (95% - 97%)  Wt(kg): --    PHYSICAL EXAM:  General: non-toxic  HEAD/EYES: anicteric, PERRL  ENT:  supple  Cardiovascular:   S1, S2  Respiratory:  clear bilaterally  GI:  soft, non-tender, normal bowel sounds  :  no CVA tenderness   Musculoskeletal:  no synovitis  Neurologic:  grossly non-focal  Skin:  no rash  Lymph: no lymphadenopathy  Psychiatric:  appropriate affect  Vascular:  no phlebitis          WBC Count: 21.8 K/uL ( @ 05:29)  WBC Count: 21.4 K/uL ( @ 06:39)  WBC Count: 16.4 K/uL ( @ 05:44)  WBC Count: 14.0 K/uL ( @ 01:26)  WBC Count: 14.5 K/uL ( @ 04:46)  WBC Count: 13.0 K/uL ( @ 01:09)  WBC Count: 11.7 K/uL ( @ 01:18)                            8.8    21.8  )-----------( 349      ( 08 Sep 2019 05:29 )             27.7       09-    129<L>  |  99  |  42<H>  ----------------------------<  117<H>  3.6   |  19<L>  |  1.87<H>    Ca    8.0<L>      08 Sep 2019 05:29  Phos  3.7       Mg     1.9         TPro  5.3<L>  /  Alb  2.4<L>  /  TBili  0.6  /  DBili  x   /  AST  21  /  ALT  28  /  AlkPhos  75        Creatinine Trend: 1.87<--, 1.93<--, 1.91<--, 1.78<--, 1.76<--, 1.93<--    Urinalysis Basic - ( 07 Sep 2019 10:34 )    Color: Light Yellow / Appearance: Clear / S.010 / pH: x  Gluc: x / Ketone: Negative  / Bili: Negative / Urobili: Negative   Blood: x / Protein: 30 mg/dL / Nitrite: Negative   Leuk Esterase: Small / RBC: 16 /hpf / WBC 8 /HPF   Sq Epi: x / Non Sq Epi: 2 /hpf / Bacteria: Many        MICROBIOLOGY:          RADIOLOGY:      < from: Xray Chest 1 View- PORTABLE-Routine (19 @ 06:06) >  Impression:    The heart isnormal in size. Left pleural effusion. Left lower lobe   pneumonia and/or atelectasis. The right lung is clear. A metallic   Wallstent is seen in the aorta. Status post sternotomy. No change in the   appearance of the chest when compared to previous study done 2019.    < end of copied text >

## 2019-09-08 NOTE — PROGRESS NOTE ADULT - ASSESSMENT
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 She is now   s/p Reop Thoracoabdominal aneurysm open repair with Dacron graft and celiac SMA bypass, reimplantation of lumbar artery with Dr. Briseno on 8/29. Intraop 2 PRBC, 2 Platelets,Her postoperative course was complicated by new onset BLE weakness R>L for which she was placed on MAPs of 110 and CSF drainage of 20cc. She was extubated POD2 ,and required BIPAP on POD3. She went into afib with RVR, placed on esmolol-weaned off- on POD4 but converted back to NSR without treatment.  9/1 RLE improving, NGT removed, BIPAP, nicardipine restarted briefly for HTN, Cr 2.43  9/2 Lumbar drain removed, Afib RVR converted without intervention, Vaso weaned off today  9/3 S&S eval recommend Dysphagia 1 nectar thick diet, 3L NC, PT recommending acute rehab, Cr improving 1.93 from 2.04  9/4 Afib with RVR, transferred to CTU, metoprolol 2.5 IV x2, amiodarone drip started, vasopressin for hypotension, esmolol drip for rate control, Digoxin IV x1, converted to NSR, Cr 1.76 DURGA improving, targeting MAPs of 70-80, L pleural chest tubes x3 removed, S&S recommend dysphagia 1 thin liquids, bradycardic at night and amio decreased to 0.5 from 1  9/5 Transitioned to Labetalol from esmolol, PO amio taper, A-line/introducer/and muñoz discontinued, transferred to SDU  She is now transferred from the CTU to SDU. She no longer has any critical care needs at this time and is stable enough for transfer to SDU.   9/6 Increase PT/ ambulation. Dys 1 diet, speech/swallow f/u, repeat mbs today. Maintaining nsr  9/7 Tolerating Dys II diet, check urinalysis, for leukocytosis. CXR negative for infiltrate. Creat 1,9 9/8 Cipro initiated for suspected UTI. ID consulted. creatinine improving . 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 She is now   s/p Reop Thoracoabdominal aneurysm open repair with Dacron graft and celiac SMA bypass, reimplantation of lumbar artery with Dr. Briseno on 8/29. Intraop 2 PRBC, 2 Platelets,Her postoperative course was complicated by new onset BLE weakness R>L for which she was placed on MAPs of 110 and CSF drainage of 20cc. She was extubated POD2 ,and required BIPAP on POD3. She went into afib with RVR, placed on esmolol-weaned off- on POD4 but converted back to NSR without treatment.  9/1 RLE improving, NGT removed, BIPAP, nicardipine restarted briefly for HTN, Cr 2.43  9/2 Lumbar drain removed, Afib RVR converted without intervention, Vaso weaned off today  9/3 S&S eval recommend Dysphagia 1 nectar thick diet, 3L NC, PT recommending acute rehab, Cr improving 1.93 from 2.04  9/4 Afib with RVR, transferred to CTU, metoprolol 2.5 IV x2, amiodarone drip started, vasopressin for hypotension, esmolol drip for rate control, Digoxin IV x1, converted to NSR, Cr 1.76 DURGA improving, targeting MAPs of 70-80, L pleural chest tubes x3 removed, S&S recommend dysphagia 1 thin liquids, bradycardic at night and amio decreased to 0.5 from 1  9/5 Transitioned to Labetalol from esmolol, PO amio taper, A-line/introducer/and muñoz discontinued, transferred to SDU  She is now transferred from the CTU to SDU. She no longer has any critical care needs at this time and is stable enough for transfer to SDU.   9/6 Increase PT/ ambulation. Dys 1 diet, speech/swallow f/u, repeat mbs today. Maintaining nsr  9/7 Tolerating Dys II diet, check urinalysis, for leukocytosis. CXR negative for infiltrate. Creat 1,9   9/8 Zosyn initiated for suspected UTI/LLL pna. ID consulted for assistance in mangement. creatinine improving . -1415 fluid balance, ct chest, BC x 2

## 2019-09-08 NOTE — CONSULT NOTE ADULT - ASSESSMENT
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 She is now s/p Reop Thoracoabdominal aneurysm open repair with Dacron graft and celiac SMA bypass, reimplantation of lumbar artery with Dr. Briseno on 8/29. Intraop 2 PRBC, 2 Platelets,Her postoperative course was complicated by new onset BLE weakness R>L for which she was placed on MAPs of 110 and CSF drainage of 20cc. She was extubated POD2 ,and required BIPAP on POD3. She went into afib with RVR, placed on esmolol-weaned off- on POD4 but converted back to NSR without treatment.  She has been doing well but has a rising leukocytosis. No steroids given  UA 8 WBC, small LE with many bacteria  Febrile to 100.4 2 days ago, no blood cultures sent

## 2019-09-08 NOTE — PROGRESS NOTE ADULT - SUBJECTIVE AND OBJECTIVE BOX
VITAL SIGNS    Telemetry:  SB 50-60  Vital Signs Last 24 Hrs  T(C): 36.6 (19 @ 07:00), Max: 37.1 (19 @ 11:20)  T(F): 97.8 (19 @ 07:00), Max: 98.8 (19 @ 15:11)  HR: 57 (19 @ 07:00) (57 - 68)  BP: 102/50 (19 @ 07:00) (102/50 - 155/70)  RR: 17 (19 @ 07:00) (17 - 18)  SpO2: 95% (19 @ 07:00) (95% - 97%)             @ 07:01  -   @ 07:00  --------------------------------------------------------  IN: 1180 mL / OUT: 2635 mL / NET: -1455 mL     @ 07:01  -   @ 10:00  --------------------------------------------------------  IN: 420 mL / OUT: 0 mL / NET: 420 mL       Daily     Daily Weight in k.4 (08 Sep 2019 05:01)  Admit Wt: Drug Dosing Weight  Height (cm): 167.64 (29 Aug 2019 06:40)  Weight (kg): 67.6 (29 Aug 2019 06:40)  BMI (kg/m2): 24.1 (29 Aug 2019 06:40)  BSA (m2): 1.77 (29 Aug 2019 06:40)    Bilirubin Total, Serum: 0.6 mg/dL ( @ 05:29)    CAPILLARY BLOOD GLUCOSE      POCT Blood Glucose.: 136 mg/dL (08 Sep 2019 08:01)  POCT Blood Glucose.: 121 mg/dL (07 Sep 2019 21:49)  POCT Blood Glucose.: 123 mg/dL (07 Sep 2019 16:52)  POCT Blood Glucose.: 99 mg/dL (07 Sep 2019 11:33)          MEDICATIONS  ALPRAZolam 0.25 milliGRAM(s) Oral every 8 hours PRN  amiodarone    Tablet 400 milliGRAM(s) Oral every 8 hours  amiodarone    Tablet 200 milliGRAM(s) Oral daily  amiodarone    Tablet   Oral   aspirin  chewable 81 milliGRAM(s) Oral daily  ciprofloxacin     Tablet 500 milliGRAM(s) Oral every 12 hours  docusate sodium 100 milliGRAM(s) Oral three times a day  heparin  Injectable 5000 Unit(s) SubCutaneous every 8 hours  HYDROmorphone  Injectable 0.5 milliGRAM(s) IV Push every 4 hours PRN  labetalol 100 milliGRAM(s) Oral every 8 hours  lidocaine   Patch 1 Patch Transdermal daily  mineral oil 30 milliLiter(s) Oral two times a day PRN  ondansetron Injectable 4 milliGRAM(s) IV Push three times a day PRN  oxyCODONE    5 mG/acetaminophen 325 mG 1 Tablet(s) Oral every 6 hours PRN  pantoprazole   Suspension 40 milliGRAM(s) Oral daily  polyethylene glycol 3350 17 Gram(s) Oral daily      >>> <<<  PHYSICAL EXAM  Subjective: NAD   Neurology: alert and oriented x 3, nonfocal, no gross deficits  CV : s1s2  left thoracic incision c/d/i  mild erythema, no drainage  Lungs: CTA b/l  Abdomen: soft, NT,ND, ( +)BM  :  muñoz  Extremities:  rle weakness -e/c/c/  + pedal pulse b/l equal    LABS      129<L>  |  99  |  42<H>  ----------------------------<  117<H>  3.6   |  19<L>  |  1.87<H>    Ca    8.0<L>      08 Sep 2019 05:29  Phos  3.7       Mg     1.9         TPro  5.3<L>  /  Alb  2.4<L>  /  TBili  0.6  /  DBili  x   /  AST  21  /  ALT  28  /  AlkPhos  75                                   8.8    21.8  )-----------( 349      ( 08 Sep 2019 05:29 )             27.7                 PAST MEDICAL & SURGICAL HISTORY:  Intermittent abdominal pain: periumbilical  Thoracoabdominal aortic aneurysm (TAAA) without rupture  Murmur, cardiac  Cataract: bilateral  Preeclampsia  HTN (hypertension): controlled  S/P aortic valve repair: 19 with bioprostetic valve  Thoracoabdominal aortic aneurysm (TAAA) without rupture: s/p repair  Asceding aortic aneurysm repair 2019  S/P cataract surgery  Arm fracture, left:

## 2019-09-08 NOTE — PROGRESS NOTE ADULT - SUBJECTIVE AND OBJECTIVE BOX
NEPHROLOGY    Patient seen and examined.    MEDICATIONS  (STANDING):  amiodarone    Tablet 200 milliGRAM(s) Oral daily  amiodarone    Tablet   Oral   aspirin  chewable 81 milliGRAM(s) Oral daily  docusate sodium 100 milliGRAM(s) Oral three times a day  heparin  Injectable 5000 Unit(s) SubCutaneous every 8 hours  labetalol 100 milliGRAM(s) Oral every 8 hours  lidocaine   Patch 1 Patch Transdermal daily  pantoprazole   Suspension 40 milliGRAM(s) Oral daily  piperacillin/tazobactam IVPB.. 3.375 Gram(s) IV Intermittent every 8 hours  polyethylene glycol 3350 17 Gram(s) Oral daily    VITALS:  T(C): , Max: 37.1 (19 @ 15:11)  T(F): , Max: 98.8 (19 @ 15:11)  HR: 57 (19 @ 11:28)  BP: 101/49 (19 @ 11:28)  BP(mean): 83 (19 @ 05:01)  RR: 18 (19 @ 11:28)  SpO2: 96% (19 @ 11:28)  Wt(kg): --  I and O's:     @ 07:01  -   @ 07:00  --------------------------------------------------------  IN: 1180 mL / OUT: 2635 mL / NET: -1455 mL     @ 07:01  -   @ 14:35  --------------------------------------------------------  IN: 780 mL / OUT: 0 mL / NET: 780 mL          REVIEW OF SYSTEMS:  Full ROS done and were negative unless otherwise indicated in HPI/assessment.     PHYSICAL EXAM:  Constitutional: NAD  Neck:  No JVD  Respiratory: CTAB/L  Cardiovascular: S1 and S2  Gastrointestinal: BS+, soft, NT/ND  Extremities: + peripheral edema  Neurological: A/O x 3,    Psychiatric: Normal mood, normal affect  : No [place]  Skin: No rashes  Access: Not applicable        LABS:                        8.8    21.8  )-----------( 349      ( 08 Sep 2019 05:29 )             27.7     09-08    129<L>  |  99  |  42<H>  ----------------------------<  117<H>  3.6   |  19<L>  |  1.87<H>    Ca    8.0<L>      08 Sep 2019 05:29  Phos  3.7     09-08  Mg     1.9     -08    TPro  5.3<L>  /  Alb  2.4<L>  /  TBili  0.6  /  DBili  x   /  AST  21  /  ALT  28  /  AlkPhos  75  -      Urine Studies:  Urinalysis Basic - ( 07 Sep 2019 10:34 )    Color: Light Yellow / Appearance: Clear / S.010 / pH: x  Gluc: x / Ketone: Negative  / Bili: Negative / Urobili: Negative   Blood: x / Protein: 30 mg/dL / Nitrite: Negative   Leuk Esterase: Small / RBC: 16 /hpf / WBC 8 /HPF   Sq Epi: x / Non Sq Epi: 2 /hpf / Bacteria: Many          RADIOLOGY & ADDITIONAL STUDIES:      ASSESSMENT  69 yr old female with H/O Thoraco abdominal Aortic aneurysm, Aortic Stenosis/ regurgitation S/P AVR (T), Ascending & hemiarch replacement 19  now sp  thoracoabdominal aortic aneurysm.  - DURGA- Non oliguric that is likely ATN-    - Euvolemic   - HTN- Worse   -SP Hypernatremia and now hyponatremia   -Presumed metabolic acidosis      RECOMMENDATION    -Continue Labetalol  -Continue Amiodarone as ordered  -Renal dosing of meds to creatinine clearance of 25 ml/min   -Can benefit from OT and PT  -Hold off on NaHCO3 tabs     Amy Fierro NP-C  [facility]  [phone] NEPHROLOGY    Patient seen and examined.    MEDICATIONS  (STANDING):  amiodarone    Tablet 200 milliGRAM(s) Oral daily  amiodarone    Tablet   Oral   aspirin  chewable 81 milliGRAM(s) Oral daily  docusate sodium 100 milliGRAM(s) Oral three times a day  heparin  Injectable 5000 Unit(s) SubCutaneous every 8 hours  labetalol 100 milliGRAM(s) Oral every 8 hours  lidocaine   Patch 1 Patch Transdermal daily  pantoprazole   Suspension 40 milliGRAM(s) Oral daily  piperacillin/tazobactam IVPB.. 3.375 Gram(s) IV Intermittent every 8 hours  polyethylene glycol 3350 17 Gram(s) Oral daily    VITALS:  T(C): , Max: 37.1 (19 @ 15:11)  T(F): , Max: 98.8 (19 @ 15:11)  HR: 57 (19 @ 11:28)  BP: 101/49 (19 @ 11:28)  BP(mean): 83 (19 @ 05:01)  RR: 18 (19 @ 11:28)  SpO2: 96% (19 @ 11:28)  Wt(kg): --  I and O's:     @ 07:01  -   @ 07:00  --------------------------------------------------------  IN: 1180 mL / OUT: 2635 mL / NET: -1455 mL     @ 07:01  -   @ 14:35  --------------------------------------------------------  IN: 780 mL / OUT: 0 mL / NET: 780 mL          REVIEW OF SYSTEMS:  Full ROS done and were negative unless otherwise indicated in HPI/assessment.     PHYSICAL EXAM:  Constitutional: NAD  Neck:  No JVD  Respiratory: CTAB/L  Cardiovascular: S1 and S2  Gastrointestinal: BS+, soft, NT/ND  Extremities: + peripheral edema  Neurological: A/O x 3,    Psychiatric: Normal mood, normal affect  : No [place]  Skin: No rashes  Access: Not applicable        LABS:                        8.8    21.8  )-----------( 349      ( 08 Sep 2019 05:29 )             27.7     09-08    129<L>  |  99  |  42<H>  ----------------------------<  117<H>  3.6   |  19<L>  |  1.87<H>    Ca    8.0<L>      08 Sep 2019 05:29  Phos  3.7     09-08  Mg     1.9     -08    TPro  5.3<L>  /  Alb  2.4<L>  /  TBili  0.6  /  DBili  x   /  AST  21  /  ALT  28  /  AlkPhos  75  -      Urine Studies:  Urinalysis Basic - ( 07 Sep 2019 10:34 )    Color: Light Yellow / Appearance: Clear / S.010 / pH: x  Gluc: x / Ketone: Negative  / Bili: Negative / Urobili: Negative   Blood: x / Protein: 30 mg/dL / Nitrite: Negative   Leuk Esterase: Small / RBC: 16 /hpf / WBC 8 /HPF   Sq Epi: x / Non Sq Epi: 2 /hpf / Bacteria: Many          RADIOLOGY & ADDITIONAL STUDIES:      ASSESSMENT  69 yr old female with H/O Thoraco abdominal Aortic aneurysm, Aortic Stenosis/ regurgitation S/P AVR (T), Ascending & hemiarch replacement 19  now sp  thoracoabdominal aortic aneurysm.  - DURGA- Non oliguric that is likely ATN-    - Euvolemic   - HTN- controlled  -SP Hypernatremia and now hyponatremia   -Presumed metabolic acidosis      RECOMMENDATION    -Continue Labetalol  -Continue Amiodarone as ordered  -Renal dosing of meds to creatinine clearance of 25 ml/min   -Can benefit from OT and PT  -Hold off on NaHCO3 tabs     Amy Fierro NP-C  [facility]  [phone] NEPHROLOGY    Patient seen and examined.    MEDICATIONS  (STANDING):  amiodarone    Tablet 200 milliGRAM(s) Oral daily  amiodarone    Tablet   Oral   aspirin  chewable 81 milliGRAM(s) Oral daily  docusate sodium 100 milliGRAM(s) Oral three times a day  heparin  Injectable 5000 Unit(s) SubCutaneous every 8 hours  labetalol 100 milliGRAM(s) Oral every 8 hours  lidocaine   Patch 1 Patch Transdermal daily  pantoprazole   Suspension 40 milliGRAM(s) Oral daily  piperacillin/tazobactam IVPB.. 3.375 Gram(s) IV Intermittent every 8 hours  polyethylene glycol 3350 17 Gram(s) Oral daily    VITALS:  T(C): , Max: 37.1 (19 @ 15:11)  T(F): , Max: 98.8 (19 @ 15:11)  HR: 57 (19 @ 11:28)  BP: 101/49 (19 @ 11:28)  BP(mean): 83 (19 @ 05:01)  RR: 18 (19 @ 11:28)  SpO2: 96% (19 @ 11:28)  Wt(kg): --  I and O's:     @ 07:01  -   @ 07:00  --------------------------------------------------------  IN: 1180 mL / OUT: 2635 mL / NET: -1455 mL     @ 07:01  -   @ 14:35  --------------------------------------------------------  IN: 780 mL / OUT: 0 mL / NET: 780 mL          REVIEW OF SYSTEMS:  Full ROS done and were negative unless otherwise indicated in HPI/assessment.     PHYSICAL EXAM:  Constitutional: NAD  Neck:  No JVD  Respiratory: CTAB/L  Cardiovascular: S1 and S2  Gastrointestinal: BS+, soft, NT/ND  Extremities: + peripheral edema  Neurological: A/O x 3,    Psychiatric: Normal mood, normal affect  : No [place]  Skin: No rashes  Access: Not applicable        LABS:                        8.8    21.8  )-----------( 349      ( 08 Sep 2019 05:29 )             27.7     09-08    129<L>  |  99  |  42<H>  ----------------------------<  117<H>  3.6   |  19<L>  |  1.87<H>    Ca    8.0<L>      08 Sep 2019 05:29  Phos  3.7     09-08  Mg     1.9     -08    TPro  5.3<L>  /  Alb  2.4<L>  /  TBili  0.6  /  DBili  x   /  AST  21  /  ALT  28  /  AlkPhos  75  -      Urine Studies:  Urinalysis Basic - ( 07 Sep 2019 10:34 )    Color: Light Yellow / Appearance: Clear / S.010 / pH: x  Gluc: x / Ketone: Negative  / Bili: Negative / Urobili: Negative   Blood: x / Protein: 30 mg/dL / Nitrite: Negative   Leuk Esterase: Small / RBC: 16 /hpf / WBC 8 /HPF   Sq Epi: x / Non Sq Epi: 2 /hpf / Bacteria: Many          RADIOLOGY & ADDITIONAL STUDIES:      ASSESSMENT  69 yr old female with H/O Thoraco abdominal Aortic aneurysm, Aortic Stenosis/ regurgitation S/P AVR (T), Ascending & hemiarch replacement 19  now sp  thoracoabdominal aortic aneurysm.  - DURGA- Non oliguric that is likely ATN-    - Euvolemic   - HTN- controlled  -SP Hypernatremia and now hyponatremia   -Presumed metabolic acidosis      RECOMMENDATION    -Continue Labetalol  -Infuse NaCl 1.5% at 50ml/hr x 10 hrs  -Continue Amiodarone as ordered  -Renal dosing of meds to creatinine clearance of 25 ml/min   -Can benefit from OT and PT  -Hold off on NaHCO3 tabs     Amy Fierro NP-C  [facility]  [phone] NEPHROLOGY    Patient seen and examined.    MEDICATIONS  (STANDING):  amiodarone    Tablet 200 milliGRAM(s) Oral daily  amiodarone    Tablet   Oral   aspirin  chewable 81 milliGRAM(s) Oral daily  docusate sodium 100 milliGRAM(s) Oral three times a day  heparin  Injectable 5000 Unit(s) SubCutaneous every 8 hours  labetalol 100 milliGRAM(s) Oral every 8 hours  lidocaine   Patch 1 Patch Transdermal daily  pantoprazole   Suspension 40 milliGRAM(s) Oral daily  piperacillin/tazobactam IVPB.. 3.375 Gram(s) IV Intermittent every 8 hours  polyethylene glycol 3350 17 Gram(s) Oral daily    VITALS:  T(C): , Max: 37.1 (19 @ 15:11)  T(F): , Max: 98.8 (19 @ 15:11)  HR: 57 (19 @ 11:28)  BP: 101/49 (19 @ 11:28)  BP(mean): 83 (19 @ 05:01)  RR: 18 (19 @ 11:28)  SpO2: 96% (19 @ 11:28)  Wt(kg): --  I and O's:     @ 07:01  -   @ 07:00  --------------------------------------------------------  IN: 1180 mL / OUT: 2635 mL / NET: -1455 mL     @ 07:01  -   @ 14:35  --------------------------------------------------------  IN: 780 mL / OUT: 0 mL / NET: 780 mL          REVIEW OF SYSTEMS:  Full ROS done and were negative unless otherwise indicated in HPI/assessment.     PHYSICAL EXAM:  Constitutional: NAD  Neck:  No JVD  Respiratory: CTAB/L  Cardiovascular: S1 and S2  Gastrointestinal: BS+, soft, NT/ND  Extremities: + peripheral edema  Neurological: A/O x 3,    Psychiatric: Normal mood, normal affect  : No [place]  Skin: No rashes  Access: Not applicable        LABS:                        8.8    21.8  )-----------( 349      ( 08 Sep 2019 05:29 )             27.7     09-08    129<L>  |  99  |  42<H>  ----------------------------<  117<H>  3.6   |  19<L>  |  1.87<H>    Ca    8.0<L>      08 Sep 2019 05:29  Phos  3.7     09-08  Mg     1.9     -08    TPro  5.3<L>  /  Alb  2.4<L>  /  TBili  0.6  /  DBili  x   /  AST  21  /  ALT  28  /  AlkPhos  75  -      Urine Studies:  Urinalysis Basic - ( 07 Sep 2019 10:34 )    Color: Light Yellow / Appearance: Clear / S.010 / pH: x  Gluc: x / Ketone: Negative  / Bili: Negative / Urobili: Negative   Blood: x / Protein: 30 mg/dL / Nitrite: Negative   Leuk Esterase: Small / RBC: 16 /hpf / WBC 8 /HPF   Sq Epi: x / Non Sq Epi: 2 /hpf / Bacteria: Many          RADIOLOGY & ADDITIONAL STUDIES:      ASSESSMENT  69 yr old female with H/O Thoraco abdominal Aortic aneurysm, Aortic Stenosis/ regurgitation S/P AVR (T), Ascending & hemiarch replacement 19  now sp  thoracoabdominal aortic aneurysm.  - DURGA- Non oliguric that is likely ATN-    - Euvolemic   - HTN- controlled  -SP Hypernatremia and now hyponatremia   -Presumed metabolic acidosis      RECOMMENDATION  -Reduce free water intake  -Continue Labetalol  -Continue Amiodarone as ordered  -Renal dosing of meds to creatinine clearance of 25 ml/min   -Can benefit from OT and PT  -Hold off on NaHCO3 tabs     Amy Fierro NP-C  [facility]  [phone]

## 2019-09-09 LAB
ALBUMIN SERPL ELPH-MCNC: 2.3 G/DL — LOW (ref 3.3–5)
ALP SERPL-CCNC: 74 U/L — SIGNIFICANT CHANGE UP (ref 40–120)
ALT FLD-CCNC: 27 U/L — SIGNIFICANT CHANGE UP (ref 10–45)
ANION GAP SERPL CALC-SCNC: 14 MMOL/L — SIGNIFICANT CHANGE UP (ref 5–17)
AST SERPL-CCNC: 20 U/L — SIGNIFICANT CHANGE UP (ref 10–40)
BILIRUB SERPL-MCNC: 0.5 MG/DL — SIGNIFICANT CHANGE UP (ref 0.2–1.2)
BUN SERPL-MCNC: 45 MG/DL — HIGH (ref 7–23)
CALCIUM SERPL-MCNC: 7.9 MG/DL — LOW (ref 8.4–10.5)
CHLORIDE SERPL-SCNC: 97 MMOL/L — SIGNIFICANT CHANGE UP (ref 96–108)
CO2 SERPL-SCNC: 18 MMOL/L — LOW (ref 22–31)
CREAT SERPL-MCNC: 2.13 MG/DL — HIGH (ref 0.5–1.3)
GLUCOSE SERPL-MCNC: 122 MG/DL — HIGH (ref 70–99)
HCT VFR BLD CALC: 27.2 % — LOW (ref 34.5–45)
HGB BLD-MCNC: 9.1 G/DL — LOW (ref 11.5–15.5)
MAGNESIUM SERPL-MCNC: 1.9 MG/DL — SIGNIFICANT CHANGE UP (ref 1.6–2.6)
MCHC RBC-ENTMCNC: 29.7 PG — SIGNIFICANT CHANGE UP (ref 27–34)
MCHC RBC-ENTMCNC: 33.5 GM/DL — SIGNIFICANT CHANGE UP (ref 32–36)
MCV RBC AUTO: 88.7 FL — SIGNIFICANT CHANGE UP (ref 80–100)
PHOSPHATE SERPL-MCNC: 4.2 MG/DL — SIGNIFICANT CHANGE UP (ref 2.5–4.5)
PLATELET # BLD AUTO: 356 K/UL — SIGNIFICANT CHANGE UP (ref 150–400)
POTASSIUM SERPL-MCNC: 3.5 MMOL/L — SIGNIFICANT CHANGE UP (ref 3.5–5.3)
POTASSIUM SERPL-SCNC: 3.5 MMOL/L — SIGNIFICANT CHANGE UP (ref 3.5–5.3)
PROT SERPL-MCNC: 5.4 G/DL — LOW (ref 6–8.3)
RBC # BLD: 3.07 M/UL — LOW (ref 3.8–5.2)
RBC # FLD: 14.3 % — SIGNIFICANT CHANGE UP (ref 10.3–14.5)
SODIUM SERPL-SCNC: 129 MMOL/L — LOW (ref 135–145)
WBC # BLD: 21.2 K/UL — HIGH (ref 3.8–10.5)
WBC # FLD AUTO: 21.2 K/UL — HIGH (ref 3.8–10.5)

## 2019-09-09 PROCEDURE — 99232 SBSQ HOSP IP/OBS MODERATE 35: CPT

## 2019-09-09 RX ORDER — OXYCODONE HYDROCHLORIDE 5 MG/1
5 TABLET ORAL EVERY 4 HOURS
Refills: 0 | Status: DISCONTINUED | OUTPATIENT
Start: 2019-09-09 | End: 2019-09-11

## 2019-09-09 RX ORDER — ACETAMINOPHEN 500 MG
1000 TABLET ORAL ONCE
Refills: 0 | Status: DISCONTINUED | OUTPATIENT
Start: 2019-09-09 | End: 2019-09-25

## 2019-09-09 RX ADMIN — AMIODARONE HYDROCHLORIDE 200 MILLIGRAM(S): 400 TABLET ORAL at 05:20

## 2019-09-09 RX ADMIN — OXYCODONE AND ACETAMINOPHEN 1 TABLET(S): 5; 325 TABLET ORAL at 00:17

## 2019-09-09 RX ADMIN — LIDOCAINE 1 PATCH: 4 CREAM TOPICAL at 12:35

## 2019-09-09 RX ADMIN — OXYCODONE AND ACETAMINOPHEN 1 TABLET(S): 5; 325 TABLET ORAL at 09:14

## 2019-09-09 RX ADMIN — OXYCODONE HYDROCHLORIDE 5 MILLIGRAM(S): 5 TABLET ORAL at 15:13

## 2019-09-09 RX ADMIN — Medication 100 MILLIGRAM(S): at 21:44

## 2019-09-09 RX ADMIN — Medication 0.25 MILLIGRAM(S): at 21:58

## 2019-09-09 RX ADMIN — OXYCODONE AND ACETAMINOPHEN 1 TABLET(S): 5; 325 TABLET ORAL at 08:44

## 2019-09-09 RX ADMIN — OXYCODONE HYDROCHLORIDE 5 MILLIGRAM(S): 5 TABLET ORAL at 20:15

## 2019-09-09 RX ADMIN — HEPARIN SODIUM 5000 UNIT(S): 5000 INJECTION INTRAVENOUS; SUBCUTANEOUS at 14:44

## 2019-09-09 RX ADMIN — HEPARIN SODIUM 5000 UNIT(S): 5000 INJECTION INTRAVENOUS; SUBCUTANEOUS at 05:20

## 2019-09-09 RX ADMIN — OXYCODONE HYDROCHLORIDE 5 MILLIGRAM(S): 5 TABLET ORAL at 14:43

## 2019-09-09 RX ADMIN — OXYCODONE HYDROCHLORIDE 5 MILLIGRAM(S): 5 TABLET ORAL at 21:15

## 2019-09-09 RX ADMIN — PANTOPRAZOLE SODIUM 40 MILLIGRAM(S): 20 TABLET, DELAYED RELEASE ORAL at 14:44

## 2019-09-09 RX ADMIN — LIDOCAINE 1 PATCH: 4 CREAM TOPICAL at 18:46

## 2019-09-09 RX ADMIN — PIPERACILLIN AND TAZOBACTAM 25 GRAM(S): 4; .5 INJECTION, POWDER, LYOPHILIZED, FOR SOLUTION INTRAVENOUS at 05:43

## 2019-09-09 RX ADMIN — Medication 81 MILLIGRAM(S): at 12:36

## 2019-09-09 RX ADMIN — PIPERACILLIN AND TAZOBACTAM 25 GRAM(S): 4; .5 INJECTION, POWDER, LYOPHILIZED, FOR SOLUTION INTRAVENOUS at 14:43

## 2019-09-09 RX ADMIN — HEPARIN SODIUM 5000 UNIT(S): 5000 INJECTION INTRAVENOUS; SUBCUTANEOUS at 21:45

## 2019-09-09 RX ADMIN — Medication 100 MILLIGRAM(S): at 05:20

## 2019-09-09 RX ADMIN — PIPERACILLIN AND TAZOBACTAM 25 GRAM(S): 4; .5 INJECTION, POWDER, LYOPHILIZED, FOR SOLUTION INTRAVENOUS at 21:45

## 2019-09-09 NOTE — PROGRESS NOTE ADULT - SUBJECTIVE AND OBJECTIVE BOX
CC: Patient is a 69y old  Female who presents with a chief complaint of Thoraco abd aneurysm (08 Sep 2019 09:59)    ID following for leukocytosis    Interval History/ROS: Patient had a large bowel movement yesterday. Lethargic. No fevers. No cough. Remains with muñoz.    Rest of ROS negative.    Allergies  No Known Allergies    ANTIMICROBIALS:  piperacillin/tazobactam IVPB.. 3.375 every 8 hours    OTHER MEDS:  ALPRAZolam 0.25 milliGRAM(s) Oral every 8 hours PRN  amiodarone    Tablet 200 milliGRAM(s) Oral daily  amiodarone    Tablet   Oral   aspirin  chewable 81 milliGRAM(s) Oral daily  docusate sodium 100 milliGRAM(s) Oral three times a day  heparin  Injectable 5000 Unit(s) SubCutaneous every 8 hours  lidocaine   Patch 1 Patch Transdermal daily  mineral oil 30 milliLiter(s) Oral two times a day PRN  ondansetron Injectable 4 milliGRAM(s) IV Push three times a day PRN  oxyCODONE    5 mG/acetaminophen 325 mG 1 Tablet(s) Oral every 6 hours PRN  pantoprazole   Suspension 40 milliGRAM(s) Oral daily  polyethylene glycol 3350 17 Gram(s) Oral daily    PE:    Vital Signs Last 24 Hrs  T(C): 36.7 (09 Sep 2019 07:40), Max: 36.9 (08 Sep 2019 19:07)  T(F): 98.1 (09 Sep 2019 07:40), Max: 98.4 (08 Sep 2019 19:07)  HR: 53 (09 Sep 2019 11:46) (53 - 64)  BP: 114/54 (09 Sep 2019 11:46) (100/62 - 124/60)  BP(mean): 78 (09 Sep 2019 11:46) (76 - 87)  RR: 18 (09 Sep 2019 07:40) (18 - 18)  SpO2: 98% (09 Sep 2019 07:40) (95% - 98%)    Gen: AOx3, NAD, non-toxic  CV: S1+S2 normal, no murmurs  Resp: Decreased in left base  Abd: Soft, nontender, +BS  : +Muñoz  IV/Skin: No thrombophlebitis  Neuro: no focal deficits    LABS:                          9.1    21.2  )-----------( 356      ( 09 Sep 2019 05:30 )             27.2       09-09    129<L>  |  97  |  45<H>  ----------------------------<  122<H>  3.5   |  18<L>  |  2.13<H>    Ca    7.9<L>      09 Sep 2019 05:30  Phos  4.2     09-09  Mg     1.9     09-09    TPro  5.4<L>  /  Alb  2.3<L>  /  TBili  0.5  /  DBili  x   /  AST  20  /  ALT  27  /  AlkPhos  74  09-09    MICROBIOLOGY:  v    RADIOLOGY:    < from: CT Chest No Cont (09.08.19 @ 18:37) >  IMPRESSION:     Complex moderate leftpleural effusion with associated left lower lobe   atelectasis.      < end of copied text >

## 2019-09-09 NOTE — PROGRESS NOTE ADULT - SUBJECTIVE AND OBJECTIVE BOX
im tired    VITAL SIGNS    Telemetry:  nsr 50-60  Vital Signs Last 24 Hrs  T(C): 36.7 (19 @ 07:40), Max: 36.9 (19 @ 19:07)  T(F): 98.1 (19 @ 07:40), Max: 98.4 (19 @ 19:07)  HR: 54 (19 @ 09:20) (54 - 64)  BP: 107/53 (19 @ 09:20) (100/62 - 124/60)  RR: 18 (19 @ 07:40) (18 - 18)  SpO2: 98% (19 @ 07:40) (95% - 98%)                       9.1<L>                129<L>| 18<L>| 45<H>        21.2<H> >-----------< 356     ------------------------< 122<H>                 27.2<L>                3.5  | 97   | 2.13<H>                                                                     Ca 7.9<L> Mg 1.9   Ph 4.2      ,             8.8<L>                129<L>| 19<L>| 42<H>        21.8<H> >-----------< 349     ------------------------< 117<H>                 27.7<L>                3.6  | 99   | 1.87<H>                                                                     Ca 8.0<L> Mg 1.9   Ph 3.7              19 @ 07:01  -  19 @ 07:00  --------------------------------------------------------  IN: 1460 mL / OUT: 2000 mL / NET: -540 mL        Daily     Daily Weight in k.2 (09 Sep 2019 05:19)        CAPILLARY BLOOD GLUCOSE                    Coumadin    [ ] YES          [  ]      NO                                   PHYSICAL EXAM    Neurological: A&O x3, 3/5 strength for left leg elevation, 2/5 strength for right leg elevation, sensation to light touch intact bilaterally    Cardiovascular: RRR, normal S1S2, 2/6 systolic murmur, no rubs or gallops, posterolateral thoracotomy dressing c/d/i  Respiratory: Diminished at bases, no wheezes/rhonchi/rales  Gastrointestinal: soft, nontender, nondistended  Genitourinary: [place] in place  Extremities: 1+ pitting edema to mid shin, warm well perfused, boot on right foot  Vascular: Radial pulses 2+ bilaterally, DP pulses 2+ bilaterally              ALPRAZolam 0.25 milliGRAM(s) Oral every 8 hours PRN  amiodarone    Tablet 200 milliGRAM(s) Oral daily  amiodarone    Tablet   Oral   aspirin  chewable 81 milliGRAM(s) Oral daily  docusate sodium 100 milliGRAM(s) Oral three times a day  heparin  Injectable 5000 Unit(s) SubCutaneous every 8 hours  lidocaine   Patch 1 Patch Transdermal daily  mineral oil 30 milliLiter(s) Oral two times a day PRN  ondansetron Injectable 4 milliGRAM(s) IV Push three times a day PRN  oxyCODONE    5 mG/acetaminophen 325 mG 1 Tablet(s) Oral every 6 hours PRN  pantoprazole   Suspension 40 milliGRAM(s) Oral daily  piperacillin/tazobactam IVPB.. 3.375 Gram(s) IV Intermittent every 8 hours  polyethylene glycol 3350 17 Gram(s) Oral daily                    Physical Therapy Rec:   Home  [  ]   Home w/ PT  [  ]  Rehab  [  ]  Discussed with Cardiothoracic Team at AM rounds.

## 2019-09-09 NOTE — PROGRESS NOTE ADULT - ASSESSMENT
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 She is now   s/p Reop Thoracoabdominal aneurysm open repair with Dacron graft and celiac SMA bypass, reimplantation of lumbar artery with Dr. Briseno on 8/29. Intraop 2 PRBC, 2 Platelets,Her postoperative course was complicated by new onset BLE weakness R>L for which she was placed on MAPs of 110 and CSF drainage of 20cc. She was extubated POD2 ,and required BIPAP on POD3. She went into afib with RVR, placed on esmolol-weaned off- on POD4 but converted back to NSR without treatment.  9/1 RLE improving, NGT removed, BIPAP, nicardipine restarted briefly for HTN, Cr 2.43  9/2 Lumbar drain removed, Afib RVR converted without intervention, Vaso weaned off today  9/3 S&S eval recommend Dysphagia 1 nectar thick diet, 3L NC, PT recommending acute rehab, Cr improving 1.93 from 2.04  9/4 Afib with RVR, transferred to CTU, metoprolol 2.5 IV x2, amiodarone drip started, vasopressin for hypotension, esmolol drip for rate control, Digoxin IV x1, converted to NSR, Cr 1.76 DURGA improving, targeting MAPs of 70-80, L pleural chest tubes x3 removed, S&S recommend dysphagia 1 thin liquids, bradycardic at night and amio decreased to 0.5 from 1  9/5 Transitioned to Labetalol from esmolol, PO amio taper, A-line/introducer/and muñoz discontinued, transferred to SDU  She is now transferred from the CTU to SDU. She no longer has any critical care needs at this time and is stable enough for transfer to SDU.   9/6 Increase PT/ ambulation. Dys 1 diet, speech/swallow f/u, repeat mbs today. Maintaining nsr  9/7 Tolerating Dys II diet, check urinalysis, for leukocytosis. CXR negative for infiltrate. Creat 1,9 9/8 Zosyn initiated for suspected UTI/LLL pna. ID consulted for assistance in mangement. creatinine improving . -1415 fluid balance, ct chest, BC x 2  9/9 WBC remains elevated, 21, afebrile, pending CT results r/o PNA. Continue zosyn.    D/w PT,  pt with decrease in  balance/ fatigued today, RLE weakness unchanged as per pt.   relative hypotension, irineo, labetolol d/c, maintain mao>85.

## 2019-09-09 NOTE — PROGRESS NOTE ADULT - ASSESSMENT
69 yr old female with H/O Thoraco abdominal Aortic aneurysm, Aortic Stenosis/ regurgitation S/P AVR (T), Ascending & hemiarch replacement 4/9/19  now sp  thoracoabdominal aortic aneurysm.  - DURGA- Non oliguric that is likely ATN-    - Euvolemic   - HTN- too controlled   -SP Hypernatremia and now hyponatremia   -Presumed metabolic acidosis      RECOMMENDATION  -Reduce free water intake.  Encourage liquids with calories; Start Ensure Enlive  -Would DC the Labetalol as the BP is soft and hold parameters were not being followed   -Continue Amiodarone as ordered  -Renal dosing of meds to creatinine clearance of 25 ml/min   -Cont PT  -IV abx

## 2019-09-09 NOTE — PROGRESS NOTE ADULT - ASSESSMENT
69 year old female s/p ascending thoracoabdominal aortic aneurysm repair and aortic valve replaced with a bioprosthetic valve in April 2019, reoperative repair on 8/29/2019  with course complicated with DURGA and Afib with RVR. 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 She states she has developed a productive cough with whitish sputum, CXR with ? LLL PNA vs atelectasis. ID consulted for rising leukocytosis.  Started on zosyn  CT chest with complex mod left pleural effusion - will review with radiology  Blood cultures sent  Recommend:  -F/U blood cultures x 2 sets  -Continue zosyn 3.375 grams IV q 8 hours pending workup  -Monitor CrCl while on abx  -Monitor for fevers

## 2019-09-09 NOTE — PROGRESS NOTE ADULT - ASSESSMENT
Flory is a very pleasant 69 Year-Old Lady s/p 8/29 ThoracoAbdominal Aneurysm Repair / Ceiliac-spinal-SMA bypass.    - Appreciate ID recs: on zosyn, positive UA nya LL pleural effusion/pneumonia.   - Care per Primary Team appreciated.     Vascular Surgery Pager #[phone]

## 2019-09-09 NOTE — PROGRESS NOTE ADULT - SUBJECTIVE AND OBJECTIVE BOX
Vascular Surgery Progress Note     Subjective/24hour Events:   Patient seen and examined.   Started on antibiotics for positive UA.   RLE movement improved.   Remained afebrile, hemodynamically stable overnight.     Vital Signs:  Vital Signs Last 24 Hrs  T(C): 36.8 (09 Sep 2019 03:24), Max: 36.9 (08 Sep 2019 19:07)  T(F): 98.2 (09 Sep 2019 03:24), Max: 98.4 (08 Sep 2019 19:07)  HR: 64 (09 Sep 2019 05:19) (54 - 64)  BP: 110/56 (09 Sep 2019 05:19) (100/62 - 118/58)  BP(mean): 80 (09 Sep 2019 05:19) (76 - 84)  RR: 18 (09 Sep 2019 05:19) (18 - 18)  SpO2: 97% (09 Sep 2019 05:19) (95% - 98%)    CAPILLARY BLOOD GLUCOSE      POCT Blood Glucose.: 136 mg/dL (08 Sep 2019 08:01)      I&O's Detail    08 Sep 2019 07:01  -  09 Sep 2019 07:00  --------------------------------------------------------  IN:    IV PiggyBack: 300 mL    Oral Fluid: 1160 mL  Total IN: 1460 mL    OUT:    Indwelling Catheter - Urethral: 2000 mL  Total OUT: 2000 mL    Total NET: -540 mL          MEDICATIONS  (STANDING):  amiodarone    Tablet 200 milliGRAM(s) Oral daily  amiodarone    Tablet   Oral   aspirin  chewable 81 milliGRAM(s) Oral daily  docusate sodium 100 milliGRAM(s) Oral three times a day  heparin  Injectable 5000 Unit(s) SubCutaneous every 8 hours  labetalol 100 milliGRAM(s) Oral every 8 hours  lidocaine   Patch 1 Patch Transdermal daily  pantoprazole   Suspension 40 milliGRAM(s) Oral daily  piperacillin/tazobactam IVPB.. 3.375 Gram(s) IV Intermittent every 8 hours  polyethylene glycol 3350 17 Gram(s) Oral daily    MEDICATIONS  (PRN):  ALPRAZolam 0.25 milliGRAM(s) Oral every 8 hours PRN anxiety  mineral oil 30 milliLiter(s) Oral two times a day PRN dry mouth  ondansetron Injectable 4 milliGRAM(s) IV Push three times a day PRN Nausea and/or Vomiting  oxyCODONE    5 mG/acetaminophen 325 mG 1 Tablet(s) Oral every 6 hours PRN Moderate Pain (4 - 6)      Physical Exam:  Gen: NAD.  Lungs: Non labored breathing.   Ab: Soft, nontender, nondistended. L incision dressing clean/dry/intact.   Ext: Continued RLE weakness, improving.   : Clear urine in muñoz.     Labs:    09-09    129<L>  |  97  |  45<H>  ----------------------------<  122<H>  3.5   |  18<L>  |  2.13<H>    Ca    7.9<L>      09 Sep 2019 05:30  Phos  4.2     09-09  Mg     1.9     09-09    TPro  5.4<L>  /  Alb  2.3<L>  /  TBili  0.5  /  DBili  x   /  AST  20  /  ALT  27  /  AlkPhos  74  09-09    LIVER FUNCTIONS - ( 09 Sep 2019 05:30 )  Alb: 2.3 g/dL / Pro: 5.4 g/dL / ALK PHOS: 74 U/L / ALT: 27 U/L / AST: 20 U/L / GGT: x                                 9.1    21.2  )-----------( 356      ( 09 Sep 2019 05:30 )             27.2         Imaging:  < from: Xray Chest 1 View- PORTABLE-Routine (09.08.19 @ 06:06) >    Impression:    The heart isnormal in size. Left pleural effusion. Left lower lobe   pneumonia and/or atelectasis. The right lung is clear. A metallic   Wallstent is seen in the aorta. Status post sternotomy. No change in the   appearance of the chest when compared to previous study done September 7, 2019.

## 2019-09-09 NOTE — PROGRESS NOTE ADULT - SUBJECTIVE AND OBJECTIVE BOX
[facility] [phone]        Patient seen and examined in bed.  She was in good spirits at present.  Still drinking a lot of water         MEDICATIONS  (STANDING):  amiodarone    Tablet 200 milliGRAM(s) Oral daily  amiodarone    Tablet   Oral   aspirin  chewable 81 milliGRAM(s) Oral daily  docusate sodium 100 milliGRAM(s) Oral three times a day  heparin  Injectable 5000 Unit(s) SubCutaneous every 8 hours  lidocaine   Patch 1 Patch Transdermal daily  pantoprazole   Suspension 40 milliGRAM(s) Oral daily  piperacillin/tazobactam IVPB.. 3.375 Gram(s) IV Intermittent every 8 hours  polyethylene glycol 3350 17 Gram(s) Oral daily      VITAL:  T(C): , Max: 36.9 (19 @ 19:07)  T(F): , Max: 98.4 (19 @ 19:07)  HR: 58 (19 @ 07:40)  BP: 124/60 (19 @ 07:40)  BP(mean): 87 (19 @ 07:40)  RR: 18 (19 @ 07:40)  SpO2: 98% (19 @ 07:40)  Wt(kg): --    I and O's:     @ 07:01  -   @ 07:00  --------------------------------------------------------  IN: 1460 mL / OUT: 2000 mL / NET: -540 mL          PHYSICAL EXAM:    Constitutional: NAD  Neck:  No JVD  Respiratory: CTAB/L  Cardiovascular: S1 and S2  Gastrointestinal: BS+, soft, NT/ND  Extremities: No peripheral edema  Neurological: A/O x 3, no focal deficits  Psychiatric: Normal mood, normal affect  : No [place]  Skin: No rashes  Access: Not applicable    LABS:                        9.1    21.2  )-----------( 356      ( 09 Sep 2019 05:30 )             27.2         129<L>  |  97  |  45<H>  ----------------------------<  122<H>  3.5   |  18<L>  |  2.13<H>    Ca    7.9<L>      09 Sep 2019 05:30  Phos  4.2       Mg     1.9         TPro  5.4<L>  /  Alb  2.3<L>  /  TBili  0.5  /  DBili  x   /  AST  20  /  ALT  27  /  AlkPhos  74            Urine Studies:  Urinalysis Basic - ( 07 Sep 2019 10:34 )    Color: Light Yellow / Appearance: Clear / S.010 / pH: x  Gluc: x / Ketone: Negative  / Bili: Negative / Urobili: Negative   Blood: x / Protein: 30 mg/dL / Nitrite: Negative   Leuk Esterase: Small / RBC: 16 /hpf / WBC 8 /HPF   Sq Epi: x / Non Sq Epi: 2 /hpf / Bacteria: Many            RADIOLOGY & ADDITIONAL STUDIES:

## 2019-09-10 LAB
ALBUMIN SERPL ELPH-MCNC: 2.6 G/DL — LOW (ref 3.3–5)
ALP SERPL-CCNC: 77 U/L — SIGNIFICANT CHANGE UP (ref 40–120)
ALT FLD-CCNC: 28 U/L — SIGNIFICANT CHANGE UP (ref 10–45)
ANION GAP SERPL CALC-SCNC: 11 MMOL/L — SIGNIFICANT CHANGE UP (ref 5–17)
ANION GAP SERPL CALC-SCNC: 13 MMOL/L — SIGNIFICANT CHANGE UP (ref 5–17)
AST SERPL-CCNC: 20 U/L — SIGNIFICANT CHANGE UP (ref 10–40)
BILIRUB SERPL-MCNC: 0.5 MG/DL — SIGNIFICANT CHANGE UP (ref 0.2–1.2)
BUN SERPL-MCNC: 39 MG/DL — HIGH (ref 7–23)
BUN SERPL-MCNC: 41 MG/DL — HIGH (ref 7–23)
CALCIUM SERPL-MCNC: 8.5 MG/DL — SIGNIFICANT CHANGE UP (ref 8.4–10.5)
CALCIUM SERPL-MCNC: 8.7 MG/DL — SIGNIFICANT CHANGE UP (ref 8.4–10.5)
CHLORIDE SERPL-SCNC: 94 MMOL/L — LOW (ref 96–108)
CHLORIDE SERPL-SCNC: 96 MMOL/L — SIGNIFICANT CHANGE UP (ref 96–108)
CO2 SERPL-SCNC: 20 MMOL/L — LOW (ref 22–31)
CO2 SERPL-SCNC: 22 MMOL/L — SIGNIFICANT CHANGE UP (ref 22–31)
CREAT SERPL-MCNC: 2.05 MG/DL — HIGH (ref 0.5–1.3)
CREAT SERPL-MCNC: 2.06 MG/DL — HIGH (ref 0.5–1.3)
GLUCOSE SERPL-MCNC: 111 MG/DL — HIGH (ref 70–99)
GLUCOSE SERPL-MCNC: 129 MG/DL — HIGH (ref 70–99)
HCT VFR BLD CALC: 25.4 % — LOW (ref 34.5–45)
HGB BLD-MCNC: 8.8 G/DL — LOW (ref 11.5–15.5)
MAGNESIUM SERPL-MCNC: 1.8 MG/DL — SIGNIFICANT CHANGE UP (ref 1.6–2.6)
MCHC RBC-ENTMCNC: 30.5 PG — SIGNIFICANT CHANGE UP (ref 27–34)
MCHC RBC-ENTMCNC: 34.5 GM/DL — SIGNIFICANT CHANGE UP (ref 32–36)
MCV RBC AUTO: 88.5 FL — SIGNIFICANT CHANGE UP (ref 80–100)
PHOSPHATE SERPL-MCNC: 3.7 MG/DL — SIGNIFICANT CHANGE UP (ref 2.5–4.5)
PLATELET # BLD AUTO: 467 K/UL — HIGH (ref 150–400)
POTASSIUM SERPL-MCNC: 3.1 MMOL/L — LOW (ref 3.5–5.3)
POTASSIUM SERPL-MCNC: 4.1 MMOL/L — SIGNIFICANT CHANGE UP (ref 3.5–5.3)
POTASSIUM SERPL-SCNC: 3.1 MMOL/L — LOW (ref 3.5–5.3)
POTASSIUM SERPL-SCNC: 4.1 MMOL/L — SIGNIFICANT CHANGE UP (ref 3.5–5.3)
PROT SERPL-MCNC: 5.8 G/DL — LOW (ref 6–8.3)
RBC # BLD: 2.87 M/UL — LOW (ref 3.8–5.2)
RBC # FLD: 14.1 % — SIGNIFICANT CHANGE UP (ref 10.3–14.5)
SODIUM SERPL-SCNC: 127 MMOL/L — LOW (ref 135–145)
SODIUM SERPL-SCNC: 129 MMOL/L — LOW (ref 135–145)
WBC # BLD: 17.1 K/UL — HIGH (ref 3.8–10.5)
WBC # FLD AUTO: 17.1 K/UL — HIGH (ref 3.8–10.5)

## 2019-09-10 PROCEDURE — 99232 SBSQ HOSP IP/OBS MODERATE 35: CPT

## 2019-09-10 PROCEDURE — 99024 POSTOP FOLLOW-UP VISIT: CPT

## 2019-09-10 PROCEDURE — 71045 X-RAY EXAM CHEST 1 VIEW: CPT | Mod: 26

## 2019-09-10 RX ORDER — POTASSIUM BICARBONATE 978 MG/1
25 TABLET, EFFERVESCENT ORAL
Refills: 0 | Status: COMPLETED | OUTPATIENT
Start: 2019-09-10 | End: 2019-09-10

## 2019-09-10 RX ADMIN — POTASSIUM BICARBONATE 25 MILLIEQUIVALENT(S): 978 TABLET, EFFERVESCENT ORAL at 09:00

## 2019-09-10 RX ADMIN — OXYCODONE HYDROCHLORIDE 5 MILLIGRAM(S): 5 TABLET ORAL at 00:55

## 2019-09-10 RX ADMIN — OXYCODONE HYDROCHLORIDE 5 MILLIGRAM(S): 5 TABLET ORAL at 05:36

## 2019-09-10 RX ADMIN — OXYCODONE HYDROCHLORIDE 5 MILLIGRAM(S): 5 TABLET ORAL at 11:20

## 2019-09-10 RX ADMIN — OXYCODONE HYDROCHLORIDE 5 MILLIGRAM(S): 5 TABLET ORAL at 01:55

## 2019-09-10 RX ADMIN — HEPARIN SODIUM 5000 UNIT(S): 5000 INJECTION INTRAVENOUS; SUBCUTANEOUS at 21:15

## 2019-09-10 RX ADMIN — AMIODARONE HYDROCHLORIDE 200 MILLIGRAM(S): 400 TABLET ORAL at 05:33

## 2019-09-10 RX ADMIN — LIDOCAINE 1 PATCH: 4 CREAM TOPICAL at 23:00

## 2019-09-10 RX ADMIN — Medication 81 MILLIGRAM(S): at 11:22

## 2019-09-10 RX ADMIN — Medication 100 MILLIGRAM(S): at 14:55

## 2019-09-10 RX ADMIN — Medication 0.25 MILLIGRAM(S): at 22:30

## 2019-09-10 RX ADMIN — OXYCODONE HYDROCHLORIDE 5 MILLIGRAM(S): 5 TABLET ORAL at 19:43

## 2019-09-10 RX ADMIN — LIDOCAINE 1 PATCH: 4 CREAM TOPICAL at 11:22

## 2019-09-10 RX ADMIN — PIPERACILLIN AND TAZOBACTAM 25 GRAM(S): 4; .5 INJECTION, POWDER, LYOPHILIZED, FOR SOLUTION INTRAVENOUS at 14:52

## 2019-09-10 RX ADMIN — OXYCODONE HYDROCHLORIDE 5 MILLIGRAM(S): 5 TABLET ORAL at 10:50

## 2019-09-10 RX ADMIN — Medication 100 MILLIGRAM(S): at 21:15

## 2019-09-10 RX ADMIN — LIDOCAINE 1 PATCH: 4 CREAM TOPICAL at 00:44

## 2019-09-10 RX ADMIN — OXYCODONE HYDROCHLORIDE 5 MILLIGRAM(S): 5 TABLET ORAL at 14:55

## 2019-09-10 RX ADMIN — HEPARIN SODIUM 5000 UNIT(S): 5000 INJECTION INTRAVENOUS; SUBCUTANEOUS at 14:55

## 2019-09-10 RX ADMIN — PIPERACILLIN AND TAZOBACTAM 25 GRAM(S): 4; .5 INJECTION, POWDER, LYOPHILIZED, FOR SOLUTION INTRAVENOUS at 21:15

## 2019-09-10 RX ADMIN — PIPERACILLIN AND TAZOBACTAM 25 GRAM(S): 4; .5 INJECTION, POWDER, LYOPHILIZED, FOR SOLUTION INTRAVENOUS at 05:33

## 2019-09-10 RX ADMIN — Medication 100 MILLIGRAM(S): at 05:33

## 2019-09-10 RX ADMIN — LIDOCAINE 1 PATCH: 4 CREAM TOPICAL at 18:40

## 2019-09-10 RX ADMIN — POTASSIUM BICARBONATE 25 MILLIEQUIVALENT(S): 978 TABLET, EFFERVESCENT ORAL at 08:27

## 2019-09-10 RX ADMIN — OXYCODONE HYDROCHLORIDE 5 MILLIGRAM(S): 5 TABLET ORAL at 06:35

## 2019-09-10 RX ADMIN — HEPARIN SODIUM 5000 UNIT(S): 5000 INJECTION INTRAVENOUS; SUBCUTANEOUS at 05:33

## 2019-09-10 RX ADMIN — OXYCODONE HYDROCHLORIDE 5 MILLIGRAM(S): 5 TABLET ORAL at 20:13

## 2019-09-10 RX ADMIN — PANTOPRAZOLE SODIUM 40 MILLIGRAM(S): 20 TABLET, DELAYED RELEASE ORAL at 11:24

## 2019-09-10 RX ADMIN — POLYETHYLENE GLYCOL 3350 17 GRAM(S): 17 POWDER, FOR SOLUTION ORAL at 11:22

## 2019-09-10 RX ADMIN — OXYCODONE HYDROCHLORIDE 5 MILLIGRAM(S): 5 TABLET ORAL at 15:25

## 2019-09-10 NOTE — PROGRESS NOTE ADULT - SUBJECTIVE AND OBJECTIVE BOX
CC: Patient is a 69y old  Female who presents with a chief complaint of Thoraco abd aneurysm (08 Sep 2019 09:59)    ID following for fever, leukocytosis, PNA    Interval History/ROS: Patient states occasional cough with mucous. Having left sided rib pain extended across chest, worse with coughing. No fevers. no chills.    Rest of ROS negative.    Allergies  No Known Allergies    ANTIMICROBIALS:  piperacillin/tazobactam IVPB.. 3.375 every 8 hours    OTHER MEDS:  acetaminophen  IVPB .. 1000 milliGRAM(s) IV Intermittent once PRN  ALPRAZolam 0.25 milliGRAM(s) Oral every 8 hours PRN  amiodarone    Tablet 200 milliGRAM(s) Oral daily  amiodarone    Tablet   Oral   aspirin  chewable 81 milliGRAM(s) Oral daily  docusate sodium 100 milliGRAM(s) Oral three times a day  heparin  Injectable 5000 Unit(s) SubCutaneous every 8 hours  lidocaine   Patch 1 Patch Transdermal daily  mineral oil 30 milliLiter(s) Oral two times a day PRN  ondansetron Injectable 4 milliGRAM(s) IV Push three times a day PRN  oxyCODONE    IR 5 milliGRAM(s) Oral every 4 hours PRN  pantoprazole   Suspension 40 milliGRAM(s) Oral daily  polyethylene glycol 3350 17 Gram(s) Oral daily    PE:    Vital Signs Last 24 Hrs  T(C): 36.3 (10 Sep 2019 11:29), Max: 36.7 (09 Sep 2019 19:22)  T(F): 97.4 (10 Sep 2019 11:29), Max: 98 (09 Sep 2019 19:22)  HR: 62 (10 Sep 2019 11:29) (57 - 66)  BP: 134/60 (10 Sep 2019 11:29) (110/56 - 138/62)  BP(mean): 86 (10 Sep 2019 03:22) (81 - 91)  RR: 16 (10 Sep 2019 11:29) (16 - 18)  SpO2: 95% (10 Sep 2019 11:29) (95% - 100%)    Gen: AOx3, NAD, non-toxic  CV: S1+S2 normal, no murmurs  Resp: Decreased in left base  Abd: Soft, nontender, +BS  : +[place]  IV/Skin: No thrombophlebitis  Neuro: no focal deficits      LABS:                          8.8    17.1  )-----------( 467      ( 10 Sep 2019 06:41 )             25.4       09-10    129<L>  |  94<L>  |  39<H>  ----------------------------<  129<H>  4.1   |  22  |  2.06<H>    Ca    8.7      10 Sep 2019 09:44  Phos  3.7     09-10  Mg     1.8     09-10    TPro  5.8<L>  /  Alb  2.6<L>  /  TBili  0.5  /  DBili  x   /  AST  20  /  ALT  28  /  AlkPhos  77  09-10    MICROBIOLOGY:  v  .Blood  09-08-19   No growth to date.  --  --    RADIOLOGY:    < from: CT Chest No Cont (09.08.19 @ 18:37) >  IMPRESSION:     Complex moderate leftpleural effusion with associated left lower lobe   atelectasis.      < end of copied text >

## 2019-09-10 NOTE — PROGRESS NOTE ADULT - ASSESSMENT
69 yr old female with H/O Thoraco abdominal Aortic aneurysm, Aortic Stenosis/ regurgitation S/P AVR (T), Ascending & hemiarch replacement 4/9/19  now sp  thoracoabdominal aortic aneurysm.  - DURGA- Non oliguric that is likely ATN-    - Euvolemic   - HTN-   -SP Hypernatremia and now hyponatremia   -Presumed metabolic acidosis      RECOMMENDATION  -Reduce free water intake.  Encourage liquids with calories; Cont Ensure Enlive  -Off Labetalol and the BP has improved    -Continue Amiodarone as ordered  -Renal dosing of meds to creatinine clearance of 25 ml/min   -Cont PT  -The pulse ox is preserved but she is subjectively SOB.  May need thoracentesis?

## 2019-09-10 NOTE — PROGRESS NOTE ADULT - ASSESSMENT
69 year old female s/p ascending thoracoabdominal aortic aneurysm repair and aortic valve replaced with a bioprosthetic valve in April 2019, reoperative repair on 8/29/2019  with course complicated with DURGA and Afib with RVR. 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 She states she has developed a productive cough with whitish sputum, CXR with ? LLL PNA vs atelectasis. ID consulted for rising leukocytosis.  Started on zosyn  CT chest with complex mod left pleural effusion - reviewed  with radiology and suspects PNA present.   Blood cultures so far no growth to date  Recommend:  -Continue zosyn 3.375 grams IV q 8 hours  -Monitor CrCl while on abx  -Monitor for fevers  -Chest PT

## 2019-09-10 NOTE — PROGRESS NOTE ADULT - SUBJECTIVE AND OBJECTIVE BOX
Im upset    VITAL SIGNS    Telemetry:  nsr 60  Vital Signs Last 24 Hrs  T(C): 36.7 (09-10-19 @ 08:11), Max: 36.7 (19 @ 11:46)  T(F): 98 (09-10-19 @ 08:11), Max: 98 (19 @ 11:46)  HR: 64 (09-10-19 @ 08:11) (53 - 66)  BP: 118/57 (09-10-19 @ 08:11) (107/53 - 138/62)  RR: 16 (09-10-19 @ 08:11) (16 - 18)  SpO2: 98% (09-10-19 @ 08:11) (97% - 100%)                       8.8<L>                127<L>| 20<L>| 41<H>        17.1<H> >-----------< 467<H>   ------------------------< 111<H>                 25.4<L>                3.1<L>| 96   | 2.05<H>                                                                     Ca 8.5   Mg 1.8   Ph 3.7      ,             9.1<L>                129<L>| 18<L>| 45<H>        21.2<H> >-----------< 356     ------------------------< 122<H>                 27.2<L>                3.5  | 97   | 2.13<H>                                                                     Ca 7.9<L> Mg 1.9   Ph 4.2              19 @ 07:01  -  09-10-19 @ 07:00  --------------------------------------------------------  IN: 1040 mL / OUT: 2500 mL / NET: -1460 mL        Daily     Daily Weight in k.8 (10 Sep 2019 07:00)        CAPILLARY BLOOD GLUCOSE                    Coumadin    [ ] YES          [x  ]      NO                                   PHYSICAL EXAM  Neurological: A&O x3, 3/5 strength for left leg elevation, 2/5 strength for right leg elevation, sensation to light touch intact bilaterally    Cardiovascular: RRR, normal S1S2, 2/6 systolic murmur, no rubs or gallops, posterolateral thoracotomy dressing c/d/i  Respiratory: Diminished at bases, no wheezes/rhonchi/rales  Gastrointestinal: soft, nontender, nondistended  Genitourinary: [place] in place  Extremities: 1+ pitting edema to mid shin, warm well perfused, boot on right foot  Vascular: Radial pulses 2+ bilaterally, DP pulses 2+ bilaterally              acetaminophen  IVPB .. 1000 milliGRAM(s) IV Intermittent once PRN  ALPRAZolam 0.25 milliGRAM(s) Oral every 8 hours PRN  amiodarone    Tablet 200 milliGRAM(s) Oral daily  amiodarone    Tablet   Oral   aspirin  chewable 81 milliGRAM(s) Oral daily  docusate sodium 100 milliGRAM(s) Oral three times a day  heparin  Injectable 5000 Unit(s) SubCutaneous every 8 hours  lidocaine   Patch 1 Patch Transdermal daily  mineral oil 30 milliLiter(s) Oral two times a day PRN  ondansetron Injectable 4 milliGRAM(s) IV Push three times a day PRN  oxyCODONE    IR 5 milliGRAM(s) Oral every 4 hours PRN  pantoprazole   Suspension 40 milliGRAM(s) Oral daily  piperacillin/tazobactam IVPB.. 3.375 Gram(s) IV Intermittent every 8 hours  polyethylene glycol 3350 17 Gram(s) Oral daily  potassium bicarbonate/potassium citrate 25 milliEquivalent(s) Oral every 1 hour                    Physical Therapy Rec:   Home  [  ]   Home w/ PT  [  ]  Rehab  [  ]  Discussed with Cardiothoracic Team at AM rounds.

## 2019-09-10 NOTE — PROGRESS NOTE ADULT - ASSESSMENT
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 She is now   s/p Reop Thoracoabdominal aneurysm open repair with Dacron graft and celiac SMA bypass, reimplantation of lumbar artery with Dr. Briseno on 8/29. Intraop 2 PRBC, 2 Platelets,Her postoperative course was complicated by new onset BLE weakness R>L for which she was placed on MAPs of 110 and CSF drainage of 20cc. She was extubated POD2 ,and required BIPAP on POD3. She went into afib with RVR, placed on esmolol-weaned off- on POD4 but converted back to NSR without treatment.  9/1 RLE improving, NGT removed, BIPAP, nicardipine restarted briefly for HTN, Cr 2.43  9/2 Lumbar drain removed, Afib RVR converted without intervention, Vaso weaned off today  9/3 S&S eval recommend Dysphagia 1 nectar thick diet, 3L NC, PT recommending acute rehab, Cr improving 1.93 from 2.04  9/4 Afib with RVR, transferred to CTU, metoprolol 2.5 IV x2, amiodarone drip started, vasopressin for hypotension, esmolol drip for rate control, Digoxin IV x1, converted to NSR, Cr 1.76 DURGA improving, targeting MAPs of 70-80, L pleural chest tubes x3 removed, S&S recommend dysphagia 1 thin liquids, bradycardic at night and amio decreased to 0.5 from 1  9/5 Transitioned to Labetalol from esmolol, PO amio taper, A-line/introducer/and muñoz discontinued, transferred to SDU  She is now transferred from the CTU to SDU. She no longer has any critical care needs at this time and is stable enough for transfer to SDU.   9/6 Increase PT/ ambulation. Dys 1 diet, speech/swallow f/u, repeat mbs today. Maintaining nsr  9/7 Tolerating Dys II diet, check urinalysis, for leukocytosis. CXR negative for infiltrate. Creat 1,9 9/8 Zosyn initiated for suspected UTI/LLL pna. ID consulted for assistance in mangement. creatinine improving . -1415 fluid balance, ct chest, BC x 2  9/9 WBC remains elevated, 21, afebrile, pending CT results r/o PNA. Continue zosyn.    D/w PT,  pt with decrease in  balance/ fatigued today, RLE weakness unchanged as per pt.   relative hypotension, irineo, labetolol d/c, maintain mao>85.  9/10 CT with compressive atelectasis,  placed on nocturnal bipap as d/w  Dr Briseno  Wbc trending down  Cont zosyn UTI/R/o pna

## 2019-09-10 NOTE — PROGRESS NOTE ADULT - SUBJECTIVE AND OBJECTIVE BOX
[facility] [phone]        Patient seen and examined in bed.  She is trying to reduce her water intake         MEDICATIONS  (STANDING):  amiodarone    Tablet 200 milliGRAM(s) Oral daily  amiodarone    Tablet   Oral   aspirin  chewable 81 milliGRAM(s) Oral daily  docusate sodium 100 milliGRAM(s) Oral three times a day  heparin  Injectable 5000 Unit(s) SubCutaneous every 8 hours  lidocaine   Patch 1 Patch Transdermal daily  pantoprazole   Suspension 40 milliGRAM(s) Oral daily  piperacillin/tazobactam IVPB.. 3.375 Gram(s) IV Intermittent every 8 hours  polyethylene glycol 3350 17 Gram(s) Oral daily      VITAL:  T(C): , Max: 36.7 (09-09-19 @ 11:46)  T(F): , Max: 98 (09-09-19 @ 11:46)  HR: 64 (09-10-19 @ 08:11)  BP: 118/57 (09-10-19 @ 08:11)  BP(mean): 86 (09-10-19 @ 03:22)  RR: 16 (09-10-19 @ 08:11)  SpO2: 98% (09-10-19 @ 08:11)  Wt(kg): --    I and O's:    09-09 @ 07:01  -  09-10 @ 07:00  --------------------------------------------------------  IN: 1040 mL / OUT: 2500 mL / NET: -1460 mL          PHYSICAL EXAM:    Constitutional: NAD  Neck:  No JVD  Respiratory: reduced at bases  Cardiovascular: S1 and S2  Gastrointestinal: BS+, soft, NT/ND  Extremities: No peripheral edema  Neurological: A/O x 3, right leg weakness   Psychiatric: Normal mood, normal affect  : No [place]  Skin: No rashes  Access: Not applicable    LABS:                        8.8    17.1  )-----------( 467      ( 10 Sep 2019 06:41 )             25.4     09-10    129<L>  |  94<L>  |  39<H>  ----------------------------<  129<H>  4.1   |  22  |  2.06<H>    Ca    8.7      10 Sep 2019 09:44  Phos  3.7     09-10  Mg     1.8     09-10    TPro  5.8<L>  /  Alb  2.6<L>  /  TBili  0.5  /  DBili  x   /  AST  20  /  ALT  28  /  AlkPhos  77  09-10          Urine Studies:          RADIOLOGY & ADDITIONAL STUDIES:          < from: CT Chest No Cont (09.08.19 @ 18:37) >    EXAM:  CT CHEST                            PROCEDURE DATE:  09/08/2019            INTERPRETATION:  CLINICAL INFORMATION: Leukocytosis status post   thoracolumbar abdominal aneurysm with graft and celiac SMA bypass with   reimplantation. Left lower lobe collection.    COMPARISON: Chest radiograph 9/8/2019 and CTA chest 5/29/2019    PROCEDURE:   CT of the Chest was performed without intravenous contrast.  Sagittal and coronal reformats were performed.      FINDINGS:    Limited evaluation of the vessels without intravenous contrast.   Redemonstration of an ascending and descending thoracic aortic aneurysm.   However, the ascending, aortic arch, and proximal descending aorta appear   grossly unchanged compared to 5/29/2019. The descending aortaappears   oblong in shape, which is new compared to 5/29/2019, and is likely   secondary due to decompression. Interval placement of a graft which   extends off of the descending aorta and is anastomosed to a mesenteric   artery. Calcified coronary and aortic atherosclerosis.  A second graft   extends off of the posterior aspect of the distal descending thoracic   aorta. Complex left pleural effusion with compressive atelectasis of the   left lower lobe. Small right pleural effusion.    No endobronchial lesions. There is barium contrast opacifying the colon.   Heart size is normal. No pericardial effusion. Status post aortic valve   replacement. No lymphadenopathy.Status post median sternotomy. Sternotomy   wires are intact. No sternal dehiscence. Left chest wall soft tissue   edema with interval placement of skin staples. No drainable fluid   collection to suggest an abscess. Chronic right rib fracture. Spinal   degenerative changes. Scoliosis.       IMPRESSION:     Complex moderate leftpleural effusion with associated left lower lobe   atelectasis.                MARIELLE CRUZ M.D., RADIOLOGY RESIDENT  This document has been electronically signed.  VISH SALEEM M.D., ATTENDING RADIOLOGIST  This document has been electronically signed. Sep  9 2019 11:20AM                < end of copied text >

## 2019-09-11 LAB
ALBUMIN SERPL ELPH-MCNC: 2.5 G/DL — LOW (ref 3.3–5)
ALP SERPL-CCNC: 77 U/L — SIGNIFICANT CHANGE UP (ref 40–120)
ALT FLD-CCNC: 28 U/L — SIGNIFICANT CHANGE UP (ref 10–45)
ANION GAP SERPL CALC-SCNC: 12 MMOL/L — SIGNIFICANT CHANGE UP (ref 5–17)
AST SERPL-CCNC: 24 U/L — SIGNIFICANT CHANGE UP (ref 10–40)
BILIRUB SERPL-MCNC: 0.5 MG/DL — SIGNIFICANT CHANGE UP (ref 0.2–1.2)
BUN SERPL-MCNC: 35 MG/DL — HIGH (ref 7–23)
CALCIUM SERPL-MCNC: 8.5 MG/DL — SIGNIFICANT CHANGE UP (ref 8.4–10.5)
CHLORIDE SERPL-SCNC: 98 MMOL/L — SIGNIFICANT CHANGE UP (ref 96–108)
CO2 SERPL-SCNC: 20 MMOL/L — LOW (ref 22–31)
CREAT SERPL-MCNC: 1.87 MG/DL — HIGH (ref 0.5–1.3)
GLUCOSE SERPL-MCNC: 114 MG/DL — HIGH (ref 70–99)
HCT VFR BLD CALC: 24.6 % — LOW (ref 34.5–45)
HGB BLD-MCNC: 8.4 G/DL — LOW (ref 11.5–15.5)
MAGNESIUM SERPL-MCNC: 1.7 MG/DL — SIGNIFICANT CHANGE UP (ref 1.6–2.6)
MCHC RBC-ENTMCNC: 30 PG — SIGNIFICANT CHANGE UP (ref 27–34)
MCHC RBC-ENTMCNC: 34 GM/DL — SIGNIFICANT CHANGE UP (ref 32–36)
MCV RBC AUTO: 88.2 FL — SIGNIFICANT CHANGE UP (ref 80–100)
PLATELET # BLD AUTO: 524 K/UL — HIGH (ref 150–400)
POTASSIUM SERPL-MCNC: 3.3 MMOL/L — LOW (ref 3.5–5.3)
POTASSIUM SERPL-SCNC: 3.3 MMOL/L — LOW (ref 3.5–5.3)
PROT SERPL-MCNC: 5.7 G/DL — LOW (ref 6–8.3)
RBC # BLD: 2.79 M/UL — LOW (ref 3.8–5.2)
RBC # FLD: 14.2 % — SIGNIFICANT CHANGE UP (ref 10.3–14.5)
SODIUM SERPL-SCNC: 130 MMOL/L — LOW (ref 135–145)
WBC # BLD: 16.1 K/UL — HIGH (ref 3.8–10.5)
WBC # FLD AUTO: 16.1 K/UL — HIGH (ref 3.8–10.5)

## 2019-09-11 PROCEDURE — 99222 1ST HOSP IP/OBS MODERATE 55: CPT

## 2019-09-11 RX ORDER — POTASSIUM BICARBONATE 978 MG/1
25 TABLET, EFFERVESCENT ORAL EVERY 4 HOURS
Refills: 0 | Status: COMPLETED | OUTPATIENT
Start: 2019-09-11 | End: 2019-09-11

## 2019-09-11 RX ORDER — ALPRAZOLAM 0.25 MG
0.25 TABLET ORAL EVERY 8 HOURS
Refills: 0 | Status: DISCONTINUED | OUTPATIENT
Start: 2019-09-11 | End: 2019-09-18

## 2019-09-11 RX ORDER — POTASSIUM BICARBONATE 978 MG/1
25 TABLET, EFFERVESCENT ORAL
Refills: 0 | Status: COMPLETED | OUTPATIENT
Start: 2019-09-11 | End: 2019-09-11

## 2019-09-11 RX ORDER — OXYCODONE HYDROCHLORIDE 5 MG/1
5 TABLET ORAL EVERY 4 HOURS
Refills: 0 | Status: DISCONTINUED | OUTPATIENT
Start: 2019-09-11 | End: 2019-09-18

## 2019-09-11 RX ADMIN — Medication 100 MILLIGRAM(S): at 14:32

## 2019-09-11 RX ADMIN — AMIODARONE HYDROCHLORIDE 200 MILLIGRAM(S): 400 TABLET ORAL at 05:23

## 2019-09-11 RX ADMIN — PIPERACILLIN AND TAZOBACTAM 25 GRAM(S): 4; .5 INJECTION, POWDER, LYOPHILIZED, FOR SOLUTION INTRAVENOUS at 21:28

## 2019-09-11 RX ADMIN — OXYCODONE HYDROCHLORIDE 5 MILLIGRAM(S): 5 TABLET ORAL at 08:16

## 2019-09-11 RX ADMIN — PIPERACILLIN AND TAZOBACTAM 25 GRAM(S): 4; .5 INJECTION, POWDER, LYOPHILIZED, FOR SOLUTION INTRAVENOUS at 14:32

## 2019-09-11 RX ADMIN — POLYETHYLENE GLYCOL 3350 17 GRAM(S): 17 POWDER, FOR SOLUTION ORAL at 12:23

## 2019-09-11 RX ADMIN — Medication 81 MILLIGRAM(S): at 12:27

## 2019-09-11 RX ADMIN — OXYCODONE HYDROCHLORIDE 5 MILLIGRAM(S): 5 TABLET ORAL at 12:23

## 2019-09-11 RX ADMIN — POTASSIUM BICARBONATE 25 MILLIEQUIVALENT(S): 978 TABLET, EFFERVESCENT ORAL at 12:17

## 2019-09-11 RX ADMIN — OXYCODONE HYDROCHLORIDE 5 MILLIGRAM(S): 5 TABLET ORAL at 20:35

## 2019-09-11 RX ADMIN — OXYCODONE HYDROCHLORIDE 5 MILLIGRAM(S): 5 TABLET ORAL at 08:45

## 2019-09-11 RX ADMIN — OXYCODONE HYDROCHLORIDE 5 MILLIGRAM(S): 5 TABLET ORAL at 17:00

## 2019-09-11 RX ADMIN — Medication 100 MILLIGRAM(S): at 21:28

## 2019-09-11 RX ADMIN — POTASSIUM BICARBONATE 25 MILLIEQUIVALENT(S): 978 TABLET, EFFERVESCENT ORAL at 10:36

## 2019-09-11 RX ADMIN — HEPARIN SODIUM 5000 UNIT(S): 5000 INJECTION INTRAVENOUS; SUBCUTANEOUS at 05:23

## 2019-09-11 RX ADMIN — PIPERACILLIN AND TAZOBACTAM 25 GRAM(S): 4; .5 INJECTION, POWDER, LYOPHILIZED, FOR SOLUTION INTRAVENOUS at 05:22

## 2019-09-11 RX ADMIN — PANTOPRAZOLE SODIUM 40 MILLIGRAM(S): 20 TABLET, DELAYED RELEASE ORAL at 12:23

## 2019-09-11 RX ADMIN — OXYCODONE HYDROCHLORIDE 5 MILLIGRAM(S): 5 TABLET ORAL at 16:29

## 2019-09-11 RX ADMIN — OXYCODONE HYDROCHLORIDE 5 MILLIGRAM(S): 5 TABLET ORAL at 21:05

## 2019-09-11 RX ADMIN — HEPARIN SODIUM 5000 UNIT(S): 5000 INJECTION INTRAVENOUS; SUBCUTANEOUS at 14:32

## 2019-09-11 RX ADMIN — LIDOCAINE 1 PATCH: 4 CREAM TOPICAL at 19:13

## 2019-09-11 RX ADMIN — OXYCODONE HYDROCHLORIDE 5 MILLIGRAM(S): 5 TABLET ORAL at 12:40

## 2019-09-11 RX ADMIN — OXYCODONE HYDROCHLORIDE 5 MILLIGRAM(S): 5 TABLET ORAL at 02:18

## 2019-09-11 RX ADMIN — Medication 0.25 MILLIGRAM(S): at 22:32

## 2019-09-11 RX ADMIN — Medication 100 MILLIGRAM(S): at 05:23

## 2019-09-11 RX ADMIN — HEPARIN SODIUM 5000 UNIT(S): 5000 INJECTION INTRAVENOUS; SUBCUTANEOUS at 21:28

## 2019-09-11 RX ADMIN — OXYCODONE HYDROCHLORIDE 5 MILLIGRAM(S): 5 TABLET ORAL at 02:48

## 2019-09-11 NOTE — PROGRESS NOTE ADULT - SUBJECTIVE AND OBJECTIVE BOX
Afebrile  Overall feels  better  RLE  weakness persisitent  PT  had  her  stand  Creatinine  coming  down WBC  coming  down   Ask  dr Briseno  regarding  tapping  the  left  pleural  effusion  I  suspect there  is  infiltrate   Speech and  swallow  needd to  revaluate  patient  would  eat better if  it is  not  Dyspagia  diet

## 2019-09-11 NOTE — CONSULT NOTE ADULT - ASSESSMENT
69F with a history of HTN, AAA, bioprosthetic AVR, who was initially admitted on Aug 14. She had repair of her thoracoabdominal aneursym on 8/29 and had a lumbar drain placed. Since her surgery, she feels that she has had a lot of pain in her lower back as well as tingling sensation to her lower extremities and some weakness of her lower extremities bilaterally, worse on the right side, that it is making it harder for her to walk. She denies urinary or bowel incontinence.     On exam, patient has weakness of her RLE, brisk reflexes bilaterally, and upgoing plantar reflexes.     MRI T/L spine showed mild lumbar spinal stenosis and severe narrowing at L4-5, moderate narrowing at L5-S1.    Impression: likely peripheral nerve compression possibly precipitated by positioning during recent surgery     Recommendations:  Orthotic splinting  PT  Follow-up outpatient with neurology at [address] [place] [address] (call [phone] for appointment) for EMG and NCS  No further neurological work-up inpatient 69F with a history of HTN, AAA, bioprosthetic AVR, who was initially admitted on Aug 14. She had repair of her thoracoabdominal aneursym on 8/29 and had a lumbar drain placed. Since her surgery, she feels that she has had a lot of pain in her lower back as well as tingling sensation to her lower extremities and some weakness of her lower extremities bilaterally, worse on the right side, that it is making it harder for her to walk. She denies urinary or bowel incontinence.     On exam, patient has weakness of her RLE, brisk reflexes bilaterally, and upgoing plantar reflexes.     MRI T/L spine showed mild lumbar spinal stenosis and severe narrowing at L4-5, moderate narrowing at L5-S1.

## 2019-09-11 NOTE — PROGRESS NOTE ADULT - ASSESSMENT
69 yr old female with H/O Thoraco abdominal Aortic aneurysm, Aortic Stenosis/ regurgitation S/P AVR (T), Ascending & hemiarch replacement 4/9/19  now sp  thoracoabdominal aortic aneurysm.  - DURGA- Non oliguric that is likely ATN-    - Euvolemic   - HTN-   -Hyponatremia but improving   -Hypokalemia      RECOMMENDATION  -Reduce free water intake.  Encourage liquids with calories; Cont Ensure Enlive  -Off Labetalol and the BP has improved    -Continue Amiodarone as ordered  -[place]  25meq x 2   -Cont PT  -The pulse ox is preserved but she is subjectively SOB.  May need thoracentesis?

## 2019-09-11 NOTE — PROGRESS NOTE ADULT - SUBJECTIVE AND OBJECTIVE BOX
NEPHROLOGY-[facility] [phone]        Patient seen and examined in bed.  She was in good spirits  and was working with PT this am   Frida is still in   She is curtailing her intake of liquids         MEDICATIONS  (STANDING):  amiodarone    Tablet 200 milliGRAM(s) Oral daily  amiodarone    Tablet   Oral   aspirin  chewable 81 milliGRAM(s) Oral daily  docusate sodium 100 milliGRAM(s) Oral three times a day  heparin  Injectable 5000 Unit(s) SubCutaneous every 8 hours  lidocaine   Patch 1 Patch Transdermal daily  pantoprazole   Suspension 40 milliGRAM(s) Oral daily  piperacillin/tazobactam IVPB.. 3.375 Gram(s) IV Intermittent every 8 hours  polyethylene glycol 3350 17 Gram(s) Oral daily      VITAL:  T(C): , Max: 36.8 (09-10-19 @ 19:25)  T(F): , Max: 98.3 (09-10-19 @ 19:25)  HR: 71 (09-11-19 @ 07:45)  BP: 125/60 (09-11-19 @ 07:45)  BP(mean): 87 (09-11-19 @ 07:45)  RR: 16 (09-11-19 @ 03:40)  SpO2: 97% (09-11-19 @ 07:45)  Wt(kg): --    I and O's:    09-10 @ 07:01  -  09-11 @ 07:00  --------------------------------------------------------  IN: 1540 mL / OUT: 2825 mL / NET: -1285 mL    09-11 @ 07:01  -  09-11 @ 09:16  --------------------------------------------------------  IN: 0 mL / OUT: 200 mL / NET: -200 mL          PHYSICAL EXAM:    Constitutional: NAD  Neck:  No JVD  Respiratory: CTAB/L  Cardiovascular: S1 and S2  Gastrointestinal: BS+, soft, NT/ND  Extremities: No peripheral edema  Neurological: A/O x 3,   Psychiatric: Normal mood, normal affect  : +  [place]  Skin: No rashes  Access: Not applicable    LABS:                        8.4    16.1  )-----------( 524      ( 11 Sep 2019 05:45 )             24.6     09-11    130<L>  |  98  |  35<H>  ----------------------------<  114<H>  3.3<L>   |  20<L>  |  1.87<H>    Ca    8.5      11 Sep 2019 05:45  Phos  3.7     09-10  Mg     1.7     09-11    TPro  5.7<L>  /  Alb  2.5<L>  /  TBili  0.5  /  DBili  x   /  AST  24  /  ALT  28  /  AlkPhos  77  09-11          Urine Studies:          RADIOLOGY & ADDITIONAL STUDIES:          < from: Xray Chest 1 View- PORTABLE-Urgent (09.10.19 @ 09:17) >    EXAM:  XR CHEST PORTABLE URGENT 1V                            PROCEDURE DATE:  09/10/2019            INTERPRETATION:  CLINICAL INDICATION: Left lower lobe atelectasis.    EXAM: Frontal view of the chest with comparison made to chest radiograph   on9/8/2019    FINDINGS:   Status post aortic valve replacement, repair of the descending aorta, and   median sternotomy. Skin staples overlie the left chest wall. Slight   interval decrease of a left pleural effusion. Barium partially opacifies   the bowel. Right lung is clear.      IMPRESSION:   Slight interval decrease of the loculated left pleural effusion.                 MARIELLE CRUZ M.D., RADIOLOGY RESIDENT  This document has been electronically signed.  TANYA TA M.D., ATTENDING RADIOLOGIST  This document has been electronically signed. Sep 10 2019  3:29PM                < end of copied text >

## 2019-09-11 NOTE — PROGRESS NOTE ADULT - SUBJECTIVE AND OBJECTIVE BOX
VITAL SIGNS    Telemetry:    Vital Signs Last 24 Hrs  T(C): 36.6 (19 @ 12:51), Max: 36.8 (09-10-19 @ 19:25)  T(F): 97.9 (19 @ 12:51), Max: 98.3 (09-10-19 @ 19:25)  HR: 64 (19 @ 12:51) (62 - 72)  BP: 124/59 (19 @ 12:51) (118/58 - 152/68)  RR: 18 (19 @ 12:51) (16 - 18)  SpO2: 99% (19 @ 12:51) (97% - 100%)                       8.4<L>                130<L>| 20<L>| 35<H>        16.1<H> >-----------< 524<H>   ------------------------< 114<H>                 24.6<L>                3.3<L>| 98   | 1.87<H>                                                                     Ca 8.5   Mg 1.7   Ph x        ,             x                    129<L>| 22   | 39<H>        x     >-----------< x       ------------------------< 129<H>                 x                    4.1  | 94<L>| 2.06<H>                                                                     Ca 8.7   Mg x     Ph x                09-10-19 @ 07:  -  19 @ 07:00  --------------------------------------------------------  IN: 1540 mL / OUT: 2825 mL / NET: -1285 mL    19 @ 07:01  -  19 @ 13:58  --------------------------------------------------------  IN: 340 mL / OUT: 525 mL / NET: -185 mL        Daily     Daily Weight in k.4 (11 Sep 2019 06:55)        CAPILLARY BLOOD GLUCOSE                    Coumadin    [ ] YES          [  ]      NO                                   PHYSICAL EXAM  Neurological: A&O x3, 3/5 strength for left leg elevation, 2/5 strength for right leg elevation, sensation to light touch intact bilaterally    Cardiovascular: RRR, normal S1S2, 2/6 systolic murmur, no rubs or gallops, posterolateral thoracotomy dressing c/d/i  Respiratory: Diminished at bases, no wheezes/rhonchi/rales  Gastrointestinal: soft, nontender, nondistended  Genitourinary: [place] in place  Extremities: 1+ pitting edema to mid shin, warm well perfused, boot on right foot  Vascular: Radial pulses 2+ bilaterally, DP pulses 2+ bilaterally            acetaminophen  IVPB .. 1000 milliGRAM(s) IV Intermittent once PRN  amiodarone    Tablet 200 milliGRAM(s) Oral daily  amiodarone    Tablet   Oral   aspirin  chewable 81 milliGRAM(s) Oral daily  docusate sodium 100 milliGRAM(s) Oral three times a day  heparin  Injectable 5000 Unit(s) SubCutaneous every 8 hours  lidocaine   Patch 1 Patch Transdermal daily  mineral oil 30 milliLiter(s) Oral two times a day PRN  ondansetron Injectable 4 milliGRAM(s) IV Push three times a day PRN  oxyCODONE    IR 5 milliGRAM(s) Oral every 4 hours PRN  pantoprazole   Suspension 40 milliGRAM(s) Oral daily  piperacillin/tazobactam IVPB.. 3.375 Gram(s) IV Intermittent every 8 hours  polyethylene glycol 3350 17 Gram(s) Oral daily  potassium bicarbonate/potassium citrate 25 milliEquivalent(s) Oral every 4 hours                    Physical Therapy Rec:   Home  [  ]   Home w/ PT  [  ]  Rehab  [  ]  Discussed with Cardiothoracic Team at AM rounds.

## 2019-09-11 NOTE — PROGRESS NOTE ADULT - SUBJECTIVE AND OBJECTIVE BOX
Vascular Surgery Progress Note     Subjective/24hour Events:   Patient seen and examined.   Episode of vomiting yesterday while coughing.   Otherwise tolerating diet, minimal N.   Worked with PT.     Vital Signs:  Vital Signs Last 24 Hrs  T(C): 36.4 (11 Sep 2019 03:40), Max: 36.8 (10 Sep 2019 19:25)  T(F): 97.5 (11 Sep 2019 03:40), Max: 98.3 (10 Sep 2019 19:25)  HR: 68 (11 Sep 2019 06:07) (61 - 72)  BP: 139/65 (11 Sep 2019 03:40) (118/57 - 152/68)  BP(mean): 94 (11 Sep 2019 03:40) (84 - 98)  RR: 16 (11 Sep 2019 03:40) (16 - 18)  SpO2: 98% (11 Sep 2019 06:07) (95% - 100%)    CAPILLARY BLOOD GLUCOSE          I&O's Detail    10 Sep 2019 07:01  -  11 Sep 2019 07:00  --------------------------------------------------------  IN:    IV PiggyBack: 300 mL    Oral Fluid: 1240 mL  Total IN: 1540 mL    OUT:    Indwelling Catheter - Urethral: 2825 mL  Total OUT: 2825 mL    Total NET: -1285 mL          MEDICATIONS  (STANDING):  amiodarone    Tablet 200 milliGRAM(s) Oral daily  amiodarone    Tablet   Oral   aspirin  chewable 81 milliGRAM(s) Oral daily  docusate sodium 100 milliGRAM(s) Oral three times a day  heparin  Injectable 5000 Unit(s) SubCutaneous every 8 hours  lidocaine   Patch 1 Patch Transdermal daily  pantoprazole   Suspension 40 milliGRAM(s) Oral daily  piperacillin/tazobactam IVPB.. 3.375 Gram(s) IV Intermittent every 8 hours  polyethylene glycol 3350 17 Gram(s) Oral daily    MEDICATIONS  (PRN):  acetaminophen  IVPB .. 1000 milliGRAM(s) IV Intermittent once PRN Moderate Pain (4 - 6)  mineral oil 30 milliLiter(s) Oral two times a day PRN dry mouth  ondansetron Injectable 4 milliGRAM(s) IV Push three times a day PRN Nausea and/or Vomiting  oxyCODONE    IR 5 milliGRAM(s) Oral every 4 hours PRN Severe Pain (7 - 10)      Physical Exam:  Gen: NAD.  Lungs: Non labored breathing.   Ab: Soft, nontender, nondistended. L incision dressing clean/dry/intact.   Ext: Continued RLE weakness, improving.   : Clear urine in muñoz.     Labs:    09-11    130<L>  |  98  |  35<H>  ----------------------------<  114<H>  3.3<L>   |  20<L>  |  1.87<H>    Ca    8.5      11 Sep 2019 05:45  Phos  3.7     09-10  Mg     1.7     09-11    TPro  5.7<L>  /  Alb  2.5<L>  /  TBili  0.5  /  DBili  x   /  AST  24  /  ALT  28  /  AlkPhos  77  09-11    LIVER FUNCTIONS - ( 11 Sep 2019 05:45 )  Alb: 2.5 g/dL / Pro: 5.7 g/dL / ALK PHOS: 77 U/L / ALT: 28 U/L / AST: 24 U/L / GGT: x                                 8.4    16.1  )-----------( 524      ( 11 Sep 2019 05:45 )             24.6

## 2019-09-11 NOTE — PROGRESS NOTE ADULT - ASSESSMENT
Flory is a very pleasant 69 Year-Old Lady s/p 8/29 ThoracoAbdominal Aneurysm Repair / Ceiliac-spinal-SMA bypass.    - WBC downtrending.   - Care per Primary Team appreciated.     Vascular Surgery Pager #[phone]

## 2019-09-11 NOTE — PROGRESS NOTE ADULT - ASSESSMENT
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 She is now   s/p Reop Thoracoabdominal aneurysm open repair with Dacron graft and celiac SMA bypass, reimplantation of lumbar artery with Dr. Briseno on 8/29. Intraop 2 PRBC, 2 Platelets,Her postoperative course was complicated by new onset BLE weakness R>L for which she was placed on MAPs of 110 and CSF drainage of 20cc. She was extubated POD2 ,and required BIPAP on POD3. She went into afib with RVR, placed on esmolol-weaned off- on POD4 but converted back to NSR without treatment.  9/1 RLE improving, NGT removed, BIPAP, nicardipine restarted briefly for HTN, Cr 2.43  9/2 Lumbar drain removed, Afib RVR converted without intervention, Vaso weaned off today  9/3 S&S eval recommend Dysphagia 1 nectar thick diet, 3L NC, PT recommending acute rehab, Cr improving 1.93 from 2.04  9/4 Afib with RVR, transferred to CTU, metoprolol 2.5 IV x2, amiodarone drip started, vasopressin for hypotension, esmolol drip for rate control, Digoxin IV x1, converted to NSR, Cr 1.76 DURGA improving, targeting MAPs of 70-80, L pleural chest tubes x3 removed, S&S recommend dysphagia 1 thin liquids, bradycardic at night and amio decreased to 0.5 from 1  9/5 Transitioned to Labetalol from esmolol, PO amio taper, A-line/introducer/and muñoz discontinued, transferred to SDU  She is now transferred from the CTU to SDU. She no longer has any critical care needs at this time and is stable enough for transfer to SDU.   9/6 Increase PT/ ambulation. Dys 1 diet, speech/swallow f/u, repeat mbs today. Maintaining nsr  9/7 Tolerating Dys II diet, check urinalysis, for leukocytosis. CXR negative for infiltrate. Creat 1,9   9/8 Zosyn initiated for suspected UTI/LLL pna. ID consulted for assistance in mangement. creatinine improving . -1415 fluid balance, ct chest, BC x 2  9/9 WBC remains elevated, 21, afebrile, pending CT results r/o PNA. Continue zosyn.    D/w PT,  pt with decrease in  balance/ fatigued today, RLE weakness unchanged as per pt.   relative hypotension, irineo, labetolol d/c, maintain mao>85.  9/10 CT with compressive atelectasis,  placed on nocturnal bipap as d/w  Dr Briseno  Wbc trending down  Cont zosyn UTI/R/o pna  9/11 Zosyn for pna x 7 days  PT  Neuro consulted for f/u  F/u s/s to advance diet

## 2019-09-11 NOTE — CONSULT NOTE ADULT - SUBJECTIVE AND OBJECTIVE BOX
HPI:    Patient is a 69F with a history of HTN, AAA, bioprosthetic AVR, who was initially admitted on Aug 14. She had repair of her thoracoabdominal aneursym on 8/29 and had a lumbar drain placed. Since her surgery, she feels that she has had a lot of pain in her lower back as well as tingling sensation to her lower extremities and some weakness of her lower extremities bilaterally, worse on the right side, that it is making it harder for her to walk. She denies urinary or bowel incontinence.       MEDICATIONS  (STANDING):  amiodarone    Tablet 200 milliGRAM(s) Oral daily  amiodarone    Tablet   Oral   aspirin  chewable 81 milliGRAM(s) Oral daily  docusate sodium 100 milliGRAM(s) Oral three times a day  heparin  Injectable 5000 Unit(s) SubCutaneous every 8 hours  lidocaine   Patch 1 Patch Transdermal daily  pantoprazole   Suspension 40 milliGRAM(s) Oral daily  piperacillin/tazobactam IVPB.. 3.375 Gram(s) IV Intermittent every 8 hours  polyethylene glycol 3350 17 Gram(s) Oral daily  potassium bicarbonate/potassium citrate 25 milliEquivalent(s) Oral every 4 hours  potassium bicarbonate/potassium citrate 25 milliEquivalent(s) Oral every 1 hour    MEDICATIONS  (PRN):  acetaminophen  IVPB .. 1000 milliGRAM(s) IV Intermittent once PRN Moderate Pain (4 - 6)  mineral oil 30 milliLiter(s) Oral two times a day PRN dry mouth  ondansetron Injectable 4 milliGRAM(s) IV Push three times a day PRN Nausea and/or Vomiting  oxyCODONE    IR 5 milliGRAM(s) Oral every 4 hours PRN Severe Pain (7 - 10)    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: s/p repair  Asceding aortic aneurysm repair 4/8/2019  S/P cataract surgery  Arm fracture, left: 2005    FAMILY HISTORY:  Family history of skin cancer: mother  Family history of coronary artery bypass graft (Father): with valve disease    Allergies    No Known Allergies    Intolerances        SHx - No smoking, No ETOH, No drug abuse      Review of Systems:  CONSTITUTIONAL:   HEENT:  No visual loss, blurred vision, double vision.  No hearing loss, sneezing, congestion, runny nose or sore throat.  SKIN:  No rash or itching.  CARDIOVASCULAR:  No chest pain, chest pressure or chest discomfort. No palpitations or edema.  RESPIRATORY:  No shortness of breath, cough or sputum.  GASTROINTESTINAL:  No anorexia, nausea, vomiting or diarrhea. No abdominal pain.  GENITOURINARY:  NO dysuria. No increased frequency. No retention. No incontinence.  NEUROLOGICAL:  See HPI  MUSCULOSKELETAL:  No muscle, back pain, joint pain or stiffness.  HEMATOLOGIC:  No anemia, bleeding or bruising.  PSYCHIATRIC:    ENDOCRINOLOGIC:  No reports of sweating, cold or heat intolerance. No polyuria or polydipsia.        Vital Signs Last 24 Hrs  T(C): 36.7 (11 Sep 2019 07:45), Max: 36.8 (10 Sep 2019 19:25)  T(F): 98 (11 Sep 2019 07:45), Max: 98.3 (10 Sep 2019 19:25)  HR: 71 (11 Sep 2019 07:45) (62 - 72)  BP: 125/60 (11 Sep 2019 07:45) (118/58 - 152/68)  BP(mean): 87 (11 Sep 2019 07:45) (84 - 98)  RR: 16 (11 Sep 2019 03:40) (16 - 18)  SpO2: 97% (11 Sep 2019 07:45) (97% - 100%)    General Exam:   General appearance: No acute distress    Neurological Exam:  Mental Status: Orientated to self, date and place.  Attention intact.  No dysarthria. Speech fluent.  Cranial Nerves:   Pupil 3 mm and reactive on the R, surgical pupil L, EOMI, VFF. Slight L esotropia.  CN V1-3 intact to light touch .  No facial asymmetry.   Motor:   Tone: normal.                  Strength:     [] Upper extremity                      Delt       Bicep    Tricep                                            R         5/5        5/5        5/5       5/5                                         L          5/5        5/5        5/5       5/5  [] Lower extremity                       HF          KE          KF        DF         PF                                                                                 L         5/5        5/5       5/5       5/5        5/5  R hip abduction 4/5; unable to flex/ext R hip or knee; wiggles R toes; cannot move R ankle    Pronator drift: none                 Dysmetria: None to finger-nose-finger    Sensation: intact to light touch, vibration sense, pinprick; proprioception impaired both lower extremities    Deep Tendon Reflexes:     Biceps          Triceps      BR        Patellar             Babinski                                 R      3+                   3+          3+            3+           upgoing                                 L        3+                  3+           3+            3+            upgoing  +cross adductor reflex        09-11    130<L>  |  98  |  35<H>  ----------------------------<  114<H>  3.3<L>   |  20<L>  |  1.87<H>    Ca    8.5      11 Sep 2019 05:45  Phos  3.7     09-10  Mg     1.7     09-11    TPro  5.7<L>  /  Alb  2.5<L>  /  TBili  0.5  /  DBili  x   /  AST  24  /  ALT  28  /  AlkPhos  77  09-11                            8.4    16.1  )-----------( 524      ( 11 Sep 2019 05:45 )             24.6       Radiology    < from: MR Thoracic Spine No Cont (08.30.19 @ 13:13) >    THORACIC SPINE:     No spinal cord compression or abnormal intrinsic cord signal. No evidence   for spinal cord infarction seen at this time.    No evidence for epidural hematoma.    LUMBAR SPINE:    No evidence for epidural hematoma.    Multilevel degenerative changes. Mild multilevel spinal canal stenosis.    Severe left neural foraminal narrowing at L4-L5. Moderate left neural   foraminal narrowing at L5-S1.    < end of copied text > HPI:    Patient is a 69F with a history of HTN, AAA, bioprosthetic AVR, who was initially admitted on Aug 14. She had repair of her thoracoabdominal aneursym on 8/29 and had a lumbar drain placed. Since her surgery, she feels that she has had a lot of pain in her lower back as well as tingling sensation to her lower extremities and some weakness of her lower extremities bilaterally, worse on the right side, that it is making it harder for her to walk. She denies urinary or bowel incontinence.       MEDICATIONS  (STANDING):  amiodarone    Tablet 200 milliGRAM(s) Oral daily  amiodarone    Tablet   Oral   aspirin  chewable 81 milliGRAM(s) Oral daily  docusate sodium 100 milliGRAM(s) Oral three times a day  heparin  Injectable 5000 Unit(s) SubCutaneous every 8 hours  lidocaine   Patch 1 Patch Transdermal daily  pantoprazole   Suspension 40 milliGRAM(s) Oral daily  piperacillin/tazobactam IVPB.. 3.375 Gram(s) IV Intermittent every 8 hours  polyethylene glycol 3350 17 Gram(s) Oral daily  potassium bicarbonate/potassium citrate 25 milliEquivalent(s) Oral every 4 hours  potassium bicarbonate/potassium citrate 25 milliEquivalent(s) Oral every 1 hour    MEDICATIONS  (PRN):  acetaminophen  IVPB .. 1000 milliGRAM(s) IV Intermittent once PRN Moderate Pain (4 - 6)  mineral oil 30 milliLiter(s) Oral two times a day PRN dry mouth  ondansetron Injectable 4 milliGRAM(s) IV Push three times a day PRN Nausea and/or Vomiting  oxyCODONE    IR 5 milliGRAM(s) Oral every 4 hours PRN Severe Pain (7 - 10)    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: s/p repair  Asceding aortic aneurysm repair 4/8/2019  S/P cataract surgery  Arm fracture, left: 2005    FAMILY HISTORY:  Family history of skin cancer: mother  Family history of coronary artery bypass graft (Father): with valve disease    Allergies    No Known Allergies    Intolerances        SHx - No smoking, No ETOH, No drug abuse      Review of Systems:  CONSTITUTIONAL:   HEENT:  No visual loss, blurred vision, double vision.  No hearing loss, sneezing, congestion, runny nose or sore throat.  SKIN:  No rash or itching.  CARDIOVASCULAR:  No chest pain, chest pressure or chest discomfort. No palpitations or edema.  RESPIRATORY:  No shortness of breath, cough or sputum.  GASTROINTESTINAL:  No anorexia, nausea, vomiting or diarrhea. No abdominal pain.  GENITOURINARY:  NO dysuria. No increased frequency. No retention. No incontinence.  NEUROLOGICAL:  See HPI  MUSCULOSKELETAL:  No muscle, back pain, joint pain or stiffness.  HEMATOLOGIC:  No anemia, bleeding or bruising.  PSYCHIATRIC:    ENDOCRINOLOGIC:  No reports of sweating, cold or heat intolerance. No polyuria or polydipsia.        Vital Signs Last 24 Hrs  T(C): 36.7 (11 Sep 2019 07:45), Max: 36.8 (10 Sep 2019 19:25)  T(F): 98 (11 Sep 2019 07:45), Max: 98.3 (10 Sep 2019 19:25)  HR: 71 (11 Sep 2019 07:45) (62 - 72)  BP: 125/60 (11 Sep 2019 07:45) (118/58 - 152/68)  BP(mean): 87 (11 Sep 2019 07:45) (84 - 98)  RR: 16 (11 Sep 2019 03:40) (16 - 18)  SpO2: 97% (11 Sep 2019 07:45) (97% - 100%)    General Exam:   General appearance: No acute distress    Neurological Exam:  Mental Status: Orientated to self, date and place.  Attention intact.  No dysarthria. Speech fluent.  Cranial Nerves:   Pupil 3 mm and reactive on the R, surgical pupil L, EOMI, VFF. Slight L esotropia.  CN V1-3 intact to light touch .  No facial asymmetry.   Motor:   Tone: normal.                  Strength:     [] Upper extremity                      Delt       Bicep    Tricep                                            R         5/5        5/5        5/5       5/5                                         L          5/5        5/5        5/5       5/5  [] Lower extremity                       HF          KE          KF        DF         PF                                                                                 L         5/5        5/5       5/5       5/5        5/5  R hip abduction  unable to flex/ext R hip or knee; wiggles R toes; cannot move R ankle    Pronator drift: none                 Dysmetria: None to finger-nose-finger    Sensation: intact to light touch, vibration sense, pinprick; proprioception impaired both lower extremities    Deep Tendon Reflexes:     Biceps          Triceps      BR        Patellar             Babinski                                 R      3+                   3+          3+            3+           upgoing                                 L        3+                  3+           3+            3+            upgoing  +cross adductor reflex        09-11    130<L>  |  98  |  35<H>  ----------------------------<  114<H>  3.3<L>   |  20<L>  |  1.87<H>    Ca    8.5      11 Sep 2019 05:45  Phos  3.7     09-10  Mg     1.7     09-11    TPro  5.7<L>  /  Alb  2.5<L>  /  TBili  0.5  /  DBili  x   /  AST  24  /  ALT  28  /  AlkPhos  77  09-11                            8.4    16.1  )-----------( 524      ( 11 Sep 2019 05:45 )             24.6       Radiology    < from: MR Thoracic Spine No Cont (08.30.19 @ 13:13) >    THORACIC SPINE:     No spinal cord compression or abnormal intrinsic cord signal. No evidence   for spinal cord infarction seen at this time.    No evidence for epidural hematoma.    LUMBAR SPINE:    No evidence for epidural hematoma.    Multilevel degenerative changes. Mild multilevel spinal canal stenosis.    Severe left neural foraminal narrowing at L4-L5. Moderate left neural   foraminal narrowing at L5-S1.    < end of copied text >

## 2019-09-12 LAB
ALBUMIN SERPL ELPH-MCNC: 2.6 G/DL — LOW (ref 3.3–5)
ALP SERPL-CCNC: 82 U/L — SIGNIFICANT CHANGE UP (ref 40–120)
ALT FLD-CCNC: 37 U/L — SIGNIFICANT CHANGE UP (ref 10–45)
ANION GAP SERPL CALC-SCNC: 13 MMOL/L — SIGNIFICANT CHANGE UP (ref 5–17)
AST SERPL-CCNC: 36 U/L — SIGNIFICANT CHANGE UP (ref 10–40)
BILIRUB SERPL-MCNC: 0.6 MG/DL — SIGNIFICANT CHANGE UP (ref 0.2–1.2)
BUN SERPL-MCNC: 28 MG/DL — HIGH (ref 7–23)
CALCIUM SERPL-MCNC: 8.5 MG/DL — SIGNIFICANT CHANGE UP (ref 8.4–10.5)
CHLORIDE SERPL-SCNC: 97 MMOL/L — SIGNIFICANT CHANGE UP (ref 96–108)
CO2 SERPL-SCNC: 20 MMOL/L — LOW (ref 22–31)
CREAT SERPL-MCNC: 1.67 MG/DL — HIGH (ref 0.5–1.3)
GLUCOSE SERPL-MCNC: 112 MG/DL — HIGH (ref 70–99)
HCT VFR BLD CALC: 25.7 % — LOW (ref 34.5–45)
HGB BLD-MCNC: 8.8 G/DL — LOW (ref 11.5–15.5)
MAGNESIUM SERPL-MCNC: 1.7 MG/DL — SIGNIFICANT CHANGE UP (ref 1.6–2.6)
MCHC RBC-ENTMCNC: 30.1 PG — SIGNIFICANT CHANGE UP (ref 27–34)
MCHC RBC-ENTMCNC: 34.1 GM/DL — SIGNIFICANT CHANGE UP (ref 32–36)
MCV RBC AUTO: 88.4 FL — SIGNIFICANT CHANGE UP (ref 80–100)
PLATELET # BLD AUTO: 591 K/UL — HIGH (ref 150–400)
POTASSIUM SERPL-MCNC: 3.8 MMOL/L — SIGNIFICANT CHANGE UP (ref 3.5–5.3)
POTASSIUM SERPL-SCNC: 3.8 MMOL/L — SIGNIFICANT CHANGE UP (ref 3.5–5.3)
PROT SERPL-MCNC: 6.1 G/DL — SIGNIFICANT CHANGE UP (ref 6–8.3)
RBC # BLD: 2.91 M/UL — LOW (ref 3.8–5.2)
RBC # FLD: 14.2 % — SIGNIFICANT CHANGE UP (ref 10.3–14.5)
SODIUM SERPL-SCNC: 130 MMOL/L — LOW (ref 135–145)
WBC # BLD: 16 K/UL — HIGH (ref 3.8–10.5)
WBC # FLD AUTO: 16 K/UL — HIGH (ref 3.8–10.5)

## 2019-09-12 PROCEDURE — 99232 SBSQ HOSP IP/OBS MODERATE 35: CPT

## 2019-09-12 PROCEDURE — 74176 CT ABD & PELVIS W/O CONTRAST: CPT | Mod: 26

## 2019-09-12 PROCEDURE — 74230 X-RAY XM SWLNG FUNCJ C+: CPT | Mod: 26

## 2019-09-12 RX ORDER — LABETALOL HCL 100 MG
100 TABLET ORAL THREE TIMES A DAY
Refills: 0 | Status: DISCONTINUED | OUTPATIENT
Start: 2019-09-12 | End: 2019-09-13

## 2019-09-12 RX ORDER — POTASSIUM BICARBONATE 978 MG/1
25 TABLET, EFFERVESCENT ORAL
Refills: 0 | Status: COMPLETED | OUTPATIENT
Start: 2019-09-12 | End: 2019-09-12

## 2019-09-12 RX ADMIN — OXYCODONE HYDROCHLORIDE 5 MILLIGRAM(S): 5 TABLET ORAL at 18:03

## 2019-09-12 RX ADMIN — HEPARIN SODIUM 5000 UNIT(S): 5000 INJECTION INTRAVENOUS; SUBCUTANEOUS at 14:01

## 2019-09-12 RX ADMIN — PIPERACILLIN AND TAZOBACTAM 25 GRAM(S): 4; .5 INJECTION, POWDER, LYOPHILIZED, FOR SOLUTION INTRAVENOUS at 05:40

## 2019-09-12 RX ADMIN — PIPERACILLIN AND TAZOBACTAM 25 GRAM(S): 4; .5 INJECTION, POWDER, LYOPHILIZED, FOR SOLUTION INTRAVENOUS at 22:10

## 2019-09-12 RX ADMIN — Medication 100 MILLIGRAM(S): at 22:13

## 2019-09-12 RX ADMIN — OXYCODONE HYDROCHLORIDE 5 MILLIGRAM(S): 5 TABLET ORAL at 13:05

## 2019-09-12 RX ADMIN — OXYCODONE HYDROCHLORIDE 5 MILLIGRAM(S): 5 TABLET ORAL at 12:35

## 2019-09-12 RX ADMIN — OXYCODONE HYDROCHLORIDE 5 MILLIGRAM(S): 5 TABLET ORAL at 08:20

## 2019-09-12 RX ADMIN — OXYCODONE HYDROCHLORIDE 5 MILLIGRAM(S): 5 TABLET ORAL at 03:39

## 2019-09-12 RX ADMIN — LIDOCAINE 1 PATCH: 4 CREAM TOPICAL at 07:45

## 2019-09-12 RX ADMIN — HEPARIN SODIUM 5000 UNIT(S): 5000 INJECTION INTRAVENOUS; SUBCUTANEOUS at 05:41

## 2019-09-12 RX ADMIN — PIPERACILLIN AND TAZOBACTAM 25 GRAM(S): 4; .5 INJECTION, POWDER, LYOPHILIZED, FOR SOLUTION INTRAVENOUS at 14:01

## 2019-09-12 RX ADMIN — OXYCODONE HYDROCHLORIDE 5 MILLIGRAM(S): 5 TABLET ORAL at 09:15

## 2019-09-12 RX ADMIN — OXYCODONE HYDROCHLORIDE 5 MILLIGRAM(S): 5 TABLET ORAL at 22:13

## 2019-09-12 RX ADMIN — HEPARIN SODIUM 5000 UNIT(S): 5000 INJECTION INTRAVENOUS; SUBCUTANEOUS at 22:15

## 2019-09-12 RX ADMIN — LIDOCAINE 1 PATCH: 4 CREAM TOPICAL at 11:35

## 2019-09-12 RX ADMIN — POTASSIUM BICARBONATE 25 MILLIEQUIVALENT(S): 978 TABLET, EFFERVESCENT ORAL at 11:35

## 2019-09-12 RX ADMIN — Medication 81 MILLIGRAM(S): at 11:35

## 2019-09-12 RX ADMIN — Medication 100 MILLIGRAM(S): at 08:29

## 2019-09-12 RX ADMIN — OXYCODONE HYDROCHLORIDE 5 MILLIGRAM(S): 5 TABLET ORAL at 22:43

## 2019-09-12 RX ADMIN — LIDOCAINE 1 PATCH: 4 CREAM TOPICAL at 23:30

## 2019-09-12 RX ADMIN — OXYCODONE HYDROCHLORIDE 5 MILLIGRAM(S): 5 TABLET ORAL at 04:10

## 2019-09-12 RX ADMIN — OXYCODONE HYDROCHLORIDE 5 MILLIGRAM(S): 5 TABLET ORAL at 17:33

## 2019-09-12 RX ADMIN — PANTOPRAZOLE SODIUM 40 MILLIGRAM(S): 20 TABLET, DELAYED RELEASE ORAL at 11:35

## 2019-09-12 RX ADMIN — AMIODARONE HYDROCHLORIDE 200 MILLIGRAM(S): 400 TABLET ORAL at 05:41

## 2019-09-12 RX ADMIN — Medication 100 MILLIGRAM(S): at 05:41

## 2019-09-12 RX ADMIN — LIDOCAINE 1 PATCH: 4 CREAM TOPICAL at 19:10

## 2019-09-12 RX ADMIN — Medication 0.25 MILLIGRAM(S): at 23:36

## 2019-09-12 NOTE — PROGRESS NOTE ADULT - PROBLEM SELECTOR PLAN 1
MAPs 110  PT/OT  serial neurovascular exams  Dysphagia level 1 nectar thick diet MAPs 110   follow up abd ct results,  ? need MRI head depends on results see neuro note  PT/OT  serial neurovascular exams  Dysphagia level 1 nectar thick diet

## 2019-09-12 NOTE — PROGRESS NOTE ADULT - SUBJECTIVE AND OBJECTIVE BOX
Vascular Surgery Progress Note     Subjective/24hour Events:   Patient seen and examined.   States that RLE weakness stable.     Vital Signs:  Vital Signs Last 24 Hrs  T(C): 36.7 (12 Sep 2019 07:30), Max: 36.8 (11 Sep 2019 23:24)  T(F): 98 (12 Sep 2019 07:30), Max: 98.2 (11 Sep 2019 23:24)  HR: 65 (12 Sep 2019 07:30) (63 - 78)  BP: 172/73 (12 Sep 2019 07:30) (124/59 - 172/73)  BP(mean): 93 (12 Sep 2019 03:20) (81 - 95)  RR: 18 (12 Sep 2019 07:30) (18 - 18)  SpO2: 99% (12 Sep 2019 07:30) (96% - 100%)    CAPILLARY BLOOD GLUCOSE          I&O's Detail    11 Sep 2019 07:01  -  12 Sep 2019 07:00  --------------------------------------------------------  IN:    IV PiggyBack: 300 mL    Oral Fluid: 1160 mL  Total IN: 1460 mL    OUT:    Indwelling Catheter - Urethral: 2675 mL  Total OUT: 2675 mL    Total NET: -1215 mL          MEDICATIONS  (STANDING):  amiodarone    Tablet 200 milliGRAM(s) Oral daily  amiodarone    Tablet   Oral   aspirin  chewable 81 milliGRAM(s) Oral daily  docusate sodium 100 milliGRAM(s) Oral three times a day  heparin  Injectable 5000 Unit(s) SubCutaneous every 8 hours  labetalol 100 milliGRAM(s) Oral three times a day  lidocaine   Patch 1 Patch Transdermal daily  pantoprazole   Suspension 40 milliGRAM(s) Oral daily  piperacillin/tazobactam IVPB.. 3.375 Gram(s) IV Intermittent every 8 hours  polyethylene glycol 3350 17 Gram(s) Oral daily    MEDICATIONS  (PRN):  acetaminophen  IVPB .. 1000 milliGRAM(s) IV Intermittent once PRN Moderate Pain (4 - 6)  ALPRAZolam 0.25 milliGRAM(s) Oral every 8 hours PRN anxiety  mineral oil 30 milliLiter(s) Oral two times a day PRN dry mouth  ondansetron Injectable 4 milliGRAM(s) IV Push three times a day PRN Nausea and/or Vomiting  oxyCODONE    IR 5 milliGRAM(s) Oral every 4 hours PRN Severe Pain (7 - 10)      Physical Exam:  Gen: NAD.  Lungs: Non labored breathing.   Ab: Soft, nontender, nondistended. L incision dressing clean/dry/intact.   Ext: Continued RLE weakness, improving.   : Clear urine in muñoz.     Labs:    09-12    130<L>  |  97  |  28<H>  ----------------------------<  112<H>  3.8   |  20<L>  |  1.67<H>    Ca    8.5      12 Sep 2019 06:01  Mg     1.7     09-12    TPro  6.1  /  Alb  2.6<L>  /  TBili  0.6  /  DBili  x   /  AST  36  /  ALT  37  /  AlkPhos  82  09-12    LIVER FUNCTIONS - ( 12 Sep 2019 06:01 )  Alb: 2.6 g/dL / Pro: 6.1 g/dL / ALK PHOS: 82 U/L / ALT: 37 U/L / AST: 36 U/L / GGT: x                                 8.8    16.0  )-----------( 591      ( 12 Sep 2019 06:01 )             25.7

## 2019-09-12 NOTE — PROGRESS NOTE ADULT - ASSESSMENT
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 She is now   s/p Reop Thoracoabdominal aneurysm open repair with Dacron graft and celiac SMA bypass, reimplantation of lumbar artery with Dr. Briseno on 8/29. Intraop 2 PRBC, 2 Platelets,Her postoperative course was complicated by new onset BLE weakness R>L for which she was placed on MAPs of 110 and CSF drainage of 20cc. She was extubated POD2 ,and required BIPAP on POD3. She went into afib with RVR, placed on esmolol-weaned off- on POD4 but converted back to NSR without treatment.  9/1 RLE improving, NGT removed, BIPAP, nicardipine restarted briefly for HTN, Cr 2.43  9/2 Lumbar drain removed, Afib RVR converted without intervention, Vaso weaned off today  9/3 S&S eval recommend Dysphagia 1 nectar thick diet, 3L NC, PT recommending acute rehab, Cr improving 1.93 from 2.04  9/4 Afib with RVR, transferred to CTU, metoprolol 2.5 IV x2, amiodarone drip started, vasopressin for hypotension, esmolol drip for rate control, Digoxin IV x1, converted to NSR, Cr 1.76 DURGA improving, targeting MAPs of 70-80, L pleural chest tubes x3 removed, S&S recommend dysphagia 1 thin liquids, bradycardic at night and amio decreased to 0.5 from 1  9/5 Transitioned to Labetalol from esmolol, PO amio taper, A-line/introducer/and muñoz discontinued, transferred to SDU  She is now transferred from the CTU to SDU. She no longer has any critical care needs at this time and is stable enough for transfer to SDU.   9/6 Increase PT/ ambulation. Dys 1 diet, speech/swallow f/u, repeat mbs today. Maintaining nsr  9/7 Tolerating Dys II diet, check urinalysis, for leukocytosis. CXR negative for infiltrate. Creat 1,9   9/8 Zosyn initiated for suspected UTI/LLL pna. ID consulted for assistance in mangement. creatinine improving . -1415 fluid balance, ct chest, BC x 2  9/9 WBC remains elevated, 21, afebrile, pending CT results r/o PNA. Continue zosyn.    D/w PT,  pt with decrease in  balance/ fatigued today, RLE weakness unchanged as per pt.   relative hypotension, irineo, labetolol d/c, maintain mao>85.  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 She is now   s/p Reop Thoracoabdominal aneurysm open repair with Dacron graft and celiac SMA bypass, reimplantation of lumbar artery with Dr. Briseno on 8/29. Intraop 2 PRBC, 2 Platelets,Her postoperative course was complicated by new onset BLE weakness R>L for which she was placed on MAPs of 110 and CSF drainage of 20cc. She was extubated POD2 ,and required BIPAP on POD3. She went into afib with RVR, placed on esmolol-weaned off- on POD4 but converted back to NSR without treatment.  9/1 RLE improving, NGT removed, BIPAP, nicardipine restarted briefly for HTN, Cr 2.43  9/2 Lumbar drain removed, Afib RVR converted without intervention, Vaso weaned off today  9/3 S&S eval recommend Dysphagia 1 nectar thick diet, 3L NC, PT recommending acute rehab, Cr improving 1.93 from 2.04  9/4 Afib with RVR, transferred to CTU, metoprolol 2.5 IV x2, amiodarone drip started, vasopressin for hypotension, esmolol drip for rate control, Digoxin IV x1, converted to NSR, Cr 1.76 DURGA improving, targeting MAPs of 70-80, L pleural chest tubes x3 removed, S&S recommend dysphagia 1 thin liquids, bradycardic at night and amio decreased to 0.5 from 1  9/5 Transitioned to Labetalol from esmolol, PO amio taper, A-line/introducer/and muñoz discontinued, transferred to SDU  She is now transferred from the CTU to SDU. She no longer has any critical care needs at this time and is stable enough for transfer to SDU.   9/6 Increase PT/ ambulation. Dys 1 diet, speech/swallow f/u, repeat mbs today. Maintaining nsr  9/7 Tolerating Dys II diet, check urinalysis, for leukocytosis. CXR negative for infiltrate. Creat 1,9   9/8 Zosyn initiated for suspected UTI/LLL pna. ID consulted for assistance in mangement. creatinine improving . -1415 fluid balance, ct chest, BC x 2  9/9 WBC remains elevated, 21, afebrile, pending CT results r/o PNA. Continue zosyn.    D/w PT,  pt with decrease in  balance/ fatigued today, RLE weakness unchanged as per pt.   relative hypotension, irineo, labetolol d/c, maintain mao>85.  9/10 CT with compressive atelectasis,  placed on nocturnal bipap as d/w  Dr Briseno  Wbc trending down  Cont zosyn UTI/R/o pna  9/11 Zosyn for pna x 7 days  PT  Neuro consulted for f/u  9/12  CT scan  abdomen complete   ,  worked with PT,   plus one strength  r leg,  OOB to chair,  Lt thoracotomy incision

## 2019-09-12 NOTE — PROGRESS NOTE ADULT - ASSESSMENT
69 year old female s/p ascending thoracoabdominal aortic aneurysm repair and aortic valve replaced with a bioprosthetic valve in April 2019, reoperative repair on 8/29/2019  with course complicated with DURGA and Afib with RVR. 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 She states she has developed a productive cough with whitish sputum, CXR with ? LLL PNA vs atelectasis. ID consulted for rising leukocytosis.  Started on zosyn  Leukocytosis elevated but stable  CT chest with complex mod left pleural effusion - reviewed  with radiology and suspects PNA present.   Blood cultures so far no growth to date  Recommend:  -Continue zosyn 3.375 grams IV q 8 hours  -Complete a 7-day course on Sat 9/14/19.  -Monitor CrCl while on abx  -Monitor for fevers  -Chest PT    I will be away starting tomorrow and return Monday 9/16/19.  My colleagues will cover. For questions, please call [phone].

## 2019-09-12 NOTE — SWALLOW VFSS/MBS ASSESSMENT ADULT - NS SWALLOW VFSS REC ASPIR MON
Monitor for s/s aspiration/laryngeal penetration. If noted:  D/C p.o. intake, provide non-oral nutrition/hydration/meds, and contact this service @ [phone] (note: pt with baseline cough and throat clear)/pneumonia/throat clearing/upper respiratory infection/fever/change of breathing pattern/gurgly voice
Monitor for s/s aspiration/laryngeal penetration. If noted:  D/C p.o. intake, provide non-oral nutrition/hydration/meds, and contact this service @ [phone] (note: pt with baseline cough and throat clear)/change of breathing pattern/gurgly voice/fever/pneumonia/throat clearing/upper respiratory infection

## 2019-09-12 NOTE — PROGRESS NOTE ADULT - SUBJECTIVE AND OBJECTIVE BOX
VITAL SIGNS    Telemetry:  sr  60-70    Vital Signs Last 24 Hrs  T(C): 36.7 (19 @ 07:30), Max: 36.8 (19 @ 23:24)  T(F): 98 (19 @ 07:30), Max: 98.2 (19 @ 23:24)  HR: 65 (19 @ 07:30) (63 - 78)  BP: 172/73 (19 @ 07:30) (124/59 - 172/73)  RR: 18 (19 @ 07:30) (18 - 18)  SpO2: 99% (19 @ 07:30) (96% - 100%)            Daily Weight in k.7 (12 Sep 2019 07:30)      Bilirubin Total, Serum: 0.6 mg/dL ( @ 06:01)    CAPILLARY BLOOD GLUCOSE            Pacing Wires        [  ]   Settings:                                  Isolated  [                         PHYSICAL EXAM    Neurology: alert and oriented x 3, moves all extremities with no defecits  CV :  RRR  Sternal Wound :  CDI , Stable  Lungs:   CTA B/L  Abdomen: soft, nontender, nondistended, positive bowel sounds, last bowel movement   Extremities: VITAL SIGNS    Telemetry:  sr  60-70    Vital Signs Last 24 Hrs  T(C): 36.7 (19 @ 07:30), Max: 36.8 (19 @ 23:24)  T(F): 98 (19 @ 07:30), Max: 98.2 (19 @ 23:24)  HR: 65 (19 @ 07:30) (63 - 78)  BP: 172/73 (19 @ 07:30) (124/59 - 172/73)  RR: 18 (19 @ 07:30) (18 - 18)  SpO2: 99% (19 @ 07:30) (96% - 100%)            Daily Weight in k.7 (12 Sep 2019 07:30)      Bilirubin Total, Serum: 0.6 mg/dL ( @ 06:01)    CAPILLARY BLOOD GLUCOS                    PHYSICAL EXAM    Neurology: alert and oriented x 3, moves all extremities with no defecits  CV :  RRR  Lt thoracotomy dressing CDI  Lungs:   CTA B/L  Abdomen: soft, nontender, nondistended, positive bowel sounds, last bowel movement   Extremities:

## 2019-09-12 NOTE — PROGRESS NOTE ADULT - ASSESSMENT
69 yr old female with H/O Thoraco abdominal Aortic aneurysm, Aortic Stenosis/ regurgitation S/P AVR (T), Ascending & hemiarch replacement 4/9/19  now sp  thoracoabdominal aortic aneurysm.  - DURGA- Non oliguric that is likely ATN-    - Euvolemic   - HTN-   -Hyponatremia but improving          RECOMMENDATION  -Reduce free water intake.  Encourage liquids with calories; Cont Ensure Enlive  -Back on the Labetalol as the BP was increasing  -Continue Amiodarone as ordered  -Klyte  25meq x 1    -Cont PT  -The pulse ox is preserved.  Unofficial CT of the lungs did not have a lot of effusion at all

## 2019-09-12 NOTE — SWALLOW VFSS/MBS ASSESSMENT ADULT - DIAGNOSTIC IMPRESSIONS
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 There was silent laryngeal penetration of nectar thick liquids when consumed in uncontrolled volumes and silent laryngeal penetration of thin liquids to the level of the vocal folds. Residue remained in the larynx. 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 There was silent laryngeal penetration of nectar thick liquids when consumed in uncontrolled volumes and silent laryngeal penetration of thin liquids to the level of the vocal folds. Residue remained in the larynx. Safe swallow strategies had to be employed to aid in a/w protection and transfer, but persistent laryngeal penetration was noted for thin liquids. A screening view of the proximal esophagus revealed the presence of a cricopharyngeal bar, a suggestion of an anterior web, and intermittent air distention and residue below the aforementioned regions. Suggest GI consult for comprehensive esophageal assessment and for definitive dx of above.

## 2019-09-12 NOTE — SWALLOW VFSS/MBS ASSESSMENT ADULT - RECOMMENDED CONSISTENCY
Given findings of recent CT chest (suggestive of L PNA), would suggest change to more conservative fluids. Diet change to Dysphagia III with nectar thick liquids was discussed at length with the patient. Pt continued to refuse to consume thick fluids (as noted in the CTU). Pt made aware of the potential risk for aspiration and related complications and continues to refuse thick fluids. CTS NP Nancy Hernandez and RN made aware of above.     The patient will continue to require the follow swallow strategies  -MD/team Please enter the following as provider to RN orders : 1) Assist with meals, 2) Crush meds or provide via alternate source. Meds in puree, 3) Pt must be cued to utilize chin tuck (FULL NECK FLEXION) and RIGHT HEAD ROTATION with all p.o. 4) ALL p.o. via teaspoon or straw only.  5) Provide small single bites and sips (tsp amounts only)  at slow rate 6) Encourage successive swallows for oral and pharyngeal clearance /alternate p.o. textures. 7) Aspiration precautions. Monitor for s/s aspiration/laryngeal penetration. If noted:  D/C p.o. intake, provide non-oral nutrition/hydration/meds
Dysphagia II with thin liquids. MD/team Please enter the following as provider to RN orders : 1) Assist with meals, 2) Crush meds or provide via alternate source. Meds in puree, 3) Pt must be cued to utilize chin tuck (FULL NECK FLEXION) and RIGHT HEAD ROTATION with all p.o. 4) ALL p.o. via teaspoon or straw only.  5) Provide small single bites and sips (tsp amounts only)  at slow rate 6) Encourage successive swallows for oral and pharyngeal clearance 7) Aspiration precautions. Monitor for s/s aspiration/laryngeal penetration. If noted:  D/C p.o. intake, provide non-oral nutrition/hydration/meds

## 2019-09-12 NOTE — PROGRESS NOTE ADULT - SUBJECTIVE AND OBJECTIVE BOX
NEPHROLOGY-[facility] [phone]        Patient seen and examined in bed.  SHe was in good spirits and states that the strength in the legs is improving        MEDICATIONS  (STANDING):  amiodarone    Tablet 200 milliGRAM(s) Oral daily  amiodarone    Tablet   Oral   aspirin  chewable 81 milliGRAM(s) Oral daily  docusate sodium 100 milliGRAM(s) Oral three times a day  heparin  Injectable 5000 Unit(s) SubCutaneous every 8 hours  labetalol 100 milliGRAM(s) Oral three times a day  lidocaine   Patch 1 Patch Transdermal daily  pantoprazole   Suspension 40 milliGRAM(s) Oral daily  piperacillin/tazobactam IVPB.. 3.375 Gram(s) IV Intermittent every 8 hours  polyethylene glycol 3350 17 Gram(s) Oral daily      VITAL:  T(C): , Max: 36.8 (09-11-19 @ 23:24)  T(F): , Max: 98.2 (09-11-19 @ 23:24)  HR: 65 (09-12-19 @ 07:30)  BP: 172/73 (09-12-19 @ 07:30)  BP(mean): 93 (09-12-19 @ 03:20)  RR: 18 (09-12-19 @ 07:30)  SpO2: 99% (09-12-19 @ 07:30)  Wt(kg): --    I and O's:    09-11 @ 07:01  -  09-12 @ 07:00  --------------------------------------------------------  IN: 1460 mL / OUT: 2675 mL / NET: -1215 mL          PHYSICAL EXAM:    Constitutional: NAD  Neck:  No JVD  Respiratory: CTAB/L  Cardiovascular: S1 and S2  Gastrointestinal: BS+, soft, NT/ND  Extremities: No peripheral edema  Neurological: A/O x 3, no focal deficits  Psychiatric: Normal mood, normal affect  : No [place]  Skin: No rashes  Access: Not applicable    LABS:                        8.8    16.0  )-----------( 591      ( 12 Sep 2019 06:01 )             25.7     09-12    130<L>  |  97  |  28<H>  ----------------------------<  112<H>  3.8   |  20<L>  |  1.67<H>    Ca    8.5      12 Sep 2019 06:01  Mg     1.7     09-12    TPro  6.1  /  Alb  2.6<L>  /  TBili  0.6  /  DBili  x   /  AST  36  /  ALT  37  /  AlkPhos  82  09-12          Urine Studies:          RADIOLOGY & ADDITIONAL STUDIES:      < from: Xray Chest 1 View- PORTABLE-Urgent (09.10.19 @ 09:17) >    EXAM:  XR CHEST PORTABLE URGENT 1V                            PROCEDURE DATE:  09/10/2019            INTERPRETATION:  CLINICAL INDICATION: Left lower lobe atelectasis.    EXAM: Frontal view of the chest with comparison made to chest radiograph   on9/8/2019    FINDINGS:   Status post aortic valve replacement, repair of the descending aorta, and   median sternotomy. Skin staples overlie the left chest wall. Slight   interval decrease of a left pleural effusion. Barium partially opacifies   the bowel. Right lung is clear.      IMPRESSION:   Slight interval decrease of the loculated left pleural effusion.                 MARIELLE CRUZ M.D., RADIOLOGY RESIDENT  This document has been electronically signed.  TANYA TA M.D., ATTENDING RADIOLOGIST  This document has been electronically signed. Sep 10 2019  3:29PM                < end of copied text >

## 2019-09-12 NOTE — PROGRESS NOTE ADULT - SUBJECTIVE AND OBJECTIVE BOX
CC: Patient is a 69y old  Female who presents with a chief complaint of sp   reop Thoraco abd anysm repair w/ graft (12 Sep 2019 10:04)    ID following for PNA, leukocytosis    Interval History/ROS: Patient feeling better. No fevers, no chills. Leukocytosis elevated, but stable. Not much cough.    Rest of ROS negative.    Allergies  No Known Allergies    ANTIMICROBIALS:  piperacillin/tazobactam IVPB.. 3.375 every 8 hours    OTHER MEDS:  acetaminophen  IVPB .. 1000 milliGRAM(s) IV Intermittent once PRN  ALPRAZolam 0.25 milliGRAM(s) Oral every 8 hours PRN  amiodarone    Tablet 200 milliGRAM(s) Oral daily  amiodarone    Tablet   Oral   aspirin  chewable 81 milliGRAM(s) Oral daily  docusate sodium 100 milliGRAM(s) Oral three times a day  heparin  Injectable 5000 Unit(s) SubCutaneous every 8 hours  labetalol 100 milliGRAM(s) Oral three times a day  lidocaine   Patch 1 Patch Transdermal daily  mineral oil 30 milliLiter(s) Oral two times a day PRN  ondansetron Injectable 4 milliGRAM(s) IV Push three times a day PRN  oxyCODONE    IR 5 milliGRAM(s) Oral every 4 hours PRN  pantoprazole   Suspension 40 milliGRAM(s) Oral daily  polyethylene glycol 3350 17 Gram(s) Oral daily  potassium bicarbonate/potassium citrate 25 milliEquivalent(s) Oral every 1 hour    PE:    Vital Signs Last 24 Hrs  T(C): 36.7 (12 Sep 2019 07:30), Max: 36.8 (11 Sep 2019 23:24)  T(F): 98 (12 Sep 2019 07:30), Max: 98.2 (11 Sep 2019 23:24)  HR: 70 (12 Sep 2019 10:35) (63 - 78)  BP: 123/59 (12 Sep 2019 10:35) (123/59 - 172/73)  BP(mean): 85 (12 Sep 2019 10:35) (81 - 95)  RR: 17 (12 Sep 2019 10:35) (17 - 18)  SpO2: 99% (12 Sep 2019 10:35) (96% - 100%)    Gen: AOx3, NAD, non-toxic  CV: S1+S2 normal, no murmurs  Resp: Decreased in left base  Abd: Soft, nontender, +BS  : +[place]  IV/Skin: No thrombophlebitis  Neuro: no focal deficits      LABS:                          8.8    16.0  )-----------( 591      ( 12 Sep 2019 06:01 )             25.7       09-12    130<L>  |  97  |  28<H>  ----------------------------<  112<H>  3.8   |  20<L>  |  1.67<H>    Ca    8.5      12 Sep 2019 06:01  Mg     1.7     09-12    TPro  6.1  /  Alb  2.6<L>  /  TBili  0.6  /  DBili  x   /  AST  36  /  ALT  37  /  AlkPhos  82  09-12    MICROBIOLOGY:  v  .Blood  09-08-19   No growth to date.  --  --    RADIOLOGY:    < from: Xray Chest 1 View- PORTABLE-Urgent (09.10.19 @ 09:17) >  IMPRESSION:   Slight interval decrease of the loculated left pleural effusion.     < end of copied text >

## 2019-09-12 NOTE — PROGRESS NOTE ADULT - ASSESSMENT
Flory is a very pleasant 69 Year-Old Lady s/p 8/29 ThoracoAbdominal Aneurysm Repair / Celiac-spinal-SMA bypass.    - Appreciate Neurology eval: CT abdomen, MRI brain, MRA head and neck.   - Speech and swallow re-eval.  - Care per Primary Team appreciated.     Vascular Surgery Pager #[phone]

## 2019-09-12 NOTE — SWALLOW VFSS/MBS ASSESSMENT ADULT - THE ABOVE FINDINGS WERE DISCUSSED WITH
discussed at length with pt. Reviewed with CTS team (Lenka Phan) and RN
discussed at length with pt. Reviewed with AMIRA Briggs and RN

## 2019-09-12 NOTE — CHART NOTE - NSCHARTNOTEFT_GEN_A_CORE
Pt seen for LOS follow-up.     Pt is a 69 year old female with PMH of HTN, preeclampsia, c/o abdominal pain, Pt went for CT scan on 3/2019 S/P ascending thoracoabdominal aortic aneurysm repair and aortic valve replaced Bioprosthetic valve 4/8/19. Now s/p reoperative thoracoabdominal aneurysm repair 8/29/19. Pt noted with hx of swallowing difficulty, is s/p MBS 9/6/19 with SLP recommendation [facility].    Source: Patient [x]    Family [ ]     other [x] EMR    Diet: Dysphagia 2 Mechanical Soft with Thin Liquids. Supplement: Ensure Enlive x 3 daily (1050 kcal, 60 grams protein)    Patient reports [ ] nausea  [ ] vomiting [ ] diarrhea [ ] constipation  [ ]chewing problems [ ] swallowing issues  [ ] other:     PO intake:  < 50% [ ] 50-75% [ ]   % [ ]  other :    Source for PO intake [x] Patient [ ] family [x] chart [ ] staff [ ] other    Enteral/Parenteral Nutrition: n/a    Current Weight: 173.5 pounds (current, standing, +1 B/L leg edema noted). 149 pounds admit wt 8/29. Wt gain likely r/t post-op fluid retention.     Pertinent Medications: MEDICATIONS  (STANDING):  amiodarone    Tablet 200 milliGRAM(s) Oral daily  amiodarone    Tablet   Oral   aspirin  chewable 81 milliGRAM(s) Oral daily  docusate sodium 100 milliGRAM(s) Oral three times a day  heparin  Injectable 5000 Unit(s) SubCutaneous every 8 hours  labetalol 100 milliGRAM(s) Oral three times a day  lidocaine   Patch 1 Patch Transdermal daily  pantoprazole   Suspension 40 milliGRAM(s) Oral daily  piperacillin/tazobactam IVPB.. 3.375 Gram(s) IV Intermittent every 8 hours  polyethylene glycol 3350 17 Gram(s) Oral daily    MEDICATIONS  (PRN):  acetaminophen  IVPB .. 1000 milliGRAM(s) IV Intermittent once PRN Moderate Pain (4 - 6)  ALPRAZolam 0.25 milliGRAM(s) Oral every 8 hours PRN anxiety  mineral oil 30 milliLiter(s) Oral two times a day PRN dry mouth  ondansetron Injectable 4 milliGRAM(s) IV Push three times a day PRN Nausea and/or Vomiting  oxyCODONE    IR 5 milliGRAM(s) Oral every 4 hours PRN Severe Pain (7 - 10)    Pertinent Labs:  09-12 Na130 mmol/L<L> Glu 112 mg/dL<H> K+ 3.8 mmol/L Cr  1.67 mg/dL<H> BUN 28 mg/dL<H> 09-10 Phos 3.7 mg/dL 09-12 Alb 2.6 g/dL<L> 08-28 [id number] 5.4 % 08-28 Chol 178 mg/dL  mg/dL<H> HDL 58 mg/dL Trig 79 mg/dL      Skin: Surgical incision noted      Estimated Needs:   [x] no change since previous assessment  [ ] recalculated:       Previous Nutrition Diagnosis: Swallowing difficulty       Nutrition Diagnosis is [ ] ongoing  [x] resolved/addressed with dysphagia diet [ ] not applicable       New Nutrition Diagnosis: [ ] not applicable       Interventions:     1)       Monitoring and Evaluation:     [x] PO intake [x] Tolerance to diet prescription [x] weights [x] follow up per protocol    [x] other: RD remains available: Laura Paul MS, RDN, CDN, CDE. #[phone] Pt seen for LOS follow-up. Pt reports [facility] but admits she dislikes it. Has not been supplementing meals with Ensure for c/o loose BM following consumption.     Pt is a 69 year old female with PMH of HTN, preeclampsia, c/o abdominal pain, Pt went for CT scan on 3/2019 S/P ascending thoracoabdominal aortic aneurysm repair and aortic valve replaced Bioprosthetic valve 4/8/19. Now s/p reoperative thoracoabdominal aneurysm repair 8/29/19. Pt noted with hx of swallowing difficulty, is s/p MBS 9/6/19 with SLP recommendation for Dysphagia 2 mech soft diet with thin liquids.    Source: Patient [x]    Family [ ]     other [x] EMR    Diet: Dysphagia 2 Mechanical Soft with Thin Liquids. Supplement: Ensure Enlive x 3 daily (1050 kcal, 60 grams protein)    Patient reports [ ] nausea  [ ] vomiting [ ] diarrhea [ ] constipation  [ ]chewing problems [ ] swallowing issues  [x] other: Denies GI distress     PO intake (meals):  < 50% [x] 50-75% [x]   % [ ]  other :  PO intake (supplements): 0%    Source for PO intake [x] Patient [ ] family [x] chart [ ] staff [ ] other    Enteral/Parenteral Nutrition: n/a    Current Weight: 173.5 pounds (current, standing, +1 B/L leg edema noted). 149 pounds admit wt 8/29. Wt gain likely r/t post-op fluid retention.     Pertinent Medications: MEDICATIONS  (STANDING):  amiodarone    Tablet 200 milliGRAM(s) Oral daily  amiodarone    Tablet   Oral   aspirin  chewable 81 milliGRAM(s) Oral daily  docusate sodium 100 milliGRAM(s) Oral three times a day  heparin  Injectable 5000 Unit(s) SubCutaneous every 8 hours  labetalol 100 milliGRAM(s) Oral three times a day  lidocaine   Patch 1 Patch Transdermal daily  pantoprazole   Suspension 40 milliGRAM(s) Oral daily  piperacillin/tazobactam IVPB.. 3.375 Gram(s) IV Intermittent every 8 hours  polyethylene glycol 3350 17 Gram(s) Oral daily    MEDICATIONS  (PRN):  acetaminophen  IVPB .. 1000 milliGRAM(s) IV Intermittent once PRN Moderate Pain (4 - 6)  ALPRAZolam 0.25 milliGRAM(s) Oral every 8 hours PRN anxiety  mineral oil 30 milliLiter(s) Oral two times a day PRN dry mouth  ondansetron Injectable 4 milliGRAM(s) IV Push three times a day PRN Nausea and/or Vomiting  oxyCODONE    IR 5 milliGRAM(s) Oral every 4 hours PRN Severe Pain (7 - 10)    Pertinent Labs:  09-12 Na130 mmol/L<L> Glu 112 mg/dL<H> K+ 3.8 mmol/L Cr  1.67 mg/dL<H> BUN 28 mg/dL<H> 09-10 Phos 3.7 mg/dL 09-12 Alb 2.6 g/dL<L> 08-28 [id number] 5.4 % 08-28 Chol 178 mg/dL  mg/dL<H> HDL 58 mg/dL Trig 79 mg/dL      Skin: Surgical incision noted      Estimated Needs:   [x] no change since previous assessment  [ ] recalculated:       Previous Nutrition Diagnosis: Swallowing difficulty       Nutrition Diagnosis is [ ] ongoing  [x] resolved/addressed with dysphagia diet [ ] not applicable       New Nutrition Diagnosis: Inadequate oral intake related to dislike of mechanically altered diet as evidenced by ~50% meal intake, pt wishes for diet upgrade       Interventions:     1) Recommend continue current dysphagia diet per SLP recommendation post MBS  -Pt dislikes [facility] soft diet consistency, to discuss with SLP re-assessment for possible upgrade of diet consistency  -May d/c Ensure as pt dislikes and c/o associated loose BM; will add 4 oz Mightyshake with meals chocolate per pt preference which should be better tolerated       Monitoring and Evaluation:     [x] PO intake [x] Tolerance to diet prescription [x] weights [x] follow up per protocol    [x] other: RD remains available: Laura Paul MS, RDN, CDN, CDE. #[phone]

## 2019-09-12 NOTE — PROVIDER CONTACT NOTE (OTHER) - ASSESSMENT
Cardiac monitor rapid Afib. 12leadekg performed to confirm Afib. Patient asymptomatic. Patient converted to NSR without intervention. MD Torres and MD. Ramírez aware.
/74, HR 69. All other VSS, pt had no complaints of c/p or SOB.
/81 (), HR 74, O2 sat 95% RA, resp WNL. Pt has no complaints, denies any ill feelings.
pt aox4. pt in rapid afib 120-140s. amio gtt 1mg started. pt irineo down to 40s briefly.

## 2019-09-13 LAB
ALBUMIN SERPL ELPH-MCNC: 2.4 G/DL — LOW (ref 3.3–5)
ALP SERPL-CCNC: 69 U/L — SIGNIFICANT CHANGE UP (ref 40–120)
ALT FLD-CCNC: 34 U/L — SIGNIFICANT CHANGE UP (ref 10–45)
ANION GAP SERPL CALC-SCNC: 11 MMOL/L — SIGNIFICANT CHANGE UP (ref 5–17)
ANION GAP SERPL CALC-SCNC: 14 MMOL/L — SIGNIFICANT CHANGE UP (ref 5–17)
AST SERPL-CCNC: 26 U/L — SIGNIFICANT CHANGE UP (ref 10–40)
BILIRUB SERPL-MCNC: 0.4 MG/DL — SIGNIFICANT CHANGE UP (ref 0.2–1.2)
BLD GP AB SCN SERPL QL: NEGATIVE — SIGNIFICANT CHANGE UP
BUN SERPL-MCNC: 23 MG/DL — SIGNIFICANT CHANGE UP (ref 7–23)
BUN SERPL-MCNC: 24 MG/DL — HIGH (ref 7–23)
CALCIUM SERPL-MCNC: 8.1 MG/DL — LOW (ref 8.4–10.5)
CALCIUM SERPL-MCNC: 8.4 MG/DL — SIGNIFICANT CHANGE UP (ref 8.4–10.5)
CHLORIDE SERPL-SCNC: 93 MMOL/L — LOW (ref 96–108)
CHLORIDE SERPL-SCNC: 94 MMOL/L — LOW (ref 96–108)
CO2 SERPL-SCNC: 21 MMOL/L — LOW (ref 22–31)
CO2 SERPL-SCNC: 23 MMOL/L — SIGNIFICANT CHANGE UP (ref 22–31)
CREAT SERPL-MCNC: 1.5 MG/DL — HIGH (ref 0.5–1.3)
CREAT SERPL-MCNC: 1.58 MG/DL — HIGH (ref 0.5–1.3)
CULTURE RESULTS: SIGNIFICANT CHANGE UP
CULTURE RESULTS: SIGNIFICANT CHANGE UP
GLUCOSE SERPL-MCNC: 112 MG/DL — HIGH (ref 70–99)
GLUCOSE SERPL-MCNC: 114 MG/DL — HIGH (ref 70–99)
HCT VFR BLD CALC: 22.6 % — LOW (ref 34.5–45)
HCT VFR BLD CALC: 23 % — LOW (ref 34.5–45)
HGB BLD-MCNC: 7.3 G/DL — LOW (ref 11.5–15.5)
HGB BLD-MCNC: 7.7 G/DL — LOW (ref 11.5–15.5)
MCHC RBC-ENTMCNC: 28.6 PG — SIGNIFICANT CHANGE UP (ref 27–34)
MCHC RBC-ENTMCNC: 29.4 PG — SIGNIFICANT CHANGE UP (ref 27–34)
MCHC RBC-ENTMCNC: 32.4 GM/DL — SIGNIFICANT CHANGE UP (ref 32–36)
MCHC RBC-ENTMCNC: 33.4 GM/DL — SIGNIFICANT CHANGE UP (ref 32–36)
MCV RBC AUTO: 88.1 FL — SIGNIFICANT CHANGE UP (ref 80–100)
MCV RBC AUTO: 88.3 FL — SIGNIFICANT CHANGE UP (ref 80–100)
PLATELET # BLD AUTO: 569 K/UL — HIGH (ref 150–400)
PLATELET # BLD AUTO: 603 K/UL — HIGH (ref 150–400)
POTASSIUM SERPL-MCNC: 3.4 MMOL/L — LOW (ref 3.5–5.3)
POTASSIUM SERPL-MCNC: 3.7 MMOL/L — SIGNIFICANT CHANGE UP (ref 3.5–5.3)
POTASSIUM SERPL-SCNC: 3.4 MMOL/L — LOW (ref 3.5–5.3)
POTASSIUM SERPL-SCNC: 3.7 MMOL/L — SIGNIFICANT CHANGE UP (ref 3.5–5.3)
PROT SERPL-MCNC: 5.7 G/DL — LOW (ref 6–8.3)
RBC # BLD: 2.56 M/UL — LOW (ref 3.8–5.2)
RBC # BLD: 2.61 M/UL — LOW (ref 3.8–5.2)
RBC # FLD: 14.2 % — SIGNIFICANT CHANGE UP (ref 10.3–14.5)
RBC # FLD: 14.5 % — SIGNIFICANT CHANGE UP (ref 10.3–14.5)
RH IG SCN BLD-IMP: POSITIVE — SIGNIFICANT CHANGE UP
SODIUM SERPL-SCNC: 127 MMOL/L — LOW (ref 135–145)
SODIUM SERPL-SCNC: 129 MMOL/L — LOW (ref 135–145)
SPECIMEN SOURCE: SIGNIFICANT CHANGE UP
SPECIMEN SOURCE: SIGNIFICANT CHANGE UP
WBC # BLD: 13.5 K/UL — HIGH (ref 3.8–10.5)
WBC # BLD: 14.8 K/UL — HIGH (ref 3.8–10.5)
WBC # FLD AUTO: 13.5 K/UL — HIGH (ref 3.8–10.5)
WBC # FLD AUTO: 14.8 K/UL — HIGH (ref 3.8–10.5)

## 2019-09-13 PROCEDURE — 99024 POSTOP FOLLOW-UP VISIT: CPT

## 2019-09-13 RX ORDER — POTASSIUM BICARBONATE 978 MG/1
25 TABLET, EFFERVESCENT ORAL
Refills: 0 | Status: COMPLETED | OUTPATIENT
Start: 2019-09-13 | End: 2019-09-13

## 2019-09-13 RX ORDER — FOLIC ACID 0.8 MG
1 TABLET ORAL DAILY
Refills: 0 | Status: DISCONTINUED | OUTPATIENT
Start: 2019-09-13 | End: 2019-09-28

## 2019-09-13 RX ORDER — METOPROLOL TARTRATE 50 MG
25 TABLET ORAL EVERY 12 HOURS
Refills: 0 | Status: DISCONTINUED | OUTPATIENT
Start: 2019-09-13 | End: 2019-09-28

## 2019-09-13 RX ORDER — POTASSIUM CHLORIDE 20 MEQ
40 PACKET (EA) ORAL EVERY 4 HOURS
Refills: 0 | Status: DISCONTINUED | OUTPATIENT
Start: 2019-09-13 | End: 2019-09-13

## 2019-09-13 RX ORDER — FERROUS SULFATE 325(65) MG
325 TABLET ORAL
Refills: 0 | Status: DISCONTINUED | OUTPATIENT
Start: 2019-09-13 | End: 2019-09-28

## 2019-09-13 RX ORDER — DIAZEPAM 5 MG
2 TABLET ORAL ONCE
Refills: 0 | Status: DISCONTINUED | OUTPATIENT
Start: 2019-09-13 | End: 2019-09-14

## 2019-09-13 RX ORDER — ASCORBIC ACID 60 MG
500 TABLET,CHEWABLE ORAL
Refills: 0 | Status: DISCONTINUED | OUTPATIENT
Start: 2019-09-13 | End: 2019-09-28

## 2019-09-13 RX ADMIN — LIDOCAINE 1 PATCH: 4 CREAM TOPICAL at 23:00

## 2019-09-13 RX ADMIN — OXYCODONE HYDROCHLORIDE 5 MILLIGRAM(S): 5 TABLET ORAL at 04:51

## 2019-09-13 RX ADMIN — POTASSIUM BICARBONATE 25 MILLIEQUIVALENT(S): 978 TABLET, EFFERVESCENT ORAL at 13:10

## 2019-09-13 RX ADMIN — Medication 100 MILLIGRAM(S): at 05:18

## 2019-09-13 RX ADMIN — HEPARIN SODIUM 5000 UNIT(S): 5000 INJECTION INTRAVENOUS; SUBCUTANEOUS at 05:18

## 2019-09-13 RX ADMIN — Medication 100 MILLIGRAM(S): at 13:09

## 2019-09-13 RX ADMIN — Medication 0.25 MILLIGRAM(S): at 22:51

## 2019-09-13 RX ADMIN — OXYCODONE HYDROCHLORIDE 5 MILLIGRAM(S): 5 TABLET ORAL at 09:45

## 2019-09-13 RX ADMIN — LIDOCAINE 1 PATCH: 4 CREAM TOPICAL at 12:36

## 2019-09-13 RX ADMIN — Medication 81 MILLIGRAM(S): at 12:36

## 2019-09-13 RX ADMIN — LIDOCAINE 1 PATCH: 4 CREAM TOPICAL at 19:00

## 2019-09-13 RX ADMIN — HEPARIN SODIUM 5000 UNIT(S): 5000 INJECTION INTRAVENOUS; SUBCUTANEOUS at 13:09

## 2019-09-13 RX ADMIN — OXYCODONE HYDROCHLORIDE 5 MILLIGRAM(S): 5 TABLET ORAL at 14:00

## 2019-09-13 RX ADMIN — HEPARIN SODIUM 5000 UNIT(S): 5000 INJECTION INTRAVENOUS; SUBCUTANEOUS at 21:09

## 2019-09-13 RX ADMIN — PANTOPRAZOLE SODIUM 40 MILLIGRAM(S): 20 TABLET, DELAYED RELEASE ORAL at 12:37

## 2019-09-13 RX ADMIN — OXYCODONE HYDROCHLORIDE 5 MILLIGRAM(S): 5 TABLET ORAL at 18:00

## 2019-09-13 RX ADMIN — PIPERACILLIN AND TAZOBACTAM 25 GRAM(S): 4; .5 INJECTION, POWDER, LYOPHILIZED, FOR SOLUTION INTRAVENOUS at 22:51

## 2019-09-13 RX ADMIN — Medication 25 MILLIGRAM(S): at 17:09

## 2019-09-13 RX ADMIN — POTASSIUM BICARBONATE 25 MILLIEQUIVALENT(S): 978 TABLET, EFFERVESCENT ORAL at 15:07

## 2019-09-13 RX ADMIN — OXYCODONE HYDROCHLORIDE 5 MILLIGRAM(S): 5 TABLET ORAL at 05:22

## 2019-09-13 RX ADMIN — OXYCODONE HYDROCHLORIDE 5 MILLIGRAM(S): 5 TABLET ORAL at 22:00

## 2019-09-13 RX ADMIN — OXYCODONE HYDROCHLORIDE 5 MILLIGRAM(S): 5 TABLET ORAL at 13:09

## 2019-09-13 RX ADMIN — AMIODARONE HYDROCHLORIDE 200 MILLIGRAM(S): 400 TABLET ORAL at 05:19

## 2019-09-13 RX ADMIN — OXYCODONE HYDROCHLORIDE 5 MILLIGRAM(S): 5 TABLET ORAL at 21:09

## 2019-09-13 RX ADMIN — PIPERACILLIN AND TAZOBACTAM 25 GRAM(S): 4; .5 INJECTION, POWDER, LYOPHILIZED, FOR SOLUTION INTRAVENOUS at 05:18

## 2019-09-13 RX ADMIN — OXYCODONE HYDROCHLORIDE 5 MILLIGRAM(S): 5 TABLET ORAL at 17:09

## 2019-09-13 RX ADMIN — OXYCODONE HYDROCHLORIDE 5 MILLIGRAM(S): 5 TABLET ORAL at 08:53

## 2019-09-13 RX ADMIN — PIPERACILLIN AND TAZOBACTAM 25 GRAM(S): 4; .5 INJECTION, POWDER, LYOPHILIZED, FOR SOLUTION INTRAVENOUS at 15:06

## 2019-09-13 NOTE — PROGRESS NOTE ADULT - ASSESSMENT
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 She is now   s/p Reop Thoracoabdominal aneurysm open repair with Dacron graft and celiac SMA bypass, reimplantation of lumbar artery with Dr. Briseno on 8/29. Intraop 2 PRBC, 2 Platelets,Her postoperative course was complicated by new onset BLE weakness R>L for which she was placed on MAPs of 110 and CSF drainage of 20cc. She was extubated POD2 ,and required BIPAP on POD3. She went into afib with RVR, placed on esmolol-weaned off- on POD4 but converted back to NSR without treatment.  9/1 RLE improving, NGT removed, BIPAP, nicardipine restarted briefly for HTN, Cr 2.43  9/2 Lumbar drain removed, Afib RVR converted without intervention, Vaso weaned off today  9/3 S&S eval recommend Dysphagia 1 nectar thick diet, 3L NC, PT recommending acute rehab, Cr improving 1.93 from 2.04  9/4 Afib with RVR, transferred to CTU, metoprolol 2.5 IV x2, amiodarone drip started, vasopressin for hypotension, esmolol drip for rate control, Digoxin IV x1, converted to NSR, Cr 1.76 DURGA improving, targeting MAPs of 70-80, L pleural chest tubes x3 removed, S&S recommend dysphagia 1 thin liquids, bradycardic at night and amio decreased to 0.5 from 1  9/5 Transitioned to Labetalol from esmolol, PO amio taper, A-line/introducer/and muñoz discontinued, transferred to SDU  She is now transferred from the CTU to SDU. She no longer has any critical care needs at this time and is stable enough for transfer to SDU.   9/6 Increase PT/ ambulation. Dys 1 diet, speech/swallow f/u, repeat mbs today. Maintaining nsr  9/7 Tolerating Dys II diet, check urinalysis, for leukocytosis. CXR negative for infiltrate. Creat 1,9   9/8 Zosyn initiated for suspected UTI/LLL pna. ID consulted for assistance in mangement. creatinine improving . -1415 fluid balance, ct chest, BC x 2  9/9 WBC remains elevated, 21, afebrile, pending CT results r/o PNA. Continue zosyn.    D/w PT,  pt with decrease in  balance/ fatigued today, RLE weakness unchanged as per pt.   relative hypotension, irineo, labetolol d/c, maintain mao>85.  9/10 CT with compressive atelectasis,  placed on nocturnal bipap as d/w  Dr Briseno  Wbc trending down  Cont zosyn UTI/R/o pna  9/11 Zosyn for pna x 7 days  PT  Neuro consulted for f/u  9/12  CT scan  abdomen complete   ,  worked with PT,   plus one strength  r leg,  OOB to chair,  Lt thoracotomy incision  9/13 Pending mri at neuro  rec.  HCT drifting down, repeat performed.  Labetolol d/c lopressor started for HR control but to liberalize bp  Pending acute rehab

## 2019-09-13 NOTE — PROGRESS NOTE ADULT - SUBJECTIVE AND OBJECTIVE BOX
Vascular Surgery Progress Note     Subjective/24hour Events:   Patient seen and examined.   RLE weakness unchanged.     Vital Signs:  Vital Signs Last 24 Hrs  T(C): 36.6 (13 Sep 2019 07:42), Max: 36.8 (12 Sep 2019 23:00)  T(F): 97.9 (13 Sep 2019 07:42), Max: 98.2 (12 Sep 2019 23:00)  HR: 63 (13 Sep 2019 10:22) (58 - 75)  BP: 129/59 (13 Sep 2019 07:42) (116/56 - 140/63)  BP(mean): 85 (13 Sep 2019 07:42) (80 - 91)  RR: 18 (13 Sep 2019 07:42) (17 - 18)  SpO2: 99% (13 Sep 2019 10:22) (95% - 100%)    CAPILLARY BLOOD GLUCOSE          I&O's Detail    12 Sep 2019 07:01  -  13 Sep 2019 07:00  --------------------------------------------------------  IN:    Oral Fluid: 120 mL  Total IN: 120 mL    OUT:    Indwelling Catheter - Urethral: 2550 mL  Total OUT: 2550 mL    Total NET: -2430 mL          MEDICATIONS  (STANDING):  amiodarone    Tablet 200 milliGRAM(s) Oral daily  amiodarone    Tablet   Oral   aspirin  chewable 81 milliGRAM(s) Oral daily  docusate sodium 100 milliGRAM(s) Oral three times a day  heparin  Injectable 5000 Unit(s) SubCutaneous every 8 hours  lidocaine   Patch 1 Patch Transdermal daily  metoprolol tartrate 25 milliGRAM(s) Oral every 12 hours  pantoprazole   Suspension 40 milliGRAM(s) Oral daily  piperacillin/tazobactam IVPB.. 3.375 Gram(s) IV Intermittent every 8 hours  polyethylene glycol 3350 17 Gram(s) Oral daily  potassium chloride    Tablet ER 40 milliEquivalent(s) Oral every 4 hours    MEDICATIONS  (PRN):  acetaminophen  IVPB .. 1000 milliGRAM(s) IV Intermittent once PRN Moderate Pain (4 - 6)  ALPRAZolam 0.25 milliGRAM(s) Oral every 8 hours PRN anxiety  mineral oil 30 milliLiter(s) Oral two times a day PRN dry mouth  ondansetron Injectable 4 milliGRAM(s) IV Push three times a day PRN Nausea and/or Vomiting  oxyCODONE    IR 5 milliGRAM(s) Oral every 4 hours PRN Severe Pain (7 - 10)      Physical Exam:  Gen: NAD.  Lungs: Non labored breathing.   Ab: Soft, nontender, nondistended. L incision dressing clean/dry/intact.   Ext: Continued RLE weakness, improving.   : Clear urine in muñoz.   Labs:    09-13    127<L>  |  93<L>  |  23  ----------------------------<  114<H>  3.7   |  23  |  1.58<H>    Ca    8.4      13 Sep 2019 09:02  Mg     1.7     09-12    TPro  5.7<L>  /  Alb  2.4<L>  /  TBili  0.4  /  DBili  x   /  AST  26  /  ALT  34  /  AlkPhos  69  09-13    LIVER FUNCTIONS - ( 13 Sep 2019 09:02 )  Alb: 2.4 g/dL / Pro: 5.7 g/dL / ALK PHOS: 69 U/L / ALT: 34 U/L / AST: 26 U/L / GGT: x                                 7.7    14.8  )-----------( 603      ( 13 Sep 2019 09:02 )             23.0         Imaging:  Awaiting MRYovany     < from: CT Abdomen and Pelvis No Cont (09.12.19 @ 09:25) >  FINDINGS:    LOWER CHEST: Aortic valve replacement. Small bilateral pleural effusions   with loculation on the left. Postoperative changes in the left lower   hemithorax. Partially visualized repair of the descending thoracic aorta,   poorly evaluated in the absence of intravenous contrast.    LIVER: Hepatomegaly   BILE DUCTS: Normal caliber.  GALLBLADDER: Within normal limits.  SPLEEN: Within normal limits.  PANCREAS: Within normal limits.  ADRENALS: Within normal limits.  KIDNEYS/URETERS: Left renal cyst    BLADDER: Muñoz catheter.  REPRODUCTIVE ORGANS: Evaluation limited due to artifact from retained   barium contrast within the colon.    BOWEL: No bowel obstruction. Appendix is normal.  PERITONEUM: No ascites.  VESSELS: Thoracoabdominal aortic graft. Atherosclerotic changes.  RETROPERITONEUM/LYMPH NODES: No lymphadenopathy. No retroperitoneal   hematoma.    ABDOMINAL WALL: Anasarca. Surgical staples along left lateral abdominal   wall  BONES: Degenerative changes.    IMPRESSION:     No acute pathology or evidence of retroperitoneal hematoma.    < end of copied text >

## 2019-09-13 NOTE — PROGRESS NOTE ADULT - ASSESSMENT
69 yr old female with H/O Thoraco abdominal Aortic aneurysm, Aortic Stenosis/ regurgitation S/P AVR (T), Ascending & hemiarch replacement 4/9/19  now sp  thoracoabdominal aortic aneurysm.  - DURGA- Non oliguric that is likely ATN-  Creatinine is improving   - Euvolemic   - HTN-   -Hyponatremia   -Hypokalemia          RECOMMENDATION  -Reduce free water intake.  Encourage liquids with calories; Cont Ensure Enlive  -Back on the Labetalol and the BP is stable   -Continue Amiodarone as ordered  -Kdur 40 meq po x 2 doses   -Cont PT  -The pulse ox is preserved.  Unofficial CT of the lungs did not have a lot of effusion at all 69 yr old female with H/O Thoraco abdominal Aortic aneurysm, Aortic Stenosis/ regurgitation S/P AVR (T), Ascending & hemiarch replacement 4/9/19  now sp  thoracoabdominal aortic aneurysm.  - DURGA- Non oliguric that is likely ATN-  Creatinine is improving   - Euvolemic   - Hyponatremia   -Hypokalemia          RECOMMENDATION  -Reduce free water intake.  Encourage liquids with calories; Cont Ensure Enlive  -Back on the Labetalol and the BP is stable   -Continue Amiodarone as ordered  -Kdur 40 meq po x 2 doses   -Cont PT  -The pulse ox is preserved.  Unofficial CT of the lungs did not have a lot of effusion at all  -Repeat CBC please

## 2019-09-13 NOTE — PROGRESS NOTE ADULT - SUBJECTIVE AND OBJECTIVE BOX
im ok today    VITAL SIGNS    Telemetry:  nsr 65    Vital Signs Last 24 Hrs  T(C): 36.6 (09-13-19 @ 07:42), Max: 36.8 (09-12-19 @ 23:00)  T(F): 97.9 (09-13-19 @ 07:42), Max: 98.2 (09-12-19 @ 23:00)  HR: 63 (09-13-19 @ 10:22) (58 - 75)  BP: 129/59 (09-13-19 @ 07:42) (116/56 - 140/63)  RR: 18 (09-13-19 @ 07:42) (17 - 18)  SpO2: 99% (09-13-19 @ 10:22) (95% - 100%)                   09-12 @ 07:01  -  09-13 @ 07:00  --------------------------------------------------------  IN: 120 mL / OUT: 2550 mL / NET: -2430 mL          Daily     Daily             CAPILLARY BLOOD GLUCOSE                    Coumadin    [ ] YES          [ x ]      NO                                   PHYSICAL EXAM        Neurology: alert and oriented x 3, RLE . plantarflexion 1/5, no other movement to the hip  CV : s1 s2 RRR  Sternal Wound :  CDI , Stable  Lungs: cta  Abdomen: soft, nontender, nondistended, positive bowel sounds, last bowel movement +                      :     muñoz - sbd         Extremities:  +    edema   /  -   calve tenderness ,            acetaminophen  IVPB .. 1000 milliGRAM(s) IV Intermittent once PRN  ALPRAZolam 0.25 milliGRAM(s) Oral every 8 hours PRN  amiodarone    Tablet 200 milliGRAM(s) Oral daily  amiodarone    Tablet   Oral   aspirin  chewable 81 milliGRAM(s) Oral daily  docusate sodium 100 milliGRAM(s) Oral three times a day  heparin  Injectable 5000 Unit(s) SubCutaneous every 8 hours  lidocaine   Patch 1 Patch Transdermal daily  metoprolol tartrate 25 milliGRAM(s) Oral every 12 hours  mineral oil 30 milliLiter(s) Oral two times a day PRN  ondansetron Injectable 4 milliGRAM(s) IV Push three times a day PRN  oxyCODONE    IR 5 milliGRAM(s) Oral every 4 hours PRN  pantoprazole   Suspension 40 milliGRAM(s) Oral daily  piperacillin/tazobactam IVPB.. 3.375 Gram(s) IV Intermittent every 8 hours  polyethylene glycol 3350 17 Gram(s) Oral daily  potassium bicarbonate/potassium citrate 25 milliEquivalent(s) Oral every 1 hour                    Physical Therapy Rec:   Home  [  ]   Home w/ PT  [  ]  Rehab  [  ]  Discussed with Cardiothoracic Team at AM rounds.

## 2019-09-13 NOTE — PROGRESS NOTE ADULT - ASSESSMENT
69 year old female s/p ascending thoracoabdominal aortic aneurysm repair and aortic valve replaced with a bioprosthetic valve in April 2019, reoperative repair on 8/29/2019  with course complicated with DURGA and Afib with RVR. 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 She states she has developed a productive cough with whitish sputum, CXR with ? LLL PNA vs atelectasis. ID consulted for rising leukocytosis.  Started on zosyn  Leukocytosis elevated but stable  CT chest with complex mod left pleural effusion - reviewed  with radiology and suspects PNA present.   Blood cultures so far no growth to date  Recommend:  -Continue zosyn 3.375 grams IV q 8 hours  -Complete a 7-day course on Sat 9/14/19.  slowly improving   plan as outlined   dc ab tomorrow   -     I

## 2019-09-13 NOTE — PROGRESS NOTE ADULT - PROBLEM SELECTOR PLAN 1
MAPs 80-90   follow up abd ct results,  ? need MRI head depends on results see neuro note  PT/OT  serial neurovascular exams  Dysphagia level 1 nectar thick diet

## 2019-09-13 NOTE — PROGRESS NOTE ADULT - SUBJECTIVE AND OBJECTIVE BOX
infectious diseases progress note:    Patient is a 69y old  Female who presents with a chief complaint of sp   reop Thoraco abd anysm repair w/ graft (12 Sep 2019 10:04)        Thoracoabdominal aortic aneurysm (TAAA) without rupture        ROS:  CONSTITUTIONAL:  Negative fever or chills, feels well, good appetite  EYES:  Negative  blurry vision or double vision  CARDIOVASCULAR:  Negative for chest pain or palpitations  RESPIRATORY:  Negative for cough, wheezing, or SOB   GASTROINTESTINAL:  Negative for nausea, vomiting, diarrhea, constipation, or abdominal pain  GENITOURINARY:  Negative frequency, urgency or dysuria  NEUROLOGIC:  No headache, confusion, dizziness, lightheadedness    Allergies    No Known Allergies    Intolerances        ANTIBIOTICS/RELEVANT:  antimicrobials  piperacillin/tazobactam IVPB.. 3.375 Gram(s) IV Intermittent every 8 hours    immunologic:    OTHER:  acetaminophen  IVPB .. 1000 milliGRAM(s) IV Intermittent once PRN  ALPRAZolam 0.25 milliGRAM(s) Oral every 8 hours PRN  amiodarone    Tablet 200 milliGRAM(s) Oral daily  amiodarone    Tablet   Oral   aspirin  chewable 81 milliGRAM(s) Oral daily  docusate sodium 100 milliGRAM(s) Oral three times a day  heparin  Injectable 5000 Unit(s) SubCutaneous every 8 hours  labetalol 100 milliGRAM(s) Oral three times a day  lidocaine   Patch 1 Patch Transdermal daily  mineral oil 30 milliLiter(s) Oral two times a day PRN  ondansetron Injectable 4 milliGRAM(s) IV Push three times a day PRN  oxyCODONE    IR 5 milliGRAM(s) Oral every 4 hours PRN  pantoprazole   Suspension 40 milliGRAM(s) Oral daily  polyethylene glycol 3350 17 Gram(s) Oral daily      Objective:  Vital Signs Last 24 Hrs  T(C): 36.6 (13 Sep 2019 07:42), Max: 36.8 (12 Sep 2019 23:00)  T(F): 97.9 (13 Sep 2019 07:42), Max: 98.2 (12 Sep 2019 23:00)  HR: 67 (13 Sep 2019 07:42) (58 - 75)  BP: 129/59 (13 Sep 2019 07:42) (116/56 - 140/63)  BP(mean): 85 (13 Sep 2019 07:42) (80 - 91)  RR: 18 (13 Sep 2019 07:42) (17 - 18)  SpO2: 100% (13 Sep 2019 07:42) (95% - 100%)       Eyes:AUBREY, EOMI  Ear/Nose/Throat: no oral lesion, no sinus tenderness on percussion	  Neck:no JVD, no lymphadenopathy, supple  Respiratory: CTA elva  Cardiovascular: S1S2 RRR, no murmurs  Gastrointestinal:soft, (+) BS, no HSM  Extremities:no e/e/c        LABS:                        7.3    13.5  )-----------( 569      ( 13 Sep 2019 06:15 )             22.6     09-13    129<L>  |  94<L>  |  24<H>  ----------------------------<  112<H>  3.4<L>   |  21<L>  |  1.50<H>    Ca    8.1<L>      13 Sep 2019 06:15  Mg     1.7     09-12    TPro  6.1  /  Alb  2.6<L>  /  TBili  0.6  /  DBili  x   /  AST  36  /  ALT  37  /  AlkPhos  82  09-12            MICROBIOLOGY:    RECENT CULTURES:  09-08 @ 16:23 .Blood                No growth to date.          RESPIRATORY CULTURES:              RADIOLOGY & ADDITIONAL STUDIES:        Pager [phone]  After 5 pm/weekends or if no response :[phone]

## 2019-09-13 NOTE — PROGRESS NOTE ADULT - ASSESSMENT
Flory is a very pleasant 69 Year-Old Lady s/p 8/29 ThoracoAbdominal Aneurysm Repair / Celiac-spinal-SMA bypass.    - Appreciate Neurology eval: Pending MRI brain, MRA head and neck.   - On Dysphagia 3 diet with soft/thin liquids.  - Care per Primary Team appreciated.     Vascular Surgery Pager #[phone]

## 2019-09-13 NOTE — PROGRESS NOTE ADULT - SUBJECTIVE AND OBJECTIVE BOX
NEPHROLOGY-[facility] [phone]        Patient seen and examined in bed.  She was in good spirits and offered no complaints        MEDICATIONS  (STANDING):  amiodarone    Tablet 200 milliGRAM(s) Oral daily  amiodarone    Tablet   Oral   aspirin  chewable 81 milliGRAM(s) Oral daily  docusate sodium 100 milliGRAM(s) Oral three times a day  heparin  Injectable 5000 Unit(s) SubCutaneous every 8 hours  labetalol 100 milliGRAM(s) Oral three times a day  lidocaine   Patch 1 Patch Transdermal daily  pantoprazole   Suspension 40 milliGRAM(s) Oral daily  piperacillin/tazobactam IVPB.. 3.375 Gram(s) IV Intermittent every 8 hours  polyethylene glycol 3350 17 Gram(s) Oral daily      VITAL:  T(C): , Max: 36.8 (09-12-19 @ 23:00)  T(F): , Max: 98.2 (09-12-19 @ 23:00)  HR: 67 (09-13-19 @ 07:42)  BP: 129/59 (09-13-19 @ 07:42)  BP(mean): 85 (09-13-19 @ 07:42)  RR: 18 (09-13-19 @ 07:42)  SpO2: 100% (09-13-19 @ 07:42)  Wt(kg): --    I and O's:    09-12 @ 07:01  -  09-13 @ 07:00  --------------------------------------------------------  IN: 120 mL / OUT: 2550 mL / NET: -2430 mL          PHYSICAL EXAM:    Constitutional: NAD  Neck:  No JVD  Respiratory: CTAB/L  Cardiovascular: S1 and S2  Gastrointestinal: BS+, soft, NT/ND  Extremities: No peripheral edema  Neurological: A/O x 3,    Psychiatric: Normal mood, normal affect  : +  [place]  Skin: No rashes  Access: Not applicable    LABS:                        7.3    13.5  )-----------( 569      ( 13 Sep 2019 06:15 )             22.6     09-13    129<L>  |  94<L>  |  24<H>  ----------------------------<  112<H>  3.4<L>   |  21<L>  |  1.50<H>    Ca    8.1<L>      13 Sep 2019 06:15  Mg     1.7     09-12    TPro  6.1  /  Alb  2.6<L>  /  TBili  0.6  /  DBili  x   /  AST  36  /  ALT  37  /  AlkPhos  82  09-12          Urine Studies:          RADIOLOGY & ADDITIONAL STUDIES:

## 2019-09-14 LAB
ALBUMIN SERPL ELPH-MCNC: 2.4 G/DL — LOW (ref 3.3–5)
ALP SERPL-CCNC: 67 U/L — SIGNIFICANT CHANGE UP (ref 40–120)
ALT FLD-CCNC: 32 U/L — SIGNIFICANT CHANGE UP (ref 10–45)
ANION GAP SERPL CALC-SCNC: 12 MMOL/L — SIGNIFICANT CHANGE UP (ref 5–17)
AST SERPL-CCNC: 25 U/L — SIGNIFICANT CHANGE UP (ref 10–40)
BILIRUB SERPL-MCNC: 0.4 MG/DL — SIGNIFICANT CHANGE UP (ref 0.2–1.2)
BUN SERPL-MCNC: 21 MG/DL — SIGNIFICANT CHANGE UP (ref 7–23)
CALCIUM SERPL-MCNC: 8.4 MG/DL — SIGNIFICANT CHANGE UP (ref 8.4–10.5)
CHLORIDE SERPL-SCNC: 94 MMOL/L — LOW (ref 96–108)
CO2 SERPL-SCNC: 23 MMOL/L — SIGNIFICANT CHANGE UP (ref 22–31)
CREAT SERPL-MCNC: 1.45 MG/DL — HIGH (ref 0.5–1.3)
GLUCOSE SERPL-MCNC: 100 MG/DL — HIGH (ref 70–99)
HCT VFR BLD CALC: 23.6 % — LOW (ref 34.5–45)
HGB BLD-MCNC: 7.9 G/DL — LOW (ref 11.5–15.5)
MAGNESIUM SERPL-MCNC: 1.6 MG/DL — SIGNIFICANT CHANGE UP (ref 1.6–2.6)
MCHC RBC-ENTMCNC: 29.5 PG — SIGNIFICANT CHANGE UP (ref 27–34)
MCHC RBC-ENTMCNC: 33.5 GM/DL — SIGNIFICANT CHANGE UP (ref 32–36)
MCV RBC AUTO: 88.3 FL — SIGNIFICANT CHANGE UP (ref 80–100)
PLATELET # BLD AUTO: 627 K/UL — HIGH (ref 150–400)
POTASSIUM SERPL-MCNC: 4.1 MMOL/L — SIGNIFICANT CHANGE UP (ref 3.5–5.3)
POTASSIUM SERPL-SCNC: 4.1 MMOL/L — SIGNIFICANT CHANGE UP (ref 3.5–5.3)
PROT SERPL-MCNC: 5.7 G/DL — LOW (ref 6–8.3)
RBC # BLD: 2.67 M/UL — LOW (ref 3.8–5.2)
RBC # FLD: 14.5 % — SIGNIFICANT CHANGE UP (ref 10.3–14.5)
SODIUM SERPL-SCNC: 129 MMOL/L — LOW (ref 135–145)
WBC # BLD: 12.7 K/UL — HIGH (ref 3.8–10.5)
WBC # FLD AUTO: 12.7 K/UL — HIGH (ref 3.8–10.5)

## 2019-09-14 PROCEDURE — 70544 MR ANGIOGRAPHY HEAD W/O DYE: CPT | Mod: 26,59

## 2019-09-14 PROCEDURE — 70546 MR ANGIOGRAPH HEAD W/O&W/DYE: CPT | Mod: 26

## 2019-09-14 PROCEDURE — 70547 MR ANGIOGRAPHY NECK W/O DYE: CPT | Mod: 26

## 2019-09-14 PROCEDURE — 70548 MR ANGIOGRAPHY NECK W/DYE: CPT | Mod: 26

## 2019-09-14 PROCEDURE — 70551 MRI BRAIN STEM W/O DYE: CPT | Mod: 26,59

## 2019-09-14 RX ADMIN — Medication 325 MILLIGRAM(S): at 05:09

## 2019-09-14 RX ADMIN — Medication 500 MILLIGRAM(S): at 05:09

## 2019-09-14 RX ADMIN — PIPERACILLIN AND TAZOBACTAM 25 GRAM(S): 4; .5 INJECTION, POWDER, LYOPHILIZED, FOR SOLUTION INTRAVENOUS at 21:27

## 2019-09-14 RX ADMIN — LIDOCAINE 1 PATCH: 4 CREAM TOPICAL at 18:04

## 2019-09-14 RX ADMIN — OXYCODONE HYDROCHLORIDE 5 MILLIGRAM(S): 5 TABLET ORAL at 22:30

## 2019-09-14 RX ADMIN — OXYCODONE HYDROCHLORIDE 5 MILLIGRAM(S): 5 TABLET ORAL at 17:00

## 2019-09-14 RX ADMIN — OXYCODONE HYDROCHLORIDE 5 MILLIGRAM(S): 5 TABLET ORAL at 06:50

## 2019-09-14 RX ADMIN — AMIODARONE HYDROCHLORIDE 200 MILLIGRAM(S): 400 TABLET ORAL at 05:09

## 2019-09-14 RX ADMIN — HEPARIN SODIUM 5000 UNIT(S): 5000 INJECTION INTRAVENOUS; SUBCUTANEOUS at 13:01

## 2019-09-14 RX ADMIN — HEPARIN SODIUM 5000 UNIT(S): 5000 INJECTION INTRAVENOUS; SUBCUTANEOUS at 05:09

## 2019-09-14 RX ADMIN — Medication 0.25 MILLIGRAM(S): at 22:56

## 2019-09-14 RX ADMIN — OXYCODONE HYDROCHLORIDE 5 MILLIGRAM(S): 5 TABLET ORAL at 02:30

## 2019-09-14 RX ADMIN — PANTOPRAZOLE SODIUM 40 MILLIGRAM(S): 20 TABLET, DELAYED RELEASE ORAL at 10:22

## 2019-09-14 RX ADMIN — Medication 25 MILLIGRAM(S): at 05:09

## 2019-09-14 RX ADMIN — OXYCODONE HYDROCHLORIDE 5 MILLIGRAM(S): 5 TABLET ORAL at 21:27

## 2019-09-14 RX ADMIN — Medication 81 MILLIGRAM(S): at 10:16

## 2019-09-14 RX ADMIN — Medication 325 MILLIGRAM(S): at 17:02

## 2019-09-14 RX ADMIN — OXYCODONE HYDROCHLORIDE 5 MILLIGRAM(S): 5 TABLET ORAL at 18:00

## 2019-09-14 RX ADMIN — PIPERACILLIN AND TAZOBACTAM 25 GRAM(S): 4; .5 INJECTION, POWDER, LYOPHILIZED, FOR SOLUTION INTRAVENOUS at 13:01

## 2019-09-14 RX ADMIN — LIDOCAINE 1 PATCH: 4 CREAM TOPICAL at 12:57

## 2019-09-14 RX ADMIN — Medication 500 MILLIGRAM(S): at 17:02

## 2019-09-14 RX ADMIN — LIDOCAINE 1 PATCH: 4 CREAM TOPICAL at 23:00

## 2019-09-14 RX ADMIN — Medication 1 MILLIGRAM(S): at 10:15

## 2019-09-14 RX ADMIN — Medication 2 MILLIGRAM(S): at 10:14

## 2019-09-14 RX ADMIN — OXYCODONE HYDROCHLORIDE 5 MILLIGRAM(S): 5 TABLET ORAL at 05:54

## 2019-09-14 RX ADMIN — OXYCODONE HYDROCHLORIDE 5 MILLIGRAM(S): 5 TABLET ORAL at 01:30

## 2019-09-14 RX ADMIN — OXYCODONE HYDROCHLORIDE 5 MILLIGRAM(S): 5 TABLET ORAL at 13:52

## 2019-09-14 RX ADMIN — Medication 25 MILLIGRAM(S): at 17:02

## 2019-09-14 RX ADMIN — HEPARIN SODIUM 5000 UNIT(S): 5000 INJECTION INTRAVENOUS; SUBCUTANEOUS at 21:27

## 2019-09-14 RX ADMIN — PIPERACILLIN AND TAZOBACTAM 25 GRAM(S): 4; .5 INJECTION, POWDER, LYOPHILIZED, FOR SOLUTION INTRAVENOUS at 05:55

## 2019-09-14 RX ADMIN — OXYCODONE HYDROCHLORIDE 5 MILLIGRAM(S): 5 TABLET ORAL at 12:52

## 2019-09-14 NOTE — PROGRESS NOTE ADULT - SUBJECTIVE AND OBJECTIVE BOX
NEPHROLOGY    Patient seen and examined     MEDICATIONS  (STANDING):  amiodarone    Tablet 200 milliGRAM(s) Oral daily  amiodarone    Tablet   Oral   ascorbic acid 500 milliGRAM(s) Oral two times a day  aspirin  chewable 81 milliGRAM(s) Oral daily  docusate sodium 100 milliGRAM(s) Oral three times a day  ferrous    sulfate 325 milliGRAM(s) Oral two times a day  folic acid 1 milliGRAM(s) Oral daily  heparin  Injectable 5000 Unit(s) SubCutaneous every 8 hours  lidocaine   Patch 1 Patch Transdermal daily  metoprolol tartrate 25 milliGRAM(s) Oral every 12 hours  pantoprazole   Suspension 40 milliGRAM(s) Oral daily  piperacillin/tazobactam IVPB.. 3.375 Gram(s) IV Intermittent every 8 hours  polyethylene glycol 3350 17 Gram(s) Oral daily      VITAL:  T(C): , Max: 36.8 (09-14-19 @ 03:27)  T(F): , Max: 98.3 (09-14-19 @ 03:27)  HR: 66 (09-14-19 @ 08:25)  BP: 124/60 (09-14-19 @ 07:16)  BP(mean): 86 (09-14-19 @ 07:16)  RR: 18 (09-14-19 @ 07:16)  SpO2: 99% (09-14-19 @ 08:25)    I and O's:    09-13 @ 07:01  -  09-14 @ 07:00  --------------------------------------------------------  IN: 560 mL / OUT: 2775 mL / NET: -2215 mL    09-14 @ 07:01  -  09-14 @ 13:27  --------------------------------------------------------  IN: 700 mL / OUT: 900 mL / NET: -200 mL          PHYSICAL EXAM:    Constitutional: NAD  Neck:  No JVD  Respiratory: CTAB/L  Cardiovascular: S1 and S2  Gastrointestinal: BS+, soft, NT/ND  Extremities: No peripheral edema  Neurological: A/O x 3, no focal deficits  Psychiatric: Normal mood, normal affect  : + Galicia  Skin: No rashes    LABS:                        7.9    12.7  )-----------( 627      ( 14 Sep 2019 06:01 )             23.6     09-14    129<L>  |  94<L>  |  21  ----------------------------<  100<H>  4.1   |  23  |  1.45<H>    Ca    8.4      14 Sep 2019 06:01  Mg     1.6     09-14    TPro  5.7<L>  /  Alb  2.4<L>  /  TBili  0.4  /  DBili  x   /  AST  25  /  ALT  32  /  AlkPhos  67  09-14      ASSESSMENT:  69 yr old female with H/O Thoraco abdominal Aortic aneurysm, Aortic Stenosis/ regurgitation S/P AVR (T), Ascending & hemiarch replacement 4/9/19 now sp thoracoabdominal aortic aneurysm.  - DURGA- Non oliguric that is likely ATN - Creatinine is improving   - Euvolemic   - Hyponatremia - stable  - Hypokalemia - improved       RECOMMENDATION:  -Reduce free water intake. Encourage liquids with calories; Cont Ensure Enlive  -Continue Amiodarone as ordered  -Cont PT  -The pulse ox is preserved. Unofficial CT of the lungs did not have a lot of effusion at all      IAN Erickson  [facility]  [phone] Patient seen and examined in bed. Had MRI this AM with IV contrast for LE weakness.       MEDICATIONS  (STANDING):  amiodarone    Tablet 200 milliGRAM(s) Oral daily  amiodarone    Tablet   Oral   ascorbic acid 500 milliGRAM(s) Oral two times a day  aspirin  chewable 81 milliGRAM(s) Oral daily  docusate sodium 100 milliGRAM(s) Oral three times a day  ferrous    sulfate 325 milliGRAM(s) Oral two times a day  folic acid 1 milliGRAM(s) Oral daily  heparin  Injectable 5000 Unit(s) SubCutaneous every 8 hours  lidocaine   Patch 1 Patch Transdermal daily  metoprolol tartrate 25 milliGRAM(s) Oral every 12 hours  pantoprazole   Suspension 40 milliGRAM(s) Oral daily  piperacillin/tazobactam IVPB.. 3.375 Gram(s) IV Intermittent every 8 hours  polyethylene glycol 3350 17 Gram(s) Oral daily      VITAL:  T(C): , Max: 36.8 (09-14-19 @ 03:27)  T(F): , Max: 98.3 (09-14-19 @ 03:27)  HR: 66 (09-14-19 @ 08:25)  BP: 124/60 (09-14-19 @ 07:16)  BP(mean): 86 (09-14-19 @ 07:16)  RR: 18 (09-14-19 @ 07:16)  SpO2: 99% (09-14-19 @ 08:25)    I and O's:    09-13 @ 07:01  -  09-14 @ 07:00  --------------------------------------------------------  IN: 560 mL / OUT: 2775 mL / NET: -2215 mL    09-14 @ 07:01  -  09-14 @ 13:27  --------------------------------------------------------  IN: 700 mL / OUT: 900 mL / NET: -200 mL          PHYSICAL EXAM:    Constitutional: NAD  Neck:  No JVD  Respiratory: CTAB/L  Cardiovascular: S1 and S2  Gastrointestinal: BS+, soft, NT/ND  Extremities: No peripheral edema  Neurological: A/O x 3, no focal deficits  Psychiatric: Normal mood, normal affect  : + [place]  Skin: No rashes    LABS:                        7.9    12.7  )-----------( 627      ( 14 Sep 2019 06:01 )             23.6     09-14    129<L>  |  94<L>  |  21  ----------------------------<  100<H>  4.1   |  23  |  1.45<H>    Ca    8.4      14 Sep 2019 06:01  Mg     1.6     09-14    TPro  5.7<L>  /  Alb  2.4<L>  /  TBili  0.4  /  DBili  x   /  AST  25  /  ALT  32  /  AlkPhos  67  09-14      ASSESSMENT:  69 yr old female with H/O Thoraco abdominal Aortic aneurysm, Aortic Stenosis/ regurgitation S/P AVR (T), Ascending & hemiarch replacement 4/9/19 now sp thoracoabdominal aortic aneurysm.  - DURGA- Non oliguric that is likely ATN - Creatinine is improving   - Euvolemic   - Hyponatremia - stable  - Hypokalemia - improved  - b/l lower ext. weakness - s/p MRI head/neck with IV contrast this AM     RECOMMENDATION:  -Reduce free water intake. Encourage liquids with calories; Cont Ensure Enlive  -BMP daily  -Continue Amiodarone as ordered  -Cont PT  -The pulse ox is preserved. Unofficial CT of the lungs did not have a lot of effusion at all      IAN Erickson  [facility]  [phone]

## 2019-09-14 NOTE — PROGRESS NOTE ADULT - ASSESSMENT
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 She is now   s/p Reop Thoracoabdominal aneurysm open repair with Dacron graft and celiac SMA bypass, reimplantation of lumbar artery with Dr. Briseno on 8/29. Intraop 2 PRBC, 2 Platelets,Her postoperative course was complicated by new onset BLE weakness R>L for which she was placed on MAPs of 110 and CSF drainage of 20cc. She was extubated POD2 ,and required BIPAP on POD3. She went into afib with RVR, placed on esmolol-weaned off- on POD4 but converted back to NSR without treatment.  9/1 RLE improving, NGT removed, BIPAP, nicardipine restarted briefly for HTN, Cr 2.43  9/2 Lumbar drain removed, Afib RVR converted without intervention, Vaso weaned off today  9/3 S&S eval recommend Dysphagia 1 nectar thick diet, 3L NC, PT recommending acute rehab, Cr improving 1.93 from 2.04  9/4 Afib with RVR, transferred to CTU, metoprolol 2.5 IV x2, amiodarone drip started, vasopressin for hypotension, esmolol drip for rate control, Digoxin IV x1, converted to NSR, Cr 1.76 DURGA improving, targeting MAPs of 70-80, L pleural chest tubes x3 removed, S&S recommend dysphagia 1 thin liquids, bradycardic at night and amio decreased to 0.5 from 1  9/5 Transitioned to Labetalol from esmolol, PO amio taper, A-line/introducer/and muñoz discontinued, transferred to SDU  She is now transferred from the CTU to SDU. She no longer has any critical care needs at this time and is stable enough for transfer to SDU.   9/6 Increase PT/ ambulation. Dys 1 diet, speech/swallow f/u, repeat mbs today. Maintaining nsr  9/7 Tolerating Dys II diet, check urinalysis, for leukocytosis. CXR negative for infiltrate. Creat 1,9   9/8 Zosyn initiated for suspected UTI/LLL pna. ID consulted for assistance in mangement. creatinine improving . -1415 fluid balance, ct chest, BC x 2  9/9 WBC remains elevated, 21, afebrile, pending CT results r/o PNA. Continue zosyn.    D/w PT,  pt with decrease in  balance/ fatigued today, RLE weakness unchanged as per pt.   relative hypotension, irineo, labetolol d/c, maintain mao>85.  9/10 CT with compressive atelectasis,  placed on nocturnal bipap as d/w  Dr Briseno  Wbc trending down  Cont zosyn UTI/R/o pna  9/11 Zosyn for pna x 7 days  PT  Neuro consulted for f/u  9/12  CT scan  abdomen complete   ,  worked with PT,   plus one strength  r leg,  OOB to chair,  Lt thoracotomy incision  9/13 Pending mri at neuro  rec.HCT drifting down, repeat performed.Labetolol d/c lopressor started for HR control but to liberalize bp Pending acute rehab  9/14 MRI negative for mass, shift, bleed, flow limiting lesions

## 2019-09-14 NOTE — PROGRESS NOTE ADULT - SUBJECTIVE AND OBJECTIVE BOX
VITAL SIGNS    Telemetry:  SR 60-80  Vital Signs Last 24 Hrs  T(C): 36.7 (19 @ 07:16), Max: 36.8 (19 @ 03:27)  T(F): 98.1 (19 @ 07:16), Max: 98.3 (19 @ 03:27)  HR: 64 (19 @ 15:19) (60 - 86)  BP: 123/59 (19 @ 15:19) (123/59 - 178/77)  RR: 18 (19 @ 15:19) (17 - 18)  SpO2: 98% (19 @ 15:19) (97% - 100%)             @ 07:01  -   @ 07:00  --------------------------------------------------------  IN: 560 mL / OUT: 2775 mL / NET: -2215 mL     @ 07:01  -   @ 15:54  --------------------------------------------------------  IN: 700 mL / OUT: 900 mL / NET: -200 mL       Daily     Daily Weight in k (14 Sep 2019 07:16)  Admit Wt: Drug Dosing Weight  Height (cm): 167.64 (29 Aug 2019 06:40)  Weight (kg): 67.6 (29 Aug 2019 06:40)  BMI (kg/m2): 24.1 (29 Aug 2019 06:40)  BSA (m2): 1.77 (29 Aug 2019 06:40)    Bilirubin Total, Serum: 0.4 mg/dL ( @ 06:01)    MEDICATIONS  acetaminophen  IVPB .. 1000 milliGRAM(s) IV Intermittent once PRN  ALPRAZolam 0.25 milliGRAM(s) Oral every 8 hours PRN  amiodarone    Tablet 200 milliGRAM(s) Oral daily  amiodarone    Tablet   Oral   ascorbic acid 500 milliGRAM(s) Oral two times a day  aspirin  chewable 81 milliGRAM(s) Oral daily  docusate sodium 100 milliGRAM(s) Oral three times a day  ferrous    sulfate 325 milliGRAM(s) Oral two times a day  folic acid 1 milliGRAM(s) Oral daily  heparin  Injectable 5000 Unit(s) SubCutaneous every 8 hours  lidocaine   Patch 1 Patch Transdermal daily  metoprolol tartrate 25 milliGRAM(s) Oral every 12 hours  mineral oil 30 milliLiter(s) Oral two times a day PRN  ondansetron Injectable 4 milliGRAM(s) IV Push three times a day PRN  oxyCODONE    IR 5 milliGRAM(s) Oral every 4 hours PRN  pantoprazole   Suspension 40 milliGRAM(s) Oral daily  piperacillin/tazobactam IVPB.. 3.375 Gram(s) IV Intermittent every 8 hours  polyethylene glycol 3350 17 Gram(s) Oral daily      >>> <<<  PHYSICAL EXAM  Subjective: NAD   Neurology: alert and oriented x 3, nonfocal, no gross deficits  CV : s1s2  Sternal Wound :  CDI , Stable  Lungs: CTA b/l left thoracotomy site cd/i staples intact  Abdomen: soft, NT,ND, (+ )BM  :  voiding  Extremities:   + 1 edema RLE weakness 2/5    LABS      129<L>  |  94<L>  |  21  ----------------------------<  100<H>  4.1   |  23  |  1.45<H>    Ca    8.4      14 Sep 2019 06:01  Mg     1.6         TPro  5.7<L>  /  Alb  2.4<L>  /  TBili  0.4  /  DBili  x   /  AST  25  /  ALT  32  /  AlkPhos  67                                   7.9    12.7  )-----------( 627      ( 14 Sep 2019 06:01 )             23.6                 PAST MEDICAL & SURGICAL HISTORY:  Intermittent abdominal pain: periumbilical  Thoracoabdominal aortic aneurysm (TAAA) without rupture  Murmur, cardiac  Cataract: bilateral  Preeclampsia  HTN (hypertension): controlled  S/P aortic valve repair: 19 with bioprostetic valve  Thoracoabdominal aortic aneurysm (TAAA) without rupture: s/p repair  Asceding aortic aneurysm repair 2019  S/P cataract surgery  Arm fracture, left:

## 2019-09-15 RX ADMIN — OXYCODONE HYDROCHLORIDE 5 MILLIGRAM(S): 5 TABLET ORAL at 11:47

## 2019-09-15 RX ADMIN — PIPERACILLIN AND TAZOBACTAM 25 GRAM(S): 4; .5 INJECTION, POWDER, LYOPHILIZED, FOR SOLUTION INTRAVENOUS at 05:57

## 2019-09-15 RX ADMIN — Medication 25 MILLIGRAM(S): at 05:57

## 2019-09-15 RX ADMIN — OXYCODONE HYDROCHLORIDE 5 MILLIGRAM(S): 5 TABLET ORAL at 17:45

## 2019-09-15 RX ADMIN — Medication 81 MILLIGRAM(S): at 08:02

## 2019-09-15 RX ADMIN — OXYCODONE HYDROCHLORIDE 5 MILLIGRAM(S): 5 TABLET ORAL at 21:07

## 2019-09-15 RX ADMIN — HEPARIN SODIUM 5000 UNIT(S): 5000 INJECTION INTRAVENOUS; SUBCUTANEOUS at 21:07

## 2019-09-15 RX ADMIN — Medication 325 MILLIGRAM(S): at 17:17

## 2019-09-15 RX ADMIN — Medication 25 MILLIGRAM(S): at 17:17

## 2019-09-15 RX ADMIN — OXYCODONE HYDROCHLORIDE 5 MILLIGRAM(S): 5 TABLET ORAL at 10:46

## 2019-09-15 RX ADMIN — AMIODARONE HYDROCHLORIDE 200 MILLIGRAM(S): 400 TABLET ORAL at 05:57

## 2019-09-15 RX ADMIN — HEPARIN SODIUM 5000 UNIT(S): 5000 INJECTION INTRAVENOUS; SUBCUTANEOUS at 13:29

## 2019-09-15 RX ADMIN — HEPARIN SODIUM 5000 UNIT(S): 5000 INJECTION INTRAVENOUS; SUBCUTANEOUS at 05:57

## 2019-09-15 RX ADMIN — Medication 1 MILLIGRAM(S): at 08:02

## 2019-09-15 RX ADMIN — OXYCODONE HYDROCHLORIDE 5 MILLIGRAM(S): 5 TABLET ORAL at 22:00

## 2019-09-15 RX ADMIN — LIDOCAINE 1 PATCH: 4 CREAM TOPICAL at 08:02

## 2019-09-15 RX ADMIN — LIDOCAINE 1 PATCH: 4 CREAM TOPICAL at 20:34

## 2019-09-15 RX ADMIN — Medication 500 MILLIGRAM(S): at 05:57

## 2019-09-15 RX ADMIN — OXYCODONE HYDROCHLORIDE 5 MILLIGRAM(S): 5 TABLET ORAL at 16:45

## 2019-09-15 RX ADMIN — PANTOPRAZOLE SODIUM 40 MILLIGRAM(S): 20 TABLET, DELAYED RELEASE ORAL at 05:57

## 2019-09-15 RX ADMIN — Medication 500 MILLIGRAM(S): at 17:17

## 2019-09-15 RX ADMIN — OXYCODONE HYDROCHLORIDE 5 MILLIGRAM(S): 5 TABLET ORAL at 03:17

## 2019-09-15 RX ADMIN — OXYCODONE HYDROCHLORIDE 5 MILLIGRAM(S): 5 TABLET ORAL at 04:00

## 2019-09-15 RX ADMIN — Medication 325 MILLIGRAM(S): at 05:57

## 2019-09-15 RX ADMIN — LIDOCAINE 1 PATCH: 4 CREAM TOPICAL at 17:17

## 2019-09-15 NOTE — PROGRESS NOTE ADULT - SUBJECTIVE AND OBJECTIVE BOX
VITAL SIGNS    Telemetry:      Vital Signs Last 24 Hrs  T(C): 36.7 (09-15-19 @ 07:31), Max: 36.8 (09-14-19 @ 18:53)  T(F): 98.1 (09-15-19 @ 07:31), Max: 98.2 (09-14-19 @ 18:53)  HR: 63 (09-15-19 @ 11:06) (63 - 79)  BP: 152/66 (09-15-19 @ 11:06) (123/59 - 165/70)  RR: 18 (09-15-19 @ 11:06) (18 - 18)  SpO2: 99% (09-15-19 @ 11:06) (97% - 99%)                   Daily     Daily         CAPILLARY BLOOD GLUCOSE        NSR  60-75                      PHYSICAL EXAM    Neurology: alert and oriented x 3,  CV :  RRR     lt thoracotomy   dressing CDI  w/ staples  Lungs:   CTA B/L  Abdomen: soft, nontender, nondistended, positive bowel sounds  Extremities:       rt leg split, + 1 strenght

## 2019-09-15 NOTE — PROGRESS NOTE ADULT - SUBJECTIVE AND OBJECTIVE BOX
Patient seen and examined in chair. No new complaints.       MEDICATIONS  (STANDING):  amiodarone    Tablet 200 milliGRAM(s) Oral daily  amiodarone    Tablet   Oral   ascorbic acid 500 milliGRAM(s) Oral two times a day  aspirin  chewable 81 milliGRAM(s) Oral daily  docusate sodium 100 milliGRAM(s) Oral three times a day  ferrous    sulfate 325 milliGRAM(s) Oral two times a day  folic acid 1 milliGRAM(s) Oral daily  heparin  Injectable 5000 Unit(s) SubCutaneous every 8 hours  lidocaine   Patch 1 Patch Transdermal daily  metoprolol tartrate 25 milliGRAM(s) Oral every 12 hours  pantoprazole   Suspension 40 milliGRAM(s) Oral daily  polyethylene glycol 3350 17 Gram(s) Oral daily      VITAL:  T(C): , Max: 36.8 (09-14-19 @ 18:53)  T(F): , Max: 98.2 (09-14-19 @ 18:53)  HR: 63 (09-15-19 @ 11:06)  BP: 152/66 (09-15-19 @ 11:06)  BP(mean): 93 (09-15-19 @ 07:31)  RR: 18 (09-15-19 @ 11:06)  SpO2: 99% (09-15-19 @ 11:06)    I and O's:    09-14 @ 07:01  -  09-15 @ 07:00  --------------------------------------------------------  IN: 1620 mL / OUT: 3850 mL / NET: -2230 mL    09-15 @ 07:01  -  09-15 @ 13:23  --------------------------------------------------------  IN: 720 mL / OUT: 400 mL / NET: 320 mL          PHYSICAL EXAM:    Constitutional: NAD  Neck:  No JVD  Respiratory: CTAB/L  Cardiovascular: S1 and S2  Gastrointestinal: BS+, soft, NT/ND  Extremities: No peripheral edema  Neurological: A/O x 3, no focal deficits  Psychiatric: Normal mood, normal affect  : + Galicia  Skin: No rashes    LABS:                        7.9    12.7  )-----------( 627      ( 14 Sep 2019 06:01 )             23.6     09-14    129<L>  |  94<L>  |  21  ----------------------------<  100<H>  4.1   |  23  |  1.45<H>    Ca    8.4      14 Sep 2019 06:01  Phos  2.7     09-14  Mg     1.6     09-14    TPro  5.7<L>  /  Alb  2.4<L>  /  TBili  0.4  /  DBili  x   /  AST  25  /  ALT  32  /  AlkPhos  67  09-14        ASSESSMENT:  69 yr old female with H/O Thoraco abdominal Aortic aneurysm, Aortic Stenosis/ regurgitation S/P AVR (T), Ascending & hemiarch replacement 4/9/19 now sp thoracoabdominal aortic aneurysm.  *No labs drawn today per CTS  - DURGA- Non oliguric that is likely ATN - Creatinine is improving on yesterday's labs  - Euvolemic   - Hyponatremia - stable  - Hypokalemia - improved  - b/l lower ext. weakness - s/p MRI head/neck with IV contrast 9/14    RECOMMENDATION:  -Reduce free water intake. Encourage liquids with calories; Cont Ensure Enlive  -BMP in AM  -Continue Amiodarone as ordered  -Cont PT  -The pulse ox is preserved. Unofficial CT of the lungs did not have a lot of effusion at all        IAN Erickson  [facility]  [phone]

## 2019-09-15 NOTE — PROGRESS NOTE ADULT - PROBLEM SELECTOR PLAN 1
MAPs 80-90    PT/OT,   Lt thoracotomy incision  serial neurovascular exams,   [facility] re[facility] plan this wk

## 2019-09-15 NOTE — PROGRESS NOTE ADULT - ASSESSMENT
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 She is now   s/p Reop Thoracoabdominal aneurysm open repair with Dacron graft and celiac SMA bypass, reimplantation of lumbar artery with Dr. Briseno on 8/29. Intraop 2 PRBC, 2 Platelets,Her postoperative course was complicated by new onset BLE weakness R>L for which she was placed on MAPs of 110 and CSF drainage of 20cc. She was extubated POD2 ,and required BIPAP on POD3. She went into afib with RVR, placed on esmolol-weaned off- on POD4 but converted back to NSR without treatment.  9/1 RLE improving, NGT removed, BIPAP, nicardipine restarted briefly for HTN, Cr 2.43  9/2 Lumbar drain removed, Afib RVR converted without intervention, Vaso weaned off today  9/3 S&S eval recommend Dysphagia 1 nectar thick diet, 3L NC, PT recommending acute rehab, Cr improving 1.93 from 2.04  9/4 Afib with RVR, transferred to CTU, metoprolol 2.5 IV x2, amiodarone drip started, vasopressin for hypotension, esmolol drip for rate control, Digoxin IV x1, converted to NSR, Cr 1.76 DURGA improving, targeting MAPs of 70-80, L pleural chest tubes x3 removed, S&S recommend dysphagia 1 thin liquids, bradycardic at night and amio decreased to 0.5 from 1  9/5 Transitioned to Labetalol from esmolol, PO amio taper, A-line/introducer/and muñoz discontinued, transferred to SDU  She is now transferred from the CTU to SDU. She no longer has any critical care needs at this time and is stable enough for transfer to SDU.   9/6 Increase PT/ ambulation. Dys 1 diet, speech/swallow f/u, repeat mbs today. Maintaining nsr  9/7 Tolerating Dys II diet, check urinalysis, for leukocytosis. CXR negative for infiltrate. Creat 1,9   9/8 Zosyn initiated for suspected UTI/LLL pna. ID consulted for assistance in mangement. creatinine improving . -1415 fluid balance, ct chest, BC x 2  9/9 WBC remains elevated, 21, afebrile, pending CT results r/o PNA. Continue zosyn.    D/w PT,  pt with decrease in  balance/ fatigued today, RLE weakness unchanged as per pt.   relative hypotension, irineo, labetolol d/c, maintain mao>85.  9/10 CT with compressive atelectasis,  placed on nocturnal bipap as d/w  Dr Briseno  Wbc trending down  Cont zosyn UTI/R/o pna  9/11 Zosyn for pna x 7 days  PT  Neuro consulted for f/u  9/12  CT scan  abdomen complete   ,  worked with PT,   plus one strength  r leg,  OOB to chair,  Lt thoracotomy incision  9/13 Pending mri at neuro  rec.HCT drifting down, repeat performed.Labetolol d/c lopressor started for HR control but to liberalize bp Pending acute rehab  9/14 MRI negative for mass, shift, bleed, flow limiting lesions  9/15   zosyn dc,   OOB to chair   NSR   rt leg splint w/ cont weakness no

## 2019-09-16 LAB
ANION GAP SERPL CALC-SCNC: 14 MMOL/L — SIGNIFICANT CHANGE UP (ref 5–17)
ANION GAP SERPL CALC-SCNC: 14 MMOL/L — SIGNIFICANT CHANGE UP (ref 5–17)
BUN SERPL-MCNC: 16 MG/DL — SIGNIFICANT CHANGE UP (ref 7–23)
BUN SERPL-MCNC: 17 MG/DL — SIGNIFICANT CHANGE UP (ref 7–23)
CALCIUM SERPL-MCNC: 8.5 MG/DL — SIGNIFICANT CHANGE UP (ref 8.4–10.5)
CALCIUM SERPL-MCNC: 8.5 MG/DL — SIGNIFICANT CHANGE UP (ref 8.4–10.5)
CHLORIDE SERPL-SCNC: 93 MMOL/L — LOW (ref 96–108)
CHLORIDE SERPL-SCNC: 94 MMOL/L — LOW (ref 96–108)
CO2 SERPL-SCNC: 21 MMOL/L — LOW (ref 22–31)
CO2 SERPL-SCNC: 23 MMOL/L — SIGNIFICANT CHANGE UP (ref 22–31)
CREAT SERPL-MCNC: 1.22 MG/DL — SIGNIFICANT CHANGE UP (ref 0.5–1.3)
CREAT SERPL-MCNC: 1.26 MG/DL — SIGNIFICANT CHANGE UP (ref 0.5–1.3)
GLUCOSE SERPL-MCNC: 101 MG/DL — HIGH (ref 70–99)
GLUCOSE SERPL-MCNC: 104 MG/DL — HIGH (ref 70–99)
HCT VFR BLD CALC: 25.1 % — LOW (ref 34.5–45)
HGB BLD-MCNC: 8.5 G/DL — LOW (ref 11.5–15.5)
MCHC RBC-ENTMCNC: 29.6 PG — SIGNIFICANT CHANGE UP (ref 27–34)
MCHC RBC-ENTMCNC: 33.8 GM/DL — SIGNIFICANT CHANGE UP (ref 32–36)
MCV RBC AUTO: 87.5 FL — SIGNIFICANT CHANGE UP (ref 80–100)
PLATELET # BLD AUTO: 653 K/UL — HIGH (ref 150–400)
POTASSIUM SERPL-MCNC: 2.9 MMOL/L — CRITICAL LOW (ref 3.5–5.3)
POTASSIUM SERPL-MCNC: 4.4 MMOL/L — SIGNIFICANT CHANGE UP (ref 3.5–5.3)
POTASSIUM SERPL-SCNC: 2.9 MMOL/L — CRITICAL LOW (ref 3.5–5.3)
POTASSIUM SERPL-SCNC: 4.4 MMOL/L — SIGNIFICANT CHANGE UP (ref 3.5–5.3)
RBC # BLD: 2.87 M/UL — LOW (ref 3.8–5.2)
RBC # FLD: 14.3 % — SIGNIFICANT CHANGE UP (ref 10.3–14.5)
SODIUM SERPL-SCNC: 129 MMOL/L — LOW (ref 135–145)
SODIUM SERPL-SCNC: 130 MMOL/L — LOW (ref 135–145)
WBC # BLD: 13 K/UL — HIGH (ref 3.8–10.5)
WBC # FLD AUTO: 13 K/UL — HIGH (ref 3.8–10.5)

## 2019-09-16 PROCEDURE — 99232 SBSQ HOSP IP/OBS MODERATE 35: CPT

## 2019-09-16 RX ORDER — POTASSIUM CHLORIDE 20 MEQ
20 PACKET (EA) ORAL
Refills: 0 | Status: DISCONTINUED | OUTPATIENT
Start: 2019-09-16 | End: 2019-09-16

## 2019-09-16 RX ORDER — POTASSIUM CHLORIDE 20 MEQ
40 PACKET (EA) ORAL EVERY 4 HOURS
Refills: 0 | Status: COMPLETED | OUTPATIENT
Start: 2019-09-16 | End: 2019-09-16

## 2019-09-16 RX ADMIN — Medication 40 MILLIEQUIVALENT(S): at 17:10

## 2019-09-16 RX ADMIN — OXYCODONE HYDROCHLORIDE 5 MILLIGRAM(S): 5 TABLET ORAL at 01:34

## 2019-09-16 RX ADMIN — AMIODARONE HYDROCHLORIDE 200 MILLIGRAM(S): 400 TABLET ORAL at 05:03

## 2019-09-16 RX ADMIN — Medication 40 MILLIEQUIVALENT(S): at 09:24

## 2019-09-16 RX ADMIN — OXYCODONE HYDROCHLORIDE 5 MILLIGRAM(S): 5 TABLET ORAL at 06:30

## 2019-09-16 RX ADMIN — Medication 81 MILLIGRAM(S): at 11:55

## 2019-09-16 RX ADMIN — Medication 0.25 MILLIGRAM(S): at 22:15

## 2019-09-16 RX ADMIN — Medication 1 MILLIGRAM(S): at 11:56

## 2019-09-16 RX ADMIN — Medication 325 MILLIGRAM(S): at 17:10

## 2019-09-16 RX ADMIN — Medication 500 MILLIGRAM(S): at 05:03

## 2019-09-16 RX ADMIN — Medication 25 MILLIGRAM(S): at 17:10

## 2019-09-16 RX ADMIN — Medication 40 MILLIEQUIVALENT(S): at 13:49

## 2019-09-16 RX ADMIN — Medication 25 MILLIGRAM(S): at 05:03

## 2019-09-16 RX ADMIN — OXYCODONE HYDROCHLORIDE 5 MILLIGRAM(S): 5 TABLET ORAL at 20:20

## 2019-09-16 RX ADMIN — OXYCODONE HYDROCHLORIDE 5 MILLIGRAM(S): 5 TABLET ORAL at 20:50

## 2019-09-16 RX ADMIN — OXYCODONE HYDROCHLORIDE 5 MILLIGRAM(S): 5 TABLET ORAL at 16:35

## 2019-09-16 RX ADMIN — HEPARIN SODIUM 5000 UNIT(S): 5000 INJECTION INTRAVENOUS; SUBCUTANEOUS at 21:13

## 2019-09-16 RX ADMIN — LIDOCAINE 1 PATCH: 4 CREAM TOPICAL at 12:05

## 2019-09-16 RX ADMIN — Medication 500 MILLIGRAM(S): at 17:10

## 2019-09-16 RX ADMIN — OXYCODONE HYDROCHLORIDE 5 MILLIGRAM(S): 5 TABLET ORAL at 12:01

## 2019-09-16 RX ADMIN — Medication 325 MILLIGRAM(S): at 05:03

## 2019-09-16 RX ADMIN — LIDOCAINE 1 PATCH: 4 CREAM TOPICAL at 19:33

## 2019-09-16 RX ADMIN — OXYCODONE HYDROCHLORIDE 5 MILLIGRAM(S): 5 TABLET ORAL at 05:36

## 2019-09-16 RX ADMIN — HEPARIN SODIUM 5000 UNIT(S): 5000 INJECTION INTRAVENOUS; SUBCUTANEOUS at 13:49

## 2019-09-16 RX ADMIN — Medication 20 MILLIEQUIVALENT(S): at 07:02

## 2019-09-16 RX ADMIN — HEPARIN SODIUM 5000 UNIT(S): 5000 INJECTION INTRAVENOUS; SUBCUTANEOUS at 05:03

## 2019-09-16 RX ADMIN — OXYCODONE HYDROCHLORIDE 5 MILLIGRAM(S): 5 TABLET ORAL at 02:30

## 2019-09-16 RX ADMIN — OXYCODONE HYDROCHLORIDE 5 MILLIGRAM(S): 5 TABLET ORAL at 12:31

## 2019-09-16 RX ADMIN — OXYCODONE HYDROCHLORIDE 5 MILLIGRAM(S): 5 TABLET ORAL at 16:05

## 2019-09-16 RX ADMIN — PANTOPRAZOLE SODIUM 40 MILLIGRAM(S): 20 TABLET, DELAYED RELEASE ORAL at 11:58

## 2019-09-16 NOTE — PROGRESS NOTE ADULT - ASSESSMENT
69 year old female s/p ascending thoracoabdominal aortic aneurysm repair and aortic valve replaced with a bioprosthetic valve in April 2019, reoperative repair on 8/29/2019  with course complicated with DURGA and Afib with RVR. 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 She states she has developed a productive cough with whitish sputum, CXR with ? LLL PNA vs atelectasis. ID consulted for rising leukocytosis.  Started on zosyn  Leukocytosis elevated but stable  CT chest with complex mod left pleural effusion - reviewed  with radiology and suspects PNA present.   Blood cultures so far no growth to date  Recommend:  -Completed a 7-day course on Sat 9/14/19.  -Continue to monitor off antibiotics.  -Continue to trend WBC.  -Monitor for fevers.

## 2019-09-16 NOTE — PROGRESS NOTE ADULT - ASSESSMENT
ASSESSMENT:  69 yr old female with H/O Thoraco abdominal Aortic aneurysm, Aortic Stenosis/ regurgitation S/P AVR (T), Ascending & hemiarch replacement 4/9/19 now sp thoracoabdominal aortic aneurysm.     - Resolved DURGA  - Hyponatremia - stable  - Hypokalemia -   - b/l lower ext. weakness - s/p MRI head/neck with IV contrast 9/14    RECOMMENDATION:  -Reduce free water intake. Encourage liquids with calories; Cont Ensure Enlive  -Kdur 40meq po x 3 doses   -?Restart  Amiodarone   -Cont PT ASSESSMENT:  69 yr old female with H/O Thoraco abdominal Aortic aneurysm, Aortic Stenosis/ regurgitation S/P AVR (T), Ascending & hemiarch replacement 4/9/19 now sp thoracoabdominal aortic aneurysm.     - Resolved DURGA  - Hyponatremia - stable  - Hypokalemia -   - b/l lower ext. weakness - s/p MRI head/neck with IV contrast 9/14    RECOMMENDATION:  -Reduce free water intake. Encourage liquids with calories; Cont Ensure Enlive  -Kdur 40meq po x 3 doses   -?Restart  Amiodarone   -Cont PT  -DC Miralax and colace

## 2019-09-16 NOTE — PROGRESS NOTE ADULT - ASSESSMENT
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 She is now   s/p Reop Thoracoabdominal aneurysm open repair with Dacron graft and celiac SMA bypass, reimplantation of lumbar artery with Dr. Briseno on 8/29. Intraop 2 PRBC, 2 Platelets,Her postoperative course was complicated by new onset BLE weakness R>L for which she was placed on MAPs of 110 and CSF drainage of 20cc. She was extubated POD2 ,and required BIPAP on POD3. She went into afib with RVR, placed on esmolol-weaned off- on POD4 but converted back to NSR without treatment.  9/1 RLE improving, NGT removed, BIPAP, nicardipine restarted briefly for HTN, Cr 2.43  9/2 Lumbar drain removed, Afib RVR converted without intervention, Vaso weaned off today  9/3 S&S eval recommend Dysphagia 1 nectar thick diet, 3L NC, PT recommending acute rehab, Cr improving 1.93 from 2.04  9/4 Afib with RVR, transferred to CTU, metoprolol 2.5 IV x2, amiodarone drip started, vasopressin for hypotension, esmolol drip for rate control, Digoxin IV x1, converted to NSR, Cr 1.76 DURGA improving, targeting MAPs of 70-80, L pleural chest tubes x3 removed, S&S recommend dysphagia 1 thin liquids, bradycardic at night and amio decreased to 0.5 from 1  9/5 Transitioned to Labetalol from esmolol, PO amio taper, A-line/introducer/and muñoz discontinued, transferred to SDU  She is now transferred from the CTU to SDU. She no longer has any critical care needs at this time and is stable enough for transfer to SDU.   9/6 Increase PT/ ambulation. Dys 1 diet, speech/swallow f/u, repeat mbs today. Maintaining nsr  9/7 Tolerating Dys II diet, check urinalysis, for leukocytosis. CXR negative for infiltrate. Creat 1,9   9/8 Zosyn initiated for suspected UTI/LLL pna. ID consulted for assistance in mangement. creatinine improving . -1415 fluid balance, ct chest, BC x 2  9/9 WBC remains elevated, 21, afebrile, pending CT results r/o PNA. Continue zosyn.    D/w PT,  pt with decrease in  balance/ fatigued today, RLE weakness unchanged as per pt.   relative hypotension, irineo, labetolol d/c, maintain mao>85.  9/10 CT with compressive atelectasis,  placed on nocturnal bipap as d/w  Dr Briseno  Wbc trending down  Cont zosyn UTI/R/o pna  9/11 Zosyn for pna x 7 days  PT  Neuro consulted for f/u  9/12  CT scan  abdomen complete   ,  worked with PT,   plus one strength  r leg,  OOB to chair,  Lt thoracotomy incision  9/13 Pending mri at neuro  rec.HCT drifting down, repeat performed.Labetolol d/c lopressor started for HR control but to liberalize bp Pending acute rehab  9/14 MRI negative for mass, shift, bleed, flow limiting lesions  9/15   zosyn dc,   OOB to chair   NSR   rt leg splint w/ cont weakness  9/16 Pt voiding, female urine collection device, incontenent   Hypokalemia, supplementation increased.  Zosyn complete  Rehab planning

## 2019-09-16 NOTE — PROGRESS NOTE ADULT - SUBJECTIVE AND OBJECTIVE BOX
[facility] [phone]        Patient seen and examined         MEDICATIONS  (STANDING):  amiodarone    Tablet 200 milliGRAM(s) Oral daily  amiodarone    Tablet   Oral   ascorbic acid 500 milliGRAM(s) Oral two times a day  aspirin  chewable 81 milliGRAM(s) Oral daily  docusate sodium 100 milliGRAM(s) Oral three times a day  ferrous    sulfate 325 milliGRAM(s) Oral two times a day  folic acid 1 milliGRAM(s) Oral daily  heparin  Injectable 5000 Unit(s) SubCutaneous every 8 hours  lidocaine   Patch 1 Patch Transdermal daily  metoprolol tartrate 25 milliGRAM(s) Oral every 12 hours  pantoprazole   Suspension 40 milliGRAM(s) Oral daily  polyethylene glycol 3350 17 Gram(s) Oral daily  potassium chloride    Tablet ER 40 milliEquivalent(s) Oral every 4 hours      VITAL:  T(C): , Max: 37 (09-16-19 @ 07:03)  T(F): , Max: 98.6 (09-16-19 @ 07:03)  HR: 61 (09-16-19 @ 07:03)  BP: 145/66 (09-16-19 @ 07:03)  BP(mean): 95 (09-16-19 @ 07:03)  RR: 20 (09-16-19 @ 07:03)  SpO2: 97% (09-16-19 @ 07:03)  Wt(kg): --    I and O's:    09-15 @ 07:01  -  09-16 @ 07:00  --------------------------------------------------------  IN: 2040 mL / OUT: 1970 mL / NET: 70 mL          PHYSICAL EXAM:    Constitutional: NAD  Neck:  No JVD  Respiratory: CTAB/L  Cardiovascular: S1 and S2  Gastrointestinal: BS+, soft, NT/ND  Extremities: No peripheral edema  Neurological: A/O x 3, no focal deficits  Psychiatric: Normal mood, normal affect  : No [place]  Skin: No rashes  Access: Not applicable    LABS:                        8.5    13.0  )-----------( 653      ( 16 Sep 2019 06:09 )             25.1     09-16    130<L>  |  93<L>  |  16  ----------------------------<  104<H>  2.9<LL>   |  23  |  1.26    Ca    8.5      16 Sep 2019 06:09            Urine Studies:          RADIOLOGY & ADDITIONAL STUDIES: [facility] [phone]        Patient seen and examined in the chair and she just finished with PT.  She is having loose stools         MEDICATIONS  (STANDING):  amiodarone    Tablet 200 milliGRAM(s) Oral daily  amiodarone    Tablet   Oral   ascorbic acid 500 milliGRAM(s) Oral two times a day  aspirin  chewable 81 milliGRAM(s) Oral daily  docusate sodium 100 milliGRAM(s) Oral three times a day  ferrous    sulfate 325 milliGRAM(s) Oral two times a day  folic acid 1 milliGRAM(s) Oral daily  heparin  Injectable 5000 Unit(s) SubCutaneous every 8 hours  lidocaine   Patch 1 Patch Transdermal daily  metoprolol tartrate 25 milliGRAM(s) Oral every 12 hours  pantoprazole   Suspension 40 milliGRAM(s) Oral daily  polyethylene glycol 3350 17 Gram(s) Oral daily  potassium chloride    Tablet ER 40 milliEquivalent(s) Oral every 4 hours      VITAL:  T(C): , Max: 37 (09-16-19 @ 07:03)  T(F): , Max: 98.6 (09-16-19 @ 07:03)  HR: 61 (09-16-19 @ 07:03)  BP: 145/66 (09-16-19 @ 07:03)  BP(mean): 95 (09-16-19 @ 07:03)  RR: 20 (09-16-19 @ 07:03)  SpO2: 97% (09-16-19 @ 07:03)  Wt(kg): --    I and O's:    09-15 @ 07:01  -  09-16 @ 07:00  --------------------------------------------------------  IN: 2040 mL / OUT: 1970 mL / NET: 70 mL          PHYSICAL EXAM:    Constitutional: NAD  Neck:  No JVD  Respiratory: CTAB/L  Cardiovascular: S1 and S2  Gastrointestinal: BS+, soft, NT/ND  Extremities: No peripheral edema  Neurological: A/O x 3, right leg weakness  Psychiatric: Normal mood, normal affect  : No Muñoz + external muñoz   Skin: No rashes  Access: Not applicable    LABS:                        8.5    13.0  )-----------( 653      ( 16 Sep 2019 06:09 )             25.1     09-16    130<L>  |  93<L>  |  16  ----------------------------<  104<H>  2.9<LL>   |  23  |  1.26    Ca    8.5      16 Sep 2019 06:09            Urine Studies:          RADIOLOGY & ADDITIONAL STUDIES:

## 2019-09-16 NOTE — PROGRESS NOTE ADULT - SUBJECTIVE AND OBJECTIVE BOX
Patient scheduled for Colonoscopy    Indication for procedure. BRBPR (bright red blood per rectum)    Referring Provider. Argelia De Los Santos PA-C    ? Not Needed    Arrival time verified? Yes, 1230    Facility location verified? Yes, [facility]    Instructions given regarding prep and procedure    Prep Type Golytely    Are you taking any anticoagulants or blood thinners? Aspirin    Instructions given? Stopped    Electronic implanted devices? No    Pre procedure teaching completed? Yes    Transportation from procedure? None, Patient states that he does not want any sedation.     H&P / Pre op physical completed? N/A           im ok    VITAL SIGNS    Telemetry:  nsr 60  Vital Signs Last 24 Hrs  T(C): 37 (09-16-19 @ 07:03), Max: 37 (09-16-19 @ 07:03)  T(F): 98.6 (09-16-19 @ 07:03), Max: 98.6 (09-16-19 @ 07:03)  HR: 61 (09-16-19 @ 07:03) (60 - 69)  BP: 145/66 (09-16-19 @ 07:03) (145/66 - 173/68)  RR: 20 (09-16-19 @ 07:03) (18 - 20)  SpO2: 97% (09-16-19 @ 07:03) (97% - 99%)                       8.5<L>                130<L>| 23   | 16           13.0<H> >-----------< 653<H>   ------------------------< 104<H>                 25.1<L>                2.9<LL>| 93<L>| 1.26                                                                      Ca 8.5   Mg x     Ph x                09-15-19 @ 07:01  -  09-16-19 @ 07:00  --------------------------------------------------------  IN: 2040 mL / OUT: 1970 mL / NET: 70 mL        Daily     Daily         CAPILLARY BLOOD GLUCOSE                    Coumadin    [ ] YES          [ x]      NO                                   PHYSICAL EXAM    Neurology: alert and oriented x 3, RLE . plantarflexion 1/5, no other movement to the hip  CV : s1 s2 RRR  Sternal Wound :  CDI , Stable  Lungs: cta  Abdomen: soft, nontender, nondistended, positive bowel sounds, last bowel movement +                      :     muñoz - sbd         Extremities:  +    edema   /  -   calve tenderness ,              acetaminophen  IVPB .. 1000 milliGRAM(s) IV Intermittent once PRN  ALPRAZolam 0.25 milliGRAM(s) Oral every 8 hours PRN  amiodarone    Tablet 200 milliGRAM(s) Oral daily  amiodarone    Tablet   Oral   ascorbic acid 500 milliGRAM(s) Oral two times a day  aspirin  chewable 81 milliGRAM(s) Oral daily  ferrous    sulfate 325 milliGRAM(s) Oral two times a day  folic acid 1 milliGRAM(s) Oral daily  heparin  Injectable 5000 Unit(s) SubCutaneous every 8 hours  lidocaine   Patch 1 Patch Transdermal daily  metoprolol tartrate 25 milliGRAM(s) Oral every 12 hours  mineral oil 30 milliLiter(s) Oral two times a day PRN  ondansetron Injectable 4 milliGRAM(s) IV Push three times a day PRN  oxyCODONE    IR 5 milliGRAM(s) Oral every 4 hours PRN  pantoprazole   Suspension 40 milliGRAM(s) Oral daily  potassium chloride    Tablet ER 40 milliEquivalent(s) Oral every 4 hours                    Physical Therapy Rec:   Home  [  ]   Home w/ PT  [  ]  Rehab  [  ]  Discussed with Cardiothoracic Team at AM rounds.

## 2019-09-16 NOTE — PROGRESS NOTE ADULT - SUBJECTIVE AND OBJECTIVE BOX
CC: Patient is a 69y old  Female who presents with a chief complaint of sp   lt thoracotomy   anysm repair   w. celiac SMA bypass (15 Sep 2019 13:56)    ID following for leukocytosis    Interval History/ROS: Patient feeling better overall. No fevers. Leukocytosis elevated but stable. No cough.     Rest of ROS negative.    Allergies  No Known Allergies    ANTIMICROBIALS:      OTHER MEDS:  acetaminophen  IVPB .. 1000 milliGRAM(s) IV Intermittent once PRN  ALPRAZolam 0.25 milliGRAM(s) Oral every 8 hours PRN  amiodarone    Tablet 200 milliGRAM(s) Oral daily  amiodarone    Tablet   Oral   ascorbic acid 500 milliGRAM(s) Oral two times a day  aspirin  chewable 81 milliGRAM(s) Oral daily  ferrous    sulfate 325 milliGRAM(s) Oral two times a day  folic acid 1 milliGRAM(s) Oral daily  heparin  Injectable 5000 Unit(s) SubCutaneous every 8 hours  lidocaine   Patch 1 Patch Transdermal daily  metoprolol tartrate 25 milliGRAM(s) Oral every 12 hours  mineral oil 30 milliLiter(s) Oral two times a day PRN  ondansetron Injectable 4 milliGRAM(s) IV Push three times a day PRN  oxyCODONE    IR 5 milliGRAM(s) Oral every 4 hours PRN  pantoprazole   Suspension 40 milliGRAM(s) Oral daily  potassium chloride    Tablet ER 40 milliEquivalent(s) Oral every 4 hours    PE:    Vital Signs Last 24 Hrs  T(C): 37 (16 Sep 2019 07:03), Max: 37 (16 Sep 2019 07:03)  T(F): 98.6 (16 Sep 2019 07:03), Max: 98.6 (16 Sep 2019 07:03)  HR: 61 (16 Sep 2019 07:03) (60 - 69)  BP: 145/66 (16 Sep 2019 07:03) (145/66 - 173/68)  BP(mean): 95 (16 Sep 2019 07:03) (95 - 108)  RR: 20 (16 Sep 2019 07:03) (18 - 20)  SpO2: 97% (16 Sep 2019 07:03) (97% - 99%)    Gen: AOx3, NAD, non-toxic  CV: S1+S2 normal, no murmurs  Resp: Decreased in left base  Abd: Soft, nontender, +BS  : +[place]  IV/Skin: No thrombophlebitis, left sided surgical site with staples in place  Neuro: no focal deficits        LABS:                          8.5    13.0  )-----------( 653      ( 16 Sep 2019 06:09 )             25.1       09-16    130<L>  |  93<L>  |  16  ----------------------------<  104<H>  2.9<LL>   |  23  |  1.26    Ca    8.5      16 Sep 2019 06:09    MICROBIOLOGY:  v  .Blood  09-08-19   No growth at 5 days.  --  --      RADIOLOGY:    < from: MR Head No Cont (09.14.19 @ 15:02) >  IMPRESSION:    MRI BRAIN:    Limited by motion    No acute intracranial hemorrhage, mass effect, vasogenic edema, or   evidence of acute territorial infarct. Mild to moderate white matter   microvascular ischemic disease.    MRA BRAIN:    Limited by motion.    No gross evidence for or vascular aneurysm or flow-limiting stenosis.    MRA NECK:    Limited by motion.    No gross evidence for flow-limiting stenosis. Left carotid bifurcation   not well evaluated. Right carotid bifurcation is unremarkable.      < end of copied text >

## 2019-09-16 NOTE — PROVIDER CONTACT NOTE (CRITICAL VALUE NOTIFICATION) - BACKGROUND
TAAA repair/celiac SMA bypass, reimplantation Lumbar Artery   B/L LE weakness, MRI negative    9/16 Galicia removal void trial

## 2019-09-17 LAB
ALBUMIN SERPL ELPH-MCNC: 2.8 G/DL — LOW (ref 3.3–5)
ALP SERPL-CCNC: 70 U/L — SIGNIFICANT CHANGE UP (ref 40–120)
ALT FLD-CCNC: 22 U/L — SIGNIFICANT CHANGE UP (ref 10–45)
ANION GAP SERPL CALC-SCNC: 14 MMOL/L — SIGNIFICANT CHANGE UP (ref 5–17)
AST SERPL-CCNC: 16 U/L — SIGNIFICANT CHANGE UP (ref 10–40)
BILIRUB SERPL-MCNC: 0.4 MG/DL — SIGNIFICANT CHANGE UP (ref 0.2–1.2)
BUN SERPL-MCNC: 17 MG/DL — SIGNIFICANT CHANGE UP (ref 7–23)
CALCIUM SERPL-MCNC: 8.7 MG/DL — SIGNIFICANT CHANGE UP (ref 8.4–10.5)
CHLORIDE SERPL-SCNC: 98 MMOL/L — SIGNIFICANT CHANGE UP (ref 96–108)
CO2 SERPL-SCNC: 20 MMOL/L — LOW (ref 22–31)
CREAT SERPL-MCNC: 1.28 MG/DL — SIGNIFICANT CHANGE UP (ref 0.5–1.3)
GLUCOSE SERPL-MCNC: 102 MG/DL — HIGH (ref 70–99)
HCT VFR BLD CALC: 23.8 % — LOW (ref 34.5–45)
HGB BLD-MCNC: 8 G/DL — LOW (ref 11.5–15.5)
MAGNESIUM SERPL-MCNC: 1.6 MG/DL — SIGNIFICANT CHANGE UP (ref 1.6–2.6)
MCHC RBC-ENTMCNC: 29.4 PG — SIGNIFICANT CHANGE UP (ref 27–34)
MCHC RBC-ENTMCNC: 33.8 GM/DL — SIGNIFICANT CHANGE UP (ref 32–36)
MCV RBC AUTO: 87.1 FL — SIGNIFICANT CHANGE UP (ref 80–100)
PLATELET # BLD AUTO: 568 K/UL — HIGH (ref 150–400)
POTASSIUM SERPL-MCNC: 4.3 MMOL/L — SIGNIFICANT CHANGE UP (ref 3.5–5.3)
POTASSIUM SERPL-SCNC: 4.3 MMOL/L — SIGNIFICANT CHANGE UP (ref 3.5–5.3)
PROT SERPL-MCNC: 6.2 G/DL — SIGNIFICANT CHANGE UP (ref 6–8.3)
RBC # BLD: 2.73 M/UL — LOW (ref 3.8–5.2)
RBC # FLD: 14.3 % — SIGNIFICANT CHANGE UP (ref 10.3–14.5)
SODIUM SERPL-SCNC: 132 MMOL/L — LOW (ref 135–145)
WBC # BLD: 13.3 K/UL — HIGH (ref 3.8–10.5)
WBC # FLD AUTO: 13.3 K/UL — HIGH (ref 3.8–10.5)

## 2019-09-17 PROCEDURE — 99232 SBSQ HOSP IP/OBS MODERATE 35: CPT

## 2019-09-17 RX ORDER — BACITRACIN ZINC 500 UNIT/G
1 OINTMENT IN PACKET (EA) TOPICAL
Refills: 0 | Status: DISCONTINUED | OUTPATIENT
Start: 2019-09-17 | End: 2019-09-28

## 2019-09-17 RX ADMIN — Medication 25 MILLIGRAM(S): at 05:40

## 2019-09-17 RX ADMIN — Medication 500 MILLIGRAM(S): at 17:56

## 2019-09-17 RX ADMIN — OXYCODONE HYDROCHLORIDE 5 MILLIGRAM(S): 5 TABLET ORAL at 06:15

## 2019-09-17 RX ADMIN — OXYCODONE HYDROCHLORIDE 5 MILLIGRAM(S): 5 TABLET ORAL at 15:16

## 2019-09-17 RX ADMIN — OXYCODONE HYDROCHLORIDE 5 MILLIGRAM(S): 5 TABLET ORAL at 21:28

## 2019-09-17 RX ADMIN — LIDOCAINE 1 PATCH: 4 CREAM TOPICAL at 00:00

## 2019-09-17 RX ADMIN — HEPARIN SODIUM 5000 UNIT(S): 5000 INJECTION INTRAVENOUS; SUBCUTANEOUS at 05:40

## 2019-09-17 RX ADMIN — Medication 81 MILLIGRAM(S): at 11:10

## 2019-09-17 RX ADMIN — OXYCODONE HYDROCHLORIDE 5 MILLIGRAM(S): 5 TABLET ORAL at 16:00

## 2019-09-17 RX ADMIN — Medication 1 APPLICATION(S): at 17:57

## 2019-09-17 RX ADMIN — Medication 0.25 MILLIGRAM(S): at 22:28

## 2019-09-17 RX ADMIN — Medication 500 MILLIGRAM(S): at 05:40

## 2019-09-17 RX ADMIN — HEPARIN SODIUM 5000 UNIT(S): 5000 INJECTION INTRAVENOUS; SUBCUTANEOUS at 14:18

## 2019-09-17 RX ADMIN — HEPARIN SODIUM 5000 UNIT(S): 5000 INJECTION INTRAVENOUS; SUBCUTANEOUS at 22:28

## 2019-09-17 RX ADMIN — Medication 325 MILLIGRAM(S): at 05:40

## 2019-09-17 RX ADMIN — OXYCODONE HYDROCHLORIDE 5 MILLIGRAM(S): 5 TABLET ORAL at 11:38

## 2019-09-17 RX ADMIN — LIDOCAINE 1 PATCH: 4 CREAM TOPICAL at 23:00

## 2019-09-17 RX ADMIN — LIDOCAINE 1 PATCH: 4 CREAM TOPICAL at 20:48

## 2019-09-17 RX ADMIN — Medication 25 MILLIGRAM(S): at 17:57

## 2019-09-17 RX ADMIN — OXYCODONE HYDROCHLORIDE 5 MILLIGRAM(S): 5 TABLET ORAL at 05:45

## 2019-09-17 RX ADMIN — Medication 325 MILLIGRAM(S): at 17:56

## 2019-09-17 RX ADMIN — OXYCODONE HYDROCHLORIDE 5 MILLIGRAM(S): 5 TABLET ORAL at 20:58

## 2019-09-17 RX ADMIN — PANTOPRAZOLE SODIUM 40 MILLIGRAM(S): 20 TABLET, DELAYED RELEASE ORAL at 11:15

## 2019-09-17 RX ADMIN — OXYCODONE HYDROCHLORIDE 5 MILLIGRAM(S): 5 TABLET ORAL at 11:08

## 2019-09-17 RX ADMIN — AMIODARONE HYDROCHLORIDE 200 MILLIGRAM(S): 400 TABLET ORAL at 05:40

## 2019-09-17 RX ADMIN — LIDOCAINE 1 PATCH: 4 CREAM TOPICAL at 11:08

## 2019-09-17 RX ADMIN — Medication 1 MILLIGRAM(S): at 11:10

## 2019-09-17 NOTE — PROGRESS NOTE ADULT - SUBJECTIVE AND OBJECTIVE BOX
im ok    VITAL SIGNS    Telemetry:  nsr 60    Vital Signs Last 24 Hrs  T(C): 36.4 (19 @ 07:20), Max: 36.9 (19 @ 23:30)  T(F): 97.6 (19 @ 07:20), Max: 98.4 (19 @ 23:30)  HR: 55 (19 @ 11:03) (54 - 67)  BP: 154/66 (19 @ 11:03) (123/57 - 166/71)  RR: 18 (19 @ 07:20) (18 - 18)  SpO2: 97% (19 @ 07:20) (97% - 99%)                    @ 07:01  -   @ 07:00  --------------------------------------------------------  IN: 1085 mL / OUT: 1560 mL / NET: -475 mL     @ 07:01  -   @ 11:53  --------------------------------------------------------  IN: 360 mL / OUT: 0 mL / NET: 360 mL          Daily     Daily Weight in k.2 (17 Sep 2019 03:26)            CAPILLARY BLOOD GLUCOSE                  Coumadin    [ ] YES          [x  ]      NO                                   PHYSICAL EXAM  Neurology: alert and oriented x 3, RLE . plantarflexion 1/5, no other movement to the hip  CV : s1 s2 RRR  Sternal Wound :  CDI , Stable  Lungs: cta  Abdomen: soft, nontender, nondistended, positive bowel sounds, last bowel movement +                      :     muñoz - sbd         Extremities:  +    edema   /  -   calve tenderness ,                acetaminophen  IVPB .. 1000 milliGRAM(s) IV Intermittent once PRN  ALPRAZolam 0.25 milliGRAM(s) Oral every 8 hours PRN  amiodarone    Tablet 200 milliGRAM(s) Oral daily  amiodarone    Tablet   Oral   ascorbic acid 500 milliGRAM(s) Oral two times a day  aspirin  chewable 81 milliGRAM(s) Oral daily  ferrous    sulfate 325 milliGRAM(s) Oral two times a day  folic acid 1 milliGRAM(s) Oral daily  heparin  Injectable 5000 Unit(s) SubCutaneous every 8 hours  lidocaine   Patch 1 Patch Transdermal daily  metoprolol tartrate 25 milliGRAM(s) Oral every 12 hours  mineral oil 30 milliLiter(s) Oral two times a day PRN  ondansetron Injectable 4 milliGRAM(s) IV Push three times a day PRN  oxyCODONE    IR 5 milliGRAM(s) Oral every 4 hours PRN  pantoprazole   Suspension 40 milliGRAM(s) Oral daily                    Physical Therapy Rec:   Home  [  ]   Home w/ PT  [  ]  Rehab  [  ]  Discussed with Cardiothoracic Team at AM rounds.

## 2019-09-17 NOTE — PROGRESS NOTE ADULT - SUBJECTIVE AND OBJECTIVE BOX
Patient tolerating therapies- mod A transfers.  Some incisional pain. No CP, no SOB, no N/V.  Feels a little stronger but still very weak.    MEDICATIONS  (STANDING):  amiodarone    Tablet 200 milliGRAM(s) Oral daily  amiodarone    Tablet   Oral   ascorbic acid 500 milliGRAM(s) Oral two times a day  aspirin  chewable 81 milliGRAM(s) Oral daily  ferrous    sulfate 325 milliGRAM(s) Oral two times a day  folic acid 1 milliGRAM(s) Oral daily  heparin  Injectable 5000 Unit(s) SubCutaneous every 8 hours  lidocaine   Patch 1 Patch Transdermal daily  metoprolol tartrate 25 milliGRAM(s) Oral every 12 hours  pantoprazole   Suspension 40 milliGRAM(s) Oral daily    MEDICATIONS  (PRN):  acetaminophen  IVPB .. 1000 milliGRAM(s) IV Intermittent once PRN Moderate Pain (4 - 6)  ALPRAZolam 0.25 milliGRAM(s) Oral every 8 hours PRN anxiety  mineral oil 30 milliLiter(s) Oral two times a day PRN dry mouth  ondansetron Injectable 4 milliGRAM(s) IV Push three times a day PRN Nausea and/or Vomiting  oxyCODONE    IR 5 milliGRAM(s) Oral every 4 hours PRN Severe Pain (7 - 10)    Vital Signs Last 24 Hrs  T(C): 36.4 (17 Sep 2019 07:20), Max: 36.9 (16 Sep 2019 23:30)  T(F): 97.6 (17 Sep 2019 07:20), Max: 98.4 (16 Sep 2019 23:30)  HR: 57 (17 Sep 2019 09:41) (54 - 67)  BP: 134/61 (17 Sep 2019 09:41) (123/57 - 166/71)  BP(mean): 99 (17 Sep 2019 07:20) (82 - 102)  RR: 18 (17 Sep 2019 07:20) (18 - 18)  SpO2: 97% (17 Sep 2019 07:20) (97% - 99%)    PHYSICAL EXAM  Constitutional - NAD, Comfortable  HEENT - NCAT, EOMI  Neck - Supple, No limited ROM  Chest - CTA bilaterally, No wheeze, No rhonchi, No crackles  Cardiovascular - RRR, S1S2, No murmurs  Abdomen - BS+, Soft, NTND  Extremities - No C/C/E, No calf tenderness   Neurologic Exam -                  RLE trace motor, LLE 4/5; bl UEs 5/5   Sensation intact   DTRs trace throughout   No clonus     Psychiatric - Mood stable, Affect WNL                          8.0    13.3  )-----------( 568      ( 17 Sep 2019 05:26 )             23.8     09-17    132<L>  |  98  |  17  ----------------------------<  102<H>  4.3   |  20<L>  |  1.28    Ca    8.7      17 Sep 2019 05:26  Mg     1.6     09-17    TPro  6.2  /  Alb  2.8<L>  /  TBili  0.4  /  DBili  x   /  AST  16  /  ALT  22  /  AlkPhos  70  09-17      Impression:   70 yo with functional deficits secondary to diagnosis of debility, R leg weakness secondary to spinal cord ischemia.    Plan:  PT- ROM, Bed Mob, Transfers, Amb w AD  and bracing as needed  OT- ADLs  Prec- Falls, Cardiac  DVT Prophylaxis- On Heparin  Skin- Turn q2 h, check skin daily  Dispo- Acute Rehab- can tolerate 3h/d PT/OT and requires daily physician visits

## 2019-09-17 NOTE — CHART NOTE - NSCHARTNOTEFT_GEN_A_CORE
MRI brain and MRA head and neck reviewed. No acute intracranial hemorrhage, mass effect, vasogenic edema, or evidence of acute territorial infarct. No gross evidence for or vascular aneurysm or flow-limiting stenosis.    Recommend:  Outpatient EMG and nerve conduction study  No further inpatient neurological work up needed at this time.    Follow up outpatient with neurology:   [address] [place] [address]  [phone]

## 2019-09-17 NOTE — PROGRESS NOTE ADULT - ASSESSMENT
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 She is now   s/p Reop Thoracoabdominal aneurysm open repair with Dacron graft and celiac SMA bypass, reimplantation of lumbar artery with Dr. Briseno on 8/29. Intraop 2 PRBC, 2 Platelets,Her postoperative course was complicated by new onset BLE weakness R>L for which she was placed on MAPs of 110 and CSF drainage of 20cc. She was extubated POD2 ,and required BIPAP on POD3. She went into afib with RVR, placed on esmolol-weaned off- on POD4 but converted back to NSR without treatment.  9/1 RLE improving, NGT removed, BIPAP, nicardipine restarted briefly for HTN, Cr 2.43  9/2 Lumbar drain removed, Afib RVR converted without intervention, Vaso weaned off today  9/3 S&S eval recommend Dysphagia 1 nectar thick diet, 3L NC, PT recommending acute rehab, Cr improving 1.93 from 2.04  9/4 Afib with RVR, transferred to CTU, metoprolol 2.5 IV x2, amiodarone drip started, vasopressin for hypotension, esmolol drip for rate control, Digoxin IV x1, converted to NSR, Cr 1.76 DURGA improving, targeting MAPs of 70-80, L pleural chest tubes x3 removed, S&S recommend dysphagia 1 thin liquids, bradycardic at night and amio decreased to 0.5 from 1  9/5 Transitioned to Labetalol from esmolol, PO amio taper, A-line/introducer/and muñoz discontinued, transferred to SDU  She is now transferred from the CTU to SDU. She no longer has any critical care needs at this time and is stable enough for transfer to SDU.   9/6 Increase PT/ ambulation. Dys 1 diet, speech/swallow f/u, repeat mbs today. Maintaining nsr  9/7 Tolerating Dys II diet, check urinalysis, for leukocytosis. CXR negative for infiltrate. Creat 1,9   9/8 Zosyn initiated for suspected UTI/LLL pna. ID consulted for assistance in mangement. creatinine improving . -1415 fluid balance, ct chest, BC x 2  9/9 WBC remains elevated, 21, afebrile, pending CT results r/o PNA. Continue zosyn.    D/w PT,  pt with decrease in  balance/ fatigued today, RLE weakness unchanged as per pt.   relative hypotension, irineo, labetolol d/c, maintain mao>85.  9/10 CT with compressive atelectasis,  placed on nocturnal bipap as d/w  Dr Briseno  Wbc trending down  Cont zosyn UTI/R/o pna  9/11 Zosyn for pna x 7 days  PT  Neuro consulted for f/u  9/12  CT scan  abdomen complete   ,  worked with PT,   plus one strength  r leg,  OOB to chair,  Lt thoracotomy incision  9/13 Pending mri at neuro  rec.HCT drifting down, repeat performed.Labetolol d/c lopressor started for HR control but to liberalize bp Pending acute rehab  9/14 MRI negative for mass, shift, bleed, flow limiting lesions  9/15   zosyn dc,   OOB to chair   NSR   rt leg splint w/ cont weakness  9/16 Pt voiding, female urine collection device, incontenent   Hypokalemia, supplementation increased.  Zosyn complete  Rehab planning  9/17 PT today  Rehab planning to [place].  VSS, even i/o's  Dys 3 diet

## 2019-09-17 NOTE — PROGRESS NOTE ADULT - ASSESSMENT
ASSESSMENT:  69 yr old female with H/O Thoraco abdominal Aortic aneurysm, Aortic Stenosis/ regurgitation S/P AVR (T), Ascending & hemiarch replacement 4/9/19 now sp thoracoabdominal aortic aneurysm.     - Resolved DURGA  - Hyponatremia - stable  - b/l lower ext. weakness - Right>left     RECOMMENDATION:  -Reduce free water intake. Encourage liquids with calories; Cont Ensure Enlive  -?Restart  Aldactone  -Cont PT is ongoing   -OFF Miralax and colace due to loose stools   -IF the BP stays high can consider Norvasc 2.5mg po qd

## 2019-09-17 NOTE — PROGRESS NOTE ADULT - SUBJECTIVE AND OBJECTIVE BOX
NEPHROLOGY-[facility] [phone]        Patient seen and examined in bed.  She was in good spirits         MEDICATIONS  (STANDING):  amiodarone    Tablet 200 milliGRAM(s) Oral daily  amiodarone    Tablet   Oral   ascorbic acid 500 milliGRAM(s) Oral two times a day  aspirin  chewable 81 milliGRAM(s) Oral daily  ferrous    sulfate 325 milliGRAM(s) Oral two times a day  folic acid 1 milliGRAM(s) Oral daily  heparin  Injectable 5000 Unit(s) SubCutaneous every 8 hours  lidocaine   Patch 1 Patch Transdermal daily  metoprolol tartrate 25 milliGRAM(s) Oral every 12 hours  pantoprazole   Suspension 40 milliGRAM(s) Oral daily      VITAL:  T(C): , Max: 36.9 (09-16-19 @ 23:30)  T(F): , Max: 98.4 (09-16-19 @ 23:30)  HR: 58 (09-17-19 @ 07:20)  BP: 152/69 (09-17-19 @ 07:20)  BP(mean): 99 (09-17-19 @ 07:20)  RR: 18 (09-17-19 @ 07:20)  SpO2: 97% (09-17-19 @ 07:20)  Wt(kg): --    I and O's:    09-16 @ 07:01  -  09-17 @ 07:00  --------------------------------------------------------  IN: 1085 mL / OUT: 1560 mL / NET: -475 mL    09-17 @ 07:01  -  09-17 @ 09:04  --------------------------------------------------------  IN: 360 mL / OUT: 0 mL / NET: 360 mL          PHYSICAL EXAM:    Constitutional: NAD  Neck:  No JVD  Respiratory: CTAB/L  Cardiovascular: S1 and S2  Gastrointestinal: BS+, soft, NT/ND  Extremities: No peripheral edema  Neurological: A/O x 3,  Psychiatric: Normal mood, normal affect  : No [place] ; Female cathater   Skin: No rashes  Access: Not applicable    LABS:                        8.0    13.3  )-----------( 568      ( 17 Sep 2019 05:26 )             23.8     09-17    132<L>  |  98  |  17  ----------------------------<  102<H>  4.3   |  20<L>  |  1.28    Ca    8.7      17 Sep 2019 05:26  Mg     1.6     09-17    TPro  6.2  /  Alb  2.8<L>  /  TBili  0.4  /  DBili  x   /  AST  16  /  ALT  22  /  AlkPhos  70  09-17          Urine Studies:          RADIOLOGY & ADDITIONAL STUDIES:        < from: MR Head No Cont (09.14.19 @ 15:02) >    EXAM:  MR BRAIN                          EXAM:  MR ANGIO BRAIN WAW IC                          EXAM:  MR ANGIO NECK IC                            PROCEDURE DATE:  09/14/2019            INTERPRETATION:  Noncontrast MRI of the brain, MRA of the brain, and MRA   of the neck.    CLINICAL INDICATION: Right foot weakness    TECHNIQUE: Multiplanar, multisequence MR images of the brain, 3-D   time-of-flight images of the brain and neck, and 2-D time of flight   images of the neck were obtained without the intravenous administration   of contrast.  2-D and 3-D time-of-flight images were reformatted using   MIP protocol targeted over the head and neck.    COMPARISON: CT brain 8/14/2019    FINDINGS:     MRI BRAIN:    Limited by motion.    There is no hydrocephalus, midline shift, mass effect, vasogenic edema,   or intracranial hemorrhage.  Diffusion weighted images demonstrate no   acute territorial infarct.  There is no mild to moderate white matter   microvascular ischemic disease.  Flow voids are seen within the major   intracranial vessels consistent with their patency.    Air-fluid level versus small polyp/retention cyst in the right sphenoid   sinus. Small bilateral mastoid air cell effusions.. Left globe is   irregular in shape. Sellar/suprasellar structures are unremarkable.    MRA BRAIN:    Limited by motion    There is good flow-related signal within the bilateral anterior, middle,   and posterior cerebral arteries, and within the basilar artery.  The   intracranial portions of the vertebral arteries and internal carotid   arteries are well visualized.     There is no gross evidence for vascular aneurysm or flow-limiting   stenosis.    MRA NECK:    Limited by motion    Grossly preserved flow-related signal within the bilateral common carotid   and internal carotid arteries, and the bilateral vertebral arteries. The   left carotid bifurcation is not well evaluated. The right carotid   bifurcation is unremarkable.    IMPRESSION:    MRI BRAIN:    Limited by motion    No acute intracranial hemorrhage, mass effect, vasogenic edema, or   evidence of acute territorial infarct. Mild to moderate white matter   microvascular ischemic disease.    MRA BRAIN:    Limited by motion.    No gross evidence for or vascular aneurysm or flow-limiting stenosis.    MRA NECK:    Limited by motion.    No gross evidence for flow-limiting stenosis. Left carotid bifurcation   not well evaluated. Right carotid bifurcation is unremarkable.                    ADELINA EHSS M.D., ATTENDING RADIOLOGIST  This document has been electronically signed. Sep 14 2019  2:23PM                < end of copied text >

## 2019-09-18 LAB
ALBUMIN SERPL ELPH-MCNC: 2.8 G/DL — LOW (ref 3.3–5)
ALP SERPL-CCNC: 69 U/L — SIGNIFICANT CHANGE UP (ref 40–120)
ALT FLD-CCNC: 21 U/L — SIGNIFICANT CHANGE UP (ref 10–45)
ANION GAP SERPL CALC-SCNC: 15 MMOL/L — SIGNIFICANT CHANGE UP (ref 5–17)
AST SERPL-CCNC: 18 U/L — SIGNIFICANT CHANGE UP (ref 10–40)
BILIRUB SERPL-MCNC: 0.4 MG/DL — SIGNIFICANT CHANGE UP (ref 0.2–1.2)
BUN SERPL-MCNC: 20 MG/DL — SIGNIFICANT CHANGE UP (ref 7–23)
CALCIUM SERPL-MCNC: 8.8 MG/DL — SIGNIFICANT CHANGE UP (ref 8.4–10.5)
CHLORIDE SERPL-SCNC: 95 MMOL/L — LOW (ref 96–108)
CO2 SERPL-SCNC: 21 MMOL/L — LOW (ref 22–31)
CREAT SERPL-MCNC: 1.24 MG/DL — SIGNIFICANT CHANGE UP (ref 0.5–1.3)
GLUCOSE SERPL-MCNC: 93 MG/DL — SIGNIFICANT CHANGE UP (ref 70–99)
HCT VFR BLD CALC: 25.1 % — LOW (ref 34.5–45)
HGB BLD-MCNC: 8.3 G/DL — LOW (ref 11.5–15.5)
MAGNESIUM SERPL-MCNC: 1.6 MG/DL — SIGNIFICANT CHANGE UP (ref 1.6–2.6)
MCHC RBC-ENTMCNC: 28.8 PG — SIGNIFICANT CHANGE UP (ref 27–34)
MCHC RBC-ENTMCNC: 33 GM/DL — SIGNIFICANT CHANGE UP (ref 32–36)
MCV RBC AUTO: 87.4 FL — SIGNIFICANT CHANGE UP (ref 80–100)
PHOSPHATE SERPL-MCNC: 2.9 MG/DL — SIGNIFICANT CHANGE UP (ref 2.5–4.5)
PLATELET # BLD AUTO: 590 K/UL — HIGH (ref 150–400)
POTASSIUM SERPL-MCNC: 3.9 MMOL/L — SIGNIFICANT CHANGE UP (ref 3.5–5.3)
POTASSIUM SERPL-SCNC: 3.9 MMOL/L — SIGNIFICANT CHANGE UP (ref 3.5–5.3)
PROT SERPL-MCNC: 6.1 G/DL — SIGNIFICANT CHANGE UP (ref 6–8.3)
RBC # BLD: 2.87 M/UL — LOW (ref 3.8–5.2)
RBC # FLD: 14.3 % — SIGNIFICANT CHANGE UP (ref 10.3–14.5)
SODIUM SERPL-SCNC: 131 MMOL/L — LOW (ref 135–145)
WBC # BLD: 11.4 K/UL — HIGH (ref 3.8–10.5)
WBC # FLD AUTO: 11.4 K/UL — HIGH (ref 3.8–10.5)

## 2019-09-18 PROCEDURE — 99232 SBSQ HOSP IP/OBS MODERATE 35: CPT

## 2019-09-18 RX ADMIN — OXYCODONE HYDROCHLORIDE 5 MILLIGRAM(S): 5 TABLET ORAL at 11:19

## 2019-09-18 RX ADMIN — AMIODARONE HYDROCHLORIDE 200 MILLIGRAM(S): 400 TABLET ORAL at 06:03

## 2019-09-18 RX ADMIN — Medication 1 APPLICATION(S): at 17:20

## 2019-09-18 RX ADMIN — Medication 0.25 MILLIGRAM(S): at 22:43

## 2019-09-18 RX ADMIN — OXYCODONE HYDROCHLORIDE 5 MILLIGRAM(S): 5 TABLET ORAL at 21:08

## 2019-09-18 RX ADMIN — Medication 500 MILLIGRAM(S): at 17:21

## 2019-09-18 RX ADMIN — OXYCODONE HYDROCHLORIDE 5 MILLIGRAM(S): 5 TABLET ORAL at 06:03

## 2019-09-18 RX ADMIN — OXYCODONE HYDROCHLORIDE 5 MILLIGRAM(S): 5 TABLET ORAL at 15:53

## 2019-09-18 RX ADMIN — Medication 500 MILLIGRAM(S): at 06:03

## 2019-09-18 RX ADMIN — OXYCODONE HYDROCHLORIDE 5 MILLIGRAM(S): 5 TABLET ORAL at 11:50

## 2019-09-18 RX ADMIN — Medication 1 APPLICATION(S): at 06:03

## 2019-09-18 RX ADMIN — OXYCODONE HYDROCHLORIDE 5 MILLIGRAM(S): 5 TABLET ORAL at 16:22

## 2019-09-18 RX ADMIN — HEPARIN SODIUM 5000 UNIT(S): 5000 INJECTION INTRAVENOUS; SUBCUTANEOUS at 06:03

## 2019-09-18 RX ADMIN — Medication 81 MILLIGRAM(S): at 11:19

## 2019-09-18 RX ADMIN — Medication 325 MILLIGRAM(S): at 06:03

## 2019-09-18 RX ADMIN — Medication 25 MILLIGRAM(S): at 06:03

## 2019-09-18 RX ADMIN — Medication 1 MILLIGRAM(S): at 11:19

## 2019-09-18 RX ADMIN — Medication 325 MILLIGRAM(S): at 17:21

## 2019-09-18 RX ADMIN — HEPARIN SODIUM 5000 UNIT(S): 5000 INJECTION INTRAVENOUS; SUBCUTANEOUS at 15:53

## 2019-09-18 RX ADMIN — PANTOPRAZOLE SODIUM 40 MILLIGRAM(S): 20 TABLET, DELAYED RELEASE ORAL at 12:27

## 2019-09-18 RX ADMIN — HEPARIN SODIUM 5000 UNIT(S): 5000 INJECTION INTRAVENOUS; SUBCUTANEOUS at 21:08

## 2019-09-18 RX ADMIN — OXYCODONE HYDROCHLORIDE 5 MILLIGRAM(S): 5 TABLET ORAL at 21:30

## 2019-09-18 RX ADMIN — Medication 25 MILLIGRAM(S): at 17:21

## 2019-09-18 RX ADMIN — OXYCODONE HYDROCHLORIDE 5 MILLIGRAM(S): 5 TABLET ORAL at 06:33

## 2019-09-18 NOTE — PROGRESS NOTE ADULT - SUBJECTIVE AND OBJECTIVE BOX
S:  feels better.  Able to move right foot slightly more    Telemetry:  SR 60-70     Vital Signs Last 24 Hrs  T(C): 36.7 (09-18-19 @ 12:09), Max: 36.8 (09-18-19 @ 00:00)  T(F): 98 (09-18-19 @ 12:09), Max: 98.2 (09-18-19 @ 00:00)  HR: 92 (09-18-19 @ 12:09) (66 - 92)  BP: 133/79 (09-18-19 @ 12:09) (133/79 - 180/70)  RR: 18 (09-18-19 @ 12:09) (18 - 18)  SpO2: 96% (09-18-19 @ 12:09) (96% - 99%)                   09-17 @ 07:01  -  09-18 @ 07:00  --------------------------------------------------------  IN: 720 mL / OUT: 1750 mL / NET: -1030 mL    09-18 @ 07:01  -  09-18 @ 14:38  --------------------------------------------------------  IN: 420 mL / OUT: 0 mL / NET: 420 mL                Drains:     MS         [  ] Drainage:                 L Pleural  [  ]  Drainage:                R Pleural  [  ]  Drainage:    Pacing Wires        [  ]   Settings:                                  Isolated  [  ]    Coumadin    [ ] YES          [ x ]      NO         Reason:                                 8.3    11.4  )-----------( 590      ( 18 Sep 2019 06:27 )             25.1       09-18    131<L>  |  95<L>  |  20  ----------------------------<  93  3.9   |  21<L>  |  1.24    Ca    8.8      18 Sep 2019 06:27  Phos  2.9     09-18  Mg     1.6     09-18    TPro  6.1  /  Alb  2.8<L>  /  TBili  0.4  /  DBili  x   /  AST  18  /  ALT  21  /  AlkPhos  69  09-18          PHYSICAL EXAM:    Neurology: alert and oriented x 3, nonfocal, no gross deficits    CV :  S1S2 heard RRR    Sternal Wound :  CDI , Stable    Lungs:  clear to ausc.  No w/r/r    Abdomen: soft, nontender, nondistended, positive bowel sounds,     Extremities:   RLE weakness  1/5.   left LE 5/5            acetaminophen  IVPB .. 1000 milliGRAM(s) IV Intermittent once PRN  ALPRAZolam 0.25 milliGRAM(s) Oral every 8 hours PRN  amiodarone    Tablet 200 milliGRAM(s) Oral daily  amiodarone    Tablet   Oral   ascorbic acid 500 milliGRAM(s) Oral two times a day  aspirin  chewable 81 milliGRAM(s) Oral daily  BACItracin   Ointment 1 Application(s) Topical two times a day  ferrous    sulfate 325 milliGRAM(s) Oral two times a day  folic acid 1 milliGRAM(s) Oral daily  heparin  Injectable 5000 Unit(s) SubCutaneous every 8 hours  lidocaine   Patch 1 Patch Transdermal daily  metoprolol tartrate 25 milliGRAM(s) Oral every 12 hours  mineral oil 30 milliLiter(s) Oral two times a day PRN  ondansetron Injectable 4 milliGRAM(s) IV Push three times a day PRN  oxyCODONE    IR 5 milliGRAM(s) Oral every 4 hours PRN  pantoprazole   Suspension 40 milliGRAM(s) Oral daily                              Physical Therapy Rec:   Home  [  ]   Home w/ PT  [  ]  Rehab  [ x ]    Discussed with Cardiothoracic Team at AM rounds. S:  feels better.  Able to move right foot slightly more    Telemetry:  SR 60-70     Vital Signs Last 24 Hrs  T(C): 36.7 (09-18-19 @ 12:09), Max: 36.8 (09-18-19 @ 00:00)  T(F): 98 (09-18-19 @ 12:09), Max: 98.2 (09-18-19 @ 00:00)  HR: 92 (09-18-19 @ 12:09) (66 - 92)  BP: 133/79 (09-18-19 @ 12:09) (133/79 - 180/70)  RR: 18 (09-18-19 @ 12:09) (18 - 18)  SpO2: 96% (09-18-19 @ 12:09) (96% - 99%)                   09-17 @ 07:01  -  09-18 @ 07:00  --------------------------------------------------------  IN: 720 mL / OUT: 1750 mL / NET: -1030 mL    09-18 @ 07:01  -  09-18 @ 14:38  --------------------------------------------------------  IN: 420 mL / OUT: 0 mL / NET: 420 mL                Drains:     MS         [  ] Drainage:                 L Pleural  [  ]  Drainage:                R Pleural  [  ]  Drainage:    Pacing Wires        [  ]   Settings:                                  Isolated  [  ]    Coumadin    [ ] YES          [ x ]      NO         Reason:                                 8.3    11.4  )-----------( 590      ( 18 Sep 2019 06:27 )             25.1       09-18    131<L>  |  95<L>  |  20  ----------------------------<  93  3.9   |  21<L>  |  1.24    Ca    8.8      18 Sep 2019 06:27  Phos  2.9     09-18  Mg     1.6     09-18    TPro  6.1  /  Alb  2.8<L>  /  TBili  0.4  /  DBili  x   /  AST  18  /  ALT  21  /  AlkPhos  69  09-18          PHYSICAL EXAM:    Neurology: alert and oriented x 3, nonfocal, no gross deficits    CV :  S1S2 heard RRR    Thoracotomy incision CDI--area of erythema at upper pole with small skin tear    Lungs:  clear to ausc.  No w/r/r    Abdomen: soft, nontender, nondistended, positive bowel sounds,     Extremities:   RLE weakness  1/5.   left LE 5/5            acetaminophen  IVPB .. 1000 milliGRAM(s) IV Intermittent once PRN  ALPRAZolam 0.25 milliGRAM(s) Oral every 8 hours PRN  amiodarone    Tablet 200 milliGRAM(s) Oral daily  amiodarone    Tablet   Oral   ascorbic acid 500 milliGRAM(s) Oral two times a day  aspirin  chewable 81 milliGRAM(s) Oral daily  BACItracin   Ointment 1 Application(s) Topical two times a day  ferrous    sulfate 325 milliGRAM(s) Oral two times a day  folic acid 1 milliGRAM(s) Oral daily  heparin  Injectable 5000 Unit(s) SubCutaneous every 8 hours  lidocaine   Patch 1 Patch Transdermal daily  metoprolol tartrate 25 milliGRAM(s) Oral every 12 hours  mineral oil 30 milliLiter(s) Oral two times a day PRN  ondansetron Injectable 4 milliGRAM(s) IV Push three times a day PRN  oxyCODONE    IR 5 milliGRAM(s) Oral every 4 hours PRN  pantoprazole   Suspension 40 milliGRAM(s) Oral daily                              Physical Therapy Rec:   Home  [  ]   Home w/ PT  [  ]  Rehab  [ x ]    Discussed with Cardiothoracic Team at AM rounds.

## 2019-09-18 NOTE — PROGRESS NOTE ADULT - PROBLEM SELECTOR PLAN 1
PT/OT,   Lt thoracotomy incision  serial neurovascular exams,   [facility] re[facility] plan this wk  tomy signed off--EMG conduction studies as outpt  [address] [facility]  [phone]

## 2019-09-18 NOTE — PROGRESS NOTE ADULT - ASSESSMENT
69 year old female s/p ascending thoracoabdominal aortic aneurysm repair and aortic valve replaced with a bioprosthetic valve in April 2019, reoperative repair on 8/29/2019  with course complicated with DURGA and Afib with RVR. 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 She states she has developed a productive cough with whitish sputum, CXR with ? LLL PNA vs atelectasis. ID consulted for rising leukocytosis.  Started on zosyn  Leukocytosis elevated but stable  CT chest with complex mod left pleural effusion - reviewed  with radiology and suspects PNA present.   Blood cultures so far no growth to date  Recommend:  -Completed a 7-day course on Sat 9/14/19.  -Continue to monitor off antibiotics.  -Continue to trend WBC.  -Monitor for fevers.  -Stable off antibiotics.  -Continue to monitor surgical site

## 2019-09-18 NOTE — PROGRESS NOTE ADULT - SUBJECTIVE AND OBJECTIVE BOX
CC: Patient is a 69y old  Female who presents with a chief complaint of sp   lt thoracotomy   anysm repair   w. celiac SMA bypass (15 Sep 2019 13:56)    ID following for leukocytosis    Interval History/ROS: Patient feeling better. No complaints. No fevers, no chills.    Rest of ROS negative.    Allergies  No Known Allergies    ANTIMICROBIALS:      OTHER MEDS:  acetaminophen  IVPB .. 1000 milliGRAM(s) IV Intermittent once PRN  ALPRAZolam 0.25 milliGRAM(s) Oral every 8 hours PRN  amiodarone    Tablet 200 milliGRAM(s) Oral daily  amiodarone    Tablet   Oral   ascorbic acid 500 milliGRAM(s) Oral two times a day  aspirin  chewable 81 milliGRAM(s) Oral daily  BACItracin   Ointment 1 Application(s) Topical two times a day  ferrous    sulfate 325 milliGRAM(s) Oral two times a day  folic acid 1 milliGRAM(s) Oral daily  heparin  Injectable 5000 Unit(s) SubCutaneous every 8 hours  lidocaine   Patch 1 Patch Transdermal daily  metoprolol tartrate 25 milliGRAM(s) Oral every 12 hours  mineral oil 30 milliLiter(s) Oral two times a day PRN  ondansetron Injectable 4 milliGRAM(s) IV Push three times a day PRN  oxyCODONE    IR 5 milliGRAM(s) Oral every 4 hours PRN  pantoprazole   Suspension 40 milliGRAM(s) Oral daily    PE:    Vital Signs Last 24 Hrs  T(C): 36.6 (18 Sep 2019 05:00), Max: 36.8 (18 Sep 2019 00:00)  T(F): 97.9 (18 Sep 2019 05:00), Max: 98.2 (18 Sep 2019 00:00)  HR: 74 (18 Sep 2019 05:00) (66 - 74)  BP: 145/70 (18 Sep 2019 07:57) (145/65 - 180/70)  BP(mean): 91 (17 Sep 2019 19:57) (91 - 92)  RR: 18 (18 Sep 2019 05:00) (18 - 18)  SpO2: 97% (18 Sep 2019 05:00) (96% - 99%)    Gen: AOx3, NAD, non-toxic  CV: S1+S2 normal, no murmurs  Resp: Clear bilat, no resp distress  Abd: Soft, nontender, +BS  Ext: No LE edema, no wounds  : No [place]  IV/Skin: No thrombophlebitis, staples in place on left sided surgical site, superior portion with erythema  Neuro: no focal deficits    LABS:                          8.3    11.4  )-----------( 590      ( 18 Sep 2019 06:27 )             25.1       09-18    131<L>  |  95<L>  |  20  ----------------------------<  93  3.9   |  21<L>  |  1.24    Ca    8.8      18 Sep 2019 06:27  Phos  2.9     09-18  Mg     1.6     09-18    TPro  6.1  /  Alb  2.8<L>  /  TBili  0.4  /  DBili  x   /  AST  18  /  ALT  21  /  AlkPhos  69  09-18    MICROBIOLOGY:  v  .Blood  09-08-19   No growth at 5 days.  --  --    RADIOLOGY:    < from: MR Head No Cont (09.14.19 @ 15:02) >  IMPRESSION:    MRI BRAIN:    Limited by motion    No acute intracranial hemorrhage, mass effect, vasogenic edema, or   evidence of acute territorial infarct. Mild to moderate white matter   microvascular ischemic disease.    MRA BRAIN:    Limited by motion.    No gross evidence for or vascular aneurysm or flow-limiting stenosis.    MRA NECK:    Limited by motion.    No gross evidence for flow-limiting stenosis. Left carotid bifurcation   not well evaluated. Right carotid bifurcation is unremarkable.      < end of copied text >

## 2019-09-18 NOTE — PROGRESS NOTE ADULT - SUBJECTIVE AND OBJECTIVE BOX
NEPHROLOGY-[facility] [phone]        Patient seen and examined in bed.  Bowel movements are less  SHe feels well        MEDICATIONS  (STANDING):  amiodarone    Tablet 200 milliGRAM(s) Oral daily  amiodarone    Tablet   Oral   ascorbic acid 500 milliGRAM(s) Oral two times a day  aspirin  chewable 81 milliGRAM(s) Oral daily  BACItracin   Ointment 1 Application(s) Topical two times a day  ferrous    sulfate 325 milliGRAM(s) Oral two times a day  folic acid 1 milliGRAM(s) Oral daily  heparin  Injectable 5000 Unit(s) SubCutaneous every 8 hours  lidocaine   Patch 1 Patch Transdermal daily  metoprolol tartrate 25 milliGRAM(s) Oral every 12 hours  pantoprazole   Suspension 40 milliGRAM(s) Oral daily      VITAL:  T(C): , Max: 37 (09-17-19 @ 11:03)  T(F): , Max: 98.6 (09-17-19 @ 11:03)  HR: 74 (09-18-19 @ 05:00)  BP: 145/70 (09-18-19 @ 07:57)  BP(mean): 91 (09-17-19 @ 19:57)  RR: 18 (09-18-19 @ 05:00)  SpO2: 97% (09-18-19 @ 05:00)  Wt(kg): --    I and O's:    09-17 @ 07:01  -  09-18 @ 07:00  --------------------------------------------------------  IN: 720 mL / OUT: 1750 mL / NET: -1030 mL          PHYSICAL EXAM:    Constitutional: NAD  Neck:  No JVD  Respiratory: CTAB/L  Cardiovascular: S1 and S2  Gastrointestinal: BS+, soft, NT/ND  Extremities: No peripheral edema  Neurological: A/O x 3,    Psychiatric: Normal mood, normal affect  : No [place] + female catheter   Skin: No rashes  Access: Not applicable    LABS:                        8.3    11.4  )-----------( 590      ( 18 Sep 2019 06:27 )             25.1     09-18    131<L>  |  95<L>  |  20  ----------------------------<  93  3.9   |  21<L>  |  1.24    Ca    8.8      18 Sep 2019 06:27  Phos  2.9     09-18  Mg     1.6     09-18    TPro  6.1  /  Alb  2.8<L>  /  TBili  0.4  /  DBili  x   /  AST  18  /  ALT  21  /  AlkPhos  69  09-18          Urine Studies:          RADIOLOGY & ADDITIONAL STUDIES:

## 2019-09-18 NOTE — PROGRESS NOTE ADULT - ASSESSMENT
ASSESSMENT:  69 yr old female with H/O Thoraco abdominal Aortic aneurysm, Aortic Stenosis/ regurgitation S/P AVR (T), Ascending & hemiarch replacement 4/9/19 now sp thoracoabdominal aortic aneurysm.     - Resolved DURGA  - Hyponatremia - stable and improving   - b/l lower ext. weakness - Right>left     RECOMMENDATION:  -Reduce free water intake. Encourage liquids with calories; Cont Ensure Enlive  -?Restart  Aldactone.  WIll help with potassium and BP   -Cont PT is ongoing   -Hyponatremia is of little significance

## 2019-09-18 NOTE — PROGRESS NOTE ADULT - ASSESSMENT
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 She is now   s/p Reop Thoracoabdominal aneurysm open repair with Dacron graft and celiac SMA bypass, reimplantation of lumbar artery with Dr. Briseno on 8/29. Intraop 2 PRBC, 2 Platelets,Her postoperative course was complicated by new onset BLE weakness R>L for which she was placed on MAPs of 110 and CSF drainage of 20cc. She was extubated POD2 ,and required BIPAP on POD3. She went into afib with RVR, placed on esmolol-weaned off- on POD4 but converted back to NSR without treatment.  9/1 RLE improving, NGT removed, BIPAP, nicardipine restarted briefly for HTN, Cr 2.43  9/2 Lumbar drain removed, Afib RVR converted without intervention, Vaso weaned off today  9/3 S&S eval recommend Dysphagia 1 nectar thick diet, 3L NC, PT recommending acute rehab, Cr improving 1.93 from 2.04  9/4 Afib with RVR, transferred to CTU, metoprolol 2.5 IV x2, amiodarone drip started, vasopressin for hypotension, esmolol drip for rate control, Digoxin IV x1, converted to NSR, Cr 1.76 DURGA improving, targeting MAPs of 70-80, L pleural chest tubes x3 removed, S&S recommend dysphagia 1 thin liquids, bradycardic at night and amio decreased to 0.5 from 1  9/5 Transitioned to Labetalol from esmolol, PO amio taper, A-line/introducer/and muñoz discontinued, transferred to SDU  She is now transferred from the CTU to SDU. She no longer has any critical care needs at this time and is stable enough for transfer to SDU.   9/6 Increase PT/ ambulation. Dys 1 diet, speech/swallow f/u, repeat mbs today. Maintaining nsr  9/7 Tolerating Dys II diet, check urinalysis, for leukocytosis. CXR negative for infiltrate. Creat 1,9   9/8 Zosyn initiated for suspected UTI/LLL pna. ID consulted for assistance in mangement. creatinine improving . -1415 fluid balance, ct chest, BC x 2  9/9 WBC remains elevated, 21, afebrile, pending CT results r/o PNA. Continue zosyn.    D/w PT,  pt with decrease in  balance/ fatigued today, RLE weakness unchanged as per pt.   relative hypotension, irineo, labetolol d/c, maintain mao>85.  9/10 CT with compressive atelectasis,  placed on nocturnal bipap as d/w  Dr Briseno  Wbc trending down  Cont zosyn UTI/R/o pna  9/11 Zosyn for pna x 7 days  PT  Neuro consulted for f/u  9/12  CT scan  abdomen complete   ,  worked with PT,   plus one strength  r leg,  OOB to chair,  Lt thoracotomy incision  9/13 Pending mri at neuro  rec.HCT drifting down, repeat performed.Labetolol d/c lopressor started for HR control but to liberalize bp Pending acute rehab  9/14 MRI negative for mass, shift, bleed, flow limiting lesions  9/15   zosyn dc,   OOB to chair   NSR   rt leg splint w/ cont weakness  9/16 Pt voiding, female urine collection device, incontenent   Hypokalemia, supplementation increased.  Zosyn complete  Rehab planning  9/17 PT today  9/18  HD stable.  Continues to slowly regain strength in RLE.  [facility] awaiting auth..  Per neuro will need outpt EMG and conduction studies.  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 She is now   s/p Reop Thoracoabdominal aneurysm open repair with Dacron graft and celiac SMA bypass, reimplantation of lumbar artery with Dr. Briseno on 8/29. Intraop 2 PRBC, 2 Platelets,Her postoperative course was complicated by new onset BLE weakness R>L for which she was placed on MAPs of 110 and CSF drainage of 20cc. She was extubated POD2 ,and required BIPAP on POD3. She went into afib with RVR, placed on esmolol-weaned off- on POD4 but converted back to NSR without treatment.  9/1 RLE improving, NGT removed, BIPAP, nicardipine restarted briefly for HTN, Cr 2.43  9/2 Lumbar drain removed, Afib RVR converted without intervention, Vaso weaned off today  9/3 S&S eval recommend Dysphagia 1 nectar thick diet, 3L NC, PT recommending acute rehab, Cr improving 1.93 from 2.04  9/4 Afib with RVR, transferred to CTU, metoprolol 2.5 IV x2, amiodarone drip started, vasopressin for hypotension, esmolol drip for rate control, Digoxin IV x1, converted to NSR, Cr 1.76 DURGA improving, targeting MAPs of 70-80, L pleural chest tubes x3 removed, S&S recommend dysphagia 1 thin liquids, bradycardic at night and amio decreased to 0.5 from 1  9/5 Transitioned to Labetalol from esmolol, PO amio taper, A-line/introducer/and muñoz discontinued, transferred to SDU  She is now transferred from the CTU to SDU. She no longer has any critical care needs at this time and is stable enough for transfer to SDU.   9/6 Increase PT/ ambulation. Dys 1 diet, speech/swallow f/u, repeat mbs today. Maintaining nsr  9/7 Tolerating Dys II diet, check urinalysis, for leukocytosis. CXR negative for infiltrate. Creat 1,9   9/8 Zosyn initiated for suspected UTI/LLL pna. ID consulted for assistance in mangement. creatinine improving . -1415 fluid balance, ct chest, BC x 2  9/9 WBC remains elevated, 21, afebrile, pending CT results r/o PNA. Continue zosyn.    D/w PT,  pt with decrease in  balance/ fatigued today, RLE weakness unchanged as per pt.   relative hypotension, irineo, labetolol d/c, maintain mao>85.  9/10 CT with compressive atelectasis,  placed on nocturnal bipap as d/w  Dr Briseno  Wbc trending down  Cont zosyn UTI/R/o pna  9/11 Zosyn for pna x 7 days  PT  Neuro consulted for f/u  9/12  CT scan  abdomen complete   ,  worked with PT,   plus one strength  r leg,  OOB to chair,  Lt thoracotomy incision  9/13 Pending mri at neuro  rec.HCT drifting down, repeat performed.Labetolol d/c lopressor started for HR control but to liberalize bp Pending acute rehab  9/14 MRI negative for mass, shift, bleed, flow limiting lesions  9/15   zosyn dc,   OOB to chair   NSR   rt leg splint w/ cont weakness  9/16 Pt voiding, female urine collection device, incontenent   Hypokalemia, supplementation increased.  Zosyn complete  Rehab planning  9/17 PT today  9/18  HD stable.  Continues to slowly regain strength in RLE.  [facility] awaiting auth..  Per neuro will need outpt EMG and conduction studies.  No further neuro work-up needed at this time.  Every other staple removed

## 2019-09-19 LAB
ANION GAP SERPL CALC-SCNC: 14 MMOL/L — SIGNIFICANT CHANGE UP (ref 5–17)
BUN SERPL-MCNC: 21 MG/DL — SIGNIFICANT CHANGE UP (ref 7–23)
CALCIUM SERPL-MCNC: 9.1 MG/DL — SIGNIFICANT CHANGE UP (ref 8.4–10.5)
CHLORIDE SERPL-SCNC: 95 MMOL/L — LOW (ref 96–108)
CO2 SERPL-SCNC: 20 MMOL/L — LOW (ref 22–31)
CREAT SERPL-MCNC: 1.2 MG/DL — SIGNIFICANT CHANGE UP (ref 0.5–1.3)
GLUCOSE SERPL-MCNC: 94 MG/DL — SIGNIFICANT CHANGE UP (ref 70–99)
HCT VFR BLD CALC: 24.3 % — LOW (ref 34.5–45)
HGB BLD-MCNC: 8.3 G/DL — LOW (ref 11.5–15.5)
MCHC RBC-ENTMCNC: 29.9 PG — SIGNIFICANT CHANGE UP (ref 27–34)
MCHC RBC-ENTMCNC: 34.3 GM/DL — SIGNIFICANT CHANGE UP (ref 32–36)
MCV RBC AUTO: 87 FL — SIGNIFICANT CHANGE UP (ref 80–100)
PLATELET # BLD AUTO: 512 K/UL — HIGH (ref 150–400)
POTASSIUM SERPL-MCNC: 3.3 MMOL/L — LOW (ref 3.5–5.3)
POTASSIUM SERPL-SCNC: 3.3 MMOL/L — LOW (ref 3.5–5.3)
RBC # BLD: 2.79 M/UL — LOW (ref 3.8–5.2)
RBC # FLD: 14.2 % — SIGNIFICANT CHANGE UP (ref 10.3–14.5)
SODIUM SERPL-SCNC: 129 MMOL/L — LOW (ref 135–145)
WBC # BLD: 10.8 K/UL — HIGH (ref 3.8–10.5)
WBC # FLD AUTO: 10.8 K/UL — HIGH (ref 3.8–10.5)

## 2019-09-19 PROCEDURE — 99232 SBSQ HOSP IP/OBS MODERATE 35: CPT

## 2019-09-19 RX ORDER — OXYCODONE HYDROCHLORIDE 5 MG/1
5 TABLET ORAL EVERY 4 HOURS
Refills: 0 | Status: DISCONTINUED | OUTPATIENT
Start: 2019-09-19 | End: 2019-09-26

## 2019-09-19 RX ORDER — POTASSIUM CHLORIDE 20 MEQ
40 PACKET (EA) ORAL EVERY 4 HOURS
Refills: 0 | Status: COMPLETED | OUTPATIENT
Start: 2019-09-19 | End: 2019-09-19

## 2019-09-19 RX ORDER — ALPRAZOLAM 0.25 MG
0.25 TABLET ORAL EVERY 8 HOURS
Refills: 0 | Status: DISCONTINUED | OUTPATIENT
Start: 2019-09-19 | End: 2019-09-26

## 2019-09-19 RX ADMIN — LIDOCAINE 1 PATCH: 4 CREAM TOPICAL at 23:45

## 2019-09-19 RX ADMIN — HEPARIN SODIUM 5000 UNIT(S): 5000 INJECTION INTRAVENOUS; SUBCUTANEOUS at 21:32

## 2019-09-19 RX ADMIN — OXYCODONE HYDROCHLORIDE 5 MILLIGRAM(S): 5 TABLET ORAL at 16:40

## 2019-09-19 RX ADMIN — AMIODARONE HYDROCHLORIDE 200 MILLIGRAM(S): 400 TABLET ORAL at 06:09

## 2019-09-19 RX ADMIN — Medication 325 MILLIGRAM(S): at 06:09

## 2019-09-19 RX ADMIN — Medication 40 MILLIEQUIVALENT(S): at 10:09

## 2019-09-19 RX ADMIN — Medication 1 MILLIGRAM(S): at 11:45

## 2019-09-19 RX ADMIN — LIDOCAINE 1 PATCH: 4 CREAM TOPICAL at 11:45

## 2019-09-19 RX ADMIN — Medication 500 MILLIGRAM(S): at 17:24

## 2019-09-19 RX ADMIN — Medication 40 MILLIEQUIVALENT(S): at 14:27

## 2019-09-19 RX ADMIN — OXYCODONE HYDROCHLORIDE 5 MILLIGRAM(S): 5 TABLET ORAL at 22:02

## 2019-09-19 RX ADMIN — Medication 500 MILLIGRAM(S): at 06:09

## 2019-09-19 RX ADMIN — Medication 25 MILLIGRAM(S): at 17:24

## 2019-09-19 RX ADMIN — OXYCODONE HYDROCHLORIDE 5 MILLIGRAM(S): 5 TABLET ORAL at 12:25

## 2019-09-19 RX ADMIN — Medication 1 APPLICATION(S): at 17:25

## 2019-09-19 RX ADMIN — LIDOCAINE 1 PATCH: 4 CREAM TOPICAL at 19:00

## 2019-09-19 RX ADMIN — OXYCODONE HYDROCHLORIDE 5 MILLIGRAM(S): 5 TABLET ORAL at 16:07

## 2019-09-19 RX ADMIN — OXYCODONE HYDROCHLORIDE 5 MILLIGRAM(S): 5 TABLET ORAL at 06:38

## 2019-09-19 RX ADMIN — HEPARIN SODIUM 5000 UNIT(S): 5000 INJECTION INTRAVENOUS; SUBCUTANEOUS at 06:09

## 2019-09-19 RX ADMIN — PANTOPRAZOLE SODIUM 40 MILLIGRAM(S): 20 TABLET, DELAYED RELEASE ORAL at 11:52

## 2019-09-19 RX ADMIN — Medication 0.25 MILLIGRAM(S): at 23:20

## 2019-09-19 RX ADMIN — Medication 325 MILLIGRAM(S): at 17:24

## 2019-09-19 RX ADMIN — OXYCODONE HYDROCHLORIDE 5 MILLIGRAM(S): 5 TABLET ORAL at 07:08

## 2019-09-19 RX ADMIN — OXYCODONE HYDROCHLORIDE 5 MILLIGRAM(S): 5 TABLET ORAL at 21:32

## 2019-09-19 RX ADMIN — HEPARIN SODIUM 5000 UNIT(S): 5000 INJECTION INTRAVENOUS; SUBCUTANEOUS at 14:28

## 2019-09-19 RX ADMIN — Medication 81 MILLIGRAM(S): at 11:44

## 2019-09-19 RX ADMIN — Medication 1 APPLICATION(S): at 06:10

## 2019-09-19 RX ADMIN — Medication 25 MILLIGRAM(S): at 06:10

## 2019-09-19 RX ADMIN — OXYCODONE HYDROCHLORIDE 5 MILLIGRAM(S): 5 TABLET ORAL at 11:52

## 2019-09-19 NOTE — PROGRESS NOTE ADULT - SUBJECTIVE AND OBJECTIVE BOX
SUBJECTIVE: No events overnight. Reports feeling better.    MEDICATIONS (HOME):  Home Medications:  labetalol 200 mg oral tablet: 1 tab(s) orally 3 times a day (14 Aug 2019 09:59)    MEDICATIONS  (STANDING):  amiodarone    Tablet 200 milliGRAM(s) Oral daily  amiodarone    Tablet   Oral   ascorbic acid 500 milliGRAM(s) Oral two times a day  aspirin  chewable 81 milliGRAM(s) Oral daily  BACItracin   Ointment 1 Application(s) Topical two times a day  ferrous    sulfate 325 milliGRAM(s) Oral two times a day  folic acid 1 milliGRAM(s) Oral daily  heparin  Injectable 5000 Unit(s) SubCutaneous every 8 hours  lidocaine   Patch 1 Patch Transdermal daily  metoprolol tartrate 25 milliGRAM(s) Oral every 12 hours  pantoprazole   Suspension 40 milliGRAM(s) Oral daily  potassium chloride    Tablet ER 40 milliEquivalent(s) Oral every 4 hours    MEDICATIONS  (PRN):  acetaminophen  IVPB .. 1000 milliGRAM(s) IV Intermittent once PRN Moderate Pain (4 - 6)  mineral oil 30 milliLiter(s) Oral two times a day PRN dry mouth  ondansetron Injectable 4 milliGRAM(s) IV Push three times a day PRN Nausea and/or Vomiting  oxyCODONE    IR 5 milliGRAM(s) Oral every 4 hours PRN Moderate Pain (4 - 6)    ALLERGIES/INTOLERANCES:  No Known Allergies    VITALS & EXAMINATION:  Vital Signs Last 24 Hrs  T(C): 36.7 (18 Sep 2019 19:58), Max: 36.7 (18 Sep 2019 19:58)  T(F): 98 (18 Sep 2019 19:58), Max: 98 (18 Sep 2019 19:58)  HR: 74 (18 Sep 2019 19:58) (74 - 74)  BP: 157/61 (18 Sep 2019 19:58) (157/61 - 157/61)  RR: 18 (18 Sep 2019 19:58) (18 - 18)  SpO2: 99% (18 Sep 2019 19:58) (99% - 99%)    PHYSICAL EXAMINATION  Constitutional: No apparent distress.  Head: Normocephalic & atraumatic.  Extremities: No edema, clubbing, cyanosis  Skin: No rashes    General appearance: No acute distress    Neurological Exam:  Mental Status: Orientated to self, date and place.  Attention intact.  No dysarthria. Speech fluent.  Cranial Nerves:   Pupil 3 mm and reactive on the R, surgical pupil L, EOMI, VFF. Slight L esotropia.  CN V1-3 intact to light touch .  No facial asymmetry.   Motor:   Tone: normal.                  Strength:     [] Upper extremity                      Delt       Bicep    Tricep                                            R         5/5 5/5 4/5 5/5                                         L          5/5 5/5 4/5 5/5  [] Lower extremity                       HF          KE          KF        DF         PF                                                                                 R         2/5 1/5 1/5 4/5 4/5                                          L         4/5 4/5 4/5 4/5 4/5  Pronator drift: none                 Dysmetria: None to finger-nose-finger    Deep Tendon Reflexes:     Biceps          Triceps      BR        Patellar       Ankle      Babinski                                 R      3+                   3+          3+            3+            0      upgoing                                 L        3+                  3+           3+            3+          0       upgoing  +cross adductor reflex    LABORATORY:  CBC                       8.3    10.8  )-----------( 512      ( 19 Sep 2019 06:19 )             24.3     Chem 09-19    129<L>  |  95<L>  |  21  ----------------------------<  94  3.3<L>   |  20<L>  |  1.20    Ca    9.1      19 Sep 2019 06:19  Phos  2.9     09-18  Mg     1.6     09-18    TPro  6.1  /  Alb  2.8<L>  /  TBili  0.4  /  DBili  x   /  AST  18  /  ALT  21  /  AlkPhos  69  09-18    LFTs LIVER FUNCTIONS - ( 18 Sep 2019 06:27 )  Alb: 2.8 g/dL / Pro: 6.1 g/dL / ALK PHOS: 69 U/L / ALT: 21 U/L / AST: 18 U/L / GGT: x           Coagulopathy   Lipid Panel 08-28 Chol 178 <H> HDL 58 Trig 79  A1c 08-28 [id number] 5.4

## 2019-09-19 NOTE — PROGRESS NOTE ADULT - SUBJECTIVE AND OBJECTIVE BOX
NEPHROLOGY-[facility] [phone]        Patient seen and examined in bed..  She was about the same         MEDICATIONS  (STANDING):  amiodarone    Tablet 200 milliGRAM(s) Oral daily  amiodarone    Tablet   Oral   ascorbic acid 500 milliGRAM(s) Oral two times a day  aspirin  chewable 81 milliGRAM(s) Oral daily  BACItracin   Ointment 1 Application(s) Topical two times a day  ferrous    sulfate 325 milliGRAM(s) Oral two times a day  folic acid 1 milliGRAM(s) Oral daily  heparin  Injectable 5000 Unit(s) SubCutaneous every 8 hours  lidocaine   Patch 1 Patch Transdermal daily  metoprolol tartrate 25 milliGRAM(s) Oral every 12 hours  pantoprazole   Suspension 40 milliGRAM(s) Oral daily  potassium chloride    Tablet ER 40 milliEquivalent(s) Oral every 4 hours      VITAL:  T(C): , Max: 36.7 (09-18-19 @ 12:09)  T(F): , Max: 98 (09-18-19 @ 12:09)  HR: 74 (09-18-19 @ 19:58)  BP: 157/61 (09-18-19 @ 19:58)  BP(mean): --  RR: 18 (09-18-19 @ 19:58)  SpO2: 99% (09-18-19 @ 19:58)  Wt(kg): --    I and O's:    09-18 @ 07:01  -  09-19 @ 07:00  --------------------------------------------------------  IN: 900 mL / OUT: 2350 mL / NET: -1450 mL    09-19 @ 07:01  -  09-19 @ 11:33  --------------------------------------------------------  IN: 360 mL / OUT: 0 mL / NET: 360 mL          PHYSICAL EXAM:    Constitutional: NAD  Neck:  No JVD  Respiratory: CTAB/L  Cardiovascular: S1 and S2  Gastrointestinal: BS+, soft, NT/ND  Extremities: No peripheral edema  Neurological: A/O x 3,  right leg weakness  Psychiatric: Normal mood, normal affect  : No [place]  Skin: No rashes  Access: Not applicable    LABS:                        8.3    10.8  )-----------( 512      ( 19 Sep 2019 06:19 )             24.3     09-19    129<L>  |  95<L>  |  21  ----------------------------<  94  3.3<L>   |  20<L>  |  1.20    Ca    9.1      19 Sep 2019 06:19  Phos  2.9     09-18  Mg     1.6     09-18    TPro  6.1  /  Alb  2.8<L>  /  TBili  0.4  /  DBili  x   /  AST  18  /  ALT  21  /  AlkPhos  69  09-18          Urine Studies:          RADIOLOGY & ADDITIONAL STUDIES:

## 2019-09-19 NOTE — CHART NOTE - NSCHARTNOTEFT_GEN_A_CORE
Nutrition Follow Up Note  Patient seen for: nutrition follow-up     Pt is a 69 year old female with PMH of HTN, preeclampsia, c/o abdominal pain, Pt went for CT scan on 3/2019 S/P ascending thoracoabdominal aortic aneurysm repair and aortic valve replaced Bioprosthetic valve 19. Now s/p reoperative thoracoabdominal aneurysm repair 19. Pt noted with hx of swallowing difficulty, is s/p SLP re-evaluation on . Per SLP pt recommended for Dysphagia 3 diet with nectar thick liquid however pt refusing thickened liquids, aware of risk for aspiration.     Source: patient, medical record     Diet : Dysphagia 3 with thin liquids + Health Shakes TID     Patient reports PO intake and appetite has been improving over the last few days since diet upgrade, tolerating diet well with no issues. Pt reports diarrhea with Health Shakes, has not been drinking them, will discontinue per pt preference. Last BM . Pt encouraged to focus on protein-rich foods. Patient with no nutrition-related questions at this time. Made aware RD remains available as needed.      PO intake : % of meals last few days      Source for PO intake: pt report, per nursing flow sheets      Enteral /Parenteral Nutrition: N/A    Weights:  Daily Weight in k (-18), Weight in k.2 (-17), Weight in k (-14)  % Weight Change    Pertinent Medications: MEDICATIONS  (STANDING):  amiodarone    Tablet 200 milliGRAM(s) Oral daily  amiodarone    Tablet   Oral   ascorbic acid 500 milliGRAM(s) Oral two times a day  aspirin  chewable 81 milliGRAM(s) Oral daily  BACItracin   Ointment 1 Application(s) Topical two times a day  ferrous    sulfate 325 milliGRAM(s) Oral two times a day  folic acid 1 milliGRAM(s) Oral daily  heparin  Injectable 5000 Unit(s) SubCutaneous every 8 hours  lidocaine   Patch 1 Patch Transdermal daily  metoprolol tartrate 25 milliGRAM(s) Oral every 12 hours  pantoprazole   Suspension 40 milliGRAM(s) Oral daily    MEDICATIONS  (PRN):  acetaminophen  IVPB .. 1000 milliGRAM(s) IV Intermittent once PRN Moderate Pain (4 - 6)  mineral oil 30 milliLiter(s) Oral two times a day PRN dry mouth  ondansetron Injectable 4 milliGRAM(s) IV Push three times a day PRN Nausea and/or Vomiting  oxyCODONE    IR 5 milliGRAM(s) Oral every 4 hours PRN Moderate Pain (4 - 6)    Pertinent Labs:  @ 06:19: Na 129<L>, BUN 21, Cr 1.20, BG 94, K+ 3.3<L>, Phos --, Mg --, Alk Phos --, ALT/SGPT --, AST/SGOT --, HbA1c --      Skin per nursing documentation: free of pressure injuries per nursing flow sheets   Edema: +2 right ankles, foot    Estimated Needs:   [x ] no change since previous assessment  [ ] recalculated:     Previous Nutrition Diagnosis:  Inadequate oral intake  Nutrition Diagnosis is: improving with diet upgrade     New Nutrition Diagnosis: N/A       Interventions:   Recommend  1) Continue current diet as tolerated. Defer consistency to team   2) RD to discontinue Health Shakes per pt preference, pt declining additional supplements due to loose BMs   3) Obtain/honor food preferences as able      Monitoring and Evaluation:     Continue to monitor Nutritional intake, Tolerance to diet prescription, weights, labs, skin integrity    RD remains available upon request and will follow up per protocol  Azra Haynes RD, CDN, Pager # [phone]

## 2019-09-19 NOTE — PROGRESS NOTE ADULT - ASSESSMENT
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 She is now   s/p Reop Thoracoabdominal aneurysm open repair with Dacron graft and celiac SMA bypass, reimplantation of lumbar artery with Dr. Carranza on 8/29. Intraop 2 PRBC, 2 Platelets,Her postoperative course was complicated by new onset BLE weakness R>L for which she was placed on MAPs of 110 and CSF drainage of 20cc. She was extubated POD2 ,and required BIPAP on POD3. She went into afib with RVR, placed on esmolol-weaned off- on POD4 but converted back to NSR without treatment.  9/1 RLE improving, NGT removed, BIPAP, nicardipine restarted briefly for HTN, Cr 2.43  9/2 Lumbar drain removed, Afib RVR converted without intervention, Vaso weaned off today  9/3 S&S eval recommend Dysphagia 1 nectar thick diet, 3L NC, PT recommending acute rehab, Cr improving 1.93 from 2.04  9/4 Afib with RVR, transferred to CTU, metoprolol 2.5 IV x2, amiodarone drip started, vasopressin for hypotension, esmolol drip for rate control, Digoxin IV x1, converted to NSR, Cr 1.76 DURGA improving, targeting MAPs of 70-80, L pleural chest tubes x3 removed, S&S recommend dysphagia 1 thin liquids, bradycardic at night and amio decreased to 0.5 from 1  9/5 Transitioned to Labetalol from esmolol, PO amio taper, A-line/introducer/and muñoz discontinued, transferred to SDU  She is now transferred from the CTU to SDU. She no longer has any critical care needs at this time and is stable enough for transfer to SDU.   9/6 Increase PT/ ambulation. Dys 1 diet, speech/swallow f/u, repeat mbs today. Maintaining nsr  9/7 Tolerating Dys II diet, check urinalysis, for leukocytosis. CXR negative for infiltrate. Creat 1,9   9/8 Zosyn initiated for suspected UTI/LLL pna. ID consulted for assistance in mangement. creatinine improving . -1415 fluid balance, ct chest, BC x 2  9/9 WBC remains elevated, 21, afebrile, pending CT results r/o PNA. Continue zosyn.    D/w PT,  pt with decrease in  balance/ fatigued today, RLE weakness unchanged as per pt.   relative hypotension, irineo, labetolol d/c, maintain mao>85.  9/10 CT with compressive atelectasis,  placed on nocturnal bipap as d/w  Dr Carranza  Wbc trending down  Cont zosyn UTI/R/o pna  9/11 Zosyn for pna x 7 days  PT  Neuro consulted for f/u  9/12  CT scan  abdomen complete   ,  worked with PT,   plus one strength  r leg,  OOB to chair,  Lt thoracotomy incision  9/13 Pending mri at neuro  rec.HCT drifting down, repeat performed.Labetolol d/c lopressor started for HR control but to liberalize bp Pending acute rehab  9/14 MRI negative for mass, shift, bleed, flow limiting lesions  9/15   zosyn dc,   OOB to chair   NSR   rt leg splint w/ cont weakness  9/16 Pt voiding, female urine collection device, incontenent   Hypokalemia, supplementation increased.  Zosyn complete  Rehab planning  9/17 PT today  9/18  HD stable.  Continues to slowly regain strength in RLE.  [facility] awaiting auth..  Per neuro will need outpt EMG and conduction studies.  No further neuro work-up needed at this time.  Every other staple removed  9/19 HD stable.  Slowly improving strength.  Dr carranza wanted neuro to re-eval pt.  MRI C-spine/lumbar spine and thoracic spine to ro spinal infarct or stenosis.  Awaiting auth for [facility]

## 2019-09-19 NOTE — PROGRESS NOTE ADULT - ASSESSMENT
ASSESSMENT:  69 yr old female with H/O Thoraco abdominal Aortic aneurysm, Aortic Stenosis/ regurgitation S/P AVR (T), Ascending & hemiarch replacement 4/9/19 now sp thoracoabdominal aortic aneurysm.     - Resolved DURGA  - Hyponatremia - stable and improving   - b/l lower ext. weakness - Right>left     RECOMMENDATION:  -Reduce free water intake. Encourage liquids with calories; Cont Ensure Enlive  -?Restart  Aldactone.  WIll help with potassium and BP ;  KDUR 40meq po x 2 doses   -Cont PT is ongoing .  Needs rehab   -Hyponatremia is of little significance.

## 2019-09-19 NOTE — PROGRESS NOTE ADULT - ASSESSMENT
69F with a history of HTN, AAA, bioprosthetic AVR, who was initially admitted on Aug 14. She had repair of her thoracoabdominal aneursym on 8/29 and had a lumbar drain placed. Since her surgery, she feels that she has had a lot of pain in her lower back as well as tingling sensation to her lower extremities and some weakness of her lower extremities bilaterally, worse on the right side, that it is making it harder for her to walk. She denies urinary or bowel incontinence.   MRI T/L spine showed mild lumbar spinal stenosis and severe narrowing at L4-5, moderate narrowing at L5-S1.  MRI brain negative for stroke.    Recommend:  MRI C spine without contrast, possible cervical spondylosis. IF MRI C spine showing pathology, neurosurgery consult  Check B12,. folate, MMA, homocysteine, RPR  Otherwise, no further inpatient neurological work up, Follow up outpatient neurology for EMG and nerve conduction study    Patient seen and examined with Dr. Walker

## 2019-09-19 NOTE — PROGRESS NOTE ADULT - SUBJECTIVE AND OBJECTIVE BOX
S:  feels ok.  RLE weakness unchanged.  Feels "down"    Telemetry:  SR 68    Vital Signs Last 24 Hrs  T(C): 36.3 (09-19-19 @ 13:49), Max: 36.7 (09-18-19 @ 19:58)  T(F): 97.3 (09-19-19 @ 13:49), Max: 98 (09-18-19 @ 19:58)  HR: 85 (09-19-19 @ 15:17) (59 - 85)  BP: 126/85 (09-19-19 @ 15:17) (94/55 - 157/61)  RR: 18 (09-19-19 @ 15:17) (18 - 18)  SpO2: 99% (09-19-19 @ 13:49) (99% - 99%)                   09-18 @ 07:01  -  09-19 @ 07:00  --------------------------------------------------------  IN: 900 mL / OUT: 2350 mL / NET: -1450 mL    09-19 @ 07:01  -  09-19 @ 16:43  --------------------------------------------------------  IN: 720 mL / OUT: 300 mL / NET: 420 mL                                8.3    10.8  )-----------( 512      ( 19 Sep 2019 06:19 )             24.3       09-19    129<L>  |  95<L>  |  21  ----------------------------<  94  3.3<L>   |  20<L>  |  1.20    Ca    9.1      19 Sep 2019 06:19  Phos  2.9     09-18  Mg     1.6     09-18    TPro  6.1  /  Alb  2.8<L>  /  TBili  0.4  /  DBili  x   /  AST  18  /  ALT  21  /  AlkPhos  69  09-18          PHYSICAL EXAM:    Neurology: alert and oriented x 3, nonfocal, no gross deficits    CV :  S1S2 heard RRR    Thoracotomy incision--every other staple removed yesterday.  CDI .  small area erythema inchanged     Lungs:  clear to ausc.  No w/r/r    Abdomen: soft, nontender, nondistended, positive bowel sounds, last bowel movement             Extremities:  no edema.  moves right toes.  can slightly rotate ankle.  LLE FROM            acetaminophen  IVPB .. 1000 milliGRAM(s) IV Intermittent once PRN  amiodarone    Tablet 200 milliGRAM(s) Oral daily  amiodarone    Tablet   Oral   ascorbic acid 500 milliGRAM(s) Oral two times a day  aspirin  chewable 81 milliGRAM(s) Oral daily  BACItracin   Ointment 1 Application(s) Topical two times a day  ferrous    sulfate 325 milliGRAM(s) Oral two times a day  folic acid 1 milliGRAM(s) Oral daily  heparin  Injectable 5000 Unit(s) SubCutaneous every 8 hours  lidocaine   Patch 1 Patch Transdermal daily  metoprolol tartrate 25 milliGRAM(s) Oral every 12 hours  mineral oil 30 milliLiter(s) Oral two times a day PRN  ondansetron Injectable 4 milliGRAM(s) IV Push three times a day PRN  oxyCODONE    IR 5 milliGRAM(s) Oral every 4 hours PRN  pantoprazole   Suspension 40 milliGRAM(s) Oral daily                              Physical Therapy Rec:   Home  [  ]   Home w/ PT  [  ]  Rehab  [ x ]    Discussed with Cardiothoracic Team at AM rounds.

## 2019-09-19 NOTE — PROGRESS NOTE ADULT - PROBLEM SELECTOR PLAN 1
PT/OT,   Lt thoracotomy incision--every other staple out  serial neurovascular exams,   [facility] plan this wk  reeval by neuro--will get MRI C/L/T spine with contrast to ro spinal infarct  neuro also--EMG conduction studies as outpt  [address] [facility]  [phone]

## 2019-09-20 LAB
ANION GAP SERPL CALC-SCNC: 10 MMOL/L — SIGNIFICANT CHANGE UP (ref 5–17)
BUN SERPL-MCNC: 23 MG/DL — SIGNIFICANT CHANGE UP (ref 7–23)
CALCIUM SERPL-MCNC: 9.2 MG/DL — SIGNIFICANT CHANGE UP (ref 8.4–10.5)
CHLORIDE SERPL-SCNC: 95 MMOL/L — LOW (ref 96–108)
CO2 SERPL-SCNC: 21 MMOL/L — LOW (ref 22–31)
CREAT SERPL-MCNC: 1.23 MG/DL — SIGNIFICANT CHANGE UP (ref 0.5–1.3)
GLUCOSE SERPL-MCNC: 95 MG/DL — SIGNIFICANT CHANGE UP (ref 70–99)
HCT VFR BLD CALC: 26.5 % — LOW (ref 34.5–45)
HGB BLD-MCNC: 8.8 G/DL — LOW (ref 11.5–15.5)
MAGNESIUM SERPL-MCNC: 1.7 MG/DL — SIGNIFICANT CHANGE UP (ref 1.6–2.6)
MCHC RBC-ENTMCNC: 28.9 PG — SIGNIFICANT CHANGE UP (ref 27–34)
MCHC RBC-ENTMCNC: 33.1 GM/DL — SIGNIFICANT CHANGE UP (ref 32–36)
MCV RBC AUTO: 87.3 FL — SIGNIFICANT CHANGE UP (ref 80–100)
PHOSPHATE SERPL-MCNC: 3.1 MG/DL — SIGNIFICANT CHANGE UP (ref 2.5–4.5)
PLATELET # BLD AUTO: 483 K/UL — HIGH (ref 150–400)
POTASSIUM SERPL-MCNC: 3.7 MMOL/L — SIGNIFICANT CHANGE UP (ref 3.5–5.3)
POTASSIUM SERPL-SCNC: 3.7 MMOL/L — SIGNIFICANT CHANGE UP (ref 3.5–5.3)
RBC # BLD: 3.03 M/UL — LOW (ref 3.8–5.2)
RBC # FLD: 14.4 % — SIGNIFICANT CHANGE UP (ref 10.3–14.5)
SODIUM SERPL-SCNC: 126 MMOL/L — LOW (ref 135–145)
WBC # BLD: 9.5 K/UL — SIGNIFICANT CHANGE UP (ref 3.8–10.5)
WBC # FLD AUTO: 9.5 K/UL — SIGNIFICANT CHANGE UP (ref 3.8–10.5)

## 2019-09-20 RX ORDER — AMLODIPINE BESYLATE 2.5 MG/1
5 TABLET ORAL DAILY
Refills: 0 | Status: DISCONTINUED | OUTPATIENT
Start: 2019-09-20 | End: 2019-09-28

## 2019-09-20 RX ORDER — MAGNESIUM SULFATE 500 MG/ML
2 VIAL (ML) INJECTION
Refills: 0 | Status: COMPLETED | OUTPATIENT
Start: 2019-09-20 | End: 2019-09-20

## 2019-09-20 RX ORDER — SODIUM CHLORIDE 9 MG/ML
2 INJECTION INTRAMUSCULAR; INTRAVENOUS; SUBCUTANEOUS ONCE
Refills: 0 | Status: COMPLETED | OUTPATIENT
Start: 2019-09-20 | End: 2019-09-20

## 2019-09-20 RX ORDER — POTASSIUM CHLORIDE 20 MEQ
40 PACKET (EA) ORAL ONCE
Refills: 0 | Status: COMPLETED | OUTPATIENT
Start: 2019-09-20 | End: 2019-09-20

## 2019-09-20 RX ADMIN — Medication 50 GRAM(S): at 20:15

## 2019-09-20 RX ADMIN — HEPARIN SODIUM 5000 UNIT(S): 5000 INJECTION INTRAVENOUS; SUBCUTANEOUS at 05:21

## 2019-09-20 RX ADMIN — Medication 40 MILLIEQUIVALENT(S): at 21:29

## 2019-09-20 RX ADMIN — SODIUM CHLORIDE 2 GRAM(S): 9 INJECTION INTRAMUSCULAR; INTRAVENOUS; SUBCUTANEOUS at 11:33

## 2019-09-20 RX ADMIN — OXYCODONE HYDROCHLORIDE 5 MILLIGRAM(S): 5 TABLET ORAL at 12:35

## 2019-09-20 RX ADMIN — Medication 1 APPLICATION(S): at 17:08

## 2019-09-20 RX ADMIN — OXYCODONE HYDROCHLORIDE 5 MILLIGRAM(S): 5 TABLET ORAL at 22:15

## 2019-09-20 RX ADMIN — PANTOPRAZOLE SODIUM 40 MILLIGRAM(S): 20 TABLET, DELAYED RELEASE ORAL at 14:15

## 2019-09-20 RX ADMIN — AMIODARONE HYDROCHLORIDE 200 MILLIGRAM(S): 400 TABLET ORAL at 05:21

## 2019-09-20 RX ADMIN — Medication 1 MILLIGRAM(S): at 11:34

## 2019-09-20 RX ADMIN — AMLODIPINE BESYLATE 5 MILLIGRAM(S): 2.5 TABLET ORAL at 09:46

## 2019-09-20 RX ADMIN — OXYCODONE HYDROCHLORIDE 5 MILLIGRAM(S): 5 TABLET ORAL at 11:38

## 2019-09-20 RX ADMIN — Medication 1 APPLICATION(S): at 05:22

## 2019-09-20 RX ADMIN — Medication 81 MILLIGRAM(S): at 11:33

## 2019-09-20 RX ADMIN — Medication 500 MILLIGRAM(S): at 05:21

## 2019-09-20 RX ADMIN — LIDOCAINE 1 PATCH: 4 CREAM TOPICAL at 11:33

## 2019-09-20 RX ADMIN — Medication 325 MILLIGRAM(S): at 05:21

## 2019-09-20 RX ADMIN — Medication 25 MILLIGRAM(S): at 05:21

## 2019-09-20 RX ADMIN — OXYCODONE HYDROCHLORIDE 5 MILLIGRAM(S): 5 TABLET ORAL at 05:51

## 2019-09-20 RX ADMIN — LIDOCAINE 1 PATCH: 4 CREAM TOPICAL at 18:56

## 2019-09-20 RX ADMIN — Medication 325 MILLIGRAM(S): at 17:07

## 2019-09-20 RX ADMIN — ONDANSETRON 4 MILLIGRAM(S): 8 TABLET, FILM COATED ORAL at 14:17

## 2019-09-20 RX ADMIN — Medication 50 GRAM(S): at 21:58

## 2019-09-20 RX ADMIN — HEPARIN SODIUM 5000 UNIT(S): 5000 INJECTION INTRAVENOUS; SUBCUTANEOUS at 14:18

## 2019-09-20 RX ADMIN — HEPARIN SODIUM 5000 UNIT(S): 5000 INJECTION INTRAVENOUS; SUBCUTANEOUS at 21:29

## 2019-09-20 RX ADMIN — OXYCODONE HYDROCHLORIDE 5 MILLIGRAM(S): 5 TABLET ORAL at 17:06

## 2019-09-20 RX ADMIN — OXYCODONE HYDROCHLORIDE 5 MILLIGRAM(S): 5 TABLET ORAL at 05:21

## 2019-09-20 RX ADMIN — Medication 500 MILLIGRAM(S): at 17:09

## 2019-09-20 RX ADMIN — OXYCODONE HYDROCHLORIDE 5 MILLIGRAM(S): 5 TABLET ORAL at 17:36

## 2019-09-20 RX ADMIN — OXYCODONE HYDROCHLORIDE 5 MILLIGRAM(S): 5 TABLET ORAL at 21:30

## 2019-09-20 RX ADMIN — Medication 25 MILLIGRAM(S): at 17:07

## 2019-09-20 RX ADMIN — LIDOCAINE 1 PATCH: 4 CREAM TOPICAL at 23:35

## 2019-09-20 NOTE — PROGRESS NOTE ADULT - SUBJECTIVE AND OBJECTIVE BOX
Vascular Surgery Progress Note     Subjective/24hour Events:   Patient seen and examined.   No acute events overnight.   Pain controlled.   RLE moving better.     Vital Signs:  Vital Signs Last 24 Hrs  T(C): 36.4 (20 Sep 2019 05:16), Max: 36.7 (19 Sep 2019 19:54)  T(F): 97.5 (20 Sep 2019 05:16), Max: 98 (19 Sep 2019 19:54)  HR: 56 (20 Sep 2019 07:52) (55 - 85)  BP: 183/70 (20 Sep 2019 07:52) (94/55 - 183/70)  BP(mean): --  RR: 16 (20 Sep 2019 07:52) (16 - 18)  SpO2: 98% (20 Sep 2019 05:16) (98% - 99%)    CAPILLARY BLOOD GLUCOSE          I&O's Detail    19 Sep 2019 07:01  -  20 Sep 2019 07:00  --------------------------------------------------------  IN:    Oral Fluid: 1600 mL  Total IN: 1600 mL    OUT:    Voided: 1100 mL  Total OUT: 1100 mL    Total NET: 500 mL          MEDICATIONS  (STANDING):  amiodarone    Tablet 200 milliGRAM(s) Oral daily  amiodarone    Tablet   Oral   ascorbic acid 500 milliGRAM(s) Oral two times a day  aspirin  chewable 81 milliGRAM(s) Oral daily  BACItracin   Ointment 1 Application(s) Topical two times a day  ferrous    sulfate 325 milliGRAM(s) Oral two times a day  folic acid 1 milliGRAM(s) Oral daily  heparin  Injectable 5000 Unit(s) SubCutaneous every 8 hours  lidocaine   Patch 1 Patch Transdermal daily  metoprolol tartrate 25 milliGRAM(s) Oral every 12 hours  pantoprazole   Suspension 40 milliGRAM(s) Oral daily    MEDICATIONS  (PRN):  acetaminophen  IVPB .. 1000 milliGRAM(s) IV Intermittent once PRN Moderate Pain (4 - 6)  ALPRAZolam 0.25 milliGRAM(s) Oral every 8 hours PRN anxiety  mineral oil 30 milliLiter(s) Oral two times a day PRN dry mouth  ondansetron Injectable 4 milliGRAM(s) IV Push three times a day PRN Nausea and/or Vomiting  oxyCODONE    IR 5 milliGRAM(s) Oral every 4 hours PRN Moderate Pain (4 - 6)      Physical Exam:  Gen: NAD.  Lungs: Non labored breathing.   Ab: Soft, nontender, nondistended. L incision dressing clean/dry/intact.   Ext: Continued RLE weakness, improving.     Labs:    09-20    126<L>  |  95<L>  |  23  ----------------------------<  95  3.7   |  21<L>  |  1.23    Ca    9.2      20 Sep 2019 06:17  Phos  3.1     09-20  Mg     1.7     09-20                              8.8    9.5   )-----------( 483      ( 20 Sep 2019 06:17 )             26.5

## 2019-09-20 NOTE — PROGRESS NOTE ADULT - ASSESSMENT
Flory is a very pleasant 69 Year-Old Lady s/p 8/29 ThoracoAbdominal Aneurysm Repair / Celiac-spinal-SMA bypass.    - Rehab planning.   - Excellent care per CTS appreciated.     Vascular Surgery Pager #[phone]

## 2019-09-20 NOTE — PROGRESS NOTE ADULT - SUBJECTIVE AND OBJECTIVE BOX
NEPHROLOGY-[facility] [phone]        Patient seen and examined in bed.  She was in good spirits.  Bp was up this am         MEDICATIONS  (STANDING):  amiodarone    Tablet 200 milliGRAM(s) Oral daily  amiodarone    Tablet   Oral   ascorbic acid 500 milliGRAM(s) Oral two times a day  aspirin  chewable 81 milliGRAM(s) Oral daily  BACItracin   Ointment 1 Application(s) Topical two times a day  ferrous    sulfate 325 milliGRAM(s) Oral two times a day  folic acid 1 milliGRAM(s) Oral daily  heparin  Injectable 5000 Unit(s) SubCutaneous every 8 hours  lidocaine   Patch 1 Patch Transdermal daily  metoprolol tartrate 25 milliGRAM(s) Oral every 12 hours  pantoprazole   Suspension 40 milliGRAM(s) Oral daily      VITAL:  T(C): , Max: 36.7 (09-19-19 @ 19:54)  T(F): , Max: 98 (09-19-19 @ 19:54)  HR: 56 (09-20-19 @ 07:52)  BP: 183/70 (09-20-19 @ 07:52)  BP(mean): --  RR: 16 (09-20-19 @ 07:52)  SpO2: 98% (09-20-19 @ 05:16)  Wt(kg): --    I and O's:    09-19 @ 07:01  -  09-20 @ 07:00  --------------------------------------------------------  IN: 1600 mL / OUT: 1100 mL / NET: 500 mL          PHYSICAL EXAM:    Constitutional: NAD  Neck:  No JVD  Respiratory: CTAB/L  Cardiovascular: S1 and S2  Gastrointestinal: BS+, soft, NT/ND  Extremities: No peripheral edema  Neurological: A/O x 3, right leg weakness  Psychiatric: Normal mood, normal affect  : No [place]  Skin: No rashes  Access: Not applicable    LABS:                        8.8    9.5   )-----------( 483      ( 20 Sep 2019 06:17 )             26.5     09-20    126<L>  |  95<L>  |  23  ----------------------------<  95  3.7   |  21<L>  |  1.23    Ca    9.2      20 Sep 2019 06:17  Phos  3.1     09-20  Mg     1.7     09-20            Urine Studies:          RADIOLOGY & ADDITIONAL STUDIES:

## 2019-09-20 NOTE — PROGRESS NOTE ADULT - SUBJECTIVE AND OBJECTIVE BOX
Subjective: "Im ok, just nervous about MRI"  OOB chair,     Tele:   SR 60                           T(C): 36.3 (09-20-19 @ 14:45), Max: 36.7 (09-19-19 @ 19:54)  HR: 57 (09-20-19 @ 14:45) (55 - 64)  BP: 162/66 (09-20-19 @ 14:45) (139/58 - 183/70)  RR: 18 (09-20-19 @ 14:45) (16 - 18)  SpO2: 100% (09-20-19 @ 14:45) (98% - 100%)    LVEF:       09-20    126<L>  |  95<L>  |  23  ----------------------------<  95  3.7   |  21<L>  |  1.23    Ca    9.2      20 Sep 2019 06:17  Phos  3.1     09-20  Mg     1.7     09-20                                 8.8    9.5   )-----------( 483      ( 20 Sep 2019 06:17 )             26.5              Assessment    Neurology: alert and oriented x 3, nonfocal, no gross deficits    CV :  S1S2 heard RRR    Thoracotomy incision-- staples removed .  CDI .  small area erythema in changed     Lungs:  clear to ausc.  No w/r/r    Abdomen: soft, nontender, nondistended, positive bowel sounds, last bowel movement             Extremities:  no edema.  moves right toes.  can slightly rotate ankle.  LLE FROM          MEDICATIONS  (STANDING):  amiodarone    Tablet 200 milliGRAM(s) Oral daily  amiodarone    Tablet   Oral   amLODIPine   Tablet 5 milliGRAM(s) Oral daily  ascorbic acid 500 milliGRAM(s) Oral two times a day  aspirin  chewable 81 milliGRAM(s) Oral daily  BACItracin   Ointment 1 Application(s) Topical two times a day  ferrous    sulfate 325 milliGRAM(s) Oral two times a day  folic acid 1 milliGRAM(s) Oral daily  heparin  Injectable 5000 Unit(s) SubCutaneous every 8 hours  lidocaine   Patch 1 Patch Transdermal daily  magnesium sulfate  IVPB 2 Gram(s) IV Intermittent every 2 hours  metoprolol tartrate 25 milliGRAM(s) Oral every 12 hours  pantoprazole   Suspension 40 milliGRAM(s) Oral daily  potassium chloride    Tablet ER 40 milliEquivalent(s) Oral once       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: s/p repair  Asceding aortic aneurysm repair 4/8/2019  S/P cataract surgery  Arm fracture, left: 2005 Subjective: "Im ok, just nervous about MRI"  OOB chair,     Tele:   SR 60                           T(C): 36.3 (09-20-19 @ 14:45), Max: 36.7 (09-19-19 @ 19:54)  HR: 57 (09-20-19 @ 14:45) (55 - 64)  BP: 162/66 (09-20-19 @ 14:45) (139/58 - 183/70)  RR: 18 (09-20-19 @ 14:45) (16 - 18)  SpO2: 100% (09-20-19 @ 14:45) (98% - 100%)    LVEF:       09-20    126<L>  |  95<L>  |  23  ----------------------------<  95  3.7   |  21<L>  |  1.23    Ca    9.2      20 Sep 2019 06:17  Phos  3.1     09-20  Mg     1.7     09-20                                 8.8    9.5   )-----------( 483      ( 20 Sep 2019 06:17 )             26.5              Assessment    Neurology: alert and oriented x 3, nonfocal, no gross deficits    CV :  S1S2 heard RRR    Thoracotomy incision-- staples removed .  CDI .  small area erythema in changed     Lungs:  clear to ausc.  No w/r/r    Abdomen: soft, nontender, nondistended, positive bowel sounds, last bowel movement this am "loose"        Extremities:  no edema.  moves right toes.  can slightly rotate ankle.  LLE FROM          MEDICATIONS  (STANDING):  amiodarone    Tablet 200 milliGRAM(s) Oral daily  amiodarone    Tablet   Oral   amLODIPine   Tablet 5 milliGRAM(s) Oral daily  ascorbic acid 500 milliGRAM(s) Oral two times a day  aspirin  chewable 81 milliGRAM(s) Oral daily  BACItracin   Ointment 1 Application(s) Topical two times a day  ferrous    sulfate 325 milliGRAM(s) Oral two times a day  folic acid 1 milliGRAM(s) Oral daily  heparin  Injectable 5000 Unit(s) SubCutaneous every 8 hours  lidocaine   Patch 1 Patch Transdermal daily  magnesium sulfate  IVPB 2 Gram(s) IV Intermittent every 2 hours  metoprolol tartrate 25 milliGRAM(s) Oral every 12 hours  pantoprazole   Suspension 40 milliGRAM(s) Oral daily  potassium chloride    Tablet ER 40 milliEquivalent(s) Oral once       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: s/p repair  Asceding aortic aneurysm repair 4/8/2019  S/P cataract surgery  Arm fracture, left: 2005

## 2019-09-20 NOTE — PROGRESS NOTE ADULT - PROBLEM SELECTOR PLAN 1
PT/OT,   Lt thoracotomy incision--every other staple out  serial neurovascular exams,   [facility] plan this wk  reeval by neuro--will get MRI C/L/T spine with contrast to ro spinal infarct  neuro also--EMG conduction studies as outpt  [address] [facility]  [phone] PT/OT,   Lt thoracotomy incision--remaining staple removed  serial neurovascular exams,   [facility] re[facility] eventually  reeval by neuro--will get MRI C/L/T spine with contrast to ro spinal infarct  neuro also--EMG conduction studies as outpt  [address] [facility]  [phone]

## 2019-09-20 NOTE — PROGRESS NOTE ADULT - ASSESSMENT
ASSESSMENT:  69 yr old female with H/O Thoraco abdominal Aortic aneurysm, Aortic Stenosis/ regurgitation S/P AVR (T), Ascending & hemiarch replacement 4/9/19 now sp thoracoabdominal aortic aneurysm.     - Resolved DURGA  - Hyponatremia - worse   - b/l lower ext. weakness - Right>left     RECOMMENDATION:  -Reduce free water intake. Encourage liquids with calories; Cont Ensure Enlive;  Add [id number] po x 1 and if the sodium is low in am then samsca   -Norvasc 5mg po qd for increased BP  -Cont PT is ongoing .  Needs rehab

## 2019-09-20 NOTE — PROGRESS NOTE ADULT - ASSESSMENT
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 She is now   s/p Reop Thoracoabdominal aneurysm open repair with Dacron graft and celiac SMA bypass, reimplantation of lumbar artery with Dr. Carranza on 8/29. Intraop 2 PRBC, 2 Platelets,Her postoperative course was complicated by new onset BLE weakness R>L for which she was placed on MAPs of 110 and CSF drainage of 20cc. She was extubated POD2 ,and required BIPAP on POD3. She went into afib with RVR, placed on esmolol-weaned off- on POD4 but converted back to NSR without treatment.  9/1 RLE improving, NGT removed, BIPAP, nicardipine restarted briefly for HTN, Cr 2.43  9/2 Lumbar drain removed, Afib RVR converted without intervention, Vaso weaned off today  9/3 S&S eval recommend Dysphagia 1 nectar thick diet, 3L NC, PT recommending acute rehab, Cr improving 1.93 from 2.04  9/4 Afib with RVR, transferred to CTU, metoprolol 2.5 IV x2, amiodarone drip started, vasopressin for hypotension, esmolol drip for rate control, Digoxin IV x1, converted to NSR, Cr 1.76 DURGA improving, targeting MAPs of 70-80, L pleural chest tubes x3 removed, S&S recommend dysphagia 1 thin liquids, bradycardic at night and amio decreased to 0.5 from 1  9/5 Transitioned to Labetalol from esmolol, PO amio taper, A-line/introducer/and muñoz discontinued, transferred to SDU  She is now transferred from the CTU to SDU. She no longer has any critical care needs at this time and is stable enough for transfer to SDU.   9/6 Increase PT/ ambulation. Dys 1 diet, speech/swallow f/u, repeat mbs today. Maintaining nsr  9/7 Tolerating Dys II diet, check urinalysis, for leukocytosis. CXR negative for infiltrate. Creat 1,9   9/8 Zosyn initiated for suspected UTI/LLL pna. ID consulted for assistance in mangement. creatinine improving . -1415 fluid balance, ct chest, BC x 2  9/9 WBC remains elevated, 21, afebrile, pending CT results r/o PNA. Continue zosyn.    D/w PT,  pt with decrease in  balance/ fatigued today, RLE weakness unchanged as per pt.   relative hypotension, irineo, labetolol d/c, maintain mao>85.  9/10 CT with compressive atelectasis,  placed on nocturnal bipap as d/w  Dr Carranza  Wbc trending down  Cont zosyn UTI/R/o pna  9/11 Zosyn for pna x 7 days  PT  Neuro consulted for f/u  9/12  CT scan  abdomen complete   ,  worked with PT,   plus one strength  r leg,  OOB to chair,  Lt thoracotomy incision  9/13 Pending mri at neuro  rec.HCT drifting down, repeat performed.Labetolol d/c lopressor started for HR control but to liberalize bp Pending acute rehab  9/14 MRI negative for mass, shift, bleed, flow limiting lesions  9/15   zosyn dc,   OOB to chair   NSR   rt leg splint w/ cont weakness  9/16 Pt voiding, female urine collection device, incontenent   Hypokalemia, supplementation increased.  Zosyn complete  Rehab planning  9/17 PT today  9/18  HD stable.  Continues to slowly regain strength in RLE.  [facility] awaiting auth..  Per neuro will need outpt EMG and conduction studies.  No further neuro work-up needed at this time.  Every other staple removed  9/19 HD stable.  Slowly improving strength.  Dr carranza wanted neuro to re-eval pt.  MRI C-spine/lumbar spine and thoracic spine to ro spinal infarct or stenosis.  Awaiting auth for OLINDA  9/20 MRI sched for 9/21> po ativan prior(claustrophobia)

## 2019-09-21 LAB
ALBUMIN SERPL ELPH-MCNC: 3.1 G/DL — LOW (ref 3.3–5)
ALP SERPL-CCNC: 72 U/L — SIGNIFICANT CHANGE UP (ref 40–120)
ALT FLD-CCNC: 18 U/L — SIGNIFICANT CHANGE UP (ref 10–45)
ANION GAP SERPL CALC-SCNC: 14 MMOL/L — SIGNIFICANT CHANGE UP (ref 5–17)
AST SERPL-CCNC: 16 U/L — SIGNIFICANT CHANGE UP (ref 10–40)
BILIRUB SERPL-MCNC: 0.3 MG/DL — SIGNIFICANT CHANGE UP (ref 0.2–1.2)
BUN SERPL-MCNC: 21 MG/DL — SIGNIFICANT CHANGE UP (ref 7–23)
CALCIUM SERPL-MCNC: 9.1 MG/DL — SIGNIFICANT CHANGE UP (ref 8.4–10.5)
CHLORIDE SERPL-SCNC: 95 MMOL/L — LOW (ref 96–108)
CO2 SERPL-SCNC: 19 MMOL/L — LOW (ref 22–31)
CREAT SERPL-MCNC: 1.19 MG/DL — SIGNIFICANT CHANGE UP (ref 0.5–1.3)
GLUCOSE SERPL-MCNC: 102 MG/DL — HIGH (ref 70–99)
HCT VFR BLD CALC: 25.2 % — LOW (ref 34.5–45)
HGB BLD-MCNC: 8.3 G/DL — LOW (ref 11.5–15.5)
MAGNESIUM SERPL-MCNC: 2.5 MG/DL — SIGNIFICANT CHANGE UP (ref 1.6–2.6)
MCHC RBC-ENTMCNC: 28.6 PG — SIGNIFICANT CHANGE UP (ref 27–34)
MCHC RBC-ENTMCNC: 32.9 GM/DL — SIGNIFICANT CHANGE UP (ref 32–36)
MCV RBC AUTO: 87.1 FL — SIGNIFICANT CHANGE UP (ref 80–100)
PLATELET # BLD AUTO: 416 K/UL — HIGH (ref 150–400)
POTASSIUM SERPL-MCNC: 3.9 MMOL/L — SIGNIFICANT CHANGE UP (ref 3.5–5.3)
POTASSIUM SERPL-SCNC: 3.9 MMOL/L — SIGNIFICANT CHANGE UP (ref 3.5–5.3)
PROT SERPL-MCNC: 6.4 G/DL — SIGNIFICANT CHANGE UP (ref 6–8.3)
RBC # BLD: 2.9 M/UL — LOW (ref 3.8–5.2)
RBC # FLD: 14.5 % — SIGNIFICANT CHANGE UP (ref 10.3–14.5)
SODIUM SERPL-SCNC: 128 MMOL/L — LOW (ref 135–145)
WBC # BLD: 10 K/UL — SIGNIFICANT CHANGE UP (ref 3.8–10.5)
WBC # FLD AUTO: 10 K/UL — SIGNIFICANT CHANGE UP (ref 3.8–10.5)

## 2019-09-21 PROCEDURE — 72148 MRI LUMBAR SPINE W/O DYE: CPT | Mod: 26

## 2019-09-21 PROCEDURE — 72146 MRI CHEST SPINE W/O DYE: CPT | Mod: 26

## 2019-09-21 PROCEDURE — 72141 MRI NECK SPINE W/O DYE: CPT | Mod: 26

## 2019-09-21 PROCEDURE — 93010 ELECTROCARDIOGRAM REPORT: CPT

## 2019-09-21 RX ADMIN — Medication 81 MILLIGRAM(S): at 14:30

## 2019-09-21 RX ADMIN — Medication 1 APPLICATION(S): at 17:18

## 2019-09-21 RX ADMIN — Medication 1 APPLICATION(S): at 05:52

## 2019-09-21 RX ADMIN — Medication 500 MILLIGRAM(S): at 05:54

## 2019-09-21 RX ADMIN — Medication 25 MILLIGRAM(S): at 17:18

## 2019-09-21 RX ADMIN — AMIODARONE HYDROCHLORIDE 200 MILLIGRAM(S): 400 TABLET ORAL at 05:53

## 2019-09-21 RX ADMIN — HEPARIN SODIUM 5000 UNIT(S): 5000 INJECTION INTRAVENOUS; SUBCUTANEOUS at 05:53

## 2019-09-21 RX ADMIN — Medication 25 MILLIGRAM(S): at 05:53

## 2019-09-21 RX ADMIN — AMLODIPINE BESYLATE 5 MILLIGRAM(S): 2.5 TABLET ORAL at 05:53

## 2019-09-21 RX ADMIN — Medication 500 MILLIGRAM(S): at 17:18

## 2019-09-21 RX ADMIN — LIDOCAINE 1 PATCH: 4 CREAM TOPICAL at 19:20

## 2019-09-21 RX ADMIN — Medication 2 MILLIGRAM(S): at 09:46

## 2019-09-21 RX ADMIN — OXYCODONE HYDROCHLORIDE 5 MILLIGRAM(S): 5 TABLET ORAL at 20:52

## 2019-09-21 RX ADMIN — OXYCODONE HYDROCHLORIDE 5 MILLIGRAM(S): 5 TABLET ORAL at 15:45

## 2019-09-21 RX ADMIN — Medication 0.25 MILLIGRAM(S): at 00:15

## 2019-09-21 RX ADMIN — OXYCODONE HYDROCHLORIDE 5 MILLIGRAM(S): 5 TABLET ORAL at 20:07

## 2019-09-21 RX ADMIN — OXYCODONE HYDROCHLORIDE 5 MILLIGRAM(S): 5 TABLET ORAL at 06:38

## 2019-09-21 RX ADMIN — LIDOCAINE 1 PATCH: 4 CREAM TOPICAL at 14:30

## 2019-09-21 RX ADMIN — OXYCODONE HYDROCHLORIDE 5 MILLIGRAM(S): 5 TABLET ORAL at 05:53

## 2019-09-21 RX ADMIN — Medication 1 MILLIGRAM(S): at 14:29

## 2019-09-21 RX ADMIN — HEPARIN SODIUM 5000 UNIT(S): 5000 INJECTION INTRAVENOUS; SUBCUTANEOUS at 21:20

## 2019-09-21 RX ADMIN — OXYCODONE HYDROCHLORIDE 5 MILLIGRAM(S): 5 TABLET ORAL at 14:53

## 2019-09-21 RX ADMIN — HEPARIN SODIUM 5000 UNIT(S): 5000 INJECTION INTRAVENOUS; SUBCUTANEOUS at 14:29

## 2019-09-21 RX ADMIN — Medication 325 MILLIGRAM(S): at 17:18

## 2019-09-21 RX ADMIN — Medication 325 MILLIGRAM(S): at 05:53

## 2019-09-21 NOTE — PROGRESS NOTE ADULT - SUBJECTIVE AND OBJECTIVE BOX
Patient seen and examined. Resting in bed.  Denies complaints.  She is in good spirit.   /73 this AM.  s/p Reop Thoracoabdominal aneurysm open repair with Dacron graft and celiac SMA bypass, reimplantation of lumbar artery with Dr. Briseno on 8/29    REVIEW OF SYSTEMS:  As per HPI, otherwise 8 full 10 ROS were unremarkable    MEDICATIONS  (STANDING):  amiodarone    Tablet 200 milliGRAM(s) Oral daily  amiodarone    Tablet   Oral   amLODIPine   Tablet 5 milliGRAM(s) Oral daily  ascorbic acid 500 milliGRAM(s) Oral two times a day  aspirin  chewable 81 milliGRAM(s) Oral daily  BACItracin   Ointment 1 Application(s) Topical two times a day  ferrous    sulfate 325 milliGRAM(s) Oral two times a day  folic acid 1 milliGRAM(s) Oral daily  heparin  Injectable 5000 Unit(s) SubCutaneous every 8 hours  lidocaine   Patch 1 Patch Transdermal daily  metoprolol tartrate 25 milliGRAM(s) Oral every 12 hours  pantoprazole   Suspension 40 milliGRAM(s) Oral daily      VITAL:  T(C): , Max: 36.8 (09-21-19 @ 06:21)  T(F): , Max: 98.2 (09-21-19 @ 06:21)  HR: 60 (09-21-19 @ 06:21)  BP: 148/73 (09-21-19 @ 06:21)  BP(mean): --  RR: 18 (09-21-19 @ 06:21)  SpO2: 100% (09-21-19 @ 06:21)  Wt(kg): --    I and O's:    09-20 @ 07:01  -  09-21 @ 07:00  --------------------------------------------------------  IN: 780 mL / OUT: 1050 mL / NET: -270 mL    09-21 @ 07:01  -  09-21 @ 13:54  --------------------------------------------------------  IN: 240 mL / OUT: 0 mL / NET: 240 mL          PHYSICAL EXAM:    Constitutional: NAD  Neck:  No JVD  Respiratory: CTAB/L  Cardiovascular: S1 and S2  Gastrointestinal: BS+, soft, NT/ND  Extremities: No peripheral edema  Neurological: A/O x 3, right leg weakness  Psychiatric: Normal mood, normal affect  : + Galicia      LABS:                        8.3    10.0  )-----------( 416      ( 21 Sep 2019 07:33 )             25.2     09-21    128<L>  |  95<L>  |  21  ----------------------------<  102<H>  3.9   |  19<L>  |  1.19    Ca    9.1      21 Sep 2019 07:33  Phos  3.1     09-20  Mg     2.5     09-21    TPro  6.4  /  Alb  3.1<L>  /  TBili  0.3  /  DBili  x   /  AST  16  /  ALT  18  /  AlkPhos  72  09-21

## 2019-09-21 NOTE — PROGRESS NOTE ADULT - PROBLEM SELECTOR PLAN 1
PT/OT,   Lt thoracotomy incision--remaining staple removed  serial neurovascular exams,   [facility]  reeval by neuro--will get MRI C/L/T spine with contrast to ro spinal infarct--only partially completed  neuro also--EMG conduction studies as outpt  [address] [facility]  [phone]

## 2019-09-21 NOTE — PROGRESS NOTE ADULT - ASSESSMENT
69 yr old female with H/O Thoraco abdominal Aortic aneurysm, Aortic Stenosis/ regurgitation S/P AVR (T), Ascending & hemiarch replacement 4/9/19 now sp thoracoabdominal aortic aneurysm.     - Resolved DURGA  - Hyponatremia: today 128 improved mildly from yesterday 126, once improved to 131-132: s/p NaCl tab 2 gram po x 1 dose yesterday given   - b/l lower ext. weakness - Right>left   - BP controlled 148/73    RECOMMENDATION:  - Reduce free water intake.   - Encourage liquids with calories; Cont Ensure Enlive   - trend serum Na+        : If sodium is low in am then give Samsca   - c/w Norvasc 5mg po qd   - Cont PT is ongoing:" [facility]

## 2019-09-21 NOTE — PROGRESS NOTE ADULT - SUBJECTIVE AND OBJECTIVE BOX
S :  feels ok.  unable to tolerate the MRI.      Telemetry:  SR 80-90    Vital Signs Last 24 Hrs  T(C): 36.8 (19 @ 06:21), Max: 36.8 (19 @ 06:21)  T(F): 98.2 (19 @ 06:21), Max: 98.2 (19 @ 06:21)  HR: 60 (19 @ 06:21) (57 - 63)  BP: 148/73 (19 @ 06:21) (148/73 - 162/66)  RR: 18 (19 @ 06:21) (18 - 18)  SpO2: 100% (19 @ 06:21) (98% - 100%)                    @ 07:01  -   @ 07:00  --------------------------------------------------------  IN: 780 mL / OUT: 1050 mL / NET: -270 mL     @ 07:01  -   @ 13:11  --------------------------------------------------------  IN: 240 mL / OUT: 0 mL / NET: 240 mL          Daily     Daily Weight in k.5 (21 Sep 2019 08:38              Drains:     MS         [  ] Drainage:                 L Pleural  [  ]  Drainage:                R Pleural  [  ]  Drainage:    Pacing Wires        [  ]   Settings:                                  Isolated  [  ]    Coumadin    [ ] YES          [ x ]      NO         Reason:                                 8.3    10.0  )-----------( 416      ( 21 Sep 2019 07:33 )             25.2           128<L>  |  95<L>  |  21  ----------------------------<  102<H>  3.9   |  19<L>  |  1.19    Ca    9.1      21 Sep 2019 07:33  Phos  3.1     -  Mg     2.5     -    TPro  6.4  /  Alb  3.1<L>  /  TBili  0.3  /  DBili  x   /  AST  16  /  ALT  18  /  AlkPhos  72            PHYSICAL EXAM:    Neurology: alert and oriented x 3, nonfocal, no gross deficits    CV :  S1S2 heard RRR    Thoracotomy incision  CDI    Lungs: clear to ausc.  No w/r/r    Abdomen: soft, nontender, nondistended, positive bowel sounds,    :  incontinent              Extremities:   no edema.  RLE moves toes and can rotate ankle.              acetaminophen  IVPB .. 1000 milliGRAM(s) IV Intermittent once PRN  ALPRAZolam 0.25 milliGRAM(s) Oral every 8 hours PRN  amiodarone    Tablet 200 milliGRAM(s) Oral daily  amiodarone    Tablet   Oral   amLODIPine   Tablet 5 milliGRAM(s) Oral daily  ascorbic acid 500 milliGRAM(s) Oral two times a day  aspirin  chewable 81 milliGRAM(s) Oral daily  BACItracin   Ointment 1 Application(s) Topical two times a day  ferrous    sulfate 325 milliGRAM(s) Oral two times a day  folic acid 1 milliGRAM(s) Oral daily  heparin  Injectable 5000 Unit(s) SubCutaneous every 8 hours  lidocaine   Patch 1 Patch Transdermal daily  metoprolol tartrate 25 milliGRAM(s) Oral every 12 hours  mineral oil 30 milliLiter(s) Oral two times a day PRN  ondansetron Injectable 4 milliGRAM(s) IV Push three times a day PRN  oxyCODONE    IR 5 milliGRAM(s) Oral every 4 hours PRN  pantoprazole   Suspension 40 milliGRAM(s) Oral daily                              Physical Therapy Rec:   Home  [  ]   Home w/ PT  [  ]  Rehab  [ x]    Discussed with Cardiothoracic Team at AM rounds.

## 2019-09-21 NOTE — PROGRESS NOTE ADULT - ASSESSMENT
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 She is now   s/p Reop Thoracoabdominal aneurysm open repair with Dacron graft and celiac SMA bypass, reimplantation of lumbar artery with Dr. Carranza on 8/29. Intraop 2 PRBC, 2 Platelets,Her postoperative course was complicated by new onset BLE weakness R>L for which she was placed on MAPs of 110 and CSF drainage of 20cc. She was extubated POD2 ,and required BIPAP on POD3. She went into afib with RVR, placed on esmolol-weaned off- on POD4 but converted back to NSR without treatment.  9/1 RLE improving, NGT removed, BIPAP, nicardipine restarted briefly for HTN, Cr 2.43  9/2 Lumbar drain removed, Afib RVR converted without intervention, Vaso weaned off today  9/3 S&S eval recommend Dysphagia 1 nectar thick diet, 3L NC, PT recommending acute rehab, Cr improving 1.93 from 2.04  9/4 Afib with RVR, transferred to CTU, metoprolol 2.5 IV x2, amiodarone drip started, vasopressin for hypotension, esmolol drip for rate control, Digoxin IV x1, converted to NSR, Cr 1.76 DURGA improving, targeting MAPs of 70-80, L pleural chest tubes x3 removed, S&S recommend dysphagia 1 thin liquids, bradycardic at night and amio decreased to 0.5 from 1  9/5 Transitioned to Labetalol from esmolol, PO amio taper, A-line/introducer/and muñoz discontinued, transferred to SDU  She is now transferred from the CTU to SDU. She no longer has any critical care needs at this time and is stable enough for transfer to SDU.   9/6 Increase PT/ ambulation. Dys 1 diet, speech/swallow f/u, repeat mbs today. Maintaining nsr  9/7 Tolerating Dys II diet, check urinalysis, for leukocytosis. CXR negative for infiltrate. Creat 1,9   9/8 Zosyn initiated for suspected UTI/LLL pna. ID consulted for assistance in mangement. creatinine improving . -1415 fluid balance, ct chest, BC x 2  9/9 WBC remains elevated, 21, afebrile, pending CT results r/o PNA. Continue zosyn.    D/w PT,  pt with decrease in  balance/ fatigued today, RLE weakness unchanged as per pt.   relative hypotension, irineo, labetolol d/c, maintain mao>85.  9/10 CT with compressive atelectasis,  placed on nocturnal bipap as d/w  Dr Carranza  Wbc trending down  Cont zosyn UTI/R/o pna  9/11 Zosyn for pna x 7 days  PT  Neuro consulted for f/u  9/12  CT scan  abdomen complete   ,  worked with PT,   plus one strength  r leg,  OOB to chair,  Lt thoracotomy incision  9/13 Pending mri at neuro  rec.HCT drifting down, repeat performed.Labetolol d/c lopressor started for HR control but to liberalize bp Pending acute rehab  9/14 MRI negative for mass, shift, bleed, flow limiting lesions  9/15   zosyn dc,   OOB to chair   NSR   rt leg splint w/ cont weakness  9/16 Pt voiding, female urine collection device, incontenent   Hypokalemia, supplementation increased.  Zosyn complete  Rehab planning  9/17 PT today  9/18  HD stable.  Continues to slowly regain strength in RLE.  [facility] awaiting auth..  Per neuro will need outpt EMG and conduction studies.  No further neuro work-up needed at this time.  Every other staple removed  9/19 HD stable.  Slowly improving strength.  Dr carranza wanted neuro to re-eval pt.  MRI C-spine/lumbar spine and thoracic spine to ro spinal infarct or stenosis.  Awaiting auth for OLINDA  9/20 MRI sched for 9/21> po ativan prior(claustrophobia)   9/21 HD stable. Unable to tolerate entire MRI--only partially completed.  Awaiting OLINDA

## 2019-09-22 LAB
ALBUMIN SERPL ELPH-MCNC: 3 G/DL — LOW (ref 3.3–5)
ALP SERPL-CCNC: 68 U/L — SIGNIFICANT CHANGE UP (ref 40–120)
ALT FLD-CCNC: 15 U/L — SIGNIFICANT CHANGE UP (ref 10–45)
ANION GAP SERPL CALC-SCNC: 11 MMOL/L — SIGNIFICANT CHANGE UP (ref 5–17)
APPEARANCE UR: ABNORMAL
AST SERPL-CCNC: 17 U/L — SIGNIFICANT CHANGE UP (ref 10–40)
BACTERIA # UR AUTO: ABNORMAL
BILIRUB SERPL-MCNC: 0.3 MG/DL — SIGNIFICANT CHANGE UP (ref 0.2–1.2)
BILIRUB UR-MCNC: NEGATIVE — SIGNIFICANT CHANGE UP
BUN SERPL-MCNC: 24 MG/DL — HIGH (ref 7–23)
CALCIUM SERPL-MCNC: 8.8 MG/DL — SIGNIFICANT CHANGE UP (ref 8.4–10.5)
CHLORIDE SERPL-SCNC: 93 MMOL/L — LOW (ref 96–108)
CO2 SERPL-SCNC: 22 MMOL/L — SIGNIFICANT CHANGE UP (ref 22–31)
COLOR SPEC: SIGNIFICANT CHANGE UP
COMMENT - URINE: SIGNIFICANT CHANGE UP
CREAT SERPL-MCNC: 1.26 MG/DL — SIGNIFICANT CHANGE UP (ref 0.5–1.3)
DIFF PNL FLD: SIGNIFICANT CHANGE UP
EPI CELLS # UR: 11 /HPF — HIGH (ref 0–5)
GLUCOSE SERPL-MCNC: 103 MG/DL — HIGH (ref 70–99)
GLUCOSE UR QL: NEGATIVE — SIGNIFICANT CHANGE UP
HCT VFR BLD CALC: 24.4 % — LOW (ref 34.5–45)
HGB BLD-MCNC: 8.2 G/DL — LOW (ref 11.5–15.5)
HYALINE CASTS # UR AUTO: 1 /LPF — SIGNIFICANT CHANGE UP (ref 0–7)
KETONES UR-MCNC: NEGATIVE — SIGNIFICANT CHANGE UP
LEUKOCYTE ESTERASE UR-ACNC: ABNORMAL
MAGNESIUM SERPL-MCNC: 2.1 MG/DL — SIGNIFICANT CHANGE UP (ref 1.6–2.6)
MCHC RBC-ENTMCNC: 29.5 PG — SIGNIFICANT CHANGE UP (ref 27–34)
MCHC RBC-ENTMCNC: 33.8 GM/DL — SIGNIFICANT CHANGE UP (ref 32–36)
MCV RBC AUTO: 87.3 FL — SIGNIFICANT CHANGE UP (ref 80–100)
NITRITE UR-MCNC: POSITIVE
PH UR: 8 — SIGNIFICANT CHANGE UP (ref 5–8)
PHOSPHATE SERPL-MCNC: 3.7 MG/DL — SIGNIFICANT CHANGE UP (ref 2.5–4.5)
PLATELET # BLD AUTO: 374 K/UL — SIGNIFICANT CHANGE UP (ref 150–400)
POTASSIUM SERPL-MCNC: 3.7 MMOL/L — SIGNIFICANT CHANGE UP (ref 3.5–5.3)
POTASSIUM SERPL-SCNC: 3.7 MMOL/L — SIGNIFICANT CHANGE UP (ref 3.5–5.3)
PROT SERPL-MCNC: 6.1 G/DL — SIGNIFICANT CHANGE UP (ref 6–8.3)
PROT UR-MCNC: SIGNIFICANT CHANGE UP
RBC # BLD: 2.79 M/UL — LOW (ref 3.8–5.2)
RBC # FLD: 14.6 % — HIGH (ref 10.3–14.5)
RBC CASTS # UR COMP ASSIST: 4 /HPF — SIGNIFICANT CHANGE UP (ref 0–4)
SODIUM SERPL-SCNC: 126 MMOL/L — LOW (ref 135–145)
SP GR SPEC: 1.01 — LOW (ref 1.01–1.02)
TRI-PHOS CRY UR QL COMP ASSIST: ABNORMAL
UROBILINOGEN FLD QL: SIGNIFICANT CHANGE UP
WBC # BLD: 8.5 K/UL — SIGNIFICANT CHANGE UP (ref 3.8–10.5)
WBC # FLD AUTO: 8.5 K/UL — SIGNIFICANT CHANGE UP (ref 3.8–10.5)
WBC UR QL: 196 /HPF — HIGH (ref 0–5)

## 2019-09-22 RX ORDER — PIPERACILLIN AND TAZOBACTAM 4; .5 G/20ML; G/20ML
3.38 INJECTION, POWDER, LYOPHILIZED, FOR SOLUTION INTRAVENOUS ONCE
Refills: 0 | Status: COMPLETED | OUTPATIENT
Start: 2019-09-22 | End: 2019-09-23

## 2019-09-22 RX ORDER — PIPERACILLIN AND TAZOBACTAM 4; .5 G/20ML; G/20ML
3.38 INJECTION, POWDER, LYOPHILIZED, FOR SOLUTION INTRAVENOUS EVERY 8 HOURS
Refills: 0 | Status: DISCONTINUED | OUTPATIENT
Start: 2019-09-22 | End: 2019-09-24

## 2019-09-22 RX ADMIN — LIDOCAINE 1 PATCH: 4 CREAM TOPICAL at 02:19

## 2019-09-22 RX ADMIN — HEPARIN SODIUM 5000 UNIT(S): 5000 INJECTION INTRAVENOUS; SUBCUTANEOUS at 05:33

## 2019-09-22 RX ADMIN — HEPARIN SODIUM 5000 UNIT(S): 5000 INJECTION INTRAVENOUS; SUBCUTANEOUS at 21:55

## 2019-09-22 RX ADMIN — OXYCODONE HYDROCHLORIDE 5 MILLIGRAM(S): 5 TABLET ORAL at 06:08

## 2019-09-22 RX ADMIN — AMIODARONE HYDROCHLORIDE 200 MILLIGRAM(S): 400 TABLET ORAL at 05:23

## 2019-09-22 RX ADMIN — OXYCODONE HYDROCHLORIDE 5 MILLIGRAM(S): 5 TABLET ORAL at 13:00

## 2019-09-22 RX ADMIN — OXYCODONE HYDROCHLORIDE 5 MILLIGRAM(S): 5 TABLET ORAL at 17:31

## 2019-09-22 RX ADMIN — Medication 25 MILLIGRAM(S): at 17:29

## 2019-09-22 RX ADMIN — OXYCODONE HYDROCHLORIDE 5 MILLIGRAM(S): 5 TABLET ORAL at 12:27

## 2019-09-22 RX ADMIN — HEPARIN SODIUM 5000 UNIT(S): 5000 INJECTION INTRAVENOUS; SUBCUTANEOUS at 14:12

## 2019-09-22 RX ADMIN — PANTOPRAZOLE SODIUM 40 MILLIGRAM(S): 20 TABLET, DELAYED RELEASE ORAL at 12:27

## 2019-09-22 RX ADMIN — ONDANSETRON 4 MILLIGRAM(S): 8 TABLET, FILM COATED ORAL at 05:24

## 2019-09-22 RX ADMIN — Medication 1 APPLICATION(S): at 05:33

## 2019-09-22 RX ADMIN — Medication 0.25 MILLIGRAM(S): at 23:01

## 2019-09-22 RX ADMIN — Medication 325 MILLIGRAM(S): at 17:29

## 2019-09-22 RX ADMIN — Medication 1 MILLIGRAM(S): at 12:28

## 2019-09-22 RX ADMIN — OXYCODONE HYDROCHLORIDE 5 MILLIGRAM(S): 5 TABLET ORAL at 22:26

## 2019-09-22 RX ADMIN — Medication 500 MILLIGRAM(S): at 05:22

## 2019-09-22 RX ADMIN — Medication 81 MILLIGRAM(S): at 12:27

## 2019-09-22 RX ADMIN — Medication 25 MILLIGRAM(S): at 05:23

## 2019-09-22 RX ADMIN — Medication 325 MILLIGRAM(S): at 05:23

## 2019-09-22 RX ADMIN — AMLODIPINE BESYLATE 5 MILLIGRAM(S): 2.5 TABLET ORAL at 05:23

## 2019-09-22 RX ADMIN — OXYCODONE HYDROCHLORIDE 5 MILLIGRAM(S): 5 TABLET ORAL at 21:55

## 2019-09-22 RX ADMIN — Medication 500 MILLIGRAM(S): at 17:28

## 2019-09-22 RX ADMIN — ONDANSETRON 4 MILLIGRAM(S): 8 TABLET, FILM COATED ORAL at 05:23

## 2019-09-22 RX ADMIN — Medication 1 APPLICATION(S): at 17:29

## 2019-09-22 RX ADMIN — OXYCODONE HYDROCHLORIDE 5 MILLIGRAM(S): 5 TABLET ORAL at 18:05

## 2019-09-22 RX ADMIN — OXYCODONE HYDROCHLORIDE 5 MILLIGRAM(S): 5 TABLET ORAL at 05:23

## 2019-09-22 NOTE — CHART NOTE - NSCHARTNOTEFT_GEN_A_CORE
Galicia inserted for urinary retention, abdominal distention  Yield=2.6L of purulent drainage  Urine culture pending from earlier in the day  Empiric antibiotic coverage with Zosyn, ID will see in AM  Urology consult pending  Discussed with Dr. Briseno

## 2019-09-22 NOTE — PROGRESS NOTE ADULT - PROBLEM SELECTOR PLAN 1
PT/OT,   Lt thoracotomy incision--remaining staple removed  serial neurovascular exams,   [facility]  reeval by neuro--will get MRI C/L/T spine with contrast to ro spinal infarct--only partially completed-neurology recalled to evaluate results to see if there is a need to repeat  neuro also--EMG conduction studies as outpt  [address] [facility]  [phone]

## 2019-09-22 NOTE — PROGRESS NOTE ADULT - ASSESSMENT
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 She is now   s/p Reop Thoracoabdominal aneurysm open repair with Dacron graft and celiac SMA bypass, reimplantation of lumbar artery with Dr. Carranza on 8/29. Intraop 2 PRBC, 2 Platelets,Her postoperative course was complicated by new onset BLE weakness R>L for which she was placed on MAPs of 110 and CSF drainage of 20cc. She was extubated POD2 ,and required BIPAP on POD3. She went into afib with RVR, placed on esmolol-weaned off- on POD4 but converted back to NSR without treatment.  9/1 RLE improving, NGT removed, BIPAP, nicardipine restarted briefly for HTN, Cr 2.43  9/2 Lumbar drain removed, Afib RVR converted without intervention, Vaso weaned off today  9/3 S&S eval recommend Dysphagia 1 nectar thick diet, 3L NC, PT recommending acute rehab, Cr improving 1.93 from 2.04  9/4 Afib with RVR, transferred to CTU, metoprolol 2.5 IV x2, amiodarone drip started, vasopressin for hypotension, esmolol drip for rate control, Digoxin IV x1, converted to NSR, Cr 1.76 DURGA improving, targeting MAPs of 70-80, L pleural chest tubes x3 removed, S&S recommend dysphagia 1 thin liquids, bradycardic at night and amio decreased to 0.5 from 1  9/5 Transitioned to Labetalol from esmolol, PO amio taper, A-line/introducer/and muñoz discontinued, transferred to SDU  She is now transferred from the CTU to SDU. She no longer has any critical care needs at this time and is stable enough for transfer to SDU.   9/6 Increase PT/ ambulation. Dys 1 diet, speech/swallow f/u, repeat mbs today. Maintaining nsr  9/7 Tolerating Dys II diet, check urinalysis, for leukocytosis. CXR negative for infiltrate. Creat 1,9   9/8 Zosyn initiated for suspected UTI/LLL pna. ID consulted for assistance in mangement. creatinine improving . -1415 fluid balance, ct chest, BC x 2  9/9 WBC remains elevated, 21, afebrile, pending CT results r/o PNA. Continue zosyn.    D/w PT,  pt with decrease in  balance/ fatigued today, RLE weakness unchanged as per pt.   relative hypotension, irineo, labetolol d/c, maintain mao>85.  9/10 CT with compressive atelectasis,  placed on nocturnal bipap as d/w  Dr Carranza  Wbc trending down  Cont zosyn UTI/R/o pna  9/11 Zosyn for pna x 7 days  PT  Neuro consulted for f/u  9/12  CT scan  abdomen complete   ,  worked with PT,   plus one strength  r leg,  OOB to chair,  Lt thoracotomy incision  9/13 Pending mri at neuro  rec.HCT drifting down, repeat performed.Labetolol d/c lopressor started for HR control but to liberalize bp Pending acute rehab  9/14 MRI negative for mass, shift, bleed, flow limiting lesions  9/15   zosyn dc,   OOB to chair   NSR   rt leg splint w/ cont weakness  9/16 Pt voiding, female urine collection device, incontenent   Hypokalemia, supplementation increased.  Zosyn complete  Rehab planning  9/17 PT today  9/18  HD stable.  Continues to slowly regain strength in RLE.  [facility] awaiting auth..  Per neuro will need outpt EMG and conduction studies.  No further neuro work-up needed at this time.  Every other staple removed  9/19 HD stable.  Slowly improving strength.  Dr carranza wanted neuro to re-eval pt.  MRI C-spine/lumbar spine and thoracic spine to ro spinal infarct or stenosis.  Awaiting auth for OLINDA  9/20 MRI sched for 9/21> po ativan prior(claustrophobia)   9/21 HD stable. Unable to tolerate entire MRI--only partially completed.  Awaiting OLINDA   9/22: MRi results noted and neurology recalled to evaluate results and to see if what was imaged was sufficient. Urine with Gross smell u/a and urine cx sent in addition to greenish tan copious vaginal secretions sent for cx and r/o gonorrhea. Pt complains of foul smells from groin area.

## 2019-09-22 NOTE — PROGRESS NOTE ADULT - SUBJECTIVE AND OBJECTIVE BOX
VITAL SIGNS    Telemetry:    Vital Signs Last 24 Hrs  T(C): 36.8 (19 @ 06:17), Max: 36.8 (19 @ 06:17)  T(F): 98.3 (19 @ 06:17), Max: 98.3 (19 @ 06:17)  HR: 60 (19 @ 06:17) (54 - 70)  BP: 148/63 (19 @ 06:17) (116/64 - 148/63)  RR: 18 (19 @ 06:17) (18 - 18)  SpO2: 98% (19 @ 06:17) (96% - 100%)             @ 07:01  -   @ 07:00  --------------------------------------------------------  IN: 720 mL / OUT: 2350 mL / NET: -1630 mL     @ 07:01  -   @ 11:49  --------------------------------------------------------  IN: 240 mL / OUT: 500 mL / NET: -260 mL       Daily     Daily Weight in k.2 (22 Sep 2019 07:54)  Admit Wt: Drug Dosing Weight  Height (cm): 167.64 (29 Aug 2019 06:40)  Weight (kg): 67.6 (29 Aug 2019 06:40)  BMI (kg/m2): 24.1 (29 Aug 2019 06:40)  BSA (m2): 1.77 (29 Aug 2019 06:40)     Daily Weight in k.2 (19 @ 07:54)    [facility]      126<L>  |  93<L>  |  24<H>  ----------------------------<  103<H>  3.7   |  22  |  1.26    Ca    8.8      22 Sep 2019 07:25  Phos  3.7       Mg     2.1         TPro  6.1  /  Alb  3.0<L>  /  TBili  0.3  /  DBili  x   /  AST  17  /  ALT  15  /  AlkPhos  68                                   8.2    8.5   )-----------( 374      ( 22 Sep 2019 07:25 )             24.4                  Bilirubin Total, Serum: 0.3 mg/dL ( @ 07:25)    CAPILLARY BLOOD GLUCOSE              Drains:     MS       [  ]   [  ]            L Pleural [  ]            R Pleural  [  ]            VIVIAN  [  ]           Galicia  [  ]    Pacing Wires      [  ]   Settings:                                  Isolated  [  ]                    CXR:      MEDICATIONS  acetaminophen  IVPB .. 1000 milliGRAM(s) IV Intermittent once PRN  ALPRAZolam 0.25 milliGRAM(s) Oral every 8 hours PRN  amiodarone    Tablet 200 milliGRAM(s) Oral daily  amiodarone    Tablet   Oral   amLODIPine   Tablet 5 milliGRAM(s) Oral daily  ascorbic acid 500 milliGRAM(s) Oral two times a day  aspirin  chewable 81 milliGRAM(s) Oral daily  BACItracin   Ointment 1 Application(s) Topical two times a day  ferrous    sulfate 325 milliGRAM(s) Oral two times a day  folic acid 1 milliGRAM(s) Oral daily  heparin  Injectable 5000 Unit(s) SubCutaneous every 8 hours  lidocaine   Patch 1 Patch Transdermal daily  metoprolol tartrate 25 milliGRAM(s) Oral every 12 hours  mineral oil 30 milliLiter(s) Oral two times a day PRN  ondansetron Injectable 4 milliGRAM(s) IV Push three times a day PRN  oxyCODONE    IR 5 milliGRAM(s) Oral every 4 hours PRN  pantoprazole   Suspension 40 milliGRAM(s) Oral daily      PHYSICAL EXAM      Neurology: alert and oriented x 3, nonfocal, no gross deficits  CV :S1S2  Sternal Wound :  CDI , Stable. Thoracotomy incision Distal with improved erythema edges well approximated no drainage  Lungs: cta  Abdomen: soft, nontender, nondistended, positive bowel sounds, last bowel movement   :    voids -foulsmelling urine with copius greenish hue vaginal discharge  Extremities:   no edema left extremity with 2/5 strength.               PAST MEDICAL & SURGICAL HISTORY:  Intermittent abdominal pain: periumbilical  Thoracoabdominal aortic aneurysm (TAAA) without rupture  Murmur, cardiac  Cataract: bilateral  Preeclampsia  HTN (hypertension): controlled  S/P aortic valve repair: 19 with bioprostetic valve  Thoracoabdominal aortic aneurysm (TAAA) without rupture: s/p repair  Asceding aortic aneurysm repair 2019  S/P cataract surgery  Arm fracture, left: 2005                 Discussed with Cardiothoracic Team at AM rounds.

## 2019-09-23 DIAGNOSIS — R33.9 RETENTION OF URINE, UNSPECIFIED: ICD-10-CM

## 2019-09-23 LAB
ALBUMIN SERPL ELPH-MCNC: 2.7 G/DL — LOW (ref 3.3–5)
ALP SERPL-CCNC: 64 U/L — SIGNIFICANT CHANGE UP (ref 40–120)
ALT FLD-CCNC: 21 U/L — SIGNIFICANT CHANGE UP (ref 10–45)
ANION GAP SERPL CALC-SCNC: 13 MMOL/L — SIGNIFICANT CHANGE UP (ref 5–17)
AST SERPL-CCNC: 18 U/L — SIGNIFICANT CHANGE UP (ref 10–40)
BILIRUB SERPL-MCNC: 0.2 MG/DL — SIGNIFICANT CHANGE UP (ref 0.2–1.2)
BUN SERPL-MCNC: 24 MG/DL — HIGH (ref 7–23)
C TRACH RRNA SPEC QL NAA+PROBE: SIGNIFICANT CHANGE UP
CALCIUM SERPL-MCNC: 8.7 MG/DL — SIGNIFICANT CHANGE UP (ref 8.4–10.5)
CHLORIDE SERPL-SCNC: 99 MMOL/L — SIGNIFICANT CHANGE UP (ref 96–108)
CO2 SERPL-SCNC: 22 MMOL/L — SIGNIFICANT CHANGE UP (ref 22–31)
CREAT SERPL-MCNC: 1.22 MG/DL — SIGNIFICANT CHANGE UP (ref 0.5–1.3)
GLUCOSE SERPL-MCNC: 95 MG/DL — SIGNIFICANT CHANGE UP (ref 70–99)
HCT VFR BLD CALC: 22.4 % — LOW (ref 34.5–45)
HGB BLD-MCNC: 7.9 G/DL — LOW (ref 11.5–15.5)
MAGNESIUM SERPL-MCNC: 1.9 MG/DL — SIGNIFICANT CHANGE UP (ref 1.6–2.6)
MCHC RBC-ENTMCNC: 30.8 PG — SIGNIFICANT CHANGE UP (ref 27–34)
MCHC RBC-ENTMCNC: 35.3 GM/DL — SIGNIFICANT CHANGE UP (ref 32–36)
MCV RBC AUTO: 87.4 FL — SIGNIFICANT CHANGE UP (ref 80–100)
N GONORRHOEA RRNA SPEC QL NAA+PROBE: SIGNIFICANT CHANGE UP
PLATELET # BLD AUTO: 302 K/UL — SIGNIFICANT CHANGE UP (ref 150–400)
POTASSIUM SERPL-MCNC: 3.9 MMOL/L — SIGNIFICANT CHANGE UP (ref 3.5–5.3)
POTASSIUM SERPL-SCNC: 3.9 MMOL/L — SIGNIFICANT CHANGE UP (ref 3.5–5.3)
PROT SERPL-MCNC: 5.7 G/DL — LOW (ref 6–8.3)
RBC # BLD: 2.56 M/UL — LOW (ref 3.8–5.2)
RBC # FLD: 14.4 % — SIGNIFICANT CHANGE UP (ref 10.3–14.5)
SODIUM SERPL-SCNC: 134 MMOL/L — LOW (ref 135–145)
WBC # BLD: 6.8 K/UL — SIGNIFICANT CHANGE UP (ref 3.8–10.5)
WBC # FLD AUTO: 6.8 K/UL — SIGNIFICANT CHANGE UP (ref 3.8–10.5)

## 2019-09-23 PROCEDURE — 99024 POSTOP FOLLOW-UP VISIT: CPT

## 2019-09-23 PROCEDURE — 99232 SBSQ HOSP IP/OBS MODERATE 35: CPT

## 2019-09-23 PROCEDURE — 99222 1ST HOSP IP/OBS MODERATE 55: CPT

## 2019-09-23 RX ORDER — PANTOPRAZOLE SODIUM 20 MG/1
40 TABLET, DELAYED RELEASE ORAL
Refills: 0 | Status: DISCONTINUED | OUTPATIENT
Start: 2019-09-23 | End: 2019-09-28

## 2019-09-23 RX ADMIN — Medication 500 MILLIGRAM(S): at 05:30

## 2019-09-23 RX ADMIN — PIPERACILLIN AND TAZOBACTAM 25 GRAM(S): 4; .5 INJECTION, POWDER, LYOPHILIZED, FOR SOLUTION INTRAVENOUS at 14:14

## 2019-09-23 RX ADMIN — PIPERACILLIN AND TAZOBACTAM 25 GRAM(S): 4; .5 INJECTION, POWDER, LYOPHILIZED, FOR SOLUTION INTRAVENOUS at 22:32

## 2019-09-23 RX ADMIN — LIDOCAINE 1 PATCH: 4 CREAM TOPICAL at 19:00

## 2019-09-23 RX ADMIN — OXYCODONE HYDROCHLORIDE 5 MILLIGRAM(S): 5 TABLET ORAL at 18:26

## 2019-09-23 RX ADMIN — PIPERACILLIN AND TAZOBACTAM 200 GRAM(S): 4; .5 INJECTION, POWDER, LYOPHILIZED, FOR SOLUTION INTRAVENOUS at 00:07

## 2019-09-23 RX ADMIN — OXYCODONE HYDROCHLORIDE 5 MILLIGRAM(S): 5 TABLET ORAL at 11:18

## 2019-09-23 RX ADMIN — Medication 25 MILLIGRAM(S): at 17:18

## 2019-09-23 RX ADMIN — LIDOCAINE 1 PATCH: 4 CREAM TOPICAL at 11:13

## 2019-09-23 RX ADMIN — Medication 25 MILLIGRAM(S): at 05:30

## 2019-09-23 RX ADMIN — OXYCODONE HYDROCHLORIDE 5 MILLIGRAM(S): 5 TABLET ORAL at 05:31

## 2019-09-23 RX ADMIN — Medication 1 APPLICATION(S): at 17:18

## 2019-09-23 RX ADMIN — PIPERACILLIN AND TAZOBACTAM 25 GRAM(S): 4; .5 INJECTION, POWDER, LYOPHILIZED, FOR SOLUTION INTRAVENOUS at 07:12

## 2019-09-23 RX ADMIN — AMLODIPINE BESYLATE 5 MILLIGRAM(S): 2.5 TABLET ORAL at 05:30

## 2019-09-23 RX ADMIN — OXYCODONE HYDROCHLORIDE 5 MILLIGRAM(S): 5 TABLET ORAL at 23:03

## 2019-09-23 RX ADMIN — LIDOCAINE 1 PATCH: 4 CREAM TOPICAL at 23:59

## 2019-09-23 RX ADMIN — Medication 325 MILLIGRAM(S): at 05:30

## 2019-09-23 RX ADMIN — Medication 325 MILLIGRAM(S): at 17:18

## 2019-09-23 RX ADMIN — Medication 1 MILLIGRAM(S): at 11:13

## 2019-09-23 RX ADMIN — OXYCODONE HYDROCHLORIDE 5 MILLIGRAM(S): 5 TABLET ORAL at 17:42

## 2019-09-23 RX ADMIN — Medication 500 MILLIGRAM(S): at 17:17

## 2019-09-23 RX ADMIN — OXYCODONE HYDROCHLORIDE 5 MILLIGRAM(S): 5 TABLET ORAL at 22:33

## 2019-09-23 RX ADMIN — OXYCODONE HYDROCHLORIDE 5 MILLIGRAM(S): 5 TABLET ORAL at 06:02

## 2019-09-23 RX ADMIN — HEPARIN SODIUM 5000 UNIT(S): 5000 INJECTION INTRAVENOUS; SUBCUTANEOUS at 21:15

## 2019-09-23 RX ADMIN — AMIODARONE HYDROCHLORIDE 200 MILLIGRAM(S): 400 TABLET ORAL at 05:30

## 2019-09-23 RX ADMIN — Medication 1 APPLICATION(S): at 05:30

## 2019-09-23 RX ADMIN — OXYCODONE HYDROCHLORIDE 5 MILLIGRAM(S): 5 TABLET ORAL at 11:50

## 2019-09-23 RX ADMIN — HEPARIN SODIUM 5000 UNIT(S): 5000 INJECTION INTRAVENOUS; SUBCUTANEOUS at 05:30

## 2019-09-23 RX ADMIN — Medication 81 MILLIGRAM(S): at 11:13

## 2019-09-23 RX ADMIN — HEPARIN SODIUM 5000 UNIT(S): 5000 INJECTION INTRAVENOUS; SUBCUTANEOUS at 14:14

## 2019-09-23 NOTE — PROGRESS NOTE ADULT - PROBLEM SELECTOR PLAN 1
-f/u cx  -agree with zosyn for now  -plan short course of abx given  decompression and otherwise no fever or leukocytosis  -check blood cx if fever

## 2019-09-23 NOTE — PROGRESS NOTE ADULT - SUBJECTIVE AND OBJECTIVE BOX
KENNETH SPRING 69y [id number]    Patient is a 69y old  Female who presents with a chief complaint of Thoracoabdominal aortic aneurysm (TAAA) without rupture (21 Sep 2019 13:53)      Follow Up/CC:  ID following for uti    Interval History/ROS: ID recalled due to urinary retention and pus on muñoz placement, no fever    Allergies    No Known Allergies    Intolerances        ANTIMICROBIALS:  piperacillin/tazobactam IVPB.. 3.375 every 8 hours      MEDICATIONS  (STANDING):  amiodarone    Tablet 200 milliGRAM(s) Oral daily  amiodarone    Tablet   Oral   amLODIPine   Tablet 5 milliGRAM(s) Oral daily  ascorbic acid 500 milliGRAM(s) Oral two times a day  aspirin  chewable 81 milliGRAM(s) Oral daily  BACItracin   Ointment 1 Application(s) Topical two times a day  ferrous    sulfate 325 milliGRAM(s) Oral two times a day  folic acid 1 milliGRAM(s) Oral daily  heparin  Injectable 5000 Unit(s) SubCutaneous every 8 hours  lidocaine   Patch 1 Patch Transdermal daily  metoprolol tartrate 25 milliGRAM(s) Oral every 12 hours  pantoprazole   Suspension 40 milliGRAM(s) Oral daily  piperacillin/tazobactam IVPB.. 3.375 Gram(s) IV Intermittent every 8 hours    MEDICATIONS  (PRN):  acetaminophen  IVPB .. 1000 milliGRAM(s) IV Intermittent once PRN Moderate Pain (4 - 6)  ALPRAZolam 0.25 milliGRAM(s) Oral every 8 hours PRN anxiety  mineral oil 30 milliLiter(s) Oral two times a day PRN dry mouth  ondansetron Injectable 4 milliGRAM(s) IV Push three times a day PRN Nausea and/or Vomiting  oxyCODONE    IR 5 milliGRAM(s) Oral every 4 hours PRN Moderate Pain (4 - 6)        Vital Signs Last 24 Hrs  T(C): 36.8 (23 Sep 2019 05:48), Max: 36.8 (23 Sep 2019 05:48)  T(F): 98.2 (23 Sep 2019 05:48), Max: 98.2 (23 Sep 2019 05:48)  HR: 65 (23 Sep 2019 05:48) (55 - 69)  BP: 105/52 (23 Sep 2019 05:48) (105/52 - 147/62)  BP(mean): --  RR: 18 (23 Sep 2019 05:48) (18 - 18)  SpO2: 96% (23 Sep 2019 05:48) (96% - 100%)    CBC Full  -  ( 23 Sep 2019 05:42 )  WBC Count : 6.8 K/uL  RBC Count : 2.56 M/uL  Hemoglobin : 7.9 g/dL  Hematocrit : 22.4 %  Platelet Count - Automated : 302 K/uL  Mean Cell Volume : 87.4 fl  Mean Cell Hemoglobin : 30.8 pg  Mean Cell Hemoglobin Concentration : 35.3 gm/dL  Auto Neutrophil # : x  Auto Lymphocyte # : x  Auto Monocyte # : x  Auto Eosinophil # : x  Auto Basophil # : x  Auto Neutrophil % : x  Auto Lymphocyte % : x  Auto Monocyte % : x  Auto Eosinophil % : x  Auto Basophil % : x        134<L>  |  99  |  24<H>  ----------------------------<  95  3.9   |  22  |  1.22    Ca    8.7      23 Sep 2019 05:42  Phos  3.7       Mg     1.9         TPro  5.7<L>  /  Alb  2.7<L>  /  TBili  0.2  /  DBili  x   /  AST  18  /  ALT  21  /  AlkPhos  64      LIVER FUNCTIONS - ( 23 Sep 2019 05:42 )  Alb: 2.7 g/dL / Pro: 5.7 g/dL / ALK PHOS: 64 U/L / ALT: 21 U/L / AST: 18 U/L / GGT: x           Urinalysis Basic - ( 22 Sep 2019 13:41 )    Color: Light Yellow / Appearance: Slightly Turbid / S.006 / pH: x  Gluc: x / Ketone: Negative  / Bili: Negative / Urobili: <2 mg/dL   Blood: x / Protein: Trace / Nitrite: Positive   Leuk Esterase: Large / RBC: 4 /HPF /  /HPF   Sq Epi: x / Non Sq Epi: 11 /HPF / Bacteria: Moderate        MICROBIOLOGY:  .Blood  19   No growth at 5 days.  --  --      RADIOLOGY    MR Lumbar Spine No Cont (19 @ 12:41) >  CERVICAL SPINE MR: Degenerative change with questionable increased   hyperintense T2 signal in the central cervical cord sagittal series 3   image 6, axial images are degraded by motion.    THORACIC SPINE MR: No evidence for severe canal or foraminal compromise,   questionable increased new faint hyperintense T2 signal within the mid   thoracic cord, sagittal images T2 and STIR image 9, axialimages degraded   by technique and motion.    LUMBAR SPINE MR: Redemonstration of degenerative change as noted   previously with left frontal narrowing at L4-5 and L5-S1. Enlarged   bladder correlate clinically, with findings concerning for neurogenic   bladder.

## 2019-09-23 NOTE — PROGRESS NOTE ADULT - ASSESSMENT
69 year old female s/p ascending thoracoabdominal aortic aneurysm repair and aortic valve replaced with a bioprosthetic valve in April 2019, reoperative repair on 8/29/2019  with course complicated with DURGA and Afib with RVR. 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 She states she has developed a productive cough with whitish sputum, CXR with ? LLL PNA vs atelectasis.     s/p course of zosyn earlier this admission    9/22 noted with urine retention and Galicia placed and pus noted in urine

## 2019-09-23 NOTE — CONSULT NOTE ADULT - ASSESSMENT
Urinary retention  - cont antibiotics  - f/u culture  - encourage ambulation  - monitor for post obstructive diuresis and replace fluid appropriately  - Bowel regimen 69F with hx of HTN now with urinary retention and LE weakness after AAA repair  MRI shows lumbar stenosis as well as narrowing at L4-L5, L5-S1    - New-onset retention likely 2/2 neurologic cause given concurrent LE weakness  - Followup urine culture  - Continue antibiotics and adjust if needed once sensitivities back  - Patient likely in post-obstructive diuresis (>200cc of urine/hr for 3 or more consecutive hours)  	-Maintain strict intake and output  	-Allow patient to drink to thirst.  Keep two pitchers of water at the bedside at all times.  	-Check q6 BMPs to monitor electrolytes until UOP normalizes  - Continue muñoz catheter; anticipate patient will be discharged with catheter  - Ensure adequate bowel movements  - Patient will need to followup with Dr. Sujatha Trejo as an outpatient for management

## 2019-09-23 NOTE — CONSULT NOTE ADULT - ATTENDING COMMENTS
69 year old female s/p ascending thoracoabdominal aortic aneurysm repair and aortic valve replaced with a bioprosthetic valve in April 2019, reoperative repair on 8/29/2019  with course complicated with DURGA and Afib with RVR. 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 She states she has developed a productive cough with whitish sputum, CXR with ? LLL PNA vs atelectasis. ID consulted for rising leukocytosis. Possible PNA.    Recommend:  -Check blood cultures x 2 sets  -Check CT chest to r/o PNA  -Start zosyn 3.375 grams IV q 8 hours pending workup  -Monitor CrCl while on abx  -Monitor for fevers    Discussed with CTS team.
I saw and evaluated the patient. I reviewed the resident's note and agree. The patient is a 69-year-old female with an enlarging thoracoabdominal aortic aneurysm who is pending an open TAAA repair. 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 A lumbar drain was placed with good return of CSF without complication. Continue CSF drainage as per CTS. Neurosurgery will remain available as needed.
Patient seen and examined.  Agree with above evaluation and management plan.  Keep muñoz catheter in for now.  Can attempt voiding trial once patient's mobility improves.
patient seen and examined. C/O RLE weakness post operatively. O/E only minimal toe movement on RLE. brisk reflexes (L patella > right) Right upgoing toe and a few beats of clonus on the R. no sensory changes.   would like to r/o plexus compression, r/o retroperitoneal bleed if negative would like to r/o intracranial causes such as infarct/ mass lesions  plan  CT abdomen  MRI Brain, MRA head and neck  PT

## 2019-09-23 NOTE — PROGRESS NOTE ADULT - PROBLEM SELECTOR PLAN 1
PT/OT,   Lt thoracotomy incision--remaining staple removed  serial neurovascular exams,   [facility]  reeval by neuro--will get MRI C/L/T spine with contrast to ro spinal infarct--only partially completed-neurology recalled to evaluate results to see if there is a need to repeat  neuro also--EMG conduction studies as outpt  [address] [facility]  [phone]

## 2019-09-23 NOTE — CONSULT NOTE ADULT - CONSULT REQUESTED DATE/TIME
30-Aug-2019
04-Sep-2019 08:48
08-Sep-2019 10:45
11-Sep-2019 11:45
23-Sep-2019 13:33
29-Aug-2019 08:41

## 2019-09-23 NOTE — PROGRESS NOTE ADULT - ASSESSMENT
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 She is now   s/p Reop Thoracoabdominal aneurysm open repair with Dacron graft and celiac SMA bypass, reimplantation of lumbar artery with Dr. Carranza on 8/29. Intraop 2 PRBC, 2 Platelets,Her postoperative course was complicated by new onset BLE weakness R>L for which she was placed on MAPs of 110 and CSF drainage of 20cc. She was extubated POD2 ,and required BIPAP on POD3. She went into afib with RVR, placed on esmolol-weaned off- on POD4 but converted back to NSR without treatment.  9/1 RLE improving, NGT removed, BIPAP, nicardipine restarted briefly for HTN, Cr 2.43  9/2 Lumbar drain removed, Afib RVR converted without intervention, Vaso weaned off today  9/3 S&S eval recommend Dysphagia 1 nectar thick diet, 3L NC, PT recommending acute rehab, Cr improving 1.93 from 2.04  9/4 Afib with RVR, transferred to CTU, metoprolol 2.5 IV x2, amiodarone drip started, vasopressin for hypotension, esmolol drip for rate control, Digoxin IV x1, converted to NSR, Cr 1.76 DURGA improving, targeting MAPs of 70-80, L pleural chest tubes x3 removed, S&S recommend dysphagia 1 thin liquids, bradycardic at night and amio decreased to 0.5 from 1  9/5 Transitioned to Labetalol from esmolol, PO amio taper, A-line/introducer/and muñoz discontinued, transferred to SDU  She is now transferred from the CTU to SDU. She no longer has any critical care needs at this time and is stable enough for transfer to SDU.   9/6 Increase PT/ ambulation. Dys 1 diet, speech/swallow f/u, repeat mbs today. Maintaining nsr  9/7 Tolerating Dys II diet, check urinalysis, for leukocytosis. CXR negative for infiltrate. Creat 1,9   9/8 Zosyn initiated for suspected UTI/LLL pna. ID consulted for assistance in mangement. creatinine improving . -1415 fluid balance, ct chest, BC x 2  9/9 WBC remains elevated, 21, afebrile, pending CT results r/o PNA. Continue zosyn.    D/w PT,  pt with decrease in  balance/ fatigued today, RLE weakness unchanged as per pt.   relative hypotension, irineo, labetolol d/c, maintain mao>85.  9/10 CT with compressive atelectasis,  placed on nocturnal bipap as d/w  Dr Carranza  Wbc trending down  Cont zosyn UTI/R/o pna  9/11 Zosyn for pna x 7 days  PT  Neuro consulted for f/u  9/12  CT scan  abdomen complete   ,  worked with PT,   plus one strength  r leg,  OOB to chair,  Lt thoracotomy incision  9/13 Pending mri at neuro  rec.HCT drifting down, repeat performed.Labetolol d/c lopressor started for HR control but to liberalize bp Pending acute rehab  9/14 MRI negative for mass, shift, bleed, flow limiting lesions  9/15   zosyn dc,   OOB to chair   NSR   rt leg splint w/ cont weakness  9/16 Pt voiding, female urine collection device, incontenent   Hypokalemia, supplementation increased.  Zosyn complete  Rehab planning  9/17 PT today  9/18  HD stable.  Continues to slowly regain strength in RLE.  [facility] awaiting auth..  Per neuro will need outpt EMG and conduction studies.  No further neuro work-up needed at this time.  Every other staple removed  9/19 HD stable.  Slowly improving strength.  Dr carranza wanted neuro to re-eval pt.  MRI C-spine/lumbar spine and thoracic spine to ro spinal infarct or stenosis.  Awaiting auth for [facility]  9/20 MRI sched for 9/21> po ativan prior(claustrophobia)   9/21 HD stable. Unable to tolerate entire MRI--only partially completed.  Awaiting OLIN[facility]   9/22: MRi results noted and neurology recalled to evaluate results and to see if what was imaged was sufficient. Urine with Gross smell u/a and urine cx sent in addition to greenish tan copious vaginal secretions sent for cx and r/o gonorrhea. Pt complains of foul smells from groin area.     9/23 Urine cx- Gram neg rods (prelim). Continue abx per ID x 7 days. Vaginal discharge-not noticed within last 24 hours. Pending cx. Urology consult placed for neurogenic bladder. F/U recs

## 2019-09-23 NOTE — CONSULT NOTE ADULT - SUBJECTIVE AND OBJECTIVE BOX
HPI:  Patient is a 69y Female s/p repair of AAA on  referred for urinary retention.  Galicia was placed for 2500cc.  Patient states at that time she did not feel the urge to urinate.  Prior to admission and surgery, she was ambulatory, and did not have any issues with urination.  Post op the patient experienced lower extremity weakness and is still unsteady on her feet.  MRI T/L spine showed mild lumbar spinal stenosis and severe narrowing at L4-5, moderate narrowing at L5-S1. She is also being treated for a UTI, urine culture showing >100k GNR.    Denies constipation, fevers or chills, nausea or vomiting, hematuria, dysuria.          PAST MEDICAL & SURGICAL HISTORY:  Intermittent abdominal pain: periumbilical  Thoracoabdominal aortic aneurysm (TAAA) without rupture  Murmur, cardiac  Cataract: bilateral  Preeclampsia  HTN (hypertension): controlled  S/P aortic valve repair: 20 with bioprostetic valve  Thoracoabdominal aortic aneurysm (TAAA) without rupture: s/p repair  Asceding aortic aneurysm repair 2019  S/P cataract surgery  Arm fracture, left:     MEDICATIONS  (STANDING):  amiodarone    Tablet 200 milliGRAM(s) Oral daily  amiodarone    Tablet   Oral   amLODIPine   Tablet 5 milliGRAM(s) Oral daily  ascorbic acid 500 milliGRAM(s) Oral two times a day  aspirin  chewable 81 milliGRAM(s) Oral daily  BACItracin   Ointment 1 Application(s) Topical two times a day  ferrous    sulfate 325 milliGRAM(s) Oral two times a day  folic acid 1 milliGRAM(s) Oral daily  heparin  Injectable 5000 Unit(s) SubCutaneous every 8 hours  lidocaine   Patch 1 Patch Transdermal daily  metoprolol tartrate 25 milliGRAM(s) Oral every 12 hours  pantoprazole    Tablet 40 milliGRAM(s) Oral before breakfast  piperacillin/tazobactam IVPB.. 3.375 Gram(s) IV Intermittent every 8 hours    MEDICATIONS  (PRN):  acetaminophen  IVPB .. 1000 milliGRAM(s) IV Intermittent once PRN Moderate Pain (4 - 6)  ALPRAZolam 0.25 milliGRAM(s) Oral every 8 hours PRN anxiety  mineral oil 30 milliLiter(s) Oral two times a day PRN dry mouth  ondansetron Injectable 4 milliGRAM(s) IV Push three times a day PRN Nausea and/or Vomiting  oxyCODONE    IR 5 milliGRAM(s) Oral every 4 hours PRN Moderate Pain (4 - 6)    FAMILY HISTORY:  Family history of skin cancer: mother  Family history of coronary artery bypass graft (Father): with valve disease    Allergies    No Known Allergies    Intolerances      SOCIAL HISTORY:   Tobacco hx: none    REVIEW OF SYSTEMS: Pertinent positives and negatives as stated in HPI, otherwise negative    Vital signs  T(C): 36.4 (19 @ 12:41), Max: 36.8 (19 @ 05:48)  HR: 57 (19 @ 12:41)  BP: 114/68 (19 @ 12:41)  SpO2: 100% (19 @ 12:41)  Wt(kg): --    I&O's Detail    22 Sep 2019 07:  -  23 Sep 2019 07:00  --------------------------------------------------------  IN:    IV PiggyBack: 100 mL    Oral Fluid: 1100 mL  Total IN: 1200 mL    OUT:    Indwelling Catheter - Urethral: 4275 mL    Voided: 1650 mL  Total OUT: 5925 mL    Total NET: -4725 mL      23 Sep 2019 07:  -  23 Sep 2019 13:45  --------------------------------------------------------  IN:    Oral Fluid: 360 mL  Total IN: 360 mL    OUT:    Indwelling Catheter - Urethral: 1200 mL    Voided: 200 mL  Total OUT: 1400 mL    Total NET: -1040 mL          Physical Exam  Gen: NAD  Pulm: No respiratory distress, no subcostal retractions  CV: RRR, no JVD  Abd: Soft, NT, ND  : Galicia draining clear urine  MSK: No edema present    LABS:           @ 05:42    WBC 6.8   / Hct 22.4  / SCr 1.22      @ 07:25    WBC 8.5   / Hct 24.4  / SCr 1.26         134<L>  |  99  |  24<H>  ----------------------------<  95  3.9   |  22  |  1.22    Ca    8.7      23 Sep 2019 05:42  Phos  3.7       Mg     1.9         TPro  5.7<L>  /  Alb  2.7<L>  /  TBili  0.2  /  DBili  x   /  AST  18  /  ALT  21  /  AlkPhos  64        Urinalysis Basic - ( 22 Sep 2019 13:41 )    Color: Light Yellow / Appearance: Slightly Turbid / S.006 / pH: x  Gluc: x / Ketone: Negative  / Bili: Negative / Urobili: <2 mg/dL   Blood: x / Protein: Trace / Nitrite: Positive   Leuk Esterase: Large / RBC: 4 /HPF /  /HPF   Sq Epi: x / Non Sq Epi: 11 /HPF / Bacteria: Moderate        Urine Cx:   Culture - Urine (19 @ 12:24)    Specimen Source: .Urine    Culture Results:   >100,000 CFU/ml Gram Negative Rods

## 2019-09-23 NOTE — PROGRESS NOTE ADULT - ASSESSMENT
69 yr old female with H/O Thoraco abdominal Aortic aneurysm, Aortic Stenosis/ regurgitation S/P AVR (T), Ascending & hemiarch replacement 4/9/19 now sp thoracoabdominal aortic aneurysm.     - Resolved DURGA  - Hyponatremia:  improving    - b/l lower ext. weakness - Right>left   - HTN     RECOMMENDATION:  -  Encourage liquids with calories; Cont Ensure Enlive   - Serum sodium is not clinically significant at this level   - c/w Norvasc 5mg po qd   - Cont PT is ongoing;  [facility]  -Requires the wanda at present

## 2019-09-24 LAB
-  AMIKACIN: SIGNIFICANT CHANGE UP
-  AMPICILLIN/SULBACTAM: SIGNIFICANT CHANGE UP
-  AMPICILLIN: SIGNIFICANT CHANGE UP
-  AZTREONAM: SIGNIFICANT CHANGE UP
-  CEFAZOLIN: SIGNIFICANT CHANGE UP
-  CEFEPIME: SIGNIFICANT CHANGE UP
-  CEFOXITIN: SIGNIFICANT CHANGE UP
-  CEFTRIAXONE: SIGNIFICANT CHANGE UP
-  CIPROFLOXACIN: SIGNIFICANT CHANGE UP
-  GENTAMICIN: SIGNIFICANT CHANGE UP
-  IMIPENEM: SIGNIFICANT CHANGE UP
-  LEVOFLOXACIN: SIGNIFICANT CHANGE UP
-  MEROPENEM: SIGNIFICANT CHANGE UP
-  NITROFURANTOIN: SIGNIFICANT CHANGE UP
-  PIPERACILLIN/TAZOBACTAM: SIGNIFICANT CHANGE UP
-  TIGECYCLINE: SIGNIFICANT CHANGE UP
-  TOBRAMYCIN: SIGNIFICANT CHANGE UP
-  TRIMETHOPRIM/SULFAMETHOXAZOLE: SIGNIFICANT CHANGE UP
ALBUMIN SERPL ELPH-MCNC: 2.9 G/DL — LOW (ref 3.3–5)
ALP SERPL-CCNC: 97 U/L — SIGNIFICANT CHANGE UP (ref 40–120)
ALT FLD-CCNC: 32 U/L — SIGNIFICANT CHANGE UP (ref 10–45)
ANION GAP SERPL CALC-SCNC: 14 MMOL/L — SIGNIFICANT CHANGE UP (ref 5–17)
AST SERPL-CCNC: 28 U/L — SIGNIFICANT CHANGE UP (ref 10–40)
BILIRUB SERPL-MCNC: 0.3 MG/DL — SIGNIFICANT CHANGE UP (ref 0.2–1.2)
BUN SERPL-MCNC: 20 MG/DL — SIGNIFICANT CHANGE UP (ref 7–23)
CALCIUM SERPL-MCNC: 9 MG/DL — SIGNIFICANT CHANGE UP (ref 8.4–10.5)
CHLORIDE SERPL-SCNC: 99 MMOL/L — SIGNIFICANT CHANGE UP (ref 96–108)
CO2 SERPL-SCNC: 21 MMOL/L — LOW (ref 22–31)
CREAT SERPL-MCNC: 1.2 MG/DL — SIGNIFICANT CHANGE UP (ref 0.5–1.3)
CULTURE RESULTS: SIGNIFICANT CHANGE UP
GLUCOSE SERPL-MCNC: 97 MG/DL — SIGNIFICANT CHANGE UP (ref 70–99)
HCT VFR BLD CALC: 25.8 % — LOW (ref 34.5–45)
HGB BLD-MCNC: 8.3 G/DL — LOW (ref 11.5–15.5)
MCHC RBC-ENTMCNC: 28 PG — SIGNIFICANT CHANGE UP (ref 27–34)
MCHC RBC-ENTMCNC: 32 GM/DL — SIGNIFICANT CHANGE UP (ref 32–36)
MCV RBC AUTO: 87.5 FL — SIGNIFICANT CHANGE UP (ref 80–100)
METHOD TYPE: SIGNIFICANT CHANGE UP
ORGANISM # SPEC MICROSCOPIC CNT: SIGNIFICANT CHANGE UP
ORGANISM # SPEC MICROSCOPIC CNT: SIGNIFICANT CHANGE UP
PLATELET # BLD AUTO: 303 K/UL — SIGNIFICANT CHANGE UP (ref 150–400)
POTASSIUM SERPL-MCNC: 3.5 MMOL/L — SIGNIFICANT CHANGE UP (ref 3.5–5.3)
POTASSIUM SERPL-SCNC: 3.5 MMOL/L — SIGNIFICANT CHANGE UP (ref 3.5–5.3)
PROT SERPL-MCNC: 6.3 G/DL — SIGNIFICANT CHANGE UP (ref 6–8.3)
RBC # BLD: 2.95 M/UL — LOW (ref 3.8–5.2)
RBC # FLD: 14.4 % — SIGNIFICANT CHANGE UP (ref 10.3–14.5)
SODIUM SERPL-SCNC: 134 MMOL/L — LOW (ref 135–145)
SPECIMEN SOURCE: SIGNIFICANT CHANGE UP
WBC # BLD: 6.6 K/UL — SIGNIFICANT CHANGE UP (ref 3.8–10.5)
WBC # FLD AUTO: 6.6 K/UL — SIGNIFICANT CHANGE UP (ref 3.8–10.5)

## 2019-09-24 PROCEDURE — 99232 SBSQ HOSP IP/OBS MODERATE 35: CPT

## 2019-09-24 PROCEDURE — 99024 POSTOP FOLLOW-UP VISIT: CPT

## 2019-09-24 RX ORDER — CEFTRIAXONE 500 MG/1
1000 INJECTION, POWDER, FOR SOLUTION INTRAMUSCULAR; INTRAVENOUS EVERY 24 HOURS
Refills: 0 | Status: DISCONTINUED | OUTPATIENT
Start: 2019-09-24 | End: 2019-09-26

## 2019-09-24 RX ORDER — HYDRALAZINE HCL 50 MG
10 TABLET ORAL ONCE
Refills: 0 | Status: COMPLETED | OUTPATIENT
Start: 2019-09-24 | End: 2019-09-24

## 2019-09-24 RX ORDER — POTASSIUM CHLORIDE 20 MEQ
40 PACKET (EA) ORAL ONCE
Refills: 0 | Status: COMPLETED | OUTPATIENT
Start: 2019-09-24 | End: 2019-09-24

## 2019-09-24 RX ADMIN — OXYCODONE HYDROCHLORIDE 5 MILLIGRAM(S): 5 TABLET ORAL at 12:55

## 2019-09-24 RX ADMIN — LIDOCAINE 1 PATCH: 4 CREAM TOPICAL at 12:59

## 2019-09-24 RX ADMIN — Medication 25 MILLIGRAM(S): at 17:09

## 2019-09-24 RX ADMIN — Medication 1 MILLIGRAM(S): at 13:00

## 2019-09-24 RX ADMIN — HEPARIN SODIUM 5000 UNIT(S): 5000 INJECTION INTRAVENOUS; SUBCUTANEOUS at 13:00

## 2019-09-24 RX ADMIN — AMLODIPINE BESYLATE 5 MILLIGRAM(S): 2.5 TABLET ORAL at 06:14

## 2019-09-24 RX ADMIN — Medication 325 MILLIGRAM(S): at 06:15

## 2019-09-24 RX ADMIN — OXYCODONE HYDROCHLORIDE 5 MILLIGRAM(S): 5 TABLET ORAL at 09:08

## 2019-09-24 RX ADMIN — CEFTRIAXONE 100 MILLIGRAM(S): 500 INJECTION, POWDER, FOR SOLUTION INTRAMUSCULAR; INTRAVENOUS at 14:20

## 2019-09-24 RX ADMIN — Medication 1 APPLICATION(S): at 17:09

## 2019-09-24 RX ADMIN — PANTOPRAZOLE SODIUM 40 MILLIGRAM(S): 20 TABLET, DELAYED RELEASE ORAL at 06:15

## 2019-09-24 RX ADMIN — Medication 25 MILLIGRAM(S): at 06:15

## 2019-09-24 RX ADMIN — OXYCODONE HYDROCHLORIDE 5 MILLIGRAM(S): 5 TABLET ORAL at 21:55

## 2019-09-24 RX ADMIN — Medication 40 MILLIEQUIVALENT(S): at 14:20

## 2019-09-24 RX ADMIN — Medication 500 MILLIGRAM(S): at 06:15

## 2019-09-24 RX ADMIN — AMIODARONE HYDROCHLORIDE 200 MILLIGRAM(S): 400 TABLET ORAL at 06:14

## 2019-09-24 RX ADMIN — OXYCODONE HYDROCHLORIDE 5 MILLIGRAM(S): 5 TABLET ORAL at 08:27

## 2019-09-24 RX ADMIN — Medication 10 MILLIGRAM(S): at 18:24

## 2019-09-24 RX ADMIN — Medication 0.25 MILLIGRAM(S): at 01:48

## 2019-09-24 RX ADMIN — PIPERACILLIN AND TAZOBACTAM 25 GRAM(S): 4; .5 INJECTION, POWDER, LYOPHILIZED, FOR SOLUTION INTRAVENOUS at 06:15

## 2019-09-24 RX ADMIN — Medication 1 APPLICATION(S): at 06:15

## 2019-09-24 RX ADMIN — Medication 81 MILLIGRAM(S): at 13:00

## 2019-09-24 RX ADMIN — Medication 500 MILLIGRAM(S): at 17:09

## 2019-09-24 RX ADMIN — OXYCODONE HYDROCHLORIDE 5 MILLIGRAM(S): 5 TABLET ORAL at 13:25

## 2019-09-24 RX ADMIN — HEPARIN SODIUM 5000 UNIT(S): 5000 INJECTION INTRAVENOUS; SUBCUTANEOUS at 06:15

## 2019-09-24 RX ADMIN — OXYCODONE HYDROCHLORIDE 5 MILLIGRAM(S): 5 TABLET ORAL at 22:25

## 2019-09-24 RX ADMIN — Medication 325 MILLIGRAM(S): at 17:08

## 2019-09-24 RX ADMIN — OXYCODONE HYDROCHLORIDE 5 MILLIGRAM(S): 5 TABLET ORAL at 17:08

## 2019-09-24 RX ADMIN — HEPARIN SODIUM 5000 UNIT(S): 5000 INJECTION INTRAVENOUS; SUBCUTANEOUS at 21:55

## 2019-09-24 RX ADMIN — OXYCODONE HYDROCHLORIDE 5 MILLIGRAM(S): 5 TABLET ORAL at 17:38

## 2019-09-24 RX ADMIN — LIDOCAINE 1 PATCH: 4 CREAM TOPICAL at 19:54

## 2019-09-24 NOTE — PROGRESS NOTE ADULT - SUBJECTIVE AND OBJECTIVE BOX
VITAL SIGNS    Telemetry:    Vital Signs Last 24 Hrs  T(C): 36.4 (19 @ 14:40), Max: 36.8 (19 @ 05:11)  T(F): 97.5 (19 @ 14:40), Max: 98.2 (19 @ 05:11)  HR: 54 (19 @ 14:40) (54 - 68)  BP: 150/67 (19 @ 14:40) (124/64 - 150/67)  RR: 18 (19 @ 14:40) (18 - 18)  SpO2: 100% (19 @ 14:40) (99% - 100%)             @ 07:01  -   @ 07:00  --------------------------------------------------------  IN: 750 mL / OUT: 5050 mL / NET: -4300 mL     @ 07:01  -   @ 15:28  --------------------------------------------------------  IN: 120 mL / OUT: 1400 mL / NET: -1280 mL       Daily     Daily Weight in k (24 Sep 2019 07:47)  Admit Wt: Drug Dosing Weight  Height (cm): 167.64 (29 Aug 2019 06:40)  Weight (kg): 67.6 (29 Aug 2019 06:40)  BMI (kg/m2): 24.1 (29 Aug 2019 06:40)  BSA (m2): 1.77 (29 Aug 2019 06:40)     Daily Weight in k (19 @ 07:47)    LABS      134<L>  |  99  |  20  ----------------------------<  97  3.5   |  21<L>  |  1.20    Ca    9.0      24 Sep 2019 06:13  Mg     1.9         TPro  6.3  /  Alb  2.9<L>  /  TBili  0.3  /  DBili  x   /  AST  28  /  ALT  32  /  AlkPhos  97                                   8.3    6.6   )-----------( 303      ( 24 Sep 2019 06:13 )             25.8                  Bilirubin Total, Serum: 0.3 mg/dL ( @ 06:13)    CAPILLARY BLOOD GLUCOSE              Drains:     MS       [  ]   [  ]            L Pleural [  ]            R Pleural  [  ]            VIVIAN  [  ]           Galicia  [  ]    Pacing Wires      [  ]   Settings:                                  Isolated  [  ]                    CXR:      MEDICATIONS  acetaminophen  IVPB .. 1000 milliGRAM(s) IV Intermittent once PRN  ALPRAZolam 0.25 milliGRAM(s) Oral every 8 hours PRN  amiodarone    Tablet 200 milliGRAM(s) Oral daily  amiodarone    Tablet   Oral   amLODIPine   Tablet 5 milliGRAM(s) Oral daily  ascorbic acid 500 milliGRAM(s) Oral two times a day  aspirin  chewable 81 milliGRAM(s) Oral daily  BACItracin   Ointment 1 Application(s) Topical two times a day  cefTRIAXone   IVPB 1000 milliGRAM(s) IV Intermittent every 24 hours  ferrous    sulfate 325 milliGRAM(s) Oral two times a day  folic acid 1 milliGRAM(s) Oral daily  heparin  Injectable 5000 Unit(s) SubCutaneous every 8 hours  lidocaine   Patch 1 Patch Transdermal daily  metoprolol tartrate 25 milliGRAM(s) Oral every 12 hours  mineral oil 30 milliLiter(s) Oral two times a day PRN  ondansetron Injectable 4 milliGRAM(s) IV Push three times a day PRN  oxyCODONE    IR 5 milliGRAM(s) Oral every 4 hours PRN  pantoprazole    Tablet 40 milliGRAM(s) Oral before breakfast      PHYSICAL EXAM      Neurology: alert and oriented x 3, nonfocal, no gross deficits  CV :S1S2  Sternal Wound :  CDI , Stable, Left thoracotomy incision with improved erythema to distal tip  Lungs: cta  Abdomen: soft, nontender, nondistended, positive bowel sounds, last bowel movement   :  voids     Extremities:   improved right lower extrem. 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

## 2019-09-24 NOTE — PROGRESS NOTE ADULT - NSHPATTENDINGPLANDISCUSS_GEN_ALL_CORE
CTICU
CTICU
Discussed with patient and family at bedside.
CTICU
CTS
Discussed with CTU PA
cts rounds
primary team

## 2019-09-24 NOTE — PROGRESS NOTE ADULT - ASSESSMENT
69 year old female s/p ascending thoracoabdominal aortic aneurysm repair and aortic valve replaced with a bioprosthetic valve in April 2019, reoperative repair on 8/29/2019  with course complicated with DURGA and Afib with RVR. 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 She states she has developed a productive cough with whitish sputum, CXR with ? LLL PNA vs atelectasis s/p course of zosyn earlier this admission    9/22 noted with urine retention and Galicia placed and pus noted in urine    Urine culture with Kleb pneumo

## 2019-09-24 NOTE — PROGRESS NOTE ADULT - SUBJECTIVE AND OBJECTIVE BOX
CC: Patient is a 69y old  Female who presents with a chief complaint of Thoracoabdominal aortic aneurysm (TAAA) without rupture (21 Sep 2019 13:53)    ID following for UTI    Interval History/ROS: Patient has no complaints. Denies fever, chills. No suprapubic pain.    Rest of ROS negative.    Allergies  No Known Allergies    ANTIMICROBIALS:  piperacillin/tazobactam IVPB.. 3.375 every 8 hours    OTHER MEDS:  acetaminophen  IVPB .. 1000 milliGRAM(s) IV Intermittent once PRN  ALPRAZolam 0.25 milliGRAM(s) Oral every 8 hours PRN  amiodarone    Tablet 200 milliGRAM(s) Oral daily  amiodarone    Tablet   Oral   amLODIPine   Tablet 5 milliGRAM(s) Oral daily  ascorbic acid 500 milliGRAM(s) Oral two times a day  aspirin  chewable 81 milliGRAM(s) Oral daily  BACItracin   Ointment 1 Application(s) Topical two times a day  ferrous    sulfate 325 milliGRAM(s) Oral two times a day  folic acid 1 milliGRAM(s) Oral daily  heparin  Injectable 5000 Unit(s) SubCutaneous every 8 hours  lidocaine   Patch 1 Patch Transdermal daily  metoprolol tartrate 25 milliGRAM(s) Oral every 12 hours  mineral oil 30 milliLiter(s) Oral two times a day PRN  ondansetron Injectable 4 milliGRAM(s) IV Push three times a day PRN  oxyCODONE    IR 5 milliGRAM(s) Oral every 4 hours PRN  pantoprazole    Tablet 40 milliGRAM(s) Oral before breakfast    PE:    Vital Signs Last 24 Hrs  T(C): 36.8 (24 Sep 2019 05:11), Max: 36.8 (24 Sep 2019 05:11)  T(F): 98.2 (24 Sep 2019 05:11), Max: 98.2 (24 Sep 2019 05:11)  HR: 66 (24 Sep 2019 05:11) (66 - 68)  BP: 124/64 (24 Sep 2019 05:11) (124/64 - 150/64)  BP(mean): --  RR: 18 (24 Sep 2019 05:11) (18 - 18)  SpO2: 100% (24 Sep 2019 05:11) (100% - 100%)    Gen: AOx3, NAD, non-toxic  CV: S1+S2 normal, no murmurs  Resp: Clear bilat, no resp distress  Abd: Soft, nontender, +BS  Ext: No LE edema, no wounds  : +[place]  IV/Skin: No thrombophlebitis, staples in place on left sided surgical site, superior portion with erythema improving  Neuro: no focal deficits    LABS:                          8.3    6.6   )-----------( 303      ( 24 Sep 2019 06:13 )             25.8       09-    134<L>  |  99  |  20  ----------------------------<  97  3.5   |  21<L>  |  1.20    Ca    9.0      24 Sep 2019 06:13  Mg     1.9         TPro  6.3  /  Alb  2.9<L>  /  TBili  0.3  /  DBili  x   /  AST  28  /  ALT  32  /  AlkPhos  97        Urinalysis Basic - ( 22 Sep 2019 13:41 )    Color: Light Yellow / Appearance: Slightly Turbid / S.006 / pH: x  Gluc: x / Ketone: Negative  / Bili: Negative / Urobili: <2 mg/dL   Blood: x / Protein: Trace / Nitrite: Positive   Leuk Esterase: Large / RBC: 4 /HPF /  /HPF   Sq Epi: x / Non Sq Epi: 11 /HPF / Bacteria: Moderate    MICROBIOLOGY:  v  .Urine  19   >100,000 CFU/ml Klebsiella pneumoniae  --  Klebsiella pneumoniae      .Blood  19   No growth at 5 days.  --  --    RADIOLOGY:    < from: MR Lumbar Spine No Cont (19 @ 12:41) >  IMPRESSION:    CERVICAL SPINE MR: Degenerative change with questionable increased   hyperintense T2 signal in the central cervical cord sagittal series 3   image 6, axial images are degraded by motion.    THORACIC SPINE MR: No evidence for severe canal or foraminal compromise,   questionable increased new faint hyperintense T2 signal within the mid   thoracic cord, sagittal images T2 and STIR image 9, axialimages degraded   by technique and motion.    LUMBAR SPINE MR: Redemonstration of degenerative change as noted   previously with left frontal narrowing at L4-5 and L5-S1. Enlarged   bladder correlate clinically, with findings concerning for neurogenic   bladder.    < end of copied text >

## 2019-09-24 NOTE — PROGRESS NOTE ADULT - ASSESSMENT
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 She is now   s/p Reop Thoracoabdominal aneurysm open repair with Dacron graft and celiac SMA bypass, reimplantation of lumbar artery with Dr. Carranza on 8/29. Intraop 2 PRBC, 2 Platelets,Her postoperative course was complicated by new onset BLE weakness R>L for which she was placed on MAPs of 110 and CSF drainage of 20cc. She was extubated POD2 ,and required BIPAP on POD3. She went into afib with RVR, placed on esmolol-weaned off- on POD4 but converted back to NSR without treatment.  9/1 RLE improving, NGT removed, BIPAP, nicardipine restarted briefly for HTN, Cr 2.43  9/2 Lumbar drain removed, Afib RVR converted without intervention, Vaso weaned off today  9/3 S&S eval recommend Dysphagia 1 nectar thick diet, 3L NC, PT recommending acute rehab, Cr improving 1.93 from 2.04  9/4 Afib with RVR, transferred to CTU, metoprolol 2.5 IV x2, amiodarone drip started, vasopressin for hypotension, esmolol drip for rate control, Digoxin IV x1, converted to NSR, Cr 1.76 DURGA improving, targeting MAPs of 70-80, L pleural chest tubes x3 removed, S&S recommend dysphagia 1 thin liquids, bradycardic at night and amio decreased to 0.5 from 1  9/5 Transitioned to Labetalol from esmolol, PO amio taper, A-line/introducer/and muñoz discontinued, transferred to SDU  She is now transferred from the CTU to SDU. She no longer has any critical care needs at this time and is stable enough for transfer to SDU.   9/6 Increase PT/ ambulation. Dys 1 diet, speech/swallow f/u, repeat mbs today. Maintaining nsr  9/7 Tolerating Dys II diet, check urinalysis, for leukocytosis. CXR negative for infiltrate. Creat 1,9   9/8 Zosyn initiated for suspected UTI/LLL pna. ID consulted for assistance in mangement. creatinine improving . -1415 fluid balance, ct chest, BC x 2  9/9 WBC remains elevated, 21, afebrile, pending CT results r/o PNA. Continue zosyn.    D/w PT,  pt with decrease in  balance/ fatigued today, RLE weakness unchanged as per pt.   relative hypotension, irineo, labetolol d/c, maintain mao>85.  9/10 CT with compressive atelectasis,  placed on nocturnal bipap as d/w  Dr Carranza  Wbc trending down  Cont zosyn UTI/R/o pna  9/11 Zosyn for pna x 7 days  PT  Neuro consulted for f/u  9/12  CT scan  abdomen complete   ,  worked with PT,   plus one strength  r leg,  OOB to chair,  Lt thoracotomy incision  9/13 Pending mri at neuro  rec.HCT drifting down, repeat performed.Labetolol d/c lopressor started for HR control but to liberalize bp Pending acute rehab  9/14 MRI negative for mass, shift, bleed, flow limiting lesions  9/15   zosyn dc,   OOB to chair   NSR   rt leg splint w/ cont weakness  9/16 Pt voiding, female urine collection device, incontenent   Hypokalemia, supplementation increased.  Zosyn complete  Rehab planning  9/17 PT today  9/18  HD stable.  Continues to slowly regain strength in RLE.  [facility] awaiting auth..  Per neuro will need outpt EMG and conduction studies.  No further neuro work-up needed at this time.  Every other staple removed  9/19 HD stable.  Slowly improving strength.  Dr carranza wanted neuro to re-eval pt.  MRI C-spine/lumbar spine and thoracic spine to ro spinal infarct or stenosis.  Awaiting auth for OLINDA  9/20 MRI sched for 9/21> po ativan prior(claustrophobia)   9/21 HD stable. Unable to tolerate entire MRI--only partially completed.  Awaiting OLINDA   9/22: MRi results noted and neurology recalled to evaluate results and to see if what was imaged was sufficient. Urine with Gross smell u/a and urine cx sent in addition to greenish tan copious vaginal secretions sent for cx and r/o gonorrhea. Pt complains of foul smells from groin area.     9/23 Urine cx- Gram neg rods (prelim). Continue abx per ID x 7 days. Vaginal discharge-not noticed within last 24 hours. Pending cx. Urology consult placed for neurogenic bladder. F/U recs  9/24: Zosyn d/cd and ceftriaxone added per ID for UTI-Klebsiella for total of 5 days which includes zosyn time. Can switch to vantin po qd on dischage if needed. Vaginal cx neg for chlamydia and gonorrhea

## 2019-09-24 NOTE — PROGRESS NOTE ADULT - SUBJECTIVE AND OBJECTIVE BOX
Urology Daily Progress Note    SUBJECTIVE:  Pt seen and examined, and is resting comfortably in chair. Has hx of dribbling that pre-dates surgery.  Feels well.  Still has residual numbness on midabdomen.      OBJECTIVE:  Vital Signs Last 24 Hrs  T(C): 36.4 (24 Sep 2019 14:40), Max: 36.8 (24 Sep 2019 05:11)  T(F): 97.5 (24 Sep 2019 14:40), Max: 98.2 (24 Sep 2019 05:11)  HR: 54 (24 Sep 2019 14:40) (54 - 68)  BP: 150/67 (24 Sep 2019 14:40) (124/64 - 150/67)  BP(mean): --  RR: 18 (24 Sep 2019 14:40) (18 - 18)  SpO2: 100% (24 Sep 2019 14:40) (99% - 100%)    I&O's Detail    23 Sep 2019 07:01  -  24 Sep 2019 07:00  --------------------------------------------------------  IN:    IV PiggyBack: 200 mL    Oral Fluid: 550 mL  Total IN: 750 mL    OUT:    Indwelling Catheter - Urethral: 4050 mL    Voided: 1000 mL  Total OUT: 5050 mL    Total NET: -4300 mL      24 Sep 2019 07:01  -  24 Sep 2019 15:29  --------------------------------------------------------  IN:    Oral Fluid: 120 mL  Total IN: 120 mL    OUT:    Voided: 1400 mL  Total OUT: 1400 mL    Total NET: -1280 mL        Exam:  GEN: A&Ox3, NAD  HEENT: atraumatic, normocephalic  CV: no JVD  RESP: no increased work of breathing, no use of accessory muscles  GI/ABD: soft, NT, ND, no CVAT  : [place] with clear yellow urine  EXTREMITIES: warm, pink, well-perfused                        8.3    6.6   )-----------( 303      ( 24 Sep 2019 06:13 )             25.8       09-24    134<L>  |  99  |  20  ----------------------------<  97  3.5   |  21<L>  |  1.20    Ca    9.0      24 Sep 2019 06:13  Mg     1.9     09-23    TPro  6.3  /  Alb  2.9<L>  /  TBili  0.3  /  DBili  x   /  AST  28  /  ALT  32  /  AlkPhos  97  09-24

## 2019-09-24 NOTE — PROGRESS NOTE ADULT - ASSESSMENT
69F with hx of HTN now with urinary retention and LE weakness after AAA repair  MRI shows lumbar stenosis as well as narrowing at L4-L5, L5-S1    - New-onset retention likely 2/2 neurologic cause given concurrent LE weakness  - Treat UTI  - Continue antibiotics and adjust if needed once sensitivities back  - Patient likely in post-obstructive diuresis (>200cc of urine/hr for 3 or more consecutive hours)  	-Maintain strict intake and output  	-Allow patient to drink to thirst.  Keep two pitchers of water at the bedside at all times.  	-Check q6 daily BMP while producing > 200cc urine per hr  - Continue muñoz catheter; anticipate patient will be discharged with catheter  - Ensure adequate bowel movements  - Patient will need to followup with Dr. Sujatha Trejo as an outpatient for management    [facility]  [address] [place] [address], [address] [address] [phone]

## 2019-09-24 NOTE — PROGRESS NOTE ADULT - ASSESSMENT
69 yr old female with H/O Thoraco abdominal Aortic aneurysm, Aortic Stenosis/ regurgitation S/P AVR (T), Ascending & hemiarch replacement 4/9/19 now sp thoracoabdominal aortic aneurysm.     - Resolved DURGA  - Hyponatremia:  improving    - b/l lower ext. weakness - Right>left   - HTN -Controlled    RECOMMENDATION:  -  Encourage liquids with calories; Cont Ensure Enlive   - Serum sodium is not clinically significant at this level   - c/w Norvasc 5mg po qd   - Cont PT is ongoing;  [facility]  -Requires the wanda at present. On IV abx for UTI

## 2019-09-24 NOTE — PROGRESS NOTE ADULT - SUBJECTIVE AND OBJECTIVE BOX
[facility] [phone]        Patient seen and examined in bed.  She was a little upset re the UTI        MEDICATIONS  (STANDING):  amiodarone    Tablet 200 milliGRAM(s) Oral daily  amiodarone    Tablet   Oral   amLODIPine   Tablet 5 milliGRAM(s) Oral daily  ascorbic acid 500 milliGRAM(s) Oral two times a day  aspirin  chewable 81 milliGRAM(s) Oral daily  BACItracin   Ointment 1 Application(s) Topical two times a day  ferrous    sulfate 325 milliGRAM(s) Oral two times a day  folic acid 1 milliGRAM(s) Oral daily  heparin  Injectable 5000 Unit(s) SubCutaneous every 8 hours  lidocaine   Patch 1 Patch Transdermal daily  metoprolol tartrate 25 milliGRAM(s) Oral every 12 hours  pantoprazole    Tablet 40 milliGRAM(s) Oral before breakfast  piperacillin/tazobactam IVPB.. 3.375 Gram(s) IV Intermittent every 8 hours      VITAL:  T(C): , Max: 36.8 (19 @ 05:11)  T(F): , Max: 98.2 (19 @ 05:11)  HR: 66 (19 @ 05:11)  BP: 124/64 (19 @ 05:11)  BP(mean): --  RR: 18 (19 @ 05:11)  SpO2: 100% (19 @ 05:11)  Wt(kg): --    I and O's:     @ 07:01  -   @ 07:00  --------------------------------------------------------  IN: 750 mL / OUT: 5050 mL / NET: -4300 mL          PHYSICAL EXAM:    Constitutional: NAD  Neck:  No JVD  Respiratory: CTAB/L  Cardiovascular: S1 and S2  Gastrointestinal: BS+, soft, NT/ND  Extremities: No peripheral edema  Neurological: A/O x 3, right leg weakness> left leg  Psychiatric: Normal mood, normal affect  : + [place]  Skin: No rashes  Access: Not applicable    LABS:                        8.3    6.6   )-----------( 303      ( 24 Sep 2019 06:13 )             25.8         134<L>  |  99  |  20  ----------------------------<  97  3.5   |  21<L>  |  1.20    Ca    9.0      24 Sep 2019 06:13  Mg     1.9         TPro  6.3  /  Alb  2.9<L>  /  TBili  0.3  /  DBili  x   /  AST  28  /  ALT  32  /  AlkPhos  97            Urine Studies:  Urinalysis Basic - ( 22 Sep 2019 13:41 )    Color: Light Yellow / Appearance: Slightly Turbid / S.006 / pH: x  Gluc: x / Ketone: Negative  / Bili: Negative / Urobili: <2 mg/dL   Blood: x / Protein: Trace / Nitrite: Positive   Leuk Esterase: Large / RBC: 4 /HPF /  /HPF   Sq Epi: x / Non Sq Epi: 11 /HPF / Bacteria: Moderate            RADIOLOGY & ADDITIONAL STUDIES:

## 2019-09-24 NOTE — PROGRESS NOTE ADULT - PROBLEM SELECTOR PLAN 1
PT/OT,     [facility]   neuro also--EMG conduction studies as outpt  [address] [facility]  [phone]

## 2019-09-24 NOTE — PROGRESS NOTE ADULT - PROBLEM SELECTOR PLAN 1
-Can change zosyn to ceftriaxone 1 gram IV q 24 hours  -Plan short course of abx for 5 days given  decompression and otherwise no fever or leukocytosis  -On discharge can transition to oral vantin to complete the course.

## 2019-09-25 LAB
ALBUMIN SERPL ELPH-MCNC: 3.1 G/DL — LOW (ref 3.3–5)
ALP SERPL-CCNC: 86 U/L — SIGNIFICANT CHANGE UP (ref 40–120)
ALT FLD-CCNC: 33 U/L — SIGNIFICANT CHANGE UP (ref 10–45)
ANION GAP SERPL CALC-SCNC: 13 MMOL/L — SIGNIFICANT CHANGE UP (ref 5–17)
AST SERPL-CCNC: 29 U/L — SIGNIFICANT CHANGE UP (ref 10–40)
BILIRUB SERPL-MCNC: 0.2 MG/DL — SIGNIFICANT CHANGE UP (ref 0.2–1.2)
BUN SERPL-MCNC: 18 MG/DL — SIGNIFICANT CHANGE UP (ref 7–23)
CALCIUM SERPL-MCNC: 8.5 MG/DL — SIGNIFICANT CHANGE UP (ref 8.4–10.5)
CHLORIDE SERPL-SCNC: 95 MMOL/L — LOW (ref 96–108)
CO2 SERPL-SCNC: 23 MMOL/L — SIGNIFICANT CHANGE UP (ref 22–31)
CREAT SERPL-MCNC: 1.05 MG/DL — SIGNIFICANT CHANGE UP (ref 0.5–1.3)
GLUCOSE SERPL-MCNC: 100 MG/DL — HIGH (ref 70–99)
HCT VFR BLD CALC: 23.4 % — LOW (ref 34.5–45)
HGB BLD-MCNC: 7.8 G/DL — LOW (ref 11.5–15.5)
MCHC RBC-ENTMCNC: 29 PG — SIGNIFICANT CHANGE UP (ref 27–34)
MCHC RBC-ENTMCNC: 33.3 GM/DL — SIGNIFICANT CHANGE UP (ref 32–36)
MCV RBC AUTO: 86.9 FL — SIGNIFICANT CHANGE UP (ref 80–100)
PLATELET # BLD AUTO: 271 K/UL — SIGNIFICANT CHANGE UP (ref 150–400)
POTASSIUM SERPL-MCNC: 3.3 MMOL/L — LOW (ref 3.5–5.3)
POTASSIUM SERPL-SCNC: 3.3 MMOL/L — LOW (ref 3.5–5.3)
PROT SERPL-MCNC: 6.2 G/DL — SIGNIFICANT CHANGE UP (ref 6–8.3)
RBC # BLD: 2.7 M/UL — LOW (ref 3.8–5.2)
RBC # FLD: 14.6 % — HIGH (ref 10.3–14.5)
SODIUM SERPL-SCNC: 131 MMOL/L — LOW (ref 135–145)
WBC # BLD: 7 K/UL — SIGNIFICANT CHANGE UP (ref 3.8–10.5)
WBC # FLD AUTO: 7 K/UL — SIGNIFICANT CHANGE UP (ref 3.8–10.5)

## 2019-09-25 PROCEDURE — 99232 SBSQ HOSP IP/OBS MODERATE 35: CPT

## 2019-09-25 RX ORDER — ACETAMINOPHEN 500 MG
650 TABLET ORAL EVERY 6 HOURS
Refills: 0 | Status: DISCONTINUED | OUTPATIENT
Start: 2019-09-25 | End: 2019-09-28

## 2019-09-25 RX ORDER — POTASSIUM CHLORIDE 20 MEQ
40 PACKET (EA) ORAL EVERY 4 HOURS
Refills: 0 | Status: COMPLETED | OUTPATIENT
Start: 2019-09-25 | End: 2019-09-25

## 2019-09-25 RX ADMIN — LIDOCAINE 1 PATCH: 4 CREAM TOPICAL at 11:54

## 2019-09-25 RX ADMIN — Medication 325 MILLIGRAM(S): at 17:54

## 2019-09-25 RX ADMIN — Medication 25 MILLIGRAM(S): at 17:54

## 2019-09-25 RX ADMIN — OXYCODONE HYDROCHLORIDE 5 MILLIGRAM(S): 5 TABLET ORAL at 13:20

## 2019-09-25 RX ADMIN — Medication 40 MILLIEQUIVALENT(S): at 11:06

## 2019-09-25 RX ADMIN — CEFTRIAXONE 100 MILLIGRAM(S): 500 INJECTION, POWDER, FOR SOLUTION INTRAMUSCULAR; INTRAVENOUS at 14:26

## 2019-09-25 RX ADMIN — LIDOCAINE 1 PATCH: 4 CREAM TOPICAL at 00:10

## 2019-09-25 RX ADMIN — AMLODIPINE BESYLATE 5 MILLIGRAM(S): 2.5 TABLET ORAL at 05:19

## 2019-09-25 RX ADMIN — OXYCODONE HYDROCHLORIDE 5 MILLIGRAM(S): 5 TABLET ORAL at 12:46

## 2019-09-25 RX ADMIN — OXYCODONE HYDROCHLORIDE 5 MILLIGRAM(S): 5 TABLET ORAL at 09:09

## 2019-09-25 RX ADMIN — HEPARIN SODIUM 5000 UNIT(S): 5000 INJECTION INTRAVENOUS; SUBCUTANEOUS at 14:11

## 2019-09-25 RX ADMIN — Medication 81 MILLIGRAM(S): at 11:54

## 2019-09-25 RX ADMIN — Medication 500 MILLIGRAM(S): at 17:54

## 2019-09-25 RX ADMIN — Medication 40 MILLIEQUIVALENT(S): at 14:26

## 2019-09-25 RX ADMIN — Medication 1 APPLICATION(S): at 05:19

## 2019-09-25 RX ADMIN — LIDOCAINE 1 PATCH: 4 CREAM TOPICAL at 23:00

## 2019-09-25 RX ADMIN — HEPARIN SODIUM 5000 UNIT(S): 5000 INJECTION INTRAVENOUS; SUBCUTANEOUS at 05:19

## 2019-09-25 RX ADMIN — OXYCODONE HYDROCHLORIDE 5 MILLIGRAM(S): 5 TABLET ORAL at 08:35

## 2019-09-25 RX ADMIN — Medication 0.25 MILLIGRAM(S): at 21:51

## 2019-09-25 RX ADMIN — PANTOPRAZOLE SODIUM 40 MILLIGRAM(S): 20 TABLET, DELAYED RELEASE ORAL at 05:20

## 2019-09-25 RX ADMIN — Medication 325 MILLIGRAM(S): at 05:19

## 2019-09-25 RX ADMIN — OXYCODONE HYDROCHLORIDE 5 MILLIGRAM(S): 5 TABLET ORAL at 19:20

## 2019-09-25 RX ADMIN — HEPARIN SODIUM 5000 UNIT(S): 5000 INJECTION INTRAVENOUS; SUBCUTANEOUS at 21:52

## 2019-09-25 RX ADMIN — Medication 1 MILLIGRAM(S): at 11:54

## 2019-09-25 RX ADMIN — LIDOCAINE 1 PATCH: 4 CREAM TOPICAL at 20:43

## 2019-09-25 RX ADMIN — OXYCODONE HYDROCHLORIDE 5 MILLIGRAM(S): 5 TABLET ORAL at 18:44

## 2019-09-25 RX ADMIN — Medication 25 MILLIGRAM(S): at 05:20

## 2019-09-25 RX ADMIN — AMIODARONE HYDROCHLORIDE 200 MILLIGRAM(S): 400 TABLET ORAL at 05:19

## 2019-09-25 RX ADMIN — Medication 500 MILLIGRAM(S): at 05:19

## 2019-09-25 NOTE — PROGRESS NOTE ADULT - PROBLEM SELECTOR PLAN 1
-Continue ceftriaxone 1 gram IV q 24 hours  -Plan short course of abx for 5 days given  decompression and otherwise no fever or leukocytosis   -On discharge can transition to oral vantin to complete the course.  -Day 4/5 of antibiotics - can complete tomorrow.

## 2019-09-25 NOTE — PROGRESS NOTE ADULT - PROBLEM SELECTOR PLAN 1
PT/OT,     [facility] eventually   neuro also--EMG conduction studies as outpt  [address] [facility]  [phone]

## 2019-09-25 NOTE — PROGRESS NOTE ADULT - SUBJECTIVE AND OBJECTIVE BOX
CC: Patient is a 69y old  Female who presents with a chief complaint of Thoracoabdominal aortic aneurysm (TAAA) without rupture (21 Sep 2019 13:53)    ID following for UTI    Interval History/ROS: Patient overall feeling better. Endorsing constipation, otherwise has no other new complaints. Denies fever, chills.     Rest of ROS negative.    Allergies  No Known Allergies    ANTIMICROBIALS:  cefTRIAXone   IVPB 1000 every 24 hours    OTHER MEDS:  acetaminophen   Tablet .. 650 milliGRAM(s) Oral every 6 hours PRN  ALPRAZolam 0.25 milliGRAM(s) Oral every 8 hours PRN  amiodarone    Tablet 200 milliGRAM(s) Oral daily  amiodarone    Tablet   Oral   amLODIPine   Tablet 5 milliGRAM(s) Oral daily  ascorbic acid 500 milliGRAM(s) Oral two times a day  aspirin  chewable 81 milliGRAM(s) Oral daily  BACItracin   Ointment 1 Application(s) Topical two times a day  ferrous    sulfate 325 milliGRAM(s) Oral two times a day  folic acid 1 milliGRAM(s) Oral daily  heparin  Injectable 5000 Unit(s) SubCutaneous every 8 hours  lidocaine   Patch 1 Patch Transdermal daily  metoprolol tartrate 25 milliGRAM(s) Oral every 12 hours  mineral oil 30 milliLiter(s) Oral two times a day PRN  ondansetron Injectable 4 milliGRAM(s) IV Push three times a day PRN  oxyCODONE    IR 5 milliGRAM(s) Oral every 4 hours PRN  pantoprazole    Tablet 40 milliGRAM(s) Oral before breakfast  potassium chloride    Tablet ER 40 milliEquivalent(s) Oral every 4 hours    PE:    Vital Signs Last 24 Hrs  T(C): 36.7 (25 Sep 2019 05:57), Max: 36.7 (25 Sep 2019 05:57)  T(F): 98 (25 Sep 2019 05:57), Max: 98 (25 Sep 2019 05:57)  HR: 62 (25 Sep 2019 05:57) (54 - 62)  BP: 162/67 (25 Sep 2019 05:57) (115/47 - 188/75)  BP(mean): --  RR: 18 (25 Sep 2019 05:57) (18 - 18)  SpO2: 98% (25 Sep 2019 05:57) (98% - 100%)    Gen: AOx3, NAD, non-toxic  CV: S1+S2 normal, no murmurs  Resp: Clear bilat, no resp distress  Abd: Soft, nontender, +BS  Ext: No LE edema, no wounds  : +[place]icia clear urine  IV/Skin: No thrombophlebitis  Neuro: no focal deficits    LABS:                          7.8    7.0   )-----------( 271      ( 25 Sep 2019 07:17 )             23.4       09-25    131<L>  |  95<L>  |  18  ----------------------------<  100<H>  3.3<L>   |  23  |  1.05    Ca    8.5      25 Sep 2019 07:17    TPro  6.2  /  Alb  3.1<L>  /  TBili  0.2  /  DBili  x   /  AST  29  /  ALT  33  /  AlkPhos  86  09-25    MICROBIOLOGY:  v  .Urine  09-22-19   >100,000 CFU/ml Klebsiella pneumoniae  --  Klebsiella pneumoniae      .Blood  09-08-19   No growth at 5 days.  --  --    RADIOLOGY:    < from: MR Lumbar Spine No Cont (09.21.19 @ 12:41) >  IMPRESSION:    CERVICAL SPINE MR: Degenerative change with questionable increased   hyperintense T2 signal in the central cervical cord sagittal series 3   image 6, axial images are degraded by motion.    THORACIC SPINE MR: No evidence for severe canal or foraminal compromise,   questionable increased new faint hyperintense T2 signal within the mid   thoracic cord, sagittal images T2 and STIR image 9, axialimages degraded   by technique and motion.    LUMBAR SPINE MR: Redemonstration of degenerative change as noted   previously with left frontal narrowing at L4-5 and L5-S1. Enlarged   bladder correlate clinically, with findings concerning for neurogenic   bladder.    < end of copied text >

## 2019-09-25 NOTE — PROGRESS NOTE ADULT - ATTENDING COMMENTS
Mahesh Parker MD  Pager [phone]  After 5pm/weekends call [phone]
Mahesh Parker MD  Pager [phone]  After 5pm/weekends call [phone]
Mahesh Parker MD  Pager [phone]  After 5pm/weekends call [phone]
Mahesh Parker MD  Pager [phone]  After 5pm/weekends call [phone]
Mahesh Parker MD  Pager [phone]  After 5pm/weekends call [phone]
Patient seen and examined.  Agree with above evaluation and management plan.
I have personally seen, examined, and participated in the care of this patient on rounds 9/19/19 with the neurology team.  I have reviewed all pertinent clinical information, including history, physical examination, assessment and plan.   Hx as noted above.  Exam findings as above with in addition or instead the following:    Mild weakness of both UEs w/o evident asymmetry; she is unable to hold LLE up off the mattress (rapid fatiguing of hip extension when recumbent);  wavers when attempting to hold L knee extended while seated.      As noted above, findings indicate a cervical myelopathy; I agree w the above recommendations.
Mahesh Parker MD  Pager [phone]  After 5pm/weekends call [phone]
Mahesh Parker MD  Pager [phone]  After 5pm/weekends call [phone]    Will sign off. Please call with questions.
Oral Ibrahim  Attending Physician   Division of Infectious Disease  Pager #[phone]  After 5pm/weekend or no response, call #[phone]

## 2019-09-25 NOTE — PROGRESS NOTE ADULT - SUBJECTIVE AND OBJECTIVE BOX
[facility] [phone]        Patient seen and examined in bed.  She was in better spirits         MEDICATIONS  (STANDING):  amiodarone    Tablet 200 milliGRAM(s) Oral daily  amiodarone    Tablet   Oral   amLODIPine   Tablet 5 milliGRAM(s) Oral daily  ascorbic acid 500 milliGRAM(s) Oral two times a day  aspirin  chewable 81 milliGRAM(s) Oral daily  BACItracin   Ointment 1 Application(s) Topical two times a day  cefTRIAXone   IVPB 1000 milliGRAM(s) IV Intermittent every 24 hours  ferrous    sulfate 325 milliGRAM(s) Oral two times a day  folic acid 1 milliGRAM(s) Oral daily  heparin  Injectable 5000 Unit(s) SubCutaneous every 8 hours  lidocaine   Patch 1 Patch Transdermal daily  metoprolol tartrate 25 milliGRAM(s) Oral every 12 hours  pantoprazole    Tablet 40 milliGRAM(s) Oral before breakfast      VITAL:  T(C): , Max: 36.7 (09-25-19 @ 05:57)  T(F): , Max: 98 (09-25-19 @ 05:57)  HR: 62 (09-25-19 @ 05:57)  BP: 162/67 (09-25-19 @ 05:57)  BP(mean): --  RR: 18 (09-25-19 @ 05:57)  SpO2: 98% (09-25-19 @ 05:57)  Wt(kg): --    I and O's:    09-24 @ 07:01  -  09-25 @ 07:00  --------------------------------------------------------  IN: 530 mL / OUT: 4100 mL / NET: -3570 mL          PHYSICAL EXAM:    Constitutional: NAD  Neck:  No JVD  Respiratory: CTAB/L  Cardiovascular: S1 and S2  Gastrointestinal: BS+, soft, NT/ND  Extremities: No peripheral edema  Neurological: A/O x 3, right leg weaker then left leg   Psychiatric: Normal mood, normal affect  : +  [place]  Skin: No rashes  Access: Not applicable    LABS:                        7.8    7.0   )-----------( 271      ( 25 Sep 2019 07:17 )             23.4     09-25    131<L>  |  95<L>  |  18  ----------------------------<  100<H>  3.3<L>   |  23  |  1.05    Ca    8.5      25 Sep 2019 07:17    TPro  6.2  /  Alb  3.1<L>  /  TBili  0.2  /  DBili  x   /  AST  29  /  ALT  33  /  AlkPhos  86  09-25          Urine Studies:          RADIOLOGY & ADDITIONAL STUDIES:

## 2019-09-25 NOTE — PROGRESS NOTE ADULT - SUBJECTIVE AND OBJECTIVE BOX
Subjective: Pt states "I feel OK."   Denies any CP, SOB, N/V and palpitations.     VITAL SIGNS    Telemetry:    Vital Signs Last 24 Hrs  T(C): 36.4 (09-25-19 @ 13:31), Max: 36.7 (09-25-19 @ 05:57)  T(F): 97.5 (09-25-19 @ 13:31), Max: 98 (09-25-19 @ 05:57)  HR: 56 (09-25-19 @ 13:31) (56 - 72)  BP: 138/71 (09-25-19 @ 13:31) (115/47 - 188/75)  RR: 18 (09-25-19 @ 13:31) (17 - 18)  SpO2: 99% (09-25-19 @ 13:31) (98% - 99%)            09-24 @ 07:01  -  09-25 @ 07:00  --------------------------------------------------------  IN: 530 mL / OUT: 4100 mL / NET: -3570 mL    09-25 @ 07:01  -  09-25 @ 15:57  --------------------------------------------------------  IN: 780 mL / OUT: 200 mL / NET: 580 mL    Bilirubin Total, Serum: 0.2 mg/dL (09-25 @ 07:17)    MEDICATIONS  acetaminophen   Tablet .. 650 milliGRAM(s) Oral every 6 hours PRN  ALPRAZolam 0.25 milliGRAM(s) Oral every 8 hours PRN  amiodarone    Tablet 200 milliGRAM(s) Oral daily  amiodarone    Tablet   Oral   amLODIPine   Tablet 5 milliGRAM(s) Oral daily  ascorbic acid 500 milliGRAM(s) Oral two times a day  aspirin  chewable 81 milliGRAM(s) Oral daily  BACItracin   Ointment 1 Application(s) Topical two times a day  cefTRIAXone   IVPB 1000 milliGRAM(s) IV Intermittent every 24 hours  ferrous    sulfate 325 milliGRAM(s) Oral two times a day  folic acid 1 milliGRAM(s) Oral daily  heparin  Injectable 5000 Unit(s) SubCutaneous every 8 hours  lidocaine   Patch 1 Patch Transdermal daily  metoprolol tartrate 25 milliGRAM(s) Oral every 12 hours  mineral oil 30 milliLiter(s) Oral two times a day PRN  ondansetron Injectable 4 milliGRAM(s) IV Push three times a day PRN  oxyCODONE    IR 5 milliGRAM(s) Oral every 4 hours PRN  pantoprazole    Tablet 40 milliGRAM(s) Oral before breakfast        PHYSICAL EXAM    Neurology: A&Ox3, nonfocal, RLE weakness  CV : RRR +S1S2  Sternal Wound :  MSI CDI , Stable  Lungs: Respirations non-labored, B/L BS  Abdomen: Soft, NT/ND, +BSx4Q, last BM on 9/23/19 (-)N/V/D  : Voiding without difficulty, bladder non-distended [place]         [  ]  Extremities: B/L trace LE edema R>L, negative calf tenderness, +PP , Left groi- CDI       LABS  09-25    131<L>  |  95<L>  |  18  ----------------------------<  100<H>  3.3<L>   |  23  |  1.05    Ca    8.5      25 Sep 2019 07:17    TPro  6.2  /  Alb  3.1<L>  /  TBili  0.2  /  DBili  x   /  AST  29  /  ALT  33  /  AlkPhos  86  09-25                                 7.8    7.0   )-----------( 271      ( 25 Sep 2019 07:17 )             23.4            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: s/p repair  Asceding aortic aneurysm repair 4/8/2019  S/P cataract surgery  Arm fracture, left: 2005       Physical Therapy Rec:   Home  [  ]   Home w/ PT  [  ]  Rehab  [ X ]

## 2019-09-25 NOTE — PROGRESS NOTE ADULT - ASSESSMENT
69 yr old female with H/O Thoraco abdominal Aortic aneurysm, Aortic Stenosis/ regurgitation S/P AVR (T), Ascending & hemiarch replacement 4/9/19 now sp thoracoabdominal aortic aneurysm.     - Resolved DURGA  - Hyponatremia: Hypokalemia   - b/l lower ext. weakness - Right>left   - HTN -    RECOMMENDATION:  -  Encourage liquids with calories; Cont Ensure Enlive   - Serum sodium is not clinically significant at this level   - c/w Norvasc 5mg po qd.  If the BP remains elevated then change the lopressor to coreg 3.125mg po bid   - Cont PT is ongoing;  [facility]  -Requires the wanda at present. On IV abx for UTI(ceftiaxone)

## 2019-09-25 NOTE — PROGRESS NOTE ADULT - ASSESSMENT
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 She is now   s/p Reop Thoracoabdominal aneurysm open repair with Dacron graft and celiac SMA bypass, reimplantation of lumbar artery with Dr. Carranza on 8/29. Intraop 2 PRBC, 2 Platelets,Her postoperative course was complicated by new onset BLE weakness R>L for which she was placed on MAPs of 110 and CSF drainage of 20cc. She was extubated POD2 ,and required BIPAP on POD3. She went into afib with RVR, placed on esmolol-weaned off- on POD4 but converted back to NSR without treatment.  9/1 RLE improving, NGT removed, BIPAP, nicardipine restarted briefly for HTN, Cr 2.43  9/2 Lumbar drain removed, Afib RVR converted without intervention, Vaso weaned off today  9/3 S&S eval recommend Dysphagia 1 nectar thick diet, 3L NC, PT recommending acute rehab, Cr improving 1.93 from 2.04  9/4 Afib with RVR, transferred to CTU, metoprolol 2.5 IV x2, amiodarone drip started, vasopressin for hypotension, esmolol drip for rate control, Digoxin IV x1, converted to NSR, Cr 1.76 DURGA improving, targeting MAPs of 70-80, L pleural chest tubes x3 removed, S&S recommend dysphagia 1 thin liquids, bradycardic at night and amio decreased to 0.5 from 1  9/5 Transitioned to Labetalol from esmolol, PO amio taper, A-line/introducer/and muñoz discontinued, transferred to SDU  She is now transferred from the CTU to SDU. She no longer has any critical care needs at this time and is stable enough for transfer to SDU.   9/6 Increase PT/ ambulation. Dys 1 diet, speech/swallow f/u, repeat mbs today. Maintaining nsr  9/7 Tolerating Dys II diet, check urinalysis, for leukocytosis. CXR negative for infiltrate. Creat 1,9   9/8 Zosyn initiated for suspected UTI/LLL pna. ID consulted for assistance in mangement. creatinine improving . -1415 fluid balance, ct chest, BC x 2  9/9 WBC remains elevated, 21, afebrile, pending CT results r/o PNA. Continue zosyn.    D/w PT,  pt with decrease in  balance/ fatigued today, RLE weakness unchanged as per pt.   relative hypotension, irineo, labetolol d/c, maintain mao>85.  9/10 CT with compressive atelectasis,  placed on nocturnal bipap as d/w  Dr Carranza  Wbc trending down  Cont zosyn UTI/R/o pna  9/11 Zosyn for pna x 7 days  PT  Neuro consulted for f/u  9/12  CT scan  abdomen complete   ,  worked with PT,   plus one strength  r leg,  OOB to chair,  Lt thoracotomy incision  9/13 Pending mri at neuro  rec.HCT drifting down, repeat performed.Labetolol d/c lopressor started for HR control but to liberalize bp Pending acute rehab  9/14 MRI negative for mass, shift, bleed, flow limiting lesions  9/15   zosyn dc,   OOB to chair   NSR   rt leg splint w/ cont weakness  9/16 Pt voiding, female urine collection device, incontenent   Hypokalemia, supplementation increased.  Zosyn complete  Rehab planning  9/17 PT today  9/18  HD stable.  Continues to slowly regain strength in RLE.  [facility] awaiting auth..  Per neuro will need outpt EMG and conduction studies.  No further neuro work-up needed at this time.  Every other staple removed  9/19 HD stable.  Slowly improving strength.  Dr carranza wanted neuro to re-eval pt.  MRI C-spine/lumbar spine and thoracic spine to ro spinal infarct or stenosis.  Awaiting auth for OLINDA  9/20 MRI sched for 9/21> po ativan prior(claustrophobia)   9/21 HD stable. Unable to tolerate entire MRI--only partially completed.  Awaiting OLINDA   9/22: MRi results noted and neurology recalled to evaluate results and to see if what was imaged was sufficient. Urine with Gross smell u/a and urine cx sent in addition to greenish tan copious vaginal secretions sent for cx and r/o gonorrhea. Pt complains of foul smells from groin area.     9/23 Urine cx- Gram neg rods (prelim). Continue abx per ID x 7 days. Vaginal discharge-not noticed within last 24 hours. Pending cx. Urology consult placed for neurogenic bladder. F/U recs  9/24: Zosyn d/cd and ceftriaxone added per ID for UTI-Klebsiella for total of 5 days which includes zosyn time. Can switch to vantin po qd on dischage if needed. Vaginal cx neg for chlamydia and gonorrhea  9/25: Remains on Ceftriaxone for UTI. Muñoz in place. Last BM on 9/23-received Miralax on 9/24, laxative PRN added. Potassium supplemented this morning for K of 3.3 to maintain goal of greater than 4. Out of chair and walked a few steps with assistance from PT this AM.

## 2019-09-26 LAB
BUN SERPL-MCNC: 15 MG/DL — SIGNIFICANT CHANGE UP (ref 7–23)
CALCIUM SERPL-MCNC: 8.7 MG/DL — SIGNIFICANT CHANGE UP (ref 8.4–10.5)
CHLORIDE SERPL-SCNC: 95 MMOL/L — LOW (ref 96–108)
CO2 SERPL-SCNC: 21 MMOL/L — LOW (ref 22–31)
CREAT SERPL-MCNC: 0.92 MG/DL — SIGNIFICANT CHANGE UP (ref 0.5–1.3)
GLUCOSE SERPL-MCNC: 92 MG/DL — SIGNIFICANT CHANGE UP (ref 70–99)
GRAM STN GENITAL: SIGNIFICANT CHANGE UP
HCT VFR BLD CALC: 24.6 % — LOW (ref 34.5–45)
HGB BLD-MCNC: 8.2 G/DL — LOW (ref 11.5–15.5)
MAGNESIUM SERPL-MCNC: 1.6 MG/DL — SIGNIFICANT CHANGE UP (ref 1.6–2.6)
MCHC RBC-ENTMCNC: 29.2 PG — SIGNIFICANT CHANGE UP (ref 27–34)
MCHC RBC-ENTMCNC: 33.5 GM/DL — SIGNIFICANT CHANGE UP (ref 32–36)
MCV RBC AUTO: 86.9 FL — SIGNIFICANT CHANGE UP (ref 80–100)
PHOSPHATE SERPL-MCNC: 2.9 MG/DL — SIGNIFICANT CHANGE UP (ref 2.5–4.5)
PLATELET # BLD AUTO: 255 K/UL — SIGNIFICANT CHANGE UP (ref 150–400)
POTASSIUM SERPL-MCNC: 3.8 MMOL/L — SIGNIFICANT CHANGE UP (ref 3.5–5.3)
POTASSIUM SERPL-SCNC: 3.8 MMOL/L — SIGNIFICANT CHANGE UP (ref 3.5–5.3)
RBC # BLD: 2.83 M/UL — LOW (ref 3.8–5.2)
RBC # FLD: 14.6 % — HIGH (ref 10.3–14.5)
SODIUM SERPL-SCNC: 129 MMOL/L — LOW (ref 135–145)
WBC # BLD: 6.6 K/UL — SIGNIFICANT CHANGE UP (ref 3.8–10.5)
WBC # FLD AUTO: 6.6 K/UL — SIGNIFICANT CHANGE UP (ref 3.8–10.5)

## 2019-09-26 RX ORDER — OXYCODONE HYDROCHLORIDE 5 MG/1
5 TABLET ORAL EVERY 4 HOURS
Refills: 0 | Status: DISCONTINUED | OUTPATIENT
Start: 2019-09-26 | End: 2019-09-28

## 2019-09-26 RX ORDER — POTASSIUM CHLORIDE 20 MEQ
40 PACKET (EA) ORAL ONCE
Refills: 0 | Status: COMPLETED | OUTPATIENT
Start: 2019-09-26 | End: 2019-09-26

## 2019-09-26 RX ORDER — ALPRAZOLAM 0.25 MG
0.25 TABLET ORAL EVERY 8 HOURS
Refills: 0 | Status: DISCONTINUED | OUTPATIENT
Start: 2019-09-26 | End: 2019-09-28

## 2019-09-26 RX ORDER — POLYETHYLENE GLYCOL 3350 17 G/17G
17 POWDER, FOR SOLUTION ORAL ONCE
Refills: 0 | Status: COMPLETED | OUTPATIENT
Start: 2019-09-26 | End: 2019-09-26

## 2019-09-26 RX ADMIN — Medication 500 MILLIGRAM(S): at 05:49

## 2019-09-26 RX ADMIN — OXYCODONE HYDROCHLORIDE 5 MILLIGRAM(S): 5 TABLET ORAL at 05:52

## 2019-09-26 RX ADMIN — LIDOCAINE 1 PATCH: 4 CREAM TOPICAL at 12:03

## 2019-09-26 RX ADMIN — LIDOCAINE 1 PATCH: 4 CREAM TOPICAL at 20:28

## 2019-09-26 RX ADMIN — Medication 0.25 MILLIGRAM(S): at 22:24

## 2019-09-26 RX ADMIN — PANTOPRAZOLE SODIUM 40 MILLIGRAM(S): 20 TABLET, DELAYED RELEASE ORAL at 05:50

## 2019-09-26 RX ADMIN — LIDOCAINE 1 PATCH: 4 CREAM TOPICAL at 23:00

## 2019-09-26 RX ADMIN — OXYCODONE HYDROCHLORIDE 5 MILLIGRAM(S): 5 TABLET ORAL at 12:17

## 2019-09-26 RX ADMIN — Medication 81 MILLIGRAM(S): at 09:38

## 2019-09-26 RX ADMIN — Medication 25 MILLIGRAM(S): at 17:12

## 2019-09-26 RX ADMIN — OXYCODONE HYDROCHLORIDE 5 MILLIGRAM(S): 5 TABLET ORAL at 16:51

## 2019-09-26 RX ADMIN — OXYCODONE HYDROCHLORIDE 5 MILLIGRAM(S): 5 TABLET ORAL at 21:30

## 2019-09-26 RX ADMIN — OXYCODONE HYDROCHLORIDE 5 MILLIGRAM(S): 5 TABLET ORAL at 01:38

## 2019-09-26 RX ADMIN — Medication 325 MILLIGRAM(S): at 05:49

## 2019-09-26 RX ADMIN — AMIODARONE HYDROCHLORIDE 200 MILLIGRAM(S): 400 TABLET ORAL at 05:49

## 2019-09-26 RX ADMIN — Medication 500 MILLIGRAM(S): at 17:11

## 2019-09-26 RX ADMIN — OXYCODONE HYDROCHLORIDE 5 MILLIGRAM(S): 5 TABLET ORAL at 21:15

## 2019-09-26 RX ADMIN — OXYCODONE HYDROCHLORIDE 5 MILLIGRAM(S): 5 TABLET ORAL at 02:08

## 2019-09-26 RX ADMIN — POLYETHYLENE GLYCOL 3350 17 GRAM(S): 17 POWDER, FOR SOLUTION ORAL at 05:50

## 2019-09-26 RX ADMIN — HEPARIN SODIUM 5000 UNIT(S): 5000 INJECTION INTRAVENOUS; SUBCUTANEOUS at 13:20

## 2019-09-26 RX ADMIN — OXYCODONE HYDROCHLORIDE 5 MILLIGRAM(S): 5 TABLET ORAL at 16:21

## 2019-09-26 RX ADMIN — Medication 1 MILLIGRAM(S): at 09:38

## 2019-09-26 RX ADMIN — Medication 25 MILLIGRAM(S): at 05:49

## 2019-09-26 RX ADMIN — CEFTRIAXONE 100 MILLIGRAM(S): 500 INJECTION, POWDER, FOR SOLUTION INTRAMUSCULAR; INTRAVENOUS at 13:20

## 2019-09-26 RX ADMIN — AMLODIPINE BESYLATE 5 MILLIGRAM(S): 2.5 TABLET ORAL at 05:49

## 2019-09-26 RX ADMIN — Medication 325 MILLIGRAM(S): at 17:11

## 2019-09-26 RX ADMIN — OXYCODONE HYDROCHLORIDE 5 MILLIGRAM(S): 5 TABLET ORAL at 12:47

## 2019-09-26 RX ADMIN — OXYCODONE HYDROCHLORIDE 5 MILLIGRAM(S): 5 TABLET ORAL at 06:22

## 2019-09-26 RX ADMIN — HEPARIN SODIUM 5000 UNIT(S): 5000 INJECTION INTRAVENOUS; SUBCUTANEOUS at 21:14

## 2019-09-26 RX ADMIN — Medication 40 MILLIEQUIVALENT(S): at 09:39

## 2019-09-26 RX ADMIN — HEPARIN SODIUM 5000 UNIT(S): 5000 INJECTION INTRAVENOUS; SUBCUTANEOUS at 05:50

## 2019-09-26 NOTE — PROGRESS NOTE ADULT - ASSESSMENT
69 yr old female with H/O Thoraco abdominal Aortic aneurysm, Aortic Stenosis/ regurgitation S/P AVR (T), Ascending & hemiarch replacement 4/9/19 now sp thoracoabdominal aortic aneurysm.     - Resolved DURGA  - Hyponatremia:   - b/l lower ext. weakness - Right>left   - HTN -    RECOMMENDATION:  -  Encourage liquids with calories; Cont Ensure Enlive   - Serum sodium is not clinically significant at this level   - c/w Norvasc 5mg po qd.  If the BP remains elevated then change the lopressor to coreg 3.125mg po bid   - Cont PT is ongoing;  [facility]  -Requires the muñoz at present. Last day of IV abx for UTI(ceftiaxone)

## 2019-09-26 NOTE — PROGRESS NOTE ADULT - SUBJECTIVE AND OBJECTIVE BOX
S:  feels better.  Able to move foot     Telemetry:  JR 50-60    Vital Signs Last 24 Hrs  T(C): 36.3 (09-26-19 @ 14:06), Max: 36.6 (09-25-19 @ 20:11)  T(F): 97.3 (09-26-19 @ 14:06), Max: 97.9 (09-25-19 @ 20:11)  HR: 55 (09-26-19 @ 14:06) (53 - 81)  BP: 165/68 (09-26-19 @ 14:06) (140/68 - 171/-)  RR: 18 (09-26-19 @ 14:06) (18 - 18)  SpO2: 99% (09-26-19 @ 14:06) (99% - 99%)                   09-25 @ 07:01  -  09-26 @ 07:00  --------------------------------------------------------  IN: 1560 mL / OUT: 3350 mL / NET: -1790 mL    09-26 @ 07:01  -  09-26 @ 14:26  --------------------------------------------------------  IN: 530 mL / OUT: 1000 mL / NET: -470 mL                Drains:     MS         [  ] Drainage:                 L Pleural  [  ]  Drainage:                R Pleural  [  ]  Drainage:    Pacing Wires        [  ]   Settings:                                  Isolated  [  ]    Coumadin    [ ] YES          [ x ]      NO         Reason:                                 8.2    6.6   )-----------( 255      ( 26 Sep 2019 06:45 )             24.6       09-26    129<L>  |  95<L>  |  15  ----------------------------<  92  3.8   |  21<L>  |  0.92    Ca    8.7      26 Sep 2019 06:45  Phos  2.9     09-26  Mg     1.6     09-26    TPro  6.2  /  Alb  3.1<L>  /  TBili  0.2  /  DBili  x   /  AST  29  /  ALT  33  /  AlkPhos  86  09-25          PHYSICAL EXAM:    Neurology: alert and oriented x 3, nonfocal, no gross deficits    CV :  S1S2 heard bradycardic    thoracotomy inc CDI    Lungs:  clear to ausc.  no w/r/r    Abdomen: soft, nontender, nondistended, positive bowel sounds, last bowel movement     :  muñoz catheter in place           Extremities:     RLE 1/5 strength   moves toes and can rotate ankle.  LLE 5/5          acetaminophen   Tablet .. 650 milliGRAM(s) Oral every 6 hours PRN  ALPRAZolam 0.25 milliGRAM(s) Oral every 8 hours PRN  amiodarone    Tablet 200 milliGRAM(s) Oral daily  amiodarone    Tablet   Oral   amLODIPine   Tablet 5 milliGRAM(s) Oral daily  ascorbic acid 500 milliGRAM(s) Oral two times a day  aspirin  chewable 81 milliGRAM(s) Oral daily  BACItracin   Ointment 1 Application(s) Topical two times a day  ferrous    sulfate 325 milliGRAM(s) Oral two times a day  folic acid 1 milliGRAM(s) Oral daily  heparin  Injectable 5000 Unit(s) SubCutaneous every 8 hours  lidocaine   Patch 1 Patch Transdermal daily  metoprolol tartrate 25 milliGRAM(s) Oral every 12 hours  mineral oil 30 milliLiter(s) Oral two times a day PRN  ondansetron Injectable 4 milliGRAM(s) IV Push three times a day PRN  oxyCODONE    IR 5 milliGRAM(s) Oral every 4 hours PRN  pantoprazole    Tablet 40 milliGRAM(s) Oral before breakfast                              Physical Therapy Rec:   Home  [  ]   Home w/ PT  [  ]  Rehab  [ x ]    Discussed with Cardiothoracic Team at AM rounds.

## 2019-09-26 NOTE — PROGRESS NOTE ADULT - PROBLEM SELECTOR PLAN 1
PT/OT,     [facility] 9/27  neuro also--EMG conduction studies as outpt  [address] [facility]  [phone]

## 2019-09-26 NOTE — PROGRESS NOTE ADULT - SUBJECTIVE AND OBJECTIVE BOX
NEPHROLOGY-[facility] [phone]        Patient seen and examined in bed.  She was in good spirits and offered no complaints         MEDICATIONS  (STANDING):  amiodarone    Tablet 200 milliGRAM(s) Oral daily  amiodarone    Tablet   Oral   amLODIPine   Tablet 5 milliGRAM(s) Oral daily  ascorbic acid 500 milliGRAM(s) Oral two times a day  aspirin  chewable 81 milliGRAM(s) Oral daily  BACItracin   Ointment 1 Application(s) Topical two times a day  cefTRIAXone   IVPB 1000 milliGRAM(s) IV Intermittent every 24 hours  ferrous    sulfate 325 milliGRAM(s) Oral two times a day  folic acid 1 milliGRAM(s) Oral daily  heparin  Injectable 5000 Unit(s) SubCutaneous every 8 hours  lidocaine   Patch 1 Patch Transdermal daily  metoprolol tartrate 25 milliGRAM(s) Oral every 12 hours  pantoprazole    Tablet 40 milliGRAM(s) Oral before breakfast      VITAL:  T(C): , Max: 36.6 (09-25-19 @ 20:11)  T(F): , Max: 97.9 (09-25-19 @ 20:11)  HR: 62 (09-26-19 @ 05:42)  BP: 140/68 (09-26-19 @ 05:42)  BP(mean): --  RR: 18 (09-26-19 @ 05:42)  SpO2: 99% (09-26-19 @ 05:42)  Wt(kg): --    I and O's:    09-25 @ 07:01  -  09-26 @ 07:00  --------------------------------------------------------  IN: 1560 mL / OUT: 3350 mL / NET: -1790 mL          PHYSICAL EXAM:    Constitutional: NAD  Neck:  No JVD  Respiratory: CTAB/L  Cardiovascular: S1 and S2  Gastrointestinal: BS+, soft, NT/ND  Extremities: No peripheral edema  Neurological: A/O x 3,right leg weakness> left leg  Psychiatric: Normal mood, normal affect  : +  [place]  Skin: No rashes  Access: Not applicable    LABS:                        8.2    6.6   )-----------( 255      ( 26 Sep 2019 06:45 )             24.6     09-26    129<L>  |  95<L>  |  15  ----------------------------<  92  3.8   |  21<L>  |  0.92    Ca    8.7      26 Sep 2019 06:45  Phos  2.9     09-26  Mg     1.6     09-26    TPro  6.2  /  Alb  3.1<L>  /  TBili  0.2  /  DBili  x   /  AST  29  /  ALT  33  /  AlkPhos  86  09-25          Urine Studies:          RADIOLOGY & ADDITIONAL STUDIES:

## 2019-09-26 NOTE — PROGRESS NOTE ADULT - ASSESSMENT
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 She is now   s/p Reop Thoracoabdominal aneurysm open repair with Dacron graft and celiac SMA bypass, reimplantation of lumbar artery with Dr. Carranza on 8/29. Intraop 2 PRBC, 2 Platelets,Her postoperative course was complicated by new onset BLE weakness R>L for which she was placed on MAPs of 110 and CSF drainage of 20cc. She was extubated POD2 ,and required BIPAP on POD3. She went into afib with RVR, placed on esmolol-weaned off- on POD4 but converted back to NSR without treatment.  9/1 RLE improving, NGT removed, BIPAP, nicardipine restarted briefly for HTN, Cr 2.43  9/2 Lumbar drain removed, Afib RVR converted without intervention, Vaso weaned off today  9/3 S&S eval recommend Dysphagia 1 nectar thick diet, 3L NC, PT recommending acute rehab, Cr improving 1.93 from 2.04  9/4 Afib with RVR, transferred to CTU, metoprolol 2.5 IV x2, amiodarone drip started, vasopressin for hypotension, esmolol drip for rate control, Digoxin IV x1, converted to NSR, Cr 1.76 DURGA improving, targeting MAPs of 70-80, L pleural chest tubes x3 removed, S&S recommend dysphagia 1 thin liquids, bradycardic at night and amio decreased to 0.5 from 1  9/5 Transitioned to Labetalol from esmolol, PO amio taper, A-line/introducer/and muñoz discontinued, transferred to SDU  She is now transferred from the CTU to SDU. She no longer has any critical care needs at this time and is stable enough for transfer to SDU.   9/6 Increase PT/ ambulation. Dys 1 diet, speech/swallow f/u, repeat mbs today. Maintaining nsr  9/7 Tolerating Dys II diet, check urinalysis, for leukocytosis. CXR negative for infiltrate. Creat 1,9   9/8 Zosyn initiated for suspected UTI/LLL pna. ID consulted for assistance in mangement. creatinine improving . -1415 fluid balance, ct chest, BC x 2  9/9 WBC remains elevated, 21, afebrile, pending CT results r/o PNA. Continue zosyn.    D/w PT,  pt with decrease in  balance/ fatigued today, RLE weakness unchanged as per pt.   relative hypotension, irineo, labetolol d/c, maintain mao>85.  9/10 CT with compressive atelectasis,  placed on nocturnal bipap as d/w  Dr Carranza  Wbc trending down  Cont zosyn UTI/R/o pna  9/11 Zosyn for pna x 7 days  PT  Neuro consulted for f/u  9/12  CT scan  abdomen complete   ,  worked with PT,   plus one strength  r leg,  OOB to chair,  Lt thoracotomy incision  9/13 Pending mri at neuro  rec.HCT drifting down, repeat performed.Labetolol d/c lopressor started for HR control but to liberalize bp Pending acute rehab  9/14 MRI negative for mass, shift, bleed, flow limiting lesions  9/15   zosyn dc,   OOB to chair   NSR   rt leg splint w/ cont weakness  9/16 Pt voiding, female urine collection device, incontenent   Hypokalemia, supplementation increased.  Zosyn complete  Rehab planning  9/17 PT today  9/18  HD stable.  Continues to slowly regain strength in RLE.  [facility] awaiting auth..  Per neuro will need outpt EMG and conduction studies.  No further neuro work-up needed at this time.  Every other staple removed  9/19 HD stable.  Slowly improving strength.  Dr carranza wanted neuro to re-eval pt.  MRI C-spine/lumbar spine and thoracic spine to ro spinal infarct or stenosis.  Awaiting auth for OLINDA  9/20 MRI sched for 9/21> po ativan prior(claustrophobia)   9/21 HD stable. Unable to tolerate entire MRI--only partially completed.  Awaiting OLINDA   9/22: MRi results noted and neurology recalled to evaluate results and to see if what was imaged was sufficient. Urine with Gross smell u/a and urine cx sent in addition to greenish tan copious vaginal secretions sent for cx and r/o gonorrhea. Pt complains of foul smells from groin area.     9/23 Urine cx- Gram neg rods (prelim). Continue abx per ID x 7 days. Vaginal discharge-not noticed within last 24 hours. Pending cx. Urology consult placed for neurogenic bladder. F/U recs  9/24: Zosyn d/cd and ceftriaxone added per ID for UTI-Klebsiella for total of 5 days which includes zosyn time. Can switch to vantin po qd on dischage if needed. Vaginal cx neg for chlamydia and gonorrhea  9/25: Remains on Ceftriaxone for UTI. Muñoz in place. Last BM on 9/23-received Miralax on 9/24, laxative PRN added. Potassium supplemented this morning for K of 3.3 to maintain goal of greater than 4. Out of chair and walked a few steps with assistance from PT this AM.  9/26 HD stable.  Last dose ceftriaxone today.  Rehab 9/27

## 2019-09-27 PROBLEM — I71.6 THORACOABDOMINAL AORTIC ANEURYSM, WITHOUT RUPTURE: Chronic | Status: ACTIVE | Noted: 2019-08-14

## 2019-09-27 PROBLEM — R10.9 UNSPECIFIED ABDOMINAL PAIN: Chronic | Status: ACTIVE | Noted: 2019-08-14

## 2019-09-27 LAB
ALBUMIN SERPL ELPH-MCNC: 3.2 G/DL — LOW (ref 3.3–5)
ALP SERPL-CCNC: 80 U/L — SIGNIFICANT CHANGE UP (ref 40–120)
ALT FLD-CCNC: 34 U/L — SIGNIFICANT CHANGE UP (ref 10–45)
ANION GAP SERPL CALC-SCNC: 12 MMOL/L — SIGNIFICANT CHANGE UP (ref 5–17)
AST SERPL-CCNC: 31 U/L — SIGNIFICANT CHANGE UP (ref 10–40)
BILIRUB SERPL-MCNC: 0.3 MG/DL — SIGNIFICANT CHANGE UP (ref 0.2–1.2)
BUN SERPL-MCNC: 13 MG/DL — SIGNIFICANT CHANGE UP (ref 7–23)
CALCIUM SERPL-MCNC: 9.1 MG/DL — SIGNIFICANT CHANGE UP (ref 8.4–10.5)
CHLORIDE SERPL-SCNC: 97 MMOL/L — SIGNIFICANT CHANGE UP (ref 96–108)
CO2 SERPL-SCNC: 21 MMOL/L — LOW (ref 22–31)
CREAT SERPL-MCNC: 0.91 MG/DL — SIGNIFICANT CHANGE UP (ref 0.5–1.3)
GLUCOSE SERPL-MCNC: 92 MG/DL — SIGNIFICANT CHANGE UP (ref 70–99)
HCT VFR BLD CALC: 27.5 % — LOW (ref 34.5–45)
HGB BLD-MCNC: 8.7 G/DL — LOW (ref 11.5–15.5)
MAGNESIUM SERPL-MCNC: 1.6 MG/DL — SIGNIFICANT CHANGE UP (ref 1.6–2.6)
MCHC RBC-ENTMCNC: 27.7 PG — SIGNIFICANT CHANGE UP (ref 27–34)
MCHC RBC-ENTMCNC: 31.8 GM/DL — LOW (ref 32–36)
MCV RBC AUTO: 87.1 FL — SIGNIFICANT CHANGE UP (ref 80–100)
PHOSPHATE SERPL-MCNC: 3.1 MG/DL — SIGNIFICANT CHANGE UP (ref 2.5–4.5)
PLATELET # BLD AUTO: 266 K/UL — SIGNIFICANT CHANGE UP (ref 150–400)
POTASSIUM SERPL-MCNC: 3.8 MMOL/L — SIGNIFICANT CHANGE UP (ref 3.5–5.3)
POTASSIUM SERPL-SCNC: 3.8 MMOL/L — SIGNIFICANT CHANGE UP (ref 3.5–5.3)
PROT SERPL-MCNC: 6.5 G/DL — SIGNIFICANT CHANGE UP (ref 6–8.3)
RBC # BLD: 3.15 M/UL — LOW (ref 3.8–5.2)
RBC # FLD: 14.7 % — HIGH (ref 10.3–14.5)
SODIUM SERPL-SCNC: 130 MMOL/L — LOW (ref 135–145)
WBC # BLD: 7.4 K/UL — SIGNIFICANT CHANGE UP (ref 3.8–10.5)
WBC # FLD AUTO: 7.4 K/UL — SIGNIFICANT CHANGE UP (ref 3.8–10.5)

## 2019-09-27 RX ORDER — BACITRACIN ZINC 500 UNIT/G
1 OINTMENT IN PACKET (EA) TOPICAL
Refills: 0 | Status: DISCONTINUED | OUTPATIENT
Start: 2019-09-28 | End: 2019-10-01

## 2019-09-27 RX ORDER — ACETAMINOPHEN 500 MG
650 TABLET ORAL EVERY 6 HOURS
Refills: 0 | Status: DISCONTINUED | OUTPATIENT
Start: 2019-09-28 | End: 2019-10-30

## 2019-09-27 RX ORDER — PANTOPRAZOLE SODIUM 20 MG/1
40 TABLET, DELAYED RELEASE ORAL
Refills: 0 | Status: DISCONTINUED | OUTPATIENT
Start: 2019-09-28 | End: 2019-10-30

## 2019-09-27 RX ORDER — FOLIC ACID 0.8 MG
1 TABLET ORAL DAILY
Refills: 0 | Status: DISCONTINUED | OUTPATIENT
Start: 2019-09-28 | End: 2019-10-30

## 2019-09-27 RX ORDER — METOPROLOL TARTRATE 50 MG
25 TABLET ORAL EVERY 12 HOURS
Refills: 0 | Status: DISCONTINUED | OUTPATIENT
Start: 2019-09-28 | End: 2019-10-04

## 2019-09-27 RX ORDER — ASPIRIN/CALCIUM CARB/MAGNESIUM 324 MG
81 TABLET ORAL DAILY
Refills: 0 | Status: DISCONTINUED | OUTPATIENT
Start: 2019-09-28 | End: 2019-10-30

## 2019-09-27 RX ORDER — HEPARIN SODIUM 5000 [USP'U]/ML
5000 INJECTION INTRAVENOUS; SUBCUTANEOUS EVERY 8 HOURS
Refills: 0 | Status: DISCONTINUED | OUTPATIENT
Start: 2019-09-28 | End: 2019-09-30

## 2019-09-27 RX ORDER — MINERAL OIL
30 OIL (ML) MISCELLANEOUS
Refills: 0 | Status: DISCONTINUED | OUTPATIENT
Start: 2019-09-28 | End: 2019-10-30

## 2019-09-27 RX ORDER — ALPRAZOLAM 0.25 MG
0.25 TABLET ORAL EVERY 8 HOURS
Refills: 0 | Status: DISCONTINUED | OUTPATIENT
Start: 2019-09-28 | End: 2019-10-03

## 2019-09-27 RX ORDER — AMLODIPINE BESYLATE 2.5 MG/1
5 TABLET ORAL DAILY
Refills: 0 | Status: DISCONTINUED | OUTPATIENT
Start: 2019-09-28 | End: 2019-10-05

## 2019-09-27 RX ORDER — AMIODARONE HYDROCHLORIDE 400 MG/1
200 TABLET ORAL DAILY
Refills: 0 | Status: DISCONTINUED | OUTPATIENT
Start: 2019-09-27 | End: 2019-09-27

## 2019-09-27 RX ORDER — OXYCODONE HYDROCHLORIDE 5 MG/1
5 TABLET ORAL EVERY 4 HOURS
Refills: 0 | Status: DISCONTINUED | OUTPATIENT
Start: 2019-09-28 | End: 2019-10-03

## 2019-09-27 RX ORDER — ASCORBIC ACID 60 MG
500 TABLET,CHEWABLE ORAL
Refills: 0 | Status: DISCONTINUED | OUTPATIENT
Start: 2019-09-28 | End: 2019-10-30

## 2019-09-27 RX ORDER — AMIODARONE HYDROCHLORIDE 400 MG/1
200 TABLET ORAL DAILY
Refills: 0 | Status: DISCONTINUED | OUTPATIENT
Start: 2019-09-28 | End: 2019-10-30

## 2019-09-27 RX ORDER — FERROUS SULFATE 325(65) MG
325 TABLET ORAL
Refills: 0 | Status: DISCONTINUED | OUTPATIENT
Start: 2019-09-28 | End: 2019-10-30

## 2019-09-27 RX ORDER — LIDOCAINE 4 G/100G
1 CREAM TOPICAL DAILY
Refills: 0 | Status: DISCONTINUED | OUTPATIENT
Start: 2019-09-28 | End: 2019-10-01

## 2019-09-27 RX ADMIN — OXYCODONE HYDROCHLORIDE 5 MILLIGRAM(S): 5 TABLET ORAL at 09:06

## 2019-09-27 RX ADMIN — Medication 650 MILLIGRAM(S): at 11:23

## 2019-09-27 RX ADMIN — Medication 10 MILLIGRAM(S): at 16:23

## 2019-09-27 RX ADMIN — Medication 25 MILLIGRAM(S): at 06:28

## 2019-09-27 RX ADMIN — AMLODIPINE BESYLATE 5 MILLIGRAM(S): 2.5 TABLET ORAL at 06:28

## 2019-09-27 RX ADMIN — AMIODARONE HYDROCHLORIDE 200 MILLIGRAM(S): 400 TABLET ORAL at 06:28

## 2019-09-27 RX ADMIN — Medication 1 APPLICATION(S): at 06:27

## 2019-09-27 RX ADMIN — OXYCODONE HYDROCHLORIDE 5 MILLIGRAM(S): 5 TABLET ORAL at 08:36

## 2019-09-27 RX ADMIN — HEPARIN SODIUM 5000 UNIT(S): 5000 INJECTION INTRAVENOUS; SUBCUTANEOUS at 06:28

## 2019-09-27 RX ADMIN — OXYCODONE HYDROCHLORIDE 5 MILLIGRAM(S): 5 TABLET ORAL at 19:32

## 2019-09-27 RX ADMIN — Medication 325 MILLIGRAM(S): at 06:28

## 2019-09-27 RX ADMIN — HEPARIN SODIUM 5000 UNIT(S): 5000 INJECTION INTRAVENOUS; SUBCUTANEOUS at 21:19

## 2019-09-27 RX ADMIN — LIDOCAINE 1 PATCH: 4 CREAM TOPICAL at 11:22

## 2019-09-27 RX ADMIN — OXYCODONE HYDROCHLORIDE 5 MILLIGRAM(S): 5 TABLET ORAL at 03:59

## 2019-09-27 RX ADMIN — Medication 325 MILLIGRAM(S): at 17:19

## 2019-09-27 RX ADMIN — OXYCODONE HYDROCHLORIDE 5 MILLIGRAM(S): 5 TABLET ORAL at 19:02

## 2019-09-27 RX ADMIN — OXYCODONE HYDROCHLORIDE 5 MILLIGRAM(S): 5 TABLET ORAL at 12:57

## 2019-09-27 RX ADMIN — OXYCODONE HYDROCHLORIDE 5 MILLIGRAM(S): 5 TABLET ORAL at 13:27

## 2019-09-27 RX ADMIN — Medication 81 MILLIGRAM(S): at 11:23

## 2019-09-27 RX ADMIN — Medication 500 MILLIGRAM(S): at 17:19

## 2019-09-27 RX ADMIN — OXYCODONE HYDROCHLORIDE 5 MILLIGRAM(S): 5 TABLET ORAL at 03:26

## 2019-09-27 RX ADMIN — Medication 25 MILLIGRAM(S): at 17:19

## 2019-09-27 RX ADMIN — LIDOCAINE 1 PATCH: 4 CREAM TOPICAL at 23:13

## 2019-09-27 RX ADMIN — LIDOCAINE 1 PATCH: 4 CREAM TOPICAL at 18:34

## 2019-09-27 RX ADMIN — Medication 500 MILLIGRAM(S): at 06:28

## 2019-09-27 RX ADMIN — HEPARIN SODIUM 5000 UNIT(S): 5000 INJECTION INTRAVENOUS; SUBCUTANEOUS at 12:57

## 2019-09-27 RX ADMIN — Medication 0.25 MILLIGRAM(S): at 23:10

## 2019-09-27 RX ADMIN — Medication 650 MILLIGRAM(S): at 11:53

## 2019-09-27 RX ADMIN — PANTOPRAZOLE SODIUM 40 MILLIGRAM(S): 20 TABLET, DELAYED RELEASE ORAL at 06:28

## 2019-09-27 RX ADMIN — Medication 1 MILLIGRAM(S): at 11:23

## 2019-09-27 NOTE — PROGRESS NOTE ADULT - PROBLEM SELECTOR PROBLEM 2
Atrial fibrillation with RVR

## 2019-09-27 NOTE — PROGRESS NOTE ADULT - ASSESSMENT
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 She is now   s/p Reop Thoracoabdominal aneurysm open repair with Dacron graft and celiac SMA bypass, reimplantation of lumbar artery with Dr. Carranza on 8/29. Intraop 2 PRBC, 2 Platelets,Her postoperative course was complicated by new onset BLE weakness R>L for which she was placed on MAPs of 110 and CSF drainage of 20cc. She was extubated POD2 ,and required BIPAP on POD3. She went into afib with RVR, placed on esmolol-weaned off- on POD4 but converted back to NSR without treatment.  9/1 RLE improving, NGT removed, BIPAP, nicardipine restarted briefly for HTN, Cr 2.43  9/2 Lumbar drain removed, Afib RVR converted without intervention, Vaso weaned off today  9/3 S&S eval recommend Dysphagia 1 nectar thick diet, 3L NC, PT recommending acute rehab, Cr improving 1.93 from 2.04  9/4 Afib with RVR, transferred to CTU, metoprolol 2.5 IV x2, amiodarone drip started, vasopressin for hypotension, esmolol drip for rate control, Digoxin IV x1, converted to NSR, Cr 1.76 DURGA improving, targeting MAPs of 70-80, L pleural chest tubes x3 removed, S&S recommend dysphagia 1 thin liquids, bradycardic at night and amio decreased to 0.5 from 1  9/5 Transitioned to Labetalol from esmolol, PO amio taper, A-line/introducer/and muñoz discontinued, transferred to SDU  She is now transferred from the CTU to SDU. She no longer has any critical care needs at this time and is stable enough for transfer to SDU.   9/6 Increase PT/ ambulation. Dys 1 diet, speech/swallow f/u, repeat mbs today. Maintaining nsr  9/7 Tolerating Dys II diet, check urinalysis, for leukocytosis. CXR negative for infiltrate. Creat 1,9   9/8 Zosyn initiated for suspected UTI/LLL pna. ID consulted for assistance in mangement. creatinine improving . -1415 fluid balance, ct chest, BC x 2  9/9 WBC remains elevated, 21, afebrile, pending CT results r/o PNA. Continue zosyn.    D/w PT,  pt with decrease in  balance/ fatigued today, RLE weakness unchanged as per pt.   relative hypotension, irineo, labetolol d/c, maintain mao>85.  9/10 CT with compressive atelectasis,  placed on nocturnal bipap as d/w  Dr Carranza  Wbc trending down  Cont zosyn UTI/R/o pna  9/11 Zosyn for pna x 7 days  PT  Neuro consulted for f/u  9/12  CT scan  abdomen complete   ,  worked with PT,   plus one strength  r leg,  OOB to chair,  Lt thoracotomy incision  9/13 Pending mri at neuro  rec.HCT drifting down, repeat performed.Labetolol d/c lopressor started for HR control but to liberalize bp Pending acute rehab  9/14 MRI negative for mass, shift, bleed, flow limiting lesions  9/15   zosyn dc,   OOB to chair   NSR   rt leg splint w/ cont weakness  9/16 Pt voiding, female urine collection device, incontenent   Hypokalemia, supplementation increased.  Zosyn complete  Rehab planning  9/17 PT today  9/18  HD stable.  Continues to slowly regain strength in RLE.  [facility] awaiting auth..  Per neuro will need outpt EMG and conduction studies.  No further neuro work-up needed at this time.  Every other staple removed  9/19 HD stable.  Slowly improving strength.  Dr carranza wanted neuro to re-eval pt.  MRI C-spine/lumbar spine and thoracic spine to ro spinal infarct or stenosis.  Awaiting auth for OLINDA  9/20 MRI sched for 9/21> po ativan prior(claustrophobia)   9/21 HD stable. Unable to tolerate entire MRI--only partially completed.  Awaiting OLINDA   9/22: MRi results noted and neurology recalled to evaluate results and to see if what was imaged was sufficient. Urine with Gross smell u/a and urine cx sent in addition to greenish tan copious vaginal secretions sent for cx and r/o gonorrhea. Pt complains of foul smells from groin area.     9/23 Urine cx- Gram neg rods (prelim). Continue abx per ID x 7 days. Vaginal discharge-not noticed within last 24 hours. Pending cx. Urology consult placed for neurogenic bladder. F/U recs  9/24: Zosyn d/cd and ceftriaxone added per ID for UTI-Klebsiella for total of 5 days which includes zosyn time. Can switch to vantin po qd on dischage if needed. Vaginal cx neg for chlamydia and gonorrhea  9/25: Remains on Ceftriaxone for UTI. Muñoz in place. Last BM on 9/23-received Miralax on 9/24, laxative PRN added. Potassium supplemented this morning for K of 3.3 to maintain goal of greater than 4. Out of chair and walked a few steps with assistance from PT this AM.  9/26 HD stable.  Last dose ceftriaxone today.  Rehab 9/27 9/27  awaiting rehab auth.

## 2019-09-27 NOTE — PROGRESS NOTE ADULT - PROBLEM SELECTOR PROBLEM 1
Thoracoabdominal aortic aneurysm (TAAA) without rupture
Urine retention
Urine retention
Thoracoabdominal aortic aneurysm (TAAA) without rupture
Urine retention

## 2019-09-27 NOTE — PROGRESS NOTE ADULT - PROVIDER SPECIALTY LIST ADULT
CT Surgery
Critical Care
Infectious Disease
Nephrology
Neurosurgery
Rehab Medicine
Urology
Vascular Surgery
Nephrology
Vascular Surgery
Critical Care
Nephrology
Vascular Surgery
CT Surgery
Neurology
Nephrology
CT Surgery

## 2019-09-27 NOTE — H&P ADULT - NSHPLABSRESULTS_GEN_ALL_CORE
8.7    7.4   )-----------( 266      ( 27 Sep 2019 06:03 )             27.5   09-27    130<L>  |  97  |  13  ----------------------------<  92  3.8   |  21<L>  |  0.91    Ca    9.1      27 Sep 2019 06:03  Phos  3.1     09-27  Mg     1.6     09-27    TPro  6.5  /  Alb  3.2<L>  /  TBili  0.3  /  DBili  x   /  AST  31  /  ALT  34  /  AlkPhos  80  09-27    MR Lumbar Spine No Cont (09.21.19 @ 12:41)  IMPRESSION:  CERVICAL SPINE MR: Degenerative change with questionable increased   hyperintense T2 signal in the central cervical cord sagittal series 3   image 6, axial images are degraded by motion.  THORACIC SPINE MR: No evidence for severe canal or foraminal compromise,   questionable increased new faint hyperintense T2 signal within the mid   thoracic cord, sagittal images T2 and STIR image 9, axial images degraded   by technique and motion.  LUMBAR SPINE MR: Redemonstration of degenerative change as noted   previously with left frontal narrowing at L4-5 and L5-S1. Enlarged   bladder correlate clinically, with findings concerning for neurogenic   bladder.    MR Head No Cont (09.14.19 @ 15:02)  IMPRESSION:  MRI BRAIN:  Limited by motion  No acute intracranial hemorrhage, mass effect, vasogenic edema, or   evidence of acute territorial infarct. Mild to moderate white matter   microvascular ischemic disease.  MRA BRAIN:  Limited by motion.  No gross evidence for or vascular aneurysm or flow-limiting stenosis.  MRA NECK:  Limited by motion.  No gross evidence for flow-limiting stenosis. Left carotid bifurcation   not well evaluated. Right carotid bifurcation is unremarkable. 8.7    7.4   )-----------( 266      ( 27 Sep 2019 06:03 )             27.5   09-27    130<L>  |  97  |  13  ----------------------------<  92  3.8   |  21<L>  |  0.91    Ca    9.1      27 Sep 2019 06:03  Phos  3.1     09-27  Mg     1.6     09-27    TPro  6.5  /  Alb  3.2<L>  /  TBili  0.3  /  DBili  x   /  AST  31  /  ALT  34  /  AlkPhos  80  09-27    MR Lumbar Spine No Cont (09.21.19 @ 12:41)  IMPRESSION:  CERVICAL SPINE MR: Degenerative change with questionable increased   hyperintense T2 signal in the central cervical cord sagittal series 3   image 6, axial images are degraded by motion.  THORACIC SPINE MR: No evidence for severe canal or foraminal compromise,   questionable increased new faint hyperintense T2 signal within the mid   thoracic cord, sagittal images T2 and STIR image 9, axial images degraded   by technique and motion.  LUMBAR SPINE MR: Redemonstration of degenerative change as noted   previously with left frontal narrowing at L4-5 and L5-S1. Enlarged   bladder correlate clinically, with findings concerning for neurogenic   bladder.    MR Head No Cont (09.14.19 @ 15:02)  IMPRESSION:  MRI BRAIN:  Limited by motion  No acute intracranial hemorrhage, mass effect, vasogenic edema, or   evidence of acute territorial infarct. Mild to moderate white matter   microvascular ischemic disease.  MRA BRAIN:  Limited by motion.  No gross evidence for or vascular aneurysm or flow-limiting stenosis.  MRA NECK:  Limited by motion.  No gross evidence for flow-limiting stenosis. Left carotid bifurcation   not well evaluated. Right carotid bifurcation is unremarkable.    Intra-Operative Transesophageal Echo (08.29.19 @ 15:12)   Conclusions:  1. Normal mitral valve. Minimal mitral regurgitation.  2. Bioprosthetic aortic valve replacement. Minimal aortic  regurgitation.  3. Aortic Root and Arch are demonstrated to be prosthetic  (Previous AVR, Root Replacemnt + Stent in Distal Arch)  Descending Thoracic Aorta is Massively dilated with mural  thrombus and Spontaneous Echo Contrast.  4. Moderate concentric left ventricular hypertrophy.  5. Normal right atrium. Venous cannulation was assisted  with TREVIN in [facility].  6. Normal right ventricular size and function.  7. Normal tricuspid valve. Mild-moderate tricuspid  regurgitation.  8. Normal pulmonic valve. No pulmonic regurgitation.

## 2019-09-27 NOTE — PROGRESS NOTE ADULT - PROBLEM SELECTOR PLAN 1
PT/OT,     [facility] eventually 9/27  neuro also--EMG conduction studies as outpt  [address] [facility]  [phone]

## 2019-09-27 NOTE — PROGRESS NOTE ADULT - PROBLEM SELECTOR PLAN 4
Trend Cr  Likely ATN  PPI switched to H2 blocker  avoid nephrotoxic medications
Trend Cr  Likely ATN    avoid nephrotoxic medications
Cr 1.2     renal following    avoid nephrotoxic medications
Trend Cr     renal following    avoid nephrotoxic medications
Cr 1.19     renal following    avoid nephrotoxic medications
Trend Cr  Likely ATN    avoid nephrotoxic medications
Cr 1.19     renal following    avoid nephrotoxic medications
Cr 1.19     renal following    avoid nephrotoxic medications
Cr 1.2     renal following    avoid nephrotoxic medications
Neurogenic bladder- no sensation  Continue muñoz- per urology
Trend Cr     renal following    avoid nephrotoxic medications
Trend Cr  Likely ATN    avoid nephrotoxic medications
Trend Cr  Likely ATN  PPI switched to H2 blocker  avoid nephrotoxic medications
resolved
Trend Cr  Likely ATN  PPI switched to H2 blocker  avoid nephrotoxic medications

## 2019-09-27 NOTE — PROGRESS NOTE ADULT - ASSESSMENT
69 yr old female with H/O Thoraco abdominal Aortic aneurysm, Aortic Stenosis/ regurgitation S/P AVR (T), Ascending & hemiarch replacement 4/9/19 now sp thoracoabdominal aortic aneurysm.     - Resolved DURGA  - Hyponatremia:   - b/l lower ext. weakness - Right>left   - HTN -    RECOMMENDATION:  -  Encourage liquids with calories; Cont Ensure Enlive   - Serum sodium is not clinically significant at this level   - c/w Norvasc 5mg po qd.  If the BP remains elevated then change the lopressor to coreg 3.125mg po bid   - Cont PT is ongoing;  [facility] eventually  -off iv abx and completed the course

## 2019-09-27 NOTE — H&P ADULT - NSHPREVIEWOFSYSTEMS_GEN_ALL_CORE
REVIEW OF SYSTEMS  Constitutional: No fever, No Chills, No fatigue  HEENT: No eye pain, No visual disturbances, No difficulty hearing, No Dysphagia   Pulm: No cough,  No shortness of breath  Cardio: No chest pain, No palpitations  GI:  No abdominal pain, No nausea, No vomiting, No diarrhea, No constipation, No incontinence, Last Bowel Movement on   : No dysuria, No frequency, No hematuria, No incontinence  Neuro: No headaches, No memory loss, No loss of strength, No numbness, No tremors  Skin: No itching, No rashes, No lesions   Endo: No temperature intolerance  MSK: No joint pain, No joint swelling, No muscle pain, No Neck or back pain  Psych:  No depression, No anxiety    Any Major Surgery within past 100 days? No / Yes     Two or more Falls within past one year?  No / Yes                   One Fall with injury past one year?           No / Yes REVIEW OF SYSTEMS  Constitutional: No fever, No Chills, No fatigue  HEENT: No eye pain, No visual disturbances, No difficulty hearing   Pulm: No cough,  No shortness of breath  Cardio: No chest pain, No palpitations  GI:  No abdominal pain, No nausea, No vomiting, No diarrhea, No constipation last BM yesterday   : + muñoz cath   Neuro: + loss of strength on lower extremity (R>L), numbness from the abdomen down to bilateral lower extremity, No headaches, No memory loss, , No tremors  Skin: +skin tear in the posterior No itching, No rashes, No lesions   Endo: No temperature intolerance  MSK: No joint pain, No joint swelling, No muscle pain, No Neck or back pain  Psych:  No depression, No anxiety    Any Major Surgery within past 100 days? No / Yes     Two or more Falls within past one year?  No / Yes                   One Fall with injury past one year?           No / Yes REVIEW OF SYSTEMS  Constitutional: No fever, No Chills, No fatigue  HEENT: No eye pain, No visual disturbances, No difficulty hearing   Pulm: No cough,  No shortness of breath  Cardio: No chest pain, No palpitations  GI:  No abdominal pain, No nausea, No vomiting, No diarrhea, No constipation last BM yesterday   : + muñoz cath   Neuro: + loss of strength on lower extremity (R>L), numbness from the abdomen down to bilateral lower extremity, No headaches, No memory loss, , No tremors  Skin: +skin tear in the posterior-lateral back on the left, No itching, No rashes  Endo: No temperature intolerance  MSK: No joint pain, No joint swelling, No muscle pain, No Neck or back pain  Psych:  No depression, No anxiety    Any Major Surgery within past 100 days?  Yes     Two or more Falls within past one year?  No               One Fall with injury past one year?           No

## 2019-09-27 NOTE — H&P ADULT - HISTORY OF PRESENT ILLNESS
69F pmh HTN, preeclampsia, "adrian-aorta" status post aortic valve replacement, admitted electively to [facility] on 8/28 for open TAAA repair due to enlarging thoracoabdominal aortic aneurysm. 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 s/p Thoracoabdominal aneurysm open repair with Decron graft and celiac SMA bypass, reimplantation of lumbar artery on 8/29. Chest tube placed. Intraoperative bleeding requiring 2 PRBC, 2 Platelets, & FEIBA. Required pressors post-op in CCU. Post-op she was noted to have loss of strength in lower extremities, R>L for which she was placed on MAPs of 110 and CSF drainage of 20cc. Repeat imaging was negative for hematoma. Nephrology was consulted for ATN. Urine output improved. Extubated 8/21. Lumbar drain removed 9/2. RLE weakness showed some improvement during hospitalization. 9/3 S&S eval recommend Dysphagia 1 nectar thick diet, 3L NC, PT recommending acute rehab, Creatinine improving. Course c/b afib with RVR, requiring amio gtt, esmolol gtt, one dose digoxin, and vasopressin for pressure support. 9/4 left pleural chest tubes x3 removed. Course also c/b urinary retention requiring muñoz catheter. 9/7 infectious w/u initiated for leukocytosis and fever, initiated zosyn 9/8 for suspected UTI/LLL PNA, ID consulted. CT chest with complex mod left pleural effusion, suspect PNA present. Blood cultures so far no growth to date. Workup for RLE weakness ordered by neurology, felt weakness to be due to cervical myelopathy. Urology consulted 9/23 for urinary retention, felt to be neurogenic in nature, recommended continue muñoz. Completed abx for UTI.

## 2019-09-27 NOTE — PROGRESS NOTE ADULT - SUBJECTIVE AND OBJECTIVE BOX
VITAL SIGNS    Telemetry:    Vital Signs Last 24 Hrs  T(C): 36.6 (19 @ 04:15), Max: 36.9 (19 @ 20:11)  T(F): 97.9 (19 @ 04:15), Max: 98.5 (19 @ 20:11)  HR: 58 (19 @ 04:15) (54 - 60)  BP: 166/66 (19 @ 06:25) (161/53 - 166/74)  RR: 18 (19 @ 04:15) (18 - 18)  SpO2: 97% (19 @ 04:15) (97% - 100%)                       8.7<L>                130<L>| 21<L>| 13           7.4   >-----------< 266     ------------------------< 92                    27.5<L>                3.8  | 97   | 0.91                                                                      Ca 9.1   Mg 1.6   Ph 3.1      ,             8.2<L>                129<L>| 21<L>| 15           6.6   >-----------< 255     ------------------------< 92                    24.6<L>                3.8  | 95<L>| 0.92                                                                      Ca 8.7   Mg 1.6   Ph 2.9              19 @ 07:01  -  19 @ 07:00  --------------------------------------------------------  IN: 1570 mL / OUT: 4550 mL / NET: -2980 mL    19 @ 07:01  -  19 @ 13:08  --------------------------------------------------------  IN: 300 mL / OUT: 0 mL / NET: 300 mL        Daily     Daily Weight in k.3 (27 Sep 2019 08:11)        CAPILLARY BLOOD GLUCOSE                    Coumadin    [ ] YES          [  ]      NO                                   PHYSICAL EXAM  Neurology: alert and oriented x 3, RLE . plantarflexion 1/5, no other movement to the hip  CV : s1 s2 RRR  Sternal Wound :  CDI , Stable  Lungs: cta  Abdomen: soft, nontender, nondistended, positive bowel sounds, last bowel movement +                      :     muñoz - sbd         Extremities:  +    edema   /  -   calve tenderness ,                  acetaminophen   Tablet .. 650 milliGRAM(s) Oral every 6 hours PRN  ALPRAZolam 0.25 milliGRAM(s) Oral every 8 hours PRN  amiodarone    Tablet 200 milliGRAM(s) Oral daily  amiodarone    Tablet   Oral   amLODIPine   Tablet 5 milliGRAM(s) Oral daily  ascorbic acid 500 milliGRAM(s) Oral two times a day  aspirin  chewable 81 milliGRAM(s) Oral daily  BACItracin   Ointment 1 Application(s) Topical two times a day  ferrous    sulfate 325 milliGRAM(s) Oral two times a day  folic acid 1 milliGRAM(s) Oral daily  heparin  Injectable 5000 Unit(s) SubCutaneous every 8 hours  lidocaine   Patch 1 Patch Transdermal daily  metoprolol tartrate 25 milliGRAM(s) Oral every 12 hours  mineral oil 30 milliLiter(s) Oral two times a day PRN  ondansetron Injectable 4 milliGRAM(s) IV Push three times a day PRN  oxyCODONE    IR 5 milliGRAM(s) Oral every 4 hours PRN  pantoprazole    Tablet 40 milliGRAM(s) Oral before breakfast                    Physical Therapy Rec:   Home  [  ]   Home w/ PT  [  ]  Rehab  [  ]  Discussed with Cardiothoracic Team at AM rounds.

## 2019-09-27 NOTE — H&P ADULT - ASSESSMENT
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    Rehab: Gait Instability, ADL impairments and Functional impairments: start Comprehensive Rehab Program of PT/OT/ST    Cervical Myelopathy:  - RLE Weakness: Start PT/OT  - Neurogenic Bladder: Continue [place], Urology consult, consider voiding trial     s/p TAAA open repair:  - Left pleural drains removed  -     Hypertension:    Pain: Tylenol PRN    Cardio:    Resp:    GI/Bowel: Colace, Senna, Miralax PRN    Diet: Regular    Renal/Bladder: Voiding independently, PVR    Endo:    ID:    Heme:    Precautions / PROPHYLAXIS:   - Falls, Cardiac, Sternal, Spinal, Seizure   - Ortho: Weight bearing status: WBAT   - Lungs: Aspiration, Incentive Spirometer   - Pressure injury/Skin: Turn Q2hrs while in bed, OOB to Chair, PT/OT    - DVT: Lovenox, SCDs, TEDs     MEDICAL PROGNOSIS: GOOD            REHAB POTENTIAL: GOOD             ESTIMATED DISPOSITION: HOME WITH HOME CARE            ELOS: 10-14 Days   EXPECTED THERAPY:     P.T. 1hr/day       O.T. 1hr/day      S.L.P. 1hr/day     P&O Unnecessary     EXP FREQUENCY: 5 days per 7 day period     PRESCREEN COMPARISON:   I have reviewed the prescreen information and I have found no relevant changes between the preadmission screening and my post admission evaluation     RATIONALE FOR INPATIENT ADMISSION - Patient demonstrates the following: (check all that apply)  [X] Medically appropriate for rehabilitation admission  [X] Has attainable rehab goals with an appropriate initial discharge plan  [X] Has rehabilitation potential (expected to make a significant improvement within a reasonable period of time)   [X] Requires close medical management by a rehab physician, rehab nursing care, Hospitalist and comprehensive interdisciplinary team (including PT, OT, & or SLP, Prosthetics and Orthotics) ASSESSMENT/PLAN  KENNETH SPRING is a 69F with cervical myelopathy due to spinal cord infarction s/p open thoracoabdominal aortic aneurysm repair, now with decreased functional mobility, dysphagia, paraparesis, gait instability and ADL impairments.    Rehab: Gait Instability, ADL impairments and Functional impairments: start Comprehensive Rehab Program of PT/OT/ST    Cervical Myelopathy:  - RLE Weakness: Start PT/OT  - Neurogenic Bladder: Continue Muñoz, Urology consult, consider voiding trial   - EMG as outpatient    s/p TAAA open repair:  - Left pleural drains removed  - Continue ASA  - Blood pressure management as below  - Consider repeat TTE to assess LV function  - Hospitalist consult    Anxiety:  - Continue xanax PRN    Afib with RVR: Now rate controlled.   - Continue Metoprolol Tartrate 25mg BID  - Continue Amiodarone 200mg daily    Hypertension:  - Continue Metoprolol Tartrate 25mg BID  - Continue amlodipine 5mg Daily    GI/Bowel: Colace, Senna, Miralax PRN  - ?GERD v. GI ppx due to critical illness/intubation: Confirm reason for protonix, discontinue if not indicated any more as patient is now out of ICU/extubated.    Diet: Dysphagia III - SLT evaluate and treat    Renal: ATN now resolved, creatinine wnl.     Hyponatremia: Mild, Stable    Bladder: Course c/b urinary retention due to neurogenic bladder, muñoz in place    ID: Completed abx for PNA and UTI during acute hospitalization.     Anemia 2/2 blood loss:  - Continue iron supplementation  - f/u iron panel in AM    Pain: Tylenol PRN, Oxycodone PRN    Precautions / PROPHYLAXIS:   - Falls  - Lungs: Aspiration, Incentive Spirometer   - Pressure injury/Skin: Turn Q2hrs while in bed, OOB to Chair, PT/OT    - DVT: Heparin, SCDs, TEDs     MEDICAL PROGNOSIS: GOOD            REHAB POTENTIAL: GOOD             ESTIMATED DISPOSITION: HOME WITH HOME CARE            ELOS: 10-14 Days   EXPECTED THERAPY:     P.T. 1hr/day       O.T. 1hr/day      S.L.P. 1hr/day     P&O Unnecessary     EXP FREQUENCY: 5 days per 7 day period     PRESCREEN COMPARISON:   I have reviewed the prescreen information and I have found no relevant changes between the preadmission screening and my post admission evaluation     RATIONALE FOR INPATIENT ADMISSION - Patient demonstrates the following: (check all that apply)  [X] Medically appropriate for rehabilitation admission  [X] Has attainable rehab goals with an appropriate initial discharge plan  [X] Has rehabilitation potential (expected to make a significant improvement within a reasonable period of time)   [X] Requires close medical management by a rehab physician, rehab nursing care, Hospitalist and comprehensive interdisciplinary team (including PT, OT, & or SLP, Prosthetics and Orthotics)

## 2019-09-27 NOTE — PROGRESS NOTE ADULT - PROBLEM SELECTOR PROBLEM 4
DURGA (acute kidney injury)
Urine retention
DURGA (acute kidney injury)

## 2019-09-27 NOTE — PROGRESS NOTE ADULT - PROBLEM SELECTOR PLAN 3
Dysphagia level 2 Nectar thick fluids
Dysphagia level 2 Nectar thick fluids
Dysphagia level 3 thin liquids
Dysphagia level 3 thin liquids
Dysphagia level 2 Nectar thick fluids
Dysphagia level 3 thin liquids
Dysphagia level 1 Nectar thick fluids
Dysphagia level 1 Nectar thick fluids
Dysphagia level 2 Nectar thick fluids
Dysphagia level 3 thin liquids
Dysphagia level 1 Nectar thick fluids

## 2019-09-27 NOTE — H&P ADULT - NSICDXPASTSURGICALHX_GEN_ALL_CORE_FT
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

## 2019-09-27 NOTE — H&P ADULT - NSHPSOCIALHISTORY_GEN_ALL_CORE
SOCIAL HISTORY  Smoking - Former  EtOH - Denied   Drugs - Denied    FUNCTIONAL HISTORY  Lives in apartment w elevator access.  .  PTA Independent    CURRENT FUNCTIONAL STATUS:  Mod A transfers and gait.

## 2019-09-27 NOTE — H&P ADULT - NSHPPHYSICALEXAM_GEN_ALL_CORE
Vital Signs  T(C): 36.4 (09-27-19 @ 14:06), Max: 36.9 (09-26-19 @ 20:11)  HR: 56 (09-27-19 @ 14:06) (54 - 60)  BP: 110/55 (09-27-19 @ 14:06) (110/55 - 166/74)  RR: 18 (09-27-19 @ 14:06) (18 - 18)  SpO2: 96% (09-27-19 @ 14:06) (96% - 100%)    Gen - NAD, Comfortable  HEENT - NCAT, EOMI, MMM  Neck - Supple, No limited ROM  Pulm - CTAB, No wheeze, No rhonchi, No crackles  Cardiovascular - RRR, S1S2, No m/r/g  Abdomen - Soft, NT/ND, +BS  Extremities - No C/C/E, No calf tenderness  Neuro-     Cognitive - AAOx3     Communication - Fluent, No dysarthria     Attention: Intact      Judgement: Good evidence of judgement     Memory: Recall 3 objects immediate and 3 min later         Cranial Nerves - CN 2-12 intact     Motor - No focal deficits                    LEFT    UE - ShAB 5/5, EF 5/5, EE 5/5, WE 5/5,  5/5                    RIGHT UE - ShAB 5/5, EF 5/5, EE 5/5, WE 5/5,  5/5                    LEFT    LE - HF 5/5, KE 5/5, DF 5/5, PF 5/5                    RIGHT LE - HF 5/5, KE 5/5, DF 5/5, PF 5/5        Sensory - Intact to LT     Reflexes - DTR Intact, No primitive reflexive     Coordination - FTN intact     Tone - normal  Psychiatric - Mood stable, Affect WNL  Skin:  all skin intact    Wounds: None Present Vital Signs Last 24 Hrs  T(C): 36.8 (28 Sep 2019 21:29), Max: 36.8 (28 Sep 2019 05:04)  T(F): 98.2 (28 Sep 2019 21:29), Max: 98.2 (28 Sep 2019 05:04)  HR: 56 (28 Sep 2019 21:29) (50 - 68)  BP: 194/45 (28 Sep 2019 21:29) (96/55 - 194/45)  BP(mean): --  RR: 14 (28 Sep 2019 21:29) (14 - 18)  SpO2: 100% (28 Sep 2019 21:29) (98% - 100%)    Gen - NAD, Comfortable  HEENT - NCAT, EOMI, MMM  Neck - Supple, No limited ROM  Pulm - CTAB, No wheeze, No rhonchi, No crackles  Cardiovascular - RRR, S1S2, systolic murmur   Chest wall: Sternal incision wound-clean, no erythema or drainage noted  Posterior lateral incision wound on the left- clean, no erythema or drainage, small skin tear seen next to incision site.    Abdomen - Soft, NT/ND, +BS  Extremities - No C/C/E, No calf tenderness  Neuro-     Cognitive - AAOx3     Communication - Fluent, No dysarthria     Attention: Intact      Judgement: Good evidence of judgement     Memory: Recall 3 objects immediate and 3 min later         Cranial Nerves - CN 2-12 intact     Motor - No focal deficits                    LEFT    UE - ShAB 5/5, EF 5/5, EE 5/5, WE 5/5,  5/5                    RIGHT UE - ShAB 5/5, EF 5/5, EE 5/5, WE 5/5,  5/5                    LEFT    LE - HF 4/5, KE 4/5, DF 5/5, PF 5/5                    RIGHT LE - HF 2/5, KE 4/5, DF 3/5, PF 4/5        Sensory - Intact to LT bilaterally      Reflexes - Hyper reflexive on the right patella, no clonus, downward babinski bilateral      Tone - normal  Psychiatric - Mood stable, Affect WNL  Skin:  all skin intact

## 2019-09-27 NOTE — PROGRESS NOTE ADULT - SUBJECTIVE AND OBJECTIVE BOX
NEPHROLOGY-NSN [phone]        Patient seen and examined in bed. She was in good spirits        MEDICATIONS  (STANDING):  amiodarone    Tablet 200 milliGRAM(s) Oral daily  amiodarone    Tablet   Oral   amLODIPine   Tablet 5 milliGRAM(s) Oral daily  ascorbic acid 500 milliGRAM(s) Oral two times a day  aspirin  chewable 81 milliGRAM(s) Oral daily  BACItracin   Ointment 1 Application(s) Topical two times a day  ferrous    sulfate 325 milliGRAM(s) Oral two times a day  folic acid 1 milliGRAM(s) Oral daily  heparin  Injectable 5000 Unit(s) SubCutaneous every 8 hours  lidocaine   Patch 1 Patch Transdermal daily  metoprolol tartrate 25 milliGRAM(s) Oral every 12 hours  pantoprazole    Tablet 40 milliGRAM(s) Oral before breakfast      VITAL:  T(C): , Max: 36.9 (09-26-19 @ 20:11)  T(F): , Max: 98.5 (09-26-19 @ 20:11)  HR: 58 (09-27-19 @ 04:15)  BP: 166/66 (09-27-19 @ 06:25)  BP(mean): --  RR: 18 (09-27-19 @ 04:15)  SpO2: 97% (09-27-19 @ 04:15)  Wt(kg): --    I and O's:    09-26 @ 07:01  -  09-27 @ 07:00  --------------------------------------------------------  IN: 1570 mL / OUT: 4550 mL / NET: -2980 mL          PHYSICAL EXAM:    Constitutional: NAD  Neck:  No JVD  Respiratory: CTAB/L  Cardiovascular: S1 and S2  Gastrointestinal: BS+, soft, NT/ND  Extremities: No peripheral edema  Neurological: A/O x 3,    Psychiatric: Normal mood, normal affect  : No Galicia  Skin: No rashes  Access: Not applicable    LABS:                        8.7    7.4   )-----------( 266      ( 27 Sep 2019 06:03 )             27.5     09-27    130<L>  |  97  |  13  ----------------------------<  92  3.8   |  21<L>  |  0.91    Ca    9.1      27 Sep 2019 06:03  Phos  3.1     09-27  Mg     1.6     09-27    TPro  6.5  /  Alb  3.2<L>  /  TBili  0.3  /  DBili  x   /  AST  31  /  ALT  34  /  AlkPhos  80  09-27          Urine Studies:          RADIOLOGY & ADDITIONAL STUDIES:

## 2019-09-27 NOTE — H&P ADULT - NSHPOUTPATIENTPROVIDERS_GEN_ALL_CORE
Junior Briseno (MD)  Surgery; Thoracic and Cardiac Surgery Junior Briseno (MD)  Surgery; Thoracic and Cardiac Surgery  [address]  [place], [place] [address]  Phone: [phone]  Fax: [phone]  Follow Up Time: Routine

## 2019-09-27 NOTE — PROGRESS NOTE ADULT - PROBLEM SELECTOR PROBLEM 3
Dysphagia, unspecified type

## 2019-09-27 NOTE — PROGRESS NOTE ADULT - PROBLEM SELECTOR PLAN 2
Self converted  Monitor tele
Self converted-been in [facility] in 60s.  Monitor tele
Self converted  Monitor tele
Self converted  Monitor tele

## 2019-09-28 ENCOUNTER — TRANSCRIPTION ENCOUNTER (OUTPATIENT)
Age: 69
End: 2019-09-28

## 2019-09-28 ENCOUNTER — INPATIENT (INPATIENT)
Facility: HOSPITAL | Age: 69
LOS: 31 days | Discharge: HOME HEALTH | DRG: 949 | End: 2019-10-30
Attending: PHYSICAL MEDICINE & REHABILITATION | Admitting: PHYSICAL MEDICINE & REHABILITATION
Payer: MEDICARE

## 2019-09-28 VITALS
TEMPERATURE: 98 F | OXYGEN SATURATION: 100 % | HEIGHT: 65 IN | DIASTOLIC BLOOD PRESSURE: 45 MMHG | WEIGHT: 150.8 LBS | RESPIRATION RATE: 14 BRPM | HEART RATE: 56 BPM | SYSTOLIC BLOOD PRESSURE: 194 MMHG

## 2019-09-28 VITALS
SYSTOLIC BLOOD PRESSURE: 133 MMHG | HEART RATE: 51 BPM | TEMPERATURE: 98 F | OXYGEN SATURATION: 98 % | RESPIRATION RATE: 18 BRPM | DIASTOLIC BLOOD PRESSURE: 61 MMHG

## 2019-09-28 DIAGNOSIS — Z98.890 OTHER SPECIFIED POSTPROCEDURAL STATES: Chronic | ICD-10-CM

## 2019-09-28 DIAGNOSIS — I71.6 THORACOABDOMINAL AORTIC ANEURYSM, WITHOUT RUPTURE: Chronic | ICD-10-CM

## 2019-09-28 DIAGNOSIS — Z98.49 CATARACT EXTRACTION STATUS, UNSPECIFIED EYE: Chronic | ICD-10-CM

## 2019-09-28 DIAGNOSIS — R53.81 OTHER MALAISE: ICD-10-CM

## 2019-09-28 DIAGNOSIS — S42.302A UNSPECIFIED FRACTURE OF SHAFT OF HUMERUS, LEFT ARM, INITIAL ENCOUNTER FOR CLOSED FRACTURE: Chronic | ICD-10-CM

## 2019-09-28 LAB
ANION GAP SERPL CALC-SCNC: 12 MMOL/L — SIGNIFICANT CHANGE UP (ref 5–17)
BUN SERPL-MCNC: 16 MG/DL — SIGNIFICANT CHANGE UP (ref 7–23)
CALCIUM SERPL-MCNC: 8.8 MG/DL — SIGNIFICANT CHANGE UP (ref 8.4–10.5)
CHLORIDE SERPL-SCNC: 94 MMOL/L — LOW (ref 96–108)
CO2 SERPL-SCNC: 22 MMOL/L — SIGNIFICANT CHANGE UP (ref 22–31)
CREAT SERPL-MCNC: 0.92 MG/DL — SIGNIFICANT CHANGE UP (ref 0.5–1.3)
GLUCOSE SERPL-MCNC: 96 MG/DL — SIGNIFICANT CHANGE UP (ref 70–99)
HCT VFR BLD CALC: 27.2 % — LOW (ref 34.5–45)
HGB BLD-MCNC: 8.9 G/DL — LOW (ref 11.5–15.5)
MCHC RBC-ENTMCNC: 28.5 PG — SIGNIFICANT CHANGE UP (ref 27–34)
MCHC RBC-ENTMCNC: 32.7 GM/DL — SIGNIFICANT CHANGE UP (ref 32–36)
MCV RBC AUTO: 87.4 FL — SIGNIFICANT CHANGE UP (ref 80–100)
PLATELET # BLD AUTO: 257 K/UL — SIGNIFICANT CHANGE UP (ref 150–400)
POTASSIUM SERPL-MCNC: 3.7 MMOL/L — SIGNIFICANT CHANGE UP (ref 3.5–5.3)
POTASSIUM SERPL-SCNC: 3.7 MMOL/L — SIGNIFICANT CHANGE UP (ref 3.5–5.3)
RBC # BLD: 3.12 M/UL — LOW (ref 3.8–5.2)
RBC # FLD: 15 % — HIGH (ref 10.3–14.5)
SODIUM SERPL-SCNC: 128 MMOL/L — LOW (ref 135–145)
WBC # BLD: 7.8 K/UL — SIGNIFICANT CHANGE UP (ref 3.8–10.5)
WBC # FLD AUTO: 7.8 K/UL — SIGNIFICANT CHANGE UP (ref 3.8–10.5)

## 2019-09-28 PROCEDURE — 84480 ASSAY TRIIODOTHYRONINE (T3): CPT

## 2019-09-28 PROCEDURE — 88305 TISSUE EXAM BY PATHOLOGIST: CPT

## 2019-09-28 PROCEDURE — 74230 X-RAY XM SWLNG FUNCJ C+: CPT

## 2019-09-28 PROCEDURE — C1894: CPT

## 2019-09-28 PROCEDURE — 86900 BLOOD TYPING SEROLOGIC ABO: CPT

## 2019-09-28 PROCEDURE — 87186 SC STD MICRODIL/AGAR DIL: CPT

## 2019-09-28 PROCEDURE — 93005 ELECTROCARDIOGRAM TRACING: CPT

## 2019-09-28 PROCEDURE — 86923 COMPATIBILITY TEST ELECTRIC: CPT

## 2019-09-28 PROCEDURE — 93010 ELECTROCARDIOGRAM REPORT: CPT

## 2019-09-28 PROCEDURE — 86901 BLOOD TYPING SEROLOGIC RH(D): CPT

## 2019-09-28 PROCEDURE — 83605 ASSAY OF LACTIC ACID: CPT

## 2019-09-28 PROCEDURE — 80053 COMPREHEN METABOLIC PANEL: CPT

## 2019-09-28 PROCEDURE — 81001 URINALYSIS AUTO W/SCOPE: CPT

## 2019-09-28 PROCEDURE — 71045 X-RAY EXAM CHEST 1 VIEW: CPT

## 2019-09-28 PROCEDURE — 86850 RBC ANTIBODY SCREEN: CPT

## 2019-09-28 PROCEDURE — 70551 MRI BRAIN STEM W/O DYE: CPT

## 2019-09-28 PROCEDURE — 82803 BLOOD GASES ANY COMBINATION: CPT

## 2019-09-28 PROCEDURE — 71250 CT THORAX DX C-: CPT

## 2019-09-28 PROCEDURE — 83036 HEMOGLOBIN GLYCOSYLATED A1C: CPT

## 2019-09-28 PROCEDURE — 99223 1ST HOSP IP/OBS HIGH 75: CPT

## 2019-09-28 PROCEDURE — 70546 MR ANGIOGRAPH HEAD W/O&W/DYE: CPT

## 2019-09-28 PROCEDURE — 76000 FLUOROSCOPY <1 HR PHYS/QHP: CPT

## 2019-09-28 PROCEDURE — P9016: CPT

## 2019-09-28 PROCEDURE — 82550 ASSAY OF CK (CPK): CPT

## 2019-09-28 PROCEDURE — 82570 ASSAY OF URINE CREATININE: CPT

## 2019-09-28 PROCEDURE — C1887: CPT

## 2019-09-28 PROCEDURE — 84484 ASSAY OF TROPONIN QUANT: CPT

## 2019-09-28 PROCEDURE — 87491 CHLMYD TRACH DNA AMP PROBE: CPT

## 2019-09-28 PROCEDURE — 84132 ASSAY OF SERUM POTASSIUM: CPT

## 2019-09-28 PROCEDURE — 80061 LIPID PANEL: CPT

## 2019-09-28 PROCEDURE — 72148 MRI LUMBAR SPINE W/O DYE: CPT

## 2019-09-28 PROCEDURE — 86022 PLATELET ANTIBODIES: CPT

## 2019-09-28 PROCEDURE — 97110 THERAPEUTIC EXERCISES: CPT

## 2019-09-28 PROCEDURE — 82435 ASSAY OF BLOOD CHLORIDE: CPT

## 2019-09-28 PROCEDURE — 82962 GLUCOSE BLOOD TEST: CPT

## 2019-09-28 PROCEDURE — 83735 ASSAY OF MAGNESIUM: CPT

## 2019-09-28 PROCEDURE — 97760 ORTHOTIC MGMT&TRAING 1ST ENC: CPT

## 2019-09-28 PROCEDURE — 92611 MOTION FLUOROSCOPY/SWALLOW: CPT

## 2019-09-28 PROCEDURE — 80048 BASIC METABOLIC PNL TOTAL CA: CPT

## 2019-09-28 PROCEDURE — P9012: CPT

## 2019-09-28 PROCEDURE — 85014 HEMATOCRIT: CPT

## 2019-09-28 PROCEDURE — 82565 ASSAY OF CREATININE: CPT

## 2019-09-28 PROCEDURE — 85027 COMPLETE CBC AUTOMATED: CPT

## 2019-09-28 PROCEDURE — 72141 MRI NECK SPINE W/O DYE: CPT

## 2019-09-28 PROCEDURE — 74176 CT ABD & PELVIS W/O CONTRAST: CPT

## 2019-09-28 PROCEDURE — 97162 PT EVAL MOD COMPLEX 30 MIN: CPT

## 2019-09-28 PROCEDURE — 87040 BLOOD CULTURE FOR BACTERIA: CPT

## 2019-09-28 PROCEDURE — 87110 CHLAMYDIA CULTURE: CPT

## 2019-09-28 PROCEDURE — 87086 URINE CULTURE/COLONY COUNT: CPT

## 2019-09-28 PROCEDURE — 97535 SELF CARE MNGMENT TRAINING: CPT

## 2019-09-28 PROCEDURE — 72131 CT LUMBAR SPINE W/O DYE: CPT

## 2019-09-28 PROCEDURE — 84300 ASSAY OF URINE SODIUM: CPT

## 2019-09-28 PROCEDURE — 82330 ASSAY OF CALCIUM: CPT

## 2019-09-28 PROCEDURE — C1768: CPT

## 2019-09-28 PROCEDURE — 85730 THROMBOPLASTIN TIME PARTIAL: CPT

## 2019-09-28 PROCEDURE — 97167 OT EVAL HIGH COMPLEX 60 MIN: CPT

## 2019-09-28 PROCEDURE — 93880 EXTRACRANIAL BILAT STUDY: CPT

## 2019-09-28 PROCEDURE — 82947 ASSAY GLUCOSE BLOOD QUANT: CPT

## 2019-09-28 PROCEDURE — 97116 GAIT TRAINING THERAPY: CPT

## 2019-09-28 PROCEDURE — 85384 FIBRINOGEN ACTIVITY: CPT

## 2019-09-28 PROCEDURE — P9037: CPT

## 2019-09-28 PROCEDURE — 82553 CREATINE MB FRACTION: CPT

## 2019-09-28 PROCEDURE — 84295 ASSAY OF SERUM SODIUM: CPT

## 2019-09-28 PROCEDURE — P9047: CPT

## 2019-09-28 PROCEDURE — 97530 THERAPEUTIC ACTIVITIES: CPT

## 2019-09-28 PROCEDURE — 92610 EVALUATE SWALLOWING FUNCTION: CPT

## 2019-09-28 PROCEDURE — 31720 CLEARANCE OF AIRWAYS: CPT

## 2019-09-28 PROCEDURE — 85610 PROTHROMBIN TIME: CPT

## 2019-09-28 PROCEDURE — 92526 ORAL FUNCTION THERAPY: CPT

## 2019-09-28 PROCEDURE — C1769: CPT

## 2019-09-28 PROCEDURE — C1889: CPT

## 2019-09-28 PROCEDURE — 94660 CPAP INITIATION&MGMT: CPT

## 2019-09-28 PROCEDURE — P9059: CPT

## 2019-09-28 PROCEDURE — 84436 ASSAY OF TOTAL THYROXINE: CPT

## 2019-09-28 PROCEDURE — P9045: CPT

## 2019-09-28 PROCEDURE — 82248 BILIRUBIN DIRECT: CPT

## 2019-09-28 PROCEDURE — 70548 MR ANGIOGRAPHY NECK W/DYE: CPT

## 2019-09-28 PROCEDURE — 88304 TISSUE EXAM BY PATHOLOGIST: CPT

## 2019-09-28 PROCEDURE — 86891 AUTOLOGOUS BLOOD OP SALVAGE: CPT

## 2019-09-28 PROCEDURE — 82247 BILIRUBIN TOTAL: CPT

## 2019-09-28 PROCEDURE — 94003 VENT MGMT INPAT SUBQ DAY: CPT

## 2019-09-28 PROCEDURE — 84443 ASSAY THYROID STIM HORMONE: CPT

## 2019-09-28 PROCEDURE — 36430 TRANSFUSION BLD/BLD COMPNT: CPT

## 2019-09-28 PROCEDURE — 84100 ASSAY OF PHOSPHORUS: CPT

## 2019-09-28 PROCEDURE — 72146 MRI CHEST SPINE W/O DYE: CPT

## 2019-09-28 PROCEDURE — 94002 VENT MGMT INPAT INIT DAY: CPT

## 2019-09-28 RX ORDER — ASPIRIN/CALCIUM CARB/MAGNESIUM 324 MG
1 TABLET ORAL
Qty: 0 | Refills: 0 | DISCHARGE
Start: 2019-09-28

## 2019-09-28 RX ORDER — POTASSIUM CHLORIDE 20 MEQ
40 PACKET (EA) ORAL ONCE
Refills: 0 | Status: COMPLETED | OUTPATIENT
Start: 2019-09-28 | End: 2019-09-28

## 2019-09-28 RX ORDER — LIDOCAINE 4 G/100G
1 CREAM TOPICAL
Qty: 0 | Refills: 0 | DISCHARGE
Start: 2019-09-28

## 2019-09-28 RX ORDER — ASCORBIC ACID 60 MG
1 TABLET,CHEWABLE ORAL
Qty: 0 | Refills: 0 | DISCHARGE
Start: 2019-09-28

## 2019-09-28 RX ORDER — PANTOPRAZOLE SODIUM 20 MG/1
1 TABLET, DELAYED RELEASE ORAL
Qty: 0 | Refills: 0 | DISCHARGE
Start: 2019-09-28

## 2019-09-28 RX ORDER — FERROUS SULFATE 325(65) MG
1 TABLET ORAL
Qty: 0 | Refills: 0 | DISCHARGE
Start: 2019-09-28

## 2019-09-28 RX ORDER — INFLUENZA VIRUS VACCINE 15; 15; 15; 15 UG/.5ML; UG/.5ML; UG/.5ML; UG/.5ML
0.5 SUSPENSION INTRAMUSCULAR ONCE
Refills: 0 | Status: COMPLETED | OUTPATIENT
Start: 2019-09-28 | End: 2019-10-26

## 2019-09-28 RX ORDER — LABETALOL HCL 100 MG
1 TABLET ORAL
Qty: 0 | Refills: 0 | DISCHARGE

## 2019-09-28 RX ORDER — FOLIC ACID 0.8 MG
1 TABLET ORAL
Qty: 0 | Refills: 0 | DISCHARGE
Start: 2019-09-28

## 2019-09-28 RX ORDER — METOPROLOL TARTRATE 50 MG
1 TABLET ORAL
Qty: 0 | Refills: 0 | DISCHARGE
Start: 2019-09-28

## 2019-09-28 RX ORDER — HEPARIN SODIUM 5000 [USP'U]/ML
5000 INJECTION INTRAVENOUS; SUBCUTANEOUS
Qty: 0 | Refills: 0 | DISCHARGE
Start: 2019-09-28

## 2019-09-28 RX ORDER — AMLODIPINE BESYLATE 2.5 MG/1
1 TABLET ORAL
Qty: 0 | Refills: 0 | DISCHARGE
Start: 2019-09-28

## 2019-09-28 RX ORDER — AMIODARONE HYDROCHLORIDE 400 MG/1
1 TABLET ORAL
Qty: 0 | Refills: 0 | DISCHARGE
Start: 2019-09-28

## 2019-09-28 RX ORDER — ACETAMINOPHEN 500 MG
2 TABLET ORAL
Qty: 0 | Refills: 0 | DISCHARGE
Start: 2019-09-28

## 2019-09-28 RX ORDER — BACITRACIN ZINC 500 UNIT/G
1 OINTMENT IN PACKET (EA) TOPICAL
Qty: 0 | Refills: 0 | DISCHARGE
Start: 2019-09-28

## 2019-09-28 RX ORDER — OXYCODONE HYDROCHLORIDE 5 MG/1
1 TABLET ORAL
Qty: 0 | Refills: 0 | DISCHARGE
Start: 2019-09-28

## 2019-09-28 RX ORDER — ALPRAZOLAM 0.25 MG
1 TABLET ORAL
Qty: 0 | Refills: 0 | DISCHARGE
Start: 2019-09-28

## 2019-09-28 RX ADMIN — OXYCODONE HYDROCHLORIDE 5 MILLIGRAM(S): 5 TABLET ORAL at 14:43

## 2019-09-28 RX ADMIN — Medication 650 MILLIGRAM(S): at 12:20

## 2019-09-28 RX ADMIN — AMLODIPINE BESYLATE 5 MILLIGRAM(S): 2.5 TABLET ORAL at 22:00

## 2019-09-28 RX ADMIN — Medication 25 MILLIGRAM(S): at 05:35

## 2019-09-28 RX ADMIN — Medication 1 APPLICATION(S): at 18:34

## 2019-09-28 RX ADMIN — AMIODARONE HYDROCHLORIDE 200 MILLIGRAM(S): 400 TABLET ORAL at 05:34

## 2019-09-28 RX ADMIN — HEPARIN SODIUM 5000 UNIT(S): 5000 INJECTION INTRAVENOUS; SUBCUTANEOUS at 22:00

## 2019-09-28 RX ADMIN — PANTOPRAZOLE SODIUM 40 MILLIGRAM(S): 20 TABLET, DELAYED RELEASE ORAL at 05:40

## 2019-09-28 RX ADMIN — Medication 325 MILLIGRAM(S): at 18:34

## 2019-09-28 RX ADMIN — OXYCODONE HYDROCHLORIDE 5 MILLIGRAM(S): 5 TABLET ORAL at 14:13

## 2019-09-28 RX ADMIN — Medication 25 MILLIGRAM(S): at 18:34

## 2019-09-28 RX ADMIN — OXYCODONE HYDROCHLORIDE 5 MILLIGRAM(S): 5 TABLET ORAL at 02:15

## 2019-09-28 RX ADMIN — OXYCODONE HYDROCHLORIDE 5 MILLIGRAM(S): 5 TABLET ORAL at 08:37

## 2019-09-28 RX ADMIN — Medication 0.25 MILLIGRAM(S): at 22:00

## 2019-09-28 RX ADMIN — Medication 1 MILLIGRAM(S): at 11:47

## 2019-09-28 RX ADMIN — Medication 1 APPLICATION(S): at 05:37

## 2019-09-28 RX ADMIN — Medication 81 MILLIGRAM(S): at 22:00

## 2019-09-28 RX ADMIN — Medication 500 MILLIGRAM(S): at 18:34

## 2019-09-28 RX ADMIN — OXYCODONE HYDROCHLORIDE 5 MILLIGRAM(S): 5 TABLET ORAL at 18:33

## 2019-09-28 RX ADMIN — Medication 40 MILLIEQUIVALENT(S): at 11:47

## 2019-09-28 RX ADMIN — OXYCODONE HYDROCHLORIDE 5 MILLIGRAM(S): 5 TABLET ORAL at 02:45

## 2019-09-28 RX ADMIN — HEPARIN SODIUM 5000 UNIT(S): 5000 INJECTION INTRAVENOUS; SUBCUTANEOUS at 14:14

## 2019-09-28 RX ADMIN — AMLODIPINE BESYLATE 5 MILLIGRAM(S): 2.5 TABLET ORAL at 05:35

## 2019-09-28 RX ADMIN — OXYCODONE HYDROCHLORIDE 5 MILLIGRAM(S): 5 TABLET ORAL at 09:07

## 2019-09-28 RX ADMIN — Medication 650 MILLIGRAM(S): at 11:47

## 2019-09-28 RX ADMIN — HEPARIN SODIUM 5000 UNIT(S): 5000 INJECTION INTRAVENOUS; SUBCUTANEOUS at 05:35

## 2019-09-28 RX ADMIN — Medication 325 MILLIGRAM(S): at 05:35

## 2019-09-28 RX ADMIN — Medication 81 MILLIGRAM(S): at 11:47

## 2019-09-28 RX ADMIN — Medication 500 MILLIGRAM(S): at 05:35

## 2019-09-28 RX ADMIN — OXYCODONE HYDROCHLORIDE 5 MILLIGRAM(S): 5 TABLET ORAL at 19:00

## 2019-09-28 NOTE — DISCHARGE NOTE PROVIDER - CARE PROVIDERS DIRECT ADDRESSES
,[email or web address],DirectAddress_Unknown ,[email or web address]_Unknown,[email or web address],[email or web address],[email or web address],[email or web address] ,[email or web address],DirectAddress_Unknown,[email or web address],DirectAddress_Unknown,DirectAddress_Unknown

## 2019-09-28 NOTE — DISCHARGE NOTE NURSING/CASE MANAGEMENT/SOCIAL WORK - PATIENT PORTAL LINK FT
You can access the FollowMyHealth Patient Portal offered by [facility] by registering at the following website: [email or web address]. By joining [email or web address]’s FollowMyHealth portal, you will also be able to view your health information using other applications (apps) compatible with our system.

## 2019-09-28 NOTE — DISCHARGE NOTE PROVIDER - CARE PROVIDER_API CALL
Junior Briseno)  Surgery; Thoracic and Cardiac Surgery  [address] [place] [address]  Phone: [phone]  Fax: [phone]  Follow Up Time: Routine    Kasia Cuellar)  Internal Medicine; Nephrology  [address] [facility], [address]  [place] [address]  Phone: [phone]  Fax: [phone]  Follow Up Time: Routine Junior Briseno)  Surgery; Thoracic and Cardiac Surgery  [address] [place] [address]  Phone: [phone]  Fax: [phone]  Follow Up Time: Routine    Kasia Cuellar)  Internal Medicine; Nephrology  [address] [facility], [address]  [place] [address]  Phone: [phone]  Fax: [phone]  Follow Up Time: Routine    Addy Valladares)  Surgery; Surgical Critical Care; Vascular Surgery  [address] [facility], [address] [address]  Phone: [phone]  Fax: [phone]  Follow Up Time: Routine Junior Briseno)  Surgery; Thoracic and Cardiac Surgery  [address] [place] [address]  Phone: [phone]  Fax: [phone]  Follow Up Time: Routine    Kasia Cuellar)  Internal Medicine; Nephrology  [address] [facility], [address]  [place] [address]  Phone: [phone]  Fax: [phone]  Follow Up Time: Routine    Addy Valladares)  Surgery; Surgical Critical Care; Vascular Surgery  [address] [facility], [address]  [place] [address]  Phone: [phone]  Fax: [phone]  Follow Up Time: Routine    Sujatha Trejo)  Urology  [address] [facility], [address]  [place] [address]  Phone: [phone]  Fax: [phone]  Follow Up Time: Routine    Tiarra Deutsch)  Neurology  [address] [facility], [address]  [place] [address]  Phone: [phone]  Fax: [phone]  Follow Up Time: Routine Junior Briseno)  Surgery; Thoracic and Cardiac Surgery  [address], [address] [place] [address]  Phone: [phone]  Fax: [phone]  Follow Up Time: Routine    Kasia Cuellar)  Internal Medicine; Nephrology  [address] [facility], [address]  [place] [address]  Phone: [phone]  Fax: [phone]  Follow Up Time: Routine    Addy Valladares)  Surgery; Surgical Critical Care; Vascular Surgery  [address] [facility], [address]  [place] [address]  Phone: [phone]  Fax: [phone]  Follow Up Time: Routine    Sujatah Trejo)  Urology  [address] [facility], [address]  [place] [address]  Phone: [phone]  Fax: [phone]  Follow Up Time: Routine    Tiarra Deutsch)  Neurology  [address] [facility], [address]  [place] [address]  Phone: [phone]  Fax: [phone]  Follow Up Time: Routine    Srinath Corral)  Cardiovascular Disease; Internal Medicine  [address] [place] [id number]  Phone: [phone]  Fax: [phone]  Follow Up Time: Routine

## 2019-09-28 NOTE — DISCHARGE NOTE PROVIDER - NSDCACTIVITY_GEN_ALL_CORE
Showering allowed/Do not drive or operate machinery/Walking - Outdoors allowed/No heavy lifting/straining/Do not make important decisions/Stairs allowed/Walking - Indoors allowed

## 2019-09-28 NOTE — DISCHARGE NOTE PROVIDER - NSDCFUADDAPPT_GEN_ALL_CORE_FT
Follow up with Dr. Briseno at [facility] office after discharge from Rehab, call [phone] to confirm appointment.  Follow up with Dr. Valladares after discharge from Rehab, call the office to schedule an appointment.  Follow up with your Cardiologist in 1-2 weeks, please call the office to schedule an appointment.     Follow up outpatient with neurology for Outpatient EMG and nerve conduction study  [address] [place] [address]  [phone]    Follow up with Urologist, Dr. Sujatha Trejo as an outpatient for management  [facility]  [address] [place] [address]  [phone] Follow up with Dr. Briseno at [facility] office after discharge from Rehab, call [phone] to confirm appointment.  Follow up with Dr. Valladares after discharge from Rehab, call the office to schedule an appointment.  Follow up with your PCP/cardiologist, Dr. Corral after discharge from Rehab, please call the office to schedule an appointment.     Follow up outpatient with neurology for Outpatient EMG and nerve conduction study  [address] [place] [address]  [phone]    Follow up with Urologist, Dr. Sujatha Trejo as an outpatient for management  [facility]  [address] [place] [address], [place] [address]  [phone]

## 2019-09-28 NOTE — DISCHARGE NOTE NURSING/CASE MANAGEMENT/SOCIAL WORK - NSDCPEEMAIL_GEN_ALL_CORE
[facility] for Tobacco Control email [email or web address]

## 2019-09-28 NOTE — DISCHARGE NOTE NURSING/CASE MANAGEMENT/SOCIAL WORK - NSDCFUADDAPPT_GEN_ALL_CORE_FT
Follow up with Dr. Briseno at [facility] office after discharge from Rehab, call [phone] to confirm appointment.  Follow up with Dr. Valladares after discharge from Rehab, call the office to schedule an appointment.  Follow up with your Cardiologist in 1-2 weeks, please call the office to schedule an appointment.     Follow up outpatient with neurology for Outpatient EMG and nerve conduction study  [address] [place] [address]  [phone]    Follow up with Urologist, Dr. Sujatha Trejo as an outpatient for management  [facility]  [address] [place] [address]  [phone]

## 2019-09-28 NOTE — DISCHARGE NOTE PROVIDER - NSDCPNSUBOBJ_GEN_ALL_CORE
Vital Signs Last 24 Hrs    T(C): 36.8 (28 Sep 2019 05:04), Max: 36.8 (28 Sep 2019 05:04)    T(F): 98.2 (28 Sep 2019 05:04), Max: 98.2 (28 Sep 2019 05:04)    HR: 68 (28 Sep 2019 05:04) (52 - 68)    BP: 114/57 (28 Sep 2019 05:04) (110/55 - 150/63)    RR: 18 (28 Sep 2019 05:04) (18 - 18)    SpO2: 98% (28 Sep 2019 05:04) (96% - 99%)        PHYSICAL EXAM    Neurology: A&Ox3, nonfocal, no gross deficits    CV : RRR+S1S2    Wounds: Left thoracotomy/ back incision CDI erythema, +skin tear with DSD    Lungs: Respirations non-labored, B/L BS CTA    Abdomen: Soft, NT/ND, +BSx4Q, last BM 9/27    : +[place] with clear, yellow urine    Extremities: B/L LE no edema, negative calf tenderness, +PP            45 minutes face to face spent on total encounter, patient counseling and discharge instructions.

## 2019-09-28 NOTE — DISCHARGE NOTE PROVIDER - PROVIDER TOKENS
PROVIDER:[TOKEN:[[id number]],FOLLOWUP:[Routine]],PROVIDER:[TOKEN:[[id number]],FOLLOWUP:[Routine]] PROVIDER:[TOKEN:[[id number]],FOLLOWUP:[Routine]],PROVIDER:[TOKEN:[[id number]],FOLLOWUP:[Routine]],PROVIDER:[TOKEN:[[id number]],FOLLOWUP:[Routine]] PROVIDER:[TOKEN:[[id number]],FOLLOWUP:[Routine]],PROVIDER:[TOKEN:[[id number]],FOLLOWUP:[Routine]],PROVIDER:[TOKEN:[[id number]],FOLLOWUP:[Routine]],PROVIDER:[TOKEN:[[id number]],FOLLOWUP:[Routine]],PROVIDER:[TOKEN:[[id number]],FOLLOWUP:[Routine]] PROVIDER:[TOKEN:[[id number]],FOLLOWUP:[Routine]],PROVIDER:[TOKEN:[[id number]],FOLLOWUP:[Routine]],PROVIDER:[TOKEN:[[id number]],FOLLOWUP:[Routine]],PROVIDER:[TOKEN:[[id number]],FOLLOWUP:[Routine]],PROVIDER:[TOKEN:[[id number]],FOLLOWUP:[Routine]],PROVIDER:[TOKEN:[[id number]],FOLLOWUP:[Routine]]

## 2019-09-28 NOTE — DISCHARGE NOTE PROVIDER - HOSPITAL COURSE
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 She is now     s/p Reop Thoracoabdominal aneurysm open repair with Dacron graft and celiac SMA bypass, reimplantation of lumbar artery with Dr. Carranza on 8/29. Intraop 2 PRBC, 2 Platelets,Her postoperative course was complicated by new onset BLE weakness R>L for which she was placed on MAPs of 110 and CSF drainage of 20cc. She was extubated POD2 ,and required BIPAP on POD3. She went into afib with RVR, placed on esmolol-weaned off- on POD4 but converted back to NSR without treatment.    9/1 RLE improving, NGT removed, BIPAP, nicardipine restarted briefly for HTN, Cr 2.43    9/2 Lumbar drain removed, Afib RVR converted without intervention, Vaso weaned off today    9/3 S&S eval recommend Dysphagia 1 nectar thick diet, 3L NC, PT recommending acute rehab, Cr improving 1.93 from 2.04    9/4 Afib with RVR, transferred to CTU, metoprolol 2.5 IV x2, amiodarone drip started, vasopressin for hypotension, esmolol drip for rate control, Digoxin IV x1, converted to NSR, Cr 1.76 DURGA improving, targeting MAPs of 70-80, L pleural chest tubes x3 removed, S&S recommend dysphagia 1 thin liquids, bradycardic at night and amio decreased to 0.5 from 1    9/5 Transitioned to Labetalol from esmolol, PO amio taper, A-line/introducer/and muñoz discontinued, transferred to SDU    She is now transferred from the CTU to SDU. She no longer has any critical care needs at this time and is stable enough for transfer to SDU.     9/6 Increase PT/ ambulation. Dys 1 diet, speech/swallow f/u, repeat mbs today. Maintaining nsr    9/7 Tolerating Dys II diet, check urinalysis, for leukocytosis. CXR negative for infiltrate. Creat 1,9     9/8 Zosyn initiated for suspected UTI/LLL pna. ID consulted for assistance in mangement. creatinine improving . -1415 fluid balance, ct chest, BC x 2    9/9 WBC remains elevated, 21, afebrile, pending CT results r/o PNA. Continue zosyn.      D/w PT,  pt with decrease in  balance/ fatigued today, RLE weakness unchanged as per pt.     relative hypotension, irineo, labetolol d/c, maintain mao>85.    9/10 CT with compressive atelectasis,  placed on nocturnal bipap as d/w  Dr Carranza    Wbc trending down    Cont zosyn UTI/R/o pna    9/11 Zosyn for pna x 7 days    PT    Neuro consulted for f/u    9/12  CT scan  abdomen complete   ,  worked with PT,   plus one strength  r leg,  OOB to chair,  Lt thoracotomy incision    9/13 Pending mri at neuro  rec.HCT drifting down, repeat performed.Labetolol d/c lopressor started for HR control but to liberalize bp Pending acute rehab    9/14 MRI negative for mass, shift, bleed, flow limiting lesions    9/15   zosyn dc,   OOB to chair   NSR   rt leg splint w/ cont weakness    9/16 Pt voiding, female urine collection device, incontenent     Hypokalemia, supplementation increased.    Zosyn complete    Rehab planning    9/17 PT today    9/18  HD stable.  Continues to slowly regain strength in RLE.  Joshua Richardson awaiting auth..  Per neuro will need outpt EMG and conduction studies.  No further neuro work-up needed at this time.  Every other staple removed    9/19 HD stable.  Slowly improving strength.  Dr carranza wanted neuro to re-eval pt.  MRI C-spine/lumbar spine and thoracic spine to ro spinal infarct or stenosis.  Awaiting auth for OLINDA    9/20 MRI sched for 9/21> po ativan prior(claustrophobia)     9/21 HD stable. Unable to tolerate entire MRI--only partially completed.  Awaiting OLINDA     9/22: MRi results noted and neurology recalled to evaluate results and to see if what was imaged was sufficient. Urine with Gross smell u/a and urine cx sent in addition to greenish tan copious vaginal secretions sent for cx and r/o gonorrhea. Pt complains of foul smells from groin area.         9/23 Urine cx- Gram neg rods (prelim). Continue abx per ID x 7 days. Vaginal discharge-not noticed within last 24 hours. Pending cx. Urology consult placed for neurogenic bladder. F/U recs    9/24: Zosyn d/cd and ceftriaxone added per ID for UTI-Klebsiella for total of 5 days which includes zosyn time. Can switch to vantin po qd on dischage if needed. Vaginal cx neg for chlamydia and gonorrhea    9/25: Remains on Ceftriaxone for UTI. Muñoz in place. Last BM on 9/23-received Miralax on 9/24, laxative PRN added. Potassium supplemented this morning for K of 3.3 to maintain goal of greater than 4. Out of chair and walked a few steps with assistance from PT this AM.    9/26 HD stable.  Last dose ceftriaxone today.  Rehab 9/27 9/27  awaiting rehab auth. 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 She is now     s/p Reop Thoracoabdominal aneurysm open repair with Dacron graft and celiac SMA bypass, reimplantation of lumbar artery with Dr. Carranza on 8/29. Intraop 2 PRBC, 2 Platelets,Her postoperative course was complicated by new onset BLE weakness R>L for which she was placed on MAPs of 110 and CSF drainage of 20cc. She was extubated POD2 ,and required BIPAP on POD3. She went into afib with RVR, placed on esmolol-weaned off- on POD4 but converted back to NSR without treatment.    9/1 RLE improving, NGT removed, BIPAP, nicardipine restarted briefly for HTN, Cr 2.43    9/2 Lumbar drain removed, Afib RVR converted without intervention, Vaso weaned off today    9/3 S&S eval recommend Dysphagia 1 nectar thick diet, 3L NC, PT recommending acute rehab, Cr improving 1.93 from 2.04    9/4 Afib with RVR, transferred to CTU, metoprolol 2.5 IV x2, amiodarone drip started, vasopressin for hypotension, esmolol drip for rate control, Digoxin IV x1, converted to NSR, Cr 1.76 DURGA improving, targeting MAPs of 70-80, L pleural chest tubes x3 removed, S&S recommend dysphagia 1 thin liquids, bradycardic at night and amio decreased to 0.5 from 1    9/5 Transitioned to Labetalol from esmolol, PO amio taper, A-line/introducer/and muñoz discontinued, transferred to SDU    She is now transferred from the CTU to SDU. She no longer has any critical care needs at this time and is stable enough for transfer to SDU.     9/6 Increase PT/ ambulation. Dys 1 diet, speech/swallow f/u, repeat mbs today. Maintaining nsr    9/7 Tolerating Dys II diet, check urinalysis, for leukocytosis. CXR negative for infiltrate. Creat 1,9     9/8 Zosyn initiated for suspected UTI/LLL pna. ID consulted for assistance in mangement. creatinine improving . -1415 fluid balance, ct chest, BC x 2    9/9 WBC remains elevated, 21, afebrile, pending CT results r/o PNA. Continue zosyn.      D/w PT,  pt with decrease in  balance/ fatigued today, RLE weakness unchanged as per pt.     relative hypotension, irineo, labetolol d/c, maintain mao>85.    9/10 CT with compressive atelectasis,  placed on nocturnal bipap as d/w  Dr Carranza    Wbc trending down    Cont zosyn UTI/R/o pna    9/11 Zosyn for pna x 7 days    PT    Neuro consulted for f/u    9/12  CT scan  abdomen complete   ,  worked with PT,   plus one strength  r leg,  OOB to chair,  Lt thoracotomy incision    9/13 Pending mri at neuro  rec.HCT drifting down, repeat performed.Labetolol d/c lopressor started for HR control but to liberalize bp Pending acute rehab    9/14 MRI negative for mass, shift, bleed, flow limiting lesions    9/15   zosyn dc,   OOB to chair   NSR   rt leg splint w/ cont weakness    9/16 Pt voiding, female urine collection device, incontenent     Hypokalemia, supplementation increased.    Zosyn complete    Rehab planning    9/17 PT today    9/18  HD stable.  Continues to slowly regain strength in RLE.  Joshua Richardson awaiting auth..  Per neuro will need outpt EMG and conduction studies.  No further neuro work-up needed at this time.  Every other staple removed    9/19 HD stable.  Slowly improving strength.  Dr carranza wanted neuro to re-eval pt.  MRI C-spine/lumbar spine and thoracic spine to ro spinal infarct or stenosis.  Awaiting auth for OLINDA    9/20 MRI sched for 9/21> po ativan prior(claustrophobia)     9/21 HD stable. Unable to tolerate entire MRI--only partially completed.  Awaiting OLINDA     9/22: MRi results noted and neurology recalled to evaluate results and to see if what was imaged was sufficient. Urine with Gross smell u/a and urine cx sent in addition to greenish tan copious vaginal secretions sent for cx and r/o gonorrhea. Pt complains of foul smells from groin area.         9/23 Urine cx- Gram neg rods (prelim). Continue abx per ID x 7 days. Vaginal discharge-not noticed within last 24 hours. Pending cx. Urology consult placed for neurogenic bladder. F/U recs    9/24: Zosyn d/cd and ceftriaxone added per ID for UTI-Klebsiella for total of 5 days which includes zosyn time. Can switch to vantin po qd on dischage if needed. Vaginal cx neg for chlamydia and gonorrhea    9/25: Remains on Ceftriaxone for UTI. Muñoz in place. Last BM on 9/23-received Miralax on 9/24, laxative PRN added. Potassium supplemented this morning for K of 3.3 to maintain goal of greater than 4. Out of chair and walked a few steps with assistance from PT this AM.    9/26 HD stable.  Last dose ceftriaxone today.  Rehab 9/27 9/27  awaiting rehab auth.    9/28 VSS, Discharged to [facility] via ambulance.

## 2019-09-28 NOTE — DISCHARGE NOTE PROVIDER - NSDCCPCAREPLAN_GEN_ALL_CORE_FT
PRINCIPAL DISCHARGE DIAGNOSIS  Diagnosis: Thoracoabdominal aortic aneurysm (TAAA) without rupture  Assessment and Plan of Treatment: s/p repair with Dr. Briseno on 8/29  1. Daily Shower  2. Weight yourself daily.  3. Regular diet - low fat, low cholesterol, no added salt.  4. Cleanse Midsternal incision daily while showering with warm water and mild soap, pat dry and maintain open to air.   5. Follow Cardiac Surgery Do's and Don'ts discharge instructions.   6. Increase Activity as tolerated.      SECONDARY DISCHARGE DIAGNOSES  Diagnosis: Thoracoabdominal aortic aneurysm (TAAA) without rupture  Assessment and Plan of Treatment: PRINCIPAL DISCHARGE DIAGNOSIS  Diagnosis: Thoracoabdominal aortic aneurysm (TAAA) without rupture  Assessment and Plan of Treatment: s/p repair with Dr. Briseno on 8/29  1. Daily Shower  2. Weight yourself daily.  3. Dysphagia 3 soft diet - low fat, low cholesterol, no added salt.  4. Cleanse Midsternal incision daily while showering with warm water and mild soap, pat dry and maintain open to air.   5. Follow Cardiac Surgery Do's and Don'ts discharge instructions.   6. Increase Activity as tolerated.      SECONDARY DISCHARGE DIAGNOSES  Diagnosis: Thoracoabdominal aortic aneurysm (TAAA) without rupture  Assessment and Plan of Treatment:

## 2019-09-28 NOTE — DISCHARGE NOTE NURSING/CASE MANAGEMENT/SOCIAL WORK - NSDCPEWEB_GEN_ALL_CORE
[place] website --- [email or web address] website --- [email or web address]

## 2019-09-28 NOTE — PATIENT PROFILE ADULT - NSPROMUTANXFEARFT_GEN_A_NUR
Nurse is calling to leave a message for Lilli. She states that the pt's BP is elevated to 152/94. She also states that he took his medication this morning and she wants to know if you can give her a call back is regards to this. Please advise. [facility] [phone] also pt BP was elevated last week HH nurse said do you want blood work to be done or any medication changes. Pt has apt next week.   
anxiety about recovery process

## 2019-09-28 NOTE — DISCHARGE NOTE PROVIDER - REASON FOR ADMISSION
s/p 8/29 reop thoracoabdominal aortic aneurysm open repair with graft s/p 8/29 re-op Thoracoabdominal aneurysm open repair with Decron graft and celiac SMA bypass, reimplantation of lumbar artery.

## 2019-09-29 LAB
ALBUMIN SERPL ELPH-MCNC: 2.6 G/DL — LOW (ref 3.3–5)
ALP SERPL-CCNC: 87 U/L — SIGNIFICANT CHANGE UP (ref 40–120)
ALT FLD-CCNC: 36 U/L — SIGNIFICANT CHANGE UP (ref 10–45)
ANION GAP SERPL CALC-SCNC: 9 MMOL/L — SIGNIFICANT CHANGE UP (ref 5–17)
AST SERPL-CCNC: 30 U/L — SIGNIFICANT CHANGE UP (ref 10–40)
BASOPHILS # BLD AUTO: 0.05 K/UL — SIGNIFICANT CHANGE UP (ref 0–0.2)
BASOPHILS NFR BLD AUTO: 0.8 % — SIGNIFICANT CHANGE UP (ref 0–2)
BILIRUB SERPL-MCNC: 0.3 MG/DL — SIGNIFICANT CHANGE UP (ref 0.2–1.2)
BUN SERPL-MCNC: 16 MG/DL — SIGNIFICANT CHANGE UP (ref 7–23)
CALCIUM SERPL-MCNC: 8.8 MG/DL — SIGNIFICANT CHANGE UP (ref 8.4–10.5)
CHLORIDE SERPL-SCNC: 99 MMOL/L — SIGNIFICANT CHANGE UP (ref 96–108)
CO2 SERPL-SCNC: 26 MMOL/L — SIGNIFICANT CHANGE UP (ref 22–31)
CREAT SERPL-MCNC: 0.9 MG/DL — SIGNIFICANT CHANGE UP (ref 0.5–1.3)
EOSINOPHIL # BLD AUTO: 0.43 K/UL — SIGNIFICANT CHANGE UP (ref 0–0.5)
EOSINOPHIL NFR BLD AUTO: 6.6 % — HIGH (ref 0–6)
FERRITIN SERPL-MCNC: 633 NG/ML — HIGH (ref 15–150)
GLUCOSE SERPL-MCNC: 90 MG/DL — SIGNIFICANT CHANGE UP (ref 70–99)
HCT VFR BLD CALC: 26.2 % — LOW (ref 34.5–45)
HCV AB S/CO SERPL IA: 0.18 S/CO — SIGNIFICANT CHANGE UP (ref 0–0.99)
HCV AB SERPL-IMP: SIGNIFICANT CHANGE UP
HGB BLD-MCNC: 8.6 G/DL — LOW (ref 11.5–15.5)
IMM GRANULOCYTES NFR BLD AUTO: 1.7 % — HIGH (ref 0–1.5)
IRON SATN MFR SERPL: 15 % — SIGNIFICANT CHANGE UP (ref 14–50)
IRON SATN MFR SERPL: 37 UG/DL — SIGNIFICANT CHANGE UP (ref 30–160)
LYMPHOCYTES # BLD AUTO: 1.21 K/UL — SIGNIFICANT CHANGE UP (ref 1–3.3)
LYMPHOCYTES # BLD AUTO: 18.6 % — SIGNIFICANT CHANGE UP (ref 13–44)
MCHC RBC-ENTMCNC: 27.7 PG — SIGNIFICANT CHANGE UP (ref 27–34)
MCHC RBC-ENTMCNC: 32.8 GM/DL — SIGNIFICANT CHANGE UP (ref 32–36)
MCV RBC AUTO: 84.5 FL — SIGNIFICANT CHANGE UP (ref 80–100)
MONOCYTES # BLD AUTO: 0.54 K/UL — SIGNIFICANT CHANGE UP (ref 0–0.9)
MONOCYTES NFR BLD AUTO: 8.3 % — SIGNIFICANT CHANGE UP (ref 2–14)
NEUTROPHILS # BLD AUTO: 4.18 K/UL — SIGNIFICANT CHANGE UP (ref 1.8–7.4)
NEUTROPHILS NFR BLD AUTO: 64 % — SIGNIFICANT CHANGE UP (ref 43–77)
NRBC # BLD: 0 /100 WBCS — SIGNIFICANT CHANGE UP (ref 0–0)
PLATELET # BLD AUTO: 223 K/UL — SIGNIFICANT CHANGE UP (ref 150–400)
POTASSIUM SERPL-MCNC: 3.4 MMOL/L — LOW (ref 3.5–5.3)
POTASSIUM SERPL-SCNC: 3.4 MMOL/L — LOW (ref 3.5–5.3)
PROT SERPL-MCNC: 6.9 G/DL — SIGNIFICANT CHANGE UP (ref 6–8.3)
RBC # BLD: 3.1 M/UL — LOW (ref 3.8–5.2)
RBC # FLD: 15.8 % — HIGH (ref 10.3–14.5)
SODIUM SERPL-SCNC: 134 MMOL/L — LOW (ref 135–145)
TIBC SERPL-MCNC: 242 UG/DL — SIGNIFICANT CHANGE UP (ref 220–430)
UIBC SERPL-MCNC: 205 UG/DL — SIGNIFICANT CHANGE UP (ref 110–370)
WBC # BLD: 6.52 K/UL — SIGNIFICANT CHANGE UP (ref 3.8–10.5)
WBC # FLD AUTO: 6.52 K/UL — SIGNIFICANT CHANGE UP (ref 3.8–10.5)

## 2019-09-29 PROCEDURE — 99223 1ST HOSP IP/OBS HIGH 75: CPT

## 2019-09-29 PROCEDURE — 99232 SBSQ HOSP IP/OBS MODERATE 35: CPT

## 2019-09-29 RX ORDER — POTASSIUM CHLORIDE 20 MEQ
20 PACKET (EA) ORAL ONCE
Refills: 0 | Status: COMPLETED | OUTPATIENT
Start: 2019-09-29 | End: 2019-09-29

## 2019-09-29 RX ADMIN — OXYCODONE HYDROCHLORIDE 5 MILLIGRAM(S): 5 TABLET ORAL at 14:03

## 2019-09-29 RX ADMIN — Medication 500 MILLIGRAM(S): at 18:08

## 2019-09-29 RX ADMIN — Medication 25 MILLIGRAM(S): at 06:42

## 2019-09-29 RX ADMIN — OXYCODONE HYDROCHLORIDE 5 MILLIGRAM(S): 5 TABLET ORAL at 20:15

## 2019-09-29 RX ADMIN — LIDOCAINE 1 PATCH: 4 CREAM TOPICAL at 19:53

## 2019-09-29 RX ADMIN — AMLODIPINE BESYLATE 5 MILLIGRAM(S): 2.5 TABLET ORAL at 10:32

## 2019-09-29 RX ADMIN — LIDOCAINE 1 PATCH: 4 CREAM TOPICAL at 12:23

## 2019-09-29 RX ADMIN — LIDOCAINE 1 PATCH: 4 CREAM TOPICAL at 23:00

## 2019-09-29 RX ADMIN — HEPARIN SODIUM 5000 UNIT(S): 5000 INJECTION INTRAVENOUS; SUBCUTANEOUS at 22:22

## 2019-09-29 RX ADMIN — Medication 1 MILLIGRAM(S): at 12:22

## 2019-09-29 RX ADMIN — Medication 25 MILLIGRAM(S): at 18:08

## 2019-09-29 RX ADMIN — Medication 1 APPLICATION(S): at 18:08

## 2019-09-29 RX ADMIN — OXYCODONE HYDROCHLORIDE 5 MILLIGRAM(S): 5 TABLET ORAL at 06:41

## 2019-09-29 RX ADMIN — Medication 325 MILLIGRAM(S): at 06:43

## 2019-09-29 RX ADMIN — Medication 20 MILLIEQUIVALENT(S): at 12:22

## 2019-09-29 RX ADMIN — Medication 500 MILLIGRAM(S): at 06:41

## 2019-09-29 RX ADMIN — OXYCODONE HYDROCHLORIDE 5 MILLIGRAM(S): 5 TABLET ORAL at 14:45

## 2019-09-29 RX ADMIN — OXYCODONE HYDROCHLORIDE 5 MILLIGRAM(S): 5 TABLET ORAL at 07:00

## 2019-09-29 RX ADMIN — Medication 81 MILLIGRAM(S): at 12:22

## 2019-09-29 RX ADMIN — AMIODARONE HYDROCHLORIDE 200 MILLIGRAM(S): 400 TABLET ORAL at 06:42

## 2019-09-29 RX ADMIN — Medication 1 APPLICATION(S): at 06:42

## 2019-09-29 RX ADMIN — OXYCODONE HYDROCHLORIDE 5 MILLIGRAM(S): 5 TABLET ORAL at 19:51

## 2019-09-29 RX ADMIN — Medication 325 MILLIGRAM(S): at 18:08

## 2019-09-29 RX ADMIN — PANTOPRAZOLE SODIUM 40 MILLIGRAM(S): 20 TABLET, DELAYED RELEASE ORAL at 06:41

## 2019-09-29 RX ADMIN — Medication 0.25 MILLIGRAM(S): at 22:23

## 2019-09-29 NOTE — CONSULT NOTE ADULT - ASSESSMENT
Mrs Belcher is a pleasant 69 year-old female with 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 Admitted to acute rehabilitation at [facility] for these complaints.     Neurology/Psych  Cervical Myelopathy:  - RLE Weakness: Start PT/OT  - pain control per primary team  - Dysphagia III - SLT evaluate and treat  Anxiety/Motor Tick  - patient states that she has had this for a long time and medical therapy per primary was unsuccessfuk  - has noted that xanax has been helpful and is requesting continuation  - discussed with patient about risk/benfits (including falls and increased risk of dementia)  - would suggest psych consult to optimize medication regimen.     Cardiovascular   Thoracoabdominal aneurysm open repair  - manage blood pressure tightly as described below  - monitor for chest pain, shortness of breath and abdominal pain  - continue aspirin  - given complicated course, would obtain transthoracic echocardiography to evaluate EF which will guide further medical optimization  Hypertension  - amlodipine 5mg qd  - if needed would initiate ACEi   Atrial Fibrillation  - rate control with lopressor 25mg bid and amiodarone 200mg qd  - can consider transitioning lopressor from 25mg bid to toprol 50mg     Genitourinary  Urinary retention  - Neurogenic Bladder: Continue Galicia, Urology consult, consider voiding trial     Gastroenterology  GERD vs GI prophylaxis  - no clear indication for PPI, pt states that she did not take this at home, would recommend discontinuation    Renal  Acute Kidney Injury  - likely ATN given hospital course  - resolved at present.   Hyponatremia  - mildly low sodium, monitor for now  Hypokalemia  - replete aggressively, given recent development of AF would target K>4  - monitor magnesium as hypomagnesemia will hinder correction of potassium     Hematology  Anemia  - likely related to blood loss during surgery  - supplemental iron recommended  - transfuse if Hemoglobin < 8    DVT prophylaxis: would d/c heparin and initiate lovenox    case discussed with PMR resident  should a clinical question arise regarding the care of this patient, please do not hesistate to contact me at [phone] until 5pm on 9/29/2019 Mrs Belcher is a pleasant 69 year-old female with 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 Admitted to acute rehabilitation at [facility] for these complaints.     Neurology/Psych  Cervical Myelopathy:  - RLE Weakness: Start PT/OT  - pain control per primary team  - Dysphagia III - SLT evaluate and treat  Anxiety/Motor Tick  - patient states that she has had this for a long time and medical therapy per primary was unsuccessfuk  - has noted that xanax has been helpful and is requesting continuation  - discussed with patient about risk/benfits (including falls and increased risk of dementia)  - would suggest psych consult to optimize medication regimen.     Cardiovascular   Thoracoabdominal aneurysm open repair  - manage blood pressure tightly as described below  - monitor for chest pain, shortness of breath and abdominal pain  - continue aspirin  - given complicated course, would obtain transthoracic echocardiography to evaluate EF which will guide further medical optimization  Hypertension  - amlodipine 5mg qd  - if needed would initiate ACEi   Atrial Fibrillation  - rate control with lopressor 25mg bid and amiodarone 200mg qd  - can consider transitioning lopressor from 25mg bid to toprol 50mg     Genitourinary  Urinary retention  - Neurogenic Bladder: Continue Galicia, Urology consult, consider voiding trial     Gastroenterology  GERD vs GI prophylaxis  - no clear indication for PPI, pt states that she did not take this at home, would recommend discontinuation    Renal  Acute Kidney Injury  - likely ATN given hospital course  - resolved at present.   Hyponatremia  - mildly low sodium, monitor for now  Hypokalemia  - replete aggressively, given recent development of AF would target K>4  - monitor magnesium as hypomagnesemia will hinder correction of potassium     Hematology  Anemia  - likely related to blood loss during surgery  - supplemental iron recommended  - transfuse if Hemoglobin < 8  Eosinophilia  - AEC 0.4  - unclear etiology, only would become concerning if it continues to rise (>0.5)  - recommend CBC with differential for tomorrow    DVT prophylaxis: would d/c heparin and initiate lovenox    case discussed with PMR resident  should a clinical question arise regarding the care of this patient, please do not hesistate to contact me at [phone] until 5pm on 9/29/2019

## 2019-09-29 NOTE — CONSULT NOTE ADULT - SUBJECTIVE AND OBJECTIVE BOX
Mrs Barba is a pleasant 69 year-old  female who is admitted to [facility] for acute rehabilitation due to cervical myelopathy secondary to a spinal cord infarction       HPI: (obtained through chart review and patient conversation)  69 year-old female with hx of HTN, preeclampsia, "adrian-aorta" s/p aortic valve replacement, admitted electively to [facility] on 8/28 for open TAAA repair due to enlarging thoracoabdominal aortic aneurysm. Pre-operatively a lumbar drain was placed on 8/28 for neuroprotective purposes in the unfortunate event of a spinal cord infarction. The patient underwent her thoracoabdominal aneurysm open repair with decron graft and celiac SMA bypass with reimplantation of lumbar artery on 8/29. During this surgery a chest tube placed was placed and intraoperative bleeding occurred which required 2 PRBC, 2 Platelets, & FEIBA. Post-operatively she required vasopressors in the CTICU. Additionally post-operatively it was found that she had loss of strength in lower extremities, R>L. Repeat imaging was negative for hematoma. Also during this time period her kidney function declined and in conjunction with nephrology ATN was diagnosed with eventual recovery of her kidney function. On 9/2 her lumbar drain removed 9/2. Her course was further complicated by AF with RVR, requiring amio gtt, esmolol gtt, digoxin, and vasopressin for pressure support. On 9/4 her left pleural chest tubes x3 removed. Course also c/b urinary retention requiring muñoz catheter. 9/7 infectious w/u initiated for leukocytosis and fever, initiated zosyn 9/8 for suspected UTI/LLL PNA, ID consulted. CT chest with complex mod left pleural effusion, suspect PNA present. Blood cultures so far no growth to date. Workup for RLE weakness ordered by neurology, felt weakness to be due to cervical myelopathy. Urology consulted 9/23 for urinary retention, felt to be neurogenic in nature, recommended continue muñoz. Completed abx for UTI. Throughout the course of her hospitalization the aforementioned RLE weakness showed some improvement d9/3 S&S eval recommend Dysphagia 1 nectar thick diet, 3L NC, PT recommending acute rehab.       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: s/p repair  Asceding aortic aneurysm repair 4/8/2019  S/P cataract surgery  Arm fracture, left: 2005               Substance Use (street drugs): ( x ) never used  (  ) other:  Tobacco Usage: ( x ) former smoker   Alcohol Usage: occasional  Sexual History:  not active  Recent Travel: no recent travel      Allergies  No Known Allergies        REVIEW OF SYSTEMS:  CONSTITUTIONAL: No fever, weight loss, or fatigue  EYES: No eye pain, visual disturbances, or discharge  ENMT:  No difficulty hearing, tinnitus, vertigo; No sinus or throat pain  NECK: No pain or stiffness  BREASTS: No pain, masses, or nipple discharge  RESPIRATORY: No cough, wheezing, chills or hemoptysis; No shortness of breath  CARDIOVASCULAR: No chest pain, palpitations, dizziness, or leg swelling  GASTROINTESTINAL: No abdominal or epigastric pain. No nausea, vomiting, or hematemesis; No diarrhea or constipation. No melena or hematochezia.  GENITOURINARY: No dysuria, frequency, hematuria, or incontinence  NEUROLOGICAL: No headaches, memory loss, numbness, or tremors. + weakness on the right side  SKIN: No itching, burning, rashes, or lesions   LYMPH NODES: No enlarged glands  ENDOCRINE: No heat or cold intolerance; No hair loss  MUSCULOSKELETAL: No joint pain or swelling; No muscle, back, or extremity pain  PSYCHIATRIC: No depression, anxiety, mood swings, or difficulty sleeping  HEME/LYMPH: No easy bruising, or bleeding gums  ALLERY AND IMMUNOLOGIC: No hives or eczema    ALL ROS REVIEWED AND NORMAL EXCEPT AS STATED ABOVE    T(C): 36.7 (09-29-19 @ 08:00), Max: 36.8 (09-28-19 @ 21:29)  HR: 59 (09-29-19 @ 08:00) (50 - 60)  BP: 111/58 (09-29-19 @ 08:00) (96/55 - 194/45)  RR: 14 (09-29-19 @ 08:00) (14 - 18)  SpO2: 98% (09-29-19 @ 08:00) (98% - 100%)  Wt(kg): --Vital Signs Last 24 Hrs  T(C): 36.7 (29 Sep 2019 08:00), Max: 36.8 (28 Sep 2019 21:29)  T(F): 98 (29 Sep 2019 08:00), Max: 98.2 (28 Sep 2019 21:29)  HR: 59 (29 Sep 2019 08:00) (50 - 60)  BP: 111/58 (29 Sep 2019 08:00) (96/55 - 194/45)  BP(mean): --  RR: 14 (29 Sep 2019 08:00) (14 - 18)  SpO2: 98% (29 Sep 2019 08:00) (98% - 100%)    PHYSICAL EXAM:  GENERAL: NAD, well-groomed, well-developed  HEAD:  Atraumatic, Normocephalic  EYES: EOMI, PERRLA, conjunctiva and sclera clear  ENMT: No tonsillar erythema, exudates, or enlargement; Moist mucous membranes, Good dentition, No lesions  NECK: Supple, No JVD, Normal thyroid  NERVOUS SYSTEM:  Alert & Oriented X3, Good concentration; Motor Strength 5/5 B/L upper and lower extremities; DTRs 2+ intact and symmetric  CHEST/LUNG: Clear to percussion bilaterally; No rales, rhonchi, wheezing, or rubs  HEART: Regular rate and rhythm; No murmurs, rubs, or gallops  ABDOMEN: Soft, Nontender, Nondistended; Bowel sounds present  EXTREMITIES:  2+ Peripheral Pulses, No clubbing, cyanosis, or edema  LYMPH: No lymphadenopathy noted  SKIN: No rashes or lesions, large surgical scar with stiches noted on the left flank and abdomen.     LABS:                        8.6    6.52  )-----------( 223      ( 29 Sep 2019 05:30 )             26.2     09-29    134<L>  |  99  |  16  ----------------------------<  90  3.4<L>   |  26  |  0.90    Ca    8.8      29 Sep 2019 05:30    TPro  6.9  /  Alb  2.6<L>  /  TBili  0.3  /  DBili  x   /  AST  30  /  ALT  36  /  AlkPhos  87  09-29        RADIOLOGY & ADDITIONAL TESTS:    Consultant(s) Notes Reviewed:  [x ] YES  [ ] NO  Care Discussed with Consultants/Other Providers [ x] YES  [ ] NO Mrs Barba is a pleasant 69 year-old  female who is admitted to [facility] for acute rehabilitation due to cervical myelopathy secondary to a spinal cord infarction       HPI: (obtained through chart review and patient conversation)  69 year-old female with hx of HTN, preeclampsia, "adrian-aorta" s/p aortic valve replacement, admitted electively to [facility] on 8/28 for open TAAA repair due to enlarging thoracoabdominal aortic aneurysm. Pre-operatively a lumbar drain was placed on 8/28 for neuroprotective purposes in the unfortunate event of a spinal cord infarction. The patient underwent her thoracoabdominal aneurysm open repair with decron graft and celiac SMA bypass with reimplantation of lumbar artery on 8/29. During this surgery a chest tube placed was placed and intraoperative bleeding occurred which required 2 PRBC, 2 Platelets, & FEIBA. Post-operatively she required vasopressors in the CTICU. Additionally post-operatively it was found that she had loss of strength in lower extremities, R>L. Repeat imaging was negative for hematoma. Also during this time period her kidney function declined and in conjunction with nephrology ATN was diagnosed with eventual recovery of her kidney function. On 9/2 her lumbar drain removed 9/2. Her course was further complicated by AF with RVR, requiring amio gtt, esmolol gtt, digoxin, and vasopressin for pressure support. On 9/4 her left pleural chest tubes x3 removed. Course also c/b urinary retention requiring muñoz catheter. 9/7 infectious w/u initiated for leukocytosis and fever, initiated zosyn 9/8 for suspected UTI/LLL PNA, ID consulted. CT chest with complex mod left pleural effusion, suspect PNA present. Blood cultures so far no growth to date. Workup for RLE weakness ordered by neurology, felt weakness to be due to cervical myelopathy. Urology consulted 9/23 for urinary retention, felt to be neurogenic in nature, recommended continue muñoz. Completed abx for UTI. Throughout the course of her hospitalization the aforementioned RLE weakness showed some improvement d9/3 S&S eval recommend Dysphagia 1 nectar thick diet, 3L NC, PT recommending acute rehab.       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: s/p repair  Asceding aortic aneurysm repair 4/8/2019  S/P cataract surgery  Arm fracture, left: 2005               Substance Use (street drugs): ( x ) never used  (  ) other:  Tobacco Usage: ( x ) former smoker   Alcohol Usage: occasional  Sexual History:  not active  Recent Travel: no recent travel      Allergies  No Known Allergies        REVIEW OF SYSTEMS:  CONSTITUTIONAL: No fever, weight loss, or fatigue  EYES: No eye pain, visual disturbances, or discharge  ENMT:  No difficulty hearing, tinnitus, vertigo; No sinus or throat pain  NECK: No pain or stiffness  BREASTS: No pain, masses, or nipple discharge  RESPIRATORY: No cough, wheezing, chills or hemoptysis; No shortness of breath  CARDIOVASCULAR: No chest pain, palpitations, dizziness, or leg swelling  GASTROINTESTINAL: No abdominal or epigastric pain. No nausea, vomiting, or hematemesis; No diarrhea or constipation. No melena or hematochezia.  GENITOURINARY: No dysuria, frequency, hematuria, or incontinence. + retention  NEUROLOGICAL: No headaches, memory loss, numbness, or tremors. + weakness on the right side  SKIN: No itching, burning, rashes, or lesions  LYMPH NODES: No enlarged glands  ENDOCRINE: No heat or cold intolerance; No hair loss  MUSCULOSKELETAL: No joint pain or swelling; mild back/muscle, pain  PSYCHIATRIC: No depression, anxiety, mood swings, or difficulty sleeping  HEME/LYMPH: No easy bruising, or bleeding gums  ALLERY AND IMMUNOLOGIC: No hives or eczema    ALL ROS REVIEWED AND NORMAL EXCEPT AS STATED ABOVE    T(C): 36.7 (09-29-19 @ 08:00), Max: 36.8 (09-28-19 @ 21:29)  HR: 59 (09-29-19 @ 08:00) (50 - 60)  BP: 111/58 (09-29-19 @ 08:00) (96/55 - 194/45)  RR: 14 (09-29-19 @ 08:00) (14 - 18)  SpO2: 98% (09-29-19 @ 08:00) (98% - 100%)  Wt(kg): --Vital Signs Last 24 Hrs  T(C): 36.7 (29 Sep 2019 08:00), Max: 36.8 (28 Sep 2019 21:29)  T(F): 98 (29 Sep 2019 08:00), Max: 98.2 (28 Sep 2019 21:29)  HR: 59 (29 Sep 2019 08:00) (50 - 60)  BP: 111/58 (29 Sep 2019 08:00) (96/55 - 194/45)  BP(mean): --  RR: 14 (29 Sep 2019 08:00) (14 - 18)  SpO2: 98% (29 Sep 2019 08:00) (98% - 100%)    PHYSICAL EXAM:  GENERAL: NAD, well-groomed, well-developed  HEAD:  Atraumatic, Normocephalic  EYES: EOMI, PERRLA, conjunctiva and sclera clear  ENMT: No tonsillar erythema, exudates, or enlargement; Moist mucous membranes, Good dentition, No lesions  NECK: Supple, No JVD, Normal thyroid  NERVOUS SYSTEM:  Alert & Oriented X3, Good concentration; Motor Strength 5/5 B/L upper and lower extremities; DTRs 2+ intact and symmetric  CHEST/LUNG: Clear to percussion bilaterally; No rales, rhonchi, wheezing, or rubs  HEART: Regular rate and rhythm; No murmurs, rubs, or gallops  ABDOMEN: Soft, Nontender, Nondistended; Bowel sounds present  EXTREMITIES:  2+ Peripheral Pulses, No clubbing, cyanosis, or edema  LYMPH: No lymphadenopathy noted  SKIN: No rashes or lesions, large surgical scar with stiches noted on the left flank and abdomen.     LABS:                        8.6    6.52  )-----------( 223      ( 29 Sep 2019 05:30 )             26.2     09-29    134<L>  |  99  |  16  ----------------------------<  90  3.4<L>   |  26  |  0.90    Ca    8.8      29 Sep 2019 05:30    TPro  6.9  /  Alb  2.6<L>  /  TBili  0.3  /  DBili  x   /  AST  30  /  ALT  36  /  AlkPhos  87  09-29        RADIOLOGY & ADDITIONAL TESTS:    Consultant(s) Notes Reviewed:  [x ] YES  [ ] NO  Care Discussed with Consultants/Other Providers [ x] YES  [ ] NO

## 2019-09-30 LAB
ANION GAP SERPL CALC-SCNC: 8 MMOL/L — SIGNIFICANT CHANGE UP (ref 5–17)
BASOPHILS # BLD AUTO: 0.06 K/UL — SIGNIFICANT CHANGE UP (ref 0–0.2)
BASOPHILS NFR BLD AUTO: 0.9 % — SIGNIFICANT CHANGE UP (ref 0–2)
BUN SERPL-MCNC: 17 MG/DL — SIGNIFICANT CHANGE UP (ref 7–23)
CALCIUM SERPL-MCNC: 8.8 MG/DL — SIGNIFICANT CHANGE UP (ref 8.4–10.5)
CHLORIDE SERPL-SCNC: 99 MMOL/L — SIGNIFICANT CHANGE UP (ref 96–108)
CO2 SERPL-SCNC: 26 MMOL/L — SIGNIFICANT CHANGE UP (ref 22–31)
CREAT SERPL-MCNC: 1.03 MG/DL — SIGNIFICANT CHANGE UP (ref 0.5–1.3)
EOSINOPHIL # BLD AUTO: 0.45 K/UL — SIGNIFICANT CHANGE UP (ref 0–0.5)
EOSINOPHIL NFR BLD AUTO: 7 % — HIGH (ref 0–6)
GLUCOSE SERPL-MCNC: 89 MG/DL — SIGNIFICANT CHANGE UP (ref 70–99)
HCT VFR BLD CALC: 25.6 % — LOW (ref 34.5–45)
HGB BLD-MCNC: 8.4 G/DL — LOW (ref 11.5–15.5)
IMM GRANULOCYTES NFR BLD AUTO: 0.9 % — SIGNIFICANT CHANGE UP (ref 0–1.5)
LYMPHOCYTES # BLD AUTO: 1.36 K/UL — SIGNIFICANT CHANGE UP (ref 1–3.3)
LYMPHOCYTES # BLD AUTO: 21 % — SIGNIFICANT CHANGE UP (ref 13–44)
MAGNESIUM SERPL-MCNC: 1.6 MG/DL — SIGNIFICANT CHANGE UP (ref 1.6–2.6)
MCHC RBC-ENTMCNC: 28 PG — SIGNIFICANT CHANGE UP (ref 27–34)
MCHC RBC-ENTMCNC: 32.8 GM/DL — SIGNIFICANT CHANGE UP (ref 32–36)
MCV RBC AUTO: 85.3 FL — SIGNIFICANT CHANGE UP (ref 80–100)
MONOCYTES # BLD AUTO: 0.59 K/UL — SIGNIFICANT CHANGE UP (ref 0–0.9)
MONOCYTES NFR BLD AUTO: 9.1 % — SIGNIFICANT CHANGE UP (ref 2–14)
NEUTROPHILS # BLD AUTO: 3.95 K/UL — SIGNIFICANT CHANGE UP (ref 1.8–7.4)
NEUTROPHILS NFR BLD AUTO: 61.1 % — SIGNIFICANT CHANGE UP (ref 43–77)
NRBC # BLD: 0 /100 WBCS — SIGNIFICANT CHANGE UP (ref 0–0)
PLATELET # BLD AUTO: 238 K/UL — SIGNIFICANT CHANGE UP (ref 150–400)
POTASSIUM SERPL-MCNC: 3.7 MMOL/L — SIGNIFICANT CHANGE UP (ref 3.5–5.3)
POTASSIUM SERPL-SCNC: 3.7 MMOL/L — SIGNIFICANT CHANGE UP (ref 3.5–5.3)
RBC # BLD: 3 M/UL — LOW (ref 3.8–5.2)
RBC # FLD: 15.8 % — HIGH (ref 10.3–14.5)
SODIUM SERPL-SCNC: 133 MMOL/L — LOW (ref 135–145)
WBC # BLD: 6.47 K/UL — SIGNIFICANT CHANGE UP (ref 3.8–10.5)
WBC # FLD AUTO: 6.47 K/UL — SIGNIFICANT CHANGE UP (ref 3.8–10.5)

## 2019-09-30 PROCEDURE — 99233 SBSQ HOSP IP/OBS HIGH 50: CPT | Mod: GC

## 2019-09-30 RX ORDER — ENOXAPARIN SODIUM 100 MG/ML
40 INJECTION SUBCUTANEOUS DAILY
Refills: 0 | Status: DISCONTINUED | OUTPATIENT
Start: 2019-09-30 | End: 2019-10-30

## 2019-09-30 RX ORDER — POTASSIUM CHLORIDE 20 MEQ
20 PACKET (EA) ORAL ONCE
Refills: 0 | Status: COMPLETED | OUTPATIENT
Start: 2019-09-30 | End: 2019-09-30

## 2019-09-30 RX ADMIN — Medication 1 APPLICATION(S): at 06:35

## 2019-09-30 RX ADMIN — LIDOCAINE 1 PATCH: 4 CREAM TOPICAL at 22:17

## 2019-09-30 RX ADMIN — Medication 325 MILLIGRAM(S): at 17:40

## 2019-09-30 RX ADMIN — Medication 500 MILLIGRAM(S): at 17:40

## 2019-09-30 RX ADMIN — Medication 500 MILLIGRAM(S): at 06:35

## 2019-09-30 RX ADMIN — OXYCODONE HYDROCHLORIDE 5 MILLIGRAM(S): 5 TABLET ORAL at 12:20

## 2019-09-30 RX ADMIN — Medication 25 MILLIGRAM(S): at 06:36

## 2019-09-30 RX ADMIN — AMLODIPINE BESYLATE 5 MILLIGRAM(S): 2.5 TABLET ORAL at 06:35

## 2019-09-30 RX ADMIN — HEPARIN SODIUM 5000 UNIT(S): 5000 INJECTION INTRAVENOUS; SUBCUTANEOUS at 06:36

## 2019-09-30 RX ADMIN — Medication 20 MILLIEQUIVALENT(S): at 22:17

## 2019-09-30 RX ADMIN — ENOXAPARIN SODIUM 40 MILLIGRAM(S): 100 INJECTION SUBCUTANEOUS at 12:21

## 2019-09-30 RX ADMIN — OXYCODONE HYDROCHLORIDE 5 MILLIGRAM(S): 5 TABLET ORAL at 13:15

## 2019-09-30 RX ADMIN — Medication 81 MILLIGRAM(S): at 12:22

## 2019-09-30 RX ADMIN — OXYCODONE HYDROCHLORIDE 5 MILLIGRAM(S): 5 TABLET ORAL at 22:16

## 2019-09-30 RX ADMIN — AMIODARONE HYDROCHLORIDE 200 MILLIGRAM(S): 400 TABLET ORAL at 06:35

## 2019-09-30 RX ADMIN — OXYCODONE HYDROCHLORIDE 5 MILLIGRAM(S): 5 TABLET ORAL at 06:39

## 2019-09-30 RX ADMIN — Medication 0.25 MILLIGRAM(S): at 22:16

## 2019-09-30 RX ADMIN — PANTOPRAZOLE SODIUM 40 MILLIGRAM(S): 20 TABLET, DELAYED RELEASE ORAL at 06:37

## 2019-09-30 RX ADMIN — LIDOCAINE 1 PATCH: 4 CREAM TOPICAL at 19:58

## 2019-09-30 RX ADMIN — Medication 325 MILLIGRAM(S): at 06:36

## 2019-09-30 RX ADMIN — Medication 10 MILLIGRAM(S): at 13:25

## 2019-09-30 RX ADMIN — Medication 1 MILLIGRAM(S): at 12:21

## 2019-09-30 RX ADMIN — OXYCODONE HYDROCHLORIDE 5 MILLIGRAM(S): 5 TABLET ORAL at 18:13

## 2019-09-30 RX ADMIN — Medication 25 MILLIGRAM(S): at 17:39

## 2019-09-30 RX ADMIN — LIDOCAINE 1 PATCH: 4 CREAM TOPICAL at 12:22

## 2019-09-30 RX ADMIN — OXYCODONE HYDROCHLORIDE 5 MILLIGRAM(S): 5 TABLET ORAL at 22:40

## 2019-09-30 RX ADMIN — Medication 1 APPLICATION(S): at 17:40

## 2019-09-30 NOTE — DIETITIAN INITIAL EVALUATION ADULT. - ADD RECOMMEND
1) Monitor Weights, Intake, Tolerance, Skin & Labwork   2) Education Provided on Proper Nutrition 3) Continue Nutrition Plan of Care

## 2019-09-30 NOTE — DIETITIAN INITIAL EVALUATION ADULT. - ENERGY NEEDS
Height: 65Inches   Weight: 150lb   Body Mass Index (BMI): 25.1kg/m2   Ideal Body Weight Range: 136lb (+/-10%)   Percent Ideal Body Weight ~110%

## 2019-09-30 NOTE — DIETITIAN INITIAL EVALUATION ADULT. - FACTORS AFF FOOD INTAKE
States Poor PO Intake over Last Week - Since Surgery (Pain Also has been Affecting Intake)/persistent lack of appetite

## 2019-09-30 NOTE — DIETITIAN INITIAL EVALUATION ADULT. - DIET TYPE
on Soft (Dysphagia 3) Diet w/ Thin Liquids   Declines Nutrition Supplementation   Education Provided on Proper Nutrition & Need for Increased Fluids/Fiber  Patient Does Follow Low Sugar Style Diet (Occasionally Buys Weight Watchers Food Items)@Home & Does Take Vitamin D & Multivitamin Supplements @Home

## 2019-09-30 NOTE — DIETITIAN INITIAL EVALUATION ADULT. - OTHER INFO
69yr Old Female - Dx: Cervical Myelopathy - Initial Assessment - Soft (Dysphagia 3) Diet w/ Thin Liquids (Declines Nutrition Supplementation), Denies Food Allergy/Intolerance, Tolerates Diet Well, No Some Chewing Complications (Tolerates Soft (Dysphagia 3) Diet Well)/ No Swallowing Complications (Per Patient), Consumed 25% of First Meal (as Per Documentation), Surgical Incision on Scapula & No Pressure Ulcers (as Per Nursing Flow Sheets), No Edema Noted (as Per Nursing Flow Sheets), No Recent Nausea/Vomiting/Diarrhea (as Per Patient)

## 2019-09-30 NOTE — CHART NOTE - NSCHARTNOTEFT_GEN_A_CORE
Upon Nutritional Assessment by the Registered Dietitian your patient was determined to meet criteria / has evidence of the following diagnosis/diagnoses:          [X] Severe Protein Calorie Malnutrition    Findings as based on:  [X] Comprehensive Nutrition Assessment   [X] Nutrition Focused Physical Exam - Temporalis, Orbital Fat Pads, Buccal Fat Pads, Gastrocnemius(Calf) & Hand (Interosseous) Wasting/Depletion Observed  [X] Other: Poor PO Intake 7+ Days & Significant Weight Decrease Over Last Month     Nutrition Plan/Recommendations:    1) Declines Nutrition Supplementation   2) Education Provided on Proper Nutrition     PROVIDER Section:   By signing this assessment you are acknowledging and agree with the diagnosis/diagnoses assigned by the Registered Dietitian    Thomas Patten RDN

## 2019-09-30 NOTE — DIETITIAN INITIAL EVALUATION ADULT. - SIGNS/SYMPTOMS
Fat Depletion/Muscle Wasting Observed, Poor PO Intake & Significant Weight Decrease Need for Soft (Dysphagia 3) Diet

## 2019-09-30 NOTE — DIETITIAN INITIAL EVALUATION ADULT. - PHYSICAL APPEARANCE
Temporalis, Orbital Fat Pads, Buccal Fat Pads, Gastrocnemius(Calf) & Hand (Interosseous) Wasting Observed  (Per Nutrition Focused Physical Exam)

## 2019-09-30 NOTE — PROGRESS NOTE ADULT - SUBJECTIVE AND OBJECTIVE BOX
HPI:   69 year-old female with hx of "adrian-aorta" s/p aortic valve replacement, admitted electively to [facility] on 8/28 for open TAAA repair due to enlarging thoracoabdominal aortic aneurysm. The patient underwent her thoracoabdominal aneurysm open repair with decron graft and celiac SMA bypass with reimplantation of lumbar artery on 8/29.  Post-operatively she required vasopressors in the CTICU. Additionally post-operatively it was found that she had loss of strength in lower extremities, R>L. Repeat imaging was negative for hematoma. Also during this time period her kidney function declined and in conjunction with nephrology ATN was diagnosed with eventual recovery of her kidney function.  Her course was further complicated by AF with RVR, requiring amio gtt, esmolol gtt, digoxin, and vasopressin for pressure support. Course also c/b urinary retention requiring muñoz catheter. Workup for RLE weakness ordered by neurology, felt weakness to be due to cervical myelopathy. Urology consulted 9/23 for urinary retention, felt to be neurogenic in nature, recommended continue muñoz. Completed abx for UTI.  S&S eval recommend Dysphagia 1 nectar thick diet, 3L NC, PT recommending acute rehab.     Interval:     REVIEW OF SYSTEMS  + urinary retention, no chest pain, SOB, nausea, vomiting. Last bm 9/28.      RECENT LABS/IMAGING  CBC Full  -  ( 30 Sep 2019 06:58 )  WBC Count : 6.47 K/uL  RBC Count : 3.00 M/uL  Hemoglobin : 8.4 g/dL  Hematocrit : 25.6 %  Platelet Count - Automated : 238 K/uL  Mean Cell Volume : 85.3 fl  Mean Cell Hemoglobin : 28.0 pg  Mean Cell Hemoglobin Concentration : 32.8 gm/dL  Auto Neutrophil # : 3.95 K/uL  Auto Lymphocyte # : 1.36 K/uL  Auto Monocyte # : 0.59 K/uL  Auto Eosinophil # : 0.45 K/uL  Auto Basophil # : 0.06 K/uL  Auto Neutrophil % : 61.1 %  Auto Lymphocyte % : 21.0 %  Auto Monocyte % : 9.1 %  Auto Eosinophil % : 7.0 %  Auto Basophil % : 0.9 %    09-30    133<L>  |  99  |  17  ----------------------------<  89  3.7   |  26  |  1.03    Ca    8.8      30 Sep 2019 06:58  Mg     1.6     09-30    TPro  6.9  /  Alb  2.6<L>  /  TBili  0.3  /  DBili  x   /  AST  30  /  ALT  36  /  AlkPhos  87  09-29        VITALS  T(C): 36.6 (09-30-19 @ 08:22), Max: 36.6 (09-30-19 @ 08:22)  HR: 53 (09-30-19 @ 08:22) (53 - 66)  BP: 146/54 (09-30-19 @ 08:22) (124/60 - 146/54)  RR: 16 (09-30-19 @ 08:22) (16 - 18)  SpO2: 100% (09-30-19 @ 08:22) (98% - 100%)  Wt(kg): --    MEDICATIONS   acetaminophen   Tablet .. 650 milliGRAM(s) every 6 hours PRN  ALPRAZolam 0.25 milliGRAM(s) every 8 hours PRN  amiodarone    Tablet 200 milliGRAM(s) daily  amLODIPine   Tablet 5 milliGRAM(s) daily  ascorbic acid 500 milliGRAM(s) two times a day  aspirin  chewable 81 milliGRAM(s) daily  BACItracin   Ointment 1 Application(s) two times a day  bisacodyl Suppository 10 milliGRAM(s) daily PRN  ferrous    sulfate 325 milliGRAM(s) two times a day  folic acid 1 milliGRAM(s) daily  heparin  Injectable 5000 Unit(s) every 8 hours  influenza   Vaccine 0.5 milliLiter(s) once  lidocaine   Patch 1 Patch daily  metoprolol tartrate 25 milliGRAM(s) every 12 hours  mineral oil 30 milliLiter(s) two times a day PRN  oxyCODONE    IR 5 milliGRAM(s) every 4 hours PRN  pantoprazole    Tablet 40 milliGRAM(s) before breakfast      PE:   Gen - NAD, Comfortable  	HEENT - NCAT, EOMI, MMM  	Neck - Supple, No limited ROM  	Pulm - CTAB, No wheeze, No rhonchi, No crackles  	Cardiovascular - RRR, S1S2  	Chest wall: Sternal incision wound-clean, no erythema or drainage noted  	Posterior lateral incision wound on the left- clean, no erythema or drainage, small skin tear seen next to incision site.    	Abdomen - Soft, NT/ND, +BS  	Extremities - No C/C/E, No calf tenderness  	Neuro-  	   Cognitive - AAOx3  	   Cranial Nerves - CN 2-12 intact  	   Motor -  Bilateral Upper extremities 5/5   	                  LEFT    LE - HF 4/5, KE 4/5, DF 5/5, PF 5/5  	                  RIGHT LE - HF 2/5, KE 4/5, DF 3/5, PF 4/5     	   Sensory - Intact to LT bilaterally   	   Reflexes - Hyper reflexive on the right patella, no clonus, downward babinski bilateral   	   Tone - normal  	Psychiatric - Mood stable, Affect WNL  Skin:  all skin intact    -------------------------------------------------------------------- HPI:   69 year-old female with hx of "adrian-aorta" s/p aortic valve replacement, admitted electively to [facility] on 8/28 for open TAAA repair due to enlarging thoracoabdominal aortic aneurysm. The patient underwent her thoracoabdominal aneurysm open repair with decron graft and celiac SMA bypass with reimplantation of lumbar artery on 8/29.  Post-operatively she required vasopressors in the CTICU. Additionally post-operatively it was found that she had loss of strength in lower extremities, R>L. Repeat imaging was negative for hematoma. Also during this time period her kidney function declined and in conjunction with nephrology ATN was diagnosed with eventual recovery of her kidney function.  Her course was further complicated by AF with RVR, requiring amio gtt, esmolol gtt, digoxin, and vasopressin for pressure support. Course also c/b urinary retention requiring muñoz catheter. Workup for RLE weakness ordered by neurology, felt weakness to be due to cervical myelopathy. Urology consulted 9/23 for urinary retention, felt to be neurogenic in nature, recommended continue muñoz. Completed abx for UTI.  S&S eval recommend Dysphagia 1 nectar thick diet, 3L NC, PT recommending acute rehab.     Interval: + band like pain around thoracic area, above umbilicus. Better with Oxycodone. Could not feel last bm after suppository.     REVIEW OF SYSTEMS  + urinary retention, no chest pain, SOB, nausea, vomiting. Last bm 9/28.      RECENT LABS/IMAGING  CBC Full  -  ( 30 Sep 2019 06:58 )  WBC Count : 6.47 K/uL  RBC Count : 3.00 M/uL  Hemoglobin : 8.4 g/dL  Hematocrit : 25.6 %  Platelet Count - Automated : 238 K/uL  Mean Cell Volume : 85.3 fl  Mean Cell Hemoglobin : 28.0 pg  Mean Cell Hemoglobin Concentration : 32.8 gm/dL  Auto Neutrophil # : 3.95 K/uL  Auto Lymphocyte # : 1.36 K/uL  Auto Monocyte # : 0.59 K/uL  Auto Eosinophil # : 0.45 K/uL  Auto Basophil # : 0.06 K/uL  Auto Neutrophil % : 61.1 %  Auto Lymphocyte % : 21.0 %  Auto Monocyte % : 9.1 %  Auto Eosinophil % : 7.0 %  Auto Basophil % : 0.9 %    09-30    133<L>  |  99  |  17  ----------------------------<  89  3.7   |  26  |  1.03    Ca    8.8      30 Sep 2019 06:58  Mg     1.6     09-30    TPro  6.9  /  Alb  2.6<L>  /  TBili  0.3  /  DBili  x   /  AST  30  /  ALT  36  /  AlkPhos  87  09-29        VITALS  T(C): 36.6 (09-30-19 @ 08:22), Max: 36.6 (09-30-19 @ 08:22)  HR: 53 (09-30-19 @ 08:22) (53 - 66)  BP: 146/54 (09-30-19 @ 08:22) (124/60 - 146/54)  RR: 16 (09-30-19 @ 08:22) (16 - 18)  SpO2: 100% (09-30-19 @ 08:22) (98% - 100%)  Wt(kg): --    MEDICATIONS   acetaminophen   Tablet .. 650 milliGRAM(s) every 6 hours PRN  ALPRAZolam 0.25 milliGRAM(s) every 8 hours PRN  amiodarone    Tablet 200 milliGRAM(s) daily  amLODIPine   Tablet 5 milliGRAM(s) daily  ascorbic acid 500 milliGRAM(s) two times a day  aspirin  chewable 81 milliGRAM(s) daily  BACItracin   Ointment 1 Application(s) two times a day  bisacodyl Suppository 10 milliGRAM(s) daily PRN  ferrous    sulfate 325 milliGRAM(s) two times a day  folic acid 1 milliGRAM(s) daily  heparin  Injectable 5000 Unit(s) every 8 hours  influenza   Vaccine 0.5 milliLiter(s) once  lidocaine   Patch 1 Patch daily  metoprolol tartrate 25 milliGRAM(s) every 12 hours  mineral oil 30 milliLiter(s) two times a day PRN  oxyCODONE    IR 5 milliGRAM(s) every 4 hours PRN  pantoprazole    Tablet 40 milliGRAM(s) before breakfast      PE:   Gen - NAD, Comfortable  	HEENT - NCAT, EOMI, MMM  	Neck - Supple, No limited ROM  	Pulm - CTAB, No wheeze, No rhonchi, No crackles  	Cardiovascular - RRR, S1S2  	Chest wall: Sternal incision wound-clean, no erythema or drainage noted  	Posterior lateral incision wound on the left- clean, no erythema or drainage, small skin tear seen next to incision site.    	Abdomen - Soft, NT/ND, +BS  	Extremities - No C/C/E, No calf tenderness  	Neuro-  	   Cognitive - AAOx3  	   Cranial Nerves - CN 2-12 intact  	   Motor -  Bilateral Upper extremities 5/5   	                  LEFT    LE - HF 4/5, KE 4/5, DF 5/5, PF 5/5  	                  RIGHT LE - HF 2/5, KE 4/5, DF 3/5, PF 4/5     	   Sensory - Intact to LT bilaterally   	   Reflexes - Hyper reflexive on the right patella, no clonus, downward babinski bilateral   	   Tone - normal  	Psychiatric - Mood stable, Affect WNL  Skin:  all skin intact    --------------------------------------------------------------------

## 2019-10-01 PROCEDURE — 99232 SBSQ HOSP IP/OBS MODERATE 35: CPT | Mod: GC

## 2019-10-01 RX ORDER — GABAPENTIN 400 MG/1
300 CAPSULE ORAL AT BEDTIME
Refills: 0 | Status: DISCONTINUED | OUTPATIENT
Start: 2019-10-01 | End: 2019-10-03

## 2019-10-01 RX ORDER — LIDOCAINE 4 G/100G
2 CREAM TOPICAL
Refills: 0 | Status: DISCONTINUED | OUTPATIENT
Start: 2019-10-02 | End: 2019-10-18

## 2019-10-01 RX ORDER — LIDOCAINE 4 G/100G
2 CREAM TOPICAL DAILY
Refills: 0 | Status: COMPLETED | OUTPATIENT
Start: 2019-10-01 | End: 2019-10-01

## 2019-10-01 RX ADMIN — Medication 500 MILLIGRAM(S): at 06:15

## 2019-10-01 RX ADMIN — OXYCODONE HYDROCHLORIDE 5 MILLIGRAM(S): 5 TABLET ORAL at 22:00

## 2019-10-01 RX ADMIN — OXYCODONE HYDROCHLORIDE 5 MILLIGRAM(S): 5 TABLET ORAL at 09:17

## 2019-10-01 RX ADMIN — Medication 25 MILLIGRAM(S): at 06:17

## 2019-10-01 RX ADMIN — OXYCODONE HYDROCHLORIDE 5 MILLIGRAM(S): 5 TABLET ORAL at 09:48

## 2019-10-01 RX ADMIN — PANTOPRAZOLE SODIUM 40 MILLIGRAM(S): 20 TABLET, DELAYED RELEASE ORAL at 06:16

## 2019-10-01 RX ADMIN — Medication 1 APPLICATION(S): at 06:16

## 2019-10-01 RX ADMIN — OXYCODONE HYDROCHLORIDE 5 MILLIGRAM(S): 5 TABLET ORAL at 21:22

## 2019-10-01 RX ADMIN — GABAPENTIN 300 MILLIGRAM(S): 400 CAPSULE ORAL at 21:22

## 2019-10-01 RX ADMIN — ENOXAPARIN SODIUM 40 MILLIGRAM(S): 100 INJECTION SUBCUTANEOUS at 13:28

## 2019-10-01 RX ADMIN — OXYCODONE HYDROCHLORIDE 5 MILLIGRAM(S): 5 TABLET ORAL at 13:28

## 2019-10-01 RX ADMIN — Medication 325 MILLIGRAM(S): at 18:08

## 2019-10-01 RX ADMIN — Medication 500 MILLIGRAM(S): at 18:04

## 2019-10-01 RX ADMIN — Medication 1 MILLIGRAM(S): at 13:27

## 2019-10-01 RX ADMIN — Medication 325 MILLIGRAM(S): at 06:17

## 2019-10-01 RX ADMIN — AMLODIPINE BESYLATE 5 MILLIGRAM(S): 2.5 TABLET ORAL at 06:15

## 2019-10-01 RX ADMIN — AMIODARONE HYDROCHLORIDE 200 MILLIGRAM(S): 400 TABLET ORAL at 06:15

## 2019-10-01 RX ADMIN — OXYCODONE HYDROCHLORIDE 5 MILLIGRAM(S): 5 TABLET ORAL at 14:00

## 2019-10-01 RX ADMIN — LIDOCAINE 2 PATCH: 4 CREAM TOPICAL at 13:28

## 2019-10-01 RX ADMIN — Medication 1 TABLET(S): at 13:27

## 2019-10-01 RX ADMIN — OXYCODONE HYDROCHLORIDE 5 MILLIGRAM(S): 5 TABLET ORAL at 18:04

## 2019-10-01 RX ADMIN — Medication 0.25 MILLIGRAM(S): at 22:19

## 2019-10-01 RX ADMIN — OXYCODONE HYDROCHLORIDE 5 MILLIGRAM(S): 5 TABLET ORAL at 18:35

## 2019-10-01 RX ADMIN — Medication 81 MILLIGRAM(S): at 13:27

## 2019-10-01 NOTE — CHART NOTE - NSCHARTNOTEFT_GEN_A_CORE
REHABILITATION DIAGNOSIS/IMPAIRMENT GROUP CODE:  thoracoabdominal aneurysm open repair complicated by cervical myelopathy, dysphagia, neurogenic bowel and bladder.       COMORBIDITIES/COMPLICATING CONDITIONS IMPACTING REHABILITATION:  HEALTH ISSUES - PROBLEM Dx:  Cervical Myelopathy  Anxiety  Hypertension  Atrial Fibrillation-  Thoracoabdominal aneurysm open repair  Neurogenic Bladder:  Hyponatremia  Hypokalemia  Anemia  Eosinophilia      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: s/p repair  Asceding aortic aneurysm repair 4/8/2019  S/P cataract surgery  Arm fracture, left: 2005      Based upon consideration of the patient's impairments, functional status, complicating conditions and any other contributing factors and after information garnered from the assessments of all therapy disciplines involved in treating the patient and other pertinent clinicians:    INTERDISCIPLINARY REHABILITATION INTERVENTIONS:    [ x  ] Transfer Training  [ x  ] Bed Mobility  [ x  ] Therapeutic Exercise  [ x  ] Balance/Coordination Exercises  [ x  ] Locomotion retraining  [ x  ] Stairs  [ x  ] Functional Transfer Training  [ x  ] Bowel/Bladder program  [ x  ] Pain Management  [  x ] Skin/Wound Care  [   ] Visual/Perceptual Training  [ x  ] Therapeutic Recreation Activities  [ x  ] Neuromuscular Re-education  [ x  ] Activities of Daily Living  [ x  ] Speech Exercise  [ x  ] Swallowing Exercises  [   ] Vital Stim  [ x  ] Dietary Supplements  [   ] Calorie Count  [   ] Cognitive Exercises  [   ] Congnitive/Linguistic Treatment  [   ] Behavior Program  [   ] Neuropsych Therapy  [ x  ] Patient/Family Counseling  [ x  ] Family Training  [ x  ] Community Re-entry  [   ] Orthotic Evaluation  [   ] Prosthetic Eval/Training    MEDICAL PROGNOSIS:  good    REHAB POTENTIAL:  good     EXPECTED DAILY THERAPY:         PT: 1 hr        OT: 1 hr        ST: 1 hr        S&O: n/a    EXPECTED INTENSITY OF PROGRAM:  3 hrs  day     EXPECTED FREQUENCY OF PROGRAM:  5 days a week     ESTIMATED LOS:  21 days     ESTIMATED DISPOSITION:  home with home health     INTERDISCIPLINARY FUNCTIONAL OUTCOMES/GOALS:         Gait/Mobility: modified independent         Transfers: modified independent         ADLs: modified independent         Functional Transfers: modified independent         Medication Management: independent        Communication: independent        Cognitive: independent        Dysphagia: independent        Bladder: modified independent         Bowel: modified independent

## 2019-10-01 NOTE — PROGRESS NOTE ADULT - ASSESSMENT
This is a 70 yo female with thoracoabdominal aneurysm open repair complicated by cervical myelopathy, dysphagia, neurogenic bowel and bladder.       #Cervical Myelopathy  Continue comprehensive acute rehabilitation program including PT/OT/Swallowing therapy  - Dysphagia III diet    -Neuropathic pain- will start gabapentin 300 mg qhs, continue oxycodone for now, ensure lidocaine patches are placed daily around thoracic back incision during the day for pain control.\  -Neurogenic bladder: will d/c indwelling muñoz and start IC     #Anxiety- Xanax, home med  #Hypertension-- amlodipine 5mg qd  #Atrial Fibrillation- - rate controlled  with lopressor 25mg bid and amiodarone 200mg qd, will consider transitioning lopressor from 25mg bid to toprol 50mg as per Hospitalist recs --hold off for now (-150 but HRs in high 50s to 60s)     #Thoracoabdominal aneurysm open repair- Asa, TTE as per hospitalist, bp control   #Neurogenic Bladder: Continue Muñoz, will consider PVR with IC   #Hyponatremia- stable, continue to monitor   #Hypokalemia- 3.7 today, monitor  magnesium (normal)     #Anemia  - likely related to blood loss during surgery  - supplemental iron recommended  - transfuse if Hemoglobin < 8  #Eosinophilia- AEC 0.45, continue to monitor   #DVT prophylaxis: lovenox 40mg sq daily

## 2019-10-01 NOTE — PROGRESS NOTE ADULT - SUBJECTIVE AND OBJECTIVE BOX
HPI:   69 year-old female with hx of "adrian-aorta" s/p aortic valve replacement, admitted electively to [facility] on 8/28 for open TAAA repair due to enlarging thoracoabdominal aortic aneurysm. The patient underwent her thoracoabdominal aneurysm open repair with decron graft and celiac SMA bypass with reimplantation of lumbar artery on 8/29.  Post-operatively she required vasopressors in the CTICU. Additionally post-operatively it was found that she had loss of strength in lower extremities, R>L. Repeat imaging was negative for hematoma. Also during this time period her kidney function declined and in conjunction with nephrology ATN was diagnosed with eventual recovery of her kidney function.  Her course was further complicated by AF with RVR, requiring amio gtt, esmolol gtt, digoxin, and vasopressin for pressure support. Course also c/b urinary retention requiring muñoz catheter. Workup for RLE weakness ordered by neurology, felt weakness to be due to cervical myelopathy. Urology consulted 9/23 for urinary retention, felt to be neurogenic in nature, recommended continue muñoz. Completed abx for UTI.  S&S eval recommend Dysphagia 1 nectar thick diet, 3L NC, PT recommending acute rehab.     Interval: + band like pain around thoracic area, above umbilicus. Better with Oxycodone. Agreeable to trialing gabapentin.     REVIEW OF SYSTEMS  + muñoz in place-agreeable to trailing intermittent straight catheterization, no chest pain, SOB, nausea, vomiting. Last BM 9/30 after suppository.  Could not feel passing of stool with suppository. Agreeable to taking PRN suppository every other day.     RECENT LABS/IMAGING  CBC Full  -  ( 30 Sep 2019 06:58 )  WBC Count : 6.47 K/uL  RBC Count : 3.00 M/uL  Hemoglobin : 8.4 g/dL  Hematocrit : 25.6 %  Platelet Count - Automated : 238 K/uL  Mean Cell Volume : 85.3 fl  Mean Cell Hemoglobin : 28.0 pg  Mean Cell Hemoglobin Concentration : 32.8 gm/dL  Auto Neutrophil # : 3.95 K/uL  Auto Lymphocyte # : 1.36 K/uL  Auto Monocyte # : 0.59 K/uL  Auto Eosinophil # : 0.45 K/uL  Auto Basophil # : 0.06 K/uL  Auto Neutrophil % : 61.1 %  Auto Lymphocyte % : 21.0 %  Auto Monocyte % : 9.1 %  Auto Eosinophil % : 7.0 %  Auto Basophil % : 0.9 %    09-30    133<L>  |  99  |  17  ----------------------------<  89  3.7   |  26  |  1.03    Ca    8.8      30 Sep 2019 06:58  Mg     1.6     09-30    TPro  6.9  /  Alb  2.6<L>  /  TBili  0.3  /  DBili  x   /  AST  30  /  ALT  36  /  AlkPhos  87  09-29        VITALS  Vital Signs Last 24 Hrs  T(C): 36.4 (01 Oct 2019 07:45), Max: 36.4 (30 Sep 2019 23:54)  T(F): 97.5 (01 Oct 2019 07:45), Max: 97.5 (30 Sep 2019 23:54)  HR: 52 (01 Oct 2019 07:45) (52 - 64)  BP: 156/67 (01 Oct 2019 07:45) (140/60 - 156/67)  RR: 16 (01 Oct 2019 07:45) (16 - 18)  SpO2: 100% (01 Oct 2019 07:45) (98% - 100%)    MEDICATIONS  (STANDING):  amiodarone    Tablet 200 milliGRAM(s) Oral daily  amLODIPine   Tablet 5 milliGRAM(s) Oral daily  ascorbic acid 500 milliGRAM(s) Oral two times a day  aspirin  chewable 81 milliGRAM(s) Oral daily  enoxaparin Injectable 40 milliGRAM(s) SubCutaneous daily  ferrous    sulfate 325 milliGRAM(s) Oral two times a day  folic acid 1 milliGRAM(s) Oral daily  gabapentin 300 milliGRAM(s) Oral at bedtime  influenza   Vaccine 0.5 milliLiter(s) IntraMuscular once  lidocaine   Patch 2 Patch Transdermal daily  metoprolol tartrate 25 milliGRAM(s) Oral every 12 hours  multivitamin 1 Tablet(s) Oral daily  pantoprazole    Tablet 40 milliGRAM(s) Oral before breakfast    MEDICATIONS  (PRN):  acetaminophen   Tablet .. 650 milliGRAM(s) Oral every 6 hours PRN Mild Pain (1 - 3)  ALPRAZolam 0.25 milliGRAM(s) Oral every 8 hours PRN anxiety  bisacodyl Suppository 10 milliGRAM(s) Rectal daily PRN Constipation  mineral oil 30 milliLiter(s) Oral two times a day PRN dry mouth  oxyCODONE    IR 5 milliGRAM(s) Oral every 4 hours PRN Moderate Pain (4 - 6)      Physical Exam:  Gen - NAD, Comfortable  	HEENT - NCAT, EOMI, MMM  	Neck - Supple, No limited ROM  	Pulm - CTAB  	Cardiovascular - RRR, S1S2  	Chest wall: Sternal incision wound-clean, no erythema or drainage noted  	Posterior lateral incision wound on the left- clean, no erythema or drainage, small skin tear seen next to incision site at superior end, slightly macerated edges, signs of local infection  	Abdomen - Soft, NT/ND, +BS  	Extremities - No C/C/E, No calf tenderness  	Neuro-  	   Cognitive - AAOx3  	   Cranial Nerves - CN 2-12 intact  	   Motor -  Bilateral Upper extremities 5/5   	                  LEFT    LE - HF 4/5, KE 4/5, DF 5/5, PF 5/5  	                  RIGHT LE - HF 2/5, KE 4/5, DF 3/5, PF 4/5     	   Sensory - Intact to LT bilaterally   	   Reflexes - Hyper reflexive on the right patella, no clonus, downward babinski bilateral   	   Tone - normal  	Psychiatric - Mood stable, Affect WNL  Skin:  as above

## 2019-10-02 DIAGNOSIS — G99.2 MYELOPATHY IN DISEASES CLASSIFIED ELSEWHERE: ICD-10-CM

## 2019-10-02 PROCEDURE — 99232 SBSQ HOSP IP/OBS MODERATE 35: CPT | Mod: GC

## 2019-10-02 PROCEDURE — 99233 SBSQ HOSP IP/OBS HIGH 50: CPT

## 2019-10-02 RX ADMIN — LIDOCAINE 2 PATCH: 4 CREAM TOPICAL at 02:00

## 2019-10-02 RX ADMIN — Medication 81 MILLIGRAM(S): at 12:45

## 2019-10-02 RX ADMIN — PANTOPRAZOLE SODIUM 40 MILLIGRAM(S): 20 TABLET, DELAYED RELEASE ORAL at 06:38

## 2019-10-02 RX ADMIN — AMIODARONE HYDROCHLORIDE 200 MILLIGRAM(S): 400 TABLET ORAL at 06:37

## 2019-10-02 RX ADMIN — Medication 0.25 MILLIGRAM(S): at 23:14

## 2019-10-02 RX ADMIN — Medication 325 MILLIGRAM(S): at 06:42

## 2019-10-02 RX ADMIN — OXYCODONE HYDROCHLORIDE 5 MILLIGRAM(S): 5 TABLET ORAL at 13:12

## 2019-10-02 RX ADMIN — AMLODIPINE BESYLATE 5 MILLIGRAM(S): 2.5 TABLET ORAL at 06:38

## 2019-10-02 RX ADMIN — OXYCODONE HYDROCHLORIDE 5 MILLIGRAM(S): 5 TABLET ORAL at 12:52

## 2019-10-02 RX ADMIN — Medication 500 MILLIGRAM(S): at 17:52

## 2019-10-02 RX ADMIN — ENOXAPARIN SODIUM 40 MILLIGRAM(S): 100 INJECTION SUBCUTANEOUS at 12:44

## 2019-10-02 RX ADMIN — Medication 325 MILLIGRAM(S): at 17:51

## 2019-10-02 RX ADMIN — Medication 25 MILLIGRAM(S): at 06:38

## 2019-10-02 RX ADMIN — Medication 500 MILLIGRAM(S): at 06:38

## 2019-10-02 RX ADMIN — LIDOCAINE 2 PATCH: 4 CREAM TOPICAL at 06:38

## 2019-10-02 RX ADMIN — Medication 1 TABLET(S): at 12:44

## 2019-10-02 RX ADMIN — GABAPENTIN 300 MILLIGRAM(S): 400 CAPSULE ORAL at 21:30

## 2019-10-02 RX ADMIN — LIDOCAINE 2 PATCH: 4 CREAM TOPICAL at 07:35

## 2019-10-02 RX ADMIN — Medication 1 MILLIGRAM(S): at 12:44

## 2019-10-02 RX ADMIN — Medication 25 MILLIGRAM(S): at 17:52

## 2019-10-02 RX ADMIN — OXYCODONE HYDROCHLORIDE 5 MILLIGRAM(S): 5 TABLET ORAL at 17:52

## 2019-10-02 RX ADMIN — OXYCODONE HYDROCHLORIDE 5 MILLIGRAM(S): 5 TABLET ORAL at 09:10

## 2019-10-02 RX ADMIN — OXYCODONE HYDROCHLORIDE 5 MILLIGRAM(S): 5 TABLET ORAL at 18:48

## 2019-10-02 RX ADMIN — OXYCODONE HYDROCHLORIDE 5 MILLIGRAM(S): 5 TABLET ORAL at 08:38

## 2019-10-02 RX ADMIN — LIDOCAINE 2 PATCH: 4 CREAM TOPICAL at 20:00

## 2019-10-02 NOTE — PROGRESS NOTE ADULT - ASSESSMENT
This is a 70 yo female with thoracoabdominal aneurysm open repair complicated by cervical myelopathy, dysphagia, neurogenic bowel and bladder.       #Cervical Myelopathy  Continue comprehensive acute rehabilitation program including PT/OT/Swallowing therapy  - Dysphagia III diet    -Neuropathic pain- started gabapentin 300 mg qhs on 10/1, continue oxycodone for now, lidocaine patches daily around thoracic back incision during the day for pain control.  -Neurogenic bladder: indwelling muñoz discontinued, monitor PVRs, intermittent straight cath as needed q6 hrs     #Anxiety- Xanax, home med  #Hypertension-- amlodipine 5mg qd  #Atrial Fibrillation- - rate controlled  with lopressor 25mg bid and amiodarone 200mg qd, will consider transitioning lopressor from 25mg bid to toprol 50mg as per Hospitalist recs --hold off for now (-150 but HRs in high 50s to 60s)     #Thoracoabdominal aneurysm open repair- Asa, TTE as per hospitalist, bp control   #Neurogenic Bladder: discontinued indwelling Muñoz, monitor PVRs with IC   #Hyponatremia- stable, continue to monitor, labs for tomorrow morning   #Hypokalemia- 3.7 today, monitor  magnesium (normal), labs for tomorrow morning      #Anemia  - likely related to blood loss during surgery  - supplemental iron recommended  - transfuse if Hemoglobin < 8  #Eosinophilia- AEC 0.45, continue to monitor   #DVT prophylaxis: lovenox 40mg sq daily     IDT meeting 10/1:   Patient mod A x1 for transfers and ambulating 20ft with RW and verbal cues. In OT, requiring assist of 2 for maximal assist for lower body dressing and toileting.   Goals are set for mod I in 3 weeks with tentative dc date set for 9/19.

## 2019-10-02 NOTE — PROGRESS NOTE ADULT - SUBJECTIVE AND OBJECTIVE BOX
69 year-old female with hx of "adrian-aorta" s/p aortic valve replacement, admitted electively to [facility] on 8/28 for open TAAA repair due to enlarging thoracoabdominal aortic aneurysm. The patient underwent her thoracoabdominal aneurysm open repair with decron graft and celiac SMA bypass with reimplantation of lumbar artery on 8/29.  Post-operatively she required vasopressors in the CTICU. Additionally post-operatively it was found that she had loss of strength in lower extremities, R>L. Repeat imaging was negative for hematoma. Also during this time period her kidney function declined and in conjunction with nephrology ATN was diagnosed with eventual recovery of her kidney function.  Her course was further complicated by AF with RVR, requiring amio gtt, esmolol gtt, digoxin, and vasopressin for pressure support. Course also c/b urinary retention requiring muñoz catheter. Workup for RLE weakness ordered by neurology, felt weakness to be due to cervical myelopathy. Urology consulted 9/23 for urinary retention, felt to be neurogenic in nature, recommended continue muñoz. Completed abx for UTI.  S&S eval recommend Dysphagia 1 nectar thick diet, 3L NC, PT recommending acute rehab.     seen at the bedside, c/o + band like pain around thoracic area, 5/10, aching, above umbilicus and tingling and numbness below waist to the ankles. no n/v, no sob     Vital Signs Last 24 Hrs  T(C): 36.3 (02 Oct 2019 07:50), Max: 36.3 (02 Oct 2019 00:21)  T(F): 97.4 (02 Oct 2019 07:50), Max: 97.4 (02 Oct 2019 00:21)  HR: 55 (02 Oct 2019 07:50) (55 - 60)  BP: 102/58 (02 Oct 2019 07:50) (102/58 - 140/70)  RR: 15 (02 Oct 2019 07:50) (15 - 18)  SpO2: 99% (02 Oct 2019 07:50) (98% - 99%)      GENERAL- NAD  EAR/NOSE/MOUTH/THROAT - no pharyngeal exudates, no oral leisions,  MMM  EYES- SIXTO, conjunctiva and Sclera clear  NECK- supple  RESPIRATORY-  clear to auscultation bilaterally  CARDIOVASCULAR - SIS2, RRR  GI - soft NT BS present  EXTREMITIES- no pedal edema  NEUROLOGY- right LE weakness  SKIN- no rashes, warm to touch  PSYCHIATRY- AAO X 3  MUSCULOSKELETAL- AROM restricted to right LE

## 2019-10-02 NOTE — PROGRESS NOTE ADULT - ASSESSMENT
70 yo female with thoracoabdominal aneurysm open repair complicated by cervical myelopathy, dysphagia, neurogenic bowel and bladder- pt/ot/dvt ppx, pain meds,asa    Anxiety- Xanax    Hypertension- amlodipine     Atrial Fibrillation- lopressor, amiodarone     Neurogenic Bladder: monitor PVRs with IC , muoñz d/c    Hyponatremia- monitor    Anemia- likely related to blood loss during surgery, feso4    DVT prophylaxis: lovenox     will follow

## 2019-10-02 NOTE — PROGRESS NOTE ADULT - SUBJECTIVE AND OBJECTIVE BOX
HPI:   69 year-old female with hx of "adrian-aorta" s/p aortic valve replacement, admitted electively to [facility] on 8/28 for open TAAA repair due to enlarging thoracoabdominal aortic aneurysm. The patient underwent her thoracoabdominal aneurysm open repair with decron graft and celiac SMA bypass with reimplantation of lumbar artery on 8/29.  Post-operatively she required vasopressors in the CTICU. Additionally post-operatively it was found that she had loss of strength in lower extremities, R>L. Repeat imaging was negative for hematoma. Also during this time period her kidney function declined and in conjunction with nephrology ATN was diagnosed with eventual recovery of her kidney function.  Her course was further complicated by AF with RVR, requiring amio gtt, esmolol gtt, digoxin, and vasopressin for pressure support. Course also c/b urinary retention requiring muñoz catheter. Workup for RLE weakness ordered by neurology, felt weakness to be due to cervical myelopathy. Urology consulted 9/23 for urinary retention, felt to be neurogenic in nature, recommended continue muñoz. Completed abx for UTI.  S&S eval recommend Dysphagia 1 nectar thick diet, 3L NC, PT recommending acute rehab.     Interval: + band like pain around thoracic area, above umbilicus. Better with Oxycodone. Agreeable to trialing gabapentin.     REVIEW OF SYSTEMS  Indwelling muñoz has been removed. Patient does not have urge to void at this time.   Denies chest pain, SOB, nausea, vomiting. Last BM 9/30 after suppository.  Will ask for suppository this afternoon to have BM.     RECENT LABS/IMAGING  CBC Full  -  ( 30 Sep 2019 06:58 )  WBC Count : 6.47 K/uL  RBC Count : 3.00 M/uL  Hemoglobin : 8.4 g/dL  Hematocrit : 25.6 %  Platelet Count - Automated : 238 K/uL  Mean Cell Volume : 85.3 fl  Mean Cell Hemoglobin : 28.0 pg  Mean Cell Hemoglobin Concentration : 32.8 gm/dL  Auto Neutrophil # : 3.95 K/uL  Auto Lymphocyte # : 1.36 K/uL  Auto Monocyte # : 0.59 K/uL  Auto Eosinophil # : 0.45 K/uL  Auto Basophil # : 0.06 K/uL  Auto Neutrophil % : 61.1 %  Auto Lymphocyte % : 21.0 %  Auto Monocyte % : 9.1 %  Auto Eosinophil % : 7.0 %  Auto Basophil % : 0.9 %    09-30    133<L>  |  99  |  17  ----------------------------<  89  3.7   |  26  |  1.03    Ca    8.8      30 Sep 2019 06:58  Mg     1.6     09-30    TPro  6.9  /  Alb  2.6<L>  /  TBili  0.3  /  DBili  x   /  AST  30  /  ALT  36  /  AlkPhos  87  09-29      Vital Signs Last 24 Hrs  T(C): 36.3 (02 Oct 2019 07:50), Max: 36.3 (02 Oct 2019 00:21)  T(F): 97.4 (02 Oct 2019 07:50), Max: 97.4 (02 Oct 2019 00:21)  HR: 55 (02 Oct 2019 07:50) (55 - 60)  BP: 102/58 (02 Oct 2019 07:50) (102/58 - 140/70)  RR: 15 (02 Oct 2019 07:50) (15 - 18)  SpO2: 99% (02 Oct 2019 07:50) (98% - 99%)    MEDICATIONS  (STANDING):  amiodarone    Tablet 200 milliGRAM(s) Oral daily  amLODIPine   Tablet 5 milliGRAM(s) Oral daily  ascorbic acid 500 milliGRAM(s) Oral two times a day  aspirin  chewable 81 milliGRAM(s) Oral daily  enoxaparin Injectable 40 milliGRAM(s) SubCutaneous daily  ferrous    sulfate 325 milliGRAM(s) Oral two times a day  folic acid 1 milliGRAM(s) Oral daily  gabapentin 300 milliGRAM(s) Oral at bedtime  influenza   Vaccine 0.5 milliLiter(s) IntraMuscular once  lidocaine   Patch 2 Patch Transdermal <User Schedule>  metoprolol tartrate 25 milliGRAM(s) Oral every 12 hours  multivitamin 1 Tablet(s) Oral daily  pantoprazole    Tablet 40 milliGRAM(s) Oral before breakfast    MEDICATIONS  (PRN):  acetaminophen   Tablet .. 650 milliGRAM(s) Oral every 6 hours PRN Mild Pain (1 - 3)  ALPRAZolam 0.25 milliGRAM(s) Oral every 8 hours PRN anxiety  bisacodyl Suppository 10 milliGRAM(s) Rectal daily PRN Constipation  mineral oil 30 milliLiter(s) Oral two times a day PRN dry mouth  oxyCODONE    IR 5 milliGRAM(s) Oral every 4 hours PRN Moderate Pain (4 - 6)      Physical Exam:  Gen - NAD, Comfortable  	HEENT - NCAT, EOMI, MMM  	Neck - Supple, No limited ROM  	Pulm - CTAB  	Cardiovascular - RRR, S1S2  	Chest wall: Sternal incision wound-clean, no erythema or drainage noted  	Posterior lateral incision wound on the left- clean, no erythema or drainage, small skin tear seen next to incision site dressing intact with minimal drainage noted to dressing  	Abdomen - Soft, NT/ND, +BS  	Extremities - No C/C/E, No calf tenderness  	Neuro-  	   Cognitive - AAOx3  	   Cranial Nerves - CN 2-12 intact  	   Motor -  Bilateral Upper extremities 5/5   	                  LEFT    LE - HF 4/5, KE 4/5, DF 5/5, PF 5/5  	                  RIGHT LE - HF 2/5, KE 4/5, DF 3/5, PF 4/5     	   Sensory - Intact to LT bilaterally   	   Reflexes - Hyper reflexive on the right patella, no clonus, downward babinski bilateral   	   Tone - normal  	Psychiatric - Mood stable, Affect WNL  Skin:  as above

## 2019-10-03 LAB
ALBUMIN SERPL ELPH-MCNC: 2.7 G/DL — LOW (ref 3.3–5)
ALP SERPL-CCNC: 87 U/L — SIGNIFICANT CHANGE UP (ref 40–120)
ALT FLD-CCNC: 46 U/L — HIGH (ref 10–45)
ANION GAP SERPL CALC-SCNC: 7 MMOL/L — SIGNIFICANT CHANGE UP (ref 5–17)
APPEARANCE UR: CLEAR — SIGNIFICANT CHANGE UP
AST SERPL-CCNC: 32 U/L — SIGNIFICANT CHANGE UP (ref 10–40)
BACTERIA # UR AUTO: ABNORMAL /HPF
BILIRUB SERPL-MCNC: 0.3 MG/DL — SIGNIFICANT CHANGE UP (ref 0.2–1.2)
BILIRUB UR-MCNC: NEGATIVE — SIGNIFICANT CHANGE UP
BUN SERPL-MCNC: 24 MG/DL — HIGH (ref 7–23)
CALCIUM SERPL-MCNC: 8.9 MG/DL — SIGNIFICANT CHANGE UP (ref 8.4–10.5)
CHLORIDE SERPL-SCNC: 99 MMOL/L — SIGNIFICANT CHANGE UP (ref 96–108)
CO2 SERPL-SCNC: 28 MMOL/L — SIGNIFICANT CHANGE UP (ref 22–31)
COLOR SPEC: YELLOW — SIGNIFICANT CHANGE UP
CREAT SERPL-MCNC: 1.08 MG/DL — SIGNIFICANT CHANGE UP (ref 0.5–1.3)
DIFF PNL FLD: ABNORMAL
EPI CELLS # UR: SIGNIFICANT CHANGE UP
GLUCOSE SERPL-MCNC: 94 MG/DL — SIGNIFICANT CHANGE UP (ref 70–99)
GLUCOSE UR QL: NEGATIVE — SIGNIFICANT CHANGE UP
HCT VFR BLD CALC: 25.8 % — LOW (ref 34.5–45)
HGB BLD-MCNC: 8.4 G/DL — LOW (ref 11.5–15.5)
KETONES UR-MCNC: NEGATIVE — SIGNIFICANT CHANGE UP
LEUKOCYTE ESTERASE UR-ACNC: ABNORMAL
MCHC RBC-ENTMCNC: 28.1 PG — SIGNIFICANT CHANGE UP (ref 27–34)
MCHC RBC-ENTMCNC: 32.6 GM/DL — SIGNIFICANT CHANGE UP (ref 32–36)
MCV RBC AUTO: 86.3 FL — SIGNIFICANT CHANGE UP (ref 80–100)
NITRITE UR-MCNC: NEGATIVE — SIGNIFICANT CHANGE UP
NRBC # BLD: 0 /100 WBCS — SIGNIFICANT CHANGE UP (ref 0–0)
PH UR: 5 — SIGNIFICANT CHANGE UP (ref 5–8)
PLATELET # BLD AUTO: 245 K/UL — SIGNIFICANT CHANGE UP (ref 150–400)
POTASSIUM SERPL-MCNC: 3.7 MMOL/L — SIGNIFICANT CHANGE UP (ref 3.5–5.3)
POTASSIUM SERPL-SCNC: 3.7 MMOL/L — SIGNIFICANT CHANGE UP (ref 3.5–5.3)
PROT SERPL-MCNC: 6.8 G/DL — SIGNIFICANT CHANGE UP (ref 6–8.3)
PROT UR-MCNC: 15
RBC # BLD: 2.99 M/UL — LOW (ref 3.8–5.2)
RBC # FLD: 16.1 % — HIGH (ref 10.3–14.5)
RBC CASTS # UR COMP ASSIST: SIGNIFICANT CHANGE UP /HPF (ref 0–4)
SODIUM SERPL-SCNC: 134 MMOL/L — LOW (ref 135–145)
SP GR SPEC: 1.01 — SIGNIFICANT CHANGE UP (ref 1.01–1.02)
UROBILINOGEN FLD QL: NEGATIVE — SIGNIFICANT CHANGE UP
WBC # BLD: 6.62 K/UL — SIGNIFICANT CHANGE UP (ref 3.8–10.5)
WBC # FLD AUTO: 6.62 K/UL — SIGNIFICANT CHANGE UP (ref 3.8–10.5)
WBC UR QL: ABNORMAL /HPF (ref 0–5)

## 2019-10-03 PROCEDURE — 99232 SBSQ HOSP IP/OBS MODERATE 35: CPT | Mod: GC

## 2019-10-03 PROCEDURE — 99233 SBSQ HOSP IP/OBS HIGH 50: CPT

## 2019-10-03 RX ORDER — OXYCODONE HYDROCHLORIDE 5 MG/1
5 TABLET ORAL EVERY 4 HOURS
Refills: 0 | Status: DISCONTINUED | OUTPATIENT
Start: 2019-10-03 | End: 2019-10-10

## 2019-10-03 RX ORDER — ALPRAZOLAM 0.25 MG
0.25 TABLET ORAL EVERY 8 HOURS
Refills: 0 | Status: DISCONTINUED | OUTPATIENT
Start: 2019-10-03 | End: 2019-10-10

## 2019-10-03 RX ORDER — LACTOBACILLUS ACIDOPHILUS 100MM CELL
1 CAPSULE ORAL
Refills: 0 | Status: COMPLETED | OUTPATIENT
Start: 2019-10-03 | End: 2019-10-13

## 2019-10-03 RX ORDER — NITROFURANTOIN MACROCRYSTAL 50 MG
100 CAPSULE ORAL
Refills: 0 | Status: DISCONTINUED | OUTPATIENT
Start: 2019-10-03 | End: 2019-10-04

## 2019-10-03 RX ORDER — GABAPENTIN 400 MG/1
300 CAPSULE ORAL
Refills: 0 | Status: DISCONTINUED | OUTPATIENT
Start: 2019-10-03 | End: 2019-10-10

## 2019-10-03 RX ADMIN — LIDOCAINE 2 PATCH: 4 CREAM TOPICAL at 19:00

## 2019-10-03 RX ADMIN — OXYCODONE HYDROCHLORIDE 5 MILLIGRAM(S): 5 TABLET ORAL at 12:30

## 2019-10-03 RX ADMIN — Medication 25 MILLIGRAM(S): at 17:20

## 2019-10-03 RX ADMIN — Medication 500 MILLIGRAM(S): at 17:21

## 2019-10-03 RX ADMIN — Medication 25 MILLIGRAM(S): at 06:22

## 2019-10-03 RX ADMIN — Medication 0.25 MILLIGRAM(S): at 22:37

## 2019-10-03 RX ADMIN — Medication 1 TABLET(S): at 12:29

## 2019-10-03 RX ADMIN — OXYCODONE HYDROCHLORIDE 5 MILLIGRAM(S): 5 TABLET ORAL at 08:08

## 2019-10-03 RX ADMIN — LIDOCAINE 2 PATCH: 4 CREAM TOPICAL at 07:57

## 2019-10-03 RX ADMIN — Medication 1 MILLIGRAM(S): at 12:29

## 2019-10-03 RX ADMIN — Medication 325 MILLIGRAM(S): at 06:22

## 2019-10-03 RX ADMIN — ENOXAPARIN SODIUM 40 MILLIGRAM(S): 100 INJECTION SUBCUTANEOUS at 12:29

## 2019-10-03 RX ADMIN — Medication 100 MILLIGRAM(S): at 22:37

## 2019-10-03 RX ADMIN — AMLODIPINE BESYLATE 5 MILLIGRAM(S): 2.5 TABLET ORAL at 06:21

## 2019-10-03 RX ADMIN — Medication 81 MILLIGRAM(S): at 12:29

## 2019-10-03 RX ADMIN — Medication 500 MILLIGRAM(S): at 06:22

## 2019-10-03 RX ADMIN — GABAPENTIN 300 MILLIGRAM(S): 400 CAPSULE ORAL at 17:20

## 2019-10-03 RX ADMIN — Medication 325 MILLIGRAM(S): at 17:21

## 2019-10-03 RX ADMIN — OXYCODONE HYDROCHLORIDE 5 MILLIGRAM(S): 5 TABLET ORAL at 17:16

## 2019-10-03 RX ADMIN — AMIODARONE HYDROCHLORIDE 200 MILLIGRAM(S): 400 TABLET ORAL at 06:22

## 2019-10-03 RX ADMIN — PANTOPRAZOLE SODIUM 40 MILLIGRAM(S): 20 TABLET, DELAYED RELEASE ORAL at 06:22

## 2019-10-03 RX ADMIN — Medication 1 TABLET(S): at 22:37

## 2019-10-03 RX ADMIN — OXYCODONE HYDROCHLORIDE 5 MILLIGRAM(S): 5 TABLET ORAL at 08:45

## 2019-10-03 RX ADMIN — OXYCODONE HYDROCHLORIDE 5 MILLIGRAM(S): 5 TABLET ORAL at 13:00

## 2019-10-03 NOTE — PROGRESS NOTE ADULT - SUBJECTIVE AND OBJECTIVE BOX
70 yo female with thoracoabdominal aneurysm open repair complicated by cervical myelopathy, dysphagia, neurogenic bowel and bladder    seen at the bedside, still c/o + band like pain around thoracic area, 5/10, aching, but improves with medications,  above umbilicus, + tingling and numbness below waist to the ankles. no n/v, no sob , concern for low heart rate, in 55-65's range, 2/2 b blocker, she is asymptomatic. still getting IC , unable to void.    Vital Signs Last 24 Hrs  T(C): 36.7 (03 Oct 2019 07:49), Max: 36.7 (03 Oct 2019 07:49)  T(F): 98 (03 Oct 2019 07:49), Max: 98 (03 Oct 2019 07:49)  HR: 58 (03 Oct 2019 07:49) (58 - 64)  BP: 126/65 (03 Oct 2019 07:49) (121/72 - 144/67)  BP(mean): --  RR: 15 (03 Oct 2019 07:49) (14 - 15)  SpO2: 100% (03 Oct 2019 07:49) (98% - 100%)      GENERAL- NAD  EAR/NOSE/MOUTH/THROAT - no pharyngeal exudates, no oral lesions  MMM  EYES- SIXTO, conjunctiva and Sclera clear  NECK- supple  RESPIRATORY-  clear to auscultation bilaterally  CARDIOVASCULAR - SIS2, RRR  GI - soft NT BS present  EXTREMITIES- no pedal edema  NEUROLOGY- right LE weakness  SKIN- no rashes, warm to touch  PSYCHIATRY- AAO X 3  MUSCULOSKELETAL- AROM restricted to right LE                   8.4                  134  | 28   | 24           6.62  >-----------< 245     ------------------------< 94                    25.8                 3.7  | 99   | 1.08                                         Ca 8.9   Mg x     Ph x

## 2019-10-03 NOTE — PROGRESS NOTE ADULT - ASSESSMENT
This is a 68 yo female with thoracoabdominal aneurysm open repair complicated by cervical myelopathy, dysphagia, neurogenic bowel and bladder.       #Cervical Myelopathy  Continue comprehensive acute rehabilitation program including PT/OT/Swallowing therapy  - Dysphagia III diet    -Neuropathic pain- started gabapentin 300 mg qhs on 10/1-increase to BID dosing, continue oxycodone for now, lidocaine patches daily around thoracic back incision during the day for pain control.  -Neurogenic bladder: indwelling muñoz discontinued, monitor PVRs, intermittent straight cath as needed q6 hrs     #Anxiety- Xanax, home med    #Hypertension-- amlodipine 5mg qd    #Atrial Fibrillation- - rate controlled  with lopressor 25mg bid and amiodarone 200mg qd  Discussed regimen with hospitalist 10/3, will continue current BB regimen   Patient asymptomatic with HRs 50-60s, continue to monitor     #Thoracoabdominal aneurysm open repair- Asa, bp control     #Neurogenic Bladder: discontinued indwelling Muñoz, monitor PVRs with IC -ensure patient is bladder scanned q 6 hrs (changed schedule to 6am, 12pm, 6pm and 12am), discussed with nursing     #Hyponatremia- stable, continue to monitor     #Hypokalemia- 3.7 today, monitor  magnesium (normal)     #Anemia  -labs this am, stable   - likely related to blood loss during surgery  - supplemental iron recommended  - transfuse if Hemoglobin < 8    #Eosinophilia- AEC 0.45, continue to monitor     #DVT prophylaxis: lovenox 40mg sq daily     IDT meeting 10/1:   Patient mod A x1 for transfers and ambulating 20ft with RW and verbal cues. In OT, requiring assist of 2 for maximal assist for lower body dressing and toileting.   Goals are set for mod I in 3 weeks with tentative dc date set for 9/19.

## 2019-10-03 NOTE — PROGRESS NOTE ADULT - SUBJECTIVE AND OBJECTIVE BOX
HPI:   69 year-old female with hx of "adrian-aorta" s/p aortic valve replacement, admitted electively to [facility] on 8/28 for open TAAA repair due to enlarging thoracoabdominal aortic aneurysm. The patient underwent her thoracoabdominal aneurysm open repair with decron graft and celiac SMA bypass with reimplantation of lumbar artery on 8/29.  Post-operatively she required vasopressors in the CTICU. Additionally post-operatively it was found that she had loss of strength in lower extremities, R>L. Repeat imaging was negative for hematoma. Also during this time period her kidney function declined and in conjunction with nephrology ATN was diagnosed with eventual recovery of her kidney function.  Her course was further complicated by AF with RVR, requiring amio gtt, esmolol gtt, digoxin, and vasopressin for pressure support. Course also c/b urinary retention requiring muñoz catheter. Workup for RLE weakness ordered by neurology, felt weakness to be due to cervical myelopathy. Urology consulted 9/23 for urinary retention, felt to be neurogenic in nature, recommended continue muñoz. Completed abx for UTI.  S&S eval recommend Dysphagia 1 nectar thick diet, 3L NC, PT recommending acute rehab.     Interval: + band like pain around thoracic area without improvement as of yet, above umbilicus. Better with Oxycodone. Agreeable to increasing gabapentin to BID.     REVIEW OF SYSTEMS  Requiring intermittent straight catheterizations.   Denies chest pain, SOB, nausea, vomiting. Last BM 10/2 without suppository after being placed on commode.    RECENT LABS/IMAGING  10-03    134<L>  |  99  |  24<H>  ----------------------------<  94  3.7   |  28  |  1.08    Ca    8.9      03 Oct 2019 06:00    TPro  6.8  /  Alb  2.7<L>  /  TBili  0.3  /  DBili  x   /  AST  32  /  ALT  46<H>  /  AlkPhos  87  10-03                        8.4    6.62  )-----------( 245      ( 03 Oct 2019 06:00 )             25.8         Vital Signs Last 24 Hrs  T(C): 36.7 (03 Oct 2019 07:49), Max: 36.7 (03 Oct 2019 07:49)  T(F): 98 (03 Oct 2019 07:49), Max: 98 (03 Oct 2019 07:49)  HR: 58 (03 Oct 2019 07:49) (58 - 64)  BP: 126/65 (03 Oct 2019 07:49) (121/72 - 144/67)  RR: 15 (03 Oct 2019 07:49) (14 - 15)  SpO2: 100% (03 Oct 2019 07:49) (98% - 100%)    MEDICATIONS  (STANDING):  amiodarone    Tablet 200 milliGRAM(s) Oral daily  amLODIPine   Tablet 5 milliGRAM(s) Oral daily  ascorbic acid 500 milliGRAM(s) Oral two times a day  aspirin  chewable 81 milliGRAM(s) Oral daily  enoxaparin Injectable 40 milliGRAM(s) SubCutaneous daily  ferrous    sulfate 325 milliGRAM(s) Oral two times a day  folic acid 1 milliGRAM(s) Oral daily  gabapentin 300 milliGRAM(s) Oral two times a day  influenza   Vaccine 0.5 milliLiter(s) IntraMuscular once  lidocaine   Patch 2 Patch Transdermal <User Schedule>  metoprolol tartrate 25 milliGRAM(s) Oral every 12 hours  multivitamin 1 Tablet(s) Oral daily  pantoprazole    Tablet 40 milliGRAM(s) Oral before breakfast    MEDICATIONS  (PRN):  acetaminophen   Tablet .. 650 milliGRAM(s) Oral every 6 hours PRN Mild Pain (1 - 3)  ALPRAZolam 0.25 milliGRAM(s) Oral every 8 hours PRN anxiety  bisacodyl Suppository 10 milliGRAM(s) Rectal daily PRN Constipation  mineral oil 30 milliLiter(s) Oral two times a day PRN dry mouth  oxyCODONE    IR 5 milliGRAM(s) Oral every 4 hours PRN Moderate Pain (4 - 6)      Physical Exam:  Gen - NAD, Comfortable  	Pulm - CTAB  	Cardiovascular - RRR, S1S2  	Chest wall: Sternal incision wound-clean, no erythema or drainage noted  	Posterior lateral incision wound on the left- clean, no erythema or drainage, small skin tear seen next to incision site dressing intact with minimal drainage noted to dressing  	Abdomen - Soft, NT/ND, +BS  	Extremities - No C/C/E, No calf tenderness  	Neuro-  	   Cognitive - AAOx3  	   Cranial Nerves - CN 2-12 intact  	   Motor -  Bilateral Upper extremities 5/5   	                  LEFT    LE - HF 4+/5, KE 4+/5, DF 5/5, PF 5/5  	                  RIGHT LE - HF 2/5, KE 4/5, DF 4/5, PF 4/5     	   Sensory - Intact to LT bilaterally   	   Reflexes - Hyper reflexive on the right patella, no clonus, downward babinski bilateral   	   Tone - normal  	Psychiatric - Mood stable, Affect WNL  Skin:  as above

## 2019-10-03 NOTE — CHART NOTE - NSCHARTNOTEFT_GEN_A_CORE
Nutrition Follow Up Note  Hospital Course   (Per Electronic Medical Record):     Source:   Patient [X]  Medical Record [X]      Diet:   Soft (Dysphagia 3) Diet w/ Thin Liquids  Tolerates Diet Well  No Chewing/Swallowing Difficulties  No Recent Nausea, Vomiting or Diarrhea (Some Recent Constipation)  Education Provided on Need for Increased Fluids/Fiber  Varied Intake @Meals (as Per Documentation) - Intake Improving (Per Patient)   Declines Nutrition Supplementation   Obtained Food Preferences from Patient      Enteral/Parenteral Nutrition: N/A    Current Weight: 143.7lb on 10/2  Weights Currently Stable @This Time  Obtain Weights Weekly   States UBW 145lb    Pertinent Medications: MEDICATIONS  (STANDING):  amiodarone    Tablet 200 milliGRAM(s) Oral daily  amLODIPine   Tablet 5 milliGRAM(s) Oral daily  ascorbic acid 500 milliGRAM(s) Oral two times a day  aspirin  chewable 81 milliGRAM(s) Oral daily  enoxaparin Injectable 40 milliGRAM(s) SubCutaneous daily  ferrous    sulfate 325 milliGRAM(s) Oral two times a day  folic acid 1 milliGRAM(s) Oral daily  gabapentin 300 milliGRAM(s) Oral two times a day  influenza   Vaccine 0.5 milliLiter(s) IntraMuscular once  lidocaine   Patch 2 Patch Transdermal <User Schedule>  metoprolol tartrate 25 milliGRAM(s) Oral every 12 hours  multivitamin 1 Tablet(s) Oral daily  pantoprazole    Tablet 40 milliGRAM(s) Oral before breakfast    MEDICATIONS  (PRN):  acetaminophen   Tablet .. 650 milliGRAM(s) Oral every 6 hours PRN Mild Pain (1 - 3)  ALPRAZolam 0.25 milliGRAM(s) Oral every 8 hours PRN anxiety  bisacodyl Suppository 10 milliGRAM(s) Rectal daily PRN Constipation  mineral oil 30 milliLiter(s) Oral two times a day PRN dry mouth  oxyCODONE    IR 5 milliGRAM(s) Oral every 4 hours PRN Moderate Pain (4 - 6)    Pertinent Labs:  10-03 Na134 mmol/L<L> Glu 94 mg/dL K+ 3.7 mmol/L Cr  1.08 mg/dL BUN 24 mg/dL<H> 09-27 Phos 3.1 mg/dL 10-03 Alb 2.7 g/dL<L>    Skin: No Pressure Ulcers     Edema: None Noted     Last Bowel Movement: on 10/2    Estimated Needs:   [X] No Change Since Previous Assessment    Previous Nutrition Diagnosis:   Severe Malnutrition  Chewing Difficulties    Nutrition Diagnosis is [X] Ongoing - Declines Nutrition Supplementation & Continues on Soft (Dysphagia 3) Diet     New Nutrition Diagnosis: [X] Not Applicable    Interventions:   1. Education Provided on Need for Increased Fluids/Fiber    2. Recommend POC    Monitoring & Evaluation:   [X] Weights   [X] PO Intake   [X] Follow Up (Per Protocol)  [X] Tolerance to Diet Prescription   [X] Other: Labs    Registered Dietitian/Nutritionist Remains Available.  Thomas Patten RDN    Pager # [phone]  Phone# [phone]

## 2019-10-04 PROCEDURE — 99232 SBSQ HOSP IP/OBS MODERATE 35: CPT

## 2019-10-04 PROCEDURE — 99232 SBSQ HOSP IP/OBS MODERATE 35: CPT | Mod: GC

## 2019-10-04 RX ORDER — METOPROLOL TARTRATE 50 MG
12.5 TABLET ORAL EVERY 12 HOURS
Refills: 0 | Status: DISCONTINUED | OUTPATIENT
Start: 2019-10-04 | End: 2019-10-30

## 2019-10-04 RX ORDER — METOPROLOL TARTRATE 50 MG
12.5 TABLET ORAL DAILY
Refills: 0 | Status: DISCONTINUED | OUTPATIENT
Start: 2019-10-04 | End: 2019-10-04

## 2019-10-04 RX ADMIN — Medication 0.25 MILLIGRAM(S): at 23:57

## 2019-10-04 RX ADMIN — Medication 500 MILLIGRAM(S): at 17:47

## 2019-10-04 RX ADMIN — LIDOCAINE 2 PATCH: 4 CREAM TOPICAL at 20:18

## 2019-10-04 RX ADMIN — Medication 12.5 MILLIGRAM(S): at 18:24

## 2019-10-04 RX ADMIN — Medication 1 TABLET(S): at 06:22

## 2019-10-04 RX ADMIN — GABAPENTIN 300 MILLIGRAM(S): 400 CAPSULE ORAL at 06:22

## 2019-10-04 RX ADMIN — PANTOPRAZOLE SODIUM 40 MILLIGRAM(S): 20 TABLET, DELAYED RELEASE ORAL at 06:22

## 2019-10-04 RX ADMIN — Medication 650 MILLIGRAM(S): at 15:10

## 2019-10-04 RX ADMIN — Medication 1 TABLET(S): at 17:47

## 2019-10-04 RX ADMIN — Medication 25 MILLIGRAM(S): at 06:24

## 2019-10-04 RX ADMIN — Medication 1 TABLET(S): at 12:36

## 2019-10-04 RX ADMIN — LIDOCAINE 2 PATCH: 4 CREAM TOPICAL at 19:18

## 2019-10-04 RX ADMIN — Medication 325 MILLIGRAM(S): at 17:56

## 2019-10-04 RX ADMIN — Medication 650 MILLIGRAM(S): at 17:57

## 2019-10-04 RX ADMIN — Medication 325 MILLIGRAM(S): at 06:24

## 2019-10-04 RX ADMIN — OXYCODONE HYDROCHLORIDE 5 MILLIGRAM(S): 5 TABLET ORAL at 17:58

## 2019-10-04 RX ADMIN — OXYCODONE HYDROCHLORIDE 5 MILLIGRAM(S): 5 TABLET ORAL at 13:15

## 2019-10-04 RX ADMIN — Medication 500 MILLIGRAM(S): at 06:22

## 2019-10-04 RX ADMIN — AMIODARONE HYDROCHLORIDE 200 MILLIGRAM(S): 400 TABLET ORAL at 06:22

## 2019-10-04 RX ADMIN — Medication 100 MILLIGRAM(S): at 08:11

## 2019-10-04 RX ADMIN — Medication 1 MILLIGRAM(S): at 12:35

## 2019-10-04 RX ADMIN — ENOXAPARIN SODIUM 40 MILLIGRAM(S): 100 INJECTION SUBCUTANEOUS at 12:36

## 2019-10-04 RX ADMIN — LIDOCAINE 2 PATCH: 4 CREAM TOPICAL at 08:11

## 2019-10-04 RX ADMIN — OXYCODONE HYDROCHLORIDE 5 MILLIGRAM(S): 5 TABLET ORAL at 12:37

## 2019-10-04 RX ADMIN — GABAPENTIN 300 MILLIGRAM(S): 400 CAPSULE ORAL at 17:47

## 2019-10-04 RX ADMIN — Medication 81 MILLIGRAM(S): at 12:35

## 2019-10-04 RX ADMIN — AMLODIPINE BESYLATE 5 MILLIGRAM(S): 2.5 TABLET ORAL at 06:22

## 2019-10-04 NOTE — PROGRESS NOTE ADULT - ASSESSMENT
This is a 70 yo female with thoracoabdominal aneurysm open repair complicated by cervical myelopathy, dysphagia, neurogenic bowel and bladder.       #Cervical Myelopathy  Continue comprehensive acute rehabilitation program including PT/OT/Swallowing therapy  - Dysphagia III diet    -Neuropathic pain- started gabapentin 300 mg qhs on 10/1-increase to BID dosing, continue oxycodone for now, lidocaine patches daily around thoracic back incision during the day for pain control.  -Neurogenic bladder: indwelling muñoz discontinued, monitor PVRs, intermittent straight cath as needed q6 hrs.   + UA-> will await urine culture as pt is asymptomatic.     #Anxiety- Xanax, home med    #Hypertension-- amlodipine 5mg qd    #Atrial Fibrillation- - rate controlled  with lopressor 25mg bid and amiodarone 200mg qd  Discussed regimen with hospitalist 10/3, will continue current BB regimen   Patient asymptomatic with HRs 50-60s, continue to monitor     #Thoracoabdominal aneurysm open repair- Asa, bp control     #Neurogenic Bladder: discontinued indwelling Muñoz, monitor PVRs with IC -ensure patient is bladder scanned q 6 hrs (changed schedule to 6am, 12pm, 6pm and 12am), discussed with nursing     #Hyponatremia- stable, continue to monitor     #Hypokalemia- 3.7 today, monitor  magnesium (normal)     #Anemia  -labs this am, stable   - likely related to blood loss during surgery  - supplemental iron recommended  - transfuse if Hemoglobin < 8    #Eosinophilia- AEC 0.45, continue to monitor     #DVT prophylaxis: lovenox 40mg sq daily     IDT meeting 10/1:   Patient mod A x1 for transfers and ambulating 20ft with RW and verbal cues. In OT, requiring assist of 2 for maximal assist for lower body dressing and toileting.   Goals are set for mod I in 3 weeks with tentative dc date set for 9/19.

## 2019-10-04 NOTE — PROGRESS NOTE ADULT - SUBJECTIVE AND OBJECTIVE BOX
70 yo female with thoracoabdominal aneurysm open repair complicated by cervical myelopathy, dysphagia, neurogenic bowel and bladder    seen at the bedside, band like pain around thoracic area, improves with medications, + tingling and numbness below waist to the ankles. no n/v, no sob    Vital Signs Last 24 Hrs  T(C): 36.4 (04 Oct 2019 08:58), Max: 36.4 (03 Oct 2019 21:00)  T(F): 97.6 (04 Oct 2019 08:58), Max: 97.6 (04 Oct 2019 08:58)  HR: 54 (04 Oct 2019 08:58) (54 - 61)  BP: 137/55 (04 Oct 2019 08:58) (128/74 - 147/61)  BP(mean): --  RR: 15 (04 Oct 2019 08:58) (14 - 15)  SpO2: 100% (04 Oct 2019 08:58) (100% - 100%)    GENERAL- NAD  EAR/NOSE/MOUTH/THROAT - no pharyngeal exudates, no oral lesions  MMM  EYES- SIXTO, conjunctiva and Sclera clear  NECK- supple  RESPIRATORY-  clear to auscultation bilaterally  CARDIOVASCULAR - SIS2, RRR  GI - soft NT BS present  EXTREMITIES- no pedal edema  NEUROLOGY- right LE weakness  SKIN- no rashes, warm to touch  PSYCHIATRY- AAO X 3  MUSCULOSKELETAL- AROM restricted to right LE                   8.4                  134  | 28   | 24           6.62  >-----------< 245     ------------------------< 94                    25.8                 3.7  | 99   | 1.08                                         Ca 8.9   Mg x     Ph x      Urinalysis Basic - ( 03 Oct 2019 18:30 )    Color: Yellow / Appearance: Clear / S.010 / pH: x  Gluc: x / Ketone: Negative  / Bili: Negative / Urobili: Negative   Blood: x / Protein: 15 / Nitrite: Negative   Leuk Esterase: Moderate / RBC: 0-4 /HPF / WBC 26-50 /HPF   Sq Epi: x / Non Sq Epi: Neg.-Few / Bacteria: Few /HPF

## 2019-10-04 NOTE — PROGRESS NOTE ADULT - SUBJECTIVE AND OBJECTIVE BOX
HPI:   69 year-old female with hx of "adrian-aorta" s/p aortic valve replacement, admitted electively to [facility] on  for open TAAA repair due to enlarging thoracoabdominal aortic aneurysm. The patient underwent her thoracoabdominal aneurysm open repair with decron graft and celiac SMA bypass with reimplantation of lumbar artery on .  Post-operatively she required vasopressors in the CTICU. Additionally post-operatively it was found that she had loss of strength in lower extremities, R>L. Repeat imaging was negative for hematoma. Also during this time period her kidney function declined and in conjunction with nephrology ATN was diagnosed with eventual recovery of her kidney function.  Her course was further complicated by AF with RVR, requiring amio gtt, esmolol gtt, digoxin, and vasopressin for pressure support. Course also c/b urinary retention requiring muñoz catheter. Workup for RLE weakness ordered by neurology, felt weakness to be due to cervical myelopathy. Urology consulted  for urinary retention, felt to be neurogenic in nature, recommended continue muñoz. Completed abx for UTI.  S&S eval recommend Dysphagia 1 nectar thick diet, 3L NC, PT recommending acute rehab.     Interval: + band like pain around thoracic area with some improvement. + UA but no dysuria. Had bm on commode today.      REVIEW OF SYSTEMS  Requiring intermittent straight catheterizations.   Denies chest pain, SOB, nausea, vomiting. Last BM 10/ without suppository after being placed on commode.    RECENT LABS/IMAGING                        8.4    6.62  )-----------( 245      ( 03 Oct 2019 06:00 )             25.8     10-03    134<L>  |  99  |  24<H>  ----------------------------<  94  3.7   |  28  |  1.08    Ca    8.9      03 Oct 2019 06:00    TPro  6.8  /  Alb  2.7<L>  /  TBili  0.3  /  DBili  x   /  AST  32  /  ALT  46<H>  /  AlkPhos  87  10-03      Urinalysis Basic - ( 03 Oct 2019 18:30 )    Color: Yellow / Appearance: Clear / S.010 / pH: x  Gluc: x / Ketone: Negative  / Bili: Negative / Urobili: Negative   Blood: x / Protein: 15 / Nitrite: Negative   Leuk Esterase: Moderate / RBC: 0-4 /HPF / WBC 26-50 /HPF   Sq Epi: x / Non Sq Epi: Neg.-Few / Bacteria: Few /HPF      Physical Exam:  Gen - NAD, Comfortable  	Pulm - CTAB  	Cardiovascular - RRR, S1S2  	Chest wall: Sternal incision wound-clean, no erythema or drainage noted  	Posterior lateral incision wound on the left- clean, no erythema or drainage, small skin tear seen next to incision site dressing intact with minimal drainage noted to dressing  	Abdomen - Soft, NT/ND, +BS  	Extremities - No C/C/E, No calf tenderness  	Neuro-  	   Cognitive - AAOx3  	   Cranial Nerves - CN 2-12 intact  	   Motor -  Bilateral Upper extremities 5/5   	                  LEFT    LE - HF 4+/5, KE 4+/5, DF 5/5, PF 5/5  	                  RIGHT LE - HF 2/5, KE 4/5, DF 4/5, PF 4/5     	   Sensory - Intact to LT bilaterally   	   Reflexes - Hyper reflexive on the right patella, no clonus, downward babinski bilateral   	   Tone - normal  	Psychiatric - Mood stable, Affect WNL  Skin:  as above

## 2019-10-04 NOTE — PROGRESS NOTE ADULT - ASSESSMENT
70 yo female with thoracoabdominal aneurysm open repair complicated by cervical myelopathy, dysphagia, neurogenic bowel and bladder- pt/ot/dvt ppx, pain meds, asa    Anxiety- Xanax    Hypertension- amlodipine ,will decrease dose of lopressor to 12.5 bid due to bradycardia, since she is not symptomatic, will monitor with parameters.     Atrial Fibrillation- lopressor, amiodarone     Neurogenic Bladder: monitor PVRs with IC , muñoz d/c, pt/ot    Hyponatremia- monitor    Anemia- likely related to blood loss during surgery, feso4    DVT prophylaxis: lovenox     will follow  d/w dr. pike

## 2019-10-05 PROCEDURE — 99232 SBSQ HOSP IP/OBS MODERATE 35: CPT

## 2019-10-05 PROCEDURE — 99232 SBSQ HOSP IP/OBS MODERATE 35: CPT | Mod: GC

## 2019-10-05 RX ORDER — AMLODIPINE BESYLATE 2.5 MG/1
7.5 TABLET ORAL DAILY
Refills: 0 | Status: DISCONTINUED | OUTPATIENT
Start: 2019-10-06 | End: 2019-10-18

## 2019-10-05 RX ADMIN — Medication 12.5 MILLIGRAM(S): at 17:25

## 2019-10-05 RX ADMIN — GABAPENTIN 300 MILLIGRAM(S): 400 CAPSULE ORAL at 06:51

## 2019-10-05 RX ADMIN — Medication 0.25 MILLIGRAM(S): at 23:18

## 2019-10-05 RX ADMIN — AMLODIPINE BESYLATE 5 MILLIGRAM(S): 2.5 TABLET ORAL at 06:53

## 2019-10-05 RX ADMIN — LIDOCAINE 2 PATCH: 4 CREAM TOPICAL at 06:52

## 2019-10-05 RX ADMIN — GABAPENTIN 300 MILLIGRAM(S): 400 CAPSULE ORAL at 17:24

## 2019-10-05 RX ADMIN — Medication 1 TABLET(S): at 06:51

## 2019-10-05 RX ADMIN — Medication 500 MILLIGRAM(S): at 06:51

## 2019-10-05 RX ADMIN — OXYCODONE HYDROCHLORIDE 5 MILLIGRAM(S): 5 TABLET ORAL at 15:00

## 2019-10-05 RX ADMIN — Medication 325 MILLIGRAM(S): at 17:25

## 2019-10-05 RX ADMIN — Medication 500 MILLIGRAM(S): at 17:24

## 2019-10-05 RX ADMIN — Medication 12.5 MILLIGRAM(S): at 06:52

## 2019-10-05 RX ADMIN — OXYCODONE HYDROCHLORIDE 5 MILLIGRAM(S): 5 TABLET ORAL at 08:19

## 2019-10-05 RX ADMIN — AMIODARONE HYDROCHLORIDE 200 MILLIGRAM(S): 400 TABLET ORAL at 06:50

## 2019-10-05 RX ADMIN — OXYCODONE HYDROCHLORIDE 5 MILLIGRAM(S): 5 TABLET ORAL at 20:00

## 2019-10-05 RX ADMIN — OXYCODONE HYDROCHLORIDE 5 MILLIGRAM(S): 5 TABLET ORAL at 18:31

## 2019-10-05 RX ADMIN — Medication 325 MILLIGRAM(S): at 06:53

## 2019-10-05 RX ADMIN — OXYCODONE HYDROCHLORIDE 5 MILLIGRAM(S): 5 TABLET ORAL at 09:00

## 2019-10-05 RX ADMIN — LIDOCAINE 2 PATCH: 4 CREAM TOPICAL at 18:00

## 2019-10-05 RX ADMIN — PANTOPRAZOLE SODIUM 40 MILLIGRAM(S): 20 TABLET, DELAYED RELEASE ORAL at 06:51

## 2019-10-05 RX ADMIN — Medication 1 TABLET(S): at 11:49

## 2019-10-05 RX ADMIN — Medication 81 MILLIGRAM(S): at 11:49

## 2019-10-05 RX ADMIN — OXYCODONE HYDROCHLORIDE 5 MILLIGRAM(S): 5 TABLET ORAL at 14:05

## 2019-10-05 RX ADMIN — Medication 1 TABLET(S): at 17:24

## 2019-10-05 RX ADMIN — Medication 1 MILLIGRAM(S): at 11:49

## 2019-10-05 RX ADMIN — LIDOCAINE 2 PATCH: 4 CREAM TOPICAL at 07:46

## 2019-10-05 RX ADMIN — ENOXAPARIN SODIUM 40 MILLIGRAM(S): 100 INJECTION SUBCUTANEOUS at 11:49

## 2019-10-05 NOTE — PROGRESS NOTE ADULT - SUBJECTIVE AND OBJECTIVE BOX
70 yo female with thoracoabdominal aneurysm open repair complicated by cervical myelopathy, dysphagia, neurogenic bowel and bladder    seen at the bedside, says pain is improving slowly,  no n/v, no sob, unable to void, getting IC.    ICU Vital Signs Last 24 Hrs  T(C): 36.6 (05 Oct 2019 08:03), Max: 36.6 (04 Oct 2019 21:24)  T(F): 97.8 (05 Oct 2019 08:03), Max: 97.8 (04 Oct 2019 21:24)  HR: 64 (05 Oct 2019 08:03) (55 - 65)  BP: 156/71 (05 Oct 2019 08:03) (121/68 - 165/67)  RR: 14 (05 Oct 2019 08:03) (14 - 16)  SpO2: 99% (05 Oct 2019 08:03) (97% - 100%)      GENERAL- NAD  EAR/NOSE/MOUTH/THROAT - no pharyngeal exudates, no oral lesions  MMM  EYES- SIXTO, conjunctiva and Sclera clear  NECK- supple  RESPIRATORY-  clear to auscultation bilaterally  CARDIOVASCULAR - SIS2, RRR  GI - soft NT BS present  EXTREMITIES- no pedal edema  NEUROLOGY- right LE weakness  SKIN- no rashes, warm to touch  PSYCHIATRY- AAO X 3  MUSCULOSKELETAL- AROM restricted to right LE           Urinalysis Basic - ( 03 Oct 2019 18:30 )    Color: Yellow / Appearance: Clear / S.010 / pH: x  Gluc: x / Ketone: Negative  / Bili: Negative / Urobili: Negative   Blood: x / Protein: 15 / Nitrite: Negative   Leuk Esterase: Moderate / RBC: 0-4 /HPF / WBC 26-50 /HPF   Sq Epi: x / Non Sq Epi: Neg.-Few / Bacteria: Few /HPF      .Urine Clean Catch (Midstream)  10-04 @ 06:30   50,000 - 99,000 CFU/mL Yeast  <10,000 CFU/ml Normal Urogenital magdalena present  --  --

## 2019-10-05 NOTE — PROGRESS NOTE ADULT - ASSESSMENT
This is a 68 yo female with thoracoabdominal aneurysm open repair complicated by cervical myelopathy, dysphagia, neurogenic bowel and bladder.       #Cervical Myelopathy  Continue comprehensive acute rehabilitation program including PT/OT/Swallowing therapy  - Dysphagia III diet    -Neuropathic pain- started gabapentin 300 mg qhs on 10/1-increase to BID dosing, continue oxycodone for now, lidocaine patches daily around thoracic back incision during the day for pain control.  -Neurogenic bladder: indwelling muñoz discontinued, monitor PVRs, intermittent straight cath as needed q6 hrs.   + UA-> will await urine culture as pt is asymptomatic.     #Anxiety- Xanax, home med    #Hypertension-- amlodipine 5mg qd    #Atrial Fibrillation- - rate controlled  with lopressor 25mg bid and amiodarone 200mg qd  Discussed regimen with hospitalist 10/3, will continue current BB regimen   Patient asymptomatic with HRs 50-60s, continue to monitor     #Thoracoabdominal aneurysm open repair- Asa, bp control     #Neurogenic Bladder: discontinued indwelling Muñoz, monitor PVRs with IC -ensure patient is bladder scanned q 6 hrs (changed schedule to 6am, 12pm, 6pm and 12am), discussed with nursing     #Hyponatremia- stable, continue to monitor     #Hypokalemia- 3.7 today, monitor  magnesium (normal)     #Anemia  -labs this am, stable   - likely related to blood loss during surgery  - supplemental iron recommended  - transfuse if Hemoglobin < 8    #Eosinophilia- AEC 0.45, continue to monitor     #DVT prophylaxis: lovenox 40mg sq daily     IDT meeting 10/1:   Patient mod A x1 for transfers and ambulating 20ft with RW and verbal cues. In OT, requiring assist of 2 for maximal assist for lower body dressing and toileting.   Goals are set for mod I in 3 weeks with tentative dc date set for 9/19.

## 2019-10-05 NOTE — PROGRESS NOTE ADULT - SUBJECTIVE AND OBJECTIVE BOX
HPI:   69 year-old female with hx of "adrian-aorta" s/p aortic valve replacement, admitted electively to [facility] on 8/28 for open TAAA repair due to enlarging thoracoabdominal aortic aneurysm. The patient underwent her thoracoabdominal aneurysm open repair with decron graft and celiac SMA bypass with reimplantation of lumbar artery on 8/29.  Post-operatively she required vasopressors in the CTICU. Additionally post-operatively it was found that she had loss of strength in lower extremities, R>L. Repeat imaging was negative for hematoma. Also during this time period her kidney function declined and in conjunction with nephrology ATN was diagnosed with eventual recovery of her kidney function.  Her course was further complicated by AF with RVR, requiring amio gtt, esmolol gtt, digoxin, and vasopressin for pressure support. Course also c/b urinary retention requiring muñoz catheter. Workup for RLE weakness ordered by neurology, felt weakness to be due to cervical myelopathy. Urology consulted 9/23 for urinary retention, felt to be neurogenic in nature, recommended continue muñoz. Completed abx for UTI.  S&S eval recommend Dysphagia 1 nectar thick diet, 3L NC, PT recommending acute rehab.     Interval: + band like pain around thoracic area with some improvement. + UA but no dysuria.   REVIEW OF SYSTEMS  Requiring intermittent straight catheterizations.   Denies chest pain, SOB, nausea, vomiting. Last BM 10/2 without suppository after being placed on commode.        Physical Exam:  Gen - NAD, Comfortable  	Pulm - CTAB  	Cardiovascular - RRR, S1S2  	Chest wall: Sternal incision wound-clean, no erythema or drainage noted  	Posterior lateral incision wound on the left- clean, no erythema or drainage, small skin tear seen next to incision site dressing intact with minimal drainage noted to dressing  	Abdomen - Soft, NT/ND, +BS  	Extremities - No C/C/E, No calf tenderness  	Neuro-  	   Cognitive - AAOx3  	   Cranial Nerves - CN 2-12 intact  	   Motor -  Bilateral Upper extremities 5/5   	                  LEFT    LE - HF 4+/5, KE 4+/5, DF 5/5, PF 5/5  	                  RIGHT LE - HF 2/5, KE 4/5, DF 4/5, PF 4/5     	   Sensory - Intact to LT bilaterally   	   Reflexes - Hyper reflexive on the right patella, no clonus, downward babinski bilateral   	   Tone - normal  	Psychiatric - Mood stable, Affect WNL  Skin:  as above

## 2019-10-05 NOTE — PROGRESS NOTE ADULT - ASSESSMENT
70 yo female with thoracoabdominal aneurysm open repair complicated by cervical myelopathy, dysphagia, neurogenic bowel and bladder- pt/ot/dvt ppx, pain meds, asa    Anxiety- Xanax    Hypertension- amlodipine ,will decrease dose of lopressor to 12.5 bid due to bradycardia, will increase amlodipine to 10mg daily, since bp noted to be elevated this am.    Atrial Fibrillation- lopressor, amiodarone     Neurogenic Bladder: monitor PVRs with IC , muñoz d/c, pt/ot    Hyponatremia- monitor    Anemia- likely related to blood loss during surgery, feso4    DVT prophylaxis: lovenox     will follow 68 yo female with thoracoabdominal aneurysm open repair complicated by cervical myelopathy, dysphagia, neurogenic bowel and bladder- pt/ot/dvt ppx, pain meds, asa    Anxiety- Xanax    Hypertension- amlodipine ,will decrease dose of lopressor to 12.5 bid due to bradycardia, will increase amlodipine to 7.5 mg daily, since bp noted to be elevated this am. d/w dr. pike    Atrial Fibrillation- lopressor, amiodarone     Neurogenic Bladder: monitor PVRs with IC , muñoz d/c, pt/ot    Hyponatremia- monitor    Anemia- likely related to blood loss during surgery, feso4    DVT prophylaxis: lovenox     will follow

## 2019-10-06 LAB
CULTURE RESULTS: SIGNIFICANT CHANGE UP
SPECIMEN SOURCE: SIGNIFICANT CHANGE UP

## 2019-10-06 PROCEDURE — 99232 SBSQ HOSP IP/OBS MODERATE 35: CPT | Mod: GC

## 2019-10-06 RX ADMIN — Medication 325 MILLIGRAM(S): at 17:35

## 2019-10-06 RX ADMIN — OXYCODONE HYDROCHLORIDE 5 MILLIGRAM(S): 5 TABLET ORAL at 17:35

## 2019-10-06 RX ADMIN — LIDOCAINE 2 PATCH: 4 CREAM TOPICAL at 06:38

## 2019-10-06 RX ADMIN — Medication 1 MILLIGRAM(S): at 11:41

## 2019-10-06 RX ADMIN — ENOXAPARIN SODIUM 40 MILLIGRAM(S): 100 INJECTION SUBCUTANEOUS at 11:41

## 2019-10-06 RX ADMIN — Medication 1 TABLET(S): at 05:49

## 2019-10-06 RX ADMIN — Medication 1 TABLET(S): at 17:35

## 2019-10-06 RX ADMIN — Medication 81 MILLIGRAM(S): at 11:41

## 2019-10-06 RX ADMIN — GABAPENTIN 300 MILLIGRAM(S): 400 CAPSULE ORAL at 17:35

## 2019-10-06 RX ADMIN — Medication 1 TABLET(S): at 11:41

## 2019-10-06 RX ADMIN — GABAPENTIN 300 MILLIGRAM(S): 400 CAPSULE ORAL at 05:48

## 2019-10-06 RX ADMIN — Medication 12.5 MILLIGRAM(S): at 05:49

## 2019-10-06 RX ADMIN — Medication 325 MILLIGRAM(S): at 05:49

## 2019-10-06 RX ADMIN — Medication 500 MILLIGRAM(S): at 17:35

## 2019-10-06 RX ADMIN — Medication 500 MILLIGRAM(S): at 05:48

## 2019-10-06 RX ADMIN — AMLODIPINE BESYLATE 7.5 MILLIGRAM(S): 2.5 TABLET ORAL at 05:49

## 2019-10-06 RX ADMIN — OXYCODONE HYDROCHLORIDE 5 MILLIGRAM(S): 5 TABLET ORAL at 11:41

## 2019-10-06 RX ADMIN — AMIODARONE HYDROCHLORIDE 200 MILLIGRAM(S): 400 TABLET ORAL at 05:49

## 2019-10-06 RX ADMIN — LIDOCAINE 2 PATCH: 4 CREAM TOPICAL at 18:06

## 2019-10-06 RX ADMIN — LIDOCAINE 2 PATCH: 4 CREAM TOPICAL at 07:57

## 2019-10-06 RX ADMIN — PANTOPRAZOLE SODIUM 40 MILLIGRAM(S): 20 TABLET, DELAYED RELEASE ORAL at 05:50

## 2019-10-06 RX ADMIN — OXYCODONE HYDROCHLORIDE 5 MILLIGRAM(S): 5 TABLET ORAL at 12:00

## 2019-10-06 RX ADMIN — OXYCODONE HYDROCHLORIDE 5 MILLIGRAM(S): 5 TABLET ORAL at 18:06

## 2019-10-06 RX ADMIN — Medication 12.5 MILLIGRAM(S): at 17:35

## 2019-10-06 NOTE — PROGRESS NOTE ADULT - ASSESSMENT
This is a 68 yo female with thoracoabdominal aneurysm open repair complicated by cervical myelopathy, dysphagia, neurogenic bowel and bladder.       #Cervical Myelopathy  Continue comprehensive acute rehabilitation program including PT/OT/Swallowing therapy  - Dysphagia III diet    -Neuropathic pain- started gabapentin 300 mg qhs on 10/1-increase to BID dosing, continue oxycodone for now, lidocaine patches daily around thoracic back incision during the day for pain control.  -Neurogenic bladder: indwelling muñoz discontinued, monitor PVRs, intermittent straight cath as needed q6 hrs.   + UA-> will await urine culture as pt is asymptomatic.     #Anxiety- Xanax, home med    #Hypertension-- amlodipine 7.5mg qd-> will increase to 10 mg if no improvement     #Atrial Fibrillation- - rate controlled  with lopressor 25mg bid and amiodarone 200mg qd  Discussed regimen with hospitalist 10/3, will continue current BB regimen   Patient asymptomatic with HRs 50-60s, continue to monitor     #Thoracoabdominal aneurysm open repair- Asa, bp control     #Neurogenic Bladder: discontinued indwelling Muñoz, monitor PVRs with IC -ensure patient is bladder scanned q 6 hrs (changed schedule to 6am, 12pm, 6pm and 12am), discussed with nursing     #Hyponatremia- stable, continue to monitor     #Hypokalemia- 3.7 today, monitor  magnesium (normal)     #Anemia  -labs this am, stable   - likely related to blood loss during surgery  - supplemental iron recommended  - transfuse if Hemoglobin < 8    #Eosinophilia- AEC 0.45, continue to monitor     #DVT prophylaxis: lovenox 40mg sq daily     IDT meeting 10/1:   Patient mod A x1 for transfers and ambulating 20ft with RW and verbal cues. In OT, requiring assist of 2 for maximal assist for lower body dressing and toileting.   Goals are set for mod I in 3 weeks with tentative dc date set for 9/19.

## 2019-10-06 NOTE — PROGRESS NOTE ADULT - SUBJECTIVE AND OBJECTIVE BOX
HPI:   69 year-old female with hx of "adrian-aorta" s/p aortic valve replacement, admitted electively to [facility] on 8/28 for open TAAA repair due to enlarging thoracoabdominal aortic aneurysm. The patient underwent her thoracoabdominal aneurysm open repair with decron graft and celiac SMA bypass with reimplantation of lumbar artery on 8/29.  Post-operatively she required vasopressors in the CTICU. Additionally post-operatively it was found that she had loss of strength in lower extremities, R>L. Repeat imaging was negative for hematoma. Also during this time period her kidney function declined and in conjunction with nephrology ATN was diagnosed with eventual recovery of her kidney function.  Her course was further complicated by AF with RVR, requiring amio gtt, esmolol gtt, digoxin, and vasopressin for pressure support. Course also c/b urinary retention requiring muñoz catheter. Workup for RLE weakness ordered by neurology, felt weakness to be due to cervical myelopathy. Urology consulted 9/23 for urinary retention, felt to be neurogenic in nature, recommended continue muñoz. Completed abx for UTI.  S&S eval recommend Dysphagia 1 nectar thick diet, 3L NC, PT recommending acute rehab.     Interval: + band like pain around thoracic area with some improvement. + UA but no dysuria.   REVIEW OF SYSTEMS  Requiring intermittent straight catheterizations.       Vital Signs Last 24 Hrs  T(C): 36.5 (06 Oct 2019 07:19), Max: 36.6 (05 Oct 2019 22:30)  T(F): 97.7 (06 Oct 2019 07:19), Max: 97.8 (05 Oct 2019 22:30)  HR: 64 (06 Oct 2019 07:19) (64 - 65)  BP: 161/73 (06 Oct 2019 07:19) (145/74 - 161/73)  BP(mean): --  RR: 14 (06 Oct 2019 07:19) (14 - 14)  SpO2: 99% (06 Oct 2019 07:19) (98% - 99%)    Physical Exam:  Gen - NAD, Comfortable  	Pulm - CTAB  	Cardiovascular - RRR, S1S2  	Chest wall: Sternal incision wound-clean, no erythema or drainage noted  	Posterior lateral incision wound on the left- clean, no erythema or drainage, small skin tear seen next to incision site dressing intact with minimal drainage noted to dressing  	Abdomen - Soft, NT/ND, +BS  	Extremities - No C/C/E, No calf tenderness  	Neuro-  	   Cognitive - AAOx3  	   Cranial Nerves - CN 2-12 intact  	   Motor -  Bilateral Upper extremities 5/5   	                  LEFT    LE - HF 4+/5, KE 4+/5, DF 5/5, PF 5/5  	                  RIGHT LE - HF 2/5, KE 4/5, DF 4/5, PF 4/5     	   Sensory - Intact to LT bilaterally   	   Reflexes - Hyper reflexive on the right patella, no clonus, downward babinski bilateral   	   Tone - normal  	Psychiatric - Mood stable, Affect WNL  Skin:  as above

## 2019-10-07 PROCEDURE — 99232 SBSQ HOSP IP/OBS MODERATE 35: CPT | Mod: GC

## 2019-10-07 PROCEDURE — 99232 SBSQ HOSP IP/OBS MODERATE 35: CPT

## 2019-10-07 RX ORDER — PSYLLIUM SEED (WITH DEXTROSE)
1 POWDER (GRAM) ORAL AT BEDTIME
Refills: 0 | Status: DISCONTINUED | OUTPATIENT
Start: 2019-10-07 | End: 2019-10-17

## 2019-10-07 RX ADMIN — Medication 0.25 MILLIGRAM(S): at 22:55

## 2019-10-07 RX ADMIN — Medication 1 TABLET(S): at 12:42

## 2019-10-07 RX ADMIN — Medication 500 MILLIGRAM(S): at 05:28

## 2019-10-07 RX ADMIN — Medication 81 MILLIGRAM(S): at 12:42

## 2019-10-07 RX ADMIN — Medication 325 MILLIGRAM(S): at 18:00

## 2019-10-07 RX ADMIN — Medication 12.5 MILLIGRAM(S): at 05:29

## 2019-10-07 RX ADMIN — Medication 500 MILLIGRAM(S): at 17:59

## 2019-10-07 RX ADMIN — LIDOCAINE 2 PATCH: 4 CREAM TOPICAL at 08:28

## 2019-10-07 RX ADMIN — OXYCODONE HYDROCHLORIDE 5 MILLIGRAM(S): 5 TABLET ORAL at 18:35

## 2019-10-07 RX ADMIN — Medication 1 MILLIGRAM(S): at 12:42

## 2019-10-07 RX ADMIN — PANTOPRAZOLE SODIUM 40 MILLIGRAM(S): 20 TABLET, DELAYED RELEASE ORAL at 05:29

## 2019-10-07 RX ADMIN — Medication 0.25 MILLIGRAM(S): at 00:45

## 2019-10-07 RX ADMIN — Medication 10 MILLIGRAM(S): at 15:41

## 2019-10-07 RX ADMIN — GABAPENTIN 300 MILLIGRAM(S): 400 CAPSULE ORAL at 05:28

## 2019-10-07 RX ADMIN — AMIODARONE HYDROCHLORIDE 200 MILLIGRAM(S): 400 TABLET ORAL at 05:29

## 2019-10-07 RX ADMIN — Medication 1 TABLET(S): at 17:59

## 2019-10-07 RX ADMIN — Medication 1 TABLET(S): at 05:28

## 2019-10-07 RX ADMIN — AMLODIPINE BESYLATE 7.5 MILLIGRAM(S): 2.5 TABLET ORAL at 05:29

## 2019-10-07 RX ADMIN — Medication 325 MILLIGRAM(S): at 05:29

## 2019-10-07 RX ADMIN — LIDOCAINE 2 PATCH: 4 CREAM TOPICAL at 20:38

## 2019-10-07 RX ADMIN — GABAPENTIN 300 MILLIGRAM(S): 400 CAPSULE ORAL at 17:59

## 2019-10-07 RX ADMIN — Medication 1 PACKET(S): at 22:56

## 2019-10-07 RX ADMIN — OXYCODONE HYDROCHLORIDE 5 MILLIGRAM(S): 5 TABLET ORAL at 09:00

## 2019-10-07 RX ADMIN — OXYCODONE HYDROCHLORIDE 5 MILLIGRAM(S): 5 TABLET ORAL at 17:59

## 2019-10-07 RX ADMIN — Medication 12.5 MILLIGRAM(S): at 17:59

## 2019-10-07 RX ADMIN — OXYCODONE HYDROCHLORIDE 5 MILLIGRAM(S): 5 TABLET ORAL at 12:41

## 2019-10-07 RX ADMIN — OXYCODONE HYDROCHLORIDE 5 MILLIGRAM(S): 5 TABLET ORAL at 13:00

## 2019-10-07 RX ADMIN — ENOXAPARIN SODIUM 40 MILLIGRAM(S): 100 INJECTION SUBCUTANEOUS at 12:42

## 2019-10-07 RX ADMIN — OXYCODONE HYDROCHLORIDE 5 MILLIGRAM(S): 5 TABLET ORAL at 08:27

## 2019-10-07 NOTE — PROGRESS NOTE ADULT - ASSESSMENT
68 yo female with thoracoabdominal aneurysm open repair complicated by cervical myelopathy, dysphagia, neurogenic bowel and bladder- pt/ot/dvt ppx, pain meds, asa    Anxiety- Xanax    Hypertension- amlodipine ,will decrease dose of lopressor to 12.5 bid due to bradycardia, will increase amlodipine to 7.5 mg daily, since bp noted to be elevated this am. d/w dr. pike    Atrial Fibrillation- lopressor, amiodarone     Neurogenic Bladder: monitor PVRs with IC , muñoz d/c, pt/ot    Hyponatremia- monitor    Anemia- likely related to blood loss during surgery, feso4    DVT prophylaxis: lovenox     will follow

## 2019-10-07 NOTE — PROGRESS NOTE ADULT - SUBJECTIVE AND OBJECTIVE BOX
HPI:   69 year-old female with hx of "adrian-aorta" s/p aortic valve replacement, admitted electively to [facility] on 8/28 for open TAAA repair due to enlarging thoracoabdominal aortic aneurysm. The patient underwent her thoracoabdominal aneurysm open repair with decron graft and celiac SMA bypass with reimplantation of lumbar artery on 8/29.  Post-operatively she required vasopressors in the CTICU. Additionally post-operatively it was found that she had loss of strength in lower extremities, R>L. Repeat imaging was negative for hematoma. Also during this time period her kidney function declined and in conjunction with nephrology ATN was diagnosed with eventual recovery of her kidney function.  Her course was further complicated by AF with RVR, requiring amio gtt, esmolol gtt, digoxin, and vasopressin for pressure support. Course also c/b urinary retention requiring muñoz catheter. Workup for RLE weakness ordered by neurology, felt weakness to be due to cervical myelopathy. Urology consulted 9/23 for urinary retention, felt to be neurogenic in nature, recommended continue muñoz. Completed abx for UTI.  S&S eval recommend Dysphagia 1 nectar thick diet, 3L NC, PT recommending acute rehab.     Interval: + band like pain around thoracic area with improvement.   REVIEW OF SYSTEMS  Requiring intermittent straight catheterizations q 6 hrs, generally draining approx 1000ml with every straight catheterization. Patient does admit to drinking a large amount of water daily.       Vital Signs Last 24 Hrs  T(C): 36.7 (07 Oct 2019 08:32), Max: 36.7 (07 Oct 2019 08:32)  T(F): 98 (07 Oct 2019 08:32), Max: 98 (07 Oct 2019 08:32)  HR: 70 (07 Oct 2019 08:32) (65 - 70)  BP: 110/53 (07 Oct 2019 08:32) (110/53 - 160/77)  RR: 14 (07 Oct 2019 08:32) (14 - 14)  SpO2: 98% (07 Oct 2019 08:32) (98% - 99%)      Physical Exam:  Gen - NAD, Comfortable  	Pulm - CTAB  	Cardiovascular - RRR, S1S2  	Chest wall: Sternal incision wound-clean, no erythema or drainage noted  	Posterior lateral incision wound on the left- clean, no erythema or drainage, small skin tear seen next to incision site dressing intact with minimal drainage noted to dressing-with removal of dressing small amount of bleeding   	Abdomen - Soft, NT/ND, +BS  	Extremities - No C/C/E, No calf tenderness  	Neuro-  	   Cognitive - AAOx3  	   Cranial Nerves - CN 2-12 intact  	   Motor -  Bilateral Upper extremities 5/5   	                  LEFT    LE - HF 4+/5, KE 4+/5, DF 5/5, PF 5/5  	                  RIGHT LE - HF 2/5, KE 4/5, DF 4/5, PF 4/5     	   Sensory - Intact to LT bilaterally   	   Reflexes - Hyper reflexive on the right patella, no clonus, downward babinski bilateral   	   Tone - normal  	Psychiatric - Mood stable, Affect WNL  Skin:  as above       MEDICATIONS  (STANDING):  amiodarone    Tablet 200 milliGRAM(s) Oral daily  amLODIPine   Tablet 7.5 milliGRAM(s) Oral daily  ascorbic acid 500 milliGRAM(s) Oral two times a day  aspirin  chewable 81 milliGRAM(s) Oral daily  enoxaparin Injectable 40 milliGRAM(s) SubCutaneous daily  ferrous    sulfate 325 milliGRAM(s) Oral two times a day  folic acid 1 milliGRAM(s) Oral daily  gabapentin 300 milliGRAM(s) Oral two times a day  influenza   Vaccine 0.5 milliLiter(s) IntraMuscular once  lactobacillus acidophilus 1 Tablet(s) Oral two times a day  lidocaine   Patch 2 Patch Transdermal <User Schedule>  metoprolol succinate ER 12.5 milliGRAM(s) Oral every 12 hours  multivitamin 1 Tablet(s) Oral daily  pantoprazole    Tablet 40 milliGRAM(s) Oral before breakfast    MEDICATIONS  (PRN):  acetaminophen   Tablet .. 650 milliGRAM(s) Oral every 6 hours PRN Mild Pain (1 - 3)  ALPRAZolam 0.25 milliGRAM(s) Oral every 8 hours PRN anxiety  bisacodyl Suppository 10 milliGRAM(s) Rectal daily PRN Constipation  mineral oil 30 milliLiter(s) Oral two times a day PRN dry mouth  oxyCODONE    IR 5 milliGRAM(s) Oral every 4 hours PRN Moderate Pain (4 - 6)

## 2019-10-07 NOTE — PROGRESS NOTE ADULT - ASSESSMENT
This is a 70 yo female with thoracoabdominal aneurysm open repair complicated by cervical myelopathy, dysphagia, neurogenic bowel and bladder.       #Cervical Myelopathy  Continue comprehensive acute rehabilitation program including PT/OT/Swallowing therapy  - Dysphagia III diet    -Neuropathic pain- started gabapentin 300 mg qhs on 10/1-increase to BID dosing, continue oxycodone for now, lidocaine patches daily around thoracic back incision during the day for pain control.-pain better controlled.   -Neurogenic bladder: indwelling muñoz discontinued, monitor PVRs, intermittent straight cath as needed q6 hrs. -large amounts on IC, will fluid restrict but if PVRs remain high then will increase frequency of straight cauterizations to q4hrs  + UA, culture with normal magdalena and [id number] presumptive candida albicans, no treatment at this time, afebrile without leukocytosis     #Anxiety- Xanax, home med    #Hypertension-- amlodipine 7.5mg qd-> will increase to 10 mg if no improvement (SBPs 160 yesterday, however 110 this am, monitor before titrating norvasc)    #Atrial Fibrillation- - rate controlled  with lopressor 25mg bid and amiodarone 200mg qd  Discussed regimen with hospitalist 10/3, will continue current BB regimen   Patient asymptomatic with HRs 50-60s, continue to monitor     #Thoracoabdominal aneurysm open repair- Asa, bp control     #Neurogenic Bladder: discontinued indwelling Muñoz, monitor PVRs with IC -ensure patient is bladder scanned q 6 hrs (changed schedule to 6am, 12pm, 6pm and 12am), may need to increase frequency due to large amounts being drained q6 hrs, will fluid restrict to 1. 5 L daily and monitor overnight     #Hyponatremia- stable, continue to monitor     #Hypokalemia- 3.7 10/3, monitor magnesium (normal)     #Anemia  -labs on 10/3, stable   - likely related to blood loss during surgery  - supplemental iron recommended  - transfuse if Hemoglobin < 8    #Eosinophilia- AEC 0.45, continue to monitor     #DVT prophylaxis: lovenox 40mg sq daily     IDT meeting 10/1:   Patient mod A x1 for transfers and ambulating 20ft with RW and verbal cues. In OT, requiring assist of 2 for maximal assist for lower body dressing and toileting.   Goals are set for mod I in 3 weeks with tentative dc date set for 9/19.

## 2019-10-07 NOTE — PROGRESS NOTE ADULT - SUBJECTIVE AND OBJECTIVE BOX
68 yo female with thoracoabdominal aneurysm open repair complicated by cervical myelopathy, dysphagia, neurogenic bowel and bladder    seen at the bedside,  no n/v, no sob, still unable to void, but states she now is starting to feel tender in the lower belly when her bladder is full, had a wet diaper overnight,  getting IC.    Vital Signs Last 24 Hrs  T(C): 36.7 (07 Oct 2019 08:32), Max: 36.7 (07 Oct 2019 08:32)  T(F): 98 (07 Oct 2019 08:32), Max: 98 (07 Oct 2019 08:32)  HR: 70 (07 Oct 2019 08:32) (65 - 70)  BP: 110/53 (07 Oct 2019 08:32) (110/53 - 160/77)  BP(mean): --  RR: 14 (07 Oct 2019 08:32) (14 - 14)  SpO2: 98% (07 Oct 2019 08:32) (98% - 99%)      GENERAL- NAD  EAR/NOSE/MOUTH/THROAT - no pharyngeal exudates, no oral lesions  MMM  EYES- SIXTO, conjunctiva and Sclera clear  NECK- supple  RESPIRATORY-  clear to auscultation bilaterally  CARDIOVASCULAR - SIS2, RRR  GI - soft NT BS present  EXTREMITIES- no pedal edema  NEUROLOGY- right LE weakness  SKIN- no rashes, warm to touch  PSYCHIATRY- AAO X 3

## 2019-10-08 PROCEDURE — 99232 SBSQ HOSP IP/OBS MODERATE 35: CPT

## 2019-10-08 PROCEDURE — 99232 SBSQ HOSP IP/OBS MODERATE 35: CPT | Mod: GC

## 2019-10-08 RX ADMIN — LIDOCAINE 2 PATCH: 4 CREAM TOPICAL at 06:12

## 2019-10-08 RX ADMIN — OXYCODONE HYDROCHLORIDE 5 MILLIGRAM(S): 5 TABLET ORAL at 17:24

## 2019-10-08 RX ADMIN — GABAPENTIN 300 MILLIGRAM(S): 400 CAPSULE ORAL at 05:46

## 2019-10-08 RX ADMIN — Medication 12.5 MILLIGRAM(S): at 17:21

## 2019-10-08 RX ADMIN — Medication 0.25 MILLIGRAM(S): at 22:39

## 2019-10-08 RX ADMIN — Medication 1 TABLET(S): at 11:42

## 2019-10-08 RX ADMIN — GABAPENTIN 300 MILLIGRAM(S): 400 CAPSULE ORAL at 17:22

## 2019-10-08 RX ADMIN — PANTOPRAZOLE SODIUM 40 MILLIGRAM(S): 20 TABLET, DELAYED RELEASE ORAL at 05:46

## 2019-10-08 RX ADMIN — Medication 500 MILLIGRAM(S): at 17:22

## 2019-10-08 RX ADMIN — OXYCODONE HYDROCHLORIDE 5 MILLIGRAM(S): 5 TABLET ORAL at 09:10

## 2019-10-08 RX ADMIN — Medication 81 MILLIGRAM(S): at 11:42

## 2019-10-08 RX ADMIN — Medication 1 PACKET(S): at 22:36

## 2019-10-08 RX ADMIN — Medication 12.5 MILLIGRAM(S): at 05:46

## 2019-10-08 RX ADMIN — Medication 1 TABLET(S): at 05:46

## 2019-10-08 RX ADMIN — Medication 1 TABLET(S): at 17:21

## 2019-10-08 RX ADMIN — OXYCODONE HYDROCHLORIDE 5 MILLIGRAM(S): 5 TABLET ORAL at 16:14

## 2019-10-08 RX ADMIN — Medication 325 MILLIGRAM(S): at 05:46

## 2019-10-08 RX ADMIN — Medication 325 MILLIGRAM(S): at 17:22

## 2019-10-08 RX ADMIN — Medication 1 MILLIGRAM(S): at 11:42

## 2019-10-08 RX ADMIN — Medication 500 MILLIGRAM(S): at 05:46

## 2019-10-08 RX ADMIN — AMLODIPINE BESYLATE 7.5 MILLIGRAM(S): 2.5 TABLET ORAL at 05:46

## 2019-10-08 RX ADMIN — OXYCODONE HYDROCHLORIDE 5 MILLIGRAM(S): 5 TABLET ORAL at 08:15

## 2019-10-08 RX ADMIN — AMIODARONE HYDROCHLORIDE 200 MILLIGRAM(S): 400 TABLET ORAL at 05:46

## 2019-10-08 RX ADMIN — ENOXAPARIN SODIUM 40 MILLIGRAM(S): 100 INJECTION SUBCUTANEOUS at 11:42

## 2019-10-08 NOTE — PROGRESS NOTE ADULT - ASSESSMENT
68 yo female with thoracoabdominal aneurysm open repair complicated by cervical myelopathy, dysphagia, neurogenic bowel and bladder- pt/ot/dvt ppx, pain meds, asa    Anxiety- Xanax    Hypertension- amlodipine    Atrial Fibrillation- lopressor, amiodarone     Neurogenic Bladder: monitor PVRs with IC , muñoz d/c, pt/ot    Hyponatremia- monitor    Anemia- likely related to blood loss during surgery, feso4    DVT prophylaxis: lovenox     will follow

## 2019-10-08 NOTE — PROGRESS NOTE ADULT - SUBJECTIVE AND OBJECTIVE BOX
HPI:   69 year-old female with hx of "adrian-aorta" s/p aortic valve replacement, admitted electively to [facility] on 8/28 for open TAAA repair due to enlarging thoracoabdominal aortic aneurysm. The patient underwent her thoracoabdominal aneurysm open repair with decron graft and celiac SMA bypass with reimplantation of lumbar artery on 8/29.  Post-operatively she required vasopressors in the CTICU. Additionally post-operatively it was found that she had loss of strength in lower extremities, R>L. Repeat imaging was negative for hematoma. Also during this time period her kidney function declined and in conjunction with nephrology ATN was diagnosed with eventual recovery of her kidney function.  Her course was further complicated by AF with RVR, requiring amio gtt, esmolol gtt, digoxin, and vasopressin for pressure support. Course also c/b urinary retention requiring muñoz catheter. Workup for RLE weakness ordered by neurology, felt weakness to be due to cervical myelopathy. Urology consulted 9/23 for urinary retention, felt to be neurogenic in nature, recommended continue muñoz. Completed abx for UTI.  S&S eval recommend Dysphagia 1 nectar thick diet, 3L NC, PT recommending acute rehab.     Interval: + band like pain around thoracic area with improvement.   REVIEW OF SYSTEMS  Requiring intermittent straight catheterizations q 6 hrs, Bladder scans yesterday slightly improved, drained for 1000cc this am. Patient attempting to stick with 1.5 L FR.   Patient denies CP, SOB, abdominal pain. +BM yesterday but could not control the BM.       Vital Signs Last 24 Hrs  T(C): 36.7 (08 Oct 2019 08:36), Max: 36.7 (07 Oct 2019 17:58)  T(F): 98.1 (08 Oct 2019 08:36), Max: 98.1 (08 Oct 2019 08:36)  HR: 77 (08 Oct 2019 08:36) (68 - 77)  BP: 127/67 (08 Oct 2019 08:36) (108/64 - 127/67)  RR: 14 (08 Oct 2019 08:36) (14 - 14)  SpO2: 99% (08 Oct 2019 08:36) (98% - 99%)    Physical Exam:  Gen - NAD, Comfortable  	Pulm - CTAB  	Cardiovascular - RRR, S1S2  	Chest wall: Sternal incision wound-clean, no erythema or drainage noted  	Posterior lateral incision wound on the left- clean, no erythema or drainage, small skin tear seen next to incision site dressed  	Abdomen - Soft, NT/ND, +BS  	Extremities - No C/C/E, No calf tenderness  	Neuro-  	   Cognitive - AAOx3  	   Cranial Nerves - CN 2-12 intact  	   Motor -  Bilateral Upper extremities 5/5   	                  LEFT    LE - HF 4+/5, KE 4+/5, DF 5/5, PF 5/5  	                  RIGHT LE - HF 2/5, KE 4/5, DF 4/5, PF 4/5     	   Sensory - Intact to LT bilaterally   	   Reflexes - Hyper reflexive on the right patella, no clonus, downward babinski bilateral   	   Tone - normal  	Psychiatric - Mood stable, Affect WNL  Skin:  as above       MEDICATIONS  (STANDING):  amiodarone    Tablet 200 milliGRAM(s) Oral daily  amLODIPine   Tablet 7.5 milliGRAM(s) Oral daily  ascorbic acid 500 milliGRAM(s) Oral two times a day  aspirin  chewable 81 milliGRAM(s) Oral daily  enoxaparin Injectable 40 milliGRAM(s) SubCutaneous daily  ferrous    sulfate 325 milliGRAM(s) Oral two times a day  folic acid 1 milliGRAM(s) Oral daily  gabapentin 300 milliGRAM(s) Oral two times a day  influenza   Vaccine 0.5 milliLiter(s) IntraMuscular once  lactobacillus acidophilus 1 Tablet(s) Oral two times a day  lidocaine   Patch 2 Patch Transdermal <User Schedule>  metoprolol succinate ER 12.5 milliGRAM(s) Oral every 12 hours  multivitamin 1 Tablet(s) Oral daily  pantoprazole    Tablet 40 milliGRAM(s) Oral before breakfast  psyllium Powder 1 Packet(s) Oral at bedtime    MEDICATIONS  (PRN):  acetaminophen   Tablet .. 650 milliGRAM(s) Oral every 6 hours PRN Mild Pain (1 - 3)  ALPRAZolam 0.25 milliGRAM(s) Oral every 8 hours PRN anxiety  bisacodyl Suppository 10 milliGRAM(s) Rectal daily PRN Constipation  mineral oil 30 milliLiter(s) Oral two times a day PRN dry mouth  oxyCODONE    IR 5 milliGRAM(s) Oral every 4 hours PRN Moderate Pain (4 - 6)

## 2019-10-08 NOTE — PROGRESS NOTE ADULT - ASSESSMENT
This is a 70 yo female with thoracoabdominal aneurysm open repair complicated by cervical myelopathy, dysphagia, neurogenic bowel and bladder.       #Cervical Myelopathy  Continue comprehensive acute rehabilitation program including PT/OT/Swallowing therapy  - Dysphagia III diet    -Neuropathic pain- started gabapentin 300 mg qhs on 10/1-increase to BID dosing, continue oxycodone for now, lidocaine patches daily around thoracic back incision during the day for pain control.-pain better controlled.   -Neurogenic bladder: indwelling muñoz discontinued, monitor PVRs, intermittent straight cath as needed q6 hrs. -large amounts on IC, will fluid restrict but if PVRs remain high then will increase frequency of straight cauterizations to q4hrs  + UA, culture with normal magdalena and [id number] presumptive candida albicans, no treatment at this time, afebrile without leukocytosis     #Anxiety- Xanax, home med    #Hypertension-- amlodipine 7.5mg qd-> will increase to 10 mg if no improvement (SBPs last 24 hrs in 110-120s, some elevated SBPs in therapies up to 160s but will drop again to 100 or lower, patient remains asymptomatic)    #Atrial Fibrillation- - rate controlled  with lopressor 25mg bid and amiodarone 200mg qd  Discussed regimen with hospitalist 10/3, will continue current BB regimen   Patient asymptomatic with HRs 50-60s, continue to monitor     #Thoracoabdominal aneurysm open repair- Asa, bp control     #Neurogenic Bladder: discontinued indwelling Muñoz, monitor PVRs with IC -ensure patient is bladder scanned q 6 hrs (changed schedule to 6am, 12pm, 6pm and 12am), may need to increase frequency due to large amounts being drained q6 hrs, fluid restriction to 1. 5 L daily     #Hyponatremia- stable, continue to monitor     #Hypokalemia- 3.7 10/3, monitor magnesium (normal)     #Anemia  -labs on 10/3, stable   - likely related to blood loss during surgery  - supplemental iron recommended  - transfuse if Hemoglobin < 8  -check labs in morning     #Eosinophilia- AEC 0.45, continue to monitor     #DVT prophylaxis: lovenox 40mg sq daily     IDT meeting 10/1:   Patient mod A x1 for transfers and ambulating 20ft with RW and verbal cues. In OT, requiring assist of 2 for maximal assist for lower body dressing and toileting.   Goals are set for mod I in 3 weeks with tentative dc date set for 9/19.    IDT meeting 10/8:   In OT, Mod A for bathing, set up/supervison for upper body dressing, mod A for lower body dressing, toilet transfers with total A, mod A for commode transfers,   In PT, mod A with bed mobility, can be CG for transfers at times, "pop-over" transfers with mod A  Barriers: lower body weakness, unsafe to trial stair training at this time.   ST: working on vocalizations, no need to continue ST. Will increase PT and OT to 90 min each.   Tentative date for D/c home on 10/19. Social work to discuss hiring private aide for dc to home.

## 2019-10-09 LAB
ALBUMIN SERPL ELPH-MCNC: 2.6 G/DL — LOW (ref 3.3–5)
ALP SERPL-CCNC: 85 U/L — SIGNIFICANT CHANGE UP (ref 40–120)
ALT FLD-CCNC: 46 U/L — HIGH (ref 10–45)
ANION GAP SERPL CALC-SCNC: 11 MMOL/L — SIGNIFICANT CHANGE UP (ref 5–17)
AST SERPL-CCNC: 28 U/L — SIGNIFICANT CHANGE UP (ref 10–40)
BILIRUB SERPL-MCNC: 0.3 MG/DL — SIGNIFICANT CHANGE UP (ref 0.2–1.2)
BUN SERPL-MCNC: 20 MG/DL — SIGNIFICANT CHANGE UP (ref 7–23)
CALCIUM SERPL-MCNC: 8.4 MG/DL — SIGNIFICANT CHANGE UP (ref 8.4–10.5)
CHLORIDE SERPL-SCNC: 100 MMOL/L — SIGNIFICANT CHANGE UP (ref 96–108)
CO2 SERPL-SCNC: 26 MMOL/L — SIGNIFICANT CHANGE UP (ref 22–31)
CREAT SERPL-MCNC: 0.89 MG/DL — SIGNIFICANT CHANGE UP (ref 0.5–1.3)
GLUCOSE SERPL-MCNC: 96 MG/DL — SIGNIFICANT CHANGE UP (ref 70–99)
HCT VFR BLD CALC: 25.3 % — LOW (ref 34.5–45)
HGB BLD-MCNC: 8.4 G/DL — LOW (ref 11.5–15.5)
MCHC RBC-ENTMCNC: 28.1 PG — SIGNIFICANT CHANGE UP (ref 27–34)
MCHC RBC-ENTMCNC: 33.2 GM/DL — SIGNIFICANT CHANGE UP (ref 32–36)
MCV RBC AUTO: 84.6 FL — SIGNIFICANT CHANGE UP (ref 80–100)
NRBC # BLD: 0 /100 WBCS — SIGNIFICANT CHANGE UP (ref 0–0)
PLATELET # BLD AUTO: 263 K/UL — SIGNIFICANT CHANGE UP (ref 150–400)
POTASSIUM SERPL-MCNC: 3.2 MMOL/L — LOW (ref 3.5–5.3)
POTASSIUM SERPL-SCNC: 3.2 MMOL/L — LOW (ref 3.5–5.3)
PROT SERPL-MCNC: 6.5 G/DL — SIGNIFICANT CHANGE UP (ref 6–8.3)
RBC # BLD: 2.99 M/UL — LOW (ref 3.8–5.2)
RBC # FLD: 16.6 % — HIGH (ref 10.3–14.5)
SODIUM SERPL-SCNC: 137 MMOL/L — SIGNIFICANT CHANGE UP (ref 135–145)
WBC # BLD: 5.91 K/UL — SIGNIFICANT CHANGE UP (ref 3.8–10.5)
WBC # FLD AUTO: 5.91 K/UL — SIGNIFICANT CHANGE UP (ref 3.8–10.5)

## 2019-10-09 PROCEDURE — 99232 SBSQ HOSP IP/OBS MODERATE 35: CPT

## 2019-10-09 RX ORDER — TAMSULOSIN HYDROCHLORIDE 0.4 MG/1
0.4 CAPSULE ORAL AT BEDTIME
Refills: 0 | Status: DISCONTINUED | OUTPATIENT
Start: 2019-10-09 | End: 2019-10-12

## 2019-10-09 RX ORDER — POTASSIUM CHLORIDE 20 MEQ
40 PACKET (EA) ORAL EVERY 4 HOURS
Refills: 0 | Status: COMPLETED | OUTPATIENT
Start: 2019-10-09 | End: 2019-10-09

## 2019-10-09 RX ADMIN — Medication 40 MILLIEQUIVALENT(S): at 13:26

## 2019-10-09 RX ADMIN — OXYCODONE HYDROCHLORIDE 5 MILLIGRAM(S): 5 TABLET ORAL at 21:45

## 2019-10-09 RX ADMIN — ENOXAPARIN SODIUM 40 MILLIGRAM(S): 100 INJECTION SUBCUTANEOUS at 12:16

## 2019-10-09 RX ADMIN — AMLODIPINE BESYLATE 7.5 MILLIGRAM(S): 2.5 TABLET ORAL at 06:04

## 2019-10-09 RX ADMIN — Medication 40 MILLIEQUIVALENT(S): at 17:41

## 2019-10-09 RX ADMIN — OXYCODONE HYDROCHLORIDE 5 MILLIGRAM(S): 5 TABLET ORAL at 07:00

## 2019-10-09 RX ADMIN — Medication 325 MILLIGRAM(S): at 17:41

## 2019-10-09 RX ADMIN — TAMSULOSIN HYDROCHLORIDE 0.4 MILLIGRAM(S): 0.4 CAPSULE ORAL at 21:43

## 2019-10-09 RX ADMIN — OXYCODONE HYDROCHLORIDE 5 MILLIGRAM(S): 5 TABLET ORAL at 12:15

## 2019-10-09 RX ADMIN — Medication 12.5 MILLIGRAM(S): at 06:02

## 2019-10-09 RX ADMIN — LIDOCAINE 2 PATCH: 4 CREAM TOPICAL at 08:45

## 2019-10-09 RX ADMIN — GABAPENTIN 300 MILLIGRAM(S): 400 CAPSULE ORAL at 06:02

## 2019-10-09 RX ADMIN — Medication 1 TABLET(S): at 17:37

## 2019-10-09 RX ADMIN — Medication 1 PACKET(S): at 21:42

## 2019-10-09 RX ADMIN — LIDOCAINE 2 PATCH: 4 CREAM TOPICAL at 19:00

## 2019-10-09 RX ADMIN — OXYCODONE HYDROCHLORIDE 5 MILLIGRAM(S): 5 TABLET ORAL at 07:13

## 2019-10-09 RX ADMIN — Medication 12.5 MILLIGRAM(S): at 17:39

## 2019-10-09 RX ADMIN — Medication 1 TABLET(S): at 12:15

## 2019-10-09 RX ADMIN — GABAPENTIN 300 MILLIGRAM(S): 400 CAPSULE ORAL at 17:38

## 2019-10-09 RX ADMIN — LIDOCAINE 2 PATCH: 4 CREAM TOPICAL at 20:00

## 2019-10-09 RX ADMIN — AMIODARONE HYDROCHLORIDE 200 MILLIGRAM(S): 400 TABLET ORAL at 06:04

## 2019-10-09 RX ADMIN — PANTOPRAZOLE SODIUM 40 MILLIGRAM(S): 20 TABLET, DELAYED RELEASE ORAL at 06:04

## 2019-10-09 RX ADMIN — OXYCODONE HYDROCHLORIDE 5 MILLIGRAM(S): 5 TABLET ORAL at 22:30

## 2019-10-09 RX ADMIN — Medication 325 MILLIGRAM(S): at 06:04

## 2019-10-09 RX ADMIN — OXYCODONE HYDROCHLORIDE 5 MILLIGRAM(S): 5 TABLET ORAL at 13:13

## 2019-10-09 RX ADMIN — Medication 1 MILLIGRAM(S): at 12:16

## 2019-10-09 RX ADMIN — Medication 0.25 MILLIGRAM(S): at 21:42

## 2019-10-09 RX ADMIN — Medication 1 TABLET(S): at 06:02

## 2019-10-09 RX ADMIN — Medication 500 MILLIGRAM(S): at 17:41

## 2019-10-09 RX ADMIN — Medication 10 MILLIGRAM(S): at 05:11

## 2019-10-09 RX ADMIN — Medication 500 MILLIGRAM(S): at 06:03

## 2019-10-09 RX ADMIN — OXYCODONE HYDROCHLORIDE 5 MILLIGRAM(S): 5 TABLET ORAL at 18:04

## 2019-10-09 RX ADMIN — OXYCODONE HYDROCHLORIDE 5 MILLIGRAM(S): 5 TABLET ORAL at 17:37

## 2019-10-09 RX ADMIN — Medication 81 MILLIGRAM(S): at 12:16

## 2019-10-09 NOTE — PROGRESS NOTE ADULT - SUBJECTIVE AND OBJECTIVE BOX
HPI:   69 year-old female with hx of "adrian-aorta" s/p aortic valve replacement, admitted electively to [facility] on 8/28 for open TAAA repair due to enlarging thoracoabdominal aortic aneurysm. The patient underwent her thoracoabdominal aneurysm open repair with decron graft and celiac SMA bypass with reimplantation of lumbar artery on 8/29.  Post-operatively she required vasopressors in the CTICU. Additionally post-operatively it was found that she had loss of strength in lower extremities, R>L. Repeat imaging was negative for hematoma. Also during this time period her kidney function declined and in conjunction with nephrology ATN was diagnosed with eventual recovery of her kidney function.  Her course was further complicated by AF with RVR, requiring amio gtt, esmolol gtt, digoxin, and vasopressin for pressure support. Course also c/b urinary retention requiring muñoz catheter. Workup for RLE weakness ordered by neurology, felt weakness to be due to cervical myelopathy. Urology consulted 9/23 for urinary retention, felt to be neurogenic in nature, recommended continue muñoz. Completed abx for UTI.  S&S eval recommend Dysphagia 1 nectar thick diet, 3L NC, PT recommending acute rehab.     Interval: + band like pain around thoracic area with improvement.   REVIEW OF SYSTEMS  Patient reports that suppository worked within an hour this morning and had a large BM.   Patient reports that she was allowed to sleep overnight and was not awoken for a bladder scan.   PVRs somewhat improved during the day yesterday but above 500cc. Patient straight cathed at midnight and then not again until 730am for 900cc.   Patient states that she does not have the urge to void. Has woken up twice in the last couple of days with urine in her brief. Patient does report some urge to move bowels.   Eating well and reports she is trying to keep to the FR of 1.5 L.   Denies fever, chills, SOB, HA, dizziness.   SBPs fluctuate in therapies and can be as low as 80 and then as high as 160. Patient remains asymptomatic during these BP fluctuations.     Vital Signs Last 24 Hrs  T(C): 36.4 (09 Oct 2019 08:24), Max: 36.7 (08 Oct 2019 21:03)  T(F): 97.5 (09 Oct 2019 08:24), Max: 98 (08 Oct 2019 21:03)  HR: 78 (09 Oct 2019 08:24) (71 - 80)  BP: 142/66 (09 Oct 2019 08:24) (133/70 - 151/60)  RR: 14 (09 Oct 2019 08:24) (14 - 14)  SpO2: 98% (09 Oct 2019 08:24) (98% - 99%)    Physical Exam:  Gen - NAD, Comfortable  	Pulm - CTAB  	Cardiovascular - RRR, S1S2  	Chest wall: Sternal incision wound-clean, no erythema or drainage noted  	Posterior lateral incision wound on the left- clean, no erythema or drainage, small skin tear seen next to incision site dressed  	Abdomen - Soft, NT/ND, +BS  	Extremities - No C/C/E, No calf tenderness  	Neuro-  	   Cognitive - AAOx3  	   Cranial Nerves - CN 2-12 intact  	   Motor -  Bilateral Upper extremities 5/5   	                  LEFT    LE - HF 4+/5, KE 4+/5, DF 5/5, PF 5/5  	                  RIGHT LE - HF 2/5, KE 4/5, DF 4/5, PF 4/5     	   Sensory - Intact to LT bilaterally   	   Tone - normal  	Psychiatric - Mood stable, Affect WNL  Skin:  as above       MEDICATIONS  (STANDING):  amiodarone    Tablet 200 milliGRAM(s) Oral daily  amLODIPine   Tablet 7.5 milliGRAM(s) Oral daily  ascorbic acid 500 milliGRAM(s) Oral two times a day  aspirin  chewable 81 milliGRAM(s) Oral daily  bisacodyl Suppository 10 milliGRAM(s) Rectal <User Schedule>  enoxaparin Injectable 40 milliGRAM(s) SubCutaneous daily  ferrous    sulfate 325 milliGRAM(s) Oral two times a day  folic acid 1 milliGRAM(s) Oral daily  gabapentin 300 milliGRAM(s) Oral two times a day  influenza   Vaccine 0.5 milliLiter(s) IntraMuscular once  lactobacillus acidophilus 1 Tablet(s) Oral two times a day  lidocaine   Patch 2 Patch Transdermal <User Schedule>  metoprolol succinate ER 12.5 milliGRAM(s) Oral every 12 hours  multivitamin 1 Tablet(s) Oral daily  pantoprazole    Tablet 40 milliGRAM(s) Oral before breakfast  psyllium Powder 1 Packet(s) Oral at bedtime  tamsulosin 0.4 milliGRAM(s) Oral at bedtime    MEDICATIONS  (PRN):  acetaminophen   Tablet .. 650 milliGRAM(s) Oral every 6 hours PRN Mild Pain (1 - 3)  ALPRAZolam 0.25 milliGRAM(s) Oral every 8 hours PRN anxiety  mineral oil 30 milliLiter(s) Oral two times a day PRN dry mouth  oxyCODONE    IR 5 milliGRAM(s) Oral every 4 hours PRN Moderate Pain (4 - 6)

## 2019-10-09 NOTE — CHART NOTE - NSCHARTNOTEFT_GEN_A_CORE
Nutrition Follow Up Note  Hospital Course   (Per Electronic Medical Record):     Source:   Patient [X]  Medical Record [X]      Diet:   Soft (Dysphagia 3) Diet w/ Thin Liquids  1,500ml/day Fluid Restriction - Started on 10/7 - Sodium Improving  Tolerates Diet Well  No Chewing/Swallowing Difficulties  No Recent Nausea, Vomiting, Diarrhea or Constipation  Consumes 50-75% of Meals (as Per Documentation)   Nutrition Education Provided on Soft (Dysphagia 3) Diet & Fluid Restriction     Enteral/Parenteral Nutrition: N/A    Current Weight: 143.7lb on 10/2  Weights Currently Stable @This Time  Obtain Weights Weekly     Pertinent Medications: MEDICATIONS  (STANDING):  amiodarone    Tablet 200 milliGRAM(s) Oral daily  amLODIPine   Tablet 7.5 milliGRAM(s) Oral daily  ascorbic acid 500 milliGRAM(s) Oral two times a day  aspirin  chewable 81 milliGRAM(s) Oral daily  bisacodyl Suppository 10 milliGRAM(s) Rectal <User Schedule>  enoxaparin Injectable 40 milliGRAM(s) SubCutaneous daily  ferrous    sulfate 325 milliGRAM(s) Oral two times a day  folic acid 1 milliGRAM(s) Oral daily  gabapentin 300 milliGRAM(s) Oral two times a day  influenza   Vaccine 0.5 milliLiter(s) IntraMuscular once  lactobacillus acidophilus 1 Tablet(s) Oral two times a day  lidocaine   Patch 2 Patch Transdermal <User Schedule>  metoprolol succinate ER 12.5 milliGRAM(s) Oral every 12 hours  multivitamin 1 Tablet(s) Oral daily  pantoprazole    Tablet 40 milliGRAM(s) Oral before breakfast  potassium chloride    Tablet ER 40 milliEquivalent(s) Oral every 4 hours  psyllium Powder 1 Packet(s) Oral at bedtime  tamsulosin 0.4 milliGRAM(s) Oral at bedtime    MEDICATIONS  (PRN):  acetaminophen   Tablet .. 650 milliGRAM(s) Oral every 6 hours PRN Mild Pain (1 - 3)  ALPRAZolam 0.25 milliGRAM(s) Oral every 8 hours PRN anxiety  mineral oil 30 milliLiter(s) Oral two times a day PRN dry mouth  oxyCODONE    IR 5 milliGRAM(s) Oral every 4 hours PRN Moderate Pain (4 - 6)    Pertinent Labs:  10-09 Na137 mmol/L Glu 96 mg/dL K+ 3.2 mmol/L<L> Cr  0.89 mg/dL BUN 20 mg/dL 10-09 Alb 2.6 g/dL<L>    Skin: No Pressure Ulcers   Surgical Incision on Scapula to Left Abdomen  (as Per Nursing Flow Sheet)     Edema: None Noted     Last Bowel Movement: on 10/8    Estimated Needs:   [X] No Change Since Previous Assessment    Previous Nutrition Diagnosis:   Severe Malnutrition  Chewing Difficulties     Nutrition Diagnosis is [X] Ongoing - Declines Nutrition Supplementation & Continues on Soft (Dysphagia 3) Diet, Nutrition Education Provided on Soft (Dysphagia 3) Diet     New Nutrition Diagnosis: [X] Not Applicable    Interventions:   1. Education Provided on Need for Supplementation Soft (Dysphagia 3) Diet    2. Recommend Continue Nutrition Plan of Care     Monitoring & Evaluation:   [X] Weights   [X] PO Intake   [X] Follow Up (Per Protocol)  [X] Tolerance to Diet Prescription   [X] Other: Labs     Registered Dietitian/Nutritionist Remains Available.  Thomas Patten RDN    Pager # [phone]  Phone# [phone]

## 2019-10-09 NOTE — PROGRESS NOTE ADULT - ASSESSMENT
68 yo female with thoracoabdominal aneurysm open repair complicated by cervical myelopathy, dysphagia, neurogenic bowel and bladder- pt/ot/dvt ppx, pain meds, asa    Anxiety- Xanax    Hypertension- amlodipine    episode of paroxysmal Atrial Fibrillation converted to sinus- lopressor, amiodarone , asa    Neurogenic Bladder: monitor PVRs with IC , muñoz d/c, pt/ot    Hyponatremia- monitor    Anemia- likely related to blood loss during surgery, [id number]    DVT prophylaxis: lovenox     will follow

## 2019-10-09 NOTE — PROGRESS NOTE ADULT - SUBJECTIVE AND OBJECTIVE BOX
70 yo female with thoracoabdominal aneurysm open repair complicated by cervical myelopathy, dysphagia, neurogenic bowel and bladder  seen at the bedside, no n/v, no sob. no events , no new complaints.    Vital Signs Last 24 Hrs  T(C): 36.4 (09 Oct 2019 08:24), Max: 36.7 (08 Oct 2019 21:03)  T(F): 97.5 (09 Oct 2019 08:24), Max: 98 (08 Oct 2019 21:03)  HR: 78 (09 Oct 2019 08:24) (71 - 80)  BP: 142/66 (09 Oct 2019 08:24) (133/70 - 151/60)  BP(mean): --  RR: 14 (09 Oct 2019 08:24) (14 - 14)  SpO2: 98% (09 Oct 2019 08:24) (98% - 99%)      GENERAL- NAD  EAR/NOSE/MOUTH/THROAT - no pharyngeal exudates, no oral lesions  MMM  EYES- SIXTO, conjunctiva and Sclera clear  NECK- supple  RESPIRATORY-  clear to auscultation bilaterally  CARDIOVASCULAR - SIS2, RRR  GI - soft NT BS present  EXTREMITIES- no pedal edema  NEUROLOGY- right LE weakness  SKIN- no rashes, warm to touch  PSYCHIATRY- AAO X 3

## 2019-10-09 NOTE — PROGRESS NOTE ADULT - ASSESSMENT
This is a 70 yo female with thoracoabdominal aneurysm open repair complicated by cervical myelopathy, dysphagia, neurogenic bowel and bladder.       #Cervical Myelopathy  Continue comprehensive acute rehabilitation program including PT/OT/Swallowing therapy  - Dysphagiadiet-soft with thin liquids    -Neuropathic pain- started gabapentin 300 mg qhs on 10/1-increase to BID dosing, continue oxycodone, lidocaine patches daily around thoracic back incision during the day for pain control.-pain better controlled.   -Neurogenic bladder: indwelling muñoz discontinued, monitor PVRs, intermittent straight cath as needed q4 hrs.: some inconsistencies with nursing -discussed with nursing to keep patient on schedule for toilting and bladder scans/straight caths.    + UA, culture with normal magdalena and [id number] presumptive candida albicans, no treatment at this time, afebrile without leukocytosis, no signs of vaginal or skin yeast infections    #Anxiety- Xanax, home med    #Hypertension-- amlodipine 7.5mg qd-> will increase to 10 mg if no improvement (SBPs last 24 hrs in 140-150s again, some elevated SBPs in therapies up to 160s but will drop again to 80, patient remains asymptomatic-?dysreflexia-control urine output better with increased frequency of straight caths and monitor)    #Atrial Fibrillation- - rate controlled  with lopressor 25mg bid and amiodarone 200mg qd  Discussed regimen with hospitalist 10/3, will continue current BB regimen   Patient asymptomatic with HRs 50-60s, continue to monitor     #Thoracoabdominal aneurysm open repair- Asa, bp control     #Neurogenic Bladder: discontinued indwelling Muñoz, monitor PVRs with IC -ensure patient is bladder scanned q 4 hrs   - fluid restriction of 1. 5 L daily     #Hyponatremia- stable, continue to monitor     #Hypokalemia- 3.2 , replete today and recheck BMP in am     #Anemia  -stable   - likely related to blood loss during surgery  - supplemental iron recommended  - transfuse if Hemoglobin < 8  -check labs in morning     #Eosinophilia- AEC 0.45, continue to monitor     #DVT prophylaxis: lovenox 40mg sq daily     IDT meeting 10/1:   Patient mod A x1 for transfers and ambulating 20ft with RW and verbal cues. In OT, requiring assist of 2 for maximal assist for lower body dressing and toileting.   Goals are set for mod I in 3 weeks with tentative dc date set for 9/19.    IDT meeting 10/8:   In OT, Mod A for bathing, set up/supervison for upper body dressing, mod A for lower body dressing, toilet transfers with total A, mod A for commode transfers,   In PT, mod A with bed mobility, can be CG for transfers at times, "pop-over" transfers with mod A  Barriers: lower body weakness, unsafe to trial stair training at this time.   ST: working on vocalizations, no need to continue ST. Will increase PT and OT to 90 min each.   Tentative date for D/c home on 10/19. Social work to discuss hiring private aide for dc to home.

## 2019-10-10 LAB
ANION GAP SERPL CALC-SCNC: 11 MMOL/L — SIGNIFICANT CHANGE UP (ref 5–17)
BUN SERPL-MCNC: 16 MG/DL — SIGNIFICANT CHANGE UP (ref 7–23)
CALCIUM SERPL-MCNC: 8.6 MG/DL — SIGNIFICANT CHANGE UP (ref 8.4–10.5)
CHLORIDE SERPL-SCNC: 100 MMOL/L — SIGNIFICANT CHANGE UP (ref 96–108)
CO2 SERPL-SCNC: 25 MMOL/L — SIGNIFICANT CHANGE UP (ref 22–31)
CREAT SERPL-MCNC: 0.82 MG/DL — SIGNIFICANT CHANGE UP (ref 0.5–1.3)
GLUCOSE SERPL-MCNC: 125 MG/DL — HIGH (ref 70–99)
POTASSIUM SERPL-MCNC: 4.1 MMOL/L — SIGNIFICANT CHANGE UP (ref 3.5–5.3)
POTASSIUM SERPL-SCNC: 4.1 MMOL/L — SIGNIFICANT CHANGE UP (ref 3.5–5.3)
SODIUM SERPL-SCNC: 136 MMOL/L — SIGNIFICANT CHANGE UP (ref 135–145)

## 2019-10-10 PROCEDURE — 99232 SBSQ HOSP IP/OBS MODERATE 35: CPT | Mod: GC

## 2019-10-10 PROCEDURE — 99232 SBSQ HOSP IP/OBS MODERATE 35: CPT

## 2019-10-10 RX ORDER — OXYCODONE HYDROCHLORIDE 5 MG/1
5 TABLET ORAL EVERY 4 HOURS
Refills: 0 | Status: DISCONTINUED | OUTPATIENT
Start: 2019-10-10 | End: 2019-10-15

## 2019-10-10 RX ORDER — ALPRAZOLAM 0.25 MG
0.25 TABLET ORAL EVERY 8 HOURS
Refills: 0 | Status: DISCONTINUED | OUTPATIENT
Start: 2019-10-10 | End: 2019-10-15

## 2019-10-10 RX ORDER — GABAPENTIN 400 MG/1
300 CAPSULE ORAL THREE TIMES A DAY
Refills: 0 | Status: DISCONTINUED | OUTPATIENT
Start: 2019-10-10 | End: 2019-10-12

## 2019-10-10 RX ADMIN — Medication 500 MILLIGRAM(S): at 18:16

## 2019-10-10 RX ADMIN — OXYCODONE HYDROCHLORIDE 5 MILLIGRAM(S): 5 TABLET ORAL at 08:30

## 2019-10-10 RX ADMIN — GABAPENTIN 300 MILLIGRAM(S): 400 CAPSULE ORAL at 05:43

## 2019-10-10 RX ADMIN — LIDOCAINE 2 PATCH: 4 CREAM TOPICAL at 20:07

## 2019-10-10 RX ADMIN — GABAPENTIN 300 MILLIGRAM(S): 400 CAPSULE ORAL at 16:13

## 2019-10-10 RX ADMIN — Medication 12.5 MILLIGRAM(S): at 18:16

## 2019-10-10 RX ADMIN — OXYCODONE HYDROCHLORIDE 5 MILLIGRAM(S): 5 TABLET ORAL at 18:22

## 2019-10-10 RX ADMIN — LIDOCAINE 2 PATCH: 4 CREAM TOPICAL at 19:00

## 2019-10-10 RX ADMIN — OXYCODONE HYDROCHLORIDE 5 MILLIGRAM(S): 5 TABLET ORAL at 12:30

## 2019-10-10 RX ADMIN — GABAPENTIN 300 MILLIGRAM(S): 400 CAPSULE ORAL at 21:39

## 2019-10-10 RX ADMIN — OXYCODONE HYDROCHLORIDE 5 MILLIGRAM(S): 5 TABLET ORAL at 18:16

## 2019-10-10 RX ADMIN — Medication 0.25 MILLIGRAM(S): at 21:41

## 2019-10-10 RX ADMIN — OXYCODONE HYDROCHLORIDE 5 MILLIGRAM(S): 5 TABLET ORAL at 08:07

## 2019-10-10 RX ADMIN — Medication 1 TABLET(S): at 12:15

## 2019-10-10 RX ADMIN — LIDOCAINE 2 PATCH: 4 CREAM TOPICAL at 08:07

## 2019-10-10 RX ADMIN — TAMSULOSIN HYDROCHLORIDE 0.4 MILLIGRAM(S): 0.4 CAPSULE ORAL at 21:39

## 2019-10-10 RX ADMIN — Medication 12.5 MILLIGRAM(S): at 05:42

## 2019-10-10 RX ADMIN — AMIODARONE HYDROCHLORIDE 200 MILLIGRAM(S): 400 TABLET ORAL at 05:43

## 2019-10-10 RX ADMIN — Medication 325 MILLIGRAM(S): at 05:45

## 2019-10-10 RX ADMIN — Medication 325 MILLIGRAM(S): at 18:20

## 2019-10-10 RX ADMIN — Medication 1 PACKET(S): at 21:39

## 2019-10-10 RX ADMIN — Medication 81 MILLIGRAM(S): at 12:15

## 2019-10-10 RX ADMIN — Medication 1 TABLET(S): at 18:16

## 2019-10-10 RX ADMIN — PANTOPRAZOLE SODIUM 40 MILLIGRAM(S): 20 TABLET, DELAYED RELEASE ORAL at 05:43

## 2019-10-10 RX ADMIN — Medication 1 TABLET(S): at 05:43

## 2019-10-10 RX ADMIN — Medication 500 MILLIGRAM(S): at 05:44

## 2019-10-10 RX ADMIN — AMLODIPINE BESYLATE 7.5 MILLIGRAM(S): 2.5 TABLET ORAL at 05:43

## 2019-10-10 RX ADMIN — OXYCODONE HYDROCHLORIDE 5 MILLIGRAM(S): 5 TABLET ORAL at 12:16

## 2019-10-10 RX ADMIN — Medication 10 MILLIGRAM(S): at 05:44

## 2019-10-10 RX ADMIN — ENOXAPARIN SODIUM 40 MILLIGRAM(S): 100 INJECTION SUBCUTANEOUS at 12:15

## 2019-10-10 RX ADMIN — Medication 1 MILLIGRAM(S): at 12:15

## 2019-10-10 NOTE — PROGRESS NOTE ADULT - ASSESSMENT
This is a 68 yo female with thoracoabdominal aneurysm open repair complicated by cervical myelopathy, dysphagia, neurogenic bowel and bladder.       #Cervical Myelopathy  Continue comprehensive acute rehabilitation program including PT/OT/Swallowing therapy  - Dysphagia diet-soft with thin liquids    -Neuropathic pain- gabapentin 300 mg BID dosing-will increase to TID, continue oxycodone, lidocaine patches daily around thoracic back incision during the day for pain control  -Neurogenic bladder: indwelling muñoz discontinued, monitor PVRs, change intermittent straight cath as needed to q6 hrs due to improved bladder scan numbers; continue toileting program     + UA, culture with normal magdalena and [id number] presumptive candida albicans, no treatment at this time, afebrile without leukocytosis, no signs of vaginal or skin yeast infections    #Anxiety- Xanax, home med    #Hypertension-- amlodipine 7.5mg qd-> will increase to 10 mg if no improvement (SBPs last 24 hrs in 140-150s again, some elevated SBPs in therapies up to 160s but will drop again to 80, patient remains asymptomatic-?dysreflexia-control urine output better with increased frequency of straight caths and monitor)    #Atrial Fibrillation- - rate controlled  with lopressor 25mg bid and amiodarone 200mg qd  Discussed regimen with hospitalist 10/3, will continue current BB regimen   Patient asymptomatic with HRs 50-60s, continue to monitor     #Thoracoabdominal aneurysm open repair- Asa, bp control     #Neurogenic Bladder: discontinued indwelling Muñoz, monitor PVRs with IC - bladder scann q 6 hrs   - fluid restriction of 1. 5 L daily     #Hyponatremia- stable, continue to monitor     #Hypokalemia- 3.2 , repleted and now 4.1 today      #Anemia  -stable   - likely related to blood loss during surgery  - supplemental iron recommended  - transfuse if Hemoglobin < 8  -check labs in morning     #Eosinophilia- AEC 0.45, continue to monitor     #DVT prophylaxis: lovenox 40mg sq daily     IDT meeting 10/1:   Patient mod A x1 for transfers and ambulating 20ft with RW and verbal cues. In OT, requiring assist of 2 for maximal assist for lower body dressing and toileting.   Goals are set for mod I in 3 weeks with tentative dc date set for 9/19.    IDT meeting 10/8:   In OT, Mod A for bathing, set up/supervison for upper body dressing, mod A for lower body dressing, toilet transfers with total A, mod A for commode transfers,   In PT, mod A with bed mobility, can be CG for transfers at times, "pop-over" transfers with mod A  Barriers: lower body weakness, unsafe to trial stair training at this time.   ST: working on vocalizations, no need to continue ST. Will increase PT and OT to 90 min each.   Tentative date for D/c home on 10/19. Social work to discuss hiring private aide for dc to home.

## 2019-10-10 NOTE — PROGRESS NOTE ADULT - SUBJECTIVE AND OBJECTIVE BOX
HPI:   69 year-old female with hx of "adrian-aorta" s/p aortic valve replacement, admitted electively to [facility] on 8/28 for open TAAA repair due to enlarging thoracoabdominal aortic aneurysm. The patient underwent her thoracoabdominal aneurysm open repair with decron graft and celiac SMA bypass with reimplantation of lumbar artery on 8/29.  Post-operatively she required vasopressors in the CTICU. Additionally post-operatively it was found that she had loss of strength in lower extremities, R>L. Repeat imaging was negative for hematoma. Also during this time period her kidney function declined and in conjunction with nephrology ATN was diagnosed with eventual recovery of her kidney function.  Her course was further complicated by AF with RVR, requiring amio gtt, esmolol gtt, digoxin, and vasopressin for pressure support. Course also c/b urinary retention requiring muñoz catheter. Workup for RLE weakness ordered by neurology, felt weakness to be due to cervical myelopathy. Urology consulted 9/23 for urinary retention, felt to be neurogenic in nature, recommended continue muñoz. Completed abx for UTI.  S&S eval recommend Dysphagia 1 nectar thick diet, 3L NC, PT recommending acute rehab.     Interval: + band like pain around thoracic area with improvement however does get severe at times in therapy.    REVIEW OF SYSTEMS  +BM this morning after suppository.   No urine in bed this morning, bladder scans are improving. Denies feeling the urge to void but does have the urge to move bowels.  Eating well and reports she is trying to keep to the FR of 1.5 L.   Denies fever, chills, SOB, HA, dizziness.     Vital Signs Last 24 Hrs  T(C): 36.4 (10 Oct 2019 08:00), Max: 36.7 (09 Oct 2019 20:38)  T(F): 97.5 (10 Oct 2019 08:00), Max: 98 (09 Oct 2019 20:38)  HR: 73 (10 Oct 2019 08:00) (67 - 73)  BP: 105/60 (10 Oct 2019 08:00) (105/60 - 166/65)  RR: 16 (10 Oct 2019 08:00) (14 - 16)  SpO2: 100% (10 Oct 2019 08:00) (97% - 100%)    10-10    136  |  100  |  16  ----------------------------<  125<H>  4.1   |  25  |  0.82    Ca    8.6      10 Oct 2019 06:10    TPro  6.5  /  Alb  2.6<L>  /  TBili  0.3  /  DBili  x   /  AST  28  /  ALT  46<H>  /  AlkPhos  85  10-09      Physical Exam:  Gen - NAD, Comfortable  	Pulm - CTAB  	Cardiovascular - RRR, S1S2  	Chest wall: Sternal incision wound-clean, no erythema or drainage noted  	Posterior lateral incision wound on the left- clean, no erythema or drainage, small skin tear seen next to incision site dressed  	Abdomen - Soft, NT/ND, +BS  	Extremities - No C/C/E, No calf tenderness  	Neuro-  	   Cognitive - AAOx3  	   Cranial Nerves - CN 2-12 intact  	   Motor -  Bilateral Upper extremities 5/5   	                  LEFT    LE - HF 4+/5, KE 4+/5, DF 5/5, PF 5/5  	                  RIGHT LE - HF 2/5, KE 4/5, DF 4/5, PF 4/5     	   Sensory - Intact to LT bilaterally   	   Tone - normal  	Psychiatric - Mood stable, Affect WNL  Skin:  as above       MEDICATIONS  (STANDING):  amiodarone    Tablet 200 milliGRAM(s) Oral daily  amLODIPine   Tablet 7.5 milliGRAM(s) Oral daily  ascorbic acid 500 milliGRAM(s) Oral two times a day  aspirin  chewable 81 milliGRAM(s) Oral daily  bisacodyl Suppository 10 milliGRAM(s) Rectal <User Schedule>  enoxaparin Injectable 40 milliGRAM(s) SubCutaneous daily  ferrous    sulfate 325 milliGRAM(s) Oral two times a day  folic acid 1 milliGRAM(s) Oral daily  gabapentin 300 milliGRAM(s) Oral two times a day  influenza   Vaccine 0.5 milliLiter(s) IntraMuscular once  lactobacillus acidophilus 1 Tablet(s) Oral two times a day  lidocaine   Patch 2 Patch Transdermal <User Schedule>  metoprolol succinate ER 12.5 milliGRAM(s) Oral every 12 hours  multivitamin 1 Tablet(s) Oral daily  pantoprazole    Tablet 40 milliGRAM(s) Oral before breakfast  psyllium Powder 1 Packet(s) Oral at bedtime  tamsulosin 0.4 milliGRAM(s) Oral at bedtime    MEDICATIONS  (PRN):  acetaminophen   Tablet .. 650 milliGRAM(s) Oral every 6 hours PRN Mild Pain (1 - 3)  ALPRAZolam 0.25 milliGRAM(s) Oral every 8 hours PRN anxiety  mineral oil 30 milliLiter(s) Oral two times a day PRN dry mouth  oxyCODONE    IR 5 milliGRAM(s) Oral every 4 hours PRN Moderate Pain (4 - 6)

## 2019-10-10 NOTE — PROGRESS NOTE ADULT - SUBJECTIVE AND OBJECTIVE BOX
68 yo female with thoracoabdominal aneurysm open repair complicated by cervical myelopathy, dysphagia, neurogenic bowel and bladder  seen at the bedside, no n/v, no sob. no events , no new complaints.    Vital Signs Last 24 Hrs  T(C): 36.4 (10 Oct 2019 08:00), Max: 36.7 (09 Oct 2019 20:38)  T(F): 97.5 (10 Oct 2019 08:00), Max: 98 (09 Oct 2019 20:38)  HR: 73 (10 Oct 2019 08:00) (67 - 73)  BP: 105/60 (10 Oct 2019 08:00) (105/60 - 166/65)  BP(mean): --  RR: 16 (10 Oct 2019 08:00) (14 - 16)  SpO2: 100% (10 Oct 2019 08:00) (97% - 100%)      GENERAL- NAD  EAR/NOSE/MOUTH/THROAT - no pharyngeal exudates, no oral lesions  MMM  EYES- SIXTO, conjunctiva and Sclera clear  NECK- supple  RESPIRATORY-  clear to auscultation bilaterally  CARDIOVASCULAR - SIS2, RRR  GI - soft NT BS present  EXTREMITIES- no pedal edema  NEUROLOGY- right LE weakness  SKIN- no rashes, warm to touch  PSYCHIATRY- AAO X 3                   x                    136  | 25   | 16           x     >-----------< x       ------------------------< 125                   x                    4.1  | 100  | 0.82                                         Ca 8.6   Mg x     Ph x

## 2019-10-11 PROCEDURE — 99232 SBSQ HOSP IP/OBS MODERATE 35: CPT | Mod: GC

## 2019-10-11 RX ADMIN — PANTOPRAZOLE SODIUM 40 MILLIGRAM(S): 20 TABLET, DELAYED RELEASE ORAL at 06:14

## 2019-10-11 RX ADMIN — Medication 500 MILLIGRAM(S): at 17:40

## 2019-10-11 RX ADMIN — Medication 325 MILLIGRAM(S): at 17:40

## 2019-10-11 RX ADMIN — GABAPENTIN 300 MILLIGRAM(S): 400 CAPSULE ORAL at 13:06

## 2019-10-11 RX ADMIN — Medication 1 PACKET(S): at 22:35

## 2019-10-11 RX ADMIN — GABAPENTIN 300 MILLIGRAM(S): 400 CAPSULE ORAL at 22:34

## 2019-10-11 RX ADMIN — AMLODIPINE BESYLATE 7.5 MILLIGRAM(S): 2.5 TABLET ORAL at 06:14

## 2019-10-11 RX ADMIN — LIDOCAINE 2 PATCH: 4 CREAM TOPICAL at 20:28

## 2019-10-11 RX ADMIN — Medication 81 MILLIGRAM(S): at 12:42

## 2019-10-11 RX ADMIN — LIDOCAINE 2 PATCH: 4 CREAM TOPICAL at 08:04

## 2019-10-11 RX ADMIN — OXYCODONE HYDROCHLORIDE 5 MILLIGRAM(S): 5 TABLET ORAL at 08:02

## 2019-10-11 RX ADMIN — Medication 650 MILLIGRAM(S): at 11:30

## 2019-10-11 RX ADMIN — Medication 10 MILLIGRAM(S): at 06:18

## 2019-10-11 RX ADMIN — OXYCODONE HYDROCHLORIDE 5 MILLIGRAM(S): 5 TABLET ORAL at 12:44

## 2019-10-11 RX ADMIN — Medication 1 TABLET(S): at 06:14

## 2019-10-11 RX ADMIN — ENOXAPARIN SODIUM 40 MILLIGRAM(S): 100 INJECTION SUBCUTANEOUS at 12:42

## 2019-10-11 RX ADMIN — TAMSULOSIN HYDROCHLORIDE 0.4 MILLIGRAM(S): 0.4 CAPSULE ORAL at 22:35

## 2019-10-11 RX ADMIN — OXYCODONE HYDROCHLORIDE 5 MILLIGRAM(S): 5 TABLET ORAL at 09:05

## 2019-10-11 RX ADMIN — Medication 500 MILLIGRAM(S): at 06:13

## 2019-10-11 RX ADMIN — Medication 650 MILLIGRAM(S): at 10:40

## 2019-10-11 RX ADMIN — OXYCODONE HYDROCHLORIDE 5 MILLIGRAM(S): 5 TABLET ORAL at 19:25

## 2019-10-11 RX ADMIN — LIDOCAINE 2 PATCH: 4 CREAM TOPICAL at 19:21

## 2019-10-11 RX ADMIN — Medication 12.5 MILLIGRAM(S): at 17:40

## 2019-10-11 RX ADMIN — AMIODARONE HYDROCHLORIDE 200 MILLIGRAM(S): 400 TABLET ORAL at 06:14

## 2019-10-11 RX ADMIN — Medication 1 MILLIGRAM(S): at 12:42

## 2019-10-11 RX ADMIN — Medication 12.5 MILLIGRAM(S): at 06:13

## 2019-10-11 RX ADMIN — Medication 1 TABLET(S): at 12:44

## 2019-10-11 RX ADMIN — Medication 0.25 MILLIGRAM(S): at 22:35

## 2019-10-11 RX ADMIN — GABAPENTIN 300 MILLIGRAM(S): 400 CAPSULE ORAL at 06:14

## 2019-10-11 RX ADMIN — OXYCODONE HYDROCHLORIDE 5 MILLIGRAM(S): 5 TABLET ORAL at 20:39

## 2019-10-11 RX ADMIN — OXYCODONE HYDROCHLORIDE 5 MILLIGRAM(S): 5 TABLET ORAL at 13:45

## 2019-10-11 RX ADMIN — Medication 325 MILLIGRAM(S): at 06:18

## 2019-10-11 RX ADMIN — Medication 1 TABLET(S): at 17:40

## 2019-10-11 NOTE — PROGRESS NOTE ADULT - ASSESSMENT
This is a 68 yo female with thoracoabdominal aneurysm open repair complicated by cervical myelopathy, dysphagia, neurogenic bowel and bladder.       #Cervical Myelopathy  Continue comprehensive acute rehabilitation program including PT/OT/Swallowing therapy  - Dysphagia diet-soft with thin liquids    -Neuropathic pain- gabapentin 300 mg BID dosing-increased to TID on 10/10, continue oxycodone, lidocaine patches daily around thoracic back incision during the day for pain control  -Neurogenic bladder: indwelling muñoz discontinued, monitor PVRs, change intermittent straight cath as needed to q6 hrs due to improved bladder scan numbers; continue toileting program     + UA, culture with normal magdalena and [id number] presumptive candida albicans, no treatment at this time, afebrile without leukocytosis, no signs of vaginal or skin yeast infections    #Anxiety- Xanax, home med    #Hypertension-- amlodipine 7.5mg qd-> will increase to 10 mg if no improvement (SBPs last 24 hrs in 140-150s again, some elevated SBPs in therapies up to 160s but will drop again to 80, patient remains asymptomatic-?dysreflexia-control urine output better with increased frequency of straight caths and monitor)    #Atrial Fibrillation- - rate controlled  with lopressor 25mg bid and amiodarone 200mg qd  Discussed regimen with hospitalist 10/3, will continue current BB regimen   Patient asymptomatic with HRs 50-70s, continue to monitor     #Thoracoabdominal aneurysm open repair- Asa, bp control     #Neurogenic Bladder: discontinued indwelling Muñoz, monitor PVRs with IC - bladder scann q 6 hrs   - fluid restriction of 1. 5 L daily     #Hyponatremia- stable, continue to monitor     #Hypokalemia- 3.2 , repleted and now 4.1 on 10/10     #Anemia  -stable   - likely related to blood loss during surgery  - supplemental iron recommended  - transfuse if Hemoglobin < 8  -check labs in morning     #Eosinophilia- AEC 0.45, continue to monitor     #DVT prophylaxis: lovenox 40mg sq daily     IDT meeting 10/1:   Patient mod A x1 for transfers and ambulating 20ft with RW and verbal cues. In OT, requiring assist of 2 for maximal assist for lower body dressing and toileting.   Goals are set for mod I in 3 weeks with tentative dc date set for 9/19.    IDT meeting 10/8:   In OT, Mod A for bathing, set up/supervison for upper body dressing, mod A for lower body dressing, toilet transfers with total A, mod A for commode transfers,   In PT, mod A with bed mobility, can be CG for transfers at times, "pop-over" transfers with mod A  Barriers: lower body weakness, unsafe to trial stair training at this time.   ST: working on vocalizations, no need to continue ST. Will increase PT and OT to 90 min each.   Tentative date for D/c home on 10/19. Social work to discuss hiring private aide for dc to home.

## 2019-10-11 NOTE — PROGRESS NOTE ADULT - SUBJECTIVE AND OBJECTIVE BOX
HPI:   69 year-old female with hx of "adrian-aorta" s/p aortic valve replacement, admitted electively to [facility] on 8/28 for open TAAA repair due to enlarging thoracoabdominal aortic aneurysm. The patient underwent her thoracoabdominal aneurysm open repair with decron graft and celiac SMA bypass with reimplantation of lumbar artery on 8/29.  Post-operatively she required vasopressors in the CTICU. Additionally post-operatively it was found that she had loss of strength in lower extremities, R>L. Repeat imaging was negative for hematoma. Also during this time period her kidney function declined and in conjunction with nephrology ATN was diagnosed with eventual recovery of her kidney function.  Her course was further complicated by AF with RVR, requiring amio gtt, esmolol gtt, digoxin, and vasopressin for pressure support. Course also c/b urinary retention requiring muñoz catheter. Workup for RLE weakness ordered by neurology, felt weakness to be due to cervical myelopathy. Urology consulted 9/23 for urinary retention, felt to be neurogenic in nature, recommended continue muñoz. Completed abx for UTI.  S&S eval recommend Dysphagia 1 nectar thick diet, 3L NC, PT recommending acute rehab.     Interval: + band like pain around thoracic area with improvement however does get severe at times in therapy.  -feels improved today  REVIEW OF SYSTEMS  +BM this morning after suppository.   No urine in bed this morning, bladder scans are improving. Denies feeling the urge to void but does have the urge to move bowels. Did seem to have urge to void this morning but feeling passed quickly.  Eating well and reports she is trying to keep to the FR of 1.5 L.   Denies fever, chills, SOB, HA, dizziness.   Morning SBPs low but patient denying symptoms.     Vital Signs Last 24 Hrs  T(C): 36.4 (11 Oct 2019 08:25), Max: 36.9 (10 Oct 2019 22:16)  T(F): 97.5 (11 Oct 2019 08:25), Max: 98.5 (10 Oct 2019 22:16)  HR: 64 (11 Oct 2019 08:25) (64 - 72)  BP: 102/55 (11 Oct 2019 08:25) (102/55 - 145/60)  RR: 14 (11 Oct 2019 08:25) (14 - 14)  SpO2: 97% (11 Oct 2019 08:25) (97% - 100%)    10-10    136  |  100  |  16  ----------------------------<  125<H>  4.1   |  25  |  0.82    Ca    8.6      10 Oct 2019 06:10    TPro  6.5  /  Alb  2.6<L>  /  TBili  0.3  /  DBili  x   /  AST  28  /  ALT  46<H>  /  AlkPhos  85  10-09      Physical Exam:  Gen - NAD, Comfortable  	Pulm - CTAB  	Cardiovascular - RRR, S1S2  	Chest wall: Sternal incision wound-clean, no erythema or drainage noted  	Posterior lateral incision wound on the left- clean, no erythema or drainage, small skin tear seen next to incision site dressed  	Abdomen - Soft, NT/ND, +BS  	Extremities - No C/C/E, No calf tenderness  	Neuro-  	   Cognitive - AAOx3  	   Cranial Nerves - CN 2-12 intact  	   Motor -  Bilateral Upper extremities 5/5   	                  LEFT    LE - HF 4+/5, KE 4+/5, DF 5/5, PF 5/5  	                  RIGHT LE - HF 2/5, KE 4/5, DF 4/5, PF 4/5     	   Sensory - Intact to LT bilaterally   	   Tone - normal  	Psychiatric - Mood stable, Affect WNL  Skin:  as above       MEDICATIONS  (STANDING):  amiodarone    Tablet 200 milliGRAM(s) Oral daily  amLODIPine   Tablet 7.5 milliGRAM(s) Oral daily  ascorbic acid 500 milliGRAM(s) Oral two times a day  aspirin  chewable 81 milliGRAM(s) Oral daily  bisacodyl Suppository 10 milliGRAM(s) Rectal <User Schedule>  enoxaparin Injectable 40 milliGRAM(s) SubCutaneous daily  ferrous    sulfate 325 milliGRAM(s) Oral two times a day  folic acid 1 milliGRAM(s) Oral daily  gabapentin 300 milliGRAM(s) Oral three times a day  influenza   Vaccine 0.5 milliLiter(s) IntraMuscular once  lactobacillus acidophilus 1 Tablet(s) Oral two times a day  lidocaine   Patch 2 Patch Transdermal <User Schedule>  metoprolol succinate ER 12.5 milliGRAM(s) Oral every 12 hours  multivitamin 1 Tablet(s) Oral daily  pantoprazole    Tablet 40 milliGRAM(s) Oral before breakfast  psyllium Powder 1 Packet(s) Oral at bedtime  tamsulosin 0.4 milliGRAM(s) Oral at bedtime    MEDICATIONS  (PRN):  acetaminophen   Tablet .. 650 milliGRAM(s) Oral every 6 hours PRN Mild Pain (1 - 3)  ALPRAZolam 0.25 milliGRAM(s) Oral every 8 hours PRN anxiety  mineral oil 30 milliLiter(s) Oral two times a day PRN dry mouth  oxyCODONE    IR 5 milliGRAM(s) Oral every 4 hours PRN Moderate Pain (4 - 6)

## 2019-10-12 PROCEDURE — 99232 SBSQ HOSP IP/OBS MODERATE 35: CPT

## 2019-10-12 RX ORDER — GABAPENTIN 400 MG/1
600 CAPSULE ORAL
Refills: 0 | Status: DISCONTINUED | OUTPATIENT
Start: 2019-10-12 | End: 2019-10-15

## 2019-10-12 RX ORDER — GABAPENTIN 400 MG/1
300 CAPSULE ORAL
Refills: 0 | Status: DISCONTINUED | OUTPATIENT
Start: 2019-10-12 | End: 2019-10-15

## 2019-10-12 RX ORDER — TAMSULOSIN HYDROCHLORIDE 0.4 MG/1
0.8 CAPSULE ORAL AT BEDTIME
Refills: 0 | Status: DISCONTINUED | OUTPATIENT
Start: 2019-10-12 | End: 2019-10-13

## 2019-10-12 RX ADMIN — AMIODARONE HYDROCHLORIDE 200 MILLIGRAM(S): 400 TABLET ORAL at 06:13

## 2019-10-12 RX ADMIN — Medication 500 MILLIGRAM(S): at 18:08

## 2019-10-12 RX ADMIN — TAMSULOSIN HYDROCHLORIDE 0.8 MILLIGRAM(S): 0.4 CAPSULE ORAL at 21:06

## 2019-10-12 RX ADMIN — Medication 10 MILLIGRAM(S): at 06:14

## 2019-10-12 RX ADMIN — Medication 1 TABLET(S): at 06:14

## 2019-10-12 RX ADMIN — LIDOCAINE 2 PATCH: 4 CREAM TOPICAL at 18:07

## 2019-10-12 RX ADMIN — Medication 500 MILLIGRAM(S): at 06:14

## 2019-10-12 RX ADMIN — GABAPENTIN 300 MILLIGRAM(S): 400 CAPSULE ORAL at 18:06

## 2019-10-12 RX ADMIN — Medication 81 MILLIGRAM(S): at 13:07

## 2019-10-12 RX ADMIN — Medication 1 PACKET(S): at 21:07

## 2019-10-12 RX ADMIN — ENOXAPARIN SODIUM 40 MILLIGRAM(S): 100 INJECTION SUBCUTANEOUS at 13:07

## 2019-10-12 RX ADMIN — GABAPENTIN 300 MILLIGRAM(S): 400 CAPSULE ORAL at 13:07

## 2019-10-12 RX ADMIN — OXYCODONE HYDROCHLORIDE 5 MILLIGRAM(S): 5 TABLET ORAL at 14:11

## 2019-10-12 RX ADMIN — Medication 1 TABLET(S): at 13:07

## 2019-10-12 RX ADMIN — PANTOPRAZOLE SODIUM 40 MILLIGRAM(S): 20 TABLET, DELAYED RELEASE ORAL at 06:14

## 2019-10-12 RX ADMIN — Medication 1 TABLET(S): at 18:08

## 2019-10-12 RX ADMIN — GABAPENTIN 300 MILLIGRAM(S): 400 CAPSULE ORAL at 06:14

## 2019-10-12 RX ADMIN — Medication 325 MILLIGRAM(S): at 18:09

## 2019-10-12 RX ADMIN — Medication 12.5 MILLIGRAM(S): at 18:08

## 2019-10-12 RX ADMIN — Medication 325 MILLIGRAM(S): at 06:14

## 2019-10-12 RX ADMIN — LIDOCAINE 2 PATCH: 4 CREAM TOPICAL at 07:58

## 2019-10-12 RX ADMIN — GABAPENTIN 600 MILLIGRAM(S): 400 CAPSULE ORAL at 21:06

## 2019-10-12 RX ADMIN — LIDOCAINE 2 PATCH: 4 CREAM TOPICAL at 06:21

## 2019-10-12 RX ADMIN — AMLODIPINE BESYLATE 7.5 MILLIGRAM(S): 2.5 TABLET ORAL at 06:14

## 2019-10-12 RX ADMIN — OXYCODONE HYDROCHLORIDE 5 MILLIGRAM(S): 5 TABLET ORAL at 13:09

## 2019-10-12 RX ADMIN — Medication 1 MILLIGRAM(S): at 13:07

## 2019-10-12 RX ADMIN — Medication 12.5 MILLIGRAM(S): at 06:14

## 2019-10-12 RX ADMIN — Medication 0.25 MILLIGRAM(S): at 23:22

## 2019-10-12 NOTE — PROGRESS NOTE ADULT - ASSESSMENT
This is a 68 yo female with thoracoabdominal aneurysm open repair complicated by cervical myelopathy, dysphagia, neurogenic bowel and bladder.       #Cervical Myelopathy  Continue comprehensive acute rehabilitation program including PT/OT/Swallowing therapy  - Dysphagia diet-soft with thin liquids    -Neuropathic pain- gabapentin 300 mg BID dosing-increased to TID on 10/10, continue oxycodone, lidocaine patches daily around thoracic back incision during the day for pain control  -Neurogenic bladder: indwelling muñoz discontinued, monitor PVRs, change intermittent straight cath as needed to q6 hrs due to improved bladder scan numbers; continue toileting program     + UA, culture with normal magdalena and [id number] presumptive candida albicans, no treatment at this time, afebrile without leukocytosis, no signs of vaginal or skin yeast infections    #Anxiety- Xanax, home med    #Hypertension-- amlodipine 7.5mg qd-> will increase to 10 mg if no improvement (SBPs last 24 hrs in 140-150s again, some elevated SBPs in therapies up to 160s but will drop again to 80, patient remains asymptomatic-?dysreflexia-control urine output better with increased frequency of straight caths and monitor)    #Atrial Fibrillation- - rate controlled  with lopressor 25mg bid and amiodarone 200mg qd  Discussed regimen with hospitalist 10/3, will continue current BB regimen   Patient asymptomatic with HRs 50-70s, continue to monitor     #Thoracoabdominal aneurysm open repair- Asa, bp control     #Neurogenic Bladder: discontinued indwelling Muñoz, monitor PVRs with IC - bladder scann q 6 hrs   - fluid restriction of 1. 5 L daily     #Hyponatremia- stable, continue to monitor     #Hypokalemia- 3.2 , repleted and now 4.1 on 10/10     #Anemia  -stable   - likely related to blood loss during surgery  - supplemental iron recommended  - transfuse if Hemoglobin < 8  -check labs in morning     #Eosinophilia- AEC 0.45, continue to monitor     #DVT prophylaxis: lovenox 40mg sq daily     IDT meeting 10/1:   Patient mod A x1 for transfers and ambulating 20ft with RW and verbal cues. In OT, requiring assist of 2 for maximal assist for lower body dressing and toileting.   Goals are set for mod I in 3 weeks with tentative dc date set for 9/19.    IDT meeting 10/8:   In OT, Mod A for bathing, set up/supervison for upper body dressing, mod A for lower body dressing, toilet transfers with total A, mod A for commode transfers,   In PT, mod A with bed mobility, can be CG for transfers at times, "pop-over" transfers with mod A  Barriers: lower body weakness, unsafe to trial stair training at this time.   ST: working on vocalizations, no need to continue ST. Will increase PT and OT to 90 min each.   Tentative date for D/c home on 10/19. Social work to discuss hiring private aide for dc to home. This is a 70 yo female with thoracoabdominal aneurysm open repair complicated by cervical myelopathy, dysphagia, neurogenic bowel and bladder.       #Cervical Myelopathy  Continue comprehensive acute rehabilitation program including PT/OT/Swallowing therapy  - Dysphagia diet-soft with thin liquids    -Neuropathic pain- gabapentin 300 mg BID dosing-increased to TID on 10/10, continue oxycodone, lidocaine patches daily around thoracic back incision during the day for pain control  10/12/19- AT level pain, increased gabapentin nighttime to 600 mg. continue daytime 300 mg twice.   -Neurogenic bladder: indwelling muñoz discontinued, monitor PVRs, change intermittent straight cath as needed to q6 hrs due to improved bladder scan numbers; continue toileting program     + UA, culture with normal magdalena and [id number] presumptive candida albicans, no treatment at this time, afebrile without leukocytosis, no signs of vaginal or skin yeast infections    #Anxiety- Xanax, home med    #Hypertension-- amlodipine 7.5mg qd-> will increase to 10 mg if no improvement (SBPs last 24 hrs in 140-150s again, some elevated SBPs in therapies up to 160s but will drop again to 80, patient remains asymptomatic-?dysreflexia-control urine output better with increased frequency of straight caths and monitor)    #Atrial Fibrillation- - rate controlled  with lopressor 25mg bid and amiodarone 200mg qd  Discussed regimen with hospitalist 10/3, will continue current BB regimen   Patient asymptomatic with HRs 50-70s, continue to monitor     #Thoracoabdominal aneurysm open repair- Asa, bp control     #Neurogenic Bladder: discontinued indwelling Muñoz, monitor PVRs with IC - bladder scann q 6 hrs   - fluid restriction of 1. 5 L daily   -10/12/19 Increased Flomax to 0.8 mg for urinary retention, not having leaks. learning self cath.     #Hyponatremia- stable, continue to monitor     #Hypokalemia- 3.2 , repleted and now 4.1 on 10/10     #Anemia  -stable   - likely related to blood loss during surgery  - supplemental iron recommended  - transfuse if Hemoglobin < 8  -check labs in morning     #Eosinophilia- AEC 0.45, continue to monitor     #DVT prophylaxis: lovenox 40mg sq daily     IDT meeting 10/1:   Patient mod A x1 for transfers and ambulating 20ft with RW and verbal cues. In OT, requiring assist of 2 for maximal assist for lower body dressing and toileting.   Goals are set for mod I in 3 weeks with tentative dc date set for 9/19.    IDT meeting 10/8:   In OT, Mod A for bathing, set up/supervison for upper body dressing, mod A for lower body dressing, toilet transfers with total A, mod A for commode transfers,   In PT, mod A with bed mobility, can be CG for transfers at times, "pop-over" transfers with mod A  Barriers: lower body weakness, unsafe to trial stair training at this time.   ST: working on vocalizations, no need to continue ST. Will increase PT and OT to 90 min each.   Tentative date for D/c home on 10/19. Social work to discuss hiring private aide for dc to home.

## 2019-10-12 NOTE — PROGRESS NOTE ADULT - SUBJECTIVE AND OBJECTIVE BOX
HPI:   69 year-old female with hx of "adrian-aorta" s/p aortic valve replacement, admitted electively to [facility] on 8/28 for open TAAA repair due to enlarging thoracoabdominal aortic aneurysm. The patient underwent her thoracoabdominal aneurysm open repair with decron graft and celiac SMA bypass with reimplantation of lumbar artery on 8/29.  Post-operatively she required vasopressors in the CTICU. Additionally post-operatively it was found that she had loss of strength in lower extremities, R>L. Repeat imaging was negative for hematoma. Also during this time period her kidney function declined and in conjunction with nephrology ATN was diagnosed with eventual recovery of her kidney function.  Her course was further complicated by AF with RVR, requiring amio gtt, esmolol gtt, digoxin, and vasopressin for pressure support. Course also c/b urinary retention requiring muñoz catheter. Workup for RLE weakness ordered by neurology, felt weakness to be due to cervical myelopathy. Urology consulted 9/23 for urinary retention, felt to be neurogenic in nature, recommended continue muñoz. Completed abx for UTI.  S&S eval recommend Dysphagia 1 nectar thick diet, 3L NC, PT recommending acute rehab.     Interval: patient seen bedside, no acute events overnight. Participating in therapy well.     Vital Signs Last 24 Hrs   Vital Signs Last 24 Hrs  T(C): 36.5 (12 Oct 2019 08:16), Max: 36.7 (11 Oct 2019 20:38)  T(F): 97.7 (12 Oct 2019 08:16), Max: 98 (11 Oct 2019 20:38)  HR: 73 (12 Oct 2019 08:16) (65 - 74)  BP: 118/51 (12 Oct 2019 08:16) (118/51 - 143/61)  BP(mean): --  RR: 14 (12 Oct 2019 08:16) (14 - 14)  SpO2: 100% (12 Oct 2019 08:16) (95% - 100%)    10-10    136  |  100  |  16  ----------------------------<  125<H>  4.1   |  25  |  0.82    Ca    8.6      10 Oct 2019 06:10    TPro  6.5  /  Alb  2.6<L>  /  TBili  0.3  /  DBili  x   /  AST  28  /  ALT  46<H>  /  AlkPhos  85  10-09      Physical Exam:  Gen - NAD, Comfortable  	Pulm - CTAB  	Cardiovascular - RRR, S1S2  	Chest wall: Sternal incision wound-clean, no erythema or drainage noted  	Posterior lateral incision wound on the left- clean, no erythema or drainage, small skin tear seen next to incision site dressed  	Abdomen - Soft, NT/ND, +BS  	Extremities - No C/C/E, No calf tenderness  	Neuro-  	   Cognitive - AAOx3  	   Cranial Nerves - CN 2-12 intact  	   	   Tone - normal  	Psychiatric - Mood stable, Affect WNL  Skin:  as above       MEDICATIONS  (STANDING):  amiodarone    Tablet 200 milliGRAM(s) Oral daily  amLODIPine   Tablet 7.5 milliGRAM(s) Oral daily  ascorbic acid 500 milliGRAM(s) Oral two times a day  aspirin  chewable 81 milliGRAM(s) Oral daily  bisacodyl Suppository 10 milliGRAM(s) Rectal <User Schedule>  enoxaparin Injectable 40 milliGRAM(s) SubCutaneous daily  ferrous    sulfate 325 milliGRAM(s) Oral two times a day  folic acid 1 milliGRAM(s) Oral daily  gabapentin 300 milliGRAM(s) Oral three times a day  influenza   Vaccine 0.5 milliLiter(s) IntraMuscular once  lactobacillus acidophilus 1 Tablet(s) Oral two times a day  lidocaine   Patch 2 Patch Transdermal <User Schedule>  metoprolol succinate ER 12.5 milliGRAM(s) Oral every 12 hours  multivitamin 1 Tablet(s) Oral daily  pantoprazole    Tablet 40 milliGRAM(s) Oral before breakfast  psyllium Powder 1 Packet(s) Oral at bedtime  tamsulosin 0.4 milliGRAM(s) Oral at bedtime    MEDICATIONS  (PRN):  acetaminophen   Tablet .. 650 milliGRAM(s) Oral every 6 hours PRN Mild Pain (1 - 3)  ALPRAZolam 0.25 milliGRAM(s) Oral every 8 hours PRN anxiety  mineral oil 30 milliLiter(s) Oral two times a day PRN dry mouth  oxyCODONE    IR 5 milliGRAM(s) Oral every 4 hours PRN Moderate Pain (4 - 6) HPI:   69 year-old female with hx of "adrian-aorta" s/p aortic valve replacement, admitted electively to [facility] on 8/28 for open TAAA repair due to enlarging thoracoabdominal aortic aneurysm. The patient underwent her thoracoabdominal aneurysm open repair with decron graft and celiac SMA bypass with reimplantation of lumbar artery on 8/29.  Post-operatively she required vasopressors in the CTICU. Additionally post-operatively it was found that she had loss of strength in lower extremities, R>L. Repeat imaging was negative for hematoma. Also during this time period her kidney function declined and in conjunction with nephrology ATN was diagnosed with eventual recovery of her kidney function.  Her course was further complicated by AF with RVR, requiring amio gtt, esmolol gtt, digoxin, and vasopressin for pressure support. Course also c/b urinary retention requiring muñoz catheter. Workup for RLE weakness ordered by neurology, felt weakness to be due to cervical myelopathy. Urology consulted 9/23 for urinary retention, felt to be neurogenic in nature, recommended continue muñoz. Completed abx for UTI.  S&S eval recommend Dysphagia 1 nectar thick diet, 3L NC, PT recommending acute rehab.     Interval: patient seen bedside, no acute events overnight. Participating in therapy well. Still needs intermittent cath, no urine leaks, learning self cath today. At level pain, band like, oxycodone helps but not much. Tolerating gabapentin.     Vital Signs Last 24 Hrs   Vital Signs Last 24 Hrs  T(C): 36.5 (12 Oct 2019 08:16), Max: 36.7 (11 Oct 2019 20:38)  T(F): 97.7 (12 Oct 2019 08:16), Max: 98 (11 Oct 2019 20:38)  HR: 73 (12 Oct 2019 08:16) (65 - 74)  BP: 118/51 (12 Oct 2019 08:16) (118/51 - 143/61)  BP(mean): --  RR: 14 (12 Oct 2019 08:16) (14 - 14)  SpO2: 100% (12 Oct 2019 08:16) (95% - 100%)    10-10    136  |  100  |  16  ----------------------------<  125<H>  4.1   |  25  |  0.82    Ca    8.6      10 Oct 2019 06:10    TPro  6.5  /  Alb  2.6<L>  /  TBili  0.3  /  DBili  x   /  AST  28  /  ALT  46<H>  /  AlkPhos  85  10-09      Physical Exam:  Gen - NAD, Comfortable  	Pulm - CTAB  	Cardiovascular - RRR, S1S2  	Chest wall: Sternal incision wound-clean, no erythema or drainage noted  	Posterior lateral incision wound on the left- clean, no erythema or drainage, small skin tear seen next to incision site dressed  	Abdomen - Soft, NT/ND, +BS  	Extremities - No C/C/E, No calf tenderness  	Neuro-  	   Cognitive - AAOx3  	   Cranial Nerves - CN 2-12 intact  	   	   Tone - normal  	Psychiatric - Mood stable, Affect WNL  Skin:  as above       MEDICATIONS  (STANDING):  amiodarone    Tablet 200 milliGRAM(s) Oral daily  amLODIPine   Tablet 7.5 milliGRAM(s) Oral daily  ascorbic acid 500 milliGRAM(s) Oral two times a day  aspirin  chewable 81 milliGRAM(s) Oral daily  bisacodyl Suppository 10 milliGRAM(s) Rectal <User Schedule>  enoxaparin Injectable 40 milliGRAM(s) SubCutaneous daily  ferrous    sulfate 325 milliGRAM(s) Oral two times a day  folic acid 1 milliGRAM(s) Oral daily  gabapentin 300 milliGRAM(s) Oral three times a day  influenza   Vaccine 0.5 milliLiter(s) IntraMuscular once  lactobacillus acidophilus 1 Tablet(s) Oral two times a day  lidocaine   Patch 2 Patch Transdermal <User Schedule>  metoprolol succinate ER 12.5 milliGRAM(s) Oral every 12 hours  multivitamin 1 Tablet(s) Oral daily  pantoprazole    Tablet 40 milliGRAM(s) Oral before breakfast  psyllium Powder 1 Packet(s) Oral at bedtime  tamsulosin 0.4 milliGRAM(s) Oral at bedtime    MEDICATIONS  (PRN):  acetaminophen   Tablet .. 650 milliGRAM(s) Oral every 6 hours PRN Mild Pain (1 - 3)  ALPRAZolam 0.25 milliGRAM(s) Oral every 8 hours PRN anxiety  mineral oil 30 milliLiter(s) Oral two times a day PRN dry mouth  oxyCODONE    IR 5 milliGRAM(s) Oral every 4 hours PRN Moderate Pain (4 - 6)

## 2019-10-13 PROCEDURE — 99232 SBSQ HOSP IP/OBS MODERATE 35: CPT

## 2019-10-13 RX ORDER — TAMSULOSIN HYDROCHLORIDE 0.4 MG/1
0.4 CAPSULE ORAL AT BEDTIME
Refills: 0 | Status: DISCONTINUED | OUTPATIENT
Start: 2019-10-13 | End: 2019-10-30

## 2019-10-13 RX ADMIN — GABAPENTIN 300 MILLIGRAM(S): 400 CAPSULE ORAL at 05:35

## 2019-10-13 RX ADMIN — AMLODIPINE BESYLATE 7.5 MILLIGRAM(S): 2.5 TABLET ORAL at 05:35

## 2019-10-13 RX ADMIN — TAMSULOSIN HYDROCHLORIDE 0.4 MILLIGRAM(S): 0.4 CAPSULE ORAL at 21:13

## 2019-10-13 RX ADMIN — LIDOCAINE 2 PATCH: 4 CREAM TOPICAL at 17:46

## 2019-10-13 RX ADMIN — Medication 325 MILLIGRAM(S): at 05:37

## 2019-10-13 RX ADMIN — PANTOPRAZOLE SODIUM 40 MILLIGRAM(S): 20 TABLET, DELAYED RELEASE ORAL at 05:36

## 2019-10-13 RX ADMIN — Medication 1 MILLIGRAM(S): at 12:48

## 2019-10-13 RX ADMIN — Medication 81 MILLIGRAM(S): at 12:48

## 2019-10-13 RX ADMIN — Medication 12.5 MILLIGRAM(S): at 17:41

## 2019-10-13 RX ADMIN — GABAPENTIN 600 MILLIGRAM(S): 400 CAPSULE ORAL at 21:13

## 2019-10-13 RX ADMIN — OXYCODONE HYDROCHLORIDE 5 MILLIGRAM(S): 5 TABLET ORAL at 13:47

## 2019-10-13 RX ADMIN — Medication 12.5 MILLIGRAM(S): at 05:36

## 2019-10-13 RX ADMIN — LIDOCAINE 2 PATCH: 4 CREAM TOPICAL at 07:43

## 2019-10-13 RX ADMIN — Medication 325 MILLIGRAM(S): at 17:41

## 2019-10-13 RX ADMIN — LIDOCAINE 2 PATCH: 4 CREAM TOPICAL at 05:36

## 2019-10-13 RX ADMIN — GABAPENTIN 300 MILLIGRAM(S): 400 CAPSULE ORAL at 13:54

## 2019-10-13 RX ADMIN — AMIODARONE HYDROCHLORIDE 200 MILLIGRAM(S): 400 TABLET ORAL at 05:34

## 2019-10-13 RX ADMIN — Medication 500 MILLIGRAM(S): at 17:40

## 2019-10-13 RX ADMIN — Medication 10 MILLIGRAM(S): at 05:35

## 2019-10-13 RX ADMIN — Medication 1 TABLET(S): at 05:36

## 2019-10-13 RX ADMIN — ENOXAPARIN SODIUM 40 MILLIGRAM(S): 100 INJECTION SUBCUTANEOUS at 12:48

## 2019-10-13 RX ADMIN — Medication 500 MILLIGRAM(S): at 05:35

## 2019-10-13 RX ADMIN — Medication 1 PACKET(S): at 21:13

## 2019-10-13 RX ADMIN — OXYCODONE HYDROCHLORIDE 5 MILLIGRAM(S): 5 TABLET ORAL at 12:59

## 2019-10-13 RX ADMIN — Medication 1 TABLET(S): at 17:40

## 2019-10-13 RX ADMIN — Medication 1 TABLET(S): at 12:48

## 2019-10-13 NOTE — PROGRESS NOTE ADULT - ASSESSMENT
This is a 70 yo female with thoracoabdominal aneurysm open repair complicated by cervical myelopathy, dysphagia, neurogenic bowel and bladder.       #Cervical Myelopathy  Continue comprehensive acute rehabilitation program including PT/OT/Swallowing therapy  - Dysphagia diet-soft with thin liquids    -Neuropathic pain- gabapentin 300 mg BID dosing-increased to TID on 10/10, continue oxycodone, lidocaine patches daily around thoracic back incision during the day for pain control  10/12/19- AT level pain, increased gabapentin nighttime to 600 mg. continue daytime 300 mg twice.   -Neurogenic bladder: indwelling muñoz discontinued, monitor PVRs, change intermittent straight cath as needed to q6 hrs due to improved bladder scan numbers; continue toileting program     + UA, culture with normal magdalena and [id number] presumptive candida albicans, no treatment at this time, afebrile without leukocytosis, no signs of vaginal or skin yeast infections    #Anxiety- Xanax, home med    #Hypertension-- amlodipine 7.5mg qd-> will increase to 10 mg if no improvement (SBPs last 24 hrs in 140-150s again, some elevated SBPs in therapies up to 160s but will drop again to 80, patient remains asymptomatic-?dysreflexia-control urine output better with increased frequency of straight caths and monitor)    #Atrial Fibrillation- - rate controlled  with lopressor 25mg bid and amiodarone 200mg qd  Discussed regimen with hospitalist 10/3, will continue current BB regimen   Patient asymptomatic with HRs 50-70s, continue to monitor     #Thoracoabdominal aneurysm open repair- Asa, bp control     #Neurogenic Bladder: discontinued indwelling Muñoz, monitor PVRs with IC - bladder scann q 6 hrs   - fluid restriction of 1. 5 L daily   -10/12/19 Increased Flomax to 0.8 mg for urinary retention, not having leaks. learning self cath.   10/13/19: Decrease flomax to 0.4 mg as started urinary leaks without voluntary control which is not goal. Since she is not able to voluntarily initiate/control voiding, she can continue straight cath and goal is to have no urinary  leaks in between to prevent skin breakdown.     #Hyponatremia- stable, continue to monitor     #Hypokalemia- 3.2 , repleted and now 4.1 on 10/10     #Anemia  -stable   - likely related to blood loss during surgery  - supplemental iron recommended  - transfuse if Hemoglobin < 8  -check labs in morning     #Eosinophilia- AEC 0.45, continue to monitor     #DVT prophylaxis: lovenox 40mg sq daily     IDT meeting 10/1:   Patient mod A x1 for transfers and ambulating 20ft with RW and verbal cues. In OT, requiring assist of 2 for maximal assist for lower body dressing and toileting.   Goals are set for mod I in 3 weeks with tentative dc date set for 9/19.    IDT meeting 10/8:   In OT, Mod A for bathing, set up/supervison for upper body dressing, mod A for lower body dressing, toilet transfers with total A, mod A for commode transfers,   In PT, mod A with bed mobility, can be CG for transfers at times, "pop-over" transfers with mod A  Barriers: lower body weakness, unsafe to trial stair training at this time.   ST: working on vocalizations, no need to continue ST. Will increase PT and OT to 90 min each.   Tentative date for D/c home on 10/19. Social work to discuss hiring private aide for dc to home.

## 2019-10-13 NOTE — PROGRESS NOTE ADULT - SUBJECTIVE AND OBJECTIVE BOX
HPI:   69 year-old female with hx of "adrian-aorta" s/p aortic valve replacement, admitted electively to [facility] on 8/28 for open TAAA repair due to enlarging thoracoabdominal aortic aneurysm. The patient underwent her thoracoabdominal aneurysm open repair with decron graft and celiac SMA bypass with reimplantation of lumbar artery on 8/29.  Post-operatively she required vasopressors in the CTICU. Additionally post-operatively it was found that she had loss of strength in lower extremities, R>L. Repeat imaging was negative for hematoma. Also during this time period her kidney function declined and in conjunction with nephrology ATN was diagnosed with eventual recovery of her kidney function.  Her course was further complicated by AF with RVR, requiring amio gtt, esmolol gtt, digoxin, and vasopressin for pressure support. Course also c/b urinary retention requiring muñoz catheter. Workup for RLE weakness ordered by neurology, felt weakness to be due to cervical myelopathy. Urology consulted 9/23 for urinary retention, felt to be neurogenic in nature, recommended continue muñoz. Completed abx for UTI.  S&S eval recommend Dysphagia 1 nectar thick diet, 3L NC, PT recommending acute rehab.     Interval: patient seen bedside, no acute events overnight. Tolerating increase in gabapentin well with better control of At level neuropathic pain. Reports urine leaks without voluntary control started yesterday. Having good bowel movements. Also feels thirsty all times as on fluid restriction to control straight cath volumes.     Vital Signs Last 24 Hrs   Vital Signs Last 24 Hrs  T(C): 36.3 (13 Oct 2019 08:32), Max: 37.1 (12 Oct 2019 21:03)  T(F): 97.4 (13 Oct 2019 08:32), Max: 98.7 (12 Oct 2019 21:03)  HR: 77 (13 Oct 2019 08:32) (73 - 77)  BP: 113/55 (13 Oct 2019 08:32) (113/55 - 156/-)  BP(mean): --  RR: 14 (13 Oct 2019 08:32) (14 - 14)  SpO2: 100% (13 Oct 2019 08:32) (96% - 100%)    10-10    136  |  100  |  16  ----------------------------<  125<H>  4.1   |  25  |  0.82    Ca    8.6      10 Oct 2019 06:10    TPro  6.5  /  Alb  2.6<L>  /  TBili  0.3  /  DBili  x   /  AST  28  /  ALT  46<H>  /  AlkPhos  85  10-09      Physical Exam:  Gen - NAD, Comfortable  	Pulm - CTAB  	Cardiovascular - RRR, S1S2  	Chest wall: Sternal incision wound-clean, no erythema or drainage noted  	Posterior lateral incision wound on the left- clean, no erythema or drainage, small skin tear seen next to incision site dressed  	Abdomen - Soft, NT/ND, +BS  	Extremities - No C/C/E, No calf tenderness  	Neuro-  	   Cognitive - AAOx3  	   Cranial Nerves - CN 2-12 intact  	   	   Tone - normal  	Psychiatric - Mood stable, Affect WNL  Skin:  as above       MEDICATIONS  (STANDING):   MEDICATIONS  (STANDING):  amiodarone    Tablet 200 milliGRAM(s) Oral daily  amLODIPine   Tablet 7.5 milliGRAM(s) Oral daily  ascorbic acid 500 milliGRAM(s) Oral two times a day  aspirin  chewable 81 milliGRAM(s) Oral daily  bisacodyl Suppository 10 milliGRAM(s) Rectal <User Schedule>  enoxaparin Injectable 40 milliGRAM(s) SubCutaneous daily  ferrous    sulfate 325 milliGRAM(s) Oral two times a day  folic acid 1 milliGRAM(s) Oral daily  gabapentin 300 milliGRAM(s) Oral <User Schedule>  gabapentin 600 milliGRAM(s) Oral <User Schedule>  influenza   Vaccine 0.5 milliLiter(s) IntraMuscular once  lactobacillus acidophilus 1 Tablet(s) Oral two times a day  lidocaine   Patch 2 Patch Transdermal <User Schedule>  metoprolol succinate ER 12.5 milliGRAM(s) Oral every 12 hours  multivitamin 1 Tablet(s) Oral daily  pantoprazole    Tablet 40 milliGRAM(s) Oral before breakfast  psyllium Powder 1 Packet(s) Oral at bedtime  tamsulosin 0.8 milliGRAM(s) Oral at bedtime    MEDICATIONS  (PRN):  acetaminophen   Tablet .. 650 milliGRAM(s) Oral every 6 hours PRN Mild Pain (1 - 3)  ALPRAZolam 0.25 milliGRAM(s) Oral every 8 hours PRN anxiety  mineral oil 30 milliLiter(s) Oral two times a day PRN dry mouth  oxyCODONE    IR 5 milliGRAM(s) Oral every 4 hours PRN Moderate Pain (4 - 6)

## 2019-10-14 LAB
ANION GAP SERPL CALC-SCNC: 10 MMOL/L — SIGNIFICANT CHANGE UP (ref 5–17)
BUN SERPL-MCNC: 20 MG/DL — SIGNIFICANT CHANGE UP (ref 7–23)
CALCIUM SERPL-MCNC: 8.9 MG/DL — SIGNIFICANT CHANGE UP (ref 8.4–10.5)
CHLORIDE SERPL-SCNC: 101 MMOL/L — SIGNIFICANT CHANGE UP (ref 96–108)
CO2 SERPL-SCNC: 26 MMOL/L — SIGNIFICANT CHANGE UP (ref 22–31)
CREAT SERPL-MCNC: 0.8 MG/DL — SIGNIFICANT CHANGE UP (ref 0.5–1.3)
GLUCOSE SERPL-MCNC: 93 MG/DL — SIGNIFICANT CHANGE UP (ref 70–99)
HCT VFR BLD CALC: 25.2 % — LOW (ref 34.5–45)
HGB BLD-MCNC: 8.2 G/DL — LOW (ref 11.5–15.5)
MCHC RBC-ENTMCNC: 28.4 PG — SIGNIFICANT CHANGE UP (ref 27–34)
MCHC RBC-ENTMCNC: 32.5 GM/DL — SIGNIFICANT CHANGE UP (ref 32–36)
MCV RBC AUTO: 87.2 FL — SIGNIFICANT CHANGE UP (ref 80–100)
NRBC # BLD: 0 /100 WBCS — SIGNIFICANT CHANGE UP (ref 0–0)
PLATELET # BLD AUTO: 268 K/UL — SIGNIFICANT CHANGE UP (ref 150–400)
POTASSIUM SERPL-MCNC: 3.6 MMOL/L — SIGNIFICANT CHANGE UP (ref 3.5–5.3)
POTASSIUM SERPL-SCNC: 3.6 MMOL/L — SIGNIFICANT CHANGE UP (ref 3.5–5.3)
RBC # BLD: 2.89 M/UL — LOW (ref 3.8–5.2)
RBC # FLD: 16.7 % — HIGH (ref 10.3–14.5)
SODIUM SERPL-SCNC: 137 MMOL/L — SIGNIFICANT CHANGE UP (ref 135–145)
WBC # BLD: 6.42 K/UL — SIGNIFICANT CHANGE UP (ref 3.8–10.5)
WBC # FLD AUTO: 6.42 K/UL — SIGNIFICANT CHANGE UP (ref 3.8–10.5)

## 2019-10-14 PROCEDURE — 99233 SBSQ HOSP IP/OBS HIGH 50: CPT

## 2019-10-14 RX ADMIN — Medication 500 MILLIGRAM(S): at 17:23

## 2019-10-14 RX ADMIN — Medication 0.25 MILLIGRAM(S): at 01:22

## 2019-10-14 RX ADMIN — OXYCODONE HYDROCHLORIDE 5 MILLIGRAM(S): 5 TABLET ORAL at 13:29

## 2019-10-14 RX ADMIN — OXYCODONE HYDROCHLORIDE 5 MILLIGRAM(S): 5 TABLET ORAL at 08:25

## 2019-10-14 RX ADMIN — Medication 1 MILLIGRAM(S): at 13:09

## 2019-10-14 RX ADMIN — AMLODIPINE BESYLATE 7.5 MILLIGRAM(S): 2.5 TABLET ORAL at 05:46

## 2019-10-14 RX ADMIN — GABAPENTIN 300 MILLIGRAM(S): 400 CAPSULE ORAL at 13:09

## 2019-10-14 RX ADMIN — Medication 1 TABLET(S): at 13:09

## 2019-10-14 RX ADMIN — Medication 325 MILLIGRAM(S): at 06:58

## 2019-10-14 RX ADMIN — OXYCODONE HYDROCHLORIDE 5 MILLIGRAM(S): 5 TABLET ORAL at 08:27

## 2019-10-14 RX ADMIN — OXYCODONE HYDROCHLORIDE 5 MILLIGRAM(S): 5 TABLET ORAL at 08:24

## 2019-10-14 RX ADMIN — Medication 10 MILLIGRAM(S): at 05:46

## 2019-10-14 RX ADMIN — LIDOCAINE 2 PATCH: 4 CREAM TOPICAL at 08:37

## 2019-10-14 RX ADMIN — ENOXAPARIN SODIUM 40 MILLIGRAM(S): 100 INJECTION SUBCUTANEOUS at 13:08

## 2019-10-14 RX ADMIN — Medication 81 MILLIGRAM(S): at 13:09

## 2019-10-14 RX ADMIN — OXYCODONE HYDROCHLORIDE 5 MILLIGRAM(S): 5 TABLET ORAL at 09:02

## 2019-10-14 RX ADMIN — Medication 1 PACKET(S): at 20:57

## 2019-10-14 RX ADMIN — GABAPENTIN 600 MILLIGRAM(S): 400 CAPSULE ORAL at 20:57

## 2019-10-14 RX ADMIN — Medication 500 MILLIGRAM(S): at 05:46

## 2019-10-14 RX ADMIN — Medication 12.5 MILLIGRAM(S): at 06:58

## 2019-10-14 RX ADMIN — Medication 325 MILLIGRAM(S): at 17:25

## 2019-10-14 RX ADMIN — TAMSULOSIN HYDROCHLORIDE 0.4 MILLIGRAM(S): 0.4 CAPSULE ORAL at 20:57

## 2019-10-14 RX ADMIN — GABAPENTIN 300 MILLIGRAM(S): 400 CAPSULE ORAL at 05:46

## 2019-10-14 RX ADMIN — PANTOPRAZOLE SODIUM 40 MILLIGRAM(S): 20 TABLET, DELAYED RELEASE ORAL at 05:46

## 2019-10-14 RX ADMIN — OXYCODONE HYDROCHLORIDE 5 MILLIGRAM(S): 5 TABLET ORAL at 13:08

## 2019-10-14 RX ADMIN — AMIODARONE HYDROCHLORIDE 200 MILLIGRAM(S): 400 TABLET ORAL at 05:46

## 2019-10-14 NOTE — PROGRESS NOTE ADULT - ASSESSMENT
This is a 70 yo female with thoracoabdominal aneurysm open repair complicated by cervical myelopathy, dysphagia, neurogenic bowel and bladder.       #Cervical Myelopathy  Continue comprehensive acute rehabilitation program including PT/OT/Swallowing therapy  -Dysphagia diet-soft with thin liquids    -Neuropathic pain- gabapentin 300 mg BID dosing-increased to TID on 10/10 and bedtime dose increased over the weekend, continue oxycodone, lidocaine patches daily around thoracic back incision during the day for pain control  -Neurogenic bladder: indwelling muñoz discontinued, per Dr Casey dc PVRs, change intermittent straight cath as needed to q4 hrs due to again high bladder scan numbers; continue toileting program     + UA, culture with normal magdalena and [id number] presumptive candida albicans, no treatment at this time, afebrile without leukocytosis, no signs of vaginal or skin yeast infections    #Anxiety- Xanax, home med    #Hypertension-- amlodipine 7.5mg qd-> will increase to 10 mg if no improvement (SBPs last 24 hrs in 140-150s again, some elevated SBPs in therapies up to 160s but will drop again to 80, patient remains asymptomatic-?dysreflexia-control urine output better with increased frequency of straight caths and monitor)    #Atrial Fibrillation- - rate controlled  with lopressor 25mg bid and amiodarone 200mg qd  Discussed regimen with hospitalist 10/3, will continue current BB regimen   Patient asymptomatic with HRs 50-70s, continue to monitor     #Thoracoabdominal aneurysm open repair- Asa, bp control     #Neurogenic Bladder: discontinued indwelling Muñoz, discontinue PVRs and change st cath to q 4 hrs   - d/c fluid restriction  -leaking of urine with 0.8mg flomax over weekend, back on 0.4mg qHS     #Hyponatremia- stable, continue to monitor     #Hypokalemia- resolved   3.6 on todays labs      #Anemia  -stable   - likely related to blood loss during surgery  - supplemental iron recommended  - transfuse if Hemoglobin < 8  -check labs in morning     #Eosinophilia- AEC 0.45, continue to monitor     #DVT prophylaxis: lovenox 40mg sq daily     IDT meeting 10/1:   Patient mod A x1 for transfers and ambulating 20ft with RW and verbal cues. In OT, requiring assist of 2 for maximal assist for lower body dressing and toileting.   Goals are set for mod I in 3 weeks with tentative dc date set for 9/19.    IDT meeting 10/8:   In OT, Mod A for bathing, set up/supervison for upper body dressing, mod A for lower body dressing, toilet transfers with total A, mod A for commode transfers,   In PT, mod A with bed mobility, can be CG for transfers at times, "pop-over" transfers with mod A  Barriers: lower body weakness, unsafe to trial stair training at this time.   ST: working on vocalizations, no need to continue ST. Will increase PT and OT to 90 min each.   Tentative date for D/c home on 10/19. Social work to discuss hiring private aide for dc to home.

## 2019-10-14 NOTE — PROGRESS NOTE ADULT - SUBJECTIVE AND OBJECTIVE BOX
HPI:   69 year-old female with hx of "adrian-aorta" s/p aortic valve replacement, admitted electively to [facility] on 8/28 for open TAAA repair due to enlarging thoracoabdominal aortic aneurysm. The patient underwent her thoracoabdominal aneurysm open repair with decron graft and celiac SMA bypass with reimplantation of lumbar artery on 8/29.  Post-operatively she required vasopressors in the CTICU. Additionally post-operatively it was found that she had loss of strength in lower extremities, R>L. Repeat imaging was negative for hematoma. Also during this time period her kidney function declined and in conjunction with nephrology ATN was diagnosed with eventual recovery of her kidney function.  Her course was further complicated by AF with RVR, requiring amio gtt, esmolol gtt, digoxin, and vasopressin for pressure support. Course also c/b urinary retention requiring muñoz catheter. Workup for RLE weakness ordered by neurology, felt weakness to be due to cervical myelopathy. Urology consulted 9/23 for urinary retention, felt to be neurogenic in nature, recommended continue muñoz. Completed abx for UTI.  S&S eval recommend Dysphagia 1 nectar thick diet, 3L NC, PT recommending acute rehab.     Interval: + band like pain around thoracic area with improved with higher dose of gabapentin at bedtime  REVIEW OF SYSTEMS  +BM this morning after suppository.   No urine in bed this morning, bladder scans are improving. Denies feeling the urge to void but does have the urge to move bowels. Did seem to have urge to void this morning but feeling passed quickly.  Eating well and reports she is trying to keep to the FR of 1.5 L.   Denies fever, chills, SOB, HA, dizziness.   Morning SBPs low but patient denying symptoms.     Vital Signs Last 24 Hrs  T(C): 36.5 (14 Oct 2019 08:44), Max: 37 (13 Oct 2019 20:51)  T(F): 97.7 (14 Oct 2019 08:44), Max: 98.6 (13 Oct 2019 20:51)  HR: 61 (14 Oct 2019 09:56) (61 - 80)  BP: 99/58 (14 Oct 2019 09:56) (88/53 - 138/69)  RR: 14 (14 Oct 2019 08:44) (14 - 15)  SpO2: 100% (14 Oct 2019 08:44) (99% - 100%)    10-14    137  |  101  |  20  ----------------------------<  93  3.6   |  26  |  0.80    Ca    8.9      14 Oct 2019 05:18                          8.2    6.42  )-----------( 268      ( 14 Oct 2019 05:18 )             25.2       Physical Exam:  Gen - NAD, Comfortable  	Pulm - CTAB  	Cardiovascular - RRR, S1S2  	Chest wall: Sternal incision wound-clean, no erythema or drainage noted  	Posterior lateral incision wound on the left- clean, no erythema or drainage, small skin tear seen next to incision site dressed  	Abdomen - Soft, NT/ND, +BS  	Extremities - No C/C/E, No calf tenderness  	Neuro-  	   Cognitive - AAOx3  	   Cranial Nerves - CN 2-12 intact  	   Motor -  Bilateral Upper extremities 5/5   	                  LEFT    LE - HF 4+/5, KE 4+/5, DF 5/5, PF 5/5  	                  RIGHT LE - HF 2/5, KE 4/5, DF 4/5, PF 4/5     	   Sensory - Intact to LT bilaterally   	   Tone - normal  	Psychiatric - Mood stable, Affect WNL  Skin:  as above       MEDICATIONS  (STANDING):  amiodarone    Tablet 200 milliGRAM(s) Oral daily  amLODIPine   Tablet 7.5 milliGRAM(s) Oral daily  ascorbic acid 500 milliGRAM(s) Oral two times a day  aspirin  chewable 81 milliGRAM(s) Oral daily  bisacodyl Suppository 10 milliGRAM(s) Rectal <User Schedule>  enoxaparin Injectable 40 milliGRAM(s) SubCutaneous daily  ferrous    sulfate 325 milliGRAM(s) Oral two times a day  folic acid 1 milliGRAM(s) Oral daily  gabapentin 300 milliGRAM(s) Oral <User Schedule>  gabapentin 600 milliGRAM(s) Oral <User Schedule>  influenza   Vaccine 0.5 milliLiter(s) IntraMuscular once  lidocaine   Patch 2 Patch Transdermal <User Schedule>  metoprolol succinate ER 12.5 milliGRAM(s) Oral every 12 hours  multivitamin 1 Tablet(s) Oral daily  pantoprazole    Tablet 40 milliGRAM(s) Oral before breakfast  psyllium Powder 1 Packet(s) Oral at bedtime  tamsulosin 0.4 milliGRAM(s) Oral at bedtime    MEDICATIONS  (PRN):  acetaminophen   Tablet .. 650 milliGRAM(s) Oral every 6 hours PRN Mild Pain (1 - 3)  ALPRAZolam 0.25 milliGRAM(s) Oral every 8 hours PRN anxiety  mineral oil 30 milliLiter(s) Oral two times a day PRN dry mouth  oxyCODONE    IR 5 milliGRAM(s) Oral every 4 hours PRN Moderate Pain (4 - 6)

## 2019-10-15 PROCEDURE — 99232 SBSQ HOSP IP/OBS MODERATE 35: CPT | Mod: 24

## 2019-10-15 PROCEDURE — 99232 SBSQ HOSP IP/OBS MODERATE 35: CPT

## 2019-10-15 RX ORDER — OXYCODONE HYDROCHLORIDE 5 MG/1
5 TABLET ORAL EVERY 4 HOURS
Refills: 0 | Status: DISCONTINUED | OUTPATIENT
Start: 2019-10-15 | End: 2019-10-15

## 2019-10-15 RX ORDER — ALPRAZOLAM 0.25 MG
0.25 TABLET ORAL
Refills: 0 | Status: DISCONTINUED | OUTPATIENT
Start: 2019-10-16 | End: 2019-10-23

## 2019-10-15 RX ORDER — GABAPENTIN 400 MG/1
600 CAPSULE ORAL
Refills: 0 | Status: DISCONTINUED | OUTPATIENT
Start: 2019-10-15 | End: 2019-10-30

## 2019-10-15 RX ORDER — ALPRAZOLAM 0.25 MG
0.25 TABLET ORAL EVERY 8 HOURS
Refills: 0 | Status: DISCONTINUED | OUTPATIENT
Start: 2019-10-15 | End: 2019-10-15

## 2019-10-15 RX ORDER — GABAPENTIN 400 MG/1
300 CAPSULE ORAL
Refills: 0 | Status: DISCONTINUED | OUTPATIENT
Start: 2019-10-15 | End: 2019-10-30

## 2019-10-15 RX ADMIN — Medication 1 MILLIGRAM(S): at 12:05

## 2019-10-15 RX ADMIN — Medication 650 MILLIGRAM(S): at 17:32

## 2019-10-15 RX ADMIN — Medication 500 MILLIGRAM(S): at 17:31

## 2019-10-15 RX ADMIN — GABAPENTIN 300 MILLIGRAM(S): 400 CAPSULE ORAL at 05:17

## 2019-10-15 RX ADMIN — Medication 12.5 MILLIGRAM(S): at 17:31

## 2019-10-15 RX ADMIN — LIDOCAINE 2 PATCH: 4 CREAM TOPICAL at 21:18

## 2019-10-15 RX ADMIN — PANTOPRAZOLE SODIUM 40 MILLIGRAM(S): 20 TABLET, DELAYED RELEASE ORAL at 05:17

## 2019-10-15 RX ADMIN — Medication 325 MILLIGRAM(S): at 05:18

## 2019-10-15 RX ADMIN — AMLODIPINE BESYLATE 7.5 MILLIGRAM(S): 2.5 TABLET ORAL at 05:17

## 2019-10-15 RX ADMIN — Medication 1 PACKET(S): at 21:15

## 2019-10-15 RX ADMIN — Medication 500 MILLIGRAM(S): at 05:16

## 2019-10-15 RX ADMIN — Medication 81 MILLIGRAM(S): at 12:05

## 2019-10-15 RX ADMIN — OXYCODONE HYDROCHLORIDE 5 MILLIGRAM(S): 5 TABLET ORAL at 08:31

## 2019-10-15 RX ADMIN — OXYCODONE HYDROCHLORIDE 5 MILLIGRAM(S): 5 TABLET ORAL at 13:12

## 2019-10-15 RX ADMIN — Medication 12.5 MILLIGRAM(S): at 05:16

## 2019-10-15 RX ADMIN — Medication 10 MILLIGRAM(S): at 05:15

## 2019-10-15 RX ADMIN — OXYCODONE HYDROCHLORIDE 5 MILLIGRAM(S): 5 TABLET ORAL at 12:45

## 2019-10-15 RX ADMIN — LIDOCAINE 2 PATCH: 4 CREAM TOPICAL at 08:30

## 2019-10-15 RX ADMIN — ENOXAPARIN SODIUM 40 MILLIGRAM(S): 100 INJECTION SUBCUTANEOUS at 12:05

## 2019-10-15 RX ADMIN — Medication 1 TABLET(S): at 12:05

## 2019-10-15 RX ADMIN — Medication 650 MILLIGRAM(S): at 21:18

## 2019-10-15 RX ADMIN — TAMSULOSIN HYDROCHLORIDE 0.4 MILLIGRAM(S): 0.4 CAPSULE ORAL at 21:15

## 2019-10-15 RX ADMIN — GABAPENTIN 600 MILLIGRAM(S): 400 CAPSULE ORAL at 21:15

## 2019-10-15 RX ADMIN — Medication 325 MILLIGRAM(S): at 17:32

## 2019-10-15 RX ADMIN — OXYCODONE HYDROCHLORIDE 5 MILLIGRAM(S): 5 TABLET ORAL at 09:00

## 2019-10-15 RX ADMIN — AMIODARONE HYDROCHLORIDE 200 MILLIGRAM(S): 400 TABLET ORAL at 05:17

## 2019-10-15 RX ADMIN — GABAPENTIN 300 MILLIGRAM(S): 400 CAPSULE ORAL at 13:01

## 2019-10-15 NOTE — PROGRESS NOTE ADULT - SUBJECTIVE AND OBJECTIVE BOX
HPI:   69 year-old female with hx of "adrian-aorta" s/p aortic valve replacement, admitted electively to [facility] on 8/28 for open TAAA repair due to enlarging thoracoabdominal aortic aneurysm. The patient underwent her thoracoabdominal aneurysm open repair with decron graft and celiac SMA bypass with reimplantation of lumbar artery on 8/29.  Post-operatively she required vasopressors in the CTICU. Additionally post-operatively it was found that she had loss of strength in lower extremities, R>L. Repeat imaging was negative for hematoma. Also during this time period her kidney function declined and in conjunction with nephrology ATN was diagnosed with eventual recovery of her kidney function.  Her course was further complicated by AF with RVR, requiring amio gtt, esmolol gtt, digoxin, and vasopressin for pressure support. Course also c/b urinary retention requiring muñoz catheter. Workup for RLE weakness ordered by neurology, felt weakness to be due to cervical myelopathy. Urology consulted 9/23 for urinary retention, felt to be neurogenic in nature, recommended continue muñoz. Completed abx for UTI.  S&S eval recommend Dysphagia 1 nectar thick diet, 3L NC, PT recommending acute rehab.     Interval: + band like pain around thoracic area with improved with higher dose of gabapentin at bedtime  REVIEW OF SYSTEMS  No BM this morning after suppository.   No urine in bed this morning, bladder scans remain high. Denies feeling the urge to void but does have the urge to move bowels.   Eating well and reports feeling less dry after FR lifted.   Denies fever, chills, SOB, HA, dizziness.   SBPs improved today after FR lifted.   Expressed anxiety this morning prior to therapies.     Vital Signs Last 24 Hrs  T(C): 36.7 (15 Oct 2019 08:37), Max: 37.2 (14 Oct 2019 21:35)  T(F): 98.1 (15 Oct 2019 08:37), Max: 98.9 (14 Oct 2019 21:35)  HR: 68 (15 Oct 2019 08:37) (64 - 78)  BP: 123/64 (15 Oct 2019 08:37) (98/57 - 136/61)  RR: 14 (15 Oct 2019 08:37) (14 - 14)  SpO2: 98% (15 Oct 2019 08:37) (98% - 99%)      10-14    137  |  101  |  20  ----------------------------<  93  3.6   |  26  |  0.80    Ca    8.9      14 Oct 2019 05:18                          8.2    6.42  )-----------( 268      ( 14 Oct 2019 05:18 )             25.2       Physical Exam:  Gen - NAD, Comfortable  	Pulm - CTAB  	Cardiovascular - RRR, S1S2  	Chest wall: Sternal incision wound-clean, no erythema or drainage noted  	Posterior lateral incision wound on the left- clean, no erythema or drainage, small skin tear seen next to incision site dressed  	Abdomen - Soft, NT/ND, +BS  	Extremities - No C/C/E, No calf tenderness  	Neuro-  	   Cognitive - AAOx3  	   Cranial Nerves - CN 2-12 intact  	   Motor -  Bilateral Upper extremities 5/5   	                  LEFT    LE - HF 4+/5, KE 4+/5, DF 5/5, PF 5/5  	                  RIGHT LE - HF 2/5, KE 4/5, DF 4/5, PF 4/5     	   Sensory - Intact to LT bilaterally   	   Tone - normal  	Psychiatric - Mood stable, Affect WNL  Skin:  as above       MEDICATIONS  (STANDING):  amiodarone    Tablet 200 milliGRAM(s) Oral daily  amLODIPine   Tablet 7.5 milliGRAM(s) Oral daily  ascorbic acid 500 milliGRAM(s) Oral two times a day  aspirin  chewable 81 milliGRAM(s) Oral daily  bisacodyl Suppository 10 milliGRAM(s) Rectal <User Schedule>  enoxaparin Injectable 40 milliGRAM(s) SubCutaneous daily  ferrous    sulfate 325 milliGRAM(s) Oral two times a day  folic acid 1 milliGRAM(s) Oral daily  gabapentin 300 milliGRAM(s) Oral <User Schedule>  gabapentin 600 milliGRAM(s) Oral <User Schedule>  influenza   Vaccine 0.5 milliLiter(s) IntraMuscular once  lidocaine   Patch 2 Patch Transdermal <User Schedule>  metoprolol succinate ER 12.5 milliGRAM(s) Oral every 12 hours  multivitamin 1 Tablet(s) Oral daily  pantoprazole    Tablet 40 milliGRAM(s) Oral before breakfast  psyllium Powder 1 Packet(s) Oral at bedtime  tamsulosin 0.4 milliGRAM(s) Oral at bedtime    MEDICATIONS  (PRN):  acetaminophen   Tablet .. 650 milliGRAM(s) Oral every 6 hours PRN Mild Pain (1 - 3)  ALPRAZolam 0.25 milliGRAM(s) Oral every 8 hours PRN anxiety  mineral oil 30 milliLiter(s) Oral two times a day PRN dry mouth  oxyCODONE    IR 5 milliGRAM(s) Oral every 4 hours PRN Moderate Pain (4 - 6)

## 2019-10-15 NOTE — PROGRESS NOTE ADULT - ATTENDING COMMENTS
Patient seen with NP. Continues to have neuropathic pain that disturbs her sleep and therapy. increase gabapentin.

## 2019-10-15 NOTE — PROGRESS NOTE ADULT - ASSESSMENT
This is a 68 yo female with thoracoabdominal aneurysm open repair complicated by cervical myelopathy, dysphagia, neurogenic bowel and bladder.       #Cervical Myelopathy  Continue comprehensive acute rehabilitation program including PT/OT/Swallowing therapy  -Dysphagia diet-soft with thin liquids    -Neuropathic pain- gabapentin 300 mg BID dosing-increased to TID on 10/10 and bedtime dose increased over the weekend, continue oxycodone, lidocaine patches daily around thoracic back incision during the day for pain control  -Neurogenic bladder: indwelling muñoz discontinued, per Dr Carmela castro PVRs on 10/14, changed intermittent straight cath as needed to q4 hrs due to again high bladder scan numbers; continue toileting program --need to confirm with nursing that patient is in fact st cathing q4 hrs (not well documented in chart overnight)    + UA, culture with normal magdalena and [id number] presumptive candida albicans, no treatment at this time, afebrile without leukocytosis, no signs of vaginal or skin yeast infections    #Anxiety- Xanax, home med  Schedule xanax 0.25mg q AM prior to therapies.     #Hypertension-- amlodipine 7.5mg qd  Low SBPs over last couple of days, however has improved since last evening after lifting FR  Continue to monitor     #Atrial Fibrillation- - rate controlled  with lopressor 25mg bid and amiodarone 200mg qd  Discussed regimen with hospitalist 10/3, will continue current BB regimen   Patient asymptomatic with HRs 50-70s, continue to monitor     #Thoracoabdominal aneurysm open repair- Asa, bp control     #Neurogenic Bladder: discontinued indwelling Muñoz, discontinued PVRs and change st cath to q 4 hrs on 10/14  - d/c'd fluid restriction on 10/14  -leaking of urine with 0.8mg flomax over weekend, back on 0.4mg qHS     #Hyponatremia- stable, continue to monitor     #Hypokalemia- resolved   3.6 on 10/14  Will recheck to morning to ensure K has not dropped      #Anemia  -stable   - likely related to blood loss during surgery  - supplemental iron recommended  - transfuse if Hemoglobin < 8  -check labs in morning     #Eosinophilia- AEC 0.45, continue to monitor     #DVT prophylaxis: lovenox 40mg sq daily     IDT meeting 10/1:   Patient mod A x1 for transfers and ambulating 20ft with RW and verbal cues. In OT, requiring assist of 2 for maximal assist for lower body dressing and toileting.   Goals are set for mod I in 3 weeks with tentative dc date set for 9/19.    IDT meeting 10/8:   In OT, Mod A for bathing, set up/supervison for upper body dressing, mod A for lower body dressing, toilet transfers with total A, mod A for commode transfers,   In PT, mod A with bed mobility, can be CG for transfers at times, "pop-over" transfers with mod A  Barriers: lower body weakness, unsafe to trial stair training at this time.   ST: working on vocalizations, no need to continue ST. Will increase PT and OT to 90 min each.   Tentative date for D/c home on 10/19. Social work to discuss hiring private aide for dc to home.

## 2019-10-15 NOTE — CHART NOTE - NSCHARTNOTEFT_GEN_A_CORE
Nutrition Follow Up Note  Hospital Course   (Per Electronic Medical Record):     Source:   Patient [X]  Medical Record [X]      Diet:   Soft (Dysphagia 3) Diet w/ Thin Liquids  Fluid Restriction Discontinued on 10/14  Tolerates Diet Well  No Chewing/Swallowing Difficulties  No Recent Nausea, Vomiting or Diarrhea (Some Constipation)  Consumes % of Meals (as Per Documentation)   Intake Improving (Per Patient)     Enteral/Parenteral Nutrition: N/A    Current Weight: 143.7lb on 10/2  Obtain New Weight  Obtain Weights Weekly     Pertinent Medications: MEDICATIONS  (STANDING):  amiodarone    Tablet 200 milliGRAM(s) Oral daily  amLODIPine   Tablet 7.5 milliGRAM(s) Oral daily  ascorbic acid 500 milliGRAM(s) Oral two times a day  aspirin  chewable 81 milliGRAM(s) Oral daily  bisacodyl Suppository 10 milliGRAM(s) Rectal <User Schedule>  enoxaparin Injectable 40 milliGRAM(s) SubCutaneous daily  ferrous    sulfate 325 milliGRAM(s) Oral two times a day  folic acid 1 milliGRAM(s) Oral daily  gabapentin 300 milliGRAM(s) Oral <User Schedule>  gabapentin 600 milliGRAM(s) Oral <User Schedule>  influenza   Vaccine 0.5 milliLiter(s) IntraMuscular once  lidocaine   Patch 2 Patch Transdermal <User Schedule>  metoprolol succinate ER 12.5 milliGRAM(s) Oral every 12 hours  multivitamin 1 Tablet(s) Oral daily  pantoprazole    Tablet 40 milliGRAM(s) Oral before breakfast  psyllium Powder 1 Packet(s) Oral at bedtime  tamsulosin 0.4 milliGRAM(s) Oral at bedtime    MEDICATIONS  (PRN):  acetaminophen   Tablet .. 650 milliGRAM(s) Oral every 6 hours PRN Mild Pain (1 - 3)  ALPRAZolam 0.25 milliGRAM(s) Oral every 8 hours PRN anxiety  mineral oil 30 milliLiter(s) Oral two times a day PRN dry mouth  oxyCODONE    IR 5 milliGRAM(s) Oral every 4 hours PRN Moderate Pain (4 - 6)    Pertinent Labs:  10-14 Na137 mmol/L Glu 93 mg/dL K+ 3.6 mmol/L Cr  0.80 mg/dL BUN 20 mg/dL 10-09 Alb 2.6 g/dL<L>    Skin: No Pressure Ulcers  Surgical Incision on Left Scapula  (as Per Nursing Flow Sheet)     Edema: None Noted     Last Bowel Movement: on 10/13    Estimated Needs:   [X] No Change Since Previous Assessment    Previous Nutrition Diagnosis:   Severe Malnutrition  Chewing Difficulties    Nutrition Diagnosis is [X] Ongoing - Continues on Soft (Dysphagia 3) Diet; States Good PO Intake/Appetite & Hx of Declining Nutrition Supplementation     New Nutrition Diagnosis: [X] Not Applicable    Interventions:   1. Recommend Continue Nutrition Plan of Care     Monitoring & Evaluation:   [X] Weights   [X] PO Intake   [X] Follow Up (Per Protocol)  [X] Tolerance to Diet Prescription   [X] Other: Labs    Registered Dietitian/Nutritionist Remains Available.  Thomas Patten RDN    Pager # [phone]  Phone# [phone]

## 2019-10-15 NOTE — PROGRESS NOTE ADULT - ASSESSMENT
70 yo female with thoracoabdominal aneurysm open repair complicated by cervical myelopathy, dysphagia, neurogenic bowel and bladder- pt/ot/dvt ppx, pain meds, asa    Anxiety- Xanax    Hypertension- amlodipine    episode of paroxysmal Atrial Fibrillation converted to sinus- lopressor, amiodarone , asa    Neurogenic Bladder: monitor PVRs with IC , muñoz d/c, pt/ot, flomax    Hyponatremia- monitor    Anemia- likely related to blood loss during surgery, [id number]    DVT prophylaxis: lovenox     will follow

## 2019-10-15 NOTE — PROGRESS NOTE ADULT - SUBJECTIVE AND OBJECTIVE BOX
70 yo female with thoracoabdominal aneurysm open repair complicated by cervical myelopathy, dysphagia, neurogenic bowel and bladder  seen at the bedside, no n/v, no sob. no events , no new complaints, learning to self cath.    Vital Signs Last 24 Hrs  T(C): 36.7 (15 Oct 2019 08:37), Max: 37.2 (14 Oct 2019 21:35)  T(F): 98.1 (15 Oct 2019 08:37), Max: 98.9 (14 Oct 2019 21:35)  HR: 68 (15 Oct 2019 08:37) (64 - 78)  BP: 123/64 (15 Oct 2019 08:37) (98/57 - 136/61)  BP(mean): --  RR: 14 (15 Oct 2019 08:37) (14 - 14)  SpO2: 98% (15 Oct 2019 08:37) (98% - 99%)      GENERAL- NAD  EAR/NOSE/MOUTH/THROAT - no pharyngeal exudates, no oral lesions  MMM  EYES- SIXTO, conjunctiva and Sclera clear  NECK- supple  RESPIRATORY-  clear to auscultation bilaterally  CARDIOVASCULAR - SIS2, RRR  GI - soft NT BS present  EXTREMITIES- no pedal edema  NEUROLOGY- right LE weakness  SKIN- no rashes, warm to touch  PSYCHIATRY- AAO X 3                 8.2                  137  | 26   | 20           6.42  >-----------< 268     ------------------------< 93                    25.2                 3.6  | 101  | 0.80                                         Ca 8.9   Mg x     Ph x 70 yo female with thoracoabdominal aneurysm open repair complicated by cervical myelopathy, dysphagia, neurogenic bowel and bladder  seen at the bedside, no n/v, no sob. no events , no new complaints, learning to self cath.    Vital Signs Last 24 Hrs  T(C): 36.7 (15 Oct 2019 08:37), Max: 37.2 (14 Oct 2019 21:35)  T(F): 98.1 (15 Oct 2019 08:37), Max: 98.9 (14 Oct 2019 21:35)  HR: 68 (15 Oct 2019 08:37) (64 - 78)  BP: 123/64 (15 Oct 2019 08:37) (98/57 - 136/61)  BP(mean): --  RR: 14 (15 Oct 2019 08:37) (14 - 14)  SpO2: 98% (15 Oct 2019 08:37) (98% - 99%)      GENERAL- NAD  EAR/NOSE/MOUTH/THROAT - no pharyngeal exudates, no oral lesions  MMM  EYES- SIXTO, conjunctiva and Sclera clear  NECK- supple  RESPIRATORY-  clear to auscultation bilaterally  CARDIOVASCULAR - SIS2, RRR  GI - soft NT BS present  EXTREMITIES- no pedal edema  NEUROLOGY- right LE weakness  SKIN- no rashes, warm to touch, incision clean  PSYCHIATRY- AAO X 3                 8.2                  137  | 26   | 20           6.42  >-----------< 268     ------------------------< 93                    25.2                 3.6  | 101  | 0.80                                         Ca 8.9   Mg x     Ph x

## 2019-10-16 LAB
ANION GAP SERPL CALC-SCNC: 9 MMOL/L — SIGNIFICANT CHANGE UP (ref 5–17)
BUN SERPL-MCNC: 21 MG/DL — SIGNIFICANT CHANGE UP (ref 7–23)
CALCIUM SERPL-MCNC: 8.8 MG/DL — SIGNIFICANT CHANGE UP (ref 8.4–10.5)
CHLORIDE SERPL-SCNC: 103 MMOL/L — SIGNIFICANT CHANGE UP (ref 96–108)
CO2 SERPL-SCNC: 27 MMOL/L — SIGNIFICANT CHANGE UP (ref 22–31)
CREAT SERPL-MCNC: 0.96 MG/DL — SIGNIFICANT CHANGE UP (ref 0.5–1.3)
GLUCOSE SERPL-MCNC: 96 MG/DL — SIGNIFICANT CHANGE UP (ref 70–99)
POTASSIUM SERPL-MCNC: 3.5 MMOL/L — SIGNIFICANT CHANGE UP (ref 3.5–5.3)
POTASSIUM SERPL-SCNC: 3.5 MMOL/L — SIGNIFICANT CHANGE UP (ref 3.5–5.3)
SODIUM SERPL-SCNC: 139 MMOL/L — SIGNIFICANT CHANGE UP (ref 135–145)

## 2019-10-16 PROCEDURE — 99232 SBSQ HOSP IP/OBS MODERATE 35: CPT | Mod: 24

## 2019-10-16 PROCEDURE — 99232 SBSQ HOSP IP/OBS MODERATE 35: CPT

## 2019-10-16 RX ADMIN — Medication 12.5 MILLIGRAM(S): at 17:46

## 2019-10-16 RX ADMIN — Medication 650 MILLIGRAM(S): at 18:19

## 2019-10-16 RX ADMIN — Medication 325 MILLIGRAM(S): at 17:47

## 2019-10-16 RX ADMIN — Medication 12.5 MILLIGRAM(S): at 05:52

## 2019-10-16 RX ADMIN — Medication 81 MILLIGRAM(S): at 12:30

## 2019-10-16 RX ADMIN — Medication 650 MILLIGRAM(S): at 08:22

## 2019-10-16 RX ADMIN — Medication 500 MILLIGRAM(S): at 05:51

## 2019-10-16 RX ADMIN — Medication 650 MILLIGRAM(S): at 17:47

## 2019-10-16 RX ADMIN — Medication 1 MILLIGRAM(S): at 12:30

## 2019-10-16 RX ADMIN — GABAPENTIN 600 MILLIGRAM(S): 400 CAPSULE ORAL at 21:36

## 2019-10-16 RX ADMIN — PANTOPRAZOLE SODIUM 40 MILLIGRAM(S): 20 TABLET, DELAYED RELEASE ORAL at 05:52

## 2019-10-16 RX ADMIN — ENOXAPARIN SODIUM 40 MILLIGRAM(S): 100 INJECTION SUBCUTANEOUS at 12:30

## 2019-10-16 RX ADMIN — GABAPENTIN 300 MILLIGRAM(S): 400 CAPSULE ORAL at 05:52

## 2019-10-16 RX ADMIN — Medication 500 MILLIGRAM(S): at 17:46

## 2019-10-16 RX ADMIN — GABAPENTIN 600 MILLIGRAM(S): 400 CAPSULE ORAL at 13:00

## 2019-10-16 RX ADMIN — AMIODARONE HYDROCHLORIDE 200 MILLIGRAM(S): 400 TABLET ORAL at 05:51

## 2019-10-16 RX ADMIN — Medication 1 TABLET(S): at 12:30

## 2019-10-16 RX ADMIN — Medication 325 MILLIGRAM(S): at 05:52

## 2019-10-16 RX ADMIN — AMLODIPINE BESYLATE 7.5 MILLIGRAM(S): 2.5 TABLET ORAL at 05:51

## 2019-10-16 RX ADMIN — LIDOCAINE 2 PATCH: 4 CREAM TOPICAL at 08:22

## 2019-10-16 RX ADMIN — Medication 0.25 MILLIGRAM(S): at 08:22

## 2019-10-16 RX ADMIN — Medication 650 MILLIGRAM(S): at 09:00

## 2019-10-16 RX ADMIN — TAMSULOSIN HYDROCHLORIDE 0.4 MILLIGRAM(S): 0.4 CAPSULE ORAL at 21:36

## 2019-10-16 NOTE — PROGRESS NOTE ADULT - SUBJECTIVE AND OBJECTIVE BOX
HPI:   69 year-old female with hx of "adrian-aorta" s/p aortic valve replacement, admitted electively to [facility] on 8/28 for open TAAA repair due to enlarging thoracoabdominal aortic aneurysm. The patient underwent her thoracoabdominal aneurysm open repair with decron graft and celiac SMA bypass with reimplantation of lumbar artery on 8/29.  Post-operatively she required vasopressors in the CTICU. Additionally post-operatively it was found that she had loss of strength in lower extremities, R>L. Repeat imaging was negative for hematoma. Also during this time period her kidney function declined and in conjunction with nephrology ATN was diagnosed with eventual recovery of her kidney function.  Her course was further complicated by AF with RVR, requiring amio gtt, esmolol gtt, digoxin, and vasopressin for pressure support. Course also c/b urinary retention requiring muñoz catheter. Workup for RLE weakness ordered by neurology, felt weakness to be due to cervical myelopathy. Urology consulted 9/23 for urinary retention, felt to be neurogenic in nature, recommended continue muñoz. Completed abx for UTI.  S&S eval recommend Dysphagia 1 nectar thick diet, 3L NC, PT recommending acute rehab.     Interval: + band like pain around thoracic area with improved with higher dose of gabapentin at bedtime  REVIEW OF SYSTEMS  Refused suppository this am. Patient reports that she had a male nurse overnight and did not feel comfortable with getting the suppository as well as the last couple of days she felt the suppository in the rectum and was uncomfortable. She reports a small BM yesterday morning and felt the urge to have BM.   No urge to urinate at this time yet. Nursing reports about 500cc removed from straight caths yesterday but patient not cathed for about 8 hours overnight. Patient reports that she did not want a male nurse to cath her and he did offer a female nurse but female nurse didn't show up to cath her until early this morning.     Eating well and reports feeling less dry after FR lifted.   Denies fever, chills, SOB, HA, dizziness.     Reports less anxiety this morning. Did take xanax this am and reports a "little" tired from xanax.     Vital Signs Last 24 Hrs  T(C): 36.7 (16 Oct 2019 08:10), Max: 36.7 (16 Oct 2019 08:10)  T(F): 98.1 (16 Oct 2019 08:10), Max: 98.1 (16 Oct 2019 08:10)  HR: 77 (16 Oct 2019 08:10) (69 - 77)  BP: 151/71 (16 Oct 2019 08:10) (121/65 - 151/71)  RR: 14 (16 Oct 2019 08:10) (14 - 14)  SpO2: 97% (16 Oct 2019 08:10) (97% - 97%)      10-16    139  |  103  |  21  ----------------------------<  96  3.5   |  27  |  0.96    Ca    8.8      16 Oct 2019 05:15                            8.2    6.42  )-----------( 268      ( 14 Oct 2019 05:18 )             25.2       Physical Exam:  Gen - NAD, Comfortable  	Pulm - CTAB  	Cardiovascular - RRR, S1S2  	Chest wall: Sternal incision wound-clean, no erythema or drainage noted  	Posterior lateral incision wound on the left- clean, no erythema or drainage, small skin tear seen next to incision site dressed  	Abdomen - Soft, NT/ND, +BS  	Extremities - No C/C/E, No calf tenderness  	Neuro-  	   Cognitive - AAOx3  	   Cranial Nerves - CN 2-12 intact  	   Motor -  Bilateral Upper extremities 5/5   	                  LEFT    LE - HF 4+/5, KE 4+/5, DF 5/5, PF 5/5  	                  RIGHT LE - HF 2/5, KE 4/5, DF 4/5, PF 4/5     	   Sensory - Intact to LT bilaterally   	   Tone - normal  	Psychiatric - Mood stable, Affect WNL  Skin:  as above       MEDICATIONS  (STANDING):  ALPRAZolam 0.25 milliGRAM(s) Oral <User Schedule>  amiodarone    Tablet 200 milliGRAM(s) Oral daily  amLODIPine   Tablet 7.5 milliGRAM(s) Oral daily  ascorbic acid 500 milliGRAM(s) Oral two times a day  aspirin  chewable 81 milliGRAM(s) Oral daily  bisacodyl Suppository 10 milliGRAM(s) Rectal <User Schedule>  enoxaparin Injectable 40 milliGRAM(s) SubCutaneous daily  ferrous    sulfate 325 milliGRAM(s) Oral two times a day  folic acid 1 milliGRAM(s) Oral daily  gabapentin 300 milliGRAM(s) Oral <User Schedule>  gabapentin 600 milliGRAM(s) Oral <User Schedule>  influenza   Vaccine 0.5 milliLiter(s) IntraMuscular once  lidocaine   Patch 2 Patch Transdermal <User Schedule>  metoprolol succinate ER 12.5 milliGRAM(s) Oral every 12 hours  multivitamin 1 Tablet(s) Oral daily  pantoprazole    Tablet 40 milliGRAM(s) Oral before breakfast  psyllium Powder 1 Packet(s) Oral at bedtime  tamsulosin 0.4 milliGRAM(s) Oral at bedtime    MEDICATIONS  (PRN):  acetaminophen   Tablet .. 650 milliGRAM(s) Oral every 6 hours PRN Mild Pain (1 - 3)  ALPRAZolam 0.25 milliGRAM(s) Oral every 8 hours PRN anxiety  mineral oil 30 milliLiter(s) Oral two times a day PRN dry mouth  oxyCODONE    IR 5 milliGRAM(s) Oral every 4 hours PRN Moderate Pain (4 - 6)

## 2019-10-16 NOTE — PROGRESS NOTE ADULT - ASSESSMENT
This is a 68 yo female with thoracoabdominal aneurysm open repair complicated by cervical myelopathy, dysphagia, neurogenic bowel and bladder.       #Cervical Myelopathy  Continue comprehensive acute rehabilitation program including PT/OT/Swallowing therapy  -Dysphagia diet-soft with thin liquids    -Neuropathic pain- gabapentin 300 mg BID dosing-increased to TID on 10/10 and bedtime dose increased over the weekend, continue oxycodone, lidocaine patches daily around thoracic back incision during the day for pain control  -Neurogenic bladder: indwelling muñoz discontinued, per Dr Carmela castro PVRs on 10/14, changed intermittent straight cath as needed to q4 hrs due to again high bladder scan numbers; continue toileting program   From chart: straight cathed at 1015am today for 1150cc and 530am for 950cc.   On chart for yesterday's st caths: midnight 1200cc, 6am 800cc, 120pm 550cc, 5pm 590cc.   Spoke with nursing again about importance of keeping on q4 hr schedule. Therapy schedule rearranged to allow for q4 hr st caths.       + UA, culture with normal magdalena and [id number] presumptive candida albicans, no treatment at this time, afebrile without leukocytosis, no signs of vaginal or skin yeast infections    #Anxiety- Xanax, home med  Scheduled xanax 0.25mg q AM prior to therapies.     #Hypertension-- amlodipine 7.5mg qd  Low SBPs over last couple of days, however has improved since last evening after lifting FR  Continue to monitor     #Atrial Fibrillation- - rate controlled  with lopressor 25mg bid and amiodarone 200mg qd  Discussed regimen with hospitalist 10/3, will continue current BB regimen   Patient asymptomatic with HRs 50-70s, continue to monitor     #Thoracoabdominal aneurysm open repair- Asa, bp control     #Neurogenic Bladder: discontinued indwelling Muñoz, discontinued PVRs and change st cath to q 4 hrs on 10/14  - d/c'd fluid restriction on 10/14  -leaking of urine with 0.8mg flomax over weekend, back on 0.4mg qHS     #Hyponatremia- stable, continue to monitor     #Hypokalemia- resolved   3.6 on 10/14  K stable at 3.5 this morning.      #Anemia  -stable   - likely related to blood loss during surgery  - supplemental iron recommended  - transfuse if Hemoglobin < 8  -check labs in morning     #Eosinophilia- AEC 0.45, continue to monitor     #DVT prophylaxis: lovenox 40mg sq daily     IDT meeting 10/1:   Patient mod A x1 for transfers and ambulating 20ft with RW and verbal cues. In OT, requiring assist of 2 for maximal assist for lower body dressing and toileting.   Goals are set for mod I in 3 weeks with tentative dc date set for 9/19.    IDT meeting 10/8:   In OT, Mod A for bathing, set up/supervison for upper body dressing, mod A for lower body dressing, toilet transfers with total A, mod A for commode transfers,   In PT, mod A with bed mobility, can be CG for transfers at times, "pop-over" transfers with mod A  Barriers: lower body weakness, unsafe to trial stair training at this time.   ST: working on vocalizations, no need to continue ST. Will increase PT and OT to 90 min each.   Tentative date for D/c home on 10/19. Social work to discuss hiring private aide for dc to home.     Family meeting with social work and therapists over the phone today at 11am to discuss options for d/c planning.   Thus far patient has been encouraged to private hire to allow for increased hours of help and supervision while at home above and beyond what her insurance company may provide in aide services after d/c home.   Will also discuss availability of family to help with physical needs and supervision during phone meeting today.

## 2019-10-16 NOTE — PROGRESS NOTE ADULT - ASSESSMENT
68 yo female with thoracoabdominal aneurysm open repair complicated by cervical myelopathy, dysphagia, neurogenic bowel and bladder- pt/ot/dvt ppx, pain meds, asa    Anxiety- Xanax    Hypertension- amlodipine    episode of paroxysmal Atrial Fibrillation converted to sinus- lopressor, amiodarone , asa    Neurogenic Bladder: IC , muñoz d/c, pt/ot, flomax    Hyponatremia- monitor    Anemia- likely related to blood loss during surgery, feso4    DVT prophylaxis: lovenox     will follow

## 2019-10-16 NOTE — PROGRESS NOTE ADULT - ATTENDING COMMENTS
Patient seen with NP. Pain is much better with Gabapentin. Family meeting done today, for 30 min. Discharge plan discussed.

## 2019-10-16 NOTE — PROGRESS NOTE ADULT - SUBJECTIVE AND OBJECTIVE BOX
68 yo female with thoracoabdominal aneurysm open repair complicated by cervical myelopathy, dysphagia, neurogenic bowel and bladder  seen at the bedside, no n/v, no sob. no events, unhappy about not getting straight cath last night    Vital Signs Last 24 Hrs  T(C): 36.7 (16 Oct 2019 08:10), Max: 36.7 (16 Oct 2019 08:10)  T(F): 98.1 (16 Oct 2019 08:10), Max: 98.1 (16 Oct 2019 08:10)  HR: 77 (16 Oct 2019 08:10) (69 - 77)  BP: 151/71 (16 Oct 2019 08:10) (121/65 - 151/71)  BP(mean): --  RR: 14 (16 Oct 2019 08:10) (14 - 14)  SpO2: 97% (16 Oct 2019 08:10) (97% - 97%)      GENERAL- NAD  EAR/NOSE/MOUTH/THROAT - no pharyngeal exudates, no oral lesions  MMM  EYES- SIXTO, conjunctiva and Sclera clear  NECK- supple  RESPIRATORY-  clear to auscultation bilaterally  CARDIOVASCULAR - SIS2, RRR  GI - soft NT BS present  EXTREMITIES- no pedal edema  NEUROLOGY- right LE weakness  SKIN- no rashes, warm to touch, incision clean, small area of skin tear on the left posterior lateral thorax area  PSYCHIATRY- AAO X 3                   x                    139  | 27   | 21           x     >-----------< x       ------------------------< 96                    x                    3.5  | 103  | 0.96                                         Ca 8.8   Mg x     Ph x

## 2019-10-17 PROCEDURE — 99232 SBSQ HOSP IP/OBS MODERATE 35: CPT | Mod: GC

## 2019-10-17 PROCEDURE — 99232 SBSQ HOSP IP/OBS MODERATE 35: CPT

## 2019-10-17 RX ORDER — PSYLLIUM SEED (WITH DEXTROSE)
1 POWDER (GRAM) ORAL
Refills: 0 | Status: DISCONTINUED | OUTPATIENT
Start: 2019-10-17 | End: 2019-10-30

## 2019-10-17 RX ORDER — DOCUSATE SODIUM 100 MG
100 CAPSULE ORAL AT BEDTIME
Refills: 0 | Status: DISCONTINUED | OUTPATIENT
Start: 2019-10-17 | End: 2019-10-30

## 2019-10-17 RX ADMIN — Medication 81 MILLIGRAM(S): at 12:16

## 2019-10-17 RX ADMIN — PANTOPRAZOLE SODIUM 40 MILLIGRAM(S): 20 TABLET, DELAYED RELEASE ORAL at 06:16

## 2019-10-17 RX ADMIN — Medication 10 MILLIGRAM(S): at 06:19

## 2019-10-17 RX ADMIN — GABAPENTIN 600 MILLIGRAM(S): 400 CAPSULE ORAL at 14:47

## 2019-10-17 RX ADMIN — Medication 650 MILLIGRAM(S): at 08:35

## 2019-10-17 RX ADMIN — Medication 1 TABLET(S): at 12:16

## 2019-10-17 RX ADMIN — LIDOCAINE 2 PATCH: 4 CREAM TOPICAL at 21:29

## 2019-10-17 RX ADMIN — Medication 12.5 MILLIGRAM(S): at 17:22

## 2019-10-17 RX ADMIN — GABAPENTIN 600 MILLIGRAM(S): 400 CAPSULE ORAL at 22:29

## 2019-10-17 RX ADMIN — Medication 1 MILLIGRAM(S): at 12:16

## 2019-10-17 RX ADMIN — TAMSULOSIN HYDROCHLORIDE 0.4 MILLIGRAM(S): 0.4 CAPSULE ORAL at 22:29

## 2019-10-17 RX ADMIN — AMLODIPINE BESYLATE 7.5 MILLIGRAM(S): 2.5 TABLET ORAL at 06:16

## 2019-10-17 RX ADMIN — Medication 325 MILLIGRAM(S): at 17:24

## 2019-10-17 RX ADMIN — Medication 500 MILLIGRAM(S): at 06:16

## 2019-10-17 RX ADMIN — LIDOCAINE 2 PATCH: 4 CREAM TOPICAL at 08:13

## 2019-10-17 RX ADMIN — Medication 12.5 MILLIGRAM(S): at 06:17

## 2019-10-17 RX ADMIN — Medication 100 MILLIGRAM(S): at 22:29

## 2019-10-17 RX ADMIN — LIDOCAINE 2 PATCH: 4 CREAM TOPICAL at 17:24

## 2019-10-17 RX ADMIN — GABAPENTIN 300 MILLIGRAM(S): 400 CAPSULE ORAL at 06:16

## 2019-10-17 RX ADMIN — Medication 1 PACKET(S): at 22:29

## 2019-10-17 RX ADMIN — ENOXAPARIN SODIUM 40 MILLIGRAM(S): 100 INJECTION SUBCUTANEOUS at 12:16

## 2019-10-17 RX ADMIN — Medication 0.25 MILLIGRAM(S): at 08:16

## 2019-10-17 RX ADMIN — AMIODARONE HYDROCHLORIDE 200 MILLIGRAM(S): 400 TABLET ORAL at 06:16

## 2019-10-17 RX ADMIN — Medication 500 MILLIGRAM(S): at 17:22

## 2019-10-17 RX ADMIN — Medication 650 MILLIGRAM(S): at 09:30

## 2019-10-17 NOTE — PROGRESS NOTE ADULT - ASSESSMENT
This is a 68 yo female with thoracoabdominal aneurysm open repair complicated by cervical myelopathy, dysphagia, neurogenic bowel and bladder.       #Cervical Myelopathy  Continue comprehensive acute rehabilitation program including PT/OT/Swallowing therapy  -Dysphagia diet-soft with thin liquids    -Neuropathic pain- gabapentin 300 mg BID dosing-increased to TID on 10/10 and bedtime dose increased over the weekend, continue oxycodone, lidocaine patches daily around thoracic back incision during the day for pain control  -Neurogenic bladder: indwelling muñoz discontinued, per Dr Carmela castro PVRs on 10/14, changed intermittent straight cath as needed to q4 hrs due to again high bladder scan numbers; continue toileting program   From chart: straight cathed at 1015am today for 1150cc and 530am for 950cc.   On chart for yesterday's st caths: midnight 1200cc, 6am 800cc, 120pm 550cc, 5pm 590cc.   Spoke with nursing again about importance of keeping on q4 hr schedule. Therapy schedule rearranged to allow for q4 hr st caths.       + UA, culture with normal magdalena and [id number] presumptive candida albicans, no treatment at this time, afebrile without leukocytosis, no signs of vaginal or skin yeast infections    #Anxiety- Xanax, home med  Scheduled xanax 0.25mg q AM prior to therapies.     #Hypertension-- amlodipine 7.5mg qd  Low SBPs over last couple of days, however has improved since last evening after lifting FR  Continue to monitor     #Atrial Fibrillation- - rate controlled  with lopressor 25mg bid and amiodarone 200mg qd  Discussed regimen with hospitalist 10/3, will continue current BB regimen   Patient asymptomatic with HRs 50-70s, continue to monitor     #Thoracoabdominal aneurysm open repair- Asa, bp control     #Neurogenic Bladder: discontinued indwelling Muñoz, discontinued PVRs and change st cath to q 4 hrs on 10/14  - d/c'd fluid restriction on 10/14  -leaking of urine with 0.8mg flomax over weekend, back on 0.4mg qHS     #Hyponatremia- stable, continue to monitor     #Hypokalemia- resolved   3.6 on 10/14  K stable at 3.5 this morning.      #Anemia  -stable   - likely related to blood loss during surgery  - supplemental iron recommended  - transfuse if Hemoglobin < 8  -check labs in morning     #Eosinophilia- AEC 0.45, continue to monitor     #DVT prophylaxis: lovenox 40mg sq daily     IDT meeting 10/1:   Patient mod A x1 for transfers and ambulating 20ft with RW and verbal cues. In OT, requiring assist of 2 for maximal assist for lower body dressing and toileting.   Goals are set for mod I in 3 weeks with tentative dc date set for 9/19.    IDT meeting 10/8:   In OT, Mod A for bathing, set up/supervison for upper body dressing, mod A for lower body dressing, toilet transfers with total A, mod A for commode transfers,   In PT, mod A with bed mobility, can be CG for transfers at times, "pop-over" transfers with mod A  Barriers: lower body weakness, unsafe to trial stair training at this time.   ST: working on vocalizations, no need to continue ST. Will increase PT and OT to 90 min each.   Tentative date for D/c home on 10/19. Social work to discuss hiring private aide for dc to home.     Family meeting with sons, Dr Casey, social work, PT and OT yesterday morning.   Plan for d/c 10/23 to home. Discussed morning bowel regimen, bladder (st cath) regimen, patient's ability to be wc bound in home and ability to get herself to curb in wc. Discussed ability of patient to groom and dress herself in bed, set up at kitchen sink for bathing but will need help with perineal and back care. Discussed having meal prep done and easy meals available that patient can eat cold or microwavable. Patient will consider having an aide available in the mornings for care of bowel regimen. Patient is motivated to return home at a  level and eventually pursue outpatient therapies. This is a 68 yo female with thoracoabdominal aneurysm open repair complicated by cervical myelopathy, dysphagia, neurogenic bowel and bladder.       #Cervical Myelopathy  Continue comprehensive acute rehabilitation program including PT/OT/Swallowing therapy  -Dysphagia diet-soft with thin liquids    -Neuropathic pain- gabapentin 300 mg BID dosing-increased to TID on 10/10 and bedtime dose increased over the weekend, continue oxycodone, lidocaine patches daily around thoracic back incision during the day for pain control  -Neurogenic bladder: indwelling muñoz discontinued, per Dr Carmela castro PVRs on 10/14, changed intermittent straight cath as needed to q4 hrs due to again high bladder scan numbers; continue toileting program       + UA, culture with normal magdalena and [id number] presumptive candida albicans, no treatment at this time, afebrile without leukocytosis, no signs of vaginal or skin yeast infections    #Anxiety- Xanax, home med  Scheduled xanax 0.25mg q AM prior to therapies.     #Hypertension-- amlodipine 7.5mg qd  Low SBPs over last couple of days, however has improved since last evening after lifting FR  Continue to monitor     #Atrial Fibrillation- - rate controlled  with lopressor 25mg bid and amiodarone 200mg qd  Discussed regimen with hospitalist 10/3, will continue current BB regimen   Patient asymptomatic with HRs 50-70s, continue to monitor     #Thoracoabdominal aneurysm open repair- Asa, bp control     #Neurogenic Bladder: discontinued indwelling Muñoz, discontinued PVRs and change st cath to q 4 hrs on 10/14  - d/c'd fluid restriction on 10/14  -leaking of urine with 0.8mg flomax over weekend, back on 0.4mg qHS     #Neurogenic bowel: continue suppository qAM, inquire about possibility of using Enemeez rather than bisacodyl suppository. Consider using miralax. Increase metamucil to BID dosing.     #Hyponatremia- stable, continue to monitor     #Hypokalemia- resolved   3.6 on 10/14  K stable at 3.5 10/16.      #Anemia  -stable   - likely related to blood loss during surgery  - supplemental iron recommended  - transfuse if Hemoglobin < 8    #Eosinophilia- AEC 0.45, continue to monitor     #DVT prophylaxis: lovenox 40mg sq daily     IDT meeting 10/1:   Patient mod A x1 for transfers and ambulating 20ft with RW and verbal cues. In OT, requiring assist of 2 for maximal assist for lower body dressing and toileting.   Goals are set for mod I in 3 weeks with tentative dc date set for 9/19.    IDT meeting 10/8:   In OT, Mod A for bathing, set up/supervison for upper body dressing, mod A for lower body dressing, toilet transfers with total A, mod A for commode transfers,   In PT, mod A with bed mobility, can be CG for transfers at times, "pop-over" transfers with mod A  Barriers: lower body weakness, unsafe to trial stair training at this time.   ST: working on vocalizations, no need to continue ST. Will increase PT and OT to 90 min each.   Tentative date for D/c home on 10/19. Social work to discuss hiring private aide for dc to home.     Family meeting with sons, Dr Casey, social work, PT and OT yesterday morning.   Plan for d/c 10/23 to home. Discussed morning bowel regimen, bladder (st cath) regimen, patient's ability to be wc bound in home and ability to get herself to curb in wc. Discussed ability of patient to groom and dress herself in bed, set up at kitchen sink for bathing but will need help with perineal and back care. Discussed having meal prep done and easy meals available that patient can eat cold or microwavable. Patient will consider having an aide available in the mornings for care of bowel regimen. Patient is motivated to return home at a  level and eventually pursue outpatient therapies.

## 2019-10-17 NOTE — PROGRESS NOTE ADULT - SUBJECTIVE AND OBJECTIVE BOX
70 yo female with thoracoabdominal aneurysm open repair complicated by cervical myelopathy, dysphagia, neurogenic bowel and bladder  seen at the bedside, no n/v, no sob. no events, say she is learning to apply suppository for bowel movements.    Vital Signs Last 24 Hrs  T(C): 37.1 (17 Oct 2019 08:01), Max: 37.1 (17 Oct 2019 08:01)  T(F): 98.8 (17 Oct 2019 08:01), Max: 98.8 (17 Oct 2019 08:01)  HR: 73 (17 Oct 2019 08:01) (72 - 78)  BP: 154/65 (17 Oct 2019 08:01) (135/72 - 154/65)  BP(mean): --  RR: 14 (17 Oct 2019 08:01) (14 - 14)  SpO2: 99% (17 Oct 2019 08:01) (99% - 99%)    GENERAL- NAD  EAR/NOSE/MOUTH/THROAT - no pharyngeal exudates, no oral lesions  MMM  EYES- SIXTO, conjunctiva and Sclera clear  NECK- supple  RESPIRATORY-  clear to auscultation bilaterally  CARDIOVASCULAR - SIS2, RRR  GI - soft NT BS present  EXTREMITIES- no pedal edema  NEUROLOGY- right LE weakness  SKIN- no rashes, warm to touch, incision clean, small area of skin tear on the left posterior lateral thorax area  PSYCHIATRY- AAO X 3                 x                    139  | 27   | 21           x     >-----------< x       ------------------------< 96                    x                    3.5  | 103  | 0.96                                         Ca 8.8   Mg x     Ph x

## 2019-10-17 NOTE — PROGRESS NOTE ADULT - ASSESSMENT
68 yo female with thoracoabdominal aneurysm open repair complicated by cervical myelopathy, dysphagia, neurogenic bowel and bladder- pt/ot/dvt ppx, pain meds, asa    Anxiety- Xanax    Hypertension- amlodipine    episode of paroxysmal Atrial Fibrillation converted to sinus- lopressor, amiodarone , asa    Neurogenic Bladder: IC ,  pt/ot, flomax    Hyponatremia- monitor    Anemia- likely related to blood loss during surgery, feso4    DVT prophylaxis: lovenox     will follow

## 2019-10-17 NOTE — PROGRESS NOTE ADULT - SUBJECTIVE AND OBJECTIVE BOX
HPI:   69 year-old female with hx of "adrian-aorta" s/p aortic valve replacement, admitted electively to [facility] on 8/28 for open TAAA repair due to enlarging thoracoabdominal aortic aneurysm. The patient underwent her thoracoabdominal aneurysm open repair with decron graft and celiac SMA bypass with reimplantation of lumbar artery on 8/29.  Post-operatively she required vasopressors in the CTICU. Additionally post-operatively it was found that she had loss of strength in lower extremities, R>L. Repeat imaging was negative for hematoma. Also during this time period her kidney function declined and in conjunction with nephrology ATN was diagnosed with eventual recovery of her kidney function.  Her course was further complicated by AF with RVR, requiring amio gtt, esmolol gtt, digoxin, and vasopressin for pressure support. Course also c/b urinary retention requiring muñoz catheter. Workup for RLE weakness ordered by neurology, felt weakness to be due to cervical myelopathy. Urology consulted 9/23 for urinary retention, felt to be neurogenic in nature, recommended continue muñoz. Completed abx for UTI.  S&S eval recommend Dysphagia 1 nectar thick diet, 3L NC, PT recommending acute rehab.     Interval: + band like pain around thoracic area with improved with higher dose of gabapentin at bedtime  REVIEW OF SYSTEMS  Refused suppository this am. Patient reports that she had a male nurse overnight and did not feel comfortable with getting the suppository as well as the last couple of days she felt the suppository in the rectum and was uncomfortable. She reports a small BM yesterday morning and felt the urge to have BM.   No urge to urinate at this time yet. Nursing reports about 500cc removed from straight caths yesterday but patient not cathed for about 8 hours overnight. Patient reports that she did not want a male nurse to cath her and he did offer a female nurse but female nurse didn't show up to cath her until early this morning.     Eating well and reports feeling less dry after FR lifted.   Denies fever, chills, SOB, HA, dizziness.     Reports less anxiety this morning. Did take xanax this am and reports a "little" tired from xanax.     Vital Signs Last 24 Hrs  T(C): 36.7 (16 Oct 2019 08:10), Max: 36.7 (16 Oct 2019 08:10)  T(F): 98.1 (16 Oct 2019 08:10), Max: 98.1 (16 Oct 2019 08:10)  HR: 77 (16 Oct 2019 08:10) (69 - 77)  BP: 151/71 (16 Oct 2019 08:10) (121/65 - 151/71)  RR: 14 (16 Oct 2019 08:10) (14 - 14)  SpO2: 97% (16 Oct 2019 08:10) (97% - 97%)      10-16    139  |  103  |  21  ----------------------------<  96  3.5   |  27  |  0.96    Ca    8.8      16 Oct 2019 05:15                            8.2    6.42  )-----------( 268      ( 14 Oct 2019 05:18 )             25.2       Physical Exam:  Gen - NAD, Comfortable  	Pulm - CTAB  	Cardiovascular - RRR, S1S2  	Chest wall: Sternal incision wound-clean, no erythema or drainage noted  	Posterior lateral incision wound on the left- clean, no erythema or drainage, small skin tear seen next to incision site dressed  	Abdomen - Soft, NT/ND, +BS  	Extremities - No C/C/E, No calf tenderness  	Neuro-  	   Cognitive - AAOx3  	   Cranial Nerves - CN 2-12 intact  	   Motor -  Bilateral Upper extremities 5/5   	                  LEFT    LE - HF 4+/5, KE 4+/5, DF 5/5, PF 5/5  	                  RIGHT LE - HF 2/5, KE 4/5, DF 4/5, PF 4/5     	   Sensory - Intact to LT bilaterally   	   Tone - normal  	Psychiatric - Mood stable, Affect WNL  Skin:  as above       MEDICATIONS  (STANDING):  ALPRAZolam 0.25 milliGRAM(s) Oral <User Schedule>  amiodarone    Tablet 200 milliGRAM(s) Oral daily  amLODIPine   Tablet 7.5 milliGRAM(s) Oral daily  ascorbic acid 500 milliGRAM(s) Oral two times a day  aspirin  chewable 81 milliGRAM(s) Oral daily  bisacodyl Suppository 10 milliGRAM(s) Rectal <User Schedule>  enoxaparin Injectable 40 milliGRAM(s) SubCutaneous daily  ferrous    sulfate 325 milliGRAM(s) Oral two times a day  folic acid 1 milliGRAM(s) Oral daily  gabapentin 300 milliGRAM(s) Oral <User Schedule>  gabapentin 600 milliGRAM(s) Oral <User Schedule>  influenza   Vaccine 0.5 milliLiter(s) IntraMuscular once  lidocaine   Patch 2 Patch Transdermal <User Schedule>  metoprolol succinate ER 12.5 milliGRAM(s) Oral every 12 hours  multivitamin 1 Tablet(s) Oral daily  pantoprazole    Tablet 40 milliGRAM(s) Oral before breakfast  psyllium Powder 1 Packet(s) Oral at bedtime  tamsulosin 0.4 milliGRAM(s) Oral at bedtime    MEDICATIONS  (PRN):  acetaminophen   Tablet .. 650 milliGRAM(s) Oral every 6 hours PRN Mild Pain (1 - 3)  ALPRAZolam 0.25 milliGRAM(s) Oral every 8 hours PRN anxiety  mineral oil 30 milliLiter(s) Oral two times a day PRN dry mouth  oxyCODONE    IR 5 milliGRAM(s) Oral every 4 hours PRN Moderate Pain (4 - 6) HPI:   69 year-old female with hx of "adrian-aorta" s/p aortic valve replacement, admitted electively to [facility] on 8/28 for open TAAA repair due to enlarging thoracoabdominal aortic aneurysm. The patient underwent her thoracoabdominal aneurysm open repair with decron graft and celiac SMA bypass with reimplantation of lumbar artery on 8/29.  Post-operatively she required vasopressors in the CTICU. Additionally post-operatively it was found that she had loss of strength in lower extremities, R>L. Repeat imaging was negative for hematoma. Also during this time period her kidney function declined and in conjunction with nephrology ATN was diagnosed with eventual recovery of her kidney function.  Her course was further complicated by AF with RVR, requiring amio gtt, esmolol gtt, digoxin, and vasopressin for pressure support. Course also c/b urinary retention requiring muñoz catheter. Workup for RLE weakness ordered by neurology, felt weakness to be due to cervical myelopathy. Urology consulted 9/23 for urinary retention, felt to be neurogenic in nature, recommended continue muñoz. Completed abx for UTI.  S&S eval recommend Dysphagia 1 nectar thick diet, 3L NC, PT recommending acute rehab.     Interval: + band like pain around thoracic area with improved with higher dose of gabapentin at bedtime  REVIEW OF SYSTEMS  Patient reports that she is having difficulty with giving her self the suppository (bisacodyl) and feels that she would like to try something else, maybe by mouth. Feels suppositories aren't as effective as they have been.   Still cathing herself with some help from nursing, continuing to work on independently cathing self.   Appears st caths are happening closer to every 6 hours and have been approx 900cc each time. Continue to re-inforce cathing q4hrs.     Eating well and reports feeling less dry after FR lifted.   Denies fever, chills, SOB, HA, dizziness.       Vital Signs Last 24 Hrs  T(C): 37.1 (17 Oct 2019 08:01), Max: 37.1 (17 Oct 2019 08:01)  T(F): 98.8 (17 Oct 2019 08:01), Max: 98.8 (17 Oct 2019 08:01)  HR: 73 (17 Oct 2019 08:01) (72 - 78)  BP: 154/65 (17 Oct 2019 08:01) (135/72 - 154/65)  RR: 14 (17 Oct 2019 08:01) (14 - 14)  SpO2: 99% (17 Oct 2019 08:01) (99% - 99%)      10-16    139  |  103  |  21  ----------------------------<  96  3.5   |  27  |  0.96    Ca    8.8      16 Oct 2019 05:15                            8.2    6.42  )-----------( 268      ( 14 Oct 2019 05:18 )             25.2       Physical Exam:  Gen - NAD, Comfortable  	Pulm - CTAB  	Cardiovascular - RRR, S1S2  	Chest wall: Sternal incision wound-clean, no erythema or drainage noted  	Posterior lateral incision wound on the left- clean, no erythema or drainage, small skin tear seen next to incision site healing well, decreased in size  	Abdomen - Soft, NT/ND, +BS  	Extremities - No C/C/E, No calf tenderness  	Neuro-  	   Cognitive - AAOx3  	   Cranial Nerves - CN 2-12 intact  	   Motor -  Bilateral Upper extremities 5/5   	                  LEFT    LE - HF 4+/5, KE 4+/5, DF 5/5, PF 5/5  	                  RIGHT LE - HF 2/5, KE 4/5, DF 4/5, PF 4/5     	   Sensory - Intact to LT bilaterally   	   Tone - normal  	Psychiatric - Mood stable, Affect WNL  Skin:  as above       MEDICATIONS  (STANDING):  ALPRAZolam 0.25 milliGRAM(s) Oral <User Schedule>  amiodarone    Tablet 200 milliGRAM(s) Oral daily  amLODIPine   Tablet 7.5 milliGRAM(s) Oral daily  ascorbic acid 500 milliGRAM(s) Oral two times a day  aspirin  chewable 81 milliGRAM(s) Oral daily  bisacodyl Suppository 10 milliGRAM(s) Rectal <User Schedule>  docusate sodium 100 milliGRAM(s) Oral at bedtime  enoxaparin Injectable 40 milliGRAM(s) SubCutaneous daily  ferrous    sulfate 325 milliGRAM(s) Oral two times a day  folic acid 1 milliGRAM(s) Oral daily  gabapentin 300 milliGRAM(s) Oral <User Schedule>  gabapentin 600 milliGRAM(s) Oral <User Schedule>  influenza   Vaccine 0.5 milliLiter(s) IntraMuscular once  lidocaine   Patch 2 Patch Transdermal <User Schedule>  metoprolol succinate ER 12.5 milliGRAM(s) Oral every 12 hours  multivitamin 1 Tablet(s) Oral daily  pantoprazole    Tablet 40 milliGRAM(s) Oral before breakfast  psyllium Powder 1 Packet(s) Oral two times a day  tamsulosin 0.4 milliGRAM(s) Oral at bedtime    MEDICATIONS  (PRN):  acetaminophen   Tablet .. 650 milliGRAM(s) Oral every 6 hours PRN Mild Pain (1 - 3)  ALPRAZolam 0.25 milliGRAM(s) Oral every 8 hours PRN anxiety  mineral oil 30 milliLiter(s) Oral two times a day PRN dry mouth  oxyCODONE    IR 5 milliGRAM(s) Oral every 4 hours PRN Moderate Pain (4 - 6)

## 2019-10-18 PROCEDURE — 99232 SBSQ HOSP IP/OBS MODERATE 35: CPT | Mod: GC

## 2019-10-18 RX ORDER — AMLODIPINE BESYLATE 2.5 MG/1
5 TABLET ORAL AT BEDTIME
Refills: 0 | Status: DISCONTINUED | OUTPATIENT
Start: 2019-10-19 | End: 2019-10-20

## 2019-10-18 RX ORDER — LIDOCAINE 4 G/100G
1 CREAM TOPICAL
Refills: 0 | Status: DISCONTINUED | OUTPATIENT
Start: 2019-10-21 | End: 2019-10-30

## 2019-10-18 RX ORDER — MIDODRINE HYDROCHLORIDE 2.5 MG/1
2.5 TABLET ORAL
Refills: 0 | Status: DISCONTINUED | OUTPATIENT
Start: 2019-10-18 | End: 2019-10-23

## 2019-10-18 RX ADMIN — AMLODIPINE BESYLATE 7.5 MILLIGRAM(S): 2.5 TABLET ORAL at 05:27

## 2019-10-18 RX ADMIN — Medication 325 MILLIGRAM(S): at 05:27

## 2019-10-18 RX ADMIN — Medication 81 MILLIGRAM(S): at 12:59

## 2019-10-18 RX ADMIN — Medication 100 MILLIGRAM(S): at 22:19

## 2019-10-18 RX ADMIN — Medication 1 PACKET(S): at 22:19

## 2019-10-18 RX ADMIN — PANTOPRAZOLE SODIUM 40 MILLIGRAM(S): 20 TABLET, DELAYED RELEASE ORAL at 05:27

## 2019-10-18 RX ADMIN — Medication 500 MILLIGRAM(S): at 05:27

## 2019-10-18 RX ADMIN — Medication 650 MILLIGRAM(S): at 13:00

## 2019-10-18 RX ADMIN — AMIODARONE HYDROCHLORIDE 200 MILLIGRAM(S): 400 TABLET ORAL at 05:27

## 2019-10-18 RX ADMIN — Medication 1 TABLET(S): at 13:00

## 2019-10-18 RX ADMIN — Medication 325 MILLIGRAM(S): at 17:34

## 2019-10-18 RX ADMIN — GABAPENTIN 600 MILLIGRAM(S): 400 CAPSULE ORAL at 22:19

## 2019-10-18 RX ADMIN — Medication 650 MILLIGRAM(S): at 12:59

## 2019-10-18 RX ADMIN — TAMSULOSIN HYDROCHLORIDE 0.4 MILLIGRAM(S): 0.4 CAPSULE ORAL at 22:19

## 2019-10-18 RX ADMIN — Medication 12.5 MILLIGRAM(S): at 17:34

## 2019-10-18 RX ADMIN — Medication 10 MILLIGRAM(S): at 05:27

## 2019-10-18 RX ADMIN — Medication 650 MILLIGRAM(S): at 18:26

## 2019-10-18 RX ADMIN — Medication 0.25 MILLIGRAM(S): at 07:44

## 2019-10-18 RX ADMIN — GABAPENTIN 600 MILLIGRAM(S): 400 CAPSULE ORAL at 13:00

## 2019-10-18 RX ADMIN — GABAPENTIN 300 MILLIGRAM(S): 400 CAPSULE ORAL at 05:27

## 2019-10-18 RX ADMIN — ENOXAPARIN SODIUM 40 MILLIGRAM(S): 100 INJECTION SUBCUTANEOUS at 13:00

## 2019-10-18 RX ADMIN — Medication 12.5 MILLIGRAM(S): at 05:27

## 2019-10-18 RX ADMIN — Medication 1 MILLIGRAM(S): at 12:59

## 2019-10-18 RX ADMIN — Medication 500 MILLIGRAM(S): at 17:34

## 2019-10-18 NOTE — PROGRESS NOTE ADULT - ASSESSMENT
This is a 70 yo female with thoracoabdominal aneurysm open repair complicated by cervical myelopathy, dysphagia, neurogenic bowel and bladder.       #Cervical Myelopathy  Continue comprehensive acute rehabilitation program including PT/OT/Swallowing therapy  -Dysphagia diet-soft with thin liquids    -Neuropathic pain- gabapentin 300 mg BID dosing-increased to TID on 10/10 and bedtime dose increased over the weekend, continue oxycodone, lidocaine patches daily around thoracic back incision during the day for pain control  -Neurogenic bladder: indwelling muñoz discontinued, per Dr Carmela castro PVRs on 10/14, changed intermittent straight cath as needed to q4 hrs due to again high bladder scan numbers; continue toileting program, continue to ask nursing to keep to q4 hr schedule overnight due to high volumes usually occurring overnight       + UA, culture with normal magdalena and [id number] presumptive candida albicans, no treatment at this time, afebrile without leukocytosis, no signs of vaginal or skin yeast infections    #Anxiety- Xanax, home med  Scheduled xanax 0.25mg q AM prior to therapies.     #Hypertension-- amlodipine 7.5mg qd  LOW SBPs in therapies (into the 70s today)   Add teds and abdominal binder   Continue to monitor     #Atrial Fibrillation- - rate controlled  with lopressor 12.5mg bid and amiodarone 200mg qd  Discussed regimen with hospitalist 10/3, will continue current BB regimen   Patient asymptomatic with HRs 50-70s, continue to monitor     #Thoracoabdominal aneurysm open repair- Asa, bp control     #Neurogenic Bladder: discontinued indwelling Muñoz, discontinued PVRs and change st cath to q 4 hrs on 10/14  - d/c'd fluid restriction on 10/14  -leaking of urine with 0.8mg flomax over weekend, back on 0.4mg qHS     #Neurogenic bowel: continue suppository qAM, inquire about possibility of using Enemeez rather than bisacodyl suppository. Consider using miralax. Increase metamucil to BID dosing.     #Hyponatremia- stable, continue to monitor     #Hypokalemia- resolved   3.6 on 10/14  K stable at 3.5 10/16.      #Anemia  -stable   - likely related to blood loss during surgery  - supplemental iron recommended  - transfuse if Hemoglobin < 8    #Eosinophilia- AEC 0.45, continue to monitor     #DVT prophylaxis: lovenox 40mg sq daily     IDT meeting 10/1:   Patient mod A x1 for transfers and ambulating 20ft with RW and verbal cues. In OT, requiring assist of 2 for maximal assist for lower body dressing and toileting.   Goals are set for mod I in 3 weeks with tentative dc date set for 9/19.    IDT meeting 10/8:   In OT, Mod A for bathing, set up/supervison for upper body dressing, mod A for lower body dressing, toilet transfers with total A, mod A for commode transfers,   In PT, mod A with bed mobility, can be CG for transfers at times, "pop-over" transfers with mod A  Barriers: lower body weakness, unsafe to trial stair training at this time.   ST: working on vocalizations, no need to continue ST. Will increase PT and OT to 90 min each.   Tentative date for D/c home on 10/19. Social work to discuss hiring private aide for dc to home. This is a 68 yo female with thoracoabdominal aneurysm open repair complicated by cervical myelopathy, dysphagia, neurogenic bowel and bladder.       #Cervical Myelopathy  Continue comprehensive acute rehabilitation program including PT/OT/Swallowing therapy  -Dysphagia diet-soft with thin liquids    -Neuropathic pain- continue gabapentin   -Neurogenic bladder: continue toileting program, continue to ask nursing to keep to q4 hr schedule overnight due to high volumes usually occurring overnight       #Anxiety- Xanax, home med  Scheduled xanax 0.25mg q AM prior to therapies.     #Hypertension-- amlodipine 7.5mg qd  LOW SBPs in therapies (into the 70s today), decrease norvasc per hospitalist and add midodrine 2.5mg q8am  Add teds and abdominal binder (add lidocaine gel to abdomen prior to application of abdominal binder qAM)  Continue to monitor     #Atrial Fibrillation- - rate controlled  with lopressor 12.5mg bid and amiodarone 200mg qd  Discussed regimen with hospitalist 10/3, will continue current BB regimen   Patient asymptomatic with HRs 50-70s, continue to monitor     #Thoracoabdominal aneurysm open repair- Asa, bp control     #Neurogenic Bladder: discontinued indwelling [place], discontinued PVRs and change st cath to q 4 hrs on 10/14  - d/c'd fluid restriction on 10/14  -leaking of urine with 0.8mg flomax over weekend, back on 0.4mg qHS     #Neurogenic bowel: continue suppository qAM and toileting program     #Hyponatremia- stable, continue to monitor     #Hypokalemia- resolved     #Anemia  -stable       #Eosinophilia- AEC 0.45, continue to monitor     #DVT prophylaxis: lovenox 40mg sq daily

## 2019-10-18 NOTE — PROGRESS NOTE ADULT - SUBJECTIVE AND OBJECTIVE BOX
HPI:   69 year-old female with hx of "adrian-aorta" s/p aortic valve replacement, admitted electively to [facility] on 8/28 for open TAAA repair due to enlarging thoracoabdominal aortic aneurysm. The patient underwent her thoracoabdominal aneurysm open repair with decron graft and celiac SMA bypass with reimplantation of lumbar artery on 8/29.  Post-operatively she required vasopressors in the CTICU. Additionally post-operatively it was found that she had loss of strength in lower extremities, R>L. Repeat imaging was negative for hematoma. Also during this time period her kidney function declined and in conjunction with nephrology ATN was diagnosed with eventual recovery of her kidney function.  Her course was further complicated by AF with RVR, requiring amio gtt, esmolol gtt, digoxin, and vasopressin for pressure support. Course also c/b urinary retention requiring muñoz catheter. Workup for RLE weakness ordered by neurology, felt weakness to be due to cervical myelopathy. Urology consulted 9/23 for urinary retention, felt to be neurogenic in nature, recommended continue muñoz. Completed abx for UTI.  S&S eval recommend Dysphagia 1 nectar thick diet, 3L NC, PT recommending acute rehab.     Interval: + band like pain around thoracic area with improved with higher dose of gabapentin at bedtime  REVIEW OF SYSTEMS  Patient reports was successful giving herself the suppository this morning.   Still cathing herself with some help from nursing, continuing to work on independently cathing self.   St caths are not occurring q4 hrs overnight. Continue to re-inforce cathing q4hrs.     Eating well and reports feeling less dry after FR lifted.   Denies fever, chills, SOB, HA, dizziness.     PT reports SBPs dropping into the 70s while standing.       Vital Signs Last 24 Hrs  T(C): 37 (18 Oct 2019 08:04), Max: 37 (18 Oct 2019 08:04)  T(F): 98.6 (18 Oct 2019 08:04), Max: 98.6 (18 Oct 2019 08:04)  HR: 68 (18 Oct 2019 08:04) (67 - 73)  BP: 129/63 (18 Oct 2019 08:04) (122/68 - 162/63)  RR: 14 (18 Oct 2019 08:04) (14 - 14)  SpO2: 100% (18 Oct 2019 08:04) (96% - 100%)    10-16    139  |  103  |  21  ----------------------------<  96  3.5   |  27  |  0.96    Ca    8.8      16 Oct 2019 05:15                            8.2    6.42  )-----------( 268      ( 14 Oct 2019 05:18 )             25.2       Physical Exam:  Gen - NAD, Comfortable  	Pulm - CTAB  	Cardiovascular - RRR, S1S2  	Chest wall: Sternal incision wound-clean, no erythema or drainage noted  	Posterior lateral incision wound on the left- clean, no erythema or drainage, small skin tear seen next to incision site healing well, decreased in size  	Abdomen - Soft, NT/ND, +BS  	Extremities - No C/C/E, No calf tenderness  	Neuro-  	   Cognitive - AAOx3  	   Cranial Nerves - CN 2-12 intact  	   Motor -  Bilateral Upper extremities 5/5   	                  LEFT    LE - HF 4+/5, KE 4+/5, DF 5/5, PF 5/5  	                  RIGHT LE - HF 3/5, KE 4/5, DF 4/5, PF 4/5     	   Sensory - Intact to LT bilaterally   	   Tone - normal  	Psychiatric - Mood stable, Affect WNL  Skin:  as above       MEDICATIONS  (STANDING):  ALPRAZolam 0.25 milliGRAM(s) Oral <User Schedule>  amiodarone    Tablet 200 milliGRAM(s) Oral daily  amLODIPine   Tablet 7.5 milliGRAM(s) Oral daily  ascorbic acid 500 milliGRAM(s) Oral two times a day  aspirin  chewable 81 milliGRAM(s) Oral daily  bisacodyl Suppository 10 milliGRAM(s) Rectal <User Schedule>  docusate sodium 100 milliGRAM(s) Oral at bedtime  enoxaparin Injectable 40 milliGRAM(s) SubCutaneous daily  ferrous    sulfate 325 milliGRAM(s) Oral two times a day  folic acid 1 milliGRAM(s) Oral daily  gabapentin 300 milliGRAM(s) Oral <User Schedule>  gabapentin 600 milliGRAM(s) Oral <User Schedule>  influenza   Vaccine 0.5 milliLiter(s) IntraMuscular once  lidocaine   Patch 2 Patch Transdermal <User Schedule>  metoprolol succinate ER 12.5 milliGRAM(s) Oral every 12 hours  multivitamin 1 Tablet(s) Oral daily  pantoprazole    Tablet 40 milliGRAM(s) Oral before breakfast  psyllium Powder 1 Packet(s) Oral two times a day  tamsulosin 0.4 milliGRAM(s) Oral at bedtime    MEDICATIONS  (PRN):  acetaminophen   Tablet .. 650 milliGRAM(s) Oral every 6 hours PRN Mild Pain (1 - 3)  ALPRAZolam 0.25 milliGRAM(s) Oral every 8 hours PRN anxiety  mineral oil 30 milliLiter(s) Oral two times a day PRN dry mouth  oxyCODONE    IR 5 milliGRAM(s) Oral every 4 hours PRN Moderate Pain (4 - 6) HPI:   69 year-old female with hx of "adrian-aorta" s/p aortic valve replacement, admitted electively to [facility] on 8/28 for open TAAA repair due to enlarging thoracoabdominal aortic aneurysm. The patient underwent her thoracoabdominal aneurysm open repair with decron graft and celiac SMA bypass with reimplantation of lumbar artery on 8/29.  Post-operatively she required vasopressors in the CTICU. Additionally post-operatively it was found that she had loss of strength in lower extremities, R>L. Repeat imaging was negative for hematoma. Also during this time period her kidney function declined and in conjunction with nephrology ATN was diagnosed with eventual recovery of her kidney function.  Her course was further complicated by AF with RVR, requiring amio gtt, esmolol gtt, digoxin, and vasopressin for pressure support. Course also c/b urinary retention requiring muñoz catheter. Workup for RLE weakness ordered by neurology, felt weakness to be due to cervical myelopathy. Urology consulted 9/23 for urinary retention, felt to be neurogenic in nature, recommended continue muñoz. Completed abx for UTI.  S&S eval recommend Dysphagia 1 nectar thick diet, 3L NC, PT recommending acute rehab.     Interval: + band like pain around thoracic area with improved with higher dose of gabapentin at bedtime  REVIEW OF SYSTEMS  Patient reports was successful giving herself the suppository this morning.   Still cathing herself with some help from nursing, continuing to work on independently cathing self.   St caths are not occurring q4 hrs overnight. Continue to re-inforce cathing q4hrs.     Eating well and reports feeling less dry after FR lifted.   Denies fever, chills, SOB, HA, dizziness.     PT reports SBPs dropping into the 70s while standing and did have some symptoms with low BP.       Vital Signs Last 24 Hrs  T(C): 37 (18 Oct 2019 08:04), Max: 37 (18 Oct 2019 08:04)  T(F): 98.6 (18 Oct 2019 08:04), Max: 98.6 (18 Oct 2019 08:04)  HR: 68 (18 Oct 2019 08:04) (67 - 73)  BP: 129/63 (18 Oct 2019 08:04) (122/68 - 162/63)  RR: 14 (18 Oct 2019 08:04) (14 - 14)  SpO2: 100% (18 Oct 2019 08:04) (96% - 100%)    10-16    139  |  103  |  21  ----------------------------<  96  3.5   |  27  |  0.96    Ca    8.8      16 Oct 2019 05:15                            8.2    6.42  )-----------( 268      ( 14 Oct 2019 05:18 )             25.2       Physical Exam:  Gen - NAD, Comfortable  	Pulm - CTAB  	Cardiovascular - RRR, S1S2  	Chest wall: Sternal incision wound-clean, no erythema or drainage noted  	Posterior lateral incision wound on the left- clean, no erythema or drainage, small skin tear seen next to incision site healing well, decreased in size  	Abdomen - Soft, NT/ND, +BS  	Extremities - No C/C/E, No calf tenderness  	Neuro-  	   Cognitive - AAOx3  	   Cranial Nerves - CN 2-12 intact  	   Motor -  Bilateral Upper extremities 5/5   	                  LEFT    LE - HF 4+/5, KE 4+/5, DF 5/5, PF 5/5  	                  RIGHT LE - HF 3/5, KE 4/5, DF 4/5, PF 4/5     	   Sensory - Intact to LT bilaterally   	   Tone - normal  	Psychiatric - Mood stable, Affect WNL        MEDICATIONS  (STANDING):  ALPRAZolam 0.25 milliGRAM(s) Oral <User Schedule>  amiodarone    Tablet 200 milliGRAM(s) Oral daily  amLODIPine   Tablet 7.5 milliGRAM(s) Oral daily  ascorbic acid 500 milliGRAM(s) Oral two times a day  aspirin  chewable 81 milliGRAM(s) Oral daily  bisacodyl Suppository 10 milliGRAM(s) Rectal <User Schedule>  docusate sodium 100 milliGRAM(s) Oral at bedtime  enoxaparin Injectable 40 milliGRAM(s) SubCutaneous daily  ferrous    sulfate 325 milliGRAM(s) Oral two times a day  folic acid 1 milliGRAM(s) Oral daily  gabapentin 300 milliGRAM(s) Oral <User Schedule>  gabapentin 600 milliGRAM(s) Oral <User Schedule>  influenza   Vaccine 0.5 milliLiter(s) IntraMuscular once  lidocaine   Patch 2 Patch Transdermal <User Schedule>  metoprolol succinate ER 12.5 milliGRAM(s) Oral every 12 hours  multivitamin 1 Tablet(s) Oral daily  pantoprazole    Tablet 40 milliGRAM(s) Oral before breakfast  psyllium Powder 1 Packet(s) Oral two times a day  tamsulosin 0.4 milliGRAM(s) Oral at bedtime    MEDICATIONS  (PRN):  acetaminophen   Tablet .. 650 milliGRAM(s) Oral every 6 hours PRN Mild Pain (1 - 3)  ALPRAZolam 0.25 milliGRAM(s) Oral every 8 hours PRN anxiety  mineral oil 30 milliLiter(s) Oral two times a day PRN dry mouth  oxyCODONE    IR 5 milliGRAM(s) Oral every 4 hours PRN Moderate Pain (4 - 6)

## 2019-10-19 PROCEDURE — 99232 SBSQ HOSP IP/OBS MODERATE 35: CPT

## 2019-10-19 RX ADMIN — Medication 325 MILLIGRAM(S): at 05:39

## 2019-10-19 RX ADMIN — Medication 1 MILLIGRAM(S): at 11:44

## 2019-10-19 RX ADMIN — Medication 81 MILLIGRAM(S): at 11:44

## 2019-10-19 RX ADMIN — PANTOPRAZOLE SODIUM 40 MILLIGRAM(S): 20 TABLET, DELAYED RELEASE ORAL at 05:38

## 2019-10-19 RX ADMIN — MIDODRINE HYDROCHLORIDE 2.5 MILLIGRAM(S): 2.5 TABLET ORAL at 09:24

## 2019-10-19 RX ADMIN — Medication 650 MILLIGRAM(S): at 17:45

## 2019-10-19 RX ADMIN — Medication 650 MILLIGRAM(S): at 19:15

## 2019-10-19 RX ADMIN — AMIODARONE HYDROCHLORIDE 200 MILLIGRAM(S): 400 TABLET ORAL at 05:37

## 2019-10-19 RX ADMIN — Medication 500 MILLIGRAM(S): at 17:41

## 2019-10-19 RX ADMIN — TAMSULOSIN HYDROCHLORIDE 0.4 MILLIGRAM(S): 0.4 CAPSULE ORAL at 21:44

## 2019-10-19 RX ADMIN — Medication 650 MILLIGRAM(S): at 01:39

## 2019-10-19 RX ADMIN — GABAPENTIN 300 MILLIGRAM(S): 400 CAPSULE ORAL at 11:44

## 2019-10-19 RX ADMIN — Medication 12.5 MILLIGRAM(S): at 05:38

## 2019-10-19 RX ADMIN — Medication 10 MILLIGRAM(S): at 05:38

## 2019-10-19 RX ADMIN — Medication 325 MILLIGRAM(S): at 17:45

## 2019-10-19 RX ADMIN — Medication 1 TABLET(S): at 11:44

## 2019-10-19 RX ADMIN — GABAPENTIN 600 MILLIGRAM(S): 400 CAPSULE ORAL at 21:44

## 2019-10-19 RX ADMIN — Medication 0.25 MILLIGRAM(S): at 09:22

## 2019-10-19 RX ADMIN — Medication 100 MILLIGRAM(S): at 21:44

## 2019-10-19 RX ADMIN — Medication 650 MILLIGRAM(S): at 07:36

## 2019-10-19 RX ADMIN — Medication 650 MILLIGRAM(S): at 13:26

## 2019-10-19 RX ADMIN — Medication 12.5 MILLIGRAM(S): at 17:41

## 2019-10-19 RX ADMIN — AMLODIPINE BESYLATE 5 MILLIGRAM(S): 2.5 TABLET ORAL at 21:44

## 2019-10-19 RX ADMIN — ENOXAPARIN SODIUM 40 MILLIGRAM(S): 100 INJECTION SUBCUTANEOUS at 11:44

## 2019-10-19 RX ADMIN — Medication 500 MILLIGRAM(S): at 05:38

## 2019-10-19 RX ADMIN — Medication 650 MILLIGRAM(S): at 09:24

## 2019-10-19 NOTE — PROGRESS NOTE ADULT - SUBJECTIVE AND OBJECTIVE BOX
No overnight events.  Feels well.      REVIEW OF SYSTEMS  Constitutional - No fever,  No fatigue  Neurological - No headaches, No loss of strength  Musculoskeletal - No joint pain, No joint swelling, No muscle pain    VITALS  T(C): 36.9 (10-19-19 @ 08:09), Max: 36.9 (10-19-19 @ 08:09)  HR: 70 (10-19-19 @ 08:09) (68 - 72)  BP: 124/64 (10-19-19 @ 08:09) (121/67 - 130/68)  RR: 14 (10-19-19 @ 08:09) (14 - 14)  SpO2: 96% (10-19-19 @ 08:09) (94% - 96%)  Wt(kg): --       MEDICATIONS   acetaminophen   Tablet .. 650 milliGRAM(s) every 6 hours PRN  ALPRAZolam 0.25 milliGRAM(s) <User Schedule>  ALPRAZolam 0.25 milliGRAM(s) every 8 hours PRN  amiodarone    Tablet 200 milliGRAM(s) daily  amLODIPine   Tablet 5 milliGRAM(s) at bedtime  ascorbic acid 500 milliGRAM(s) two times a day  aspirin  chewable 81 milliGRAM(s) daily  bisacodyl Suppository 10 milliGRAM(s) <User Schedule>  docusate sodium 100 milliGRAM(s) at bedtime  enoxaparin Injectable 40 milliGRAM(s) daily  ferrous    sulfate 325 milliGRAM(s) two times a day  folic acid 1 milliGRAM(s) daily  gabapentin 300 milliGRAM(s) <User Schedule>  gabapentin 600 milliGRAM(s) <User Schedule>  influenza   Vaccine 0.5 milliLiter(s) once  metoprolol succinate ER 12.5 milliGRAM(s) every 12 hours  midodrine. 2.5 milliGRAM(s) <User Schedule>  mineral oil 30 milliLiter(s) two times a day PRN  multivitamin 1 Tablet(s) daily  oxyCODONE    IR 5 milliGRAM(s) every 4 hours PRN  pantoprazole    Tablet 40 milliGRAM(s) before breakfast  psyllium Powder 1 Packet(s) two times a day  tamsulosin 0.4 milliGRAM(s) at bedtime      RECENT LABS/IMAGING                        ---------  PHYSICAL EXAM  Constitutional - NAD, Comfortable  Pulm - Breathing comfortably, No wheezing  Abd - Soft, NTND  Extremities - No edema, No calf tenderness  Neurologic Exam -                    Cognitive - Awake, Alert     Communication - Fluent     Motor - No focal deficits     Sensory - Intact to LT  Psychiatric - Mood WNL, Affect WNL    ASSESSMENT/PLAN  69y Female with functional deficits after SC infarction s/p thoracoabdominal aortic aneurysm repair  Continue current medical management  Pain - Tylenol PRN  DVT PPX - enoxaparin Injectable 40 milliGRAM(s) daily  Active issues - added TEDs + ABD binder  Continue 3hrs a day of comprehensive rehab program.

## 2019-10-20 PROCEDURE — 99232 SBSQ HOSP IP/OBS MODERATE 35: CPT | Mod: GC

## 2019-10-20 PROCEDURE — 99232 SBSQ HOSP IP/OBS MODERATE 35: CPT

## 2019-10-20 RX ORDER — AMLODIPINE BESYLATE 2.5 MG/1
5 TABLET ORAL AT BEDTIME
Refills: 0 | Status: DISCONTINUED | OUTPATIENT
Start: 2019-10-20 | End: 2019-10-23

## 2019-10-20 RX ADMIN — AMIODARONE HYDROCHLORIDE 200 MILLIGRAM(S): 400 TABLET ORAL at 05:34

## 2019-10-20 RX ADMIN — Medication 325 MILLIGRAM(S): at 05:35

## 2019-10-20 RX ADMIN — Medication 0.25 MILLIGRAM(S): at 08:00

## 2019-10-20 RX ADMIN — Medication 1 TABLET(S): at 11:32

## 2019-10-20 RX ADMIN — Medication 500 MILLIGRAM(S): at 17:29

## 2019-10-20 RX ADMIN — GABAPENTIN 300 MILLIGRAM(S): 400 CAPSULE ORAL at 05:38

## 2019-10-20 RX ADMIN — Medication 12.5 MILLIGRAM(S): at 17:29

## 2019-10-20 RX ADMIN — Medication 81 MILLIGRAM(S): at 11:32

## 2019-10-20 RX ADMIN — Medication 650 MILLIGRAM(S): at 06:54

## 2019-10-20 RX ADMIN — PANTOPRAZOLE SODIUM 40 MILLIGRAM(S): 20 TABLET, DELAYED RELEASE ORAL at 05:38

## 2019-10-20 RX ADMIN — Medication 1 MILLIGRAM(S): at 11:32

## 2019-10-20 RX ADMIN — Medication 650 MILLIGRAM(S): at 17:36

## 2019-10-20 RX ADMIN — Medication 325 MILLIGRAM(S): at 17:29

## 2019-10-20 RX ADMIN — ENOXAPARIN SODIUM 40 MILLIGRAM(S): 100 INJECTION SUBCUTANEOUS at 11:32

## 2019-10-20 RX ADMIN — Medication 1 PACKET(S): at 05:35

## 2019-10-20 RX ADMIN — Medication 650 MILLIGRAM(S): at 05:34

## 2019-10-20 RX ADMIN — GABAPENTIN 600 MILLIGRAM(S): 400 CAPSULE ORAL at 11:32

## 2019-10-20 RX ADMIN — AMLODIPINE BESYLATE 5 MILLIGRAM(S): 2.5 TABLET ORAL at 21:37

## 2019-10-20 RX ADMIN — Medication 500 MILLIGRAM(S): at 05:35

## 2019-10-20 RX ADMIN — Medication 12.5 MILLIGRAM(S): at 05:34

## 2019-10-20 RX ADMIN — TAMSULOSIN HYDROCHLORIDE 0.4 MILLIGRAM(S): 0.4 CAPSULE ORAL at 21:35

## 2019-10-20 RX ADMIN — GABAPENTIN 600 MILLIGRAM(S): 400 CAPSULE ORAL at 21:31

## 2019-10-20 NOTE — PROGRESS NOTE ADULT - ASSESSMENT
Mrs Belcher is a pleasant 69 year-old female with 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 Admitted to acute rehabilitation at [facility] for these complaints.     Neurology/Psych  Cervical Myelopathy:  - PT/OT  - pain control per primary team  Anxiety/Motor Tick  - xanax 0.25mg qd with 0.25mg prn tid    Cardiovascular   Thoracoabdominal aneurysm open repair  - manage blood pressure tightly as described below  - monitor for chest pain, shortness of breath and abdominal pain  - continue aspirin  Hypertension  - amlodipine 5mg qd  - if needed would initiate ACEi, though given orthostatic hypotension likely unable to add  Atrial Fibrillation  - toprol 12.5mg bid -> consider 25mg qd for ease of use and as anticipated d/c is within 3-7 days. single vs multiple dosing shown to improve compliance  - amiodarone 200mg qd  Orthostatic Hypotension  - midodrine 2.5mg qd, consider increasing to BID or TID as patient states she is still suffering from episodes of orthostasis in the afternoon     Genitourinary  Urinary retention 2/2 neurogenic bladder  - intermittent self catheterization  - tamulosin    Gastroenterology  GERD   - continue protonix 40mg qd    Hematology  Anemia  - likely related to blood loss during surgery  - supplemental iron recommended  - transfuse if Hemoglobin < 8      DVT prophylaxis: lovenox    case discussed with PMR resident  should a clinical question arise regarding the care of this patient, please do not hesistate to contact me at [phone] until 5pm on 10/20/2019

## 2019-10-20 NOTE — PROGRESS NOTE ADULT - SUBJECTIVE AND OBJECTIVE BOX
Patient seen and examined at bedside. No overnight events per nursing or chart review. Patient notes continued band like pain around her waist. Notes improvement with gabapentin.     ALLERGIES:  No Known Allergies    MEDICATIONS  (STANDING):  ALPRAZolam 0.25 milliGRAM(s) Oral <User Schedule>  amiodarone    Tablet 200 milliGRAM(s) Oral daily  amLODIPine   Tablet 5 milliGRAM(s) Oral at bedtime  ascorbic acid 500 milliGRAM(s) Oral two times a day  aspirin  chewable 81 milliGRAM(s) Oral daily  bisacodyl Suppository 10 milliGRAM(s) Rectal <User Schedule>  docusate sodium 100 milliGRAM(s) Oral at bedtime  enoxaparin Injectable 40 milliGRAM(s) SubCutaneous daily  ferrous    sulfate 325 milliGRAM(s) Oral two times a day  folic acid 1 milliGRAM(s) Oral daily  gabapentin 300 milliGRAM(s) Oral <User Schedule>  gabapentin 600 milliGRAM(s) Oral <User Schedule>  influenza   Vaccine 0.5 milliLiter(s) IntraMuscular once  metoprolol succinate ER 12.5 milliGRAM(s) Oral every 12 hours  midodrine. 2.5 milliGRAM(s) Oral <User Schedule>  multivitamin 1 Tablet(s) Oral daily  pantoprazole    Tablet 40 milliGRAM(s) Oral before breakfast  psyllium Powder 1 Packet(s) Oral two times a day  tamsulosin 0.4 milliGRAM(s) Oral at bedtime    MEDICATIONS  (PRN):  acetaminophen   Tablet .. 650 milliGRAM(s) Oral every 6 hours PRN Mild Pain (1 - 3)  ALPRAZolam 0.25 milliGRAM(s) Oral every 8 hours PRN anxiety  mineral oil 30 milliLiter(s) Oral two times a day PRN dry mouth  oxyCODONE    IR 5 milliGRAM(s) Oral every 4 hours PRN Moderate Pain (4 - 6)    Vital Signs Last 24 Hrs  T(F): 98.1 (19 Oct 2019 19:51), Max: 98.1 (19 Oct 2019 19:51)  HR: 70 (20 Oct 2019 09:32) (69 - 76)  BP: 126/70 (20 Oct 2019 09:32) (126/70 - 160/74)  RR: 14 (20 Oct 2019 09:32) (14 - 14)  SpO2: 97% (20 Oct 2019 09:32) (97% - 97%)  I&O's Summary    19 Oct 2019 07:01  -  20 Oct 2019 07:00  --------------------------------------------------------  IN: 720 mL / OUT: 0 mL / NET: 720 mL        PHYSICAL EXAM:  Gen: nad, resting in bed  Neuro: aaox3, improved muscle strength but 2/5 on left and right lower extremity  Heent: eomi b/l, no jvd, no oral exudates  Pulm: cta b/l, no w/r/r  CV: +s1s2, no m/r/g  Ab: soft, nt/nd, normoactive bs x 4  Extrem: no edema, pulses intact and equal, surgical scar appears clean and intact

## 2019-10-20 NOTE — PROGRESS NOTE ADULT - SUBJECTIVE AND OBJECTIVE BOX
----- Message from April Rushing sent at 10/17/2017  4:23 PM CDT -----  Contact: Edward hartley/[facility]   [phone]  Edward requesting a referral for outpatient physical therapy at [facility].  Orders can be faxed to [phone]   No overnight events.  Feels well.  Reports feeling urge to urinate.  Having some trouble w/ IC.      REVIEW OF SYSTEMS  Constitutional - No fever,  No fatigue  Neurological - No headaches, No loss of strength  Musculoskeletal - No joint pain, No joint swelling, No muscle pain    VITALS  T(C): 36.7 (10-19-19 @ 19:51), Max: 36.7 (10-19-19 @ 19:51)  HR: 70 (10-20-19 @ 09:32) (69 - 76)  BP: 126/70 (10-20-19 @ 09:32) (126/70 - 160/74)  RR: 14 (10-20-19 @ 09:32) (14 - 14)  SpO2: 97% (10-20-19 @ 09:32) (97% - 97%)  Wt(kg): --       MEDICATIONS   acetaminophen   Tablet .. 650 milliGRAM(s) every 6 hours PRN  ALPRAZolam 0.25 milliGRAM(s) <User Schedule>  ALPRAZolam 0.25 milliGRAM(s) every 8 hours PRN  amiodarone    Tablet 200 milliGRAM(s) daily  amLODIPine   Tablet 5 milliGRAM(s) at bedtime  ascorbic acid 500 milliGRAM(s) two times a day  aspirin  chewable 81 milliGRAM(s) daily  bisacodyl Suppository 10 milliGRAM(s) <User Schedule>  docusate sodium 100 milliGRAM(s) at bedtime  enoxaparin Injectable 40 milliGRAM(s) daily  ferrous    sulfate 325 milliGRAM(s) two times a day  folic acid 1 milliGRAM(s) daily  gabapentin 300 milliGRAM(s) <User Schedule>  gabapentin 600 milliGRAM(s) <User Schedule>  influenza   Vaccine 0.5 milliLiter(s) once  metoprolol succinate ER 12.5 milliGRAM(s) every 12 hours  midodrine. 2.5 milliGRAM(s) <User Schedule>  mineral oil 30 milliLiter(s) two times a day PRN  multivitamin 1 Tablet(s) daily  oxyCODONE    IR 5 milliGRAM(s) every 4 hours PRN  pantoprazole    Tablet 40 milliGRAM(s) before breakfast  psyllium Powder 1 Packet(s) two times a day  tamsulosin 0.4 milliGRAM(s) at bedtime      RECENT LABS/IMAGING                        ---------  PHYSICAL EXAM  Constitutional - NAD, Comfortable  Pulm - Breathing comfortably, No wheezing  Abd - Soft, NTND  Extremities - No edema, No calf tenderness  Neurologic Exam -                    Cognitive - Awake, Alert     Communication - Fluent     Motor - No focal deficits     Sensory - Intact to LT  Psychiatric - Mood WNL, Affect WNL    ASSESSMENT/PLAN  69y Female with functional deficits after 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  Continue current medical management  Pain - Tylenol PRN; yoshi; oxy  DVT PPX - enoxaparin Injectable 40 milliGRAM(s) daily  Active issues - none; will c/w O.T. for to improve hand dexterity for IC  Continue 3hrs a day of comprehensive rehab program.

## 2019-10-21 PROCEDURE — 99232 SBSQ HOSP IP/OBS MODERATE 35: CPT

## 2019-10-21 PROCEDURE — 99232 SBSQ HOSP IP/OBS MODERATE 35: CPT | Mod: GC

## 2019-10-21 RX ADMIN — Medication 325 MILLIGRAM(S): at 05:09

## 2019-10-21 RX ADMIN — Medication 1 MILLIGRAM(S): at 11:13

## 2019-10-21 RX ADMIN — Medication 1 PACKET(S): at 21:25

## 2019-10-21 RX ADMIN — Medication 1 TABLET(S): at 11:13

## 2019-10-21 RX ADMIN — GABAPENTIN 600 MILLIGRAM(S): 400 CAPSULE ORAL at 14:58

## 2019-10-21 RX ADMIN — Medication 0.25 MILLIGRAM(S): at 08:06

## 2019-10-21 RX ADMIN — Medication 12.5 MILLIGRAM(S): at 17:10

## 2019-10-21 RX ADMIN — AMIODARONE HYDROCHLORIDE 200 MILLIGRAM(S): 400 TABLET ORAL at 05:08

## 2019-10-21 RX ADMIN — Medication 12.5 MILLIGRAM(S): at 05:10

## 2019-10-21 RX ADMIN — TAMSULOSIN HYDROCHLORIDE 0.4 MILLIGRAM(S): 0.4 CAPSULE ORAL at 21:24

## 2019-10-21 RX ADMIN — MIDODRINE HYDROCHLORIDE 2.5 MILLIGRAM(S): 2.5 TABLET ORAL at 08:05

## 2019-10-21 RX ADMIN — PANTOPRAZOLE SODIUM 40 MILLIGRAM(S): 20 TABLET, DELAYED RELEASE ORAL at 08:06

## 2019-10-21 RX ADMIN — Medication 500 MILLIGRAM(S): at 05:08

## 2019-10-21 RX ADMIN — Medication 650 MILLIGRAM(S): at 16:58

## 2019-10-21 RX ADMIN — GABAPENTIN 300 MILLIGRAM(S): 400 CAPSULE ORAL at 08:06

## 2019-10-21 RX ADMIN — Medication 10 MILLIGRAM(S): at 04:43

## 2019-10-21 RX ADMIN — Medication 500 MILLIGRAM(S): at 17:11

## 2019-10-21 RX ADMIN — Medication 325 MILLIGRAM(S): at 17:11

## 2019-10-21 RX ADMIN — Medication 81 MILLIGRAM(S): at 11:13

## 2019-10-21 RX ADMIN — GABAPENTIN 600 MILLIGRAM(S): 400 CAPSULE ORAL at 21:25

## 2019-10-21 RX ADMIN — ENOXAPARIN SODIUM 40 MILLIGRAM(S): 100 INJECTION SUBCUTANEOUS at 11:13

## 2019-10-21 RX ADMIN — Medication 100 MILLIGRAM(S): at 21:25

## 2019-10-21 RX ADMIN — AMLODIPINE BESYLATE 5 MILLIGRAM(S): 2.5 TABLET ORAL at 21:24

## 2019-10-21 RX ADMIN — Medication 650 MILLIGRAM(S): at 17:11

## 2019-10-21 RX ADMIN — LIDOCAINE 1 APPLICATION(S): 4 CREAM TOPICAL at 08:06

## 2019-10-21 NOTE — PROGRESS NOTE ADULT - ASSESSMENT
70 yo female with thoracoabdominal aneurysm open repair complicated by cervical myelopathy, dysphagia, neurogenic bowel and bladder- pt/ot/dvt ppx, pain meds, asa    Anxiety- Xanax    Hypertension- amlodipine    episode of paroxysmal Atrial Fibrillation converted to sinus- lopressor, amiodarone,  asa    Neurogenic Bladder: IC ,  pt/ot, Flomax    Hyponatremia- monitor    Anemia- likely related to blood loss during surgery, [id number]    DVT prophylaxis: Lovenox     will follow

## 2019-10-21 NOTE — PROGRESS NOTE ADULT - SUBJECTIVE AND OBJECTIVE BOX
HPI:   69 year-old female with hx of "adrian-aorta" s/p aortic valve replacement, admitted electively to [facility] on 8/28 for open TAAA repair due to enlarging thoracoabdominal aortic aneurysm. Post-operatively it was found that she had loss of strength in lower extremities, R>L. Repeat imaging was negative for hematoma. Workup for RLE weakness ordered by neurology, felt weakness to be due to cervical myelopathy. Urology consulted 9/23 for urinary retention, felt to be neurogenic in nature, recommended continue muñoz.       REVIEW OF SYSTEMS        Vital Signs Last 24 Hrs  T(C): 36.8 (21 Oct 2019 07:59), Max: 37.1 (20 Oct 2019 21:40)  T(F): 98.2 (21 Oct 2019 07:59), Max: 98.7 (20 Oct 2019 21:40)  HR: 72 (21 Oct 2019 07:59) (67 - 78)  BP: 121/64 (21 Oct 2019 07:59) (118/77 - 131/65)  RR: 14 (21 Oct 2019 07:59) (14 - 16)  SpO2: 98% (21 Oct 2019 07:59) (95% - 98%)    10-16    139  |  103  |  21  ----------------------------<  96  3.5   |  27  |  0.96    Ca    8.8      16 Oct 2019 05:15                            8.2    6.42  )-----------( 268      ( 14 Oct 2019 05:18 )             25.2       Physical Exam:  Gen - NAD, Comfortable  	Pulm - CTAB  	Cardiovascular - RRR, S1S2  	Chest wall: Sternal incision wound-clean, no erythema or drainage noted  	Posterior lateral incision wound on the left- clean, no erythema or drainage, small skin tear seen next to incision site healing well, decreased in size  	Abdomen - Soft, NT/ND, +BS  	Extremities - No C/C/E, No calf tenderness  	Neuro-  	   Cognitive - AAOx3  	   Cranial Nerves - CN 2-12 intact  	   Motor -  Bilateral Upper extremities 5/5   	                  LEFT    LE - HF 4+/5, KE 4+/5, DF 5/5, PF 5/5  	                  RIGHT LE - HF 3/5, KE 4/5, DF 4/5, PF 4/5     	   Sensory - Intact to LT bilaterally   	   Tone - normal  	Psychiatric - Mood stable, Affect WNL        MEDICATIONS  (STANDING):  ALPRAZolam 0.25 milliGRAM(s) Oral <User Schedule>  amiodarone    Tablet 200 milliGRAM(s) Oral daily  amLODIPine   Tablet 5 milliGRAM(s) Oral at bedtime  ascorbic acid 500 milliGRAM(s) Oral two times a day  aspirin  chewable 81 milliGRAM(s) Oral daily  bisacodyl Suppository 10 milliGRAM(s) Rectal <User Schedule>  docusate sodium 100 milliGRAM(s) Oral at bedtime  enoxaparin Injectable 40 milliGRAM(s) SubCutaneous daily  ferrous    sulfate 325 milliGRAM(s) Oral two times a day  folic acid 1 milliGRAM(s) Oral daily  gabapentin 300 milliGRAM(s) Oral <User Schedule>  gabapentin 600 milliGRAM(s) Oral <User Schedule>  influenza   Vaccine 0.5 milliLiter(s) IntraMuscular once  lidocaine 2% Gel 1 Application(s) Topical <User Schedule>  metoprolol succinate ER 12.5 milliGRAM(s) Oral every 12 hours  midodrine. 2.5 milliGRAM(s) Oral <User Schedule>  multivitamin 1 Tablet(s) Oral daily  pantoprazole    Tablet 40 milliGRAM(s) Oral before breakfast  psyllium Powder 1 Packet(s) Oral two times a day  tamsulosin 0.4 milliGRAM(s) Oral at bedtime    MEDICATIONS  (PRN):  acetaminophen   Tablet .. 650 milliGRAM(s) Oral every 6 hours PRN Mild Pain (1 - 3)  ALPRAZolam 0.25 milliGRAM(s) Oral every 8 hours PRN anxiety  mineral oil 30 milliLiter(s) Oral two times a day PRN dry mouth  oxyCODONE    IR 5 milliGRAM(s) Oral every 4 hours PRN Moderate Pain (4 - 6) HPI:   69 year-old female with hx of "adrian-aorta" s/p aortic valve replacement, admitted electively to [facility] on 8/28 for open TAAA repair due to enlarging thoracoabdominal aortic aneurysm. Post-operatively it was found that she had loss of strength in lower extremities, R>L. Repeat imaging was negative for hematoma. Workup for RLE weakness ordered by neurology, felt weakness to be due to cervical myelopathy. Urology consulted 9/23 for urinary retention, felt to be neurogenic in nature, recommended continue muñoz.       REVIEW OF SYSTEMS  Denies cardio or pulm complaints.   Pain controlled. Tolerating abdominal binder in therapies.   Ambulated 10 steps this morning. Very excited about progress.   Able to give self suppository this morning with + BM.   Did urinate in brief yesterday after having sensation to urinate however did not get to the commode on time. Getting to commode on time for BM.       Vital Signs Last 24 Hrs  T(C): 36.8 (21 Oct 2019 07:59), Max: 37.1 (20 Oct 2019 21:40)  T(F): 98.2 (21 Oct 2019 07:59), Max: 98.7 (20 Oct 2019 21:40)  HR: 72 (21 Oct 2019 07:59) (67 - 78)  BP: 121/64 (21 Oct 2019 07:59) (118/77 - 131/65)  RR: 14 (21 Oct 2019 07:59) (14 - 16)  SpO2: 98% (21 Oct 2019 07:59) (95% - 98%)    10-16    139  |  103  |  21  ----------------------------<  96  3.5   |  27  |  0.96    Ca    8.8      16 Oct 2019 05:15                            8.2    6.42  )-----------( 268      ( 14 Oct 2019 05:18 )             25.2       Physical Exam:  Gen - NAD, Comfortable  	Pulm - CTAB  	Cardiovascular - RRR, S1S2  	Chest wall: Sternal incision wound-clean, no erythema or drainage noted  	Posterior lateral incision wound on the left- clean, no erythema or drainage, small skin tear seen next to incision site healing well, decreased in size  	Abdomen - Soft, NT/ND, +BS  	Extremities - No C/C/E, No calf tenderness  	Neuro-  	   Cognitive - AAOx3  	   Cranial Nerves - CN 2-12 intact  	   Motor -  Bilateral Upper extremities 5/5   	                  LEFT    LE - HF 4+/5, KE 4+/5, DF 5/5, PF 5/5  	                  RIGHT LE - HF 3/5, KE 4/5, DF 4/5, PF 4/5     	   Sensory - Intact to LT bilaterally   	   Tone - normal  	Psychiatric - Mood stable, Affect WNL      Functional status:   ADLs:   Gait:   Transfers:       MEDICATIONS  (STANDING):  ALPRAZolam 0.25 milliGRAM(s) Oral <User Schedule>  amiodarone    Tablet 200 milliGRAM(s) Oral daily  amLODIPine   Tablet 5 milliGRAM(s) Oral at bedtime  ascorbic acid 500 milliGRAM(s) Oral two times a day  aspirin  chewable 81 milliGRAM(s) Oral daily  bisacodyl Suppository 10 milliGRAM(s) Rectal <User Schedule>  docusate sodium 100 milliGRAM(s) Oral at bedtime  enoxaparin Injectable 40 milliGRAM(s) SubCutaneous daily  ferrous    sulfate 325 milliGRAM(s) Oral two times a day  folic acid 1 milliGRAM(s) Oral daily  gabapentin 300 milliGRAM(s) Oral <User Schedule>  gabapentin 600 milliGRAM(s) Oral <User Schedule>  influenza   Vaccine 0.5 milliLiter(s) IntraMuscular once  lidocaine 2% Gel 1 Application(s) Topical <User Schedule>  metoprolol succinate ER 12.5 milliGRAM(s) Oral every 12 hours  midodrine. 2.5 milliGRAM(s) Oral <User Schedule>  multivitamin 1 Tablet(s) Oral daily  pantoprazole    Tablet 40 milliGRAM(s) Oral before breakfast  psyllium Powder 1 Packet(s) Oral two times a day  tamsulosin 0.4 milliGRAM(s) Oral at bedtime    MEDICATIONS  (PRN):  acetaminophen   Tablet .. 650 milliGRAM(s) Oral every 6 hours PRN Mild Pain (1 - 3)  ALPRAZolam 0.25 milliGRAM(s) Oral every 8 hours PRN anxiety  mineral oil 30 milliLiter(s) Oral two times a day PRN dry mouth  oxyCODONE    IR 5 milliGRAM(s) Oral every 4 hours PRN Moderate Pain (4 - 6) HPI:   69 year-old female with hx of "adrian-aorta" s/p aortic valve replacement, admitted electively to [facility] on 8/28 for open TAAA repair due to enlarging thoracoabdominal aortic aneurysm. Post-operatively it was found that she had loss of strength in lower extremities, R>L. Repeat imaging was negative for hematoma. Workup for RLE weakness ordered by neurology, felt weakness to be due to cervical myelopathy. Urology consulted 9/23 for urinary retention, felt to be neurogenic in nature, recommended continue muñoz.       REVIEW OF SYSTEMS  Denies cardio or pulm complaints.   Pain controlled. Tolerating abdominal binder in therapies.   Ambulated 10 steps this morning. Very excited about progress.   Able to give self suppository this morning with + BM.   Did urinate in brief yesterday after having sensation to urinate however did not get to the commode on time. Getting to commode on time for BM.       Vital Signs Last 24 Hrs  T(C): 36.8 (21 Oct 2019 07:59), Max: 37.1 (20 Oct 2019 21:40)  T(F): 98.2 (21 Oct 2019 07:59), Max: 98.7 (20 Oct 2019 21:40)  HR: 72 (21 Oct 2019 07:59) (67 - 78)  BP: 121/64 (21 Oct 2019 07:59) (118/77 - 131/65)  RR: 14 (21 Oct 2019 07:59) (14 - 16)  SpO2: 98% (21 Oct 2019 07:59) (95% - 98%)    10-16    139  |  103  |  21  ----------------------------<  96  3.5   |  27  |  0.96    Ca    8.8      16 Oct 2019 05:15                            8.2    6.42  )-----------( 268      ( 14 Oct 2019 05:18 )             25.2       Physical Exam:  Gen - NAD, Comfortable  	Pulm - CTAB  	Cardiovascular - RRR, S1S2  	Chest wall: Sternal incision wound-clean, no erythema or drainage noted  	Posterior lateral incision wound on the left- clean, no erythema or drainage, small skin tear seen next to incision site healing well, decreased in size  	Abdomen - Soft, NT/ND, +BS  	Extremities - No C/C/E, No calf tenderness  	Neuro-  	   Cognitive - AAOx3  	   Cranial Nerves - CN 2-12 intact  	   Motor -  Bilateral Upper extremities 5/5   	                  LEFT    LE - HF 4+/5, KE 4+/5, DF 5/5, PF 5/5  	                  RIGHT LE - HF 3/5, KE 4/5, DF 4/5, PF 4/5     	   Sensory - Intact to LT bilaterally   	   Tone - normal  	Psychiatric - Mood stable, Affect WNL      Functional status:   ADLs: uses slide board onto commode with supervision and back to bed with supervision, able to dress self while in bed, able to straight cath self and admisinster suppository while in bed with supervision, still requiring some assist for hygiene after BM  Gait: 10 steps with RW and assist this morning (first day able to attempt ambulation)   Transfers: CG        MEDICATIONS  (STANDING):  ALPRAZolam 0.25 milliGRAM(s) Oral <User Schedule>  amiodarone    Tablet 200 milliGRAM(s) Oral daily  amLODIPine   Tablet 5 milliGRAM(s) Oral at bedtime  ascorbic acid 500 milliGRAM(s) Oral two times a day  aspirin  chewable 81 milliGRAM(s) Oral daily  bisacodyl Suppository 10 milliGRAM(s) Rectal <User Schedule>  docusate sodium 100 milliGRAM(s) Oral at bedtime  enoxaparin Injectable 40 milliGRAM(s) SubCutaneous daily  ferrous    sulfate 325 milliGRAM(s) Oral two times a day  folic acid 1 milliGRAM(s) Oral daily  gabapentin 300 milliGRAM(s) Oral <User Schedule>  gabapentin 600 milliGRAM(s) Oral <User Schedule>  influenza   Vaccine 0.5 milliLiter(s) IntraMuscular once  lidocaine 2% Gel 1 Application(s) Topical <User Schedule>  metoprolol succinate ER 12.5 milliGRAM(s) Oral every 12 hours  midodrine. 2.5 milliGRAM(s) Oral <User Schedule>  multivitamin 1 Tablet(s) Oral daily  pantoprazole    Tablet 40 milliGRAM(s) Oral before breakfast  psyllium Powder 1 Packet(s) Oral two times a day  tamsulosin 0.4 milliGRAM(s) Oral at bedtime    MEDICATIONS  (PRN):  acetaminophen   Tablet .. 650 milliGRAM(s) Oral every 6 hours PRN Mild Pain (1 - 3)  ALPRAZolam 0.25 milliGRAM(s) Oral every 8 hours PRN anxiety  mineral oil 30 milliLiter(s) Oral two times a day PRN dry mouth  oxyCODONE    IR 5 milliGRAM(s) Oral every 4 hours PRN Moderate Pain (4 - 6)

## 2019-10-21 NOTE — PROGRESS NOTE ADULT - ASSESSMENT
This is a 68 yo female with thoracoabdominal aneurysm open repair complicated by cervical myelopathy, dysphagia, neurogenic bowel and bladder.       #Cervical Myelopathy  Continue comprehensive acute rehabilitation program including PT/OT/Swallowing therapy  -Dysphagia diet-soft with thin liquids    -Neuropathic pain- continue gabapentin   -Neurogenic bladder: continue toileting program and straight catheterizations q4 hrs       #Anxiety- Xanax, home med  Scheduled xanax 0.25mg q AM prior to therapies.     #Hypertension-- amlodipine 7.5mg qd  Decreased norvasc to 5mg and added midodrine 2.5mg q8am on 10/18  Added teds and abdominal binder (add lidocaine gel to abdomen prior to application of abdominal binder qAM)  Continue to monitor-Midodrine held yesterday due to SBP of 160    #Atrial Fibrillation- - rate controlled  with lopressor 12.5mg bid and amiodarone 200mg qd    #Thoracoabdominal aneurysm open repair- Asa, bp control     #Neurogenic Bladder: st cath to q 4 hrs on 10/14  -continue flomax 0.4mg qHS     #Neurogenic bowel: continue suppository qAM and toileting program     #Hyponatremia- stable, continue to monitor     #Hypokalemia- resolved     #Anemia  -stable       #Eosinophilia- AEC 0.45, continue to monitor     #DVT prophylaxis: lovenox 40mg sq daily This is a 68 yo female with thoracoabdominal aneurysm open repair complicated by cervical myelopathy, dysphagia, neurogenic bowel and bladder.       #Cervical Myelopathy  Continue comprehensive acute rehabilitation program including PT/OT/Swallowing therapy  -Dysphagia diet-soft with thin liquids    -Neuropathic pain- continue gabapentin   -Neurogenic bladder: continue toileting program and straight catheterizations q4 hrs       #Anxiety- Xanax, home med  Scheduled xanax 0.25mg q AM prior to therapies.     #Hypertension-- amlodipine 7.5mg qd  Decreased norvasc to 5mg and added midodrine 2.5mg q8am on 10/18  Added teds and abdominal binder (add lidocaine gel to abdomen prior to application of abdominal binder qAM)  Continue to monitor-Midodrine held yesterday due to SBP of 160    #Atrial Fibrillation- - rate controlled  with lopressor 12.5mg bid and amiodarone 200mg qd    #Thoracoabdominal aneurysm open repair- Asa, bp control     #Neurogenic Bladder: st cath to q 4 hrs on 10/14  -continue flomax 0.4mg qHS     #Neurogenic bowel: continue suppository qAM and toileting program     #Hyponatremia- stable, continue to monitor     #Hypokalemia- resolved     #Anemia  -stable     #Eosinophilia- AEC 0.45, continue to monitor     #DVT prophylaxis: lovenox 40mg sq daily This is a 68 yo female with thoracoabdominal aneurysm open repair complicated by cervical myelopathy, dysphagia, neurogenic bowel and bladder.       #Cervical Myelopathy  Continue comprehensive acute rehabilitation program including PT/OT/Swallowing therapy, will trial the DOMINIQUE robotic to assist with strengthening and ambulation.  -Dysphagia diet-soft with thin liquids    -Neuropathic pain- continue gabapentin   -Neurogenic bladder: continue toileting program and straight catheterizations q4 hrs       #Anxiety- Xanax, home med  Scheduled xanax 0.25mg q AM prior to therapies.     #Hypertension-- amlodipine 7.5mg qd  Decreased norvasc to 5mg and added midodrine 2.5mg q8am on 10/18  Added teds and abdominal binder (add lidocaine gel to abdomen prior to application of abdominal binder qAM)  Continue to monitor-Midodrine held yesterday due to SBP of 160    #Atrial Fibrillation- - rate controlled  with lopressor 12.5mg bid and amiodarone 200mg qd    #Thoracoabdominal aneurysm open repair- Asa, bp control     #Neurogenic Bladder: st cath to q 4 hrs on 10/14  -continue flomax 0.4mg qHS     #Neurogenic bowel: continue suppository qAM and toileting program     #Hyponatremia- stable, continue to monitor     #Hypokalemia- resolved     #Anemia  -stable     #Eosinophilia- AEC 0.45, continue to monitor     #DVT prophylaxis: lovenox 40mg sq daily

## 2019-10-21 NOTE — PROGRESS NOTE ADULT - SUBJECTIVE AND OBJECTIVE BOX
70 yo female with thoracoabdominal aneurysm open repair complicated by cervical myelopathy, dysphagia, neurogenic bowel and bladder  seen at the bedside, no n/v, no sob. no events    Vital Signs Last 24 Hrs  T(C): 36.8 (21 Oct 2019 07:59), Max: 37.1 (20 Oct 2019 21:40)  T(F): 98.2 (21 Oct 2019 07:59), Max: 98.7 (20 Oct 2019 21:40)  HR: 72 (21 Oct 2019 07:59) (67 - 78)  BP: 121/64 (21 Oct 2019 07:59) (118/77 - 131/65)  BP(mean): --  RR: 14 (21 Oct 2019 07:59) (14 - 16)  SpO2: 98% (21 Oct 2019 07:59) (95% - 98%)    GENERAL- NAD  EAR/NOSE/MOUTH/THROAT - no pharyngeal exudates, no oral lesions  MMM  EYES- SIXTO, conjunctiva and Sclera clear  NECK- supple  RESPIRATORY-  clear to auscultation bilaterally  CARDIOVASCULAR - SIS2, RRR  GI - soft NT BS present  EXTREMITIES- no pedal edema  NEUROLOGY- right LE weakness  SKIN- no rashes, warm to touch, incision clean, small area of skin tear on the left posterior lateral thorax area  PSYCHIATRY- AAO X 3

## 2019-10-22 PROCEDURE — 99232 SBSQ HOSP IP/OBS MODERATE 35: CPT | Mod: GC

## 2019-10-22 RX ORDER — ACETAMINOPHEN 500 MG
650 TABLET ORAL ONCE
Refills: 0 | Status: COMPLETED | OUTPATIENT
Start: 2019-10-22 | End: 2019-10-22

## 2019-10-22 RX ADMIN — AMIODARONE HYDROCHLORIDE 200 MILLIGRAM(S): 400 TABLET ORAL at 06:00

## 2019-10-22 RX ADMIN — Medication 100 MILLIGRAM(S): at 22:20

## 2019-10-22 RX ADMIN — Medication 1 MILLIGRAM(S): at 12:28

## 2019-10-22 RX ADMIN — GABAPENTIN 600 MILLIGRAM(S): 400 CAPSULE ORAL at 22:21

## 2019-10-22 RX ADMIN — GABAPENTIN 300 MILLIGRAM(S): 400 CAPSULE ORAL at 06:01

## 2019-10-22 RX ADMIN — Medication 12.5 MILLIGRAM(S): at 17:42

## 2019-10-22 RX ADMIN — TAMSULOSIN HYDROCHLORIDE 0.4 MILLIGRAM(S): 0.4 CAPSULE ORAL at 22:21

## 2019-10-22 RX ADMIN — Medication 650 MILLIGRAM(S): at 13:36

## 2019-10-22 RX ADMIN — Medication 650 MILLIGRAM(S): at 14:30

## 2019-10-22 RX ADMIN — Medication 1 TABLET(S): at 12:28

## 2019-10-22 RX ADMIN — Medication 10 MILLIGRAM(S): at 06:00

## 2019-10-22 RX ADMIN — Medication 650 MILLIGRAM(S): at 09:30

## 2019-10-22 RX ADMIN — GABAPENTIN 600 MILLIGRAM(S): 400 CAPSULE ORAL at 13:36

## 2019-10-22 RX ADMIN — Medication 650 MILLIGRAM(S): at 08:59

## 2019-10-22 RX ADMIN — Medication 500 MILLIGRAM(S): at 06:01

## 2019-10-22 RX ADMIN — MIDODRINE HYDROCHLORIDE 2.5 MILLIGRAM(S): 2.5 TABLET ORAL at 08:56

## 2019-10-22 RX ADMIN — ENOXAPARIN SODIUM 40 MILLIGRAM(S): 100 INJECTION SUBCUTANEOUS at 12:28

## 2019-10-22 RX ADMIN — Medication 81 MILLIGRAM(S): at 12:29

## 2019-10-22 RX ADMIN — Medication 12.5 MILLIGRAM(S): at 06:01

## 2019-10-22 RX ADMIN — AMLODIPINE BESYLATE 5 MILLIGRAM(S): 2.5 TABLET ORAL at 22:21

## 2019-10-22 RX ADMIN — Medication 325 MILLIGRAM(S): at 17:43

## 2019-10-22 RX ADMIN — Medication 0.25 MILLIGRAM(S): at 08:56

## 2019-10-22 RX ADMIN — PANTOPRAZOLE SODIUM 40 MILLIGRAM(S): 20 TABLET, DELAYED RELEASE ORAL at 06:01

## 2019-10-22 RX ADMIN — Medication 325 MILLIGRAM(S): at 06:03

## 2019-10-22 RX ADMIN — Medication 500 MILLIGRAM(S): at 17:42

## 2019-10-22 RX ADMIN — LIDOCAINE 1 APPLICATION(S): 4 CREAM TOPICAL at 08:57

## 2019-10-22 NOTE — PROGRESS NOTE ADULT - SUBJECTIVE AND OBJECTIVE BOX
HPI:   69 year-old female with hx of "adrian-aorta" s/p aortic valve replacement, admitted electively to [facility] on 8/28 for open TAAA repair due to enlarging thoracoabdominal aortic aneurysm. Post-operatively it was found that she had loss of strength in lower extremities, R>L. Repeat imaging was negative for hematoma. Workup for RLE weakness ordered by neurology, felt weakness to be due to cervical myelopathy.         REVIEW OF SYSTEMS  Denies cardio or pulm complaints.   Pain controlled. Tolerating abdominal binder in therapies.   Able to give self suppository this morning with + BM.   All other ROS negative     Vital Signs Last 24 Hrs  T(C): 36.8 (22 Oct 2019 08:55), Max: 36.8 (22 Oct 2019 08:55)  T(F): 98.2 (22 Oct 2019 08:55), Max: 98.2 (22 Oct 2019 08:55)  HR: 68 (22 Oct 2019 09:16) (65 - 72)  BP: 133/62 (22 Oct 2019 09:16) (104/49 - 159/65)  BP(mean): --  RR: 14 (22 Oct 2019 08:55) (14 - 15)  SpO2: 97% (22 Oct 2019 08:55) (97% - 98%)      Physical Exam:  Gen - NAD, Comfortable  	Pulm - CTAB  	Cardiovascular - RRR, S1S2  	Chest wall: Sternal incision wound-clean, no erythema or drainage noted  	Posterior lateral incision wound on the left- clean, no erythema or drainage, small skin tear seen next to incision site healing well, decreased in size  	Abdomen - Soft, NT/ND, +BS  	Extremities - No C/C/E, No calf tenderness  	Neuro-  	   Cognitive - AAOx3  	   Cranial Nerves - CN 2-12 intact  	   Motor -  Bilateral Upper extremities 5/5   	                  LEFT    LE - HF 4+/5, KE 4+/5, DF 5/5, PF 5/5  	                  RIGHT LE - HF 2/5, KE 2/5, DF 4/5, PF 4/5     	   Sensory - Intact to LT bilaterally, decreased proprioception.    	   Tone - normal  	Psychiatric - Mood stable, Affect WNL      Functional status:   ADLs: uses slide board onto commode with supervision and back to bed with supervision, able to dress self while in bed, able to straight cath self and administer suppository while in bed with supervision, still requiring some assist for hygiene after BM  Gait: 10 steps with RW and max assist    Transfers: CG        MEDICATIONS  (STANDING):  ALPRAZolam 0.25 milliGRAM(s) Oral <User Schedule>  amiodarone    Tablet 200 milliGRAM(s) Oral daily  amLODIPine   Tablet 5 milliGRAM(s) Oral at bedtime  ascorbic acid 500 milliGRAM(s) Oral two times a day  aspirin  chewable 81 milliGRAM(s) Oral daily  bisacodyl Suppository 10 milliGRAM(s) Rectal <User Schedule>  docusate sodium 100 milliGRAM(s) Oral at bedtime  enoxaparin Injectable 40 milliGRAM(s) SubCutaneous daily  ferrous    sulfate 325 milliGRAM(s) Oral two times a day  folic acid 1 milliGRAM(s) Oral daily  gabapentin 300 milliGRAM(s) Oral <User Schedule>  gabapentin 600 milliGRAM(s) Oral <User Schedule>  influenza   Vaccine 0.5 milliLiter(s) IntraMuscular once  lidocaine 2% Gel 1 Application(s) Topical <User Schedule>  metoprolol succinate ER 12.5 milliGRAM(s) Oral every 12 hours  midodrine. 2.5 milliGRAM(s) Oral <User Schedule>  multivitamin 1 Tablet(s) Oral daily  pantoprazole    Tablet 40 milliGRAM(s) Oral before breakfast  psyllium Powder 1 Packet(s) Oral two times a day  tamsulosin 0.4 milliGRAM(s) Oral at bedtime    MEDICATIONS  (PRN):  acetaminophen   Tablet .. 650 milliGRAM(s) Oral every 6 hours PRN Mild Pain (1 - 3)  ALPRAZolam 0.25 milliGRAM(s) Oral every 8 hours PRN anxiety  mineral oil 30 milliLiter(s) Oral two times a day PRN dry mouth  oxyCODONE    IR 5 milliGRAM(s) Oral every 4 hours PRN Moderate Pain (4 - 6)

## 2019-10-22 NOTE — CHART NOTE - NSCHARTNOTEFT_GEN_A_CORE
Nutrition Follow Up Note  Hospital Course   (Per Electronic Medical Record):     Source:   Patient [X]  Medical Record [X]      Diet:   Soft (Dysphagia 3) Diet w/ Thin Liquids  Tolerates Diet Well  No Chewing/Swallowing Difficulties  No Recent Nausea, Vomiting, Diarrhea or Constipation  Consumes % of Meals (as Per Documentation)   States Good PO Intake/Appetite     Enteral/Parenteral Nutrition: N/A    Current Weight: 143.7lb on 10/20  Obtain New Weight  Obtain Weights Weekly     Pertinent Medications: MEDICATIONS  (STANDING):  ALPRAZolam 0.25 milliGRAM(s) Oral <User Schedule>  amiodarone    Tablet 200 milliGRAM(s) Oral daily  amLODIPine   Tablet 5 milliGRAM(s) Oral at bedtime  ascorbic acid 500 milliGRAM(s) Oral two times a day  aspirin  chewable 81 milliGRAM(s) Oral daily  bisacodyl Suppository 10 milliGRAM(s) Rectal <User Schedule>  docusate sodium 100 milliGRAM(s) Oral at bedtime  enoxaparin Injectable 40 milliGRAM(s) SubCutaneous daily  ferrous    sulfate 325 milliGRAM(s) Oral two times a day  folic acid 1 milliGRAM(s) Oral daily  gabapentin 300 milliGRAM(s) Oral <User Schedule>  gabapentin 600 milliGRAM(s) Oral <User Schedule>  influenza   Vaccine 0.5 milliLiter(s) IntraMuscular once  lidocaine 2% Gel 1 Application(s) Topical <User Schedule>  metoprolol succinate ER 12.5 milliGRAM(s) Oral every 12 hours  midodrine. 2.5 milliGRAM(s) Oral <User Schedule>  multivitamin 1 Tablet(s) Oral daily  pantoprazole    Tablet 40 milliGRAM(s) Oral before breakfast  psyllium Powder 1 Packet(s) Oral two times a day  tamsulosin 0.4 milliGRAM(s) Oral at bedtime    MEDICATIONS  (PRN):  acetaminophen   Tablet .. 650 milliGRAM(s) Oral every 6 hours PRN Mild Pain (1 - 3)  ALPRAZolam 0.25 milliGRAM(s) Oral every 8 hours PRN anxiety  mineral oil 30 milliLiter(s) Oral two times a day PRN dry mouth  oxyCODONE    IR 5 milliGRAM(s) Oral every 4 hours PRN Moderate Pain (4 - 6)    Pertinent Labs:      Skin: No Pressure Ulcers  Surgical Incision on Left Scapula to Left Abd Region  (as Per Nursing Flow Sheet)     Edema: None Noted     Last Bowel Movement: on 10/20    Estimated Needs:   [X] No Change Since Previous Assessment    Previous Nutrition Diagnosis:   Severe Malnutrition  Chewing Difficulties    Nutrition Diagnosis is [X] Ongoing - Continues on Soft (Dysphagia 3) Diet & Declined Nutrition Supplementation     New Nutrition Diagnosis: [X] Not Applicable    Interventions:   1. Recommend Continue Nutrition Plan of Care     Monitoring & Evaluation:   [X] Weights   [X] PO Intake   [X] Follow Up (Per Protocol)  [X] Tolerance to Diet Prescription   [X] Other: Labs     Registered Dietitian/Nutritionist Remains Available.  Thomas Patten RDN    Pager # [phone]  Phone# [phone]

## 2019-10-22 NOTE — PROGRESS NOTE ADULT - ASSESSMENT
This is a 70 yo female with thoracoabdominal aneurysm open repair complicated by cervical myelopathy, dysphagia, neurogenic bowel and bladder.       #Cervical Myelopathy  Continue comprehensive acute rehabilitation program including PT/OT will trial the DOMINIQUE robotic to assist with strengthening and ambulation.  -Dysphagia diet-soft with thin liquids    -Neuropathic pain- continue gabapentin, improved    -Neurogenic bladder: continue toileting program and straight catheterizations q4 hrs       #Anxiety- Xanax, home med      #Hypertension/Orthostasis -- amlodipine 5mg qd  midodrine 2.5mg q8am  teds and abdominal binder (add lidocaine gel to abdomen prior to application of abdominal binder qAM)  Continue to monitor-Midodrine held yesterday due to SBP of 160    #Atrial Fibrillation- - rate controlled  with lopressor 12.5mg bid and amiodarone 200mg qd    #Thoracoabdominal aneurysm open repair- Asa, bp control     #Neurogenic Bladder: st cath to q 4 hrs -continue flomax 0.4mg qHS     #Neurogenic bowel: continue suppository qAM and toileting program- pt now able to do with set up     #Anemia  -stable , asymptomatic     #Eosinophilia- AEC 0.45, continue to monitor     #DVT prophylaxis: lovenox 40mg sq daily     Barriers to dc: pt lives alone, requires assist with ADL's. She continues to make remarkable progress in PT and would benefit from an extension.

## 2019-10-23 PROCEDURE — 99232 SBSQ HOSP IP/OBS MODERATE 35: CPT | Mod: GC

## 2019-10-23 PROCEDURE — 99232 SBSQ HOSP IP/OBS MODERATE 35: CPT

## 2019-10-23 RX ORDER — MIDODRINE HYDROCHLORIDE 2.5 MG/1
5 TABLET ORAL
Refills: 0 | Status: DISCONTINUED | OUTPATIENT
Start: 2019-10-23 | End: 2019-10-29

## 2019-10-23 RX ADMIN — MIDODRINE HYDROCHLORIDE 2.5 MILLIGRAM(S): 2.5 TABLET ORAL at 08:16

## 2019-10-23 RX ADMIN — Medication 1 TABLET(S): at 12:11

## 2019-10-23 RX ADMIN — GABAPENTIN 600 MILLIGRAM(S): 400 CAPSULE ORAL at 17:38

## 2019-10-23 RX ADMIN — ENOXAPARIN SODIUM 40 MILLIGRAM(S): 100 INJECTION SUBCUTANEOUS at 12:12

## 2019-10-23 RX ADMIN — Medication 500 MILLIGRAM(S): at 17:37

## 2019-10-23 RX ADMIN — Medication 10 MILLIGRAM(S): at 05:33

## 2019-10-23 RX ADMIN — Medication 1 PACKET(S): at 05:32

## 2019-10-23 RX ADMIN — Medication 325 MILLIGRAM(S): at 05:34

## 2019-10-23 RX ADMIN — Medication 12.5 MILLIGRAM(S): at 17:37

## 2019-10-23 RX ADMIN — PANTOPRAZOLE SODIUM 40 MILLIGRAM(S): 20 TABLET, DELAYED RELEASE ORAL at 05:32

## 2019-10-23 RX ADMIN — Medication 650 MILLIGRAM(S): at 21:00

## 2019-10-23 RX ADMIN — Medication 0.25 MILLIGRAM(S): at 08:30

## 2019-10-23 RX ADMIN — AMIODARONE HYDROCHLORIDE 200 MILLIGRAM(S): 400 TABLET ORAL at 05:33

## 2019-10-23 RX ADMIN — Medication 325 MILLIGRAM(S): at 18:43

## 2019-10-23 RX ADMIN — Medication 100 MILLIGRAM(S): at 21:08

## 2019-10-23 RX ADMIN — Medication 1 MILLIGRAM(S): at 12:11

## 2019-10-23 RX ADMIN — Medication 12.5 MILLIGRAM(S): at 05:32

## 2019-10-23 RX ADMIN — GABAPENTIN 600 MILLIGRAM(S): 400 CAPSULE ORAL at 21:08

## 2019-10-23 RX ADMIN — Medication 650 MILLIGRAM(S): at 10:14

## 2019-10-23 RX ADMIN — Medication 650 MILLIGRAM(S): at 18:43

## 2019-10-23 RX ADMIN — Medication 500 MILLIGRAM(S): at 05:33

## 2019-10-23 RX ADMIN — Medication 81 MILLIGRAM(S): at 12:11

## 2019-10-23 RX ADMIN — Medication 650 MILLIGRAM(S): at 11:00

## 2019-10-23 RX ADMIN — TAMSULOSIN HYDROCHLORIDE 0.4 MILLIGRAM(S): 0.4 CAPSULE ORAL at 21:08

## 2019-10-23 RX ADMIN — GABAPENTIN 300 MILLIGRAM(S): 400 CAPSULE ORAL at 06:41

## 2019-10-23 NOTE — PROGRESS NOTE ADULT - ASSESSMENT
This is a 68 yo female with thoracoabdominal aneurysm open repair complicated by cervical myelopathy, dysphagia, neurogenic bowel and bladder.       #Cervical Myelopathy  Continue comprehensive acute rehabilitation program including PT/OT will trial the DOMINIQUE robotic to assist with strengthening and ambulation.  -Dysphagia diet-soft with thin liquids    -Neuropathic pain- continue gabapentin, improved    -Neurogenic bladder: continue toileting program and straight catheterizations q4 hrs      #Anxiety- Xanax, home med      #Hypertension/Orthostasis -- amlodipine 5mg qd  midodrine 2.5mg q8am  teds and abdominal binder (add lidocaine gel to abdomen prior to application of abdominal binder qAM)      #Atrial Fibrillation- - rate controlled  with lopressor 12.5mg bid and amiodarone 200mg qd    #Thoracoabdominal aneurysm open repair- Asa, bp control     #Neurogenic bowel: continue suppository qAM and toileting program- pt now able to do with set up     #Anemia  -stable , asymptomatic     #Eosinophilia- AEC 0.45, continue to monitor     #DVT prophylaxis: lovenox 40mg sq daily     Barriers to dc: pt lives alone, requires assist with ADL's. She continues to make remarkable progress in PT and would benefit from an extension.  IDT rounds: dc set for 10/30 as pt continues to make progress

## 2019-10-23 NOTE — PROGRESS NOTE ADULT - SUBJECTIVE AND OBJECTIVE BOX
70 yo female with thoracoabdominal aneurysm open repair complicated by cervical myelopathy, dysphagia, neurogenic bowel and bladder  seen at the bedside, no n/v, no sob. no events, per PT, SBP  was in 90's this am. no dizziness, no headaches    Vital Signs Last 24 Hrs  T(C): 36.8 (23 Oct 2019 08:05), Max: 36.9 (22 Oct 2019 22:19)  T(F): 98.2 (23 Oct 2019 08:05), Max: 98.5 (22 Oct 2019 22:19)  HR: 62 (23 Oct 2019 08:05) (62 - 76)  BP: 105/56 (23 Oct 2019 08:05) (105/56 - 146/67)  BP(mean): --  RR: 14 (23 Oct 2019 08:05) (14 - 14)  SpO2: 94% (23 Oct 2019 08:05) (94% - 94%)        GENERAL- NAD  EAR/NOSE/MOUTH/THROAT - no pharyngeal exudates, no oral lesions  MMM  EYES- SIXTO, conjunctiva and Sclera clear  NECK- supple  RESPIRATORY- clear to auscultation bilaterally  CARDIOVASCULAR - SIS2, RRR  GI - soft NT BS present  EXTREMITIES- no pedal edema  NEUROLOGY- right LE weakness  SKIN- no rashes, warm to touch, incision clean  PSYCHIATRY- AAO X 3

## 2019-10-23 NOTE — PROGRESS NOTE ADULT - ASSESSMENT
70 yo female with thoracoabdominal aneurysm open repair complicated by cervical myelopathy, dysphagia, neurogenic bowel and bladder- pt/ot/dvt ppx, pain meds, asa    Anxiety- Xanax    orthostatic hypotension- midodrine can increase to 5 mg , d/w dr. pike    Hypertension- d/c amlodipine    episode of paroxysmal Atrial Fibrillation converted to sinus- c/w lopressor, amiodarone,  asa    Neurogenic Bladder: IC ,  pt/ot, Flomax    Hyponatremia- monitor    Anemia- likely related to blood loss during surgery, [id number]    DVT prophylaxis: Lovenox     will follow

## 2019-10-23 NOTE — PROGRESS NOTE ADULT - SUBJECTIVE AND OBJECTIVE BOX
HPI:   69 year-old female with hx of "adrian-aorta" s/p aortic valve replacement, admitted electively to [facility] on 8/28 for open TAAA repair due to enlarging thoracoabdominal aortic aneurysm. Post-operatively it was found that she had loss of strength in lower extremities, R>L. Repeat imaging was negative for hematoma. Workup for RLE weakness ordered by neurology, felt weakness to be due to cervical myelopathy.     Interval: had the urge to urinate last night and was able to urinate on commode.   Pain controlled.   Used Ga in PT yesterday with no orthostasis     REVIEW OF SYSTEMS  Denies cardio or pulm complaints.   Able to give self suppository this morning with + BM.   All other ROS negative     Vital Signs Last 24 Hrs  T(C): 36.8 (23 Oct 2019 08:05), Max: 36.9 (22 Oct 2019 22:19)  T(F): 98.2 (23 Oct 2019 08:05), Max: 98.5 (22 Oct 2019 22:19)  HR: 62 (23 Oct 2019 08:05) (62 - 76)  BP: 105/56 (23 Oct 2019 08:05) (105/56 - 146/67)  BP(mean): --  RR: 14 (23 Oct 2019 08:05) (14 - 14)  SpO2: 94% (23 Oct 2019 08:05) (94% - 94%)      Physical Exam:  Gen - NAD, Comfortable  	Chest wall: Sternal incision wound-clean, no erythema or drainage noted  	Posterior lateral incision wound on the left- clean, no erythema or drainage, small skin tear seen next to incision site healing well, decreased in size  	Abdomen - Soft, NT/ND, +BS  	Extremities - No C/C/E, No calf tenderness  	Neuro-  	   Cognitive - AAOx3  	   Cranial Nerves - CN 2-12 intact  	   Motor -  Bilateral Upper extremities 5/5   	                  LEFT    LE - HF 4+/5, KE 4+/5, DF 5/5, PF 5/5  	                  RIGHT LE - HF 2/5, KE 2/5, DF 4/5, PF 4/5     	   Sensory - Intact to LT bilaterally, decreased proprioception.    	   Tone - normal  	Psychiatric - Mood stable, Affect WNL      Functional status:   ADLs: uses slide board onto commode with supervision and back to bed with supervision, able to dress self while in bed, able to straight cath self and administer suppository while in bed with supervision, still requiring some assist for hygiene after BM  Gait: 10 steps with RW and max assist    Transfers: CG      MEDICATIONS  (STANDING):  amiodarone    Tablet 200 milliGRAM(s) Oral daily  amLODIPine   Tablet 5 milliGRAM(s) Oral at bedtime  ascorbic acid 500 milliGRAM(s) Oral two times a day  aspirin  chewable 81 milliGRAM(s) Oral daily  bisacodyl Suppository 10 milliGRAM(s) Rectal <User Schedule>  docusate sodium 100 milliGRAM(s) Oral at bedtime  enoxaparin Injectable 40 milliGRAM(s) SubCutaneous daily  ferrous    sulfate 325 milliGRAM(s) Oral two times a day  folic acid 1 milliGRAM(s) Oral daily  gabapentin 300 milliGRAM(s) Oral <User Schedule>  gabapentin 600 milliGRAM(s) Oral <User Schedule>  influenza   Vaccine 0.5 milliLiter(s) IntraMuscular once  lidocaine 2% Gel 1 Application(s) Topical <User Schedule>  metoprolol succinate ER 12.5 milliGRAM(s) Oral every 12 hours  midodrine. 2.5 milliGRAM(s) Oral <User Schedule>  multivitamin 1 Tablet(s) Oral daily  pantoprazole    Tablet 40 milliGRAM(s) Oral before breakfast  psyllium Powder 1 Packet(s) Oral two times a day  tamsulosin 0.4 milliGRAM(s) Oral at bedtime    MEDICATIONS  (PRN):  acetaminophen   Tablet .. 650 milliGRAM(s) Oral every 6 hours PRN Mild Pain (1 - 3)  mineral oil 30 milliLiter(s) Oral two times a day PRN dry mouth

## 2019-10-24 PROCEDURE — 99232 SBSQ HOSP IP/OBS MODERATE 35: CPT | Mod: GC

## 2019-10-24 PROCEDURE — 99232 SBSQ HOSP IP/OBS MODERATE 35: CPT

## 2019-10-24 RX ORDER — ALPRAZOLAM 0.25 MG
0.25 TABLET ORAL
Refills: 0 | Status: DISCONTINUED | OUTPATIENT
Start: 2019-10-24 | End: 2019-10-30

## 2019-10-24 RX ADMIN — Medication 12.5 MILLIGRAM(S): at 17:06

## 2019-10-24 RX ADMIN — LIDOCAINE 1 APPLICATION(S): 4 CREAM TOPICAL at 08:25

## 2019-10-24 RX ADMIN — Medication 100 MILLIGRAM(S): at 21:17

## 2019-10-24 RX ADMIN — TAMSULOSIN HYDROCHLORIDE 0.4 MILLIGRAM(S): 0.4 CAPSULE ORAL at 21:17

## 2019-10-24 RX ADMIN — Medication 500 MILLIGRAM(S): at 17:06

## 2019-10-24 RX ADMIN — GABAPENTIN 600 MILLIGRAM(S): 400 CAPSULE ORAL at 21:17

## 2019-10-24 RX ADMIN — Medication 500 MILLIGRAM(S): at 05:45

## 2019-10-24 RX ADMIN — Medication 12.5 MILLIGRAM(S): at 05:46

## 2019-10-24 RX ADMIN — GABAPENTIN 300 MILLIGRAM(S): 400 CAPSULE ORAL at 05:45

## 2019-10-24 RX ADMIN — Medication 650 MILLIGRAM(S): at 17:15

## 2019-10-24 RX ADMIN — Medication 81 MILLIGRAM(S): at 12:01

## 2019-10-24 RX ADMIN — MIDODRINE HYDROCHLORIDE 5 MILLIGRAM(S): 2.5 TABLET ORAL at 08:25

## 2019-10-24 RX ADMIN — Medication 1 TABLET(S): at 12:01

## 2019-10-24 RX ADMIN — Medication 1 PACKET(S): at 05:46

## 2019-10-24 RX ADMIN — Medication 650 MILLIGRAM(S): at 10:20

## 2019-10-24 RX ADMIN — Medication 10 MILLIGRAM(S): at 05:46

## 2019-10-24 RX ADMIN — Medication 325 MILLIGRAM(S): at 05:46

## 2019-10-24 RX ADMIN — Medication 325 MILLIGRAM(S): at 17:06

## 2019-10-24 RX ADMIN — GABAPENTIN 600 MILLIGRAM(S): 400 CAPSULE ORAL at 15:25

## 2019-10-24 RX ADMIN — AMIODARONE HYDROCHLORIDE 200 MILLIGRAM(S): 400 TABLET ORAL at 05:45

## 2019-10-24 RX ADMIN — Medication 650 MILLIGRAM(S): at 10:47

## 2019-10-24 RX ADMIN — ENOXAPARIN SODIUM 40 MILLIGRAM(S): 100 INJECTION SUBCUTANEOUS at 12:01

## 2019-10-24 RX ADMIN — PANTOPRAZOLE SODIUM 40 MILLIGRAM(S): 20 TABLET, DELAYED RELEASE ORAL at 05:45

## 2019-10-24 RX ADMIN — Medication 1 MILLIGRAM(S): at 12:01

## 2019-10-24 RX ADMIN — Medication 650 MILLIGRAM(S): at 17:48

## 2019-10-24 NOTE — PROGRESS NOTE ADULT - SUBJECTIVE AND OBJECTIVE BOX
HPI:   69 year-old female with hx of "adrian-aorta" s/p aortic valve replacement, admitted electively to [facility] on 8/28 for open TAAA repair due to enlarging thoracoabdominal aortic aneurysm. Post-operatively it was found that she had loss of strength in lower extremities, R>L. Repeat imaging was negative for hematoma. Workup for RLE weakness ordered by neurology, felt weakness to be due to cervical myelopathy.     Interval: Pain controlled. Less fatigue today      REVIEW OF SYSTEMS  Denies cardio or pulm complaints.   Able to give self suppository this morning with + BM.   All other ROS negative     Vital Signs Last 24 Hrs  T(C): 36.7 (24 Oct 2019 07:22), Max: 36.9 (23 Oct 2019 20:58)  T(F): 98.1 (24 Oct 2019 07:22), Max: 98.4 (23 Oct 2019 20:58)  HR: 71 (24 Oct 2019 07:22) (63 - 71)  BP: 134/55 (24 Oct 2019 07:22) (118/62 - 146/63)  BP(mean): --  RR: 14 (24 Oct 2019 07:22) (14 - 15)  SpO2: 97% (24 Oct 2019 07:22) (95% - 97%)    Physical Exam:  Gen - NAD, Comfortable  	Chest wall: Sternal incision wound-clean, no erythema or drainage noted  	Posterior lateral incision wound on the left- clean, no erythema or drainage, small skin tear seen next to incision site healing well, decreased in size  	Abdomen - Soft, NT/ND, +BS  	Extremities - No C/C/E, No calf tenderness  	Neuro-  	   Cognitive - AAOx3  	   Cranial Nerves - CN 2-12 intact  	   Motor -  Bilateral Upper extremities 5/5   	                  LEFT    LE - HF 4+/5, KE 4+/5, DF 5/5, PF 5/5  	                  RIGHT LE - HF 2/5, KE 2/5, DF 4/5, PF 4/5     	   Sensory - Intact to LT bilaterally, decreased proprioception.    	   Tone - normal  	Psychiatric - Mood stable, Affect WNL      Functional status:   ADLs: uses slide board onto commode with supervision and back to bed with supervision, able to dress self while in bed, able to straight cath self and administer suppository while in bed with supervision, still requiring some assist for hygiene after BM  Gait: 10 steps with RW and max assist    Transfers: CG      MEDICATIONS  (STANDING):  ALPRAZolam 0.25 milliGRAM(s) Oral <User Schedule>  amiodarone    Tablet 200 milliGRAM(s) Oral daily  ascorbic acid 500 milliGRAM(s) Oral two times a day  aspirin  chewable 81 milliGRAM(s) Oral daily  bisacodyl Suppository 10 milliGRAM(s) Rectal <User Schedule>  docusate sodium 100 milliGRAM(s) Oral at bedtime  enoxaparin Injectable 40 milliGRAM(s) SubCutaneous daily  ferrous    sulfate 325 milliGRAM(s) Oral two times a day  folic acid 1 milliGRAM(s) Oral daily  gabapentin 300 milliGRAM(s) Oral <User Schedule>  gabapentin 600 milliGRAM(s) Oral <User Schedule>  influenza   Vaccine 0.5 milliLiter(s) IntraMuscular once  lidocaine 2% Gel 1 Application(s) Topical <User Schedule>  metoprolol succinate ER 12.5 milliGRAM(s) Oral every 12 hours  midodrine. 5 milliGRAM(s) Oral <User Schedule>  multivitamin 1 Tablet(s) Oral daily  pantoprazole    Tablet 40 milliGRAM(s) Oral before breakfast  psyllium Powder 1 Packet(s) Oral two times a day  tamsulosin 0.4 milliGRAM(s) Oral at bedtime    MEDICATIONS  (PRN):  acetaminophen   Tablet .. 650 milliGRAM(s) Oral every 6 hours PRN Mild Pain (1 - 3)  mineral oil 30 milliLiter(s) Oral two times a day PRN dry mouth

## 2019-10-24 NOTE — PROGRESS NOTE ADULT - ASSESSMENT
This is a 70 yo female with thoracoabdominal aneurysm open repair complicated by cervical myelopathy, dysphagia, neurogenic bowel and bladder.       #Cervical Myelopathy  Continue comprehensive acute rehabilitation program including PT/OT will trial the DOMINIQUE robotic to assist with strengthening and ambulation.  -Dysphagia diet-soft with thin liquids    -Neuropathic pain- continue gabapentin, improved    -Neurogenic bladder: continue toileting program and straight catheterizations q4 hrs      #Anxiety- Xanax, home med      #Hypertension/Orthostasis -- amlodipine 5mg qd  midodrine 2.5mg q8am  teds and abdominal binder (add lidocaine gel to abdomen prior to application of abdominal binder qAM)      #Atrial Fibrillation- - rate controlled  with lopressor 12.5mg bid and amiodarone 200mg qd    #Thoracoabdominal aneurysm open repair- Asa, bp control     #Neurogenic bowel: continue suppository qAM and toileting program- pt now able to do with set up     #Anemia  -stable , asymptomatic     #Eosinophilia- AEC 0.45, continue to monitor     #DVT prophylaxis: lovenox 40mg sq daily     Barriers to dc: pt lives alone, requires assist with ADL's. She continues to make remarkable progress in PT and would benefit from an extension.  IDT rounds: dc set for 10/30 as pt continues to make progress

## 2019-10-24 NOTE — PROGRESS NOTE ADULT - SUBJECTIVE AND OBJECTIVE BOX
68 yo female with thoracoabdominal aneurysm open repair complicated by cervical myelopathy, dysphagia, neurogenic bowel and bladder  seen at the bedside, no n/v, no sob. no events,  no dizziness, no headaches      Vital Signs Last 24 Hrs  T(C): 36.7 (24 Oct 2019 07:22), Max: 36.9 (23 Oct 2019 20:58)  T(F): 98.1 (24 Oct 2019 07:22), Max: 98.4 (23 Oct 2019 20:58)  HR: 71 (24 Oct 2019 07:22) (63 - 71)  BP: 134/55 (24 Oct 2019 07:22) (118/62 - 146/63)  BP(mean): --  RR: 14 (24 Oct 2019 07:22) (14 - 15)  SpO2: 97% (24 Oct 2019 07:22) (95% - 97%)        GENERAL- NAD  EAR/NOSE/MOUTH/THROAT - no pharyngeal exudates, no oral lesions  MMM  EYES- SIXTO, conjunctiva and Sclera clear  NECK- supple  RESPIRATORY- clear to auscultation bilaterally  CARDIOVASCULAR - SIS2, RRR  GI - soft NT BS present  EXTREMITIES- no pedal edema  NEUROLOGY- right LE weakness  SKIN- no rashes, warm to touch, incision clean  PSYCHIATRY- AAO X 3

## 2019-10-24 NOTE — PROGRESS NOTE ADULT - ASSESSMENT
70 yo female with thoracoabdominal aneurysm open repair complicated by cervical myelopathy, dysphagia, neurogenic bowel and bladder- pt/ot/dvt ppx, pain meds, asa    Anxiety- Xanax    orthostatic hypotension- c/w midodrine  5 mg , bp improved in PT,  d/w dr. pike    Hypertension- d/c amlodipine    episode of paroxysmal Atrial Fibrillation converted to sinus- c/w lopressor, amiodarone,  asa    Neurogenic Bladder: IC ,  pt/ot, Flomax    Hyponatremia- monitor    Anemia- likely related to blood loss during surgery, feso4    DVT prophylaxis: Lovenox     will follow

## 2019-10-25 PROCEDURE — 99232 SBSQ HOSP IP/OBS MODERATE 35: CPT | Mod: GC

## 2019-10-25 PROCEDURE — 99232 SBSQ HOSP IP/OBS MODERATE 35: CPT

## 2019-10-25 RX ORDER — PETROLATUM,WHITE
1 JELLY (GRAM) TOPICAL
Refills: 0 | Status: DISCONTINUED | OUTPATIENT
Start: 2019-10-25 | End: 2019-10-30

## 2019-10-25 RX ADMIN — GABAPENTIN 600 MILLIGRAM(S): 400 CAPSULE ORAL at 15:11

## 2019-10-25 RX ADMIN — Medication 500 MILLIGRAM(S): at 17:39

## 2019-10-25 RX ADMIN — Medication 325 MILLIGRAM(S): at 17:40

## 2019-10-25 RX ADMIN — Medication 650 MILLIGRAM(S): at 15:55

## 2019-10-25 RX ADMIN — AMIODARONE HYDROCHLORIDE 200 MILLIGRAM(S): 400 TABLET ORAL at 05:21

## 2019-10-25 RX ADMIN — GABAPENTIN 300 MILLIGRAM(S): 400 CAPSULE ORAL at 05:24

## 2019-10-25 RX ADMIN — TAMSULOSIN HYDROCHLORIDE 0.4 MILLIGRAM(S): 0.4 CAPSULE ORAL at 22:02

## 2019-10-25 RX ADMIN — Medication 12.5 MILLIGRAM(S): at 17:38

## 2019-10-25 RX ADMIN — MIDODRINE HYDROCHLORIDE 5 MILLIGRAM(S): 2.5 TABLET ORAL at 08:24

## 2019-10-25 RX ADMIN — Medication 650 MILLIGRAM(S): at 10:26

## 2019-10-25 RX ADMIN — GABAPENTIN 600 MILLIGRAM(S): 400 CAPSULE ORAL at 22:01

## 2019-10-25 RX ADMIN — Medication 1 MILLIGRAM(S): at 12:18

## 2019-10-25 RX ADMIN — Medication 0.25 MILLIGRAM(S): at 08:24

## 2019-10-25 RX ADMIN — Medication 1 TABLET(S): at 12:18

## 2019-10-25 RX ADMIN — Medication 10 MILLIGRAM(S): at 05:21

## 2019-10-25 RX ADMIN — ENOXAPARIN SODIUM 40 MILLIGRAM(S): 100 INJECTION SUBCUTANEOUS at 12:18

## 2019-10-25 RX ADMIN — Medication 650 MILLIGRAM(S): at 09:17

## 2019-10-25 RX ADMIN — Medication 12.5 MILLIGRAM(S): at 05:22

## 2019-10-25 RX ADMIN — Medication 81 MILLIGRAM(S): at 12:18

## 2019-10-25 RX ADMIN — PANTOPRAZOLE SODIUM 40 MILLIGRAM(S): 20 TABLET, DELAYED RELEASE ORAL at 05:24

## 2019-10-25 RX ADMIN — LIDOCAINE 1 APPLICATION(S): 4 CREAM TOPICAL at 08:27

## 2019-10-25 RX ADMIN — Medication 100 MILLIGRAM(S): at 22:02

## 2019-10-25 RX ADMIN — Medication 325 MILLIGRAM(S): at 05:22

## 2019-10-25 RX ADMIN — Medication 650 MILLIGRAM(S): at 16:12

## 2019-10-25 RX ADMIN — Medication 500 MILLIGRAM(S): at 05:21

## 2019-10-25 NOTE — PROGRESS NOTE ADULT - SUBJECTIVE AND OBJECTIVE BOX
HPI:   69 year-old female with hx of "adrian-aorta" s/p aortic valve replacement, admitted electively to [facility] on 8/28 for open TAAA repair due to enlarging thoracoabdominal aortic aneurysm. Post-operatively it was found that she had loss of strength in lower extremities, R>L. Repeat imaging was negative for hematoma. Workup for RLE weakness ordered by neurology, felt weakness to be due to cervical myelopathy.     Interval: Pain controlled. slept well.     REVIEW OF SYSTEMS  Denies cardio or pulm complaints.   Able to give self suppository this morning with + BM. doing IC on own, working on improving middle of the night cath    All other ROS negative     Vital Signs Last 24 Hrs  T(C): 36.6 (25 Oct 2019 07:50), Max: 36.6 (24 Oct 2019 20:57)  T(F): 97.9 (25 Oct 2019 07:50), Max: 97.9 (24 Oct 2019 20:57)  HR: 70 (25 Oct 2019 07:50) (70 - 75)  BP: 111/67 (25 Oct 2019 07:50) (111/67 - 161/71)  BP(mean): --  RR: 14 (25 Oct 2019 07:50) (14 - 14)  SpO2: 98% (25 Oct 2019 07:50) (96% - 98%)    Physical Exam:  Gen - NAD, Comfortable  	Chest wall: Sternal incision wound-clean, no erythema or drainage noted  	Posterior lateral incision wound on the left- clean, no erythema or drainage, small skin tear seen next to incision site healing well, decreased in size  	Abdomen - Soft, NT/ND, +BS  	Extremities - No C/C/E, No calf tenderness  	Neuro-  	   Cognitive - AAOx3  	   Cranial Nerves - CN 2-12 intact  	   Motor -  Bilateral Upper extremities 5/5   	                  LEFT    LE - HF 4+/5, KE 4+/5, DF 5/5, PF 5/5  	                  RIGHT LE - HF 2/5, KE 2/5, DF 4/5, PF 4/5     	   Sensory - Intact to LT bilaterally, decreased proprioception.    	   Tone - normal  	Psychiatric - Mood stable, Affect WNL      Functional status:   ADLs: uses slide board onto commode with supervision and back to bed with supervision, able to dress self while in bed, able to straight cath self and administer suppository while in bed with supervision, still requiring some assist for hygiene after BM  Gait: 10 steps with RW and max assist    Transfers: CG      MEDICATIONS  (STANDING):  ALPRAZolam 0.25 milliGRAM(s) Oral <User Schedule>  amiodarone    Tablet 200 milliGRAM(s) Oral daily  ascorbic acid 500 milliGRAM(s) Oral two times a day  aspirin  chewable 81 milliGRAM(s) Oral daily  bisacodyl Suppository 10 milliGRAM(s) Rectal <User Schedule>  docusate sodium 100 milliGRAM(s) Oral at bedtime  enoxaparin Injectable 40 milliGRAM(s) SubCutaneous daily  ferrous    sulfate 325 milliGRAM(s) Oral two times a day  folic acid 1 milliGRAM(s) Oral daily  gabapentin 300 milliGRAM(s) Oral <User Schedule>  gabapentin 600 milliGRAM(s) Oral <User Schedule>  influenza   Vaccine 0.5 milliLiter(s) IntraMuscular once  lidocaine 2% Gel 1 Application(s) Topical <User Schedule>  metoprolol succinate ER 12.5 milliGRAM(s) Oral every 12 hours  midodrine. 5 milliGRAM(s) Oral <User Schedule>  multivitamin 1 Tablet(s) Oral daily  pantoprazole    Tablet 40 milliGRAM(s) Oral before breakfast  psyllium Powder 1 Packet(s) Oral two times a day  tamsulosin 0.4 milliGRAM(s) Oral at bedtime    MEDICATIONS  (PRN):  acetaminophen   Tablet .. 650 milliGRAM(s) Oral every 6 hours PRN Mild Pain (1 - 3)  mineral oil 30 milliLiter(s) Oral two times a day PRN dry mouth

## 2019-10-25 NOTE — PROGRESS NOTE ADULT - ASSESSMENT
68 yo female with thoracoabdominal aneurysm open repair complicated by cervical myelopathy, dysphagia, neurogenic bowel and bladder- pt/ot/dvt ppx, pain meds, asa    Anxiety- Xanax    orthostatic hypotension- c/w midodrine  5 mg   Hypertension- d/c amlodipine    episode of paroxysmal Atrial Fibrillation converted to sinus- c/w lopressor, amiodarone,  asa    Neurogenic Bladder: IC ,  pt/ot, Flomax    Hyponatremia- monitor    Anemia- likely related to blood loss during surgery, feso4    DVT prophylaxis: Lovenox     will follow

## 2019-10-25 NOTE — PROGRESS NOTE ADULT - SUBJECTIVE AND OBJECTIVE BOX
70 yo female with thoracoabdominal aneurysm open repair complicated by cervical myelopathy, dysphagia, neurogenic bowel and bladder  seen at the bedside, feels good, says she has learnt how to self cath, and is happy about that,  no n/v, no sob. no events,  no dizziness, no headaches      Vital Signs Last 24 Hrs  T(C): 36.6 (25 Oct 2019 07:50), Max: 36.6 (24 Oct 2019 20:57)  T(F): 97.9 (25 Oct 2019 07:50), Max: 97.9 (24 Oct 2019 20:57)  HR: 70 (25 Oct 2019 07:50) (70 - 75)  BP: 111/67 (25 Oct 2019 07:50) (111/67 - 161/71)  BP(mean): --  RR: 14 (25 Oct 2019 07:50) (14 - 14)  SpO2: 98% (25 Oct 2019 07:50) (96% - 98%)        GENERAL- NAD  EAR/NOSE/MOUTH/THROAT - no pharyngeal exudates, no oral lesions  MMM  EYES- SIXTO, conjunctiva and Sclera clear  NECK- supple  RESPIRATORY- clear to auscultation bilaterally  CARDIOVASCULAR - SIS2, RRR  GI - soft NT BS present  EXTREMITIES- no pedal edema  NEUROLOGY- right LE weakness  SKIN- no rashes, warm to touch, incision clean  PSYCHIATRY- AAO X 3

## 2019-10-25 NOTE — PROGRESS NOTE ADULT - ASSESSMENT
This is a 68 yo female with thoracoabdominal aneurysm open repair complicated by cervical myelopathy, dysphagia, neurogenic bowel and bladder.       #Cervical Myelopathy  Continue comprehensive acute rehabilitation program including PT/OT, DOMINIQUE robotic to assist with strengthening and ambulation.  -Dysphagia diet-soft with thin liquids    -Neuropathic pain- continue gabapentin, improved    -Neurogenic bladder: continue toileting program and straight catheterizations q4 hrs      #Anxiety- Xanax, home med      #Hypertension/Orthostasis -- amlodipine 5mg qd  midodrine 2.5mg q8am  teds and abdominal binder (add lidocaine gel to abdomen prior to application of abdominal binder qAM)  orthostasis improved      #Atrial Fibrillation- - rate controlled  with lopressor 12.5mg bid and amiodarone 200mg qd    #Thoracoabdominal aneurysm open repair- Asa, bp control     #Neurogenic bowel: continue suppository qAM and toileting program- pt now able to do with set up     #Anemia  -stable , asymptomatic     #Eosinophilia- AEC 0.45, continue to monitor     #DVT prophylaxis: lovenox 40mg sq daily     Barriers to dc: pt lives alone, requires assist with ADL's. She continues to make remarkable progress in PT and would benefit from an extension.  IDT rounds: dc set for 10/30 as pt continues to make progress

## 2019-10-26 LAB
APPEARANCE UR: CLEAR — SIGNIFICANT CHANGE UP
BILIRUB UR-MCNC: NEGATIVE — SIGNIFICANT CHANGE UP
COLOR SPEC: YELLOW — SIGNIFICANT CHANGE UP
DIFF PNL FLD: ABNORMAL
GLUCOSE UR QL: NEGATIVE — SIGNIFICANT CHANGE UP
KETONES UR-MCNC: NEGATIVE — SIGNIFICANT CHANGE UP
LEUKOCYTE ESTERASE UR-ACNC: ABNORMAL
NITRITE UR-MCNC: POSITIVE
PH UR: 6.5 — SIGNIFICANT CHANGE UP (ref 5–8)
PROT UR-MCNC: NEGATIVE — SIGNIFICANT CHANGE UP
SP GR SPEC: 1 — LOW (ref 1.01–1.02)
UROBILINOGEN FLD QL: NEGATIVE — SIGNIFICANT CHANGE UP

## 2019-10-26 PROCEDURE — 99232 SBSQ HOSP IP/OBS MODERATE 35: CPT

## 2019-10-26 RX ADMIN — Medication 1 MILLIGRAM(S): at 12:26

## 2019-10-26 RX ADMIN — Medication 650 MILLIGRAM(S): at 14:15

## 2019-10-26 RX ADMIN — Medication 1 APPLICATION(S): at 17:43

## 2019-10-26 RX ADMIN — GABAPENTIN 300 MILLIGRAM(S): 400 CAPSULE ORAL at 05:38

## 2019-10-26 RX ADMIN — Medication 1 TABLET(S): at 12:26

## 2019-10-26 RX ADMIN — Medication 10 MILLIGRAM(S): at 05:39

## 2019-10-26 RX ADMIN — GABAPENTIN 600 MILLIGRAM(S): 400 CAPSULE ORAL at 21:08

## 2019-10-26 RX ADMIN — TAMSULOSIN HYDROCHLORIDE 0.4 MILLIGRAM(S): 0.4 CAPSULE ORAL at 21:09

## 2019-10-26 RX ADMIN — AMIODARONE HYDROCHLORIDE 200 MILLIGRAM(S): 400 TABLET ORAL at 05:38

## 2019-10-26 RX ADMIN — PANTOPRAZOLE SODIUM 40 MILLIGRAM(S): 20 TABLET, DELAYED RELEASE ORAL at 05:38

## 2019-10-26 RX ADMIN — Medication 325 MILLIGRAM(S): at 17:43

## 2019-10-26 RX ADMIN — Medication 81 MILLIGRAM(S): at 12:26

## 2019-10-26 RX ADMIN — INFLUENZA VIRUS VACCINE 0.5 MILLILITER(S): 15; 15; 15; 15 SUSPENSION INTRAMUSCULAR at 13:38

## 2019-10-26 RX ADMIN — Medication 500 MILLIGRAM(S): at 17:43

## 2019-10-26 RX ADMIN — MIDODRINE HYDROCHLORIDE 5 MILLIGRAM(S): 2.5 TABLET ORAL at 08:05

## 2019-10-26 RX ADMIN — ENOXAPARIN SODIUM 40 MILLIGRAM(S): 100 INJECTION SUBCUTANEOUS at 12:26

## 2019-10-26 RX ADMIN — Medication 12.5 MILLIGRAM(S): at 17:44

## 2019-10-26 RX ADMIN — GABAPENTIN 600 MILLIGRAM(S): 400 CAPSULE ORAL at 13:36

## 2019-10-26 RX ADMIN — Medication 650 MILLIGRAM(S): at 13:36

## 2019-10-26 RX ADMIN — Medication 1 APPLICATION(S): at 05:38

## 2019-10-26 RX ADMIN — Medication 100 MILLIGRAM(S): at 21:09

## 2019-10-26 RX ADMIN — Medication 500 MILLIGRAM(S): at 05:37

## 2019-10-26 RX ADMIN — Medication 12.5 MILLIGRAM(S): at 05:38

## 2019-10-26 RX ADMIN — Medication 325 MILLIGRAM(S): at 05:38

## 2019-10-26 NOTE — PROGRESS NOTE ADULT - SUBJECTIVE AND OBJECTIVE BOX
70 yo female with thoracoabdominal aneurysm open repair complicated by cervical myelopathy, dysphagia, neurogenic bowel and bladder  seen at the bedside, feels good,  no n/v, no sob. no events,  no dizziness, no headaches    Vital Signs Last 24 Hrs  T(C): 36.7 (26 Oct 2019 08:46), Max: 36.9 (25 Oct 2019 22:04)  T(F): 98.1 (26 Oct 2019 08:46), Max: 98.4 (25 Oct 2019 22:04)  HR: 65 (26 Oct 2019 08:46) (65 - 72)  BP: 138/74 (26 Oct 2019 08:46) (114/56 - 164/66)  BP(mean): --  RR: 12 (26 Oct 2019 08:46) (12 - 14)  SpO2: 99% (26 Oct 2019 08:46) (96% - 99%)      GENERAL- NAD  EAR/NOSE/MOUTH/THROAT - no pharyngeal exudates, no oral lesions  MMM  EYES- SIXTO, conjunctiva and Sclera clear  NECK- supple  RESPIRATORY- clear to auscultation bilaterally  CARDIOVASCULAR - SIS2, RRR  GI - soft NT BS present  EXTREMITIES- no pedal edema  NEUROLOGY- right LE weakness  SKIN- no rashes, warm to touch, incision clean  PSYCHIATRY- AAO X 3

## 2019-10-26 NOTE — PROGRESS NOTE ADULT - SUBJECTIVE AND OBJECTIVE BOX
CC: Gait dysfunction    Subjective: Patient seen and evaluated at the bedside. She is asleep but arousable. Pain controlled, no chest pain, no nausea, vomiting no fever, shills.   No acute events overnight. Has been tolerating rehabilitation program.    acetaminophen   Tablet .. 650 milliGRAM(s) Oral every 6 hours PRN  ALPRAZolam 0.25 milliGRAM(s) Oral <User Schedule>  amiodarone    Tablet 200 milliGRAM(s) Oral daily  AQUAPHOR (petrolatum Ointment) 1 Application(s) Topical two times a day  ascorbic acid 500 milliGRAM(s) Oral two times a day  aspirin  chewable 81 milliGRAM(s) Oral daily  bisacodyl Suppository 10 milliGRAM(s) Rectal <User Schedule>  docusate sodium 100 milliGRAM(s) Oral at bedtime  enoxaparin Injectable 40 milliGRAM(s) SubCutaneous daily  ferrous    sulfate 325 milliGRAM(s) Oral two times a day  folic acid 1 milliGRAM(s) Oral daily  gabapentin 300 milliGRAM(s) Oral <User Schedule>  gabapentin 600 milliGRAM(s) Oral <User Schedule>  influenza   Vaccine 0.5 milliLiter(s) IntraMuscular once  lidocaine 2% Gel 1 Application(s) Topical <User Schedule>  metoprolol succinate ER 12.5 milliGRAM(s) Oral every 12 hours  midodrine. 5 milliGRAM(s) Oral <User Schedule>  mineral oil 30 milliLiter(s) Oral two times a day PRN  multivitamin 1 Tablet(s) Oral daily  pantoprazole    Tablet 40 milliGRAM(s) Oral before breakfast  psyllium Powder 1 Packet(s) Oral two times a day  tamsulosin 0.4 milliGRAM(s) Oral at bedtime      T(C): 36.7 (10-26-19 @ 08:46), Max: 36.9 (10-25-19 @ 22:04)  HR: 65 (10-26-19 @ 08:46) (65 - 72)  BP: 138/74 (10-26-19 @ 08:46) (114/56 - 164/66)  RR: 12 (10-26-19 @ 08:46) (12 - 14)  SpO2: 99% (10-26-19 @ 08:46) (96% - 99%)    Exam:  NAD  HEENT: EOMI  Heart: RRR, S1/2  Chest wall: Sternal incision wound-clean, no erythema or drainage noted  	Posterior lateral incision wound on the left- clean, no erythema or drainage, small skin tear seen next to incision site healing well  Lungs: CTA bilaterally  Abd: soft ND  Ext: no calf pain  Neuro: exam unchanged    Impression:  Myelopathy due to another disorder  Other malaise  Family history of skin cancer  Family history of coronary artery bypass graft (Father)  Family history of early CAD (Father)  Handoff  MEWS Score  Intermittent abdominal pain  Thoracoabdominal aortic aneurysm (TAAA) without rupture  Murmur, cardiac  Abdominal hernia  Cataract  Preeclampsia  HTN (hypertension)  S/P aortic valve repair  Thoracoabdominal aortic aneurysm (TAAA) without rupture  S/P cataract surgery  Bilateral cataracts  Arm fracture, left      KENNETH SPRING is a 68 yo female with thoracoabdominal aneurysm open repair complicated by cervical myelopathy, dysphagia, neurogenic bowel and bladder.       Plan:  - continue medical management as per primary team  - continue PT/OT/SLP  - lovenox  - CVS stable  - Patient is stable to continue current rehabilitation program

## 2019-10-26 NOTE — PROGRESS NOTE ADULT - ASSESSMENT
68 yo female with thoracoabdominal aneurysm open repair complicated by cervical myelopathy, dysphagia, neurogenic bowel and bladder- pt/ot/dvt ppx, pain meds, asa    Anxiety- Xanax    orthostatic hypotension- c/w midodrine  5 mg     Hypertension- d/c amlodipine, c/w lopressor    episode of paroxysmal Atrial Fibrillation converted to sinus- c/w lopressor, amiodarone,  asa    Neurogenic Bladder: IC ,  pt/ot, Flomax    Hyponatremia- monitor    Anemia- likely related to blood loss during surgery, feso4    DVT prophylaxis: Lovenox     will follow

## 2019-10-27 RX ADMIN — Medication 325 MILLIGRAM(S): at 18:04

## 2019-10-27 RX ADMIN — Medication 1 APPLICATION(S): at 05:26

## 2019-10-27 RX ADMIN — GABAPENTIN 600 MILLIGRAM(S): 400 CAPSULE ORAL at 22:11

## 2019-10-27 RX ADMIN — AMIODARONE HYDROCHLORIDE 200 MILLIGRAM(S): 400 TABLET ORAL at 05:25

## 2019-10-27 RX ADMIN — Medication 1 TABLET(S): at 11:54

## 2019-10-27 RX ADMIN — GABAPENTIN 300 MILLIGRAM(S): 400 CAPSULE ORAL at 06:56

## 2019-10-27 RX ADMIN — ENOXAPARIN SODIUM 40 MILLIGRAM(S): 100 INJECTION SUBCUTANEOUS at 11:54

## 2019-10-27 RX ADMIN — Medication 81 MILLIGRAM(S): at 11:54

## 2019-10-27 RX ADMIN — Medication 325 MILLIGRAM(S): at 05:25

## 2019-10-27 RX ADMIN — TAMSULOSIN HYDROCHLORIDE 0.4 MILLIGRAM(S): 0.4 CAPSULE ORAL at 22:11

## 2019-10-27 RX ADMIN — Medication 12.5 MILLIGRAM(S): at 05:25

## 2019-10-27 RX ADMIN — Medication 500 MILLIGRAM(S): at 18:04

## 2019-10-27 RX ADMIN — Medication 500 MILLIGRAM(S): at 05:26

## 2019-10-27 RX ADMIN — Medication 1 APPLICATION(S): at 18:03

## 2019-10-27 RX ADMIN — Medication 650 MILLIGRAM(S): at 12:04

## 2019-10-27 RX ADMIN — GABAPENTIN 600 MILLIGRAM(S): 400 CAPSULE ORAL at 13:26

## 2019-10-27 RX ADMIN — Medication 1 MILLIGRAM(S): at 11:54

## 2019-10-27 RX ADMIN — PANTOPRAZOLE SODIUM 40 MILLIGRAM(S): 20 TABLET, DELAYED RELEASE ORAL at 05:26

## 2019-10-27 RX ADMIN — Medication 12.5 MILLIGRAM(S): at 18:04

## 2019-10-27 RX ADMIN — Medication 1 PACKET(S): at 18:05

## 2019-10-27 RX ADMIN — Medication 100 MILLIGRAM(S): at 22:11

## 2019-10-27 RX ADMIN — Medication 650 MILLIGRAM(S): at 12:45

## 2019-10-27 RX ADMIN — MIDODRINE HYDROCHLORIDE 5 MILLIGRAM(S): 2.5 TABLET ORAL at 08:30

## 2019-10-28 PROCEDURE — 99232 SBSQ HOSP IP/OBS MODERATE 35: CPT | Mod: GC

## 2019-10-28 PROCEDURE — 99232 SBSQ HOSP IP/OBS MODERATE 35: CPT

## 2019-10-28 RX ADMIN — Medication 650 MILLIGRAM(S): at 18:07

## 2019-10-28 RX ADMIN — ENOXAPARIN SODIUM 40 MILLIGRAM(S): 100 INJECTION SUBCUTANEOUS at 12:00

## 2019-10-28 RX ADMIN — Medication 1 APPLICATION(S): at 18:11

## 2019-10-28 RX ADMIN — Medication 500 MILLIGRAM(S): at 18:08

## 2019-10-28 RX ADMIN — PANTOPRAZOLE SODIUM 40 MILLIGRAM(S): 20 TABLET, DELAYED RELEASE ORAL at 06:28

## 2019-10-28 RX ADMIN — Medication 500 MILLIGRAM(S): at 06:28

## 2019-10-28 RX ADMIN — Medication 12.5 MILLIGRAM(S): at 06:27

## 2019-10-28 RX ADMIN — Medication 650 MILLIGRAM(S): at 10:00

## 2019-10-28 RX ADMIN — LIDOCAINE 1 APPLICATION(S): 4 CREAM TOPICAL at 08:53

## 2019-10-28 RX ADMIN — GABAPENTIN 300 MILLIGRAM(S): 400 CAPSULE ORAL at 06:27

## 2019-10-28 RX ADMIN — Medication 81 MILLIGRAM(S): at 12:00

## 2019-10-28 RX ADMIN — Medication 1 MILLIGRAM(S): at 12:00

## 2019-10-28 RX ADMIN — Medication 100 MILLIGRAM(S): at 22:23

## 2019-10-28 RX ADMIN — Medication 12.5 MILLIGRAM(S): at 18:08

## 2019-10-28 RX ADMIN — MIDODRINE HYDROCHLORIDE 5 MILLIGRAM(S): 2.5 TABLET ORAL at 08:53

## 2019-10-28 RX ADMIN — Medication 1 TABLET(S): at 12:00

## 2019-10-28 RX ADMIN — Medication 650 MILLIGRAM(S): at 08:53

## 2019-10-28 RX ADMIN — Medication 1 APPLICATION(S): at 06:32

## 2019-10-28 RX ADMIN — Medication 650 MILLIGRAM(S): at 19:10

## 2019-10-28 RX ADMIN — Medication 325 MILLIGRAM(S): at 18:08

## 2019-10-28 RX ADMIN — AMIODARONE HYDROCHLORIDE 200 MILLIGRAM(S): 400 TABLET ORAL at 06:28

## 2019-10-28 RX ADMIN — GABAPENTIN 600 MILLIGRAM(S): 400 CAPSULE ORAL at 15:09

## 2019-10-28 RX ADMIN — TAMSULOSIN HYDROCHLORIDE 0.4 MILLIGRAM(S): 0.4 CAPSULE ORAL at 22:23

## 2019-10-28 RX ADMIN — Medication 325 MILLIGRAM(S): at 06:27

## 2019-10-28 RX ADMIN — GABAPENTIN 600 MILLIGRAM(S): 400 CAPSULE ORAL at 22:23

## 2019-10-28 NOTE — PROGRESS NOTE ADULT - SUBJECTIVE AND OBJECTIVE BOX
70 yo female with thoracoabdominal aneurysm open repair complicated by cervical myelopathy, dysphagia, neurogenic bowel and bladder  seen at the bedside, feels good,  no n/v, no sob. no events,  no dizziness, no headaches    Vital Signs Last 24 Hrs  T(C): 36.9 (28 Oct 2019 08:15), Max: 36.9 (28 Oct 2019 08:15)  T(F): 98.4 (28 Oct 2019 08:15), Max: 98.4 (28 Oct 2019 08:15)  HR: 69 (28 Oct 2019 08:15) (69 - 75)  BP: 133/65 (28 Oct 2019 08:15) (102/57 - 156/73)  BP(mean): --  RR: 14 (28 Oct 2019 08:15) (14 - 14)  SpO2: 99% (28 Oct 2019 08:15) (97% - 99%)      GENERAL- NAD  EAR/NOSE/MOUTH/THROAT - no pharyngeal exudates, no oral lesions  MMM  EYES- SIXTO, conjunctiva and Sclera clear  NECK- supple  RESPIRATORY- clear to auscultation bilaterally  CARDIOVASCULAR - SIS2, RRR  GI - soft NT BS present  EXTREMITIES- no pedal edema  NEUROLOGY- right LE weakness  SKIN- no rashes, warm to touch, incision clean  PSYCHIATRY- AAO X 3            Urinalysis Basic - ( 26 Oct 2019 21:03 )    Color: Yellow / Appearance: Clear / S.005 / pH: x  Gluc: x / Ketone: Negative  / Bili: Negative / Urobili: Negative   Blood: x / Protein: Negative / Nitrite: Positive   Leuk Esterase: Small / RBC: Negative /HPF / WBC 6-10 /HPF   Sq Epi: x / Non Sq Epi: Neg.-Few / Bacteria: TNTC /HPF

## 2019-10-28 NOTE — PROGRESS NOTE ADULT - ASSESSMENT
68 yo female with thoracoabdominal aneurysm open repair complicated by cervical myelopathy, dysphagia, neurogenic bowel and bladder- pt/ot/dvt ppx, pain meds, asa    ua- positive, will send culture, getting IC for retension    Anxiety- Xanax    orthostatic hypotension- c/w midodrine  5 mg     Hypertension- c/w lopressor    episode of paroxysmal Atrial Fibrillation converted to sinus- c/w lopressor, amiodarone,  asa    Neurogenic Bladder: IC ,  pt/ot, Flomax    Hyponatremia- monitor    Anemia- likely related to blood loss during surgery, feso4    DVT prophylaxis: Lovenox     will follow

## 2019-10-28 NOTE — PROGRESS NOTE ADULT - ASSESSMENT
This is a 68 yo female with thoracoabdominal aneurysm open repair complicated by cervical myelopathy, dysphagia, neurogenic bowel and bladder.       #Cervical Myelopathy  Continue comprehensive acute rehabilitation program including PT/OT, DOMINIQUE robotic to assist with strengthening and ambulation.  -Dysphagia diet-soft with thin liquids    -Neuropathic pain- continue gabapentin, improved    -Neurogenic bladder: continue toileting program and straight catheterizations q4 hrs    #Postive UA, cx pending   Hospitalist following     #Anxiety- Xanax, home med      #Hypertension/Orthostasis -- amlodipine 5mg qd  midodrine 2.5mg q8am  teds and abdominal binder (add lidocaine gel to abdomen prior to application of abdominal binder qAM)  orthostasis improved      #Atrial Fibrillation- - rate controlled  with lopressor 12.5mg bid and amiodarone 200mg qd    #Thoracoabdominal aneurysm open repair- Asa, bp control     #Neurogenic bowel: continue suppository qAM and toileting program- pt now able to do with set up     #Anemia  -stable , asymptomatic     #Eosinophilia- AEC 0.45, continue to monitor     #DVT prophylaxis: lovenox 40mg sq daily     Barriers to dc: pt lives alone, requires assist with ADL's. She continues to make remarkable progress in PT and would benefit from an extension.  IDT rounds: dc set for 10/30 as pt continues to make progress This is a 70 yo female with thoracoabdominal aneurysm open repair complicated by cervical myelopathy, dysphagia, neurogenic bowel and bladder.       #Cervical Myelopathy  Continue comprehensive acute rehabilitation program including PT/OT, DOMINIQUE robotic to assist with strengthening and ambulation.  -Dysphagia diet-soft with thin liquids    -Neuropathic pain- continue gabapentin, improved    -Neurogenic bladder: continue toileting program and straight catheterizations q4 hrs    #Postive UA, cx pending   Hospitalist following     #Anxiety- Xanax, home med      #Hypertension/Orthostasis -- amlodipine 5mg qd  midodrine 2.5mg q8am  teds and abdominal binder (add lidocaine gel to abdomen prior to application of abdominal binder qAM)  orthostasis improved      #Atrial Fibrillation- - rate controlled  with lopressor 12.5mg bid and amiodarone 200mg qd    #Thoracoabdominal aneurysm open repair- Asa, bp control     #Neurogenic bowel: continue suppository qAM and toileting program- pt now able to do with set up     #Anemia  -stable , asymptomatic     #Eosinophilia- AEC 0.45, continue to monitor     #DVT prophylaxis: lovenox 40mg sq daily       IDT rounds: dc set for 10/30 as pt continues to make progress

## 2019-10-28 NOTE — PROGRESS NOTE ADULT - SUBJECTIVE AND OBJECTIVE BOX
HPI:   69 year-old female with hx of "adrian-aorta" s/p aortic valve replacement, admitted electively to [facility] on 8/28 for open TAAA repair due to enlarging thoracoabdominal aortic aneurysm. Post-operatively it was found that she had loss of strength in lower extremities, R>L. Repeat imaging was negative for hematoma. Workup for RLE weakness ordered by neurology, felt weakness to be due to cervical myelopathy.     Interval: Pain controlled. slept well.     REVIEW OF SYSTEMS  Denies cardio or pulm complaints.   States that she is having the urge to void and was able to get onto the commode three times since yesterday without incontinence. However, deferred straight cath 3 times and then did st cath for 900cc.   All other ROS negative     Vital Signs Last 24 Hrs  T(C): 36.9 (28 Oct 2019 08:15), Max: 36.9 (28 Oct 2019 08:15)  T(F): 98.4 (28 Oct 2019 08:15), Max: 98.4 (28 Oct 2019 08:15)  HR: 69 (28 Oct 2019 08:15) (69 - 75)  BP: 133/65 (28 Oct 2019 08:15) (102/57 - 156/73)  RR: 14 (28 Oct 2019 08:15) (14 - 14)  SpO2: 99% (28 Oct 2019 08:15) (97% - 99%)      Physical Exam:  Gen - NAD, Comfortable  	Chest wall: Sternal incision wound-clean, no erythema or drainage noted  	Posterior lateral incision wound on the left- clean, no erythema or drainage, small skin tear seen next to incision site healing well, decreased in size  	Abdomen - Soft, NT/ND, +BS  	Extremities - No C/C/E, No calf tenderness  	Neuro-  	   Cognitive - AAOx3  	   Cranial Nerves - CN 2-12 intact  	   Motor -  Bilateral Upper extremities 5/5   	                  LEFT    LE - HF 4+/5, KE 4+/5, DF 5/5, PF 5/5  	                  RIGHT LE - HF 2/5, KE 2/5, DF 4/5, PF 4/5     	   Sensory - Intact to LT bilaterally, decreased proprioception.    	   Tone - normal  	Psychiatric - Mood stable, Affect WNL      Functional status:   ADLs: uses slide board onto commode with supervision and back to bed with supervision, able to dress self while in bed, able to straight cath self and administer suppository while in bed with supervision, still requiring some assist for hygiene after BM  Gait: 10 feet in parallel bars and mod assist  with wc follow   Transfers: CG  stand to sit, min A sit to stand     MEDICATIONS  (STANDING):  ALPRAZolam 0.25 milliGRAM(s) Oral <User Schedule>  amiodarone    Tablet 200 milliGRAM(s) Oral daily  AQUAPHOR (petrolatum Ointment) 1 Application(s) Topical two times a day  ascorbic acid 500 milliGRAM(s) Oral two times a day  aspirin  chewable 81 milliGRAM(s) Oral daily  bisacodyl Suppository 10 milliGRAM(s) Rectal <User Schedule>  docusate sodium 100 milliGRAM(s) Oral at bedtime  enoxaparin Injectable 40 milliGRAM(s) SubCutaneous daily  ferrous    sulfate 325 milliGRAM(s) Oral two times a day  folic acid 1 milliGRAM(s) Oral daily  gabapentin 300 milliGRAM(s) Oral <User Schedule>  gabapentin 600 milliGRAM(s) Oral <User Schedule>  lidocaine 2% Gel 1 Application(s) Topical <User Schedule>  metoprolol succinate ER 12.5 milliGRAM(s) Oral every 12 hours  midodrine. 5 milliGRAM(s) Oral <User Schedule>  multivitamin 1 Tablet(s) Oral daily  pantoprazole    Tablet 40 milliGRAM(s) Oral before breakfast  psyllium Powder 1 Packet(s) Oral two times a day  tamsulosin 0.4 milliGRAM(s) Oral at bedtime    MEDICATIONS  (PRN):  acetaminophen   Tablet .. 650 milliGRAM(s) Oral every 6 hours PRN Mild Pain (1 - 3)  mineral oil 30 milliLiter(s) Oral two times a day PRN dry mouth

## 2019-10-29 ENCOUNTER — TRANSCRIPTION ENCOUNTER (OUTPATIENT)
Age: 69
End: 2019-10-29

## 2019-10-29 LAB
-  AMIKACIN: SIGNIFICANT CHANGE UP
-  AMPICILLIN/SULBACTAM: SIGNIFICANT CHANGE UP
-  AMPICILLIN: SIGNIFICANT CHANGE UP
-  AZTREONAM: SIGNIFICANT CHANGE UP
-  CEFAZOLIN: SIGNIFICANT CHANGE UP
-  CEFEPIME: SIGNIFICANT CHANGE UP
-  CEFOXITIN: SIGNIFICANT CHANGE UP
-  CEFTRIAXONE: SIGNIFICANT CHANGE UP
-  CIPROFLOXACIN: SIGNIFICANT CHANGE UP
-  GENTAMICIN: SIGNIFICANT CHANGE UP
-  IMIPENEM: SIGNIFICANT CHANGE UP
-  LEVOFLOXACIN: SIGNIFICANT CHANGE UP
-  MEROPENEM: SIGNIFICANT CHANGE UP
-  NITROFURANTOIN: SIGNIFICANT CHANGE UP
-  PIPERACILLIN/TAZOBACTAM: SIGNIFICANT CHANGE UP
-  TIGECYCLINE: SIGNIFICANT CHANGE UP
-  TOBRAMYCIN: SIGNIFICANT CHANGE UP
-  TRIMETHOPRIM/SULFAMETHOXAZOLE: SIGNIFICANT CHANGE UP
ANION GAP SERPL CALC-SCNC: 7 MMOL/L — SIGNIFICANT CHANGE UP (ref 5–17)
BASOPHILS # BLD AUTO: 0.06 K/UL — SIGNIFICANT CHANGE UP (ref 0–0.2)
BASOPHILS NFR BLD AUTO: 1.1 % — SIGNIFICANT CHANGE UP (ref 0–2)
BUN SERPL-MCNC: 21 MG/DL — SIGNIFICANT CHANGE UP (ref 7–23)
CALCIUM SERPL-MCNC: 9.1 MG/DL — SIGNIFICANT CHANGE UP (ref 8.4–10.5)
CHLORIDE SERPL-SCNC: 102 MMOL/L — SIGNIFICANT CHANGE UP (ref 96–108)
CO2 SERPL-SCNC: 27 MMOL/L — SIGNIFICANT CHANGE UP (ref 22–31)
CREAT SERPL-MCNC: 0.94 MG/DL — SIGNIFICANT CHANGE UP (ref 0.5–1.3)
CULTURE RESULTS: SIGNIFICANT CHANGE UP
EOSINOPHIL # BLD AUTO: 0.38 K/UL — SIGNIFICANT CHANGE UP (ref 0–0.5)
EOSINOPHIL NFR BLD AUTO: 6.9 % — HIGH (ref 0–6)
GLUCOSE SERPL-MCNC: 117 MG/DL — HIGH (ref 70–99)
HCT VFR BLD CALC: 28.7 % — LOW (ref 34.5–45)
HGB BLD-MCNC: 9.3 G/DL — LOW (ref 11.5–15.5)
IMM GRANULOCYTES NFR BLD AUTO: 0.7 % — SIGNIFICANT CHANGE UP (ref 0–1.5)
LYMPHOCYTES # BLD AUTO: 1.44 K/UL — SIGNIFICANT CHANGE UP (ref 1–3.3)
LYMPHOCYTES # BLD AUTO: 26.1 % — SIGNIFICANT CHANGE UP (ref 13–44)
MCHC RBC-ENTMCNC: 29 PG — SIGNIFICANT CHANGE UP (ref 27–34)
MCHC RBC-ENTMCNC: 32.4 GM/DL — SIGNIFICANT CHANGE UP (ref 32–36)
MCV RBC AUTO: 89.4 FL — SIGNIFICANT CHANGE UP (ref 80–100)
METHOD TYPE: SIGNIFICANT CHANGE UP
MONOCYTES # BLD AUTO: 0.52 K/UL — SIGNIFICANT CHANGE UP (ref 0–0.9)
MONOCYTES NFR BLD AUTO: 9.4 % — SIGNIFICANT CHANGE UP (ref 2–14)
NEUTROPHILS # BLD AUTO: 3.08 K/UL — SIGNIFICANT CHANGE UP (ref 1.8–7.4)
NEUTROPHILS NFR BLD AUTO: 55.8 % — SIGNIFICANT CHANGE UP (ref 43–77)
NRBC # BLD: 0 /100 WBCS — SIGNIFICANT CHANGE UP (ref 0–0)
ORGANISM # SPEC MICROSCOPIC CNT: SIGNIFICANT CHANGE UP
ORGANISM # SPEC MICROSCOPIC CNT: SIGNIFICANT CHANGE UP
PLATELET # BLD AUTO: 284 K/UL — SIGNIFICANT CHANGE UP (ref 150–400)
POTASSIUM SERPL-MCNC: 4.1 MMOL/L — SIGNIFICANT CHANGE UP (ref 3.5–5.3)
POTASSIUM SERPL-SCNC: 4.1 MMOL/L — SIGNIFICANT CHANGE UP (ref 3.5–5.3)
RBC # BLD: 3.21 M/UL — LOW (ref 3.8–5.2)
RBC # FLD: 16.5 % — HIGH (ref 10.3–14.5)
SODIUM SERPL-SCNC: 136 MMOL/L — SIGNIFICANT CHANGE UP (ref 135–145)
SPECIMEN SOURCE: SIGNIFICANT CHANGE UP
WBC # BLD: 5.52 K/UL — SIGNIFICANT CHANGE UP (ref 3.8–10.5)
WBC # FLD AUTO: 5.52 K/UL — SIGNIFICANT CHANGE UP (ref 3.8–10.5)

## 2019-10-29 PROCEDURE — 99232 SBSQ HOSP IP/OBS MODERATE 35: CPT

## 2019-10-29 PROCEDURE — 99232 SBSQ HOSP IP/OBS MODERATE 35: CPT | Mod: GC

## 2019-10-29 RX ORDER — FERROUS SULFATE 325(65) MG
1 TABLET ORAL
Qty: 60 | Refills: 0
Start: 2019-10-29 | End: 2019-11-27

## 2019-10-29 RX ORDER — ASCORBIC ACID 60 MG
1 TABLET,CHEWABLE ORAL
Qty: 0 | Refills: 0 | DISCHARGE
Start: 2019-10-29

## 2019-10-29 RX ORDER — TAMSULOSIN HYDROCHLORIDE 0.4 MG/1
1 CAPSULE ORAL
Qty: 30 | Refills: 0
Start: 2019-10-29 | End: 2019-11-27

## 2019-10-29 RX ORDER — GABAPENTIN 400 MG/1
2 CAPSULE ORAL
Qty: 120 | Refills: 0
Start: 2019-10-29 | End: 2019-11-27

## 2019-10-29 RX ORDER — PSYLLIUM SEED (WITH DEXTROSE)
1 POWDER (GRAM) ORAL
Qty: 60 | Refills: 0
Start: 2019-10-29 | End: 2019-11-27

## 2019-10-29 RX ORDER — ALPRAZOLAM 0.25 MG
1 TABLET ORAL
Qty: 30 | Refills: 0
Start: 2019-10-29 | End: 2019-11-27

## 2019-10-29 RX ORDER — METOPROLOL TARTRATE 50 MG
0.5 TABLET ORAL
Qty: 30 | Refills: 0
Start: 2019-10-29 | End: 2019-11-27

## 2019-10-29 RX ORDER — GABAPENTIN 400 MG/1
1 CAPSULE ORAL
Qty: 60 | Refills: 0
Start: 2019-10-29 | End: 2019-11-27

## 2019-10-29 RX ORDER — AMIODARONE HYDROCHLORIDE 400 MG/1
1 TABLET ORAL
Qty: 30 | Refills: 0
Start: 2019-10-29 | End: 2019-11-27

## 2019-10-29 RX ORDER — ACETAMINOPHEN 500 MG
2 TABLET ORAL
Qty: 0 | Refills: 0 | DISCHARGE
Start: 2019-10-29

## 2019-10-29 RX ORDER — NITROFURANTOIN MACROCRYSTAL 50 MG
100 CAPSULE ORAL
Refills: 0 | Status: DISCONTINUED | OUTPATIENT
Start: 2019-10-29 | End: 2019-10-30

## 2019-10-29 RX ORDER — GABAPENTIN 400 MG/1
1 CAPSULE ORAL
Qty: 30 | Refills: 0
Start: 2019-10-29 | End: 2019-11-27

## 2019-10-29 RX ORDER — FOLIC ACID 0.8 MG
1 TABLET ORAL
Qty: 30 | Refills: 0
Start: 2019-10-29 | End: 2019-11-27

## 2019-10-29 RX ADMIN — PANTOPRAZOLE SODIUM 40 MILLIGRAM(S): 20 TABLET, DELAYED RELEASE ORAL at 05:09

## 2019-10-29 RX ADMIN — Medication 100 MILLIGRAM(S): at 17:21

## 2019-10-29 RX ADMIN — LIDOCAINE 1 APPLICATION(S): 4 CREAM TOPICAL at 08:25

## 2019-10-29 RX ADMIN — Medication 10 MILLIGRAM(S): at 05:09

## 2019-10-29 RX ADMIN — Medication 325 MILLIGRAM(S): at 05:09

## 2019-10-29 RX ADMIN — Medication 12.5 MILLIGRAM(S): at 05:18

## 2019-10-29 RX ADMIN — Medication 500 MILLIGRAM(S): at 05:09

## 2019-10-29 RX ADMIN — GABAPENTIN 300 MILLIGRAM(S): 400 CAPSULE ORAL at 05:08

## 2019-10-29 RX ADMIN — ENOXAPARIN SODIUM 40 MILLIGRAM(S): 100 INJECTION SUBCUTANEOUS at 12:16

## 2019-10-29 RX ADMIN — GABAPENTIN 600 MILLIGRAM(S): 400 CAPSULE ORAL at 14:02

## 2019-10-29 RX ADMIN — Medication 100 MILLIGRAM(S): at 21:14

## 2019-10-29 RX ADMIN — Medication 650 MILLIGRAM(S): at 08:26

## 2019-10-29 RX ADMIN — Medication 12.5 MILLIGRAM(S): at 17:21

## 2019-10-29 RX ADMIN — Medication 81 MILLIGRAM(S): at 12:16

## 2019-10-29 RX ADMIN — Medication 1 APPLICATION(S): at 05:10

## 2019-10-29 RX ADMIN — Medication 1 MILLIGRAM(S): at 12:16

## 2019-10-29 RX ADMIN — Medication 325 MILLIGRAM(S): at 17:22

## 2019-10-29 RX ADMIN — TAMSULOSIN HYDROCHLORIDE 0.4 MILLIGRAM(S): 0.4 CAPSULE ORAL at 21:14

## 2019-10-29 RX ADMIN — AMIODARONE HYDROCHLORIDE 200 MILLIGRAM(S): 400 TABLET ORAL at 05:09

## 2019-10-29 RX ADMIN — Medication 650 MILLIGRAM(S): at 08:23

## 2019-10-29 RX ADMIN — Medication 1 APPLICATION(S): at 17:22

## 2019-10-29 RX ADMIN — Medication 1 TABLET(S): at 12:16

## 2019-10-29 RX ADMIN — GABAPENTIN 600 MILLIGRAM(S): 400 CAPSULE ORAL at 21:14

## 2019-10-29 RX ADMIN — Medication 500 MILLIGRAM(S): at 17:22

## 2019-10-29 NOTE — DISCHARGE NOTE PROVIDER - CARE PROVIDER_API CALL
Junior Briseno)  Surgery; Thoracic and Cardiac Surgery  [address] [place] [address]  Phone: [phone]  Fax: [phone]  Follow Up Time:     Kasia Cuellar)  Internal Medicine; Nephrology  [address] [facility], [address]  [place] [address]  Phone: [phone]  Fax: [phone]  Follow Up Time:     Addy Valladares)  Surgery; Surgical Critical Care; Vascular Surgery  [address] [facility], [address]  [place] [address]  Phone: [phone]  Fax: [phone]  Follow Up Time:     Sujatha Trejo)  Urology  [address] [facility], [address]  [place] [address]  Phone: [phone]  Fax: [phone]  Follow Up Time:     Tiarra Deutsch)  Neurology  [address] [facility], [address]  [place] [address]  Phone: [phone]  Fax: [phone]  Follow Up Time:     Srinath Corral)  Cardiovascular Disease; Internal Medicine  [address] [place] [id number]  Phone: [phone]  Fax: [phone]  Follow Up Time:

## 2019-10-29 NOTE — DISCHARGE NOTE PROVIDER - HOSPITAL COURSE
69F pmh HTN, preeclampsia, "adrian-aorta" status post aortic valve replacement, admitted electively to [facility] on 8/28 for open TAAA repair due to enlarging thoracoabdominal aortic aneurysm. 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 s/p Thoracoabdominal aneurysm open repair with Decron graft and celiac SMA bypass, reimplantation of lumbar artery on 8/29. Chest tube placed. Intraoperative bleeding requiring 2 PRBC, 2 Platelets, & FEIBA. Required pressors post-op in CCU. Post-op she was noted to have loss of strength in lower extremities, R>L for which she was placed on MAPs of 110 and CSF drainage of 20cc. Repeat imaging was negative for hematoma. Nephrology was consulted for ATN. Urine output improved. Extubated 8/21. Lumbar drain removed 9/2. RLE weakness showed some improvement during hospitalization. 9/3 S&S eval recommend Dysphagia 1 nectar thick diet, 3L NC, PT recommending acute rehab, Creatinine improving. Course c/b afib with RVR, requiring amio gtt, esmolol gtt, one dose digoxin, and vasopressin for pressure support. 9/4 left pleural chest tubes x3 removed. Course also c/b urinary retention requiring muñoz catheter. 9/7 infectious w/u initiated for leukocytosis and fever, initiated zosyn 9/8 for suspected UTI/LLL PNA, ID consulted. CT chest with complex mod left pleural effusion, suspect PNA present. Blood cultures so far no growth to date. Workup for RLE weakness ordered by neurology, felt weakness to be due to cervical myelopathy. Urology consulted 9/23 for urinary retention, felt to be neurogenic in nature, recommended continue muñoz. Completed abx for UTI.         Patient admitted to [facility]'s acute inpatient rehab on September 28, 2019.     During hospitalization patient continued to have neurogenic bowel and bladder. Bowel regimen started (suppository every morning) and has been quit successful. Patient also learned to straight cath herself every 4 hours.     She was started on flomax 0.4mg qHS.     Patient did experience some symptomatic orthostatic blood pressures. She was placed on midodrine once daily and midodrine was eventually discontinued. Patient did wear erasmo hose and abdominal binder while out of bed in therapies.     For neuropathic pain, patient was maintained on gabapentin. It was titrated up to 300mg qAM, 600mg at lunchtime and 600mg at bedtime. Pain was well controlled with increased dosing.     Patient with positive UA at end of rehab stay with + urine culture. Patient started on macrobid *****************    Patient made functional gains while in acute rehab and is able to be safely discharged home with home health services on 10/29/19. Patient is medically stable. 69F pmh HTN, preeclampsia, "adrian-aorta" status post aortic valve replacement, admitted electively to [facility] on 8/28 for open TAAA repair due to enlarging thoracoabdominal aortic aneurysm. 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 s/p Thoracoabdominal aneurysm open repair with Decron graft and celiac SMA bypass, reimplantation of lumbar artery on 8/29. Chest tube placed. Intraoperative bleeding requiring 2 PRBC, 2 Platelets, & FEIBA. Required pressors post-op in CCU. Post-op she was noted to have loss of strength in lower extremities, R>L for which she was placed on MAPs of 110 and CSF drainage of 20cc. Repeat imaging was negative for hematoma. Nephrology was consulted for ATN. Urine output improved. Extubated 8/21. Lumbar drain removed 9/2. RLE weakness showed some improvement during hospitalization. 9/3 S&S eval recommend Dysphagia 1 nectar thick diet, 3L NC, PT recommending acute rehab, Creatinine improving. Course c/b afib with RVR, requiring amio gtt, esmolol gtt, one dose digoxin, and vasopressin for pressure support. 9/4 left pleural chest tubes x3 removed. Course also c/b urinary retention requiring muñoz catheter. 9/7 infectious w/u initiated for leukocytosis and fever, initiated zosyn 9/8 for suspected UTI/LLL PNA, ID consulted. CT chest with complex mod left pleural effusion, suspect PNA present. Blood cultures so far no growth to date. Workup for RLE weakness ordered by neurology, felt weakness to be due to cervical myelopathy. Urology consulted 9/23 for urinary retention, felt to be neurogenic in nature, recommended continue muñoz. Completed abx for UTI.         Patient admitted to [facility]'s acute inpatient rehab on September 28, 2019.     During hospitalization patient continued to have neurogenic bowel and bladder. Bowel regimen started (suppository every morning) and has been quit successful. Patient also learned to straight cath herself every 4 hours.     She was started on flomax 0.4mg qHS.     Patient did experience some symptomatic orthostatic blood pressures. She was placed on midodrine once daily and midodrine was eventually discontinued. Patient did wear erasmo hose and abdominal binder while out of bed in therapies.     For neuropathic pain, patient was maintained on gabapentin. It was titrated up to 300mg qAM, 600mg at lunchtime and 600mg at bedtime. Pain was well controlled with increased dosing.     Patient with positive UA at end of rehab stay with + urine culture. Patient started on macrobid, final urine culture shows sensitivity to macrobid.     Patient made functional gains while in acute rehab and is able to be safely discharged home with home health services on 10/29/19. Patient is medically stable.

## 2019-10-29 NOTE — DISCHARGE NOTE PROVIDER - NSDCCPCAREPLAN_GEN_ALL_CORE_FT
PRINCIPAL DISCHARGE DIAGNOSIS  Diagnosis: Cervical myelopathy  Assessment and Plan of Treatment: with neurogenic bowel and bladder.   Continue bowel regimen with bisacodyl suppository every morning and metamucil twice a day.   Continue to straight catheterize every 4 hours. Continue flomax 0.4mg at bedtime.   Follow up with Dr Deutsch, neurology in 1-2 weeks.   Follow up with Dr Chakraborty in about 4 weeks.  Follow up with nephrology and urology (Michelle Cuellar and Neil).        SECONDARY DISCHARGE DIAGNOSES  Diagnosis: Anemia  Assessment and Plan of Treatment: Labs stable.   Folllow up with PCP.   Continue iron and folic acid.    Diagnosis: Eosinophilia  Assessment and Plan of Treatment: Follow up with PCP.    Diagnosis: Thoracoabdominal aneurysm with rupture  Assessment and Plan of Treatment: Folllow up with Dr Briseno (cardiothroacic surgeon) and Dr Joseph (vascular surgeon).  Continue aspirin 81mg daily.    Diagnosis: Orthostatic hypotension  Assessment and Plan of Treatment: Continue use of erasmo hose and abdominal binder while out of bed.        Diagnosis: Atrial fibrillation  Assessment and Plan of Treatment: Continue metoprolol succinate and amiodarone as prescribed.   Follow up with Dr Corral (cardiovascular MD).    Diagnosis: Anxiety  Assessment and Plan of Treatment: Continue xanax daily.   Folllow up with your PCP.    Diagnosis: Acute UTI (urinary tract infection)  Assessment and Plan of Treatment: Follow up with your PCP.   Complete antibiotic as prescribed.

## 2019-10-29 NOTE — DISCHARGE NOTE PROVIDER - PROVIDER TOKENS
PROVIDER:[TOKEN:[[id number]]],PROVIDER:[TOKEN:[[id number]]],PROVIDER:[TOKEN:[[id number]]],PROVIDER:[TOKEN:[[id number]]],PROVIDER:[TOKEN:[[id number]]],PROVIDER:[TOKEN:[[id number]]]

## 2019-10-29 NOTE — PROGRESS NOTE ADULT - SUBJECTIVE AND OBJECTIVE BOX
HPI:   69 year-old female with hx of "adrian-aorta" s/p aortic valve replacement, admitted electively to [facility] on 8/28 for open TAAA repair due to enlarging thoracoabdominal aortic aneurysm. Post-operatively it was found that she had loss of strength in lower extremities, R>L. Repeat imaging was negative for hematoma. Workup for RLE weakness ordered by neurology, felt weakness to be due to cervical myelopathy.     Interval/ROS: Pain controlled. slept well. Doing well in therapies. Looking forward to going home tomorrow.   Denies any fevers, chills, abdominal pain, nausea, HAs, dizziness, CP, SOB. Reports that she is still very successful with using a suppository in the morning and doing well straight cathing herself.   Pain is controlled.       Vital Signs Last 24 Hrs  T(C): 36.6 (29 Oct 2019 07:42), Max: 36.6 (28 Oct 2019 20:14)  T(F): 97.8 (29 Oct 2019 07:42), Max: 97.8 (28 Oct 2019 20:14)  HR: 64 (29 Oct 2019 07:42) (64 - 74)  BP: 151/72 (29 Oct 2019 07:42) (126/69 - 152/68)  RR: 14 (29 Oct 2019 07:42) (14 - 14)  SpO2: 99% (29 Oct 2019 07:42) (95% - 99%)      Physical Exam:  Gen - NAD, Comfortable  	Chest wall: Sternal incision wound-clean, no erythema or drainage noted  	Posterior lateral incision wound on the left- clean, no erythema or drainage, small skin tear seen next to incision site healing well, decreased in size  	Abdomen - Soft, NT/ND, +BS  	Extremities - No C/C/E, No calf tenderness  	Neuro-  	   Cognitive - AAOx3  	   Cranial Nerves - CN 2-12 intact  	   Motor -  Bilateral Upper extremities 5/5   	                  LEFT    LE - HF 4+/5, KE 4+/5, DF 5/5, PF 5/5  	                  RIGHT LE - HF 2/5, KE 2/5, DF 4/5, PF 4/5     	   Sensory - Intact to LT bilaterally, decreased proprioception.    	   Tone - normal  	Psychiatric - Mood stable, Affect WNL    10-29    136  |  102  |  21  ----------------------------<  117<H>  4.1   |  27  |  0.94    Ca    9.1      29 Oct 2019 05:50                          9.3    5.52  )-----------( 284      ( 29 Oct 2019 05:50 )             28.7     Functional status:   ADLs: uses slide board onto commode with supervision and back to bed with supervision, able to dress self while in bed, able to straight cath self and administer suppository while in bed with supervision, still requiring some assist for hygiene after BM  Gait: 10 feet in parallel bars and mod assist  with wc follow   Transfers: CG  stand to sit, min A sit to stand     MEDICATIONS  (STANDING):  ALPRAZolam 0.25 milliGRAM(s) Oral <User Schedule>  amiodarone    Tablet 200 milliGRAM(s) Oral daily  AQUAPHOR (petrolatum Ointment) 1 Application(s) Topical two times a day  ascorbic acid 500 milliGRAM(s) Oral two times a day  aspirin  chewable 81 milliGRAM(s) Oral daily  bisacodyl Suppository 10 milliGRAM(s) Rectal <User Schedule>  docusate sodium 100 milliGRAM(s) Oral at bedtime  enoxaparin Injectable 40 milliGRAM(s) SubCutaneous daily  ferrous    sulfate 325 milliGRAM(s) Oral two times a day  folic acid 1 milliGRAM(s) Oral daily  gabapentin 300 milliGRAM(s) Oral <User Schedule>  gabapentin 600 milliGRAM(s) Oral <User Schedule>  lidocaine 2% Gel 1 Application(s) Topical <User Schedule>  metoprolol succinate ER 12.5 milliGRAM(s) Oral every 12 hours  multivitamin 1 Tablet(s) Oral daily  nitrofurantoin monohydrate/macrocrystals (MACROBID) 100 milliGRAM(s) Oral two times a day with meals  pantoprazole    Tablet 40 milliGRAM(s) Oral before breakfast  psyllium Powder 1 Packet(s) Oral two times a day  tamsulosin 0.4 milliGRAM(s) Oral at bedtime    MEDICATIONS  (PRN):  acetaminophen   Tablet .. 650 milliGRAM(s) Oral every 6 hours PRN Mild Pain (1 - 3)  mineral oil 30 milliLiter(s) Oral two times a day PRN dry mouth

## 2019-10-29 NOTE — DISCHARGE NOTE PROVIDER - NSDCFUADDAPPT_GEN_ALL_CORE_FT
Follow up with Dr Chakraborty in 4 weeks at [address] [place] [address], [address], [place] [address]. Office Phone: [phone]

## 2019-10-29 NOTE — DISCHARGE NOTE PROVIDER - NSDCMRMEDTOKEN_GEN_ALL_CORE_FT
acetaminophen 325 mg oral tablet: 2 tab(s) orally every 6 hours, As needed, Mild Pain (1 - 3)  ALPRAZolam 0.25 mg oral tablet: 1 tab(s) orally every 8 hours, As needed, anxiety  amLODIPine 5 mg oral tablet: 1 tab(s) orally once a day  ascorbic acid 500 mg oral tablet: 1 tab(s) orally 2 times a day  aspirin 81 mg oral tablet, chewable: 1 tab(s) orally once a day  bacitracin 500 units/g topical ointment: 1 application topically 2 times a day  bisacodyl 10 mg rectal suppository: 1 suppository(ies) rectal once a day, As needed, Constipation  ferrous sulfate 325 mg (65 mg elemental iron) oral tablet: 1 tab(s) orally 2 times a day  folic acid 1 mg oral tablet: 1 tab(s) orally once a day  heparin: 5000 unit(s) subcutaneous every 8 hours  lidocaine 5% topical film: Apply topically to affected area once a day  metoprolol tartrate 25 mg oral tablet: 1 tab(s) orally every 12 hours  oxyCODONE 5 mg oral tablet: 1 tab(s) orally every 4 hours, As needed, Moderate Pain (4 - 6)  pantoprazole 40 mg oral delayed release tablet: 1 tab(s) orally once a day (before a meal) acetaminophen 325 mg oral tablet: 2 tab(s) orally every 6 hours, As needed, Mild Pain (1 - 3)  ALPRAZolam 0.25 mg oral tablet: 1 tab(s) orally once a day MDD:1 tablet  amiodarone 200 mg oral tablet: 1 tab(s) orally once a day  ascorbic acid 500 mg oral tablet: 1 tab(s) orally 2 times a day  aspirin 81 mg oral tablet, chewable: 1 tab(s) orally once a day  bisacodyl 10 mg rectal suppository: 1 suppository(ies) rectal once a day   ferrous sulfate 325 mg (65 mg elemental iron) oral tablet: 1 tab(s) orally 2 times a day  folic acid 1 mg oral tablet: 1 tab(s) orally once a day  gabapentin 300 mg oral capsule: 1 cap(s) orally once a day with breakfast   gabapentin 600 mg oral tablet: 1 tab(s) orally 2 times a day at 2pm and 9pm  metoprolol succinate 25 mg oral tablet, extended release: 0.5 tab(s) orally every 12 hours   Multiple Vitamins oral tablet: 1 tab(s) orally once a day  psyllium 3.4 g/7 g oral powder for reconstitution: 1 packet(s) orally 2 times a day   tamsulosin 0.4 mg oral capsule: 1 cap(s) orally once a day (at bedtime) acetaminophen 325 mg oral tablet: 2 tab(s) orally every 6 hours, As needed, Mild Pain (1 - 3)  ALPRAZolam 0.25 mg oral tablet: 1 tab(s) orally once a day MDD:1 tablet  amiodarone 200 mg oral tablet: 1 tab(s) orally once a day  ascorbic acid 500 mg oral tablet: 1 tab(s) orally 2 times a day  aspirin 81 mg oral tablet, chewable: 1 tab(s) orally once a day  bisacodyl 10 mg rectal suppository: 1 suppository(ies) rectal once a day   ferrous sulfate 325 mg (65 mg elemental iron) oral tablet: 1 tab(s) orally 2 times a day  folic acid 1 mg oral tablet: 1 tab(s) orally once a day  gabapentin 300 mg oral capsule: 1 cap(s) orally once a day with breakfast   gabapentin 600 mg oral tablet: 1 tab(s) orally 2 times a day at 2pm and 9pm  metoprolol succinate 25 mg oral tablet, extended release: 0.5 tab(s) orally every 12 hours   Multiple Vitamins oral tablet: 1 tab(s) orally once a day  nitrofurantoin macrocrystals-monohydrate 100 mg oral capsule: 1 cap(s) orally 2 times a day (with meals)  psyllium 3.4 g/7 g oral powder for reconstitution: 1 packet(s) orally 2 times a day   tamsulosin 0.4 mg oral capsule: 1 cap(s) orally once a day (at bedtime)

## 2019-10-29 NOTE — PROGRESS NOTE ADULT - ASSESSMENT
This is a 70 yo female with thoracoabdominal aneurysm open repair complicated by cervical myelopathy, dysphagia, neurogenic bowel and bladder.       #Cervical Myelopathy  Continue comprehensive acute rehabilitation program including PT/OT, DOMINIQUE robotic to assist with strengthening and ambulation.  -Dysphagia diet-soft with thin liquids    -Neuropathic pain- continue gabapentin, improved    -Neurogenic bladder: continue toileting program and straight catheterizations q4 hrs    #Postive UA,   -cx showing gram negative rods >[id number] colonies  -Hospitalist recommended starting macrobid  -Await final cx results     #Anxiety- Xanax, home med      #Hypertension/Orthostasis -- amlodipine 5mg qd  -will discontinue midodrine 2.5mg q8am  teds and abdominal binder   orthostasis improved      #Atrial Fibrillation- - rate controlled  with lopressor 12.5mg bid and amiodarone 200mg qd    #Thoracoabdominal aneurysm open repair- Asa, bp control     #Neurogenic bowel: continue suppository qAM and toileting program- pt now able to do with set up     #Anemia  -stable , asymptomatic     #Eosinophilia- AEC 0.45, continue to monitor     #DVT prophylaxis: lovenox 40mg sq daily       IDT rounds: dc set for 10/30 as pt continues to make progress

## 2019-10-29 NOTE — PROGRESS NOTE ADULT - SUBJECTIVE AND OBJECTIVE BOX
70 yo female with thoracoabdominal aneurysm open repair complicated by cervical myelopathy, dysphagia, neurogenic bowel and bladder  seen at the bedside, feels good,  no n/v, no sob. no events,  no dizziness, no headaches    Vital Signs Last 24 Hrs  T(C): 36.6 (29 Oct 2019 07:42), Max: 36.6 (28 Oct 2019 20:14)  T(F): 97.8 (29 Oct 2019 07:42), Max: 97.8 (28 Oct 2019 20:14)  HR: 64 (29 Oct 2019 07:42) (64 - 74)  BP: 151/72 (29 Oct 2019 07:42) (126/69 - 152/68)  BP(mean): --  RR: 14 (29 Oct 2019 07:42) (14 - 14)  SpO2: 99% (29 Oct 2019 07:42) (95% - 99%)      GENERAL- NAD  EAR/NOSE/MOUTH/THROAT - no pharyngeal exudates, no oral lesions  MMM  EYES- SIXTO, conjunctiva and Sclera clear  NECK- supple  RESPIRATORY- clear to auscultation bilaterally  CARDIOVASCULAR - SIS2, RRR  GI - soft NT BS present  EXTREMITIES- no pedal edema  NEUROLOGY- right LE weakness  SKIN- no rashes, warm to touch, incision clean  PSYCHIATRY- AAO X 3                    9.3                  136  | 27   | 21           5.52  >-----------< 284     ------------------------< 117                   28.7                 4.1  | 102  | 0.94                                         Ca 9.1   Mg x     Ph x            Urinalysis Basic - ( 26 Oct 2019 21:03 )    Color: Yellow / Appearance: Clear / S.005 / pH: x  Gluc: x / Ketone: Negative  / Bili: Negative / Urobili: Negative   Blood: x / Protein: Negative / Nitrite: Positive   Leuk Esterase: Small / RBC: Negative /HPF / WBC 6-10 /HPF   Sq Epi: x / Non Sq Epi: Neg.-Few / Bacteria: TNTC /HPF    .Urine Clean Catch (Midstream)  10- @ 21:03   >100,000 CFU/ml Gram Negative Rods  --  --

## 2019-10-29 NOTE — CHART NOTE - NSCHARTNOTEFT_GEN_A_CORE
Nutrition Follow Up Note  Hospital Course   (Per Electronic Medical Record):     Source:   Medical Record [X]      Diet:   Soft (Dysphagia 3) Diet w/ Thin Liquids  Tolerates Diet Well   Consumes % of Meals (as Per Documentation)     Enteral/Parenteral Nutrition: N/A    Current Weight: 143.7lb on 10/2  Obtain New Weight to Confirm Change  Obtain Weights Weekly     Pertinent Medications: MEDICATIONS  (STANDING):  ALPRAZolam 0.25 milliGRAM(s) Oral <User Schedule>  amiodarone    Tablet 200 milliGRAM(s) Oral daily  AQUAPHOR (petrolatum Ointment) 1 Application(s) Topical two times a day  ascorbic acid 500 milliGRAM(s) Oral two times a day  aspirin  chewable 81 milliGRAM(s) Oral daily  bisacodyl Suppository 10 milliGRAM(s) Rectal <User Schedule>  docusate sodium 100 milliGRAM(s) Oral at bedtime  enoxaparin Injectable 40 milliGRAM(s) SubCutaneous daily  ferrous    sulfate 325 milliGRAM(s) Oral two times a day  folic acid 1 milliGRAM(s) Oral daily  gabapentin 300 milliGRAM(s) Oral <User Schedule>  gabapentin 600 milliGRAM(s) Oral <User Schedule>  lidocaine 2% Gel 1 Application(s) Topical <User Schedule>  metoprolol succinate ER 12.5 milliGRAM(s) Oral every 12 hours  multivitamin 1 Tablet(s) Oral daily  nitrofurantoin monohydrate/macrocrystals (MACROBID) 100 milliGRAM(s) Oral two times a day with meals  pantoprazole    Tablet 40 milliGRAM(s) Oral before breakfast  psyllium Powder 1 Packet(s) Oral two times a day  tamsulosin 0.4 milliGRAM(s) Oral at bedtime    MEDICATIONS  (PRN):  acetaminophen   Tablet .. 650 milliGRAM(s) Oral every 6 hours PRN Mild Pain (1 - 3)  mineral oil 30 milliLiter(s) Oral two times a day PRN dry mouth    Pertinent Labs:  10-29 Na136 mmol/L Glu 117 mg/dL<H> K+ 4.1 mmol/L Cr  0.94 mg/dL BUN 21 mg/dL    Skin: No Pressure Ulcers   Surgical Incision on Scapula o Left Abdomen  (as Per Nursing Flow Sheet)     Edema: None Noted     Last Bowel Movement: on 10/27    Estimated Needs:   [X] No Change Since Previous Assessment    Previous Nutrition Diagnosis:   Severe Malnutrition  Chewing Difficulties     Nutrition Diagnosis is [X] Ongoing - Continues on Soft (Dysphagia 3) Diet     New Nutrition Diagnosis: [X] Not Applicable    Interventions:   1. Recommend Continue Nutrition Plan of Care     Monitoring & Evaluation:   [X] Weights   [X] PO Intake   [X] Follow Up (Per Protocol)  [X] Tolerance to Diet Prescription   [X] Other: Labs     Registered Dietitian/Nutritionist Remains Available.  Thomas Patten RDN    Pager # [phone]  Phone# [phone]

## 2019-10-29 NOTE — PROGRESS NOTE ADULT - ASSESSMENT
70 yo female with thoracoabdominal aneurysm open repair complicated by cervical myelopathy, dysphagia, neurogenic bowel and bladder- pt/ot/dvt ppx, pain meds, asa    UTI- treat with  MACROBID, pending sensitivity,  IC for retension    Anxiety- Xanax    orthostatic hypotension improved- d/c  midodrine     Hypertension- c/w lopressor    episode of paroxysmal Atrial Fibrillation converted to sinus- c/w lopressor, amiodarone,  asa    Neurogenic Bladder: IC ,  pt/ot, Flomax    Hyponatremia- monitor    Anemia- likely related to blood loss during surgery, feso4    DVT prophylaxis: Lovenox     will follow  d/w dr. pike

## 2019-10-29 NOTE — DISCHARGE NOTE PROVIDER - CARE PROVIDERS DIRECT ADDRESSES
,[email or web address],[email or web address]

## 2019-10-30 ENCOUNTER — TRANSCRIPTION ENCOUNTER (OUTPATIENT)
Age: 69
End: 2019-10-30

## 2019-10-30 VITALS
OXYGEN SATURATION: 96 % | RESPIRATION RATE: 14 BRPM | TEMPERATURE: 98 F | SYSTOLIC BLOOD PRESSURE: 105 MMHG | HEART RATE: 76 BPM | DIASTOLIC BLOOD PRESSURE: 63 MMHG

## 2019-10-30 PROCEDURE — 97530 THERAPEUTIC ACTIVITIES: CPT

## 2019-10-30 PROCEDURE — 80048 BASIC METABOLIC PNL TOTAL CA: CPT

## 2019-10-30 PROCEDURE — 83540 ASSAY OF IRON: CPT

## 2019-10-30 PROCEDURE — 90686 IIV4 VACC NO PRSV 0.5 ML IM: CPT

## 2019-10-30 PROCEDURE — 97116 GAIT TRAINING THERAPY: CPT

## 2019-10-30 PROCEDURE — 99232 SBSQ HOSP IP/OBS MODERATE 35: CPT

## 2019-10-30 PROCEDURE — 97535 SELF CARE MNGMENT TRAINING: CPT

## 2019-10-30 PROCEDURE — 81001 URINALYSIS AUTO W/SCOPE: CPT

## 2019-10-30 PROCEDURE — 87186 SC STD MICRODIL/AGAR DIL: CPT

## 2019-10-30 PROCEDURE — 83735 ASSAY OF MAGNESIUM: CPT

## 2019-10-30 PROCEDURE — 83550 IRON BINDING TEST: CPT

## 2019-10-30 PROCEDURE — 97112 NEUROMUSCULAR REEDUCATION: CPT

## 2019-10-30 PROCEDURE — 97110 THERAPEUTIC EXERCISES: CPT

## 2019-10-30 PROCEDURE — 99232 SBSQ HOSP IP/OBS MODERATE 35: CPT | Mod: GC

## 2019-10-30 PROCEDURE — 97163 PT EVAL HIGH COMPLEX 45 MIN: CPT

## 2019-10-30 PROCEDURE — 87086 URINE CULTURE/COLONY COUNT: CPT

## 2019-10-30 PROCEDURE — 86803 HEPATITIS C AB TEST: CPT

## 2019-10-30 PROCEDURE — 92507 TX SP LANG VOICE COMM INDIV: CPT

## 2019-10-30 PROCEDURE — 85027 COMPLETE CBC AUTOMATED: CPT

## 2019-10-30 PROCEDURE — 92526 ORAL FUNCTION THERAPY: CPT

## 2019-10-30 PROCEDURE — 97167 OT EVAL HIGH COMPLEX 60 MIN: CPT

## 2019-10-30 PROCEDURE — 80053 COMPREHEN METABOLIC PANEL: CPT

## 2019-10-30 PROCEDURE — 82728 ASSAY OF FERRITIN: CPT

## 2019-10-30 PROCEDURE — 92523 SPEECH SOUND LANG COMPREHEN: CPT

## 2019-10-30 RX ORDER — NITROFURANTOIN MACROCRYSTAL 50 MG
1 CAPSULE ORAL
Qty: 13 | Refills: 0
Start: 2019-10-30 | End: 2019-11-11

## 2019-10-30 RX ADMIN — Medication 325 MILLIGRAM(S): at 05:16

## 2019-10-30 RX ADMIN — Medication 81 MILLIGRAM(S): at 11:08

## 2019-10-30 RX ADMIN — PANTOPRAZOLE SODIUM 40 MILLIGRAM(S): 20 TABLET, DELAYED RELEASE ORAL at 05:16

## 2019-10-30 RX ADMIN — GABAPENTIN 300 MILLIGRAM(S): 400 CAPSULE ORAL at 05:16

## 2019-10-30 RX ADMIN — Medication 12.5 MILLIGRAM(S): at 05:16

## 2019-10-30 RX ADMIN — Medication 1 MILLIGRAM(S): at 11:08

## 2019-10-30 RX ADMIN — AMIODARONE HYDROCHLORIDE 200 MILLIGRAM(S): 400 TABLET ORAL at 05:16

## 2019-10-30 RX ADMIN — Medication 1 TABLET(S): at 11:08

## 2019-10-30 RX ADMIN — Medication 500 MILLIGRAM(S): at 05:16

## 2019-10-30 RX ADMIN — Medication 100 MILLIGRAM(S): at 07:26

## 2019-10-30 NOTE — PROGRESS NOTE ADULT - SUBJECTIVE AND OBJECTIVE BOX
HPI:   69 year-old female with hx of "adrian-aorta" s/p aortic valve replacement, admitted electively to [facility] on 8/28 for open TAAA repair due to enlarging thoracoabdominal aortic aneurysm. Post-operatively it was found that she had loss of strength in lower extremities, R>L. Repeat imaging was negative for hematoma. Workup for RLE weakness ordered by neurology, felt weakness to be due to cervical myelopathy.     Interval/ROS: No overnight issues.  Denies any fevers, chills, abdominal pain, nausea, HAs, dizziness, CP, SOB.  Pain is controlled.   Sons are at bedside. Follow up visits/care discussed with patient and sons.     Vital Signs Last 24 Hrs  T(C): 36.6 (30 Oct 2019 07:20), Max: 36.6 (29 Oct 2019 21:00)  T(F): 97.9 (30 Oct 2019 07:20), Max: 97.9 (30 Oct 2019 07:20)  HR: 76 (30 Oct 2019 07:20) (66 - 76)  BP: 105/63 (30 Oct 2019 07:20) (105/63 - 180/75)  RR: 14 (30 Oct 2019 07:20) (14 - 14)  SpO2: 96% (30 Oct 2019 07:20) (96% - 99%)    Physical Exam:  Gen - NAD, Comfortable  	Chest wall: Sternal incision wound-clean, no erythema or drainage noted  	Posterior lateral incision wound on the left- clean, no erythema or drainage, small skin tear seen next to incision site healing well, decreased in size  	Abdomen - Soft, NT/ND, +BS  	Extremities - No C/C/E, No calf tenderness  	Neuro-  	   Cognitive - AAOx3  	   Cranial Nerves - CN 2-12 intact  	   Motor -  Bilateral Upper extremities 5/5   	                  LEFT    LE - HF 4+/5, KE 4+/5, DF 5/5, PF 5/5  	                  RIGHT LE - HF 3/5, KE 3/5, DF 4/5, PF 4/5     	   Sensory - Intact to LT bilaterally, decreased proprioception.    	   Tone - normal  	Psychiatric - Mood stable, Affect WNL    10-29    136  |  102  |  21  ----------------------------<  117<H>  4.1   |  27  |  0.94    Ca    9.1      29 Oct 2019 05:50                          9.3    5.52  )-----------( 284      ( 29 Oct 2019 05:50 )             28.7       MEDICATIONS  (STANDING):  ALPRAZolam 0.25 milliGRAM(s) Oral <User Schedule>  amiodarone    Tablet 200 milliGRAM(s) Oral daily  AQUAPHOR (petrolatum Ointment) 1 Application(s) Topical two times a day  ascorbic acid 500 milliGRAM(s) Oral two times a day  aspirin  chewable 81 milliGRAM(s) Oral daily  bisacodyl Suppository 10 milliGRAM(s) Rectal <User Schedule>  docusate sodium 100 milliGRAM(s) Oral at bedtime  enoxaparin Injectable 40 milliGRAM(s) SubCutaneous daily  ferrous    sulfate 325 milliGRAM(s) Oral two times a day  folic acid 1 milliGRAM(s) Oral daily  gabapentin 300 milliGRAM(s) Oral <User Schedule>  gabapentin 600 milliGRAM(s) Oral <User Schedule>  lidocaine 2% Gel 1 Application(s) Topical <User Schedule>  metoprolol succinate ER 12.5 milliGRAM(s) Oral every 12 hours  multivitamin 1 Tablet(s) Oral daily  nitrofurantoin monohydrate/macrocrystals (MACROBID) 100 milliGRAM(s) Oral two times a day with meals  pantoprazole    Tablet 40 milliGRAM(s) Oral before breakfast  psyllium Powder 1 Packet(s) Oral two times a day  tamsulosin 0.4 milliGRAM(s) Oral at bedtime    MEDICATIONS  (PRN):  acetaminophen   Tablet .. 650 milliGRAM(s) Oral every 6 hours PRN Mild Pain (1 - 3)  mineral oil 30 milliLiter(s) Oral two times a day PRN dry mouth

## 2019-10-30 NOTE — PROGRESS NOTE ADULT - SUBJECTIVE AND OBJECTIVE BOX
69 y o female with thoracoabdominal aneurysm open repair complicated by cervical myelopathy, dysphagia, neurogenic bowel and bladder  seen at the bedside, feels good,  no n/v, no sob. no events,  no dizziness, no headaches, is happy to be going home today.    Vital Signs Last 24 Hrs  T(C): 36.6 (30 Oct 2019 07:20), Max: 36.6 (29 Oct 2019 21:00)  T(F): 97.9 (30 Oct 2019 07:20), Max: 97.9 (30 Oct 2019 07:20)  HR: 76 (30 Oct 2019 07:20) (66 - 76)  BP: 105/63 (30 Oct 2019 07:20) (105/63 - 180/75)  BP(mean): --  RR: 14 (30 Oct 2019 07:20) (14 - 14)  SpO2: 96% (30 Oct 2019 07:20) (96% - 99%)      GENERAL- NAD  EAR/NOSE/MOUTH/THROAT - no pharyngeal exudates, no oral lesions  MMM  EYES- SIXTO, conjunctiva and Sclera clear  NECK- supple  RESPIRATORY- clear to auscultation bilaterally  CARDIOVASCULAR - SIS2, RRR  GI - soft NT BS present  EXTREMITIES- no pedal edema  NEUROLOGY- right LE weakness  SKIN- no rashes, warm to touch, incision clean  PSYCHIATRY- AAO X 3                   9.3                  136  | 27   | 21           5.52  >-----------< 284     ------------------------< 117                   28.7                 4.1  | 102  | 0.94                                         Ca 9.1   Mg x     Ph x          Culture - Urine (10.26.19 @ 21:03)    -  Cefepime: S <=4    -  Cefazolin: S <=8 (MIC_CL_COM_ENTERIC_CEFAZU) For uncomplicated UTI with K. pneumoniae, E. coli, or P. mirablis: SOFIA <=16 is sensitive and SOFIA >=32 is resistant. This also predicts results for oral agents cefaclor, cefdinir, cefpodoxime, cefprozil, cefuroxime axetil, cephalexin and locarbef for uncomplicated UTI. Note that some isolates may be susceptible to these agents while testing resistant to cefazolin.    -  Aztreonam: S <=4    -  Ampicillin: S <=8 These ampicillin results predict results for amoxicillin    -  Ampicillin/Sulbactam: S <=8/4 Enterobacter, Citrobacter, and Serratia may develop resistance during prolonged therapy (3-4 days)    -  Amikacin: S <=16    -  Trimethoprim/Sulfamethoxazole: S <=2/38    -  Tobramycin: S <=4    -  Piperacillin/Tazobactam: S <=16    -  Tigecycline: S <=2    -  Nitrofurantoin: S <=32 Should not be used to treat pyelonephritis    -  Meropenem: S <=1    -  Levofloxacin: S <=2    -  Imipenem: S <=1    -  Ciprofloxacin: S <=1    -  Gentamicin: S <=4    -  Ceftriaxone: S <=1 Enterobacter, Citrobacter, and Serratia may develop resistance during prolonged therapy    -  Cefoxitin: S <=8    Specimen Source: .Urine Clean Catch (Midstream)    Culture Results:   >100,000 CFU/ml Escherichia coli    Organism Identification: Escherichia coli    Organism: Escherichia coli    Method Type: SOFIA

## 2019-10-30 NOTE — PROGRESS NOTE ADULT - ASSESSMENT
68 yo female with thoracoabdominal aneurysm open repair complicated by cervical myelopathy, dysphagia, neurogenic bowel and bladder- pt/ot/dvt ppx, pain meds, asa    UTI- c/w   MACROBID    Anxiety- Xanax    orthostatic hypotension- improved    Hypertension- c/w lopressor    episode of paroxysmal Atrial Fibrillation converted to sinus- c/w lopressor, amiodarone,  asa    Neurogenic Bladder: IC ,  pt/ot, Flomax    Hyponatremia- monitor    Anemia- likely related to blood loss during surgery- feso4    DVT prophylaxis: Lovenox     ok to d/c home from medical standpoint  d/w dr. pike

## 2019-10-30 NOTE — PROGRESS NOTE ADULT - PROVIDER SPECIALTY LIST ADULT
Hospitalist
Rehab Medicine
Hospitalist
Rehab Medicine

## 2019-10-30 NOTE — DISCHARGE NOTE NURSING/CASE MANAGEMENT/SOCIAL WORK - PATIENT PORTAL LINK FT
You can access the FollowMyHealth Patient Portal offered by [facility] by registering at the following website: [email or web address]. By joining [facility]’s FollowMyHealth portal, you will also be able to view your health information using other applications (apps) compatible with our system.

## 2019-10-30 NOTE — DISCHARGE NOTE NURSING/CASE MANAGEMENT/SOCIAL WORK - NSDCFUADDAPPT_GEN_ALL_CORE_FT
Follow up with Dr Chakraborty in 4 weeks at [address] [place] [address], [address] Floor, [place] [address]. Office Phone: [phone]

## 2019-10-30 NOTE — PROGRESS NOTE ADULT - REASON FOR ADMISSION
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

## 2019-10-30 NOTE — PROGRESS NOTE ADULT - ASSESSMENT
This is a 70 yo female with thoracoabdominal aneurysm open repair complicated by cervical myelopathy, dysphagia, neurogenic bowel and bladder.       #Cervical Myelopathy  Completed comprehensive acute rehabilitation program including PT/OT, DOMINIQUE robotic to assist with strengthening and ambulation.  -Neuropathic pain- continue gabapentin.  -Neurogenic bladder: continue toileting program and straight catheterizations q4 hrs    #Postive UA,   -cx showing gram negative rods >[id number] colonies  -Hospitalist recommended starting macrobid  -Await final cx results shows macrobid to be sensitive, will send remainder of weeks worth of macrobid to pharmacy, discussed with patient     #Anxiety- Xanax, home med daily       #Hypertension/Orthostasis   -off of midodrine   -teds and abdominal binder   -orthostasis improved      #Atrial Fibrillation- - rate controlled  with lopressor 12.5mg bid and amiodarone 200mg qd    #Thoracoabdominal aneurysm open repair- Asa, bp control     #Neurogenic bowel: continue suppository qAM and toileting program- pt now able to do with set up     #Anemia  -stable , asymptomatic     #Eosinophilia- AEC 0.45, follow up with PCP as outpatient     #DVT prophylaxis: lovenox 40mg sq daily       Patient is medically stable for d/c home today with home therapies. Sons are at bedside. All questions answered.

## 2019-11-04 ENCOUNTER — RX RENEWAL (OUTPATIENT)
Age: 69
End: 2019-11-04

## 2019-11-13 ENCOUNTER — APPOINTMENT (OUTPATIENT)
Dept: CARDIOTHORACIC SURGERY | Facility: CLINIC | Age: 69
End: 2019-11-13
Payer: MEDICARE

## 2019-11-13 PROCEDURE — 99024 POSTOP FOLLOW-UP VISIT: CPT

## 2019-11-14 VITALS
OXYGEN SATURATION: 91 % | DIASTOLIC BLOOD PRESSURE: 47 MMHG | SYSTOLIC BLOOD PRESSURE: 88 MMHG | RESPIRATION RATE: 16 BRPM | HEART RATE: 81 BPM

## 2019-11-14 RX ORDER — LABETALOL HYDROCHLORIDE 200 MG/1
200 TABLET, FILM COATED ORAL 3 TIMES DAILY
Qty: 90 | Refills: 0 | Status: DISCONTINUED | COMMUNITY
Start: 2019-06-13 | End: 2019-11-14

## 2019-11-14 RX ORDER — METOPROLOL SUCCINATE 25 MG/1
25 TABLET, EXTENDED RELEASE ORAL
Qty: 30 | Refills: 0 | Status: DISCONTINUED | COMMUNITY
Start: 2019-10-29 | End: 2019-11-14

## 2019-11-14 RX ORDER — FOLIC ACID 1 MG/1
1 TABLET ORAL
Qty: 30 | Refills: 0 | Status: ACTIVE | COMMUNITY
Start: 2019-10-29

## 2019-11-18 ENCOUNTER — APPOINTMENT (OUTPATIENT)
Dept: PHYSICAL MEDICINE AND REHAB | Facility: CLINIC | Age: 69
End: 2019-11-18
Payer: MEDICARE

## 2019-11-18 VITALS — DIASTOLIC BLOOD PRESSURE: 79 MMHG | OXYGEN SATURATION: 97 % | SYSTOLIC BLOOD PRESSURE: 168 MMHG | HEART RATE: 70 BPM

## 2019-11-18 DIAGNOSIS — R10.9 UNSPECIFIED ABDOMINAL PAIN: ICD-10-CM

## 2019-11-18 DIAGNOSIS — Z86.69 PERSONAL HISTORY OF OTHER DISEASES OF THE NERVOUS SYSTEM AND SENSE ORGANS: ICD-10-CM

## 2019-11-18 PROCEDURE — 99215 OFFICE O/P EST HI 40 MIN: CPT

## 2019-11-18 RX ORDER — TAMSULOSIN HYDROCHLORIDE 0.4 MG/1
0.4 CAPSULE ORAL
Qty: 30 | Refills: 0 | Status: DISCONTINUED | COMMUNITY
Start: 2019-10-29 | End: 2019-11-18

## 2019-11-18 RX ORDER — ALPRAZOLAM 0.25 MG/1
0.25 TABLET ORAL
Qty: 30 | Refills: 0 | Status: DISCONTINUED | COMMUNITY
Start: 2019-10-29 | End: 2019-11-18

## 2019-11-18 RX ORDER — CIPROFLOXACIN HYDROCHLORIDE 500 MG/1
500 TABLET, FILM COATED ORAL
Qty: 14 | Refills: 0 | Status: DISCONTINUED | COMMUNITY
Start: 2019-08-16 | End: 2019-11-18

## 2019-11-18 RX ORDER — NITROFURANTOIN (MONOHYDRATE/MACROCRYSTALS) 25; 75 MG/1; MG/1
100 CAPSULE ORAL
Qty: 13 | Refills: 0 | Status: DISCONTINUED | COMMUNITY
Start: 2019-10-30 | End: 2019-11-18

## 2019-11-18 RX ORDER — GABAPENTIN 600 MG/1
600 TABLET, COATED ORAL
Qty: 60 | Refills: 0 | Status: DISCONTINUED | COMMUNITY
Start: 2019-10-29 | End: 2019-11-18

## 2019-11-19 PROBLEM — R10.9 INTERMITTENT ABDOMINAL PAIN: Status: ACTIVE | Noted: 2019-03-01

## 2019-11-19 NOTE — ASSESSMENT
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 \par \par Spinal Cord Injury: Continue with home care PT. Once patient is comfortable ambulating inside the bathroom independently, will switch to outpatient physical therapy at [facility] ([facility] Patient has been taking Gabapentin for nerve pain, but it is causing her drowsiness and not helpful. Recommend weaning off of the Gabapentin (300 tid for 3 days, bid for 3 days, and once a day for 3 days). Recommend starting Cymbalta once weaned off of Gabapentin, script for duloxetine 30 daily sent to pharmacy. \par \par Urinary incontinence: Recommend discontinuing Flomax and continuing with straight catheterization and awaiting return of bladder function. Patient is beginning to inconsistently feel the urge to urinate and this should continue to improve.\par \par Patient and son are in agreement with plan and all questions and concerns were addressed. Patient to make a follow up appointment for 1 month.

## 2019-11-19 NOTE — REVIEW OF SYSTEMS
[Muscle Weakness] : muscle weakness [Negative] : Heme/Lymph [Chest Pain] : no chest pain [Leg Claudication] : no intermittent leg claudication [Joint Pain] : no joint pain [Joint Stiffness] : no joint stiffness [Skin Rash] : no skin rash [Muscle Pain] : no muscle pain [Skin Wound] : no skin wound [FreeTextEntry5] : See HPI [FreeTextEntry3] : Legally blind left eye.  [FreeTextEntry7] : see HPI [FreeTextEntry8] : see HPI [de-identified] : Appropriately emotional related to events, but goal directed and not overwhelmed.  [de-identified] : see HPI

## 2019-11-19 NOTE — PHYSICAL EXAM
[Tactile Decrease Entire Leg Right] : diminished right leg [Tactile Decrease Entire Leg Left] : diminished left leg [Proprioception] : proprioception was  normal [1+] : right 1+ [Pain / Temp Decrease Entire Leg - Right] : diminished right leg [3+] : left 3+ [4] : L3  knee extension 4/5 [2] : L2 hip flexion 2/5 [Normal] : Oriented to person, place, and time, insight and judgement were intact and the affect was normal [5] : T1 small finger abduction 5/5 [Right: _____] : Right: [unfilled] [Left: _____] : Left: [unfilled] [Pain / Temp Decrease Entire Leg - Left] : normal left leg [de-identified] : Functional AROM both legs, largely symmetric. Sit to stand with sup. Amb with walker short distances with close sup.  [de-identified] : see below. Tone increased right leg MAS 1. [TWNoteComboBox1] : D

## 2019-11-19 NOTE — SOCIAL HISTORY
[Lives Alone] : Only the patient, ~he/she~ lives alone [Apartment] : in an apartment [] : Patient has a home health aide [Manual Wheelchair] : manual wheelchair [Walker] : walker [Stairs to enter?] : no stairs to enter [Stairs inside the home?] : no stairs inside the home

## 2019-11-19 NOTE — HISTORY OF PRESENT ILLNESS
[FreeTextEntry1] : Patient is a 68 y/o F with PMHx of HTN, and ascending and descending thoracic aortic aneurysms who underwent ascending aortic aneurysm repair in April (elephant trunk) from which she recovered well. Patient underwent descending aortic aneurysm repair 8/29/19 at [facility]. She had a lumbar drain placed preoperatively. Surgery included a dacron graft and required reimplantation of a lumbar artery.  While in recovery after the surgery, she was found to have bilateral lower extremity weakness, and sensory loss. Course was complicated by a. fib. After her course stabilized,  she went to [facility] for acute inpatient rehabilitation and experienced both neurologic and functional improvement including early ambulation. Patient was discharged home with home care on 10/30/19.\par \par At home, she normally uses a wheelchair, but she is able to use the walker with minimal assistance to transfer to the chair and ambulate into the bathroom. She cannot access bathroom with a chair. \par \par Patient has noticed a band-like ring around her abdomen that she feels tingling, and below this ring, she has more numbness. Below this ring, she states that her sensation in her lower extremities is diminished compared to her upper extremities. The ring is quite bothersome and painful. Gabapentin is not helping and makes her tired. \par \par Bowel: Patient has been taking a suppository each morning because she feels more comfortable having a bowel movement during the time that her home health aide is present. She states that if she was better able to ambulate to the toilet independently, she would not need the suppository as she can feel when she needs to go as she is certain she has bowel control. \par \par Bladder: Patient has been straight catheterizing herself every 4 hours. She states that she is improving in feeling the urge to urinate, but cannot hold it in when she needs to go and sometimes wakes up incontinent. Cath volumes are 400-500 during the day and 700-1000 cc in the am upon awakening. Very few controlled voids.  Patient states that she has moved her Flomax to the morning to try and prevent any overnight accidents.\par

## 2019-11-19 NOTE — END OF VISIT
[] : Resident [FreeTextEntry3] : I was present for key portion of history and physical exam and I directed medical management.

## 2019-11-19 NOTE — REASON FOR VISIT
[Family Member] : family member [Follow-Up] : a follow-up visit [FreeTextEntry1] : incomplete paraplegia.

## 2019-12-05 ENCOUNTER — CHART COPY (OUTPATIENT)
Age: 69
End: 2019-12-05

## 2019-12-07 ENCOUNTER — APPOINTMENT (OUTPATIENT)
Dept: CT IMAGING | Facility: IMAGING CENTER | Age: 69
End: 2019-12-07
Payer: MEDICARE

## 2019-12-07 ENCOUNTER — OUTPATIENT (OUTPATIENT)
Dept: OUTPATIENT SERVICES | Facility: HOSPITAL | Age: 69
LOS: 1 days | End: 2019-12-07
Payer: MEDICARE

## 2019-12-07 DIAGNOSIS — Z98.890 OTHER SPECIFIED POSTPROCEDURAL STATES: Chronic | ICD-10-CM

## 2019-12-07 DIAGNOSIS — Z95.828 PRESENCE OF OTHER VASCULAR IMPLANTS AND GRAFTS: ICD-10-CM

## 2019-12-07 DIAGNOSIS — Z98.890 OTHER SPECIFIED POSTPROCEDURAL STATES: ICD-10-CM

## 2019-12-07 DIAGNOSIS — I71.6 THORACOABDOMINAL AORTIC ANEURYSM, WITHOUT RUPTURE: Chronic | ICD-10-CM

## 2019-12-07 DIAGNOSIS — Z98.49 CATARACT EXTRACTION STATUS, UNSPECIFIED EYE: Chronic | ICD-10-CM

## 2019-12-07 DIAGNOSIS — S42.302A UNSPECIFIED FRACTURE OF SHAFT OF HUMERUS, LEFT ARM, INITIAL ENCOUNTER FOR CLOSED FRACTURE: Chronic | ICD-10-CM

## 2019-12-07 DIAGNOSIS — Z95.3 PRESENCE OF XENOGENIC HEART VALVE: ICD-10-CM

## 2019-12-07 PROCEDURE — 74174 CTA ABD&PLVS W/CONTRAST: CPT

## 2019-12-07 PROCEDURE — 71275 CT ANGIOGRAPHY CHEST: CPT

## 2019-12-07 PROCEDURE — 71275 CT ANGIOGRAPHY CHEST: CPT | Mod: 26

## 2019-12-07 PROCEDURE — 74174 CTA ABD&PLVS W/CONTRAST: CPT | Mod: 26

## 2019-12-07 PROCEDURE — 82565 ASSAY OF CREATININE: CPT

## 2019-12-11 ENCOUNTER — APPOINTMENT (OUTPATIENT)
Dept: CARDIOTHORACIC SURGERY | Facility: CLINIC | Age: 69
End: 2019-12-11
Payer: MEDICARE

## 2019-12-16 ENCOUNTER — APPOINTMENT (OUTPATIENT)
Dept: PHYSICAL MEDICINE AND REHAB | Facility: CLINIC | Age: 69
End: 2019-12-16
Payer: MEDICARE

## 2019-12-16 VITALS
OXYGEN SATURATION: 97 % | HEART RATE: 81 BPM | SYSTOLIC BLOOD PRESSURE: 149 MMHG | DIASTOLIC BLOOD PRESSURE: 74 MMHG | TEMPERATURE: 97.3 F

## 2019-12-16 PROCEDURE — 99214 OFFICE O/P EST MOD 30 MIN: CPT

## 2019-12-16 RX ORDER — GABAPENTIN 300 MG/1
300 CAPSULE ORAL
Qty: 30 | Refills: 0 | Status: DISCONTINUED | COMMUNITY
Start: 2019-10-29 | End: 2019-12-16

## 2019-12-16 RX ORDER — DULOXETINE HYDROCHLORIDE 30 MG/1
30 CAPSULE, DELAYED RELEASE PELLETS ORAL
Qty: 30 | Refills: 1 | Status: DISCONTINUED | COMMUNITY
Start: 2019-11-18 | End: 2019-12-16

## 2019-12-17 NOTE — ASSESSMENT
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 She is doing extremely well. \par \par Incomplete paraplegia: Continue with home care PT 1-2 more sessions, then switch to outpatient physical therapy at [facility] ([place]). Patient with improved neuropathic pain on cymbalta without side effect, will increase dose to 40mg daily as she seeks greater pain control. Script sent to pharmacy. \par \par Urinary incontinence: Continuing with straight catheterization and awaiting return of bladder function. Still with large bladder volumes. Patient is beginning to inconsistently feel the urge to urinate and this should continue to improve. \par \par Patient and sister are in agreement with plan and all questions and concerns were addressed. Patient to make a follow up appointment for 1 month.

## 2019-12-17 NOTE — SOCIAL HISTORY
[Apartment] : in an apartment [Lives Alone] : Only the patient, ~he/she~ lives alone [] : Patient has a home health aide [Walker] : walker [Manual Wheelchair] : manual wheelchair [Stairs to enter?] : no stairs to enter [Stairs inside the home?] : no stairs inside the home

## 2019-12-17 NOTE — HISTORY OF PRESENT ILLNESS
[FreeTextEntry1] : Patient is a 68 y/o F with PMHx of HTN, afib, s/p ascending and descending thoracic aortic aneurysms c/b spinal infarct and subsequent incomplete paraplegia 8/29/19 here for follow up.\par \par Overall patient feels her walking has significantly improved. She now ambulates with a RW outside around her courtyard without issue. She does report a fall in her kitchen, however, when she was reaching in a cabinet, lost her balance, hit her wheelchair which was behind her, and fell to the floor. She was able to call her son. She did not seek medical attention. Denies any significant injury but reports some stiffness of her left lateral thigh. Patient currently has an aide 3hrs/d, 3d/wk. The aide was not there when she fell. She was going to transition to outpatient PT but deferred for another few weeks after the fall to continue home care. \par \par At her last visit she was instructed to wean off gabapentin and start cymbalta. She reports some improvement of the band-like neuropathic pain she has around her abdomen over the past few days. No side effects noted from the cymbalta. \par \par Bowel: Patient has been taking a suppository each morning because she feels more comfortable having a bowel movement during the time that her home health aide is present. She does report having a continent BM without the suppository as well.  She states that if she was better able to ambulate to the toilet independently, she would not need the suppository as she can feel when she needs to go as she is certain she has bowel control. \par \par Bladder: Patient has been straight catheterizing herself, and has extended it to every 6 hours. She states that she is improving in feeling the urge to urinate, and is no longer incontinent overnight since stopping flomax. Cath volumes are 400-500 during the day. Some controlled voids.  She was also treated for a UTI since last visit, completed course of cipro. \par

## 2019-12-17 NOTE — PHYSICAL EXAM
[Tactile Decrease Entire Leg Right] : diminished right leg [Proprioception] : proprioception was  normal [Tactile Decrease Entire Leg Left] : diminished left leg [1+] : right 1+ [Pain / Temp Decrease Entire Leg - Right] : diminished right leg [3+] : left 3+ [Normal] : Oriented to person, place, and time, insight and judgement were intact and the affect was normal [4] : L2 hip flexion 4/5 [5] : L5 great toe extension 5/5 [Left: _____] : Left: [unfilled] [Right: _____] : Right: [unfilled] [Pain / Temp Decrease Entire Leg - Left] : normal left leg [de-identified] : Functional AROM both legs, largely symmetric. Sit to stand independent. Amb with walker short distances with  supervision.  [de-identified] : No JVD [de-identified] : see below. Tone increased right leg MAS 1. 4-5 beats clonus b/l, mild. [TWNoteComboBox1] : D

## 2020-02-12 ENCOUNTER — APPOINTMENT (OUTPATIENT)
Dept: CARDIOTHORACIC SURGERY | Facility: CLINIC | Age: 70
End: 2020-02-12
Payer: MEDICARE

## 2020-02-12 VITALS
TEMPERATURE: 98.5 F | SYSTOLIC BLOOD PRESSURE: 190 MMHG | DIASTOLIC BLOOD PRESSURE: 81 MMHG | BODY MASS INDEX: 21.66 KG/M2 | OXYGEN SATURATION: 100 % | WEIGHT: 130 LBS | HEIGHT: 65 IN | RESPIRATION RATE: 16 BRPM | HEART RATE: 75 BPM

## 2020-02-12 PROCEDURE — 99213 OFFICE O/P EST LOW 20 MIN: CPT

## 2020-02-24 ENCOUNTER — APPOINTMENT (OUTPATIENT)
Dept: PHYSICAL MEDICINE AND REHAB | Facility: CLINIC | Age: 70
End: 2020-02-24
Payer: MEDICARE

## 2020-02-24 VITALS
HEART RATE: 64 BPM | DIASTOLIC BLOOD PRESSURE: 77 MMHG | SYSTOLIC BLOOD PRESSURE: 182 MMHG | TEMPERATURE: 97.9 F | OXYGEN SATURATION: 98 %

## 2020-02-24 PROCEDURE — 99214 OFFICE O/P EST MOD 30 MIN: CPT | Mod: GC

## 2020-02-24 NOTE — PHYSICAL EXAM
[Proprioception] : proprioception was  normal [Tactile Decrease Entire Leg Right] : diminished right leg [Tactile Decrease Entire Leg Left] : diminished left leg [Pain / Temp Decrease Entire Leg - Right] : diminished right leg [Normal] : Oriented to person, place, and time, insight and judgement were intact and the affect was normal [3+] : left 3+ [1+] : right 1+ [4] : L2 hip flexion 4/5 [5] : L5 great toe extension 5/5 [Pain / Temp Decrease Entire Leg - Left] : normal left leg [de-identified] : good chest expansion [de-identified] : Functional AROM both legs, largely symmetric. Sit to stand independent. Amb with walker with good gait pattern. . Hand held assist ambulating 100 ft with irregular gait pattern, particularly with placement of right foot.  [de-identified] : see below. Proprioception intact b/l toes, though has trouble relaxing right great toe for testing.  [TWNoteComboBox1] : D

## 2020-02-24 NOTE — END OF VISIT
[] : Resident [FreeTextEntry3] : I was physically present for key portion of the history and physical and I directed medical management.

## 2020-02-24 NOTE — ASSESSMENT
[FreeTextEntry1] : Patient is a 69 y/o F who underwent a descending aortic aneurysm repair complicated by cord infarct  incoth incomplete paraplegia. She is doing extremely well and is progressing with her ambulation. Pt can now ambulate with supervision on RW and with hand held assistance. \par Patient has improved neuropathic pain while on the increased dose of cymbalta.\par \par Urinary retention:continue with straight catheterization  and awaiting return of bladder function. Still with large bladder volumes. Pt should continue to monitor straight cath post void volumes. \par \par Patient in agreement with plan and all questions and concerns were addressed. Patient to make a follow up appointment for May.

## 2020-02-24 NOTE — REASON FOR VISIT
[Family Member] : family member [Follow-Up] : a follow-up visit [FreeTextEntry1] : incomplete paraplegia following spinal cord infarct.

## 2020-02-24 NOTE — REVIEW OF SYSTEMS
[Chest Pain] : no chest pain [Leg Claudication] : no intermittent leg claudication [Joint Pain] : no joint pain [Joint Stiffness] : no joint stiffness [Muscle Pain] : no muscle pain [Skin Rash] : no skin rash [Skin Wound] : no skin wound [Negative] : Psychiatric [FreeTextEntry3] : Legally blind left eye.  [FreeTextEntry5] : See HPI, recent imaging of aortic graft reveals graft to be successfully placed. Follow up recommended one year.  [FreeTextEntry7] : see HPI [FreeTextEntry8] : see HPI [de-identified] : see HPI

## 2020-02-24 NOTE — HISTORY OF PRESENT ILLNESS
[FreeTextEntry1] : 71 y/o F with PMHx of HTN, afib, s/p ascending and descending thoracic aortic aneurysms c/b spinal infarct and subsequent incomplete paraplegia 8/29/19 here today with family member for follow up.\par \par Overall, patient feels well and states that her walking has significantly improved. She now ambulates with a RW and can walk with hand held assistance and no device. . Patient continues to go to outpatient PT. \par \par Pt reports improvement of the band-like neuropathic pain she has had around her abdomen since the last visit. No side effects noted from the Cymbalta dosage rx from the last visit. \par \par Patient endorses that her main concern is her bladder. Pt has been straight catheterizing herself, and is doing it ~8 hours. Pt has been keeping a urine output and straight cath volume log daily.  PVRs remain 500-600 cc with only occasionally less. Pt reports no issues with her bowels.

## 2020-05-21 ENCOUNTER — APPOINTMENT (OUTPATIENT)
Dept: PHYSICAL MEDICINE AND REHAB | Facility: CLINIC | Age: 70
End: 2020-05-21
Payer: MEDICARE

## 2020-05-21 DIAGNOSIS — U07.1 COVID-19: ICD-10-CM

## 2020-05-21 DIAGNOSIS — I48.91 UNSPECIFIED ATRIAL FIBRILLATION: ICD-10-CM

## 2020-05-21 PROCEDURE — 99214 OFFICE O/P EST MOD 30 MIN: CPT | Mod: 95

## 2020-05-21 NOTE — PHYSICAL EXAM
[Normal] : Oriented to person, place, and time, insight and judgement were intact and the affect was normal [de-identified] : Normal respiratory effort with no cough.  [de-identified] : No witnessed spasticity. [de-identified] : No peripheral edema.  [de-identified] : Has AROM that is antigravity at key muscles. Could not observe gait due to her being alone and using cell phone.

## 2020-05-21 NOTE — ASSESSMENT
[FreeTextEntry1] : 70 woman with incomplete paraplegia.\par Rec:\par 1. Check COVID serology. Script provided. Told about [facility].\par 2. Renal and bladder sonogram, eval for hydro and stones. Cath once per day (PVR) and prn inability to void and suprapubic fullness.\par 3. F/U 6 weeks via telehealth\par 4. Resume PT in coming weeks for maximizing strength, balance, gait.\par

## 2020-05-21 NOTE — REVIEW OF SYSTEMS
[Shortness Of Breath] : no shortness of breath [Wheezing] : no wheezing [Cough] : no cough [Headache] : no headaches [Dizziness] : no dizziness [Fainting] : no fainting [Negative] : Heme/Lymph [FreeTextEntry2] : see HPI [FreeTextEntry6] : see HPI [FreeTextEntry7] : see HPI [FreeTextEntry8] : see HPI [FreeTextEntry9] : see HPI. Ambulating with walker, Rollator. Can use a cane with one person support. No falls. [de-identified] : see HPI

## 2020-05-26 ENCOUNTER — APPOINTMENT (OUTPATIENT)
Dept: PHYSICAL MEDICINE AND REHAB | Facility: CLINIC | Age: 70
End: 2020-05-26

## 2020-06-09 LAB
SARS-COV-2 IGG SERPL IA-ACNC: 16.2 INDEX
SARS-COV-2 IGG SERPL QL IA: POSITIVE

## 2020-08-02 ENCOUNTER — INPATIENT (INPATIENT)
Facility: HOSPITAL | Age: 70
LOS: 18 days | Discharge: NURSING, REHAB OR LONG-TERM CARE | DRG: 871 | End: 2020-08-21
Attending: INTERNAL MEDICINE | Admitting: INTERNAL MEDICINE
Payer: MEDICARE

## 2020-08-02 VITALS
RESPIRATION RATE: 20 BRPM | OXYGEN SATURATION: 100 % | TEMPERATURE: 98 F | SYSTOLIC BLOOD PRESSURE: 116 MMHG | HEIGHT: 66 IN | HEART RATE: 68 BPM | DIASTOLIC BLOOD PRESSURE: 50 MMHG | WEIGHT: 132.06 LBS

## 2020-08-02 DIAGNOSIS — I71.6 THORACOABDOMINAL AORTIC ANEURYSM, WITHOUT RUPTURE: ICD-10-CM

## 2020-08-02 DIAGNOSIS — N17.9 ACUTE KIDNEY FAILURE, UNSPECIFIED: ICD-10-CM

## 2020-08-02 DIAGNOSIS — I10 ESSENTIAL (PRIMARY) HYPERTENSION: ICD-10-CM

## 2020-08-02 DIAGNOSIS — E87.8 OTHER DISORDERS OF ELECTROLYTE AND FLUID BALANCE, NOT ELSEWHERE CLASSIFIED: ICD-10-CM

## 2020-08-02 DIAGNOSIS — S42.302A UNSPECIFIED FRACTURE OF SHAFT OF HUMERUS, LEFT ARM, INITIAL ENCOUNTER FOR CLOSED FRACTURE: Chronic | ICD-10-CM

## 2020-08-02 DIAGNOSIS — E87.6 HYPOKALEMIA: ICD-10-CM

## 2020-08-02 DIAGNOSIS — E87.2 ACIDOSIS: ICD-10-CM

## 2020-08-02 DIAGNOSIS — Z98.890 OTHER SPECIFIED POSTPROCEDURAL STATES: Chronic | ICD-10-CM

## 2020-08-02 DIAGNOSIS — Z98.49 CATARACT EXTRACTION STATUS, UNSPECIFIED EYE: Chronic | ICD-10-CM

## 2020-08-02 DIAGNOSIS — K52.9 NONINFECTIVE GASTROENTERITIS AND COLITIS, UNSPECIFIED: ICD-10-CM

## 2020-08-02 DIAGNOSIS — I71.6 THORACOABDOMINAL AORTIC ANEURYSM, WITHOUT RUPTURE: Chronic | ICD-10-CM

## 2020-08-02 DIAGNOSIS — R10.9 UNSPECIFIED ABDOMINAL PAIN: ICD-10-CM

## 2020-08-02 LAB
ALBUMIN SERPL ELPH-MCNC: 3 G/DL — LOW (ref 3.3–5)
ALBUMIN SERPL ELPH-MCNC: 3.2 G/DL — LOW (ref 3.3–5)
ALP SERPL-CCNC: 86 U/L — SIGNIFICANT CHANGE UP (ref 40–120)
ALP SERPL-CCNC: 97 U/L — SIGNIFICANT CHANGE UP (ref 40–120)
ALT FLD-CCNC: 18 U/L — SIGNIFICANT CHANGE UP (ref 10–45)
ALT FLD-CCNC: 19 U/L — SIGNIFICANT CHANGE UP (ref 10–45)
ANION GAP SERPL CALC-SCNC: 18 MMOL/L — HIGH (ref 5–17)
ANION GAP SERPL CALC-SCNC: 21 MMOL/L — HIGH (ref 5–17)
APTT BLD: 26.1 SEC — LOW (ref 27.5–35.5)
AST SERPL-CCNC: 20 U/L — SIGNIFICANT CHANGE UP (ref 10–40)
AST SERPL-CCNC: 23 U/L — SIGNIFICANT CHANGE UP (ref 10–40)
BASE EXCESS BLDV CALC-SCNC: 1.5 MMOL/L — SIGNIFICANT CHANGE UP (ref -2–2)
BASOPHILS # BLD AUTO: 0 K/UL — SIGNIFICANT CHANGE UP (ref 0–0.2)
BASOPHILS NFR BLD AUTO: 0 % — SIGNIFICANT CHANGE UP (ref 0–2)
BILIRUB SERPL-MCNC: 0.5 MG/DL — SIGNIFICANT CHANGE UP (ref 0.2–1.2)
BILIRUB SERPL-MCNC: 0.6 MG/DL — SIGNIFICANT CHANGE UP (ref 0.2–1.2)
BUN SERPL-MCNC: 31 MG/DL — HIGH (ref 7–23)
BUN SERPL-MCNC: 34 MG/DL — HIGH (ref 7–23)
CA-I SERPL-SCNC: 1.02 MMOL/L — LOW (ref 1.12–1.3)
CALCIUM SERPL-MCNC: 8.1 MG/DL — LOW (ref 8.4–10.5)
CALCIUM SERPL-MCNC: 8.3 MG/DL — LOW (ref 8.4–10.5)
CHLORIDE BLDV-SCNC: 99 MMOL/L — SIGNIFICANT CHANGE UP (ref 96–108)
CHLORIDE SERPL-SCNC: 92 MMOL/L — LOW (ref 96–108)
CHLORIDE SERPL-SCNC: 95 MMOL/L — LOW (ref 96–108)
CO2 BLDV-SCNC: 27 MMOL/L — SIGNIFICANT CHANGE UP (ref 22–30)
CO2 SERPL-SCNC: 13 MMOL/L — LOW (ref 22–31)
CO2 SERPL-SCNC: 21 MMOL/L — LOW (ref 22–31)
CREAT SERPL-MCNC: 1.43 MG/DL — HIGH (ref 0.5–1.3)
CREAT SERPL-MCNC: 1.74 MG/DL — HIGH (ref 0.5–1.3)
EOSINOPHIL # BLD AUTO: 0.12 K/UL — SIGNIFICANT CHANGE UP (ref 0–0.5)
EOSINOPHIL NFR BLD AUTO: 1 % — SIGNIFICANT CHANGE UP (ref 0–6)
GAS PNL BLDV: 126 MMOL/L — LOW (ref 135–145)
GAS PNL BLDV: SIGNIFICANT CHANGE UP
GAS PNL BLDV: SIGNIFICANT CHANGE UP
GLUCOSE BLDV-MCNC: 109 MG/DL — HIGH (ref 70–99)
GLUCOSE SERPL-MCNC: 110 MG/DL — HIGH (ref 70–99)
GLUCOSE SERPL-MCNC: 114 MG/DL — HIGH (ref 70–99)
HCO3 BLDV-SCNC: 26 MMOL/L — SIGNIFICANT CHANGE UP (ref 21–29)
HCT VFR BLD CALC: 33.1 % — LOW (ref 34.5–45)
HCT VFR BLDA CALC: 41 % — SIGNIFICANT CHANGE UP (ref 39–50)
HGB BLD CALC-MCNC: 13.3 G/DL — SIGNIFICANT CHANGE UP (ref 11.5–15.5)
HGB BLD-MCNC: 11.8 G/DL — SIGNIFICANT CHANGE UP (ref 11.5–15.5)
HOROWITZ INDEX BLDV+IHG-RTO: SIGNIFICANT CHANGE UP
INR BLD: 1.11 RATIO — SIGNIFICANT CHANGE UP (ref 0.88–1.16)
LACTATE BLDV-MCNC: 2.4 MMOL/L — HIGH (ref 0.7–2)
LYMPHOCYTES # BLD AUTO: 0.24 K/UL — LOW (ref 1–3.3)
LYMPHOCYTES # BLD AUTO: 2 % — LOW (ref 13–44)
MAGNESIUM SERPL-MCNC: 1.8 MG/DL — SIGNIFICANT CHANGE UP (ref 1.6–2.6)
MANUAL SMEAR VERIFICATION: SIGNIFICANT CHANGE UP
MCHC RBC-ENTMCNC: 31.4 PG — SIGNIFICANT CHANGE UP (ref 27–34)
MCHC RBC-ENTMCNC: 35.6 GM/DL — SIGNIFICANT CHANGE UP (ref 32–36)
MCV RBC AUTO: 88 FL — SIGNIFICANT CHANGE UP (ref 80–100)
MONOCYTES # BLD AUTO: 0.24 K/UL — SIGNIFICANT CHANGE UP (ref 0–0.9)
MONOCYTES NFR BLD AUTO: 2 % — SIGNIFICANT CHANGE UP (ref 2–14)
MYELOCYTES NFR BLD: 2 % — HIGH (ref 0–0)
NEUTROPHILS # BLD AUTO: 11.36 K/UL — HIGH (ref 1.8–7.4)
NEUTROPHILS NFR BLD AUTO: 89 % — HIGH (ref 43–77)
NEUTS BAND # BLD: 4 % — SIGNIFICANT CHANGE UP (ref 0–8)
NRBC # BLD: 0 /100 — SIGNIFICANT CHANGE UP (ref 0–0)
PCO2 BLDV: 41 MMHG — SIGNIFICANT CHANGE UP (ref 35–50)
PH BLDV: 7.42 — SIGNIFICANT CHANGE UP (ref 7.35–7.45)
PLAT MORPH BLD: NORMAL — SIGNIFICANT CHANGE UP
PLATELET # BLD AUTO: 141 K/UL — LOW (ref 150–400)
PO2 BLDV: <20 MMHG — LOW (ref 25–45)
POTASSIUM BLDV-SCNC: 2.2 MMOL/L — CRITICAL LOW (ref 3.5–5.3)
POTASSIUM SERPL-MCNC: 2.4 MMOL/L — CRITICAL LOW (ref 3.5–5.3)
POTASSIUM SERPL-MCNC: 2.8 MMOL/L — CRITICAL LOW (ref 3.5–5.3)
POTASSIUM SERPL-SCNC: 2.4 MMOL/L — CRITICAL LOW (ref 3.5–5.3)
POTASSIUM SERPL-SCNC: 2.8 MMOL/L — CRITICAL LOW (ref 3.5–5.3)
PROT SERPL-MCNC: 6.1 G/DL — SIGNIFICANT CHANGE UP (ref 6–8.3)
PROT SERPL-MCNC: 6.2 G/DL — SIGNIFICANT CHANGE UP (ref 6–8.3)
PROTHROM AB SERPL-ACNC: 13.1 SEC — SIGNIFICANT CHANGE UP (ref 10.6–13.6)
RBC # BLD: 3.76 M/UL — LOW (ref 3.8–5.2)
RBC # FLD: 13.1 % — SIGNIFICANT CHANGE UP (ref 10.3–14.5)
RBC BLD AUTO: SIGNIFICANT CHANGE UP
SAO2 % BLDV: 25 % — LOW (ref 67–88)
SARS-COV-2 RNA SPEC QL NAA+PROBE: SIGNIFICANT CHANGE UP
SODIUM SERPL-SCNC: 129 MMOL/L — LOW (ref 135–145)
SODIUM SERPL-SCNC: 131 MMOL/L — LOW (ref 135–145)
WBC # BLD: 12.21 K/UL — HIGH (ref 3.8–10.5)
WBC # FLD AUTO: 12.21 K/UL — HIGH (ref 3.8–10.5)

## 2020-08-02 PROCEDURE — 71045 X-RAY EXAM CHEST 1 VIEW: CPT | Mod: 26

## 2020-08-02 PROCEDURE — 93010 ELECTROCARDIOGRAM REPORT: CPT

## 2020-08-02 PROCEDURE — 99231 SBSQ HOSP IP/OBS SF/LOW 25: CPT

## 2020-08-02 PROCEDURE — 99285 EMERGENCY DEPT VISIT HI MDM: CPT

## 2020-08-02 RX ORDER — POTASSIUM CHLORIDE 20 MEQ
10 PACKET (EA) ORAL
Refills: 0 | Status: COMPLETED | OUTPATIENT
Start: 2020-08-02 | End: 2020-08-02

## 2020-08-02 RX ORDER — MAGNESIUM SULFATE 500 MG/ML
1 VIAL (ML) INJECTION ONCE
Refills: 0 | Status: COMPLETED | OUTPATIENT
Start: 2020-08-02 | End: 2020-08-02

## 2020-08-02 RX ORDER — ALPRAZOLAM 0.25 MG
0.25 TABLET ORAL DAILY
Refills: 0 | Status: DISCONTINUED | OUTPATIENT
Start: 2020-08-02 | End: 2020-08-09

## 2020-08-02 RX ORDER — PSYLLIUM SEED (WITH DEXTROSE)
1 POWDER (GRAM) ORAL
Refills: 0 | Status: DISCONTINUED | OUTPATIENT
Start: 2020-08-02 | End: 2020-08-03

## 2020-08-02 RX ORDER — SODIUM CHLORIDE 9 MG/ML
1000 INJECTION INTRAMUSCULAR; INTRAVENOUS; SUBCUTANEOUS ONCE
Refills: 0 | Status: COMPLETED | OUTPATIENT
Start: 2020-08-02 | End: 2020-08-02

## 2020-08-02 RX ORDER — POTASSIUM CHLORIDE 20 MEQ
40 PACKET (EA) ORAL ONCE
Refills: 0 | Status: COMPLETED | OUTPATIENT
Start: 2020-08-02 | End: 2020-08-02

## 2020-08-02 RX ORDER — NITROFURANTOIN MACROCRYSTAL 50 MG
100 CAPSULE ORAL
Refills: 0 | Status: DISCONTINUED | OUTPATIENT
Start: 2020-08-02 | End: 2020-08-03

## 2020-08-02 RX ORDER — TAMSULOSIN HYDROCHLORIDE 0.4 MG/1
0.4 CAPSULE ORAL AT BEDTIME
Refills: 0 | Status: DISCONTINUED | OUTPATIENT
Start: 2020-08-02 | End: 2020-08-06

## 2020-08-02 RX ORDER — MAGNESIUM SULFATE 500 MG/ML
1 VIAL (ML) INJECTION ONCE
Refills: 0 | Status: DISCONTINUED | OUTPATIENT
Start: 2020-08-02 | End: 2020-08-02

## 2020-08-02 RX ORDER — SODIUM CHLORIDE 9 MG/ML
1000 INJECTION INTRAMUSCULAR; INTRAVENOUS; SUBCUTANEOUS
Refills: 0 | Status: DISCONTINUED | OUTPATIENT
Start: 2020-08-02 | End: 2020-08-02

## 2020-08-02 RX ORDER — PIPERACILLIN AND TAZOBACTAM 4; .5 G/20ML; G/20ML
3.38 INJECTION, POWDER, LYOPHILIZED, FOR SOLUTION INTRAVENOUS EVERY 8 HOURS
Refills: 0 | Status: DISCONTINUED | OUTPATIENT
Start: 2020-08-02 | End: 2020-08-04

## 2020-08-02 RX ORDER — POTASSIUM CHLORIDE 20 MEQ
10 PACKET (EA) ORAL
Refills: 0 | Status: COMPLETED | OUTPATIENT
Start: 2020-08-02 | End: 2020-08-03

## 2020-08-02 RX ORDER — SODIUM CHLORIDE 9 MG/ML
1000 INJECTION, SOLUTION INTRAVENOUS
Refills: 0 | Status: DISCONTINUED | OUTPATIENT
Start: 2020-08-02 | End: 2020-08-03

## 2020-08-02 RX ORDER — FERROUS SULFATE 325(65) MG
325 TABLET ORAL
Refills: 0 | Status: DISCONTINUED | OUTPATIENT
Start: 2020-08-02 | End: 2020-08-21

## 2020-08-02 RX ORDER — GABAPENTIN 400 MG/1
600 CAPSULE ORAL
Refills: 0 | Status: DISCONTINUED | OUTPATIENT
Start: 2020-08-02 | End: 2020-08-04

## 2020-08-02 RX ORDER — POTASSIUM CHLORIDE 20 MEQ
20 PACKET (EA) ORAL
Refills: 0 | Status: DISCONTINUED | OUTPATIENT
Start: 2020-08-02 | End: 2020-08-02

## 2020-08-02 RX ORDER — METOPROLOL TARTRATE 50 MG
25 TABLET ORAL DAILY
Refills: 0 | Status: DISCONTINUED | OUTPATIENT
Start: 2020-08-03 | End: 2020-08-08

## 2020-08-02 RX ORDER — ASPIRIN/CALCIUM CARB/MAGNESIUM 324 MG
81 TABLET ORAL DAILY
Refills: 0 | Status: DISCONTINUED | OUTPATIENT
Start: 2020-08-02 | End: 2020-08-21

## 2020-08-02 RX ORDER — GABAPENTIN 400 MG/1
300 CAPSULE ORAL DAILY
Refills: 0 | Status: DISCONTINUED | OUTPATIENT
Start: 2020-08-02 | End: 2020-08-04

## 2020-08-02 RX ORDER — METOPROLOL TARTRATE 50 MG
12.5 TABLET ORAL DAILY
Refills: 0 | Status: DISCONTINUED | OUTPATIENT
Start: 2020-08-02 | End: 2020-08-02

## 2020-08-02 RX ORDER — PIPERACILLIN AND TAZOBACTAM 4; .5 G/20ML; G/20ML
3.38 INJECTION, POWDER, LYOPHILIZED, FOR SOLUTION INTRAVENOUS ONCE
Refills: 0 | Status: COMPLETED | OUTPATIENT
Start: 2020-08-02 | End: 2020-08-02

## 2020-08-02 RX ORDER — AMIODARONE HYDROCHLORIDE 400 MG/1
200 TABLET ORAL DAILY
Refills: 0 | Status: DISCONTINUED | OUTPATIENT
Start: 2020-08-02 | End: 2020-08-21

## 2020-08-02 RX ADMIN — Medication 40 MILLIEQUIVALENT(S): at 14:30

## 2020-08-02 RX ADMIN — SODIUM CHLORIDE 1000 MILLILITER(S): 9 INJECTION INTRAMUSCULAR; INTRAVENOUS; SUBCUTANEOUS at 14:31

## 2020-08-02 RX ADMIN — Medication 100 GRAM(S): at 22:38

## 2020-08-02 RX ADMIN — Medication 100 MILLIEQUIVALENT(S): at 16:46

## 2020-08-02 RX ADMIN — PIPERACILLIN AND TAZOBACTAM 200 GRAM(S): 4; .5 INJECTION, POWDER, LYOPHILIZED, FOR SOLUTION INTRAVENOUS at 18:50

## 2020-08-02 RX ADMIN — Medication 100 MILLIEQUIVALENT(S): at 14:32

## 2020-08-02 RX ADMIN — Medication 100 MILLIEQUIVALENT(S): at 23:27

## 2020-08-02 RX ADMIN — Medication 40 MILLIEQUIVALENT(S): at 22:38

## 2020-08-02 RX ADMIN — SODIUM CHLORIDE 1000 MILLILITER(S): 9 INJECTION INTRAMUSCULAR; INTRAVENOUS; SUBCUTANEOUS at 13:25

## 2020-08-02 RX ADMIN — Medication 100 MILLIEQUIVALENT(S): at 18:50

## 2020-08-02 NOTE — ED ADULT NURSE NOTE - PSH
Arm fracture, left  2005  S/P aortic valve repair  4/8/19 with bioprostetic valve  S/P cataract surgery    Thoracoabdominal aortic aneurysm (TAAA) without rupture  s/p repair  Asceding aortic aneurysm repair 4/8/2019

## 2020-08-02 NOTE — CONSULT NOTE ADULT - PROBLEM SELECTOR RECOMMENDATION 3
Patient with severe hypokalemia with K level 2.4 likely due to diarrhea/stool softener  - Hold stool softener  - Receiving IV KCL and IV mag  - Replete K and Mag with goal K>4.0 and<5.0 and Mag>2.0    Hyponatremia likely hypovolemic/euvolemic hyponatremia likely due to diarrhea/gastroenteritis vs ADH response due to chronic low back pain  - Check urinalysis, urine electrolytes as per ordered.  - Continue IV fluids for now with monitoring BMP  - Check urine Na and urine osm  - Optimal pain control   - Check serum TSH, cortisol level, uric acid   - Free water restriction to <1L/day    Calcium 8.3 with albumin 3.0 and no phos available  Monitor calcium and phos level

## 2020-08-02 NOTE — H&P ADULT - NSHPPHYSICALEXAM_GEN_ALL_CORE
General: WN/WD NAD  PERRLA  Neurology: A&Ox3, nonfocal, BECK x 4  Respiratory: CTA B/L  CV: RRR, S1S2, no murmurs, rubs or gallops  Abdominal: Soft, NT, ND +BS, Last BM  Extremities: No edema, + peripheral pulses  Skin Normal

## 2020-08-02 NOTE — CONSULT NOTE ADULT - PROBLEM SELECTOR RECOMMENDATION 4
High anion gap metabolic acidosis with serum bicarbonate 13 likely gastroenteritis/colitis/renal failure  - Check serum lactate, beta hydroxybutyrate  - Monitor BMP every 12 hrly  - Replete Kcl with goal K>4.0 and <5.0  - Consider switch IV fluids to 1/2 NS with 75 mEq sodium bicarbonate @ 100cc/hr with addition of KCl 20meQ  - Monitor BMP and electrolytes every 6 to 8 hrly  - Treatment of underlying gastroenteritis per primary/ID team.

## 2020-08-02 NOTE — ED PROVIDER NOTE - CHIEF COMPLAINT
The patient is a 70y Female complaining of The patient is a 70y Female complaining of diarrhea and fever.

## 2020-08-02 NOTE — CHART NOTE - NSCHARTNOTEFT_GEN_A_CORE
Notified by RN about patient with K: 2.8.        129<L>  |  95<L>  |  31<H>  ----------------------------<  110<H>  2.8<LL>   |  13<L>  |  1.43<H>    Ca    8.3<L>      02 Aug 2020 21:09  Mg     1.8     08    TPro  6.2  /  Alb  3.0<L>  /  TBili  0.6  /  DBili  x   /  AST  23  /  ALT  19  /  AlkPhos  97        Patient was admitted for hypokalemia with admission K: 2.4, and had received KCl 40mEq PO x 1 and KCl 10mEq IV x 3.   Patient to be supplemented with an additional KCl 40mEq x 1 and KCl 10mEq x 3  Patient also with M.8 -> Supplement with Mag sulfate 1gm IV x 1   F/u AM CMP and mag  Discussed with RN  Will continue to monitor  Will endorse to AM team      Makenzie Patel PA-C  #[id number]        **ADDENDUM @ 23:35**  As per Dr. Argueta (nephrology), patient to be started with 1/2 NS with 75 mEq sodium bicarbonate and KCl 20mEq @ 100cc/hr Notified by RN about patient with K: 2.8.    08    129<L>  |  95<L>  |  31<H>  ----------------------------<  110<H>  2.8<LL>   |  13<L>  |  1.43<H>    Ca    8.3<L>      02 Aug 2020 21:09  Mg     1.8         TPro  6.2  /  Alb  3.0<L>  /  TBili  0.6  /  DBili  x   /  AST  23  /  ALT  19  /  AlkPhos  97        Patient was admitted for hypokalemia with admission K: 2.4, and had received KCl 40mEq PO x 1 and KCl 10mEq IV x 3.   Patient to be supplemented with an additional KCl 40mEq x 1 and KCl 10mEq x 3  Patient also with M.8 -> Supplement with Mag sulfate 1gm IV x 1   F/u AM CMP and mag  Discussed with RN  Will continue to monitor  Will endorse to AM team      Makenzie Patel PA-C  #[id number]        **ADDENDUM @ 23:35**  As per Dr. Argueta (nephrology), severe hypokalemia likely due to diarrhea/stool softener and hyponatremia likely hypovolemic/euvolemic hyponatremia likely due to diarrhea/gastroenteritis vs ADH response due to chronic low back pain -> Start with 1/2 NS with 75 mEq sodium bicarbonate and KCl 20mEq @ 100cc/hr  F/u AM CMP and mag  Discussed with RN  Will continue to monitor  Will endorse to AM team      Makenzie Patel PA-C  #[id number]

## 2020-08-02 NOTE — ED PROVIDER NOTE - OBJECTIVE STATEMENT
71 yo F with hx of thoracic and abdominal aortic aneurysm s/p repair, AV replacement, and HTN presenting with diarrhea x1 day. Pt states that her boyfriend came over for dinner last night and after he left she started having profuse non-bloody watery diarrhea a/w with fevers to 101-102, lightheadedness and nausea/dry heaving. Pt states she has since had 5-6 watery BMs and has started to feel weak. She took Tylenol at 10AM. She has also noticed mild SOB on exertion. She denies CP. She has been trying to drink gatorade and has been able to keep it down.

## 2020-08-02 NOTE — ED PROVIDER NOTE - ATTENDING CONTRIBUTION TO CARE
69F pmh HTN status post aortic valve replacement, admitted electively to [facility] on 8/28 for open TAAA repair due to enlarging thoracoabdominal aortic aneurysm. 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 s/p Thoracoabdominal aneurysm open repair with Decron graft and celiac SMA bypass here for diarrhea for one day with multiple bouts of watery, non bloody diarrhea, no abd or back pain, vss, febrile with severe sepsis criteria.

## 2020-08-02 NOTE — ED PROVIDER NOTE - PHYSICAL EXAMINATION
General: Patient is non-toxic and well appearing and in no apparent distress, AAO x 3.  Skin: Dry and intact  HEENT: Head atraumatic. Oral mucosa moist. Conjunctiva normal.  Cardiac: Regular rhythm and rate. No pretibial edema b/l.  Respiratory: No respiratory distress, speaking in full sentences. Lungs clear b/l and symmetric.  Gastrointestinal: Abdomen soft, nondistended, nontender. No rebound or guarding.   Musculoskeletal: Moves all extremities spontaneously.  Neurological: AAOx3.  Psychiatric: Cooperative with exam.

## 2020-08-02 NOTE — H&P ADULT - NSHPREVIEWOFSYSTEMS_GEN_ALL_CORE
REVIEW OF SYSTEMS:    CONSTITUTIONAL: +  weakness, fevers +   EYES/ENT: No visual changes;  No vertigo or throat pain   NECK: No pain or stiffness  RESPIRATORY: No cough, wheezing, hemoptysis; No shortness of breath  CARDIOVASCULAR: No chest pain or palpitations  GASTROINTESTINAL: No abdominal or epigastric pain. No nausea, vomiting, or hematemesis; No diarrhea or constipation. No melena or hematochezia.  GENITOURINARY: No dysuria, frequency or hematuria  NEUROLOGICAL: No numbness or weakness  SKIN: No itching, burning, rashes, or lesions   All other review of systems is negative unless indicated above.

## 2020-08-02 NOTE — CONSULT NOTE ADULT - SUBJECTIVE AND OBJECTIVE BOX
Kalyan Argueta MD (Nephrology)  [address], [facility],  [address],  [facility]  TEl: [phone]  Cell: [phone]    Patient is a 70y old  Female who presents with a chief complaint of diarrhea (02 Aug 2020 18:13)      HPI:  71 yo F with hx of thoracic and abdominal aortic aneurysm s/p repair, AV replacement, and HTN presenting with diarrhea x1 day. Pt states that her boyfriend came over for dinner last night and after he left she started having profuse non-bloody watery diarrhea a/w with fevers to 101-102, lightheadedness and nausea/dry heaving. Pt states she has since had 5-6 watery BMs and has started to feel weak. She took Tylenol at 10AM. She has also noticed mild SOB on exertion. She denies CP. She has been trying to drink Gatorade and has been able to keep it down.       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: s/p repair  Asceding aortic aneurysm repair 4/8/2019  S/P cataract surgery  Arm fracture, left: 2005        Allergies:  No Known Allergies      Home Medications:   ALPRAZolam 0.25 milliGRAM(s) Oral daily  aMIOdarone    Tablet 200 milliGRAM(s) Oral daily  aspirin  chewable 81 milliGRAM(s) Oral daily  bisacodyl Suppository 10 milliGRAM(s) Rectal daily  ferrous    sulfate 325 milliGRAM(s) Oral two times a day  gabapentin 300 milliGRAM(s) Oral daily  gabapentin 600 milliGRAM(s) Oral two times a day  magnesium sulfate  IVPB 1 Gram(s) IV Intermittent once  metoprolol succinate ER 12.5 milliGRAM(s) Oral daily  nitrofurantoin monohydrate/macrocrystals (MACROBID) 100 milliGRAM(s) Oral two times a day with meals  piperacillin/tazobactam IVPB.. 3.375 Gram(s) IV Intermittent every 8 hours  potassium chloride  10 mEq/100 mL IVPB 10 milliEquivalent(s) IV Intermittent every 2 hours  psyllium Powder 1 Packet(s) Oral two times a day  sodium chloride 0.9%. 1000 milliLiter(s) IV Continuous <Continuous>  tamsulosin 0.4 milliGRAM(s) Oral at bedtime      Hospital Medications:   MEDICATIONS  (STANDING):  ALPRAZolam 0.25 milliGRAM(s) Oral daily  aMIOdarone    Tablet 200 milliGRAM(s) Oral daily  aspirin  chewable 81 milliGRAM(s) Oral daily  bisacodyl Suppository 10 milliGRAM(s) Rectal daily  ferrous    sulfate 325 milliGRAM(s) Oral two times a day  gabapentin 300 milliGRAM(s) Oral daily  gabapentin 600 milliGRAM(s) Oral two times a day  magnesium sulfate  IVPB 1 Gram(s) IV Intermittent once  metoprolol succinate ER 12.5 milliGRAM(s) Oral daily  nitrofurantoin monohydrate/macrocrystals (MACROBID) 100 milliGRAM(s) Oral two times a day with meals  piperacillin/tazobactam IVPB.. 3.375 Gram(s) IV Intermittent every 8 hours  potassium chloride  10 mEq/100 mL IVPB 10 milliEquivalent(s) IV Intermittent every 2 hours  psyllium Powder 1 Packet(s) Oral two times a day  sodium chloride 0.9%. 1000 milliLiter(s) (100 mL/Hr) IV Continuous <Continuous>  tamsulosin 0.4 milliGRAM(s) Oral at bedtime      SOCIAL HISTORY:  Denies ETOh, Smoking,     Family History:  FAMILY HISTORY:  Family history of skin cancer: mother  Family history of coronary artery bypass graft: with valve disease      ROS:  CONSTITUTIONAL: No fever or chills.  HEENT: No headache, visual disturbances, hearing impairment.  RESPIRATORY: No shortness of breath, cough, wheezing or hemoptysis  CARDIOVASCULAR: No Chest pain, shortness of breath, palpitations, dizziness, syncope, orthopnea, PND or leg swelling.  GASTROINTESTINAL: Abdominal discomfort and watery, non bloody diarrhea but denies nausea, vomiting, hematemesis or blood per rectum.  GENITOURINARY: No flank or supra pubic pain.  NEUROLOGICAL: No headache,  focal limb weakness, tingling or numbness sensation or seizure like activity  SKIN: No rash or skin lesion  MUSCULOSKELETAL: No leg pain, calf pain or swelling  Psych: Denies suicidal or homicidal ideation    VITALS:  T(F): 98.5 (08-02-20 @ 20:24), Max: 99.2 (08-02-20 @ 18:00)  HR: 91 (08-02-20 @ 20:24)  BP: 168/75 (08-02-20 @ 20:24)  RR: 18 (08-02-20 @ 20:24)  SpO2: 95% (08-02-20 @ 20:24)  Wt(kg): --    08-02 @ 07:01  -  08-02 @ 22:51  --------------------------------------------------------  IN: 1000 mL / OUT: 0 mL / NET: 1000 mL      Height (cm): 167.64 (08-02 @ 12:00)  Weight (kg): 59.9 (08-02 @ 12:00)  BMI (kg/m2): 21.3 (08-02 @ 12:00)  BSA (m2): 1.68 (08-02 @ 12:00)  CAPILLARY BLOOD GLUCOSE            PHYSICAL EXAM:  GENERAL: Alert, awake, oriented x3   HEENT: AUBREY, EOMI, neck supple, no JVP, no carotid bruits, no palpable thyromegaly or lymphadenopathy, no carotid bruits  CHEST/LUNG: Bilateral clear breath sounds, no rales, no crepitations, no wheezing  HEART: Regular rate and rhythm, NICANOR II/VI at LPSB, no gallops, no rub   ABDOMEN: Soft, nontender, non distended, bowel sounds present, no guarding, no rigidity, no rebound tenderness.  : No flank or supra pubic tenderness.  EXTREMITIES: Peripheral pulses are palpable, no pedal edema, no calf tenderness  Musculoskeletal: Lower lumbar spine tenderness+nt  Neurology: AAOx3, no focal neurological deficit, Motor and sensory systems are intact.  SKIN: No rash or skin lesion    LABS:  08-02    129<L>  |  95<L>  |  31<H>  ----------------------------<  110<H>  2.8<LL>   |  13<L>  |  1.43<H>    Ca    8.3<L>      02 Aug 2020 21:09  Mg     1.8     08-02    TPro  6.2  /  Alb  3.0<L>  /  TBili  0.6  /  DBili      /  AST  23  /  ALT  19  /  AlkPhos  97  08-02    Creatinine Trend: 1.43 <--, 1.74 <--                        11.8   12.21 )-----------( 141      ( 02 Aug 2020 13:14 )             33.1     Urine Studies:        RADIOLOGY & ADDITIONAL STUDIES:  < from: Xray Chest 1 View- PORTABLE-Urgent (08.02.20 @ 14:30) >    ******PRELIMINARY REPORT******    ******PRELIMINARY REPORT******          EXAM:  XR CHEST PORTABLE URGENT 1V                            PROCEDURE DATE:  08/02/2020      ******PRELIMINARY REPORT******    ******PRELIMINARY REPORT******              INTERPRETATION:  no emergent findings            ******PRELIMINARY REPORT******    ******PRELIMINARY REPORT******          OTILIA CARO M.D., RESIDENT RADIOLOGIST                      < end of copied text >      EKG findings reviewed.    Imaging Personally Reviewed:  [x] YES  [ ] NO    Consultant(s) and primary physician Notes Reviewed:  [x] YES  [ ] NO    Care Discussed with Primary team/ Consultants/Other Providers [x] YES  [ ] NO

## 2020-08-02 NOTE — CONSULT NOTE ADULT - PROBLEM SELECTOR RECOMMENDATION 5
Acute gastroenteritis/colitis/diverticulitis likely viral and/or bacterial with fever  - Tylenol prn for pain/fever  - stool studies and lactate monitoring  - Consider CT abdomen/pelvis without contrast to eval for diverticulitis/colitis  - Antibiotics adjustment per primary/ID team with renal dose adjustment.

## 2020-08-02 NOTE — H&P ADULT - NSHPLABSRESULTS_GEN_ALL_CORE
Lab Results:  CBC  CBC Full  -  ( 02 Aug 2020 13:14 )  WBC Count : 12.21 K/uL  RBC Count : 3.76 M/uL  Hemoglobin : 11.8 g/dL  Hematocrit : 33.1 %  Platelet Count - Automated : 141 K/uL  Mean Cell Volume : 88.0 fl  Mean Cell Hemoglobin : 31.4 pg  Mean Cell Hemoglobin Concentration : 35.6 gm/dL  Auto Neutrophil # : 11.36 K/uL  Auto Lymphocyte # : 0.24 K/uL  Auto Monocyte # : 0.24 K/uL  Auto Eosinophil # : 0.12 K/uL  Auto Basophil # : 0.00 K/uL  Auto Neutrophil % : 89.0 %  Auto Lymphocyte % : 2.0 %  Auto Monocyte % : 2.0 %  Auto Eosinophil % : 1.0 %  Auto Basophil % : 0.0 %    .		Differential:	[] Automated		[] Manual  Chemistry                        11.8   12.21 )-----------( 141      ( 02 Aug 2020 13:14 )             33.1     08-02    131<L>  |  92<L>  |  34<H>  ----------------------------<  114<H>  2.4<LL>   |  21<L>  |  1.74<H>    Ca    8.1<L>      02 Aug 2020 13:14    TPro  6.1  /  Alb  3.2<L>  /  TBili  0.5  /  DBili  x   /  AST  20  /  ALT  18  /  AlkPhos  86  08-02    LIVER FUNCTIONS - ( 02 Aug 2020 13:14 )  Alb: 3.2 g/dL / Pro: 6.1 g/dL / ALK PHOS: 86 U/L / ALT: 18 U/L / AST: 20 U/L / GGT: x           PT/INR - ( 02 Aug 2020 13:56 )   PT: 13.1 sec;   INR: 1.11 ratio         PTT - ( 02 Aug 2020 13:56 )  PTT:26.1 sec          MICROBIOLOGY/CULTURES:      RADIOLOGY RESULTS: Reviewed

## 2020-08-02 NOTE — CONSULT NOTE ADULT - PROBLEM SELECTOR RECOMMENDATION 9
Non oliguric DURGA with normal prior baseline renal function ( S cr 0.9 in 2019)likely pre-renal due to diarrhea/gastroenteritis  - Check urinalysis, urine electrolytes  - Avoid nephrotoxic agents  - Antibiotics as per renal dose adjustment  - Monitor BMP every 6 to 8 hrly  - Volume status dry and Low K level to 2.8  - PVR (270cc on bladder scan)  - Continue monitor intermittent bladder scan if significant retention consider muñoz's catheter  - Hold ACEI/ARB  - No urgent need for RRT/HD

## 2020-08-02 NOTE — ED ADULT NURSE NOTE - CHPI ED NUR SYMPTOMS NEG
no chills/no dysuria/no hematuria/no vomiting/no blood in stool/no burning urination/no abdominal distension

## 2020-08-02 NOTE — ED ADULT NURSE NOTE - OBJECTIVE STATEMENT
Pt bib EMS from home for eval of diarrhea, abd pain and fever to 101 @ home, took Tylenol prior to coming to ED.  No sick contacts.  Diarrhea is liquid brown, foul smelling with bits of food in it.  Abd soft, non-tender.  Oral mucosa dry, not cracked.

## 2020-08-02 NOTE — CONSULT NOTE ADULT - PROBLEM SELECTOR RECOMMENDATION 2
Patient with prior history of hypertension with elevated blood pressure with BP ranging from 160 to 170's  Low salt diet  Hold ACEi/ARB  Increase metoprolol ER to 25 mg po daily  Consider add Amlodipine 5 mg po daily  Monitor vital signs

## 2020-08-02 NOTE — ED ADULT NURSE NOTE - PMH
Cataract  bilateral  HTN (hypertension)  controlled  Intermittent abdominal pain  periumbilical  Murmur, cardiac    Preeclampsia    Thoracoabdominal aortic aneurysm (TAAA) without rupture

## 2020-08-02 NOTE — ED PROVIDER NOTE - CLINICAL SUMMARY MEDICAL DECISION MAKING FREE TEXT BOX
See Attending Note See Attending Note    Kristyn, PGY1: 71 yo F with hx of thoracic and abdominal aortic aneurysm s/p repair, AV replacement, and HTN presenting with profuse non-bloody watery diarrhea a/w with fevers to 101-102, lightheadedness and nausea/dry heaving x1 day. She also reports mild SOB on exertion. Pt is well appearing on exam with an unremarkable physical examination.     A/p  Likely bacterial vs viral gastroenteritis  With prior tachycardia and temp to 102 will order sepsis workup and IVF/antibiotics.

## 2020-08-02 NOTE — H&P ADULT - NSICDXFAMILYHX_GEN_ALL_CORE_FT
FAMILY HISTORY:  Family history of coronary artery bypass graft, with valve disease  Family history of skin cancer, mother

## 2020-08-02 NOTE — ED PROVIDER NOTE - NS ED ROS FT
Constitutional: +weakness, +fevers.   HEENT: +LH. Denies injury, headache, LOC, dizziness. Denies visual changes. Denies nasal discharge, URI symptoms. Denies neck pain/stiffness.   Cardiovascular: Denies chest pain, palpitations.  Respiratory: +SOB. Denies cough  Gastrointestinal: +nausea, +dry heaving, +watery diarrhea, +decreased PO intake, +lower abdominal cramping.   Musculoskeletal: Denies LE swelling, extremity pain.  Genitourinary: Denies dysuria or hematuria.  Social: Denies sick contacts, recent travel

## 2020-08-02 NOTE — ED PROVIDER NOTE - PROGRESS NOTE DETAILS
Mandile, PO: Made aware of critical lab of potassium 2.4. Consistent with profuse watery diarrhea. Pt remains hemodynamically stable. Will replete K and order EKG. Mandreal, DO: Made aware of critical lab of potassium 2.4. Consistent with profuse watery diarrhea. Pt remains hemodynamically stable. Will replete K and order EKG.

## 2020-08-02 NOTE — ED ADULT NURSE NOTE - NSIMPLEMENTINTERV_GEN_ALL_ED
Implemented All Fall with Harm Risk Interventions:  [place] to call system. Call bell, personal items and telephone within reach. Instruct patient to call for assistance. Room bathroom lighting operational. Non-slip footwear when patient is off stretcher. Physically safe environment: no spills, clutter or unnecessary equipment. Stretcher in lowest position, wheels locked, appropriate side rails in place. Provide visual cue, wrist band, yellow gown, etc. Monitor gait and stability. Monitor for mental status changes and reorient to person, place, and time. Review medications for side effects contributing to fall risk. Reinforce activity limits and safety measures with patient and family. Provide visual clues: red socks.

## 2020-08-02 NOTE — CONSULT NOTE ADULT - ASSESSMENT
69 yo F with hx of thoracic and abdominal aortic aneurysm s/p repair, AV replacement, and HTN presenting with diarrhea x1 day. Pt states that her boyfriend came over for dinner last night and after he left she started having profuse non-bloody watery diarrhea a/w with fevers to 101-102, lightheadedness and nausea/dry heaving.

## 2020-08-03 DIAGNOSIS — R78.81 BACTEREMIA: ICD-10-CM

## 2020-08-03 DIAGNOSIS — I10 ESSENTIAL (PRIMARY) HYPERTENSION: ICD-10-CM

## 2020-08-03 DIAGNOSIS — R19.7 DIARRHEA, UNSPECIFIED: ICD-10-CM

## 2020-08-03 LAB
ALBUMIN SERPL ELPH-MCNC: 3 G/DL — LOW (ref 3.3–5)
ALP SERPL-CCNC: 108 U/L — SIGNIFICANT CHANGE UP (ref 40–120)
ALT FLD-CCNC: 16 U/L — SIGNIFICANT CHANGE UP (ref 10–45)
ANION GAP SERPL CALC-SCNC: 13 MMOL/L — SIGNIFICANT CHANGE UP (ref 5–17)
ANION GAP SERPL CALC-SCNC: 20 MMOL/L — HIGH (ref 5–17)
APPEARANCE UR: ABNORMAL
AST SERPL-CCNC: 18 U/L — SIGNIFICANT CHANGE UP (ref 10–40)
B-OH-BUTYR SERPL-SCNC: 1 MMOL/L — HIGH
BACTERIA # UR AUTO: ABNORMAL
BILIRUB SERPL-MCNC: 0.6 MG/DL — SIGNIFICANT CHANGE UP (ref 0.2–1.2)
BILIRUB UR-MCNC: NEGATIVE — SIGNIFICANT CHANGE UP
BUN SERPL-MCNC: 17 MG/DL — SIGNIFICANT CHANGE UP (ref 7–23)
BUN SERPL-MCNC: 23 MG/DL — SIGNIFICANT CHANGE UP (ref 7–23)
CALCIUM SERPL-MCNC: 8.4 MG/DL — SIGNIFICANT CHANGE UP (ref 8.4–10.5)
CALCIUM SERPL-MCNC: 8.4 MG/DL — SIGNIFICANT CHANGE UP (ref 8.4–10.5)
CHLORIDE SERPL-SCNC: 94 MMOL/L — LOW (ref 96–108)
CHLORIDE SERPL-SCNC: 96 MMOL/L — SIGNIFICANT CHANGE UP (ref 96–108)
CO2 SERPL-SCNC: 16 MMOL/L — LOW (ref 22–31)
CO2 SERPL-SCNC: 22 MMOL/L — SIGNIFICANT CHANGE UP (ref 22–31)
COLOR SPEC: YELLOW — SIGNIFICANT CHANGE UP
COMMENT - URINE: SIGNIFICANT CHANGE UP
CREAT ?TM UR-MCNC: 58 MG/DL — SIGNIFICANT CHANGE UP
CREAT SERPL-MCNC: 1.01 MG/DL — SIGNIFICANT CHANGE UP (ref 0.5–1.3)
CREAT SERPL-MCNC: 1.16 MG/DL — SIGNIFICANT CHANGE UP (ref 0.5–1.3)
DIFF PNL FLD: ABNORMAL
E COLI DNA BLD POS QL NAA+NON-PROBE: SIGNIFICANT CHANGE UP
EPI CELLS # UR: 3 /HPF — SIGNIFICANT CHANGE UP
GLUCOSE SERPL-MCNC: 113 MG/DL — HIGH (ref 70–99)
GLUCOSE SERPL-MCNC: 114 MG/DL — HIGH (ref 70–99)
GLUCOSE UR QL: NEGATIVE — SIGNIFICANT CHANGE UP
GRAM STN FLD: SIGNIFICANT CHANGE UP
HCT VFR BLD CALC: 35.2 % — SIGNIFICANT CHANGE UP (ref 34.5–45)
HGB BLD-MCNC: 12.2 G/DL — SIGNIFICANT CHANGE UP (ref 11.5–15.5)
HYALINE CASTS # UR AUTO: 4 /LPF — HIGH (ref 0–2)
KETONES UR-MCNC: SIGNIFICANT CHANGE UP
LACTATE SERPL-SCNC: 1.2 MMOL/L — SIGNIFICANT CHANGE UP (ref 0.7–2)
LEUKOCYTE ESTERASE UR-ACNC: ABNORMAL
MAGNESIUM SERPL-MCNC: 2 MG/DL — SIGNIFICANT CHANGE UP (ref 1.6–2.6)
MCHC RBC-ENTMCNC: 30.5 PG — SIGNIFICANT CHANGE UP (ref 27–34)
MCHC RBC-ENTMCNC: 34.7 GM/DL — SIGNIFICANT CHANGE UP (ref 32–36)
MCV RBC AUTO: 88 FL — SIGNIFICANT CHANGE UP (ref 80–100)
METHOD TYPE: SIGNIFICANT CHANGE UP
NITRITE UR-MCNC: NEGATIVE — SIGNIFICANT CHANGE UP
NRBC # BLD: 0 /100 WBCS — SIGNIFICANT CHANGE UP (ref 0–0)
OSMOLALITY UR: 348 MOS/KG — SIGNIFICANT CHANGE UP (ref 300–900)
PH UR: 6 — SIGNIFICANT CHANGE UP (ref 5–8)
PHOSPHATE SERPL-MCNC: 1.6 MG/DL — LOW (ref 2.5–4.5)
PLATELET # BLD AUTO: 156 K/UL — SIGNIFICANT CHANGE UP (ref 150–400)
POTASSIUM SERPL-MCNC: 2.5 MMOL/L — CRITICAL LOW (ref 3.5–5.3)
POTASSIUM SERPL-MCNC: 3.3 MMOL/L — LOW (ref 3.5–5.3)
POTASSIUM SERPL-SCNC: 2.5 MMOL/L — CRITICAL LOW (ref 3.5–5.3)
POTASSIUM SERPL-SCNC: 3.3 MMOL/L — LOW (ref 3.5–5.3)
PROT ?TM UR-MCNC: 124 MG/DL — HIGH (ref 0–12)
PROT SERPL-MCNC: 6.3 G/DL — SIGNIFICANT CHANGE UP (ref 6–8.3)
PROT UR-MCNC: 100 — SIGNIFICANT CHANGE UP
PROT/CREAT UR-RTO: 2.1 RATIO — HIGH (ref 0–0.2)
RBC # BLD: 4 M/UL — SIGNIFICANT CHANGE UP (ref 3.8–5.2)
RBC # FLD: 13.1 % — SIGNIFICANT CHANGE UP (ref 10.3–14.5)
RBC CASTS # UR COMP ASSIST: 13 /HPF — HIGH (ref 0–4)
SARS-COV-2 IGG SERPL QL IA: POSITIVE
SARS-COV-2 IGM SERPL IA-ACNC: 125 INDEX — HIGH
SODIUM SERPL-SCNC: 130 MMOL/L — LOW (ref 135–145)
SODIUM SERPL-SCNC: 131 MMOL/L — LOW (ref 135–145)
SODIUM UR-SCNC: <35 MMOL/L — SIGNIFICANT CHANGE UP
SP GR SPEC: 1.01 — SIGNIFICANT CHANGE UP (ref 1.01–1.02)
SPECIMEN SOURCE: SIGNIFICANT CHANGE UP
SPECIMEN SOURCE: SIGNIFICANT CHANGE UP
TSH SERPL-MCNC: 0.25 UIU/ML — LOW (ref 0.27–4.2)
URATE SERPL-MCNC: 2.7 MG/DL — SIGNIFICANT CHANGE UP (ref 2.5–7)
UROBILINOGEN FLD QL: NEGATIVE — SIGNIFICANT CHANGE UP
UUN UR-MCNC: 573 MG/DL — SIGNIFICANT CHANGE UP
WBC # BLD: 10.93 K/UL — HIGH (ref 3.8–10.5)
WBC # FLD AUTO: 10.93 K/UL — HIGH (ref 3.8–10.5)
WBC UR QL: 36 /HPF — HIGH (ref 0–5)

## 2020-08-03 PROCEDURE — 99223 1ST HOSP IP/OBS HIGH 75: CPT

## 2020-08-03 PROCEDURE — 74177 CT ABD & PELVIS W/CONTRAST: CPT | Mod: 26

## 2020-08-03 RX ORDER — METOPROLOL TARTRATE 50 MG
12.5 TABLET ORAL ONCE
Refills: 0 | Status: COMPLETED | OUTPATIENT
Start: 2020-08-03 | End: 2020-08-03

## 2020-08-03 RX ORDER — SODIUM CHLORIDE 9 MG/ML
1000 INJECTION, SOLUTION INTRAVENOUS
Refills: 0 | Status: DISCONTINUED | OUTPATIENT
Start: 2020-08-03 | End: 2020-08-04

## 2020-08-03 RX ORDER — POTASSIUM CHLORIDE 20 MEQ
10 PACKET (EA) ORAL
Refills: 0 | Status: COMPLETED | OUTPATIENT
Start: 2020-08-03 | End: 2020-08-03

## 2020-08-03 RX ORDER — POTASSIUM CHLORIDE 20 MEQ
40 PACKET (EA) ORAL EVERY 4 HOURS
Refills: 0 | Status: COMPLETED | OUTPATIENT
Start: 2020-08-03 | End: 2020-08-03

## 2020-08-03 RX ORDER — POTASSIUM CHLORIDE 20 MEQ
40 PACKET (EA) ORAL
Refills: 0 | Status: COMPLETED | OUTPATIENT
Start: 2020-08-03 | End: 2020-08-03

## 2020-08-03 RX ORDER — POTASSIUM PHOSPHATE, MONOBASIC POTASSIUM PHOSPHATE, DIBASIC 236; 224 MG/ML; MG/ML
15 INJECTION, SOLUTION INTRAVENOUS ONCE
Refills: 0 | Status: COMPLETED | OUTPATIENT
Start: 2020-08-03 | End: 2020-08-03

## 2020-08-03 RX ADMIN — AMIODARONE HYDROCHLORIDE 200 MILLIGRAM(S): 400 TABLET ORAL at 06:29

## 2020-08-03 RX ADMIN — SODIUM CHLORIDE 70 MILLILITER(S): 9 INJECTION, SOLUTION INTRAVENOUS at 20:50

## 2020-08-03 RX ADMIN — POTASSIUM PHOSPHATE, MONOBASIC POTASSIUM PHOSPHATE, DIBASIC 62.5 MILLIMOLE(S): 236; 224 INJECTION, SOLUTION INTRAVENOUS at 11:52

## 2020-08-03 RX ADMIN — Medication 100 MILLIGRAM(S): at 08:57

## 2020-08-03 RX ADMIN — Medication 50 MILLIEQUIVALENT(S): at 10:28

## 2020-08-03 RX ADMIN — Medication 40 MILLIEQUIVALENT(S): at 22:40

## 2020-08-03 RX ADMIN — Medication 325 MILLIGRAM(S): at 06:40

## 2020-08-03 RX ADMIN — Medication 40 MILLIEQUIVALENT(S): at 20:38

## 2020-08-03 RX ADMIN — Medication 81 MILLIGRAM(S): at 08:57

## 2020-08-03 RX ADMIN — Medication 25 MILLIGRAM(S): at 06:33

## 2020-08-03 RX ADMIN — Medication 40 MILLIEQUIVALENT(S): at 10:28

## 2020-08-03 RX ADMIN — PIPERACILLIN AND TAZOBACTAM 25 GRAM(S): 4; .5 INJECTION, POWDER, LYOPHILIZED, FOR SOLUTION INTRAVENOUS at 02:30

## 2020-08-03 RX ADMIN — Medication 40 MILLIEQUIVALENT(S): at 13:47

## 2020-08-03 RX ADMIN — SODIUM CHLORIDE 70 MILLILITER(S): 9 INJECTION, SOLUTION INTRAVENOUS at 20:37

## 2020-08-03 RX ADMIN — Medication 100 MILLIEQUIVALENT(S): at 03:31

## 2020-08-03 RX ADMIN — Medication 50 MILLIEQUIVALENT(S): at 11:51

## 2020-08-03 RX ADMIN — PIPERACILLIN AND TAZOBACTAM 25 GRAM(S): 4; .5 INJECTION, POWDER, LYOPHILIZED, FOR SOLUTION INTRAVENOUS at 20:49

## 2020-08-03 RX ADMIN — Medication 100 MILLIEQUIVALENT(S): at 01:29

## 2020-08-03 RX ADMIN — Medication 12.5 MILLIGRAM(S): at 06:40

## 2020-08-03 RX ADMIN — TAMSULOSIN HYDROCHLORIDE 0.4 MILLIGRAM(S): 0.4 CAPSULE ORAL at 22:40

## 2020-08-03 RX ADMIN — Medication 0.1 MILLIGRAM(S): at 12:53

## 2020-08-03 RX ADMIN — Medication 50 MILLIEQUIVALENT(S): at 13:51

## 2020-08-03 RX ADMIN — PIPERACILLIN AND TAZOBACTAM 25 GRAM(S): 4; .5 INJECTION, POWDER, LYOPHILIZED, FOR SOLUTION INTRAVENOUS at 11:52

## 2020-08-03 RX ADMIN — Medication 325 MILLIGRAM(S): at 18:04

## 2020-08-03 RX ADMIN — SODIUM CHLORIDE 100 MILLILITER(S): 9 INJECTION, SOLUTION INTRAVENOUS at 00:31

## 2020-08-03 RX ADMIN — SODIUM CHLORIDE 100 MILLILITER(S): 9 INJECTION, SOLUTION INTRAVENOUS at 13:48

## 2020-08-03 NOTE — PROGRESS NOTE ADULT - PROBLEM SELECTOR PLAN 2
Patient with prior history of hypertension with elevated blood pressure with BP ranging from 160 to 170's  Low salt diet  Continue Hold ACEi/ARB  Continue metoprolol ER to 25 mg po daily  Consider add Amlodipine 5 mg po daily  Monitor vital signs.

## 2020-08-03 NOTE — PROGRESS NOTE ADULT - PROBLEM SELECTOR PLAN 3
Patient with severe hypokalemia with K level 2.5 likely due to diarrhea/stool softener  - Hold stool softener  - Receiving IV KCL and IV mag  - Replete K and Mag with goal K>4.0 and<5.0 and Mag>2.0    Hyponatremia  with Na level 130 likely hypovolemic/euvolemic hyponatremia likely due to diarrhea/gastroenteritis vs ADH response due to chronic low back pain  - Urine Na <35, Urine osm 348  - Continue IV fluids for now with monitoring BMP  - Optimal pain control  - Free water restriction to <1L/day    Calcium 8.4 with albumin 3.0 and phos level 1.6  Giving IV kphos 15 mmol IV once.  Monitor calcium and phos level.

## 2020-08-03 NOTE — CONSULT NOTE ADULT - PROBLEM SELECTOR RECOMMENDATION 2
- secondary to dehydration in the setting of profuse diarrhea  - monitor electrolytes, replete prn  - Nephrology following

## 2020-08-03 NOTE — PROGRESS NOTE ADULT - SUBJECTIVE AND OBJECTIVE BOX
Patient is a 70y old  Female who presents with a chief complaint of diarrhea (03 Aug 2020 14:44)      INTERVAL HPI/OVERNIGHT EVENTS:  T(C): 36.6 (20 @ 12:44), Max: 37.3 (20 @ 18:00)  HR: 86 (20 @ 12:44) (86 - 104)  BP: 166/82 (20 @ 15:25) (147/68 - 195/96)  RR: 18 (20 @ 12:44) (18 - 18)  SpO2: 99% (20 @ 12:44) (95% - 100%)  Wt(kg): --  I&O's Summary    02 Aug 2020 07:01  -  03 Aug 2020 07:00  --------------------------------------------------------  IN: 1800 mL / OUT: 650 mL / NET: 1150 mL    03 Aug 2020 07:01  -  03 Aug 2020 16:45  --------------------------------------------------------  IN: 360 mL / OUT: 0 mL / NET: 360 mL        LABS:                        12.2   10.93 )-----------( 156      ( 03 Aug 2020 07:28 )             35.2     08-03    130<L>  |  94<L>  |  23  ----------------------------<  114<H>  2.5<LL>   |  16<L>  |  1.16    Ca    8.4      03 Aug 2020 07:28  Phos  1.6     08-03  Mg     2.0     08-03    TPro  6.3  /  Alb  3.0<L>  /  TBili  0.6  /  DBili  x   /  AST  18  /  ALT  16  /  AlkPhos  108  08-03    PT/INR - ( 02 Aug 2020 13:56 )   PT: 13.1 sec;   INR: 1.11 ratio         PTT - ( 02 Aug 2020 13:56 )  PTT:26.1 sec  Urinalysis Basic - ( 03 Aug 2020 00:37 )    Color: Yellow / Appearance: Slightly Turbid / S.015 / pH: x  Gluc: x / Ketone: Trace  / Bili: Negative / Urobili: Negative   Blood: x / Protein: 100 / Nitrite: Negative   Leuk Esterase: Moderate / RBC: 13 /hpf / WBC 36 /HPF   Sq Epi: x / Non Sq Epi: 3 /hpf / Bacteria: Many      CAPILLARY BLOOD GLUCOSE            Urinalysis Basic - ( 03 Aug 2020 00:37 )    Color: Yellow / Appearance: Slightly Turbid / S.015 / pH: x  Gluc: x / Ketone: Trace  / Bili: Negative / Urobili: Negative   Blood: x / Protein: 100 / Nitrite: Negative   Leuk Esterase: Moderate / RBC: 13 /hpf / WBC 36 /HPF   Sq Epi: x / Non Sq Epi: 3 /hpf / Bacteria: Many        MEDICATIONS  (STANDING):  ALPRAZolam 0.25 milliGRAM(s) Oral daily  aMIOdarone    Tablet 200 milliGRAM(s) Oral daily  aspirin  chewable 81 milliGRAM(s) Oral daily  cloNIDine 0.1 milliGRAM(s) Oral daily  ferrous    sulfate 325 milliGRAM(s) Oral two times a day  gabapentin 300 milliGRAM(s) Oral daily  gabapentin 600 milliGRAM(s) Oral two times a day  metoprolol succinate ER 25 milliGRAM(s) Oral daily  piperacillin/tazobactam IVPB.. 3.375 Gram(s) IV Intermittent every 8 hours  sodium chloride 0.45% 1000 milliLiter(s) (100 mL/Hr) IV Continuous <Continuous>  tamsulosin 0.4 milliGRAM(s) Oral at bedtime    MEDICATIONS  (PRN):          PHYSICAL EXAM:  GENERAL: NAD, well-groomed, well-developed  HEAD:  Atraumatic, Normocephalic  CHEST/LUNG: Clear to percussion bilaterally; No rales, rhonchi, wheezing, or rubs  HEART: Regular rate and rhythm; No murmurs, rubs, or gallops  ABDOMEN: Soft, Nontender, Nondistended; Bowel sounds present  EXTREMITIES:  2+ Peripheral Pulses, No clubbing, cyanosis, or edema  LYMPH: No lymphadenopathy noted  SKIN: No rashes or lesions    Care Discussed with Consultants/Other Providers [ ] YES  [ ] NO

## 2020-08-03 NOTE — CONSULT NOTE ADULT - SUBJECTIVE AND OBJECTIVE BOX
CHIEF COMPLAINT:  Diarrhea       HISTORY OF PRESENT ILLNESS:  69 yo F with hx of thoracic and abdominal aortic aneurysm s/p repair, AV replacement, and HTN presenting with diarrhea x1 day. Pt states that her boyfriend came over for dinner last night and after he left she started having profuse non-bloody watery diarrhea a/w with fevers to 101-102, lightheadedness and nausea/dry heaving. Pt states she has since had 5-6 watery BMs and has started to feel weak. She took Tylenol at 10AM. She has also noticed mild SOB on exertion. She denies CP. She has been trying to drink gatorade and has been able to keep it down.  Has been severely hypertensive with SBP > 195.   Denies chest pain, shortness of breath or palpitations.       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: s/p repair  Asceding aortic aneurysm repair 4/8/2019  S/P cataract surgery  Arm fracture, left: 2005          MEDICATIONS:  aMIOdarone    Tablet 200 milliGRAM(s) Oral daily  aspirin  chewable 81 milliGRAM(s) Oral daily  metoprolol succinate ER 25 milliGRAM(s) Oral daily  tamsulosin 0.4 milliGRAM(s) Oral at bedtime    piperacillin/tazobactam IVPB.. 3.375 Gram(s) IV Intermittent every 8 hours      ALPRAZolam 0.25 milliGRAM(s) Oral daily  gabapentin 300 milliGRAM(s) Oral daily  gabapentin 600 milliGRAM(s) Oral two times a day        ferrous    sulfate 325 milliGRAM(s) Oral two times a day  potassium chloride    Tablet ER 40 milliEquivalent(s) Oral every 4 hours  potassium chloride  10 mEq/50 mL IVPB 10 milliEquivalent(s) IV Intermittent every 1 hour  potassium phosphate IVPB 15 milliMole(s) IV Intermittent once  sodium chloride 0.45% 1000 milliLiter(s) IV Continuous <Continuous>      FAMILY HISTORY:  Family history of skin cancer: mother  Family history of coronary artery bypass graft: with valve disease      SOCIAL HISTORY:    [ ] Non-smoker  [ ] Smoker  [ ] Alcohol    Allergies    No Known Allergies    Intolerances    	    REVIEW OF SYSTEMS:  CONSTITUTIONAL: No fever, weight loss,+ fatigue  EYES: No eye pain, visual disturbances, or discharge  ENMT:  No difficulty hearing, tinnitus, vertigo; No sinus or throat pain  NECK: No pain or stiffness  RESPIRATORY: No cough, wheezing, chills or hemoptysis; No Shortness of Breath  CARDIOVASCULAR: No chest pain, palpitations, passing out, dizziness, or leg swelling  GASTROINTESTINAL: No abdominal or epigastric pain. No nausea, vomiting, or hematemesis;+ diarrhea or constipation. No melena or hematochezia.  GENITOURINARY: No dysuria, frequency, hematuria, or incontinence  NEUROLOGICAL: No headaches, memory loss, loss of strength, numbness, or tremors  SKIN: No itching, burning, rashes, or lesions   LYMPH Nodes: No enlarged glands  ENDOCRINE: No heat or cold intolerance; No hair loss  MUSCULOSKELETAL: No joint pain or swelling; No muscle, back, or extremity pain  PSYCHIATRIC: No depression, anxiety, mood swings, or difficulty sleeping  HEME/LYMPH: No easy bruising, or bleeding gums  ALLERY AND IMMUNOLOGIC: No hives or eczema	    [ ] All others negative	  [ ] Unable to obtain    PHYSICAL EXAM:  T(C): 37.1 (08-03-20 @ 05:37), Max: 37.3 (08-02-20 @ 18:00)  HR: 104 (08-03-20 @ 05:37) (68 - 104)  BP: 195/96 (08-03-20 @ 05:37) (101/53 - 195/96)  RR: 18 (08-03-20 @ 05:37) (18 - 20)  SpO2: 99% (08-03-20 @ 05:37) (95% - 100%)  Wt(kg): --  I&O's Summary    02 Aug 2020 07:01  -  03 Aug 2020 07:00  --------------------------------------------------------  IN: 1800 mL / OUT: 650 mL / NET: 1150 mL        Appearance: Normal	  HEENT:   Normal oral mucosa, PERRL, EOMI	  Lymphatic: No lymphadenopathy  Cardiovascular: Normal S1 S2, No JVD, No murmurs, No edema  Respiratory: Lungs clear to auscultation	  Psychiatry: A & O x 3, Mood & affect appropriate  Gastrointestinal:  Soft, Non-tender, + BS	  Skin: No rashes, No ecchymoses, No cyanosis	  Neurologic: Non-focal  Extremities: Normal range of motion, No clubbing, cyanosis or edema  Vascular: Peripheral pulses palpable 2+ bilaterally    TELEMETRY: 	SR    ECG:  	  RADIOLOGY:  < from: Xray Chest 1 View- PORTABLE-Urgent (08.02.20 @ 14:30) >    ******PRELIMINARY REPORT******    ******PRELIMINARY REPORT******          EXAM:  XR CHEST PORTABLE URGENT 1V                            PROCEDURE DATE:  08/02/2020      ******PRELIMINARY REPORT******    ******PRELIMINARY REPORT******              INTERPRETATION:  no emergent findings            ******PRELIMINARY REPORT******    ******PRELIMINARY REPORT******          OTILIA CARO M.D., RESIDENT RADIOLOGIST                      < end of copied text >    OTHER: 	  	  LABS:	 	    CARDIAC MARKERS:      COVID-19 IgG Antibody Index: 125.00: Roche ECLIA Total AB (CROW)  NOTE: This result index represents a total antibody measurement,  which  includes IgG, IgA, and IgM  Negative <= 0.99 Index  Positive >= 1.00 Index Index (08.02.20 @ 23:18)                   COVID-19 PCR . (08.02.20 @ 14:08)    COVID-19 PCR: NotDetec: This test has been validated by [employer] to be accurate;  though it has not been FDA cleared/approved by the usual pathway.  As with all laboratory tests, results should be correlated with clinical  findings.  https://www.fda.gov/media/965053/download  https://www.fda.gov/media/500061/download      Thyroid Stimulating Hormone, Serum in AM (08.03.20 @ 09:06)    Thyroid Stimulating Hormone, Serum: 0.25 uIU/mL               12.2   10.93 )-----------( 156      ( 03 Aug 2020 07:28 )             35.2     08-03    130<L>  |  94<L>  |  23  ----------------------------<  114<H>  2.5<LL>   |  16<L>  |  1.16    Ca    8.4      03 Aug 2020 07:28  Phos  1.6     08-03  Mg     2.0     08-03    TPro  6.3  /  Alb  3.0<L>  /  TBili  0.6  /  DBili  x   /  AST  18  /  ALT  16  /  AlkPhos  108  08-03    proBNP:   Lipid Profile:   HgA1c:   TSH: Thyroid Stimulating Hormone, Serum: 0.25 uIU/mL (08-03 @ 09:06)      Culture - Blood (08.02.20 @ 22:08)    Gram Stain:   Aerobic and Anaerobic Bottle: Gram Negative Rods    -  Escherichia coli: Detec    Specimen Source: .Blood Blood-Peripheral    Organism: Blood Culture PCR    Culture Results:   Aerobic and Anaerobic Bottle: Gram Negative Rods  ***Blood Panel PCR results on this specimen are available  approximately 3 hours after the Gram stain result.***  Gram stain, PCR, and/or culture results may not always  correspond due to differencein methodologies.  ************************************************************  This PCR assay was performed using [employer].  The following targets are tested for: Enterococcus,  vancomycin resistant enterococci, Listeria monocytogenes,  coagulase negative staphylococci, S. aureus,  methicillin resistant S. aureus, Streptococcus agalactiae  (Group B), S. pneumoniae, S. pyogenes (Group A),  Acinetobacter baumannii, Enterobacter cloacae, E. coli,  Klebsiella oxytoca, K. pneumoniae, Proteus sp.,  Serratia marcescens, Haemophilus influenzae,  Neisseria meningitidis, Pseudomonas aeruginosa, Candida  albicans, C. glabrata, C krusei, C parapsilosis,  C. tropicalis and the KPC resistance gene.  "Due to technical problems, Proteus sp. will Not be reported as part of  the BCID panel until further notice"    Organism Identification: Blood Culture PCR    Method Type: PCR

## 2020-08-03 NOTE — CONSULT NOTE ADULT - ASSESSMENT
71 yo F with hx of thoracic and abdominal aortic aneurysm s/p repair, AV replacement, and HTN presenting with diarrhea x1 day.    overall febrile at home, leucocytosis, lactic acidosis, DURGA, due to E coli bacteremia potential sources either urinary given pt might be retaining in setting of decreased frequency of catheterization or colitis/enteritis due to diarrhea.   Resolved DURGA, clinically improved.     Plan:   c/w zosyn at current dose  follow up final urine cx  follow up final sensitivity of e coli   repeat blood cx in am   consider CT abd/pelvis given recent surgery and bacteremia.   trend CBC for leucocytosis

## 2020-08-03 NOTE — CONSULT NOTE ADULT - ASSESSMENT
71 yo F with hx of thoracic and abdominal aortic aneurysm s/p repair, AV replacement, and HTN presenting with diarrhea x1 day and found to be bacteremic, hypokalemic and hypertensive.

## 2020-08-03 NOTE — CONSULT NOTE ADULT - SUBJECTIVE AND OBJECTIVE BOX
Patient is a 70y old  Female who presents with a chief complaint of diarrhea (03 Aug 2020 14:44)      HPI:  71 yo F with hx of thoracic and abdominal aortic aneurysm s/p repair, AV replacement, and HTN presenting with diarrhea x1 day. Pt states that her boyfriend came over for dinner last night and after he left she started having profuse non-bloody watery diarrhea a/w with fevers to 101-102, lightheadedness and nausea/dry heaving. Pt states she has since had 5-6 watery BMs and has started to feel weak. She took Tylenol at 10AM. She has also noticed mild SOB on exertion. She denies CP. She has been trying to drink gatorade and has been able to keep it down. (02 Aug 2020 18:13)  Above reviewed;   Pt says she was straight catheterizing 4 times a day since her reop AAA surgery in 8/19, recently went to once a day catheterization for a month as per conversation with her surgeon, + retching for a week along with diarrhea. Denies flank pain, has central tightness since surgery which is unchanged. She says she has lost sensation since the surgery so does not feel dysuria.       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: s/p repair  Asceding aortic aneurysm repair 4/8/2019  S/P cataract surgery  Arm fracture, left: 2005      REVIEW OF SYSTEMS    General: + chills and Fevers     Skin: No rash  	  Ophthalmologic: Denies any discharge, redness.  	  ENT: No nasal congestion or throat pain.     Respiratory and Thorax: No cough, sputum.   	  Cardiovascular: No chest pain,     Gastrointestinal: per hpi     Genitourinary: per hpi     Musculoskeletal: No joint swelling     Neurological: generalized weakness.    Psychiatric: No hallucinations	    Extremities: No swelling     Endocrine: No polyuria or polydipsia    Allergic/Immunologic: No hives      Social history:  Lives with family, former smoker       FAMILY HISTORY:  Family history of skin cancer: mother  Family history of coronary artery bypass graft: with valve disease      Allergies  No Known Allergies      Antimicrobials:  piperacillin/tazobactam IVPB.. 3.375 Gram(s) IV Intermittent every 8 hours        Vital Signs Last 24 Hrs  T(C): 36.6 (03 Aug 2020 12:44), Max: 37.3 (02 Aug 2020 18:00)  T(F): 97.8 (03 Aug 2020 12:44), Max: 99.2 (02 Aug 2020 18:00)  HR: 86 (03 Aug 2020 12:44) (72 - 104)  BP: 170/82 (03 Aug 2020 14:50) (131/58 - 195/96)  BP(mean): --  RR: 18 (03 Aug 2020 12:44) (18 - 18)  SpO2: 99% (03 Aug 2020 12:44) (95% - 100%)      PHYSICAL EXAM: Patient in no acute distress.    Constitutional: Comfortable. Awake and alert    Eyes: No discharge or conjunctival injection, + strabismus     ENT: No thrush. No pharyngeal exudate or erythema.    Neck: Supple,     Respiratory: + air entry bilaterally.    Cardiovascular: S1 S2 wnl,     Gastrointestinal: Soft BS(+) suprapubic tenderness, non distended.    Genitourinary: No CVA tenderness, + primafit with dark brown colored urine    Extremities: No edema.    Vascular: peripheral pulses felt    Neurological: No grossly focal deficits.    Skin: No rash     Musculoskeletal: No joint swelling.    Psychiatric: Affect normal.                              12.2   10.93 )-----------( 156      ( 03 Aug 2020 07:28 )             35.2       08-03    130<L>  |  94<L>  |  23  ----------------------------<  114<H>  2.5<LL>   |  16<L>  |  1.16    Ca    8.4      03 Aug 2020 07:28  Phos  1.6     08-03  Mg     2.0     08-03    TPro  6.3  /  Alb  3.0<L>  /  TBili  0.6  /  DBili  x   /  AST  18  /  ALT  16  /  AlkPhos  108  08-03      Urinalysis (08.03.20 @ 00:37)    Glucose Qualitative, Urine: Negative    Blood, Urine: Moderate    pH Urine: 6.0    Color: Yellow    Urine Appearance: Slightly Turbid    Bilirubin: Negative    Ketone - Urine: Trace    Specific Gravity: 1.015    Protein, Urine: 100    Urobilinogen: Negative    Nitrite: Negative    Leukocyte Esterase Concentration: Moderate    Epithelial Cells: 3 /hpf    Comment - Urine: Few Yeast-like Cells Seen.    Bacteria: Many    Red Blood Cell - Urine: 13 /hpf    Hyaline Casts: 4 /lpf    White Blood Cell - Urine: 36 /HPF      Culture - Blood (collected 02 Aug 2020 22:08)  Source: .Blood Blood-Peripheral  Gram Stain (03 Aug 2020 06:41):  Aerobic and Anaerobic Bottle: Gram Negative Rods  Preliminary Report (03 Aug 2020 06:41):    Aerobic and Anaerobic Bottle: Gram Negative Rods      Culture - Blood (collected 02 Aug 2020 22:08)  Source: .Blood Blood-Peripheral  Gram Stain (03 Aug 2020 08:24):    Growth in aerobic and anaerobic bottles: Gram Negative Rods  Preliminary Report (03 Aug 2020 08:24):    Growth in aerobic and anaerobic bottles: Gram Negative Rods      Radiology: Imaging reviewed and visualized personally [ x]    < from: Xray Chest 1 View- PORTABLE-Urgent (08.02.20 @ 14:30) >  IMPRESSION:  No acute cardiopulmonary findings.

## 2020-08-03 NOTE — CHART NOTE - NSCHARTNOTEFT_GEN_A_CORE
Notified by radiology resident regarding pre-adams CT A/P results (08/03/2020):    < from: CT Abdomen and Pelvis w/ Oral Cont and w/ IV Cont (08.03.20 @ 18:50) >    Multiple wedge-shaped hypodensities involving the left kidney with asymmetric enlargement. There is mild wall enhancement of the left renal pelvis with surrounding inflammatory changes involving the left collecting system. Findings favor left-sided pyelonephritis over renal infarct.    Mild bladder wall thickening, possibly representing additional cystitis.    Fluid-filled loops of descending and sigmoid colon, as well as the rectum, consistent with patient's history of diarrhea.    No significant change in appearance of the visualized descending thoracic aorta and abdominal aorta.    < end of copied text >      Patient is currently on IV zosyn, and is urinating well.   F/u UCx (sent 8/02)  ID to follow in AM  Dr. [place] notified; no further recs at this time  Will continue to monitor  Will endorse to AM team      Makenzie Patel PA-C  #[id number]

## 2020-08-03 NOTE — PROGRESS NOTE ADULT - PROBLEM SELECTOR PLAN 4
High anion gap metabolic acidosis with serum bicarbonate 13 likely gastroenteritis/colitis/renal failure  - Lactate and betahydroxy butyrate noted.  - Replete Kcl with goal K>4.0 and <5.0  - Continue  IV fluids to 1/2 NS with 75 mEq sodium bicarbonate @ 100cc/hr with addition of KCl 20meQ  - Monitor BMP and electrolytes every 6 to 8 hrly  - Treatment of underlying gastroenteritis per primary/ID team

## 2020-08-03 NOTE — CONSULT NOTE ADULT - ASSESSMENT
69 yo F with hx of thoracic and abdominal aortic aneurysm s/p repair, AV replacement, and HTN presenting with diarrhea. GI consulted

## 2020-08-03 NOTE — CONSULT NOTE ADULT - PROBLEM SELECTOR RECOMMENDATION 9
- with associated fever and GNR bacteremia   - check GI pcr, stool cultures (pending)   - continue with empiric zosyn for now  - currently tolerating CLD, advance diet as tolerated  - can consider CT a/p if develops abdominal pain or fever persists   - further recommendations pending stool studies - with associated fever and GNR bacteremia   - check GI pcr, stool cultures (pending)   - continue with empiric zosyn for now  - currently tolerating CLD, advance diet as tolerated  - consider CT a/p if develops abdominal pain or fever persists   - further recommendations pending stool studies

## 2020-08-03 NOTE — PROGRESS NOTE ADULT - SUBJECTIVE AND OBJECTIVE BOX
Kalyan Argueta MD (Nephrology progress note)  [id number], [facility],  [address],  [id number]  TEl: [phone]  Cell: [phone]    Patient is a 70y Female seen and evaluated at bedside. Vital signs, laboratory data, imaging studies, consult notes reviewed done within past 24 hours. Overnight events noted. Patient lying in bed in no distress offers no complains and feels better. Interval improvement of S cr to 1.1 with non oliguria. K level 2.5 now on IV bicarbonate. Patient with positive blood culture with gram negative rods on IV antibiotics.     Allergies    No Known Allergies    Intolerances        ROS:  CONSTITUTIONAL: No fever or chills.  HEENT: No headache or visual disturbances.  RESPIRATORY: No shortness of breath, cough, hemoptysis or wheezing  CARDIOVASCULAR: No Chest pain, shortness of breath, palpitations, dizziness, syncope, orthopnea, PND or leg swelling.  GASTROINTESTINAL: MIld abdominal discomfort and watery, non bloody diarrhea but denies nausea, vomiting, hematemesis or blood per rectum.  GENITOURINARY: Urinary frequency but denies dysuria, gross hematuria, flank or supra pubic pain.  NEUROLOGICAL: No headache, focal limb weakness, tingling or numbness or seizure like activity  SKIN: No skin rash or lesion  MUSCULOSKELETAL: No leg pain, calf pain or swelling    VITALS:    T(C): 37.1 (20 @ 05:37), Max: 37.3 (20 @ 18:00)  HR: 104 (20 @ 05:37) (68 - 104)  BP: 195/96 (20 @ 05:37) (101/53 - 195/96)  RR: 18 (20 @ 05:37) (18 - 20)  SpO2: 99% (20 @ 05:37) (95% - 100%)  CAPILLARY BLOOD GLUCOSE          20 @ 07:01  -  20 @ 07:00  --------------------------------------------------------  IN: 1800 mL / OUT: 650 mL / NET: 1150 mL      MEDICATIONS  (STANDING):  ALPRAZolam 0.25 milliGRAM(s) Oral daily  aMIOdarone    Tablet 200 milliGRAM(s) Oral daily  aspirin  chewable 81 milliGRAM(s) Oral daily  ferrous    sulfate 325 milliGRAM(s) Oral two times a day  gabapentin 300 milliGRAM(s) Oral daily  gabapentin 600 milliGRAM(s) Oral two times a day  metoprolol succinate ER 25 milliGRAM(s) Oral daily  nitrofurantoin monohydrate/macrocrystals (MACROBID) 100 milliGRAM(s) Oral two times a day with meals  piperacillin/tazobactam IVPB.. 3.375 Gram(s) IV Intermittent every 8 hours  potassium chloride    Tablet ER 40 milliEquivalent(s) Oral every 4 hours  potassium chloride  10 mEq/50 mL IVPB 10 milliEquivalent(s) IV Intermittent every 1 hour  potassium phosphate IVPB 15 milliMole(s) IV Intermittent once  sodium chloride 0.45% 1000 milliLiter(s) (100 mL/Hr) IV Continuous <Continuous>  tamsulosin 0.4 milliGRAM(s) Oral at bedtime    MEDICATIONS  (PRN):      PHYSICAL EXAM:  GENERAL: Alert, awake and oriented x3 in no distress  HEENT: AUBREY, EOMI, neck supple, no JVP, no carotid bruit, oral mucosa moist and pink.  CHEST/LUNG: Bilateral clear breath sounds, no rales, no crepitations, no rhonchi, no wheezing  HEART: Regular rate and rhythm, NICANOR II/VI at LPSB, no gallops, no rub   ABDOMEN: Soft, mild abdominal tenderness+nt, non distended, bowel sounds present, no palpable organomegaly  : No flank or supra pubic tenderness.  EXTREMITIES: Peripheral pulses are palpable, no pedal edema  Neurology: AAOx3, no focal neurological deficit  SKIN: No rash or skin lesion  Musculoskeletal: No joint deformity or spinal tenderness.      Vascular ACCESS: None    LABS:                        12.2   10.93 )-----------( 156      ( 03 Aug 2020 07:28 )             35.2     08-03    130<L>  |  94<L>  |  23  ----------------------------<  114<H>  2.5<LL>   |  16<L>  |  1.16    Ca    8.4      03 Aug 2020 07:28  Phos  1.6     -  Mg     2.0     -    TPro  6.3  /  Alb  3.0<L>  /  TBili  0.6  /  DBili  x   /  AST  18  /  ALT  16  /  AlkPhos  108  -    Uric Acid, Serum: 2.7 mg/dL [2.5 - 7.0] ( @ 07:28)    PT/INR - ( 02 Aug 2020 13:56 )   PT: 13.1 sec;   INR: 1.11 ratio         PTT - ( 02 Aug 2020 13:56 )  PTT:26.1 sec  Urinalysis Basic - ( 03 Aug 2020 00:37 )    Color: Yellow / Appearance: Slightly Turbid / S.015 / pH: x  Gluc: x / Ketone: Trace  / Bili: Negative / Urobili: Negative   Blood: x / Protein: 100 / Nitrite: Negative   Leuk Esterase: Moderate / RBC: 13 /hpf / WBC 36 /HPF   Sq Epi: x / Non Sq Epi: 3 /hpf / Bacteria: Many      Sodium, Random Urine: <35 mmol/L ( @ 00:38)  Osmolality, Random Urine: 348 mos/kg ( @ 00:38)  Creatinine, Random Urine: 58 mg/dL ( @ 00:38)  Protein/Creatinine Ratio Calculation: 2.1 Ratio ( @ 00:38)        RADIOLOGY & ADDITIONAL STUDIES:  < from: Xray Chest 1 View- PORTABLE-Urgent (20 @ 14:30) >    ******PRELIMINARY REPORT******    ******PRELIMINARY REPORT******          EXAM:  XR CHEST PORTABLE URGENT 1V                            PROCEDURE DATE:  2020      ******PRELIMINARY REPORT******    ******PRELIMINARY REPORT******              INTERPRETATION:  no emergent findings            ******PRELIMINARY REPORT******    ******PRELIMINARY REPORT******          OTILIA CARO M.D., RESIDENT RADIOLOGIST                      < end of copied text >      Imaging Personally Reviewed:  [x] YES  [ ] NO    Consultant(s) Notes Reviewed:  [x] YES  [ ] NO    Care Discussed with Primary team/ Other Providers [x] YES  [ ] NO

## 2020-08-03 NOTE — PROGRESS NOTE ADULT - PROBLEM SELECTOR PLAN 1
Non oliguric DURGA with normal prior baseline renal function ( S cr 0.9 in 2019)likely pre-renal due to diarrhea/gastroenteritis/bacteremia now with improving renal function with S cr 1.1  - Urinalysis and urine electrolytes reviewed  - Avoid nephrotoxic agents  - Antibiotics as per renal dose adjustment  - Monitor BMP every 8 hrly  - Volume status dry and electrolytes noted.  - PVR (298cc on bladder scan)  - Consider urology evaluation and possible intermittent catheterization  -Continue FLomax 0.4 mg po daily  - Continue Hold ACEI/ARB  - No urgent need for RRT/HD.

## 2020-08-03 NOTE — CONSULT NOTE ADULT - SUBJECTIVE AND OBJECTIVE BOX
Chief Complaint:  Patient is a 70y old  Female who presents with a chief complaint of diarrhea (03 Aug 2020 10:05)      HPI: 71 yo F with hx of thoracic and abdominal aortic aneurysm s/p repair, AV replacement, and HTN presenting with diarrhea. Pt states that her boyfriend came over for dinner saturday night, where she consumed a pasta dish with meat sauce & sausage. Her boyfriend did not consume the same meal. Roughly 30 min after consuming her meal she started having profuse non-bloody watery diarrhea a/w with fevers to 101-102, lightheadedness and nausea/dry heaving. Pt states she has since had 5-6 watery BMs and has started to feel weak. She took Tylenol at 10AM. She has also noticed mild SOB on exertion. She denies CP. She has been trying to drink gatorade and has been able to keep it down. She denies recent exposure to sick contacts, recent antibiotic use (prior to admission), recent medication changes. Her last colonoscopy was 4 years ago as outpatient with [facility], where she reportedly had removal of benign polyp.     Allergies:  No Known Allergies      Medications:  ALPRAZolam 0.25 milliGRAM(s) Oral daily  aMIOdarone    Tablet 200 milliGRAM(s) Oral daily  aspirin  chewable 81 milliGRAM(s) Oral daily  ferrous    sulfate 325 milliGRAM(s) Oral two times a day  gabapentin 300 milliGRAM(s) Oral daily  gabapentin 600 milliGRAM(s) Oral two times a day  metoprolol succinate ER 25 milliGRAM(s) Oral daily  piperacillin/tazobactam IVPB.. 3.375 Gram(s) IV Intermittent every 8 hours  potassium chloride    Tablet ER 40 milliEquivalent(s) Oral every 4 hours  potassium chloride  10 mEq/50 mL IVPB 10 milliEquivalent(s) IV Intermittent every 1 hour  potassium phosphate IVPB 15 milliMole(s) IV Intermittent once  sodium chloride 0.45% 1000 milliLiter(s) IV Continuous <Continuous>  tamsulosin 0.4 milliGRAM(s) Oral at bedtime      PMHX/PSHX:  Intermittent abdominal pain  Thoracoabdominal aortic aneurysm (TAAA) without rupture  Murmur, cardiac  Abdominal hernia  Cataract  Preeclampsia  HTN (hypertension)  S/P aortic valve repair  Thoracoabdominal aortic aneurysm (TAAA) without rupture  S/P cataract surgery  Bilateral cataracts  Arm fracture, left      Family history:  Family history of skin cancer  Family history of coronary artery bypass graft  Family history of early CAD      Social History: former tobacco smoker, occasional ETOH use     ROS:     General:  No wt loss, fevers, chills, night sweats, +fatigue,   Eyes:  Good vision, no reported pain  ENT:  No sore throat, pain, runny nose, dysphagia  CV:  No pain, palpitations, hypo/hypertension  Resp:  No dyspnea, cough, tachypnea, wheezing  GI:  No pain, No nausea, No vomiting, +diarrhea, No constipation, No weight loss, No fever, No pruritis, No rectal bleeding, No tarry stools, No dysphagia,  :  No pain, bleeding, incontinence, nocturia  Muscle:  No pain, weakness  Neuro:  No weakness, tingling, memory problems  Psych:  No fatigue, insomnia, mood problems, depression  Endocrine:  No polyuria, polydipsia, cold/heat intolerance  Heme:  No petechiae, ecchymosis, easy bruisability  Skin:  No rash, tattoos, scars, edema      PHYSICAL EXAM:   Vital Signs:  Vital Signs Last 24 Hrs  T(C): 37.1 (03 Aug 2020 05:37), Max: 37.3 (02 Aug 2020 18:00)  T(F): 98.8 (03 Aug 2020 05:37), Max: 99.2 (02 Aug 2020 18:00)  HR: 104 (03 Aug 2020 05:37) (68 - 104)  BP: 195/96 (03 Aug 2020 05:37) (101/53 - 195/96)  BP(mean): --  RR: 18 (03 Aug 2020 05:37) (18 - 20)  SpO2: 99% (03 Aug 2020 05:37) (95% - 100%)  Daily Height in cm: 167.64 (02 Aug 2020 12:00)    Daily Weight in k.8 (03 Aug 2020 05:37)    GENERAL: non-toxic, no acute distress  HEENT:  NC/AT  ABDOMEN:  Soft, non-tender, non-distended, normoactive bowel sounds  EXTEREMITIES:  no cyanosis,clubbing or edema  SKIN:  No rash/erythema/ecchymoses/petechiae/wounds/abscess/warm/dry  NEURO:  Alert, oriented, no asterixis, no tremor, no encephalopathy    LABS:                        12.2   10.93 )-----------( 156      ( 03 Aug 2020 07:28 )             35.2     08-03    130<L>  |  94<L>  |  23  ----------------------------<  114<H>  2.5<LL>   |  16<L>  |  1.16    Ca    8.4      03 Aug 2020 07:28  Phos  1.6     08-03  Mg     2.0     08-03    TPro  6.3  /  Alb  3.0<L>  /  TBili  0.6  /  DBili  x   /  AST  18  /  ALT  16  /  AlkPhos  108  08-03    LIVER FUNCTIONS - ( 03 Aug 2020 07:28 )  Alb: 3.0 g/dL / Pro: 6.3 g/dL / ALK PHOS: 108 U/L / ALT: 16 U/L / AST: 18 U/L / GGT: x           PT/INR - ( 02 Aug 2020 13:56 )   PT: 13.1 sec;   INR: 1.11 ratio         PTT - ( 02 Aug 2020 13:56 )  PTT:26.1 sec  Urinalysis Basic - ( 03 Aug 2020 00:37 )    Color: Yellow / Appearance: Slightly Turbid / S.015 / pH: x  Gluc: x / Ketone: Trace  / Bili: Negative / Urobili: Negative   Blood: x / Protein: 100 / Nitrite: Negative   Leuk Esterase: Moderate / RBC: 13 /hpf / WBC 36 /HPF   Sq Epi: x / Non Sq Epi: 3 /hpf / Bacteria: Many          Imaging:

## 2020-08-03 NOTE — PROVIDER CONTACT NOTE (CRITICAL VALUE NOTIFICATION) - SITUATION
Preliminary blood cultures from 8/2/20 with growth in the aerobic and anaerobic bottles for gram negative rods

## 2020-08-03 NOTE — PROGRESS NOTE ADULT - PROBLEM SELECTOR PLAN 5
Acute gastroenteritis/colitis/diverticulitis with gram negative bacteremia  - Tylenol prn for pain/fever  - Monitor lactate, stool studies  - Follow ID and sensitivity of gram negative rods  - Antibiotics adjustment per primary/ID team with renal dose adjustment.

## 2020-08-04 LAB
ANION GAP SERPL CALC-SCNC: 12 MMOL/L — SIGNIFICANT CHANGE UP (ref 5–17)
ANION GAP SERPL CALC-SCNC: 14 MMOL/L — SIGNIFICANT CHANGE UP (ref 5–17)
BUN SERPL-MCNC: 11 MG/DL — SIGNIFICANT CHANGE UP (ref 7–23)
BUN SERPL-MCNC: 8 MG/DL — SIGNIFICANT CHANGE UP (ref 7–23)
C DIFF GDH STL QL: NEGATIVE — SIGNIFICANT CHANGE UP
C DIFF GDH STL QL: SIGNIFICANT CHANGE UP
CALCIUM SERPL-MCNC: 8.4 MG/DL — SIGNIFICANT CHANGE UP (ref 8.4–10.5)
CALCIUM SERPL-MCNC: 8.5 MG/DL — SIGNIFICANT CHANGE UP (ref 8.4–10.5)
CHLORIDE SERPL-SCNC: 97 MMOL/L — SIGNIFICANT CHANGE UP (ref 96–108)
CHLORIDE SERPL-SCNC: 98 MMOL/L — SIGNIFICANT CHANGE UP (ref 96–108)
CO2 SERPL-SCNC: 20 MMOL/L — LOW (ref 22–31)
CO2 SERPL-SCNC: 22 MMOL/L — SIGNIFICANT CHANGE UP (ref 22–31)
CREAT SERPL-MCNC: 0.88 MG/DL — SIGNIFICANT CHANGE UP (ref 0.5–1.3)
CREAT SERPL-MCNC: 0.89 MG/DL — SIGNIFICANT CHANGE UP (ref 0.5–1.3)
CULTURE RESULTS: SIGNIFICANT CHANGE UP
GLUCOSE SERPL-MCNC: 121 MG/DL — HIGH (ref 70–99)
GLUCOSE SERPL-MCNC: 129 MG/DL — HIGH (ref 70–99)
MAGNESIUM SERPL-MCNC: 1.9 MG/DL — SIGNIFICANT CHANGE UP (ref 1.6–2.6)
MAGNESIUM SERPL-MCNC: 2 MG/DL — SIGNIFICANT CHANGE UP (ref 1.6–2.6)
PHOSPHATE SERPL-MCNC: 1.3 MG/DL — LOW (ref 2.5–4.5)
PHOSPHATE SERPL-MCNC: 1.5 MG/DL — LOW (ref 2.5–4.5)
POTASSIUM SERPL-MCNC: 2.8 MMOL/L — CRITICAL LOW (ref 3.5–5.3)
POTASSIUM SERPL-MCNC: 3.1 MMOL/L — LOW (ref 3.5–5.3)
POTASSIUM SERPL-SCNC: 2.8 MMOL/L — CRITICAL LOW (ref 3.5–5.3)
POTASSIUM SERPL-SCNC: 3.1 MMOL/L — LOW (ref 3.5–5.3)
SODIUM SERPL-SCNC: 131 MMOL/L — LOW (ref 135–145)
SODIUM SERPL-SCNC: 132 MMOL/L — LOW (ref 135–145)
SPECIMEN SOURCE: SIGNIFICANT CHANGE UP

## 2020-08-04 PROCEDURE — 99233 SBSQ HOSP IP/OBS HIGH 50: CPT

## 2020-08-04 RX ORDER — DULOXETINE HYDROCHLORIDE 30 MG/1
60 CAPSULE, DELAYED RELEASE ORAL DAILY
Refills: 0 | Status: DISCONTINUED | OUTPATIENT
Start: 2020-08-04 | End: 2020-08-15

## 2020-08-04 RX ORDER — POTASSIUM CHLORIDE 20 MEQ
40 PACKET (EA) ORAL ONCE
Refills: 0 | Status: COMPLETED | OUTPATIENT
Start: 2020-08-04 | End: 2020-08-04

## 2020-08-04 RX ORDER — CEFEPIME 1 G/1
1000 INJECTION, POWDER, FOR SOLUTION INTRAMUSCULAR; INTRAVENOUS EVERY 8 HOURS
Refills: 0 | Status: DISCONTINUED | OUTPATIENT
Start: 2020-08-04 | End: 2020-08-05

## 2020-08-04 RX ORDER — LISINOPRIL 2.5 MG/1
5 TABLET ORAL DAILY
Refills: 0 | Status: DISCONTINUED | OUTPATIENT
Start: 2020-08-04 | End: 2020-08-04

## 2020-08-04 RX ORDER — POTASSIUM CHLORIDE 20 MEQ
40 PACKET (EA) ORAL EVERY 4 HOURS
Refills: 0 | Status: COMPLETED | OUTPATIENT
Start: 2020-08-04 | End: 2020-08-04

## 2020-08-04 RX ORDER — MAGNESIUM SULFATE 500 MG/ML
1 VIAL (ML) INJECTION ONCE
Refills: 0 | Status: COMPLETED | OUTPATIENT
Start: 2020-08-04 | End: 2020-08-04

## 2020-08-04 RX ORDER — POTASSIUM PHOSPHATE, MONOBASIC POTASSIUM PHOSPHATE, DIBASIC 236; 224 MG/ML; MG/ML
15 INJECTION, SOLUTION INTRAVENOUS ONCE
Refills: 0 | Status: COMPLETED | OUTPATIENT
Start: 2020-08-04 | End: 2020-08-04

## 2020-08-04 RX ORDER — LISINOPRIL 2.5 MG/1
10 TABLET ORAL DAILY
Refills: 0 | Status: DISCONTINUED | OUTPATIENT
Start: 2020-08-04 | End: 2020-08-06

## 2020-08-04 RX ORDER — POTASSIUM CHLORIDE 20 MEQ
10 PACKET (EA) ORAL ONCE
Refills: 0 | Status: COMPLETED | OUTPATIENT
Start: 2020-08-04 | End: 2020-08-04

## 2020-08-04 RX ORDER — POTASSIUM CHLORIDE 20 MEQ
40 PACKET (EA) ORAL ONCE
Refills: 0 | Status: DISCONTINUED | OUTPATIENT
Start: 2020-08-04 | End: 2020-08-04

## 2020-08-04 RX ORDER — CEFEPIME 1 G/1
2000 INJECTION, POWDER, FOR SOLUTION INTRAMUSCULAR; INTRAVENOUS EVERY 12 HOURS
Refills: 0 | Status: DISCONTINUED | OUTPATIENT
Start: 2020-08-04 | End: 2020-08-04

## 2020-08-04 RX ORDER — HEPARIN SODIUM 5000 [USP'U]/ML
5000 INJECTION INTRAVENOUS; SUBCUTANEOUS EVERY 8 HOURS
Refills: 0 | Status: DISCONTINUED | OUTPATIENT
Start: 2020-08-04 | End: 2020-08-21

## 2020-08-04 RX ORDER — POTASSIUM CHLORIDE 20 MEQ
10 PACKET (EA) ORAL
Refills: 0 | Status: COMPLETED | OUTPATIENT
Start: 2020-08-04 | End: 2020-08-04

## 2020-08-04 RX ORDER — POTASSIUM PHOSPHATE, MONOBASIC POTASSIUM PHOSPHATE, DIBASIC 236; 224 MG/ML; MG/ML
30 INJECTION, SOLUTION INTRAVENOUS ONCE
Refills: 0 | Status: COMPLETED | OUTPATIENT
Start: 2020-08-04 | End: 2020-08-04

## 2020-08-04 RX ADMIN — HEPARIN SODIUM 5000 UNIT(S): 5000 INJECTION INTRAVENOUS; SUBCUTANEOUS at 22:28

## 2020-08-04 RX ADMIN — Medication 100 MILLIEQUIVALENT(S): at 21:37

## 2020-08-04 RX ADMIN — PIPERACILLIN AND TAZOBACTAM 25 GRAM(S): 4; .5 INJECTION, POWDER, LYOPHILIZED, FOR SOLUTION INTRAVENOUS at 04:49

## 2020-08-04 RX ADMIN — Medication 25 MILLIGRAM(S): at 05:08

## 2020-08-04 RX ADMIN — Medication 100 MILLIEQUIVALENT(S): at 20:16

## 2020-08-04 RX ADMIN — Medication 50 GRAM(S): at 23:06

## 2020-08-04 RX ADMIN — HEPARIN SODIUM 5000 UNIT(S): 5000 INJECTION INTRAVENOUS; SUBCUTANEOUS at 14:42

## 2020-08-04 RX ADMIN — Medication 40 MILLIEQUIVALENT(S): at 13:21

## 2020-08-04 RX ADMIN — PIPERACILLIN AND TAZOBACTAM 25 GRAM(S): 4; .5 INJECTION, POWDER, LYOPHILIZED, FOR SOLUTION INTRAVENOUS at 13:22

## 2020-08-04 RX ADMIN — CEFEPIME 100 MILLIGRAM(S): 1 INJECTION, POWDER, FOR SOLUTION INTRAMUSCULAR; INTRAVENOUS at 14:42

## 2020-08-04 RX ADMIN — Medication 325 MILLIGRAM(S): at 17:38

## 2020-08-04 RX ADMIN — AMIODARONE HYDROCHLORIDE 200 MILLIGRAM(S): 400 TABLET ORAL at 05:08

## 2020-08-04 RX ADMIN — Medication 100 MILLIEQUIVALENT(S): at 09:00

## 2020-08-04 RX ADMIN — Medication 100 GRAM(S): at 10:33

## 2020-08-04 RX ADMIN — DULOXETINE HYDROCHLORIDE 60 MILLIGRAM(S): 30 CAPSULE, DELAYED RELEASE ORAL at 13:20

## 2020-08-04 RX ADMIN — LISINOPRIL 10 MILLIGRAM(S): 2.5 TABLET ORAL at 14:45

## 2020-08-04 RX ADMIN — Medication 325 MILLIGRAM(S): at 05:08

## 2020-08-04 RX ADMIN — Medication 100 MILLIEQUIVALENT(S): at 23:00

## 2020-08-04 RX ADMIN — Medication 0.1 MILLIGRAM(S): at 17:38

## 2020-08-04 RX ADMIN — Medication 0.1 MILLIGRAM(S): at 05:08

## 2020-08-04 RX ADMIN — POTASSIUM PHOSPHATE, MONOBASIC POTASSIUM PHOSPHATE, DIBASIC 83.33 MILLIMOLE(S): 236; 224 INJECTION, SOLUTION INTRAVENOUS at 23:06

## 2020-08-04 RX ADMIN — TAMSULOSIN HYDROCHLORIDE 0.4 MILLIGRAM(S): 0.4 CAPSULE ORAL at 22:28

## 2020-08-04 RX ADMIN — CEFEPIME 100 MILLIGRAM(S): 1 INJECTION, POWDER, FOR SOLUTION INTRAMUSCULAR; INTRAVENOUS at 22:29

## 2020-08-04 RX ADMIN — Medication 40 MILLIEQUIVALENT(S): at 20:32

## 2020-08-04 RX ADMIN — Medication 40 MILLIEQUIVALENT(S): at 09:00

## 2020-08-04 RX ADMIN — Medication 81 MILLIGRAM(S): at 13:21

## 2020-08-04 RX ADMIN — POTASSIUM PHOSPHATE, MONOBASIC POTASSIUM PHOSPHATE, DIBASIC 62.5 MILLIMOLE(S): 236; 224 INJECTION, SOLUTION INTRAVENOUS at 11:31

## 2020-08-04 NOTE — PROGRESS NOTE ADULT - PROBLEM SELECTOR PLAN 3
Patient with severe hypokalemia with K level 2.5 likely due to diarrhea/stool softener  - Hold stool softener  - Receiving IV KCL and IV mag  - Replete K and Mag with goal K>4.0 and<5.0 and Mag>2.0    Hyponatremia  with Na level 130 likely hypovolemic/euvolemic hyponatremia likely due to diarrhea/gastroenteritis vs ADH response due to chronic low back pain  - Urine Na <35, Urine osm 348  - Continue IV fluids for now with monitoring BMP  - Optimal pain control  - Free water restriction to <1L/day    Calcium 8.4 with albumin 3.0 and phos level 1.6  Giving IV kphos 15 mmol IV once.  Monitor calcium and phos level. Patient with severe hypokalemia with K level 2.8 likely due to diarrhea/stool softener  - Receiving IV KCL and IV mag  - Replete K and Mag with goal K>4.0 and<5.0 and Mag>2.0  Monitor BMP and electrolytes    Mild Hyponatremia  with Na level 132 likely hypovolemic/euvolemic hyponatremia likely due to diarrhea/gastroenteritis vs ADH response due to chronic low back pain  - Urine Na <35, Urine osm 348  - Recheck urine lytes  - Optimal pain control  - Free water restriction to <1L/day    Calcium 8.4 with albumin 3.0 and phos level 1.3  Giving IV kphos  Monitor calcium and phos level.

## 2020-08-04 NOTE — PROGRESS NOTE ADULT - PROBLEM SELECTOR PLAN 1
- with associated fever and GNR bacteremia   - CT notable for fluid-filled distended colon c/w diarrhea, no evidence of colitis   - GI pcr negative, stool cultures (pending)   - Cdiff ordered  - continue with empiric zosyn for now  - diet as tolerated   - further recommendations pending stool studies

## 2020-08-04 NOTE — PROGRESS NOTE ADULT - PROBLEM SELECTOR PLAN 5
Acute gastroenteritis/colitis/diverticulitis with gram negative bacteremia  - Tylenol prn for pain/fever  - Monitor lactate, stool studies  - Follow ID and sensitivity of gram negative rods  - Antibiotics adjustment per primary/ID team with renal dose adjustment. Acute pyelonephritis with E.coli bacteremia and gastroenteritis  - Tylenol prn for pain/fever  - Monitor lactate, stool studies  - Follow ID and sensitivity of E.coli  - Antibiotics adjustment per primary/ID team with renal dose adjustment.

## 2020-08-04 NOTE — PROGRESS NOTE ADULT - SUBJECTIVE AND OBJECTIVE BOX
INTERVAL HPI/OVERNIGHT EVENTS:    patient seen and examined at bedside  continues to experience frequent, watery bowel movements  tolerating CLD  denies fever, chills, nausea, vomiting, or abdominal pain     MEDICATIONS  (STANDING):  ALPRAZolam 0.25 milliGRAM(s) Oral daily  aMIOdarone    Tablet 200 milliGRAM(s) Oral daily  aspirin  chewable 81 milliGRAM(s) Oral daily  cloNIDine 0.1 milliGRAM(s) Oral two times a day  DULoxetine 60 milliGRAM(s) Oral daily  ferrous    sulfate 325 milliGRAM(s) Oral two times a day  metoprolol succinate ER 25 milliGRAM(s) Oral daily  piperacillin/tazobactam IVPB.. 3.375 Gram(s) IV Intermittent every 8 hours  potassium chloride    Tablet ER 40 milliEquivalent(s) Oral every 4 hours  tamsulosin 0.4 milliGRAM(s) Oral at bedtime    MEDICATIONS  (PRN):      Allergies    No Known Allergies    Intolerances        Review of Systems:    General:  No wt loss, fevers, chills, night sweats,fatigue,   Eyes:  Good vision, no reported pain  ENT:  No sore throat, pain, runny nose, dysphagia  CV:  No pain, palpitatioins, hypo/hypertension  Resp:  No dyspnea, cough, tachypnea, wheezing  GI:  No pain, No nausea, No vomiting, +diarrhea, No constipatiion, No weight loss, No fever, No pruritis, No rectal bleeding, No tarry stools, No dysphagia,  :  No pain, bleeding, incontinence, nocturia  Muscle:  No pain, weakness  Neuro:  No weakness, tingling, memory problems  Psych:  No fatigue, insomnia, mood problems, depression  Endocrine:  No polyuria, polydypsia, cold/heat intolerance  Heme:  No petechiae, ecchymosis, easy bruisability  Skin:  No rash, tattoos, scars, edema      Vital Signs Last 24 Hrs  T(C): 36.4 (04 Aug 2020 13:08), Max: 37.1 (04 Aug 2020 05:02)  T(F): 97.6 (04 Aug 2020 13:08), Max: 98.8 (04 Aug 2020 05:02)  HR: 65 (04 Aug 2020 13:08) (65 - 90)  BP: 171/72 (04 Aug 2020 13:08) (158/68 - 191/84)  BP(mean): --  RR: 18 (04 Aug 2020 13:08) (18 - 18)  SpO2: 96% (04 Aug 2020 13:08) (96% - 100%)    PHYSICAL EXAM:    Constitutional: weak, frail, NAD   HEENT: EOMI, throat clear  Neck: No LAD, supple  Gastrointestinal: BS+, soft, NT/ND  Extremities: No peripheral edema  Neurological: A/O x 3, no focal deficits        LABS:                        12.2   10.93 )-----------( 156      ( 03 Aug 2020 07:28 )             35.2     08-04    132<L>  |  98  |  11  ----------------------------<  129<H>  2.8<LL>   |  20<L>  |  0.89    Ca    8.4      04 Aug 2020 06:57  Phos  1.3     08-04  Mg     1.9     08-04    TPro  6.3  /  Alb  3.0<L>  /  TBili  0.6  /  DBili  x   /  AST  18  /  ALT  16  /  AlkPhos  108  08-03    PT/INR - ( 02 Aug 2020 13:56 )   PT: 13.1 sec;   INR: 1.11 ratio         PTT - ( 02 Aug 2020 13:56 )  PTT:26.1 sec  Urinalysis Basic - ( 03 Aug 2020 00:37 )    Color: Yellow / Appearance: Slightly Turbid / S.015 / pH: x  Gluc: x / Ketone: Trace  / Bili: Negative / Urobili: Negative   Blood: x / Protein: 100 / Nitrite: Negative   Leuk Esterase: Moderate / RBC: 13 /hpf / WBC 36 /HPF   Sq Epi: x / Non Sq Epi: 3 /hpf / Bacteria: Many        RADIOLOGY & ADDITIONAL TESTS:  < from: CT Abdomen and Pelvis w/ Oral Cont and w/ IV Cont (20 @ 18:50) >    EXAM:  CT ABDOMEN AND PELVIS OC IC                            PROCEDURE DATE:  2020            INTERPRETATION:  CLINICAL INFORMATION: History of aortic aneurysm repair. Presents with colitis.    COMPARISON: CT arteriogram chest abdomen pelvisdated 2019.    PROCEDURE:  CT of the Abdomen and Pelvis was performed with intravenous contrast.  Intravenous contrast: 90 ml Omnipaque 350. 10 ml discarded.  Oral contrast: positive contrast was administered.  Sagittal and coronal reformats were performed.    FINDINGS:  LOWER CHEST: Surgical repair thoracoabdominal aorta partially visualized, appears unchanged.    LIVER: Within normal limits.  BILE DUCTS: Normal caliber.  GALLBLADDER: Within normal limits.  SPLEEN: Within normal limits.  PANCREAS: Within normal limits.  ADRENALS: Within normal limits.  KIDNEYS/URETERS: Left renal enlargement with striated nephrogram, perinephric stranding, and urothelial thickening. No hydronephrosis.    BLADDER: Mild bladder wall thickening.  REPRODUCTIVE ORGANS: Adnexa appear unremarkable.    BOWEL: No bowel obstruction. Moderate fluid-filled distention of colon consistent with diarrhea. No mural thickening. Appendix normal.  PERITONEUM: No ascites.  VESSELS: Surgical graft repair suprarenal abdominal aorta unchanged. Maximal diameter of abdominal aorta 3.1 cm. Bypass grafts of the celiac and superior mesenteric arteries are patent. Native renal arteries and AIDAN appear patent.  Lack of enhancement of the right external iliac and common femoralveins.  RETROPERITONEUM/LYMPH NODES: No lymphadenopathy.  ABDOMINAL WALL: Within normal limits.  BONES: Disc degeneration lumbosacral junction.    IMPRESSION:  Left-sided pyelonephritis and possible cystitis.    Interval stability in appearance of surgical graft repair of thoracoabdominal aorta and mesenteric bypass grafts    No CT evidence of colitis.    Failure of enhancement of the right common femoral and external iliac veins. Suggest venous duplex study to assess for DVT.    Examination findings were conveyed to physician's assistant Jorge by Dr. Chacon at 1937 hours on 8/3/2020 with read back.                  JAQUELINE BRADLEY M.D., ATTENDING RADIOLOGIST  This document has been electronically signed. Aug  4 2020  9:23AM                < end of copied text >

## 2020-08-04 NOTE — PROGRESS NOTE ADULT - PROBLEM SELECTOR PLAN 1
Non oliguric DURGA with normal prior baseline renal function ( S cr 0.9 in 2019)likely pre-renal due to diarrhea/gastroenteritis/bacteremia now with improving renal function with S cr 1.1  - Urinalysis and urine electrolytes reviewed  - Avoid nephrotoxic agents  - Antibiotics as per renal dose adjustment  - Monitor BMP every 8 hrly  - Volume status dry and electrolytes noted.  - PVR (298cc on bladder scan)  - Consider urology evaluation and possible intermittent catheterization  -Continue FLomax 0.4 mg po daily  - Continue Hold ACEI/ARB  - No urgent need for RRT/HD. Non oliguric AKInow with improvement ( S cr 0.9 in 2019)likely pre-renal due to diarrhea/gastroenteritis/bacteremia now with improving renal function with S cr 1.1  - Urinalysis and urine electrolytes reviewed  - Avoid nephrotoxic agents  - Antibiotics as per renal dose adjustment  - Monitor BMP every 8 hrly  - Volume status dry and electrolytes noted.  - PVR (298cc on bladder scan)  - Consider urology evaluation and possible intermittent catheterization  -Continue FLomax 0.4 mg po daily  - Continue Hold ACEI/ARB  - No urgent need for RRT/HD.

## 2020-08-04 NOTE — PROGRESS NOTE ADULT - PROBLEM SELECTOR PLAN 4
High anion gap metabolic acidosis with serum bicarbonate 13 likely gastroenteritis/colitis/renal failure  - Lactate and betahydroxy butyrate noted.  - Replete Kcl with goal K>4.0 and <5.0  - Continue  IV fluids to 1/2 NS with 75 mEq sodium bicarbonate @ 100cc/hr with addition of KCl 20meQ  - Monitor BMP and electrolytes every 6 to 8 hrly  - Treatment of underlying gastroenteritis per primary/ID team Non anion gap metabolic acidosis with serum bicarbonate 22 likely gastroenteritis/colitis/renal failure  - Lactate and beta-hydroxybutyrate noted.  - Off IV bicarbonate  - Monitor BMP and electrolytes every 12 hurly  - Treatment of underlying infection per primary/ID team

## 2020-08-04 NOTE — PROGRESS NOTE ADULT - PROBLEM SELECTOR PLAN 2
Patient with prior history of hypertension with elevated blood pressure with BP ranging from 160 to 170's  Low salt diet  Continue Hold ACEi/ARB  Continue metoprolol ER to 25 mg po daily  Consider add Amlodipine 5 mg po daily  Monitor vital signs. Patient with prior history of hypertension with elevated blood pressure with BP ranging from 170 to 190's  Low salt diet  Can resume ACEI/ARB  Continue metoprolol ER to 25 mg po daily  Continue clonidine 0.1 mg po bid  Monitor vital signs.

## 2020-08-04 NOTE — PROGRESS NOTE ADULT - SUBJECTIVE AND OBJECTIVE BOX
70y old  Female who presents with a chief complaint of diarrhea (04 Aug 2020 13:19)      Interval history:  Afebrile, feels weak, still with diarrhea.       Allergies:   No Known Allergies    Antimicrobials:  cefepime   IVPB 1000 milliGRAM(s) IV Intermittent every 8 hours      REVIEW OF SYSTEMS:  No chest pain   No cough,   No N/V  No rash.       Vital Signs Last 24 Hrs  T(C): 36.4 (08-04-20 @ 13:08), Max: 37.1 (08-04-20 @ 05:02)  T(F): 97.6 (08-04-20 @ 13:08), Max: 98.8 (08-04-20 @ 05:02)  HR: 72 (08-04-20 @ 14:40) (65 - 90)  BP: 168/69 (08-04-20 @ 14:40) (158/68 - 191/84)  BP(mean): --  RR: 18 (08-04-20 @ 13:08) (18 - 18)  SpO2: 96% (08-04-20 @ 13:08) (96% - 100%)      PHYSICAL EXAM:  Patient in no acute distress. AAOX3.  No icterus, no oral ulcers.  Cardiovascular: S1S2 normal.  Lungs: Good air entry B/L lung fields.  Gastrointestinal: soft, nontender, nondistended.  Extremities: no edema.  IV sites not inflamed.                           12.2   10.93 )-----------( 156      ( 03 Aug 2020 07:28 )             35.2   08-04    132<L>  |  98  |  11  ----------------------------<  129<H>  2.8<LL>   |  20<L>  |  0.89    Ca    8.4      04 Aug 2020 06:57  Phos  1.3     08-04  Mg     1.9     08-04    TPro  6.3  /  Alb  3.0<L>  /  TBili  0.6  /  DBili  x   /  AST  18  /  ALT  16  /  AlkPhos  108  08-03      LIVER FUNCTIONS - ( 03 Aug 2020 07:28 )  Alb: 3.0 g/dL / Pro: 6.3 g/dL / ALK PHOS: 108 U/L / ALT: 16 U/L / AST: 18 U/L / GGT: x               GI PCR Panel, Stool (collected 04 Aug 2020 05:39)  Source: .Stool Feces  Final Report (04 Aug 2020 07:41):    GI PCR Results: NOT detected    *******Please Note:*******    GI panel PCR evaluates for:    Campylobacter, Plesiomonas shigelloides, Salmonella,    Vibrio, Yersinia enterocolitica, Enteroaggregative    Escherichia coli (EAEC), Enteropathogenic E.coli (EPEC),    Enterotoxigenic E. coli (ETEC) lt/st, Shiga-like    toxin-producing E. coli (STEC) stx1/stx2,    Shigella/ Enteroinvasive E. coli (EIEC), Cryptosporidium,    Cyclospora cayetanensis, Entamoeba histolytica,    Giardia lamblia, Adenovirus F 40/41, Astrovirus,    Norovirus GI/GII, Rotavirus A, Sapovirus    Culture - Urine (collected 03 Aug 2020 03:31)  Source: .Urine Clean Catch (Midstream)  Preliminary Report (04 Aug 2020 16:44):    >100,000 CFU/ml Escherichia coli    Culture - Blood (collected 02 Aug 2020 22:08)  Source: .Blood Blood-Peripheral  Gram Stain (03 Aug 2020 06:41):    Aerobic and Anaerobic Bottle: Gram Negative Rods  Preliminary Report (04 Aug 2020 12:01):    Growth in aerobic and anaerobic bottles: Escherichia coli      Culture - Blood (collected 02 Aug 2020 22:08)  Source: .Blood Blood-Peripheral  Gram Stain (03 Aug 2020 08:24):    Growth in aerobic and anaerobic bottles: Gram Negative Rods  Preliminary Report (04 Aug 2020 12:15):    Growth in aerobic and anaerobic bottles: Escherichia coli See previous    culture [id number]       Radiology: Imaging reviewed personally and interpretation as mentioned below.     < from: CT Abdomen and Pelvis w/ Oral Cont and w/ IV Cont (08.03.20 @ 18:50) >  IMPRESSION:  Left-sided pyelonephritis and possible cystitis.    Interval stability in appearance of surgical graft repair of thoracoabdominal aorta and mesenteric bypass grafts.    No CT evidence of colitis.    Failure of enhancement of the right common femoral and external iliac veins. Suggest venous duplex study to assess for DVT.

## 2020-08-04 NOTE — PROGRESS NOTE ADULT - SUBJECTIVE AND OBJECTIVE BOX
Subjective: Patient seen and examined. No new events except as noted.   has remained hypertensive   sleepy   REVIEW OF SYSTEMS:    CONSTITUTIONAL: + weakness, fevers or chills  EYES/ENT: No visual changes;  No vertigo or throat pain   NECK: No pain or stiffness  RESPIRATORY: No cough, wheezing, hemoptysis; No shortness of breath  CARDIOVASCULAR: No chest pain or palpitations  GASTROINTESTINAL: No abdominal or epigastric pain. No nausea, vomiting, or hematemesis; No diarrhea or constipation. No melena or hematochezia.  GENITOURINARY: No dysuria, frequency or hematuria  NEUROLOGICAL: No numbness or weakness  SKIN: No itching, burning, rashes, or lesions   All other review of systems is negative unless indicated above.    MEDICATIONS:  MEDICATIONS  (STANDING):  ALPRAZolam 0.25 milliGRAM(s) Oral daily  aMIOdarone    Tablet 200 milliGRAM(s) Oral daily  aspirin  chewable 81 milliGRAM(s) Oral daily  cloNIDine 0.1 milliGRAM(s) Oral two times a day  ferrous    sulfate 325 milliGRAM(s) Oral two times a day  gabapentin 300 milliGRAM(s) Oral daily  gabapentin 600 milliGRAM(s) Oral two times a day  metoprolol succinate ER 25 milliGRAM(s) Oral daily  piperacillin/tazobactam IVPB.. 3.375 Gram(s) IV Intermittent every 8 hours  potassium chloride    Tablet ER 40 milliEquivalent(s) Oral every 4 hours  potassium phosphate IVPB 15 milliMole(s) IV Intermittent once  tamsulosin 0.4 milliGRAM(s) Oral at bedtime      PHYSICAL EXAM:  T(C): 37.1 (08-04-20 @ 05:02), Max: 37.1 (08-04-20 @ 05:02)  HR: 67 (08-04-20 @ 10:01) (67 - 90)  BP: 171/72 (08-04-20 @ 10:01) (158/68 - 191/84)  RR: 18 (08-04-20 @ 05:02) (18 - 18)  SpO2: 98% (08-04-20 @ 05:02) (98% - 100%)  Wt(kg): --  I&O's Summary    03 Aug 2020 07:01  -  04 Aug 2020 07:00  --------------------------------------------------------  IN: 3240 mL / OUT: 1500 mL / NET: 1740 mL            Appearance: NAD sleepy 	  HEENT:   Normal oral mucosa, PERRL, EOMI	  Lymphatic: No lymphadenopathy  Cardiovascular: Normal S1 S2, No JVD, No murmurs, No edema  Respiratory: Lungs clear to auscultation	  Psychiatry: A & O x 3, Mood & affect appropriate  Gastrointestinal:  Soft, Non-tender, + BS	  Skin: No rashes, No ecchymoses, No cyanosis	  Neurologic: Non-focal  Extremities: Normal range of motion, No clubbing, cyanosis or edema  Vascular: Peripheral pulses palpable 2+ bilaterally      LABS:    CARDIAC MARKERS:                                12.2   10.93 )-----------( 156      ( 03 Aug 2020 07:28 )             35.2     08-04    132<L>  |  98  |  11  ----------------------------<  129<H>  2.8<LL>   |  20<L>  |  0.89    Ca    8.4      04 Aug 2020 06:57  Phos  1.3     08-04  Mg     1.9     08-04    TPro  6.3  /  Alb  3.0<L>  /  TBili  0.6  /  DBili  x   /  AST  18  /  ALT  16  /  AlkPhos  108  08-03    proBNP:   Lipid Profile:   HgA1c:   TSH:     4          TELEMETRY: SR	    ECG:  	  RADIOLOGY: < from: CT Abdomen and Pelvis w/ Oral Cont and w/ IV Cont (08.03.20 @ 18:50) >    EXAM:  CT ABDOMEN AND PELVIS OC IC                            PROCEDURE DATE:  08/03/2020            INTERPRETATION:  CLINICAL INFORMATION: History of aortic aneurysm repair. Presents with colitis.    COMPARISON: CT arteriogram chest abdomen pelvisdated 12/7/2019.    PROCEDURE:  CT of the Abdomen and Pelvis was performed with intravenous contrast.  Intravenous contrast: 90 ml Omnipaque 350. 10 ml discarded.  Oral contrast: positive contrast was administered.  Sagittal and coronal reformats were performed.    FINDINGS:  LOWER CHEST: Surgical repair thoracoabdominal aorta partially visualized, appears unchanged.    LIVER: Within normal limits.  BILE DUCTS: Normal caliber.  GALLBLADDER: Within normal limits.  SPLEEN: Within normal limits.  PANCREAS: Within normal limits.  ADRENALS: Within normal limits.  KIDNEYS/URETERS: Left renal enlargement with striated nephrogram, perinephric stranding, and urothelial thickening. No hydronephrosis.    BLADDER: Mild bladder wall thickening.  REPRODUCTIVE ORGANS: Adnexa appear unremarkable.    BOWEL: No bowel obstruction. Moderate fluid-filled distention of colon consistent with diarrhea. No mural thickening. Appendix normal.  PERITONEUM: No ascites.  VESSELS: Surgical graft repair suprarenal abdominal aorta unchanged. Maximal diameter of abdominal aorta 3.1 cm. Bypass grafts of the celiac and superior mesenteric arteries are patent. Native renal arteries and AIDAN appear patent.  Lack of enhancement of the right external iliac and common femoralveins.  RETROPERITONEUM/LYMPH NODES: No lymphadenopathy.  ABDOMINAL WALL: Within normal limits.  BONES: Disc degeneration lumbosacral junction.    IMPRESSION:  Left-sided pyelonephritis and possible cystitis.    Interval stability in appearance of surgical graft repair of thoracoabdominal aorta and mesenteric bypass grafts    No CT evidence of colitis.    Failure of enhancement of the right common femoral and external iliac veins. Suggest venous duplex study to assess for DVT.    Examination findings were conveyed to physician's assistant Jorge by Dr. Chacon at 1937 hours on 8/3/2020 with read back.                  JAQUELINE BRADLEY M.D., ATTENDING RADIOLOGIST  This document has been electronically signed. Aug  4 2020  9:23AM                < end of copied text >    DIAGNOSTIC TESTING:  [ ] Echocardiogram:  [ ]  Catheterization:  [ ] Stress Test:    OTHER: 	  Culture - Blood (08.02.20 @ 22:08)    -  Escherichia coli: Detec    Gram Stain:   Aerobic and Anaerobic Bottle: Gram Negative Rods    Specimen Source: .Blood Blood-Peripheral    Organism: Blood Culture PCR    Culture Results:   Aerobic and Anaerobic Bottle: Gram Negative Rods  ***Blood Panel PCR results on this specimen are available  approximately 3 hours after the Gram stain result.***  Gram stain, PCR, and/or culture results may not always  correspond due to differencein methodologies.  ************************************************************  This PCR assay was performed using [employer].  The following targets are tested for: Enterococcus,  vancomycin resistant enterococci, Listeria monocytogenes,  coagulase negative staphylococci, S. aureus,  methicillin resistant S. aureus, Streptococcus agalactiae  (Group B), S. pneumoniae, S. pyogenes (Group A),  Acinetobacter baumannii, Enterobacter cloacae, E. coli,  Klebsiella oxytoca, K. pneumoniae, Proteus sp.,  Serratia marcescens, Haemophilus influenzae,  Neisseria meningitidis, Pseudomonas aeruginosa, Candida  albicans, C. glabrata, C krusei, C parapsilosis,  C. tropicalis and the KPC resistance gene.  "Due to technical problems, Proteus sp. will Not be reported as part of  the BCID panel until further notice"    Organism Identification: Blood Culture PCR    Method Type: PCR

## 2020-08-04 NOTE — PROGRESS NOTE ADULT - SUBJECTIVE AND OBJECTIVE BOX
Patient is a 70y old  Female who presents with a chief complaint of diarrhea (04 Aug 2020 15:14)      INTERVAL HPI/OVERNIGHT EVENTS:  T(C): 36.4 (20 @ 13:08), Max: 37.1 (20 @ 05:02)  HR: 72 (20 @ 14:40) (65 - 90)  BP: 168/69 (20 @ 14:40) (158/68 - 191/84)  RR: 18 (20 @ 13:08) (18 - 18)  SpO2: 96% (20 @ 13:08) (96% - 100%)  Wt(kg): --  I&O's Summary    03 Aug 2020 07:01  -  04 Aug 2020 07:00  --------------------------------------------------------  IN: 3240 mL / OUT: 1500 mL / NET: 1740 mL    04 Aug 2020 07:01  -  04 Aug 2020 17:40  --------------------------------------------------------  IN: 240 mL / OUT: 0 mL / NET: 240 mL        LABS:                        12.2   10.93 )-----------( 156      ( 03 Aug 2020 07:28 )             35.2     08-04    132<L>  |  98  |  11  ----------------------------<  129<H>  2.8<LL>   |  20<L>  |  0.89    Ca    8.4      04 Aug 2020 06:57  Phos  1.3     08-  Mg     1.9     -04    TPro  6.3  /  Alb  3.0<L>  /  TBili  0.6  /  DBili  x   /  AST  18  /  ALT  16  /  AlkPhos  108  08-03      Urinalysis Basic - ( 03 Aug 2020 00:37 )    Color: Yellow / Appearance: Slightly Turbid / S.015 / pH: x  Gluc: x / Ketone: Trace  / Bili: Negative / Urobili: Negative   Blood: x / Protein: 100 / Nitrite: Negative   Leuk Esterase: Moderate / RBC: 13 /hpf / WBC 36 /HPF   Sq Epi: x / Non Sq Epi: 3 /hpf / Bacteria: Many      CAPILLARY BLOOD GLUCOSE            Urinalysis Basic - ( 03 Aug 2020 00:37 )    Color: Yellow / Appearance: Slightly Turbid / S.015 / pH: x  Gluc: x / Ketone: Trace  / Bili: Negative / Urobili: Negative   Blood: x / Protein: 100 / Nitrite: Negative   Leuk Esterase: Moderate / RBC: 13 /hpf / WBC 36 /HPF   Sq Epi: x / Non Sq Epi: 3 /hpf / Bacteria: Many        MEDICATIONS  (STANDING):  ALPRAZolam 0.25 milliGRAM(s) Oral daily  aMIOdarone    Tablet 200 milliGRAM(s) Oral daily  aspirin  chewable 81 milliGRAM(s) Oral daily  cefepime   IVPB 1000 milliGRAM(s) IV Intermittent every 8 hours  cloNIDine 0.1 milliGRAM(s) Oral two times a day  DULoxetine 60 milliGRAM(s) Oral daily  ferrous    sulfate 325 milliGRAM(s) Oral two times a day  heparin   Injectable 5000 Unit(s) SubCutaneous every 8 hours  lisinopril 10 milliGRAM(s) Oral daily  metoprolol succinate ER 25 milliGRAM(s) Oral daily  tamsulosin 0.4 milliGRAM(s) Oral at bedtime    MEDICATIONS  (PRN):          PHYSICAL EXAM:  GENERAL: NAD, well-groomed, well-developed  HEAD:  Atraumatic, Normocephalic  CHEST/LUNG: Clear to percussion bilaterally; No rales, rhonchi, wheezing, or rubs  HEART: Regular rate and rhythm; No murmurs, rubs, or gallops  ABDOMEN: Soft, Nontender, Nondistended; Bowel sounds present  EXTREMITIES:  2+ Peripheral Pulses, No clubbing, cyanosis, or edema  LYMPH: No lymphadenopathy noted  SKIN: No rashes or lesions    Care Discussed with Consultants/Other Providers [ ] YES  [ ] NO

## 2020-08-04 NOTE — PROGRESS NOTE ADULT - SUBJECTIVE AND OBJECTIVE BOX
Kalyan Argueta MD (Nephrology progress note)  [address], [facility],  [address],  [facility]  TEl: [phone]  Cell: [phone]    Patient is a 70y Female seen and evaluated at bedside. Vital signs, laboratory data, imaging studies, consult notes reviewed done within past 24 hours. Overnight events noted. Patient lying in bed in no distress complains of multiple bouts of diarrhea since last night 6 to 8 times watery, non bloody. CT abdomen and pelvis with Left pyelonephritis. S cr remains to baseline with 0.8 with non oliguria. BP remains elevated.     Allergies    No Known Allergies    Intolerances        ROS:  CONSTITUTIONAL: No fever or chills.  HEENT: No headache or visual disturbances.  RESPIRATORY: No shortness of breath, cough, hemoptysis or wheezing  CARDIOVASCULAR: No Chest pain, shortness of breath, palpitations, dizziness, syncope, orthopnea, PND or leg swelling.  GASTROINTESTINAL:ABdominal discomfort and watery diarrheahematemesis or blood per rectum.  GENITOURINARY: No urinary frequency, urgency, gross hematuria, dysuria, flank or supra pubic pain, difficulty passing urine.  NEUROLOGICAL: No headache, focal limb weakness, tingling or numbness or seizure like activity  SKIN: No skin rash or lesion  MUSCULOSKELETAL: No leg pain, calf pain or swelling    VITALS:    T(C): 37.1 (20 @ 05:02), Max: 37.1 (20 @ 05:02)  HR: 67 (20 @ 10:01) (67 - 90)  BP: 171/72 (20 @ 10:01) (158/68 - 191/84)  RR: 18 (20 @ 05:02) (18 - 18)  SpO2: 98% (20 @ 05:02) (98% - 100%)  CAPILLARY BLOOD GLUCOSE          20 @ 07:01  -  20 @ 07:00  --------------------------------------------------------  IN: 3240 mL / OUT: 1500 mL / NET: 1740 mL      MEDICATIONS  (STANDING):  ALPRAZolam 0.25 milliGRAM(s) Oral daily  aMIOdarone    Tablet 200 milliGRAM(s) Oral daily  aspirin  chewable 81 milliGRAM(s) Oral daily  cloNIDine 0.1 milliGRAM(s) Oral two times a day  DULoxetine 60 milliGRAM(s) Oral daily  ferrous    sulfate 325 milliGRAM(s) Oral two times a day  metoprolol succinate ER 25 milliGRAM(s) Oral daily  piperacillin/tazobactam IVPB.. 3.375 Gram(s) IV Intermittent every 8 hours  potassium chloride    Tablet ER 40 milliEquivalent(s) Oral every 4 hours  potassium phosphate IVPB 15 milliMole(s) IV Intermittent once  tamsulosin 0.4 milliGRAM(s) Oral at bedtime    MEDICATIONS  (PRN):      PHYSICAL EXAM:  GENERAL: Alert, awake and oriented x3 in no distress  HEENT: AUBREY, EOMI, neck supple, no JVP, no carotid bruit, oral mucosa moist and pink.  CHEST/LUNG: Bilateral clear breath sounds, no rales, no crepitations, no rhonchi, no wheezing  HEART: Regular rate and rhythm, NICANOR II/VI at LPSB, no gallops, no rub   ABDOMEN: Soft, nontender, non distended, bowel sounds present, no palpable organomegaly  : No flank or supra pubic tenderness.  EXTREMITIES: Peripheral pulses are palpable, no pedal edema  Neurology: AAOx3, no focal neurological deficit  SKIN: No rash or skin lesion  Musculoskeletal: No joint deformity or spinal tenderness.      Vascular ACCESS:     LABS:                        12.2   10.93 )-----------( 156      ( 03 Aug 2020 07:28 )             35.2     08-04    132<L>  |  98  |  11  ----------------------------<  129<H>  2.8<LL>   |  20<L>  |  0.89    Ca    8.4      04 Aug 2020 06:57  Phos  1.3     08-04  Mg     1.9     08-04    TPro  6.3  /  Alb  3.0<L>  /  TBili  0.6  /  DBili  x   /  AST  18  /  ALT  16  /  AlkPhos  108  08-03      PT/INR - ( 02 Aug 2020 13:56 )   PT: 13.1 sec;   INR: 1.11 ratio         PTT - ( 02 Aug 2020 13:56 )  PTT:26.1 sec  Urinalysis Basic - ( 03 Aug 2020 00:37 )    Color: Yellow / Appearance: Slightly Turbid / S.015 / pH: x  Gluc: x / Ketone: Trace  / Bili: Negative / Urobili: Negative   Blood: x / Protein: 100 / Nitrite: Negative   Leuk Esterase: Moderate / RBC: 13 /hpf / WBC 36 /HPF   Sq Epi: x / Non Sq Epi: 3 /hpf / Bacteria: Many      Sodium, Random Urine: <35 mmol/L ( @ 00:38)  Osmolality, Random Urine: 348 mos/kg ( @ 00:38)  Creatinine, Random Urine: 58 mg/dL ( @ 00:38)  Protein/Creatinine Ratio Calculation: 2.1 Ratio ( @ 00:38)        RADIOLOGY & ADDITIONAL STUDIES:    Imaging Personally Reviewed:  [x] YES  [ ] NO    Consultant(s) Notes Reviewed:  [x] YES  [ ] NO    Care Discussed with Primary team/ Other Providers [x] YES  [ ] NO Kalyan Argueta MD (Nephrology progress note)  [address], [facility],  [address],  [facility]  TEl: [phone]  Cell: [phone]    Patient is a 70y Female seen and evaluated at bedside. Vital signs, laboratory data, imaging studies, consult notes reviewed done within past 24 hours. Overnight events noted. Patient lying in bed in no distress complains of multiple bouts of diarrhea since last night 6 to 8 times watery, non bloody. CT abdomen and pelvis with Left pyelonephritis. S cr remains to baseline with 0.8 with non oliguria. BP remains elevated.     Allergies    No Known Allergies    Intolerances        ROS:  CONSTITUTIONAL: No fever or chills.  HEENT: No headache or visual disturbances.  RESPIRATORY: No shortness of breath, cough, hemoptysis or wheezing  CARDIOVASCULAR: No Chest pain, shortness of breath, palpitations, dizziness, syncope, orthopnea, PND or leg swelling.  GASTROINTESTINAL:ABdominal discomfort and watery diarrheahematemesis or blood per rectum.  GENITOURINARY: No urinary frequency, urgency, gross hematuria, dysuria, flank or supra pubic pain, difficulty passing urine.  NEUROLOGICAL: No headache, focal limb weakness, tingling or numbness or seizure like activity  SKIN: No skin rash or lesion  MUSCULOSKELETAL: No leg pain, calf pain or swelling    VITALS:    T(C): 37.1 (20 @ 05:02), Max: 37.1 (20 @ 05:02)  HR: 67 (20 @ 10:01) (67 - 90)  BP: 171/72 (20 @ 10:01) (158/68 - 191/84)  RR: 18 (20 @ 05:02) (18 - 18)  SpO2: 98% (20 @ 05:02) (98% - 100%)  CAPILLARY BLOOD GLUCOSE          20 @ 07:01  -  20 @ 07:00  --------------------------------------------------------  IN: 3240 mL / OUT: 1500 mL / NET: 1740 mL      MEDICATIONS  (STANDING):  ALPRAZolam 0.25 milliGRAM(s) Oral daily  aMIOdarone    Tablet 200 milliGRAM(s) Oral daily  aspirin  chewable 81 milliGRAM(s) Oral daily  cloNIDine 0.1 milliGRAM(s) Oral two times a day  DULoxetine 60 milliGRAM(s) Oral daily  ferrous    sulfate 325 milliGRAM(s) Oral two times a day  metoprolol succinate ER 25 milliGRAM(s) Oral daily  piperacillin/tazobactam IVPB.. 3.375 Gram(s) IV Intermittent every 8 hours  potassium chloride    Tablet ER 40 milliEquivalent(s) Oral every 4 hours  potassium phosphate IVPB 15 milliMole(s) IV Intermittent once  tamsulosin 0.4 milliGRAM(s) Oral at bedtime    MEDICATIONS  (PRN):      PHYSICAL EXAM:  GENERAL: Alert, awake and oriented x3 in no distress  HEENT: AUBREY, EOMI, neck supple, no JVP, no carotid bruit, oral mucosa moist and pink.  CHEST/LUNG: Bilateral clear breath sounds, no rales, no crepitations, no rhonchi, no wheezing  HEART: Regular rate and rhythm, NICANOR II/VI at LPSB, no gallops, no rub   ABDOMEN: Soft, nontender, non distended, bowel sounds present, no palpable organomegaly  : No flank or supra pubic tenderness.  EXTREMITIES: Peripheral pulses are palpable, no pedal edema  Neurology: AAOx3, no focal neurological deficit  SKIN: No rash or skin lesion  Musculoskeletal: No joint deformity or spinal tenderness.      Vascular ACCESS:     LABS:                        12.2   10.93 )-----------( 156      ( 03 Aug 2020 07:28 )             35.2     08-04    132<L>  |  98  |  11  ----------------------------<  129<H>  2.8<LL>   |  20<L>  |  0.89    Ca    8.4      04 Aug 2020 06:57  Phos  1.3     08-04  Mg     1.9     08-04    TPro  6.3  /  Alb  3.0<L>  /  TBili  0.6  /  DBili  x   /  AST  18  /  ALT  16  /  AlkPhos  108  08-03      PT/INR - ( 02 Aug 2020 13:56 )   PT: 13.1 sec;   INR: 1.11 ratio         PTT - ( 02 Aug 2020 13:56 )  PTT:26.1 sec  Urinalysis Basic - ( 03 Aug 2020 00:37 )    Color: Yellow / Appearance: Slightly Turbid / S.015 / pH: x  Gluc: x / Ketone: Trace  / Bili: Negative / Urobili: Negative   Blood: x / Protein: 100 / Nitrite: Negative   Leuk Esterase: Moderate / RBC: 13 /hpf / WBC 36 /HPF   Sq Epi: x / Non Sq Epi: 3 /hpf / Bacteria: Many      Sodium, Random Urine: <35 mmol/L ( @ 00:38)  Osmolality, Random Urine: 348 mos/kg ( @ 00:38)  Creatinine, Random Urine: 58 mg/dL ( @ 00:38)  Protein/Creatinine Ratio Calculation: 2.1 Ratio ( @ 00:38)        RADIOLOGY & ADDITIONAL STUDIES:  < from: CT Abdomen and Pelvis w/ Oral Cont and w/ IV Cont (20 @ 18:50) >    EXAM:  CT ABDOMEN AND PELVIS OC IC                            PROCEDURE DATE:  2020            INTERPRETATION:  CLINICAL INFORMATION: History of aortic aneurysm repair. Presents with colitis.    COMPARISON: CT arteriogram chest abdomen pelvisdated 2019.    PROCEDURE:  CT of the Abdomen and Pelvis was performed with intravenous contrast.  Intravenous contrast: 90 ml Omnipaque 350. 10 ml discarded.  Oral contrast: positive contrast was administered.  Sagittal and coronal reformats were performed.    FINDINGS:  LOWER CHEST: Surgical repair thoracoabdominal aorta partially visualized, appears unchanged.    LIVER: Within normal limits.  BILE DUCTS: Normal caliber.  GALLBLADDER: Within normal limits.  SPLEEN: Within normal limits.  PANCREAS: Within normal limits.  ADRENALS: Within normal limits.  KIDNEYS/URETERS: Left renal enlargement with striated nephrogram, perinephric stranding, and urothelial thickening. No hydronephrosis.    BLADDER: Mild bladder wall thickening.  REPRODUCTIVE ORGANS: Adnexa appear unremarkable.    BOWEL: No bowel obstruction. Moderate fluid-filled distention of colon consistent with diarrhea. No mural thickening. Appendix normal.  PERITONEUM: No ascites.  VESSELS: Surgical graft repair suprarenal abdominal aorta unchanged. Maximal diameter of abdominal aorta 3.1 cm. Bypass grafts of the celiac and superior mesenteric arteries are patent. Native renal arteries and AIDAN appear patent.  Lack of enhancement of the right external iliac and common femoralveins.  RETROPERITONEUM/LYMPH NODES: No lymphadenopathy.  ABDOMINAL WALL: Within normal limits.  BONES: Disc degeneration lumbosacral junction.    IMPRESSION:  Left-sided pyelonephritis and possible cystitis.    Interval stability in appearance of surgical graft repair of thoracoabdominal aorta and mesenteric bypass grafts    No CT evidence of colitis.    Failure of enhancement of the right common femoral and external iliac veins. Suggest venous duplex study to assess for DVT.    Examination findings were conveyed to physician's assistant Jorge by Dr. Chacon at 1937 hours on 8/3/2020 with read back.                  JAQUELINE BRADLEY M.D., ATTENDING RADIOLOGIST  This document has been electronically signed. Aug  4 2020  9:23AM                < end of copied text >    Imaging Personally Reviewed:  [x] YES  [ ] NO    Consultant(s) Notes Reviewed:  [x] YES  [ ] NO    Care Discussed with Primary team/ Other Providers [x] YES  [ ] NO

## 2020-08-05 LAB
-  AMIKACIN: SIGNIFICANT CHANGE UP
-  AMOXICILLIN/CLAVULANIC ACID: SIGNIFICANT CHANGE UP
-  AMPICILLIN/SULBACTAM: SIGNIFICANT CHANGE UP
-  AMPICILLIN: SIGNIFICANT CHANGE UP
-  AZTREONAM: SIGNIFICANT CHANGE UP
-  CEFAZOLIN: SIGNIFICANT CHANGE UP
-  CEFEPIME: SIGNIFICANT CHANGE UP
-  CEFOTAXIME: SIGNIFICANT CHANGE UP
-  CEFOXITIN: SIGNIFICANT CHANGE UP
-  CEFTAZIDIME: SIGNIFICANT CHANGE UP
-  CEFTRIAXONE: SIGNIFICANT CHANGE UP
-  CEFUROXIME: SIGNIFICANT CHANGE UP
-  CIPROFLOXACIN: SIGNIFICANT CHANGE UP
-  ERTAPENEM: SIGNIFICANT CHANGE UP
-  GENTAMICIN: SIGNIFICANT CHANGE UP
-  IMIPENEM: SIGNIFICANT CHANGE UP
-  IMIPENEM: SIGNIFICANT CHANGE UP
-  LEVOFLOXACIN: SIGNIFICANT CHANGE UP
-  MEROPENEM: SIGNIFICANT CHANGE UP
-  MINOCYCLINE: SIGNIFICANT CHANGE UP
-  NITROFURANTOIN: SIGNIFICANT CHANGE UP
-  NITROFURANTOIN: SIGNIFICANT CHANGE UP
-  PIPERACILLIN/TAZOBACTAM: SIGNIFICANT CHANGE UP
-  TIGECYCLINE: SIGNIFICANT CHANGE UP
-  TOBRAMYCIN: SIGNIFICANT CHANGE UP
-  TRIMETHOPRIM/SULFAMETHOXAZOLE: SIGNIFICANT CHANGE UP
ANION GAP SERPL CALC-SCNC: 13 MMOL/L — SIGNIFICANT CHANGE UP (ref 5–17)
BUN SERPL-MCNC: 7 MG/DL — SIGNIFICANT CHANGE UP (ref 7–23)
CALCIUM SERPL-MCNC: 8 MG/DL — LOW (ref 8.4–10.5)
CHLORIDE SERPL-SCNC: 102 MMOL/L — SIGNIFICANT CHANGE UP (ref 96–108)
CO2 SERPL-SCNC: 18 MMOL/L — LOW (ref 22–31)
CREAT SERPL-MCNC: 0.82 MG/DL — SIGNIFICANT CHANGE UP (ref 0.5–1.3)
CULTURE RESULTS: SIGNIFICANT CHANGE UP
GLUCOSE SERPL-MCNC: 120 MG/DL — HIGH (ref 70–99)
GRAM STN FLD: SIGNIFICANT CHANGE UP
HCT VFR BLD CALC: 33 % — LOW (ref 34.5–45)
HGB BLD-MCNC: 11.2 G/DL — LOW (ref 11.5–15.5)
MAGNESIUM SERPL-MCNC: 2 MG/DL — SIGNIFICANT CHANGE UP (ref 1.6–2.6)
MCHC RBC-ENTMCNC: 30.6 PG — SIGNIFICANT CHANGE UP (ref 27–34)
MCHC RBC-ENTMCNC: 33.9 GM/DL — SIGNIFICANT CHANGE UP (ref 32–36)
MCV RBC AUTO: 90.2 FL — SIGNIFICANT CHANGE UP (ref 80–100)
METHOD TYPE: SIGNIFICANT CHANGE UP
NRBC # BLD: 0 /100 WBCS — SIGNIFICANT CHANGE UP (ref 0–0)
ORGANISM # SPEC MICROSCOPIC CNT: SIGNIFICANT CHANGE UP
PHOSPHATE SERPL-MCNC: 3.3 MG/DL — SIGNIFICANT CHANGE UP (ref 2.5–4.5)
PLATELET # BLD AUTO: 176 K/UL — SIGNIFICANT CHANGE UP (ref 150–400)
POTASSIUM SERPL-MCNC: 3.9 MMOL/L — SIGNIFICANT CHANGE UP (ref 3.5–5.3)
POTASSIUM SERPL-SCNC: 3.9 MMOL/L — SIGNIFICANT CHANGE UP (ref 3.5–5.3)
RBC # BLD: 3.66 M/UL — LOW (ref 3.8–5.2)
RBC # FLD: 13.8 % — SIGNIFICANT CHANGE UP (ref 10.3–14.5)
SODIUM SERPL-SCNC: 133 MMOL/L — LOW (ref 135–145)
SPECIMEN SOURCE: SIGNIFICANT CHANGE UP
WBC # BLD: 12.78 K/UL — HIGH (ref 3.8–10.5)
WBC # FLD AUTO: 12.78 K/UL — HIGH (ref 3.8–10.5)

## 2020-08-05 PROCEDURE — 93970 EXTREMITY STUDY: CPT | Mod: 26

## 2020-08-05 PROCEDURE — 99232 SBSQ HOSP IP/OBS MODERATE 35: CPT

## 2020-08-05 RX ORDER — ERTAPENEM SODIUM 1 G/1
1000 INJECTION, POWDER, LYOPHILIZED, FOR SOLUTION INTRAMUSCULAR; INTRAVENOUS EVERY 24 HOURS
Refills: 0 | Status: DISCONTINUED | OUTPATIENT
Start: 2020-08-05 | End: 2020-08-19

## 2020-08-05 RX ORDER — DIPHENOXYLATE HCL/ATROPINE 2.5-.025MG
1 TABLET ORAL
Refills: 0 | Status: DISCONTINUED | OUTPATIENT
Start: 2020-08-05 | End: 2020-08-12

## 2020-08-05 RX ADMIN — Medication 325 MILLIGRAM(S): at 17:39

## 2020-08-05 RX ADMIN — HEPARIN SODIUM 5000 UNIT(S): 5000 INJECTION INTRAVENOUS; SUBCUTANEOUS at 11:30

## 2020-08-05 RX ADMIN — Medication 0.1 MILLIGRAM(S): at 05:17

## 2020-08-05 RX ADMIN — Medication 1 TABLET(S): at 17:39

## 2020-08-05 RX ADMIN — AMIODARONE HYDROCHLORIDE 200 MILLIGRAM(S): 400 TABLET ORAL at 05:17

## 2020-08-05 RX ADMIN — CEFEPIME 100 MILLIGRAM(S): 1 INJECTION, POWDER, FOR SOLUTION INTRAMUSCULAR; INTRAVENOUS at 05:22

## 2020-08-05 RX ADMIN — DULOXETINE HYDROCHLORIDE 60 MILLIGRAM(S): 30 CAPSULE, DELAYED RELEASE ORAL at 11:29

## 2020-08-05 RX ADMIN — ERTAPENEM SODIUM 120 MILLIGRAM(S): 1 INJECTION, POWDER, LYOPHILIZED, FOR SOLUTION INTRAMUSCULAR; INTRAVENOUS at 11:29

## 2020-08-05 RX ADMIN — Medication 25 MILLIGRAM(S): at 05:17

## 2020-08-05 RX ADMIN — Medication 81 MILLIGRAM(S): at 11:29

## 2020-08-05 RX ADMIN — LISINOPRIL 10 MILLIGRAM(S): 2.5 TABLET ORAL at 05:17

## 2020-08-05 RX ADMIN — Medication 325 MILLIGRAM(S): at 05:17

## 2020-08-05 RX ADMIN — HEPARIN SODIUM 5000 UNIT(S): 5000 INJECTION INTRAVENOUS; SUBCUTANEOUS at 05:17

## 2020-08-05 RX ADMIN — HEPARIN SODIUM 5000 UNIT(S): 5000 INJECTION INTRAVENOUS; SUBCUTANEOUS at 21:57

## 2020-08-05 RX ADMIN — Medication 0.1 MILLIGRAM(S): at 17:39

## 2020-08-05 NOTE — PROGRESS NOTE ADULT - SUBJECTIVE AND OBJECTIVE BOX
Subjective: Patient seen and examined. No new events except as noted.   BP has been labile     REVIEW OF SYSTEMS:    CONSTITUTIONAL: +weakness, fevers or chills  EYES/ENT: No visual changes;  No vertigo or throat pain   NECK: No pain or stiffness  RESPIRATORY: No cough, wheezing, hemoptysis; No shortness of breath  CARDIOVASCULAR: No chest pain or palpitations  GASTROINTESTINAL: No abdominal or epigastric pain. No nausea, vomiting, or hematemesis; No diarrhea or constipation. No melena or hematochezia.  GENITOURINARY: No dysuria, frequency or hematuria  NEUROLOGICAL: No numbness or weakness  SKIN: No itching, burning, rashes, or lesions   All other review of systems is negative unless indicated above.    MEDICATIONS:  MEDICATIONS  (STANDING):  ALPRAZolam 0.25 milliGRAM(s) Oral daily  aMIOdarone    Tablet 200 milliGRAM(s) Oral daily  aspirin  chewable 81 milliGRAM(s) Oral daily  cloNIDine 0.1 milliGRAM(s) Oral two times a day  DULoxetine 60 milliGRAM(s) Oral daily  ertapenem  IVPB 1000 milliGRAM(s) IV Intermittent every 24 hours  ferrous    sulfate 325 milliGRAM(s) Oral two times a day  heparin   Injectable 5000 Unit(s) SubCutaneous every 8 hours  lisinopril 10 milliGRAM(s) Oral daily  metoprolol succinate ER 25 milliGRAM(s) Oral daily  tamsulosin 0.4 milliGRAM(s) Oral at bedtime      PHYSICAL EXAM:  T(C): 36.7 (08-05-20 @ 04:55), Max: 36.9 (08-04-20 @ 21:45)  HR: 79 (08-05-20 @ 04:55) (65 - 79)  BP: 174/76 (08-05-20 @ 04:55) (156/76 - 174/76)  RR: 18 (08-05-20 @ 04:55) (18 - 18)  SpO2: 99% (08-05-20 @ 04:55) (96% - 99%)  Wt(kg): --  I&O's Summary    04 Aug 2020 07:01  -  05 Aug 2020 07:00  --------------------------------------------------------  IN: 1340 mL / OUT: 1000 mL / NET: 340 mL          Appearance: Normal	  HEENT:   Normal oral mucosa, PERRL, EOMI	  Lymphatic: No lymphadenopathy , no edema  Cardiovascular: Normal S1 S2, No JVD, No murmurs , Peripheral pulses palpable 2+ bilaterally  Respiratory: Lungs clear to auscultation, normal effort 	  Gastrointestinal:  Soft, Non-tender, + BS	  Skin: No rashes, No ecchymoses, No cyanosis, warm to touch  Musculoskeletal: Normal range of motion, normal strength  Psychiatry:  Mood & affect appropriate  Ext: No edema      LABS:    CARDIAC MARKERS:                                11.2   12.78 )-----------( 176      ( 05 Aug 2020 06:05 )             33.0     08-05    133<L>  |  102  |  7   ----------------------------<  120<H>  3.9   |  18<L>  |  0.82    Ca    8.0<L>      05 Aug 2020 06:05  Phos  3.3     08-05  Mg     2.0     08-05      proBNP:   Lipid Profile:   HgA1c:   TSH:             TELEMETRY: 	SR    ECG:  	  RADIOLOGY:   DIAGNOSTIC TESTING:  [ ] Echocardiogram:  [ ]  Catheterization:  [ ] Stress Test:    OTHER:

## 2020-08-05 NOTE — PROGRESS NOTE ADULT - SUBJECTIVE AND OBJECTIVE BOX
INTERVAL HPI/OVERNIGHT EVENTS:    patient seen and examined at bedside  endorses feeling stronger today   continues to experience frequent, watery bowel movements; cdiff negative   advanced to soft diet   denies fever, chills, nausea, vomiting, or abdominal pain     MEDICATIONS  (STANDING):  ALPRAZolam 0.25 milliGRAM(s) Oral daily  aMIOdarone    Tablet 200 milliGRAM(s) Oral daily  aspirin  chewable 81 milliGRAM(s) Oral daily  cloNIDine 0.1 milliGRAM(s) Oral two times a day  DULoxetine 60 milliGRAM(s) Oral daily  ferrous    sulfate 325 milliGRAM(s) Oral two times a day  metoprolol succinate ER 25 milliGRAM(s) Oral daily  piperacillin/tazobactam IVPB.. 3.375 Gram(s) IV Intermittent every 8 hours  potassium chloride    Tablet ER 40 milliEquivalent(s) Oral every 4 hours  tamsulosin 0.4 milliGRAM(s) Oral at bedtime    MEDICATIONS  (PRN):      Allergies    No Known Allergies    Intolerances        Review of Systems:    General:  No wt loss, fevers, chills, night sweats,fatigue,   Eyes:  Good vision, no reported pain  ENT:  No sore throat, pain, runny nose, dysphagia  CV:  No pain, palpitatioins, hypo/hypertension  Resp:  No dyspnea, cough, tachypnea, wheezing  GI:  No pain, No nausea, No vomiting, +diarrhea, No constipatiion, No weight loss, No fever, No pruritis, No rectal bleeding, No tarry stools, No dysphagia,  :  No pain, bleeding, incontinence, nocturia  Muscle:  No pain, weakness  Neuro:  No weakness, tingling, memory problems  Psych:  No fatigue, insomnia, mood problems, depression  Endocrine:  No polyuria, polydypsia, cold/heat intolerance  Heme:  No petechiae, ecchymosis, easy bruisability  Skin:  No rash, tattoos, scars, edema      Vital Signs Last 24 Hrs  T(C): 36.4 (04 Aug 2020 13:08), Max: 37.1 (04 Aug 2020 05:02)  T(F): 97.6 (04 Aug 2020 13:08), Max: 98.8 (04 Aug 2020 05:02)  HR: 65 (04 Aug 2020 13:08) (65 - 90)  BP: 171/72 (04 Aug 2020 13:08) (158/68 - 191/84)  BP(mean): --  RR: 18 (04 Aug 2020 13:08) (18 - 18)  SpO2: 96% (04 Aug 2020 13:08) (96% - 100%)    PHYSICAL EXAM:    Constitutional: weak, frail, NAD   HEENT: EOMI, throat clear  Neck: No LAD, supple  Gastrointestinal: BS+, soft, NT/ND  Extremities: No peripheral edema  Neurological: A/O x 3, no focal deficits        LABS:                        12.2   10.93 )-----------( 156      ( 03 Aug 2020 07:28 )             35.2     08-04    132<L>  |  98  |  11  ----------------------------<  129<H>  2.8<LL>   |  20<L>  |  0.89    Ca    8.4      04 Aug 2020 06:57  Phos  1.3     08-04  Mg     1.9     08-04    TPro  6.3  /  Alb  3.0<L>  /  TBili  0.6  /  DBili  x   /  AST  18  /  ALT  16  /  AlkPhos  108  08-03    PT/INR - ( 02 Aug 2020 13:56 )   PT: 13.1 sec;   INR: 1.11 ratio         PTT - ( 02 Aug 2020 13:56 )  PTT:26.1 sec  Urinalysis Basic - ( 03 Aug 2020 00:37 )    Color: Yellow / Appearance: Slightly Turbid / S.015 / pH: x  Gluc: x / Ketone: Trace  / Bili: Negative / Urobili: Negative   Blood: x / Protein: 100 / Nitrite: Negative   Leuk Esterase: Moderate / RBC: 13 /hpf / WBC 36 /HPF   Sq Epi: x / Non Sq Epi: 3 /hpf / Bacteria: Many        RADIOLOGY & ADDITIONAL TESTS:  < from: CT Abdomen and Pelvis w/ Oral Cont and w/ IV Cont (20 @ 18:50) >    EXAM:  CT ABDOMEN AND PELVIS OC IC                            PROCEDURE DATE:  2020            INTERPRETATION:  CLINICAL INFORMATION: History of aortic aneurysm repair. Presents with colitis.    COMPARISON: CT arteriogram chest abdomen pelvisdated 2019.    PROCEDURE:  CT of the Abdomen and Pelvis was performed with intravenous contrast.  Intravenous contrast: 90 ml Omnipaque 350. 10 ml discarded.  Oral contrast: positive contrast was administered.  Sagittal and coronal reformats were performed.    FINDINGS:  LOWER CHEST: Surgical repair thoracoabdominal aorta partially visualized, appears unchanged.    LIVER: Within normal limits.  BILE DUCTS: Normal caliber.  GALLBLADDER: Within normal limits.  SPLEEN: Within normal limits.  PANCREAS: Within normal limits.  ADRENALS: Within normal limits.  KIDNEYS/URETERS: Left renal enlargement with striated nephrogram, perinephric stranding, and urothelial thickening. No hydronephrosis.    BLADDER: Mild bladder wall thickening.  REPRODUCTIVE ORGANS: Adnexa appear unremarkable.    BOWEL: No bowel obstruction. Moderate fluid-filled distention of colon consistent with diarrhea. No mural thickening. Appendix normal.  PERITONEUM: No ascites.  VESSELS: Surgical graft repair suprarenal abdominal aorta unchanged. Maximal diameter of abdominal aorta 3.1 cm. Bypass grafts of the celiac and superior mesenteric arteries are patent. Native renal arteries and AIDAN appear patent.  Lack of enhancement of the right external iliac and common femoralveins.  RETROPERITONEUM/LYMPH NODES: No lymphadenopathy.  ABDOMINAL WALL: Within normal limits.  BONES: Disc degeneration lumbosacral junction.    IMPRESSION:  Left-sided pyelonephritis and possible cystitis.    Interval stability in appearance of surgical graft repair of thoracoabdominal aorta and mesenteric bypass grafts    No CT evidence of colitis.    Failure of enhancement of the right common femoral and external iliac veins. Suggest venous duplex study to assess for DVT.    Examination findings were conveyed to physician's assistant Jorge by Dr. Chacon at 1937 hours on 8/3/2020 with read back.                  JAQUELINE BRADLEY M.D., ATTENDING RADIOLOGIST  This document has been electronically signed. Aug  4 2020  9:23AM                < end of copied text >

## 2020-08-05 NOTE — PROGRESS NOTE ADULT - PROBLEM SELECTOR PLAN 2
Patient with prior history of hypertension with elevated blood pressure with BP ranging from 150 to 170's  Low salt diet  Continue ACEI/ARB with uptitration as needed to achieve goal BP<130/80 mm hg  Continue metoprolol ER to 25 mg po daily  Continue clonidine 0.1 mg po bid  Monitor vital signs.

## 2020-08-05 NOTE — PROGRESS NOTE ADULT - SUBJECTIVE AND OBJECTIVE BOX
70y old  Female who presents with a chief complaint of diarrhea (05 Aug 2020 12:09)      Interval history:  Afebrile, urine cleared, improved diarrhea after medication, no cramping.     Allergies:   No Known Allergies    Antimicrobials:  ertapenem  IVPB 1000 milliGRAM(s) IV Intermittent every 24 hours      REVIEW OF SYSTEMS:  No chest pain  No SOB  No N/V  No rash.       Vital Signs Last 24 Hrs  T(C): 36.6 (08-05-20 @ 12:09), Max: 36.9 (08-04-20 @ 21:45)  T(F): 97.8 (08-05-20 @ 12:09), Max: 98.5 (08-04-20 @ 21:45)  HR: 70 (08-05-20 @ 12:09) (69 - 79)  BP: 154/69 (08-05-20 @ 12:09) (154/69 - 174/76)  BP(mean): --  RR: 18 (08-05-20 @ 12:09) (18 - 18)  SpO2: 99% (08-05-20 @ 12:09) (97% - 99%)      PHYSICAL EXAM:  Patient in no acute distress. AAOX3.  No icterus, no oral ulcers.  Cardiovascular: S1S2 normal.  Lungs: Good air entry B/L lung fields.  Gastrointestinal: soft, nontender, nondistended.  Extremities: no edema.  IV sites not inflamed.                           11.2   12.78 )-----------( 176      ( 05 Aug 2020 06:05 )             33.0   08-05    133<L>  |  102  |  7   ----------------------------<  120<H>  3.9   |  18<L>  |  0.82    Ca    8.0<L>      05 Aug 2020 06:05  Phos  3.3     08-05  Mg     2.0     08-05        Culture - Blood (collected 04 Aug 2020 08:41)  Source: .Blood Blood-Peripheral  Gram Stain (05 Aug 2020 17:18):    Growth in aerobic bottle: Gram Negative Rods  Preliminary Report (05 Aug 2020 17:18):    Growth in aerobic bottle: Gram Negative Rods    Culture - Blood (collected 04 Aug 2020 08:41)  Source: .Blood Blood-Venous  Gram Stain (05 Aug 2020 01:17):    Growth in anaerobic bottle: Gram Negative Rods  Preliminary Report (05 Aug 2020 01:17):    Growth in anaerobic bottle: Gram Negative Rods    GI PCR Panel, Stool (collected 04 Aug 2020 05:39)  Source: .Stool Feces  Final Report (04 Aug 2020 07:41):    GI PCR Results: NOT detected    *******Please Note:*******    GI panel PCR evaluates for:    Campylobacter, Plesiomonas shigelloides, Salmonella,    Vibrio, Yersinia enterocolitica, Enteroaggregative    Escherichia coli (EAEC), Enteropathogenic E.coli (EPEC),    Enterotoxigenic E. coli (ETEC) lt/st, Shiga-like    toxin-producing E. coli (STEC) stx1/stx2,    Shigella/ Enteroinvasive E. coli (EIEC), Cryptosporidium,    Cyclospora cayetanensis, Entamoeba histolytica,    Giardia lamblia, Adenovirus F 40/41, Astrovirus,    Norovirus GI/GII, Rotavirus A, Sapovirus    Culture - Stool (collected 04 Aug 2020 02:31)  Source: .Stool Feces  Preliminary Report (04 Aug 2020 21:18):    No enteric pathogens to date: Final culture pending    Culture - Urine (collected 03 Aug 2020 03:31)  Source: .Urine Clean Catch (Midstream)  Final Report (05 Aug 2020 16:29):    >100,000 CFU/ml Escherichia coli ESBL    >100,000 CFU/ml Klebsiella pneumoniae  Organism: Klebsiella pneumoniae (05 Aug 2020 16:29)  Organism: Klebsiella pneumoniae (05 Aug 2020 12:42)    Culture - Blood (collected 02 Aug 2020 22:08)  Source: .Blood Blood-Peripheral  Gram Stain (03 Aug 2020 06:41):    Aerobic and Anaerobic Bottle: Gram Negative Rods  Final Report (05 Aug 2020 08:54):    Growth in aerobic and anaerobic bottles: Escherichia coli ESBL    ***Blood Panel PCR results on this specimen are available    approximately 3 hours after the Gram stain result.***    Gram stain, PCR, and/or culture results may not always    correspond dueto difference in methodologies.    ************************************************************    This PCR assay was performed using [employer].    The following targets are tested for: Enterococcus,    vancomycin resistant enterococci, Listeria monocytogenes,    coagulase negative staphylococci, S. aureus,    methicillin resistant S. aureus, Streptococcus agalactiae    (Group B), S. pneumoniae, S. pyogenes (Group A),    Acinetobacter baumannii, Enterobacter cloacae, E. coli,    Klebsiella oxytoca, K. pneumoniae, Proteus sp.,    Serratia marcescens, Haemophilus influenzae,    Neisseria meningitidis, Pseudomonas aeruginosa, Candida    albicans, C. glabrata, C krusei, C parapsilosis,    C. tropicalis and the KPC resistance gene.    "Due to technical problems, Proteus sp. will Not be reported as part of    the BCID panel until further notice"  Organism: Blood Culture PCR  Escherichia coli ESBL (05 Aug 2020 08:54)  Organism: Escherichia coli ESBL (05 Aug 2020 08:54)  Organism: Blood Culture PCR (05 Aug 2020 08:54)    Culture - Blood (collected 02 Aug 2020 22:08)  Source: .Blood Blood-Peripheral  Gram Stain (03 Aug 2020 08:24):    Growth in aerobic and anaerobic bottles: Gram Negative Rods  Final Report (05 Aug 2020 08:55):    Growth in aerobic and anaerobic bottles: Escherichia coli ESBL    See previous culture [id number]    C. difficile GDH &amp; toxins A/B by EIA (08.04.20 @ 21:49)    Clostridium difficile [facility] Toxins A&amp;B, EIA:   Negative    Clostridium difficile [facility] Interpretation: Negative for toxigenic C. Difficile.  history.      Radiology:  < from: [facility] Duplex Lower Ext Vein Scan, Bilat (08.05.20 @ 16:51) >    IMPRESSION:  No evidence of deep venous thrombosis in either lower extremity.    The right small saphenous vein, a superficial vein, is thrombosed in the calf.

## 2020-08-05 NOTE — PROGRESS NOTE ADULT - PROBLEM SELECTOR PLAN 3
Hypokalemia now improved with K level 3.9 likely due to GI losses  - Received KCL and Mag IV  - Replete K and Mag with goal K>4.0 and<5.0 and Mag>2.0  Monitor BMP and electrolytes dailly    Mild Hyponatremia  with Na level 133 likely hypovolemic/euvolemic hyponatremia  - Urine Na <35, Urine osm 348  - Optimal pain control  - Continue po fluid intake as tolerated    Calcium 8.0 with albumin 3.0 and phos level 3.3  Monitor calcium and phos level.

## 2020-08-05 NOTE — PHYSICAL THERAPY INITIAL EVALUATION ADULT - BALANCE TRAINING, PT EVAL
LTG 1: Pt will improve sit to stand transfer balance from fair to good within 4 weeks for increased safety.

## 2020-08-05 NOTE — PROGRESS NOTE ADULT - PROBLEM SELECTOR PLAN 4
Non anion gap metabolic acidosis with serum bicarbonate 18 likely gastroenteritis/colitis  - Lactate and beta-hydroxybutyrate noted.  - Monitor BMP  - Treatment of underlying bactereamia/Gastroenteritis per primary/ID team

## 2020-08-05 NOTE — PROGRESS NOTE ADULT - SUBJECTIVE AND OBJECTIVE BOX
Kalyan Argueta MD (Nephrology progress note)  [address] [facility] ([place],  [id number],  [facility]  TEl: [phone]  Cell: [phone]    Patient is a 70y Female seen and evaluated at bedside. Vital signs, laboratory data, imaging studies, consult notes reviewed done within past 24 hours. Overnight events noted. Patient lying in bed in no distress still complains of watery, non bloody diarrhea 5 to 6 episodes over past 24 hours. Interval improvement of renal function and electrolytes.     Allergies    No Known Allergies    Intolerances        ROS:  CONSTITUTIONAL: No fever or chills.  HEENT: No headache or visual disturbances.  RESPIRATORY: No shortness of breath, cough, hemoptysis or wheezing  CARDIOVASCULAR: No Chest pain, shortness of breath, palpitations, dizziness, syncope, orthopnea, PND or leg swelling.  GASTROINTESTINAL: Diarrhea but denies abdominal pain, nausea, vomiting,  hematemesis or blood per rectum.  GENITOURINARY: No urinary frequency, urgency, gross hematuria, dysuria, flank or supra pubic pain  NEUROLOGICAL: No headache, focal limb weakness, tingling or numbness or seizure like activity  SKIN: No skin rash or lesion  MUSCULOSKELETAL: No leg pain, calf pain or swelling    VITALS:    T(C): 36.7 (08-05-20 @ 04:55), Max: 36.9 (08-04-20 @ 21:45)  HR: 79 (08-05-20 @ 04:55) (65 - 79)  BP: 174/76 (08-05-20 @ 04:55) (156/76 - 174/76)  RR: 18 (08-05-20 @ 04:55) (18 - 18)  SpO2: 99% (08-05-20 @ 04:55) (96% - 99%)  CAPILLARY BLOOD GLUCOSE          08-04-20 @ 07:01  -  08-05-20 @ 07:00  --------------------------------------------------------  IN: 1340 mL / OUT: 1000 mL / NET: 340 mL      MEDICATIONS  (STANDING):  ALPRAZolam 0.25 milliGRAM(s) Oral daily  aMIOdarone    Tablet 200 milliGRAM(s) Oral daily  aspirin  chewable 81 milliGRAM(s) Oral daily  cefepime   IVPB 1000 milliGRAM(s) IV Intermittent every 8 hours  cloNIDine 0.1 milliGRAM(s) Oral two times a day  DULoxetine 60 milliGRAM(s) Oral daily  ferrous    sulfate 325 milliGRAM(s) Oral two times a day  heparin   Injectable 5000 Unit(s) SubCutaneous every 8 hours  lisinopril 10 milliGRAM(s) Oral daily  metoprolol succinate ER 25 milliGRAM(s) Oral daily  tamsulosin 0.4 milliGRAM(s) Oral at bedtime    MEDICATIONS  (PRN):      PHYSICAL EXAM:  GENERAL: Alert, awake and oriented x3 in no distress  HEENT: AUBREY, EOMI, neck supple, no JVP, no carotid bruit, oral mucosa moist and pink.  CHEST/LUNG: Bilateral clear breath sounds, no rales, no crepitations, no rhonchi, no wheezing  HEART: Regular rate and rhythm, NICANOR II/VI at LPSB, no gallops, no rub   ABDOMEN: Soft, nontender, non distended, bowel sounds present, no palpable organomegaly  : No flank or supra pubic tenderness.  EXTREMITIES: Peripheral pulses are palpable, no pedal edema  Neurology: AAOx3, no focal neurological deficit  SKIN: No rash or skin lesion  Musculoskeletal: No joint deformity or spinal tenderness.      Vascular ACCESS: None    LABS:                        11.2   12.78 )-----------( 176      ( 05 Aug 2020 06:05 )             33.0     08-05    133<L>  |  102  |  7   ----------------------------<  120<H>  3.9   |  18<L>  |  0.82    Ca    8.0<L>      05 Aug 2020 06:05  Phos  3.3     08-05  Mg     2.0     08-05      RADIOLOGY & ADDITIONAL STUDIES:  < from: CT Abdomen and Pelvis w/ Oral Cont and w/ IV Cont (08.03.20 @ 18:50) >    EXAM:  CT ABDOMEN AND PELVIS OC IC                            PROCEDURE DATE:  08/03/2020            INTERPRETATION:  CLINICAL INFORMATION: History of aortic aneurysm repair. Presents with colitis.    COMPARISON: CT arteriogram chest abdomen pelvisdated 12/7/2019.    PROCEDURE:  CT of the Abdomen and Pelvis was performed with intravenous contrast.  Intravenous contrast: 90 ml Omnipaque 350. 10 ml discarded.  Oral contrast: positive contrast was administered.  Sagittal and coronal reformats were performed.    FINDINGS:  LOWER CHEST: Surgical repair thoracoabdominal aorta partially visualized, appears unchanged.    LIVER: Within normal limits.  BILE DUCTS: Normal caliber.  GALLBLADDER: Within normal limits.  SPLEEN: Within normal limits.  PANCREAS: Within normal limits.  ADRENALS: Within normal limits.  KIDNEYS/URETERS: Left renal enlargement with striated nephrogram, perinephric stranding, and urothelial thickening. No hydronephrosis.    BLADDER: Mild bladder wall thickening.  REPRODUCTIVE ORGANS: Adnexa appear unremarkable.    BOWEL: No bowel obstruction. Moderate fluid-filled distention of colon consistent with diarrhea. No mural thickening. Appendix normal.  PERITONEUM: No ascites.  VESSELS: Surgical graft repair suprarenal abdominal aorta unchanged. Maximal diameter of abdominal aorta 3.1 cm. Bypass grafts of the celiac and superior mesenteric arteries are patent. Native renal arteries and AIDAN appear patent.  Lack of enhancement of the right external iliac and common femoralveins.  RETROPERITONEUM/LYMPH NODES: No lymphadenopathy.  ABDOMINAL WALL: Within normal limits.  BONES: Disc degeneration lumbosacral junction.    IMPRESSION:  Left-sided pyelonephritis and possible cystitis.    Interval stability in appearance of surgical graft repair of thoracoabdominal aorta and mesenteric bypass grafts    No CT evidence of colitis.    Failure of enhancement of the right common femoral and external iliac veins. Suggest venous duplex study to assess for DVT.    Examination findings were conveyed to physician's assistant Jorge by Dr. Chacon at 1937 hours on 8/3/2020 with read back.                  JAQUELINE BRADLEY M.D., ATTENDING RADIOLOGIST  This document has been electronically signed. Aug  4 2020  9:23AM                < end of copied text >      Imaging Personally Reviewed:  [x] YES  [ ] NO    Consultant(s) Notes Reviewed:  [x] YES  [ ] NO    Care Discussed with Primary team/ Other Providers [x] YES  [ ] NO

## 2020-08-05 NOTE — CHART NOTE - NSCHARTNOTEFT_GEN_A_CORE
Called by RN to report critical lab value. Positive blood culture from 8/4/2020, Growth in Anaerobic bottle, gram negative rods . Seen and examined patient at bedside. Patient denies any fever or chills. Patient is currently on Cefepime.    Vital Signs Last 24 Hrs  T(C): 36.9 (04 Aug 2020 21:45), Max: 37.1 (04 Aug 2020 05:02)  T(F): 98.5 (04 Aug 2020 21:45), Max: 98.8 (04 Aug 2020 05:02)  HR: 69 (04 Aug 2020 21:45) (65 - 90)  BP: 156/76 (04 Aug 2020 21:45) (156/76 - 191/84)  BP(mean): --  RR: 18 (04 Aug 2020 21:45) (18 - 18)  SpO2: 97% (04 Aug 2020 21:45) (96% - 98%)                          12.2   10.93 )-----------( 156      ( 03 Aug 2020 07:28 )             35.2       08-04    131<L>  |  97  |  8   ----------------------------<  121<H>  3.1<L>   |  22  |  0.88    Ca    8.5      04 Aug 2020 18:46  Phos  1.5     08-04  Mg     2.0     08-04    TPro  6.3  /  Alb  3.0<L>  /  TBili  0.6  /  DBili  x   /  AST  18  /  ALT  16  /  AlkPhos  108  08-03

## 2020-08-05 NOTE — PROGRESS NOTE ADULT - PROBLEM SELECTOR PLAN 1
Non oliguric DURGA now with improvement ( S cr 0.9 in 2019)likely pre-renal due to diarrhea/gastroenteritis/bacteremia now with improving renal function with S cr 0.8  - Urinalysis and urine electrolytes reviewed  - Avoid nephrotoxic agents  - Antibiotics as per renal dose adjustment  - Monitor BMP daily  - Volume status dry and electrolytes noted.  -Continue FLomax 0.4 mg po daily  -Continue ACEI/ARB

## 2020-08-05 NOTE — PROGRESS NOTE ADULT - PROBLEM SELECTOR PLAN 5
Acute pyelonephritis with E.coli bacteremia and gastroenteritis  - Tylenol prn for pain/fever  - Antibiotics adjustment per primary/ID team with renal dose adjustment.

## 2020-08-05 NOTE — PHYSICAL THERAPY INITIAL EVALUATION ADULT - ADDITIONAL COMMENTS
Pt says she lives alone in an apartment. No steps to enter, +ramp. Ambulates with rollator or straight cane. Independent with ADLs. Receives assistance from boyfriend or son for grocery shopping. Owns shower chair and grab bars. She says her son will be available to bring her home and assist as needed.

## 2020-08-05 NOTE — PROGRESS NOTE ADULT - SUBJECTIVE AND OBJECTIVE BOX
Patient is a 70y old  Female who presents with a chief complaint of diarrhea (05 Aug 2020 17:50)      INTERVAL HPI/OVERNIGHT EVENTS:  T(C): 36.6 (08-05-20 @ 12:09), Max: 36.9 (08-04-20 @ 21:45)  HR: 70 (08-05-20 @ 12:09) (69 - 79)  BP: 154/69 (08-05-20 @ 12:09) (154/69 - 174/76)  RR: 18 (08-05-20 @ 12:09) (18 - 18)  SpO2: 99% (08-05-20 @ 12:09) (97% - 99%)  Wt(kg): --  I&O's Summary    04 Aug 2020 07:01  -  05 Aug 2020 07:00  --------------------------------------------------------  IN: 1340 mL / OUT: 1000 mL / NET: 340 mL    05 Aug 2020 07:01  -  05 Aug 2020 18:26  --------------------------------------------------------  IN: 480 mL / OUT: 500 mL / NET: -20 mL        LABS:                        11.2   12.78 )-----------( 176      ( 05 Aug 2020 06:05 )             33.0     08-05    133<L>  |  102  |  7   ----------------------------<  120<H>  3.9   |  18<L>  |  0.82    Ca    8.0<L>      05 Aug 2020 06:05  Phos  3.3     08-05  Mg     2.0     08-05          CAPILLARY BLOOD GLUCOSE                MEDICATIONS  (STANDING):  ALPRAZolam 0.25 milliGRAM(s) Oral daily  aMIOdarone    Tablet 200 milliGRAM(s) Oral daily  aspirin  chewable 81 milliGRAM(s) Oral daily  cloNIDine 0.1 milliGRAM(s) Oral two times a day  diphenoxylate/atropine 1 Tablet(s) Oral two times a day  DULoxetine 60 milliGRAM(s) Oral daily  ertapenem  IVPB 1000 milliGRAM(s) IV Intermittent every 24 hours  ferrous    sulfate 325 milliGRAM(s) Oral two times a day  heparin   Injectable 5000 Unit(s) SubCutaneous every 8 hours  lisinopril 10 milliGRAM(s) Oral daily  metoprolol succinate ER 25 milliGRAM(s) Oral daily  tamsulosin 0.4 milliGRAM(s) Oral at bedtime    MEDICATIONS  (PRN):          PHYSICAL EXAM:  GENERAL: NAD, well-groomed, well-developed  HEAD:  Atraumatic, Normocephalic  CHEST/LUNG: Clear to percussion bilaterally; No rales, rhonchi, wheezing, or rubs  HEART: Regular rate and rhythm; No murmurs, rubs, or gallops  ABDOMEN: Soft, Nontender, Nondistended; Bowel sounds present  EXTREMITIES:  2+ Peripheral Pulses, No clubbing, cyanosis, or edema  LYMPH: No lymphadenopathy noted  SKIN: No rashes or lesions    Care Discussed with Consultants/Other Providers [ ] YES  [ ] NO

## 2020-08-05 NOTE — PROGRESS NOTE ADULT - PROBLEM SELECTOR PLAN 1
- with associated fever and GNR bacteremia   - CT notable for fluid-filled distended colon c/w diarrhea, no evidence of colitis   - GI pcr negative, stool cultures (pending)   - Cdiff negative   - diet as tolerated   - further recommendations pending stool studies - with associated fever and GNR bacteremia   - CT notable for fluid-filled distended colon c/w diarrhea, no evidence of colitis   - GI pcr negative, stool cultures (pending)   - Cdiff negative   - will start lomotil 1 tablet BID   - diet as tolerated

## 2020-08-06 DIAGNOSIS — N10 ACUTE PYELONEPHRITIS: ICD-10-CM

## 2020-08-06 LAB
ANION GAP SERPL CALC-SCNC: 15 MMOL/L — SIGNIFICANT CHANGE UP (ref 5–17)
ANION GAP SERPL CALC-SCNC: 7 MMOL/L — SIGNIFICANT CHANGE UP (ref 5–17)
BUN SERPL-MCNC: 8 MG/DL — SIGNIFICANT CHANGE UP (ref 7–23)
BUN SERPL-MCNC: 9 MG/DL — SIGNIFICANT CHANGE UP (ref 7–23)
CALCIUM SERPL-MCNC: 8.5 MG/DL — SIGNIFICANT CHANGE UP (ref 8.4–10.5)
CALCIUM SERPL-MCNC: 8.9 MG/DL — SIGNIFICANT CHANGE UP (ref 8.4–10.5)
CHLORIDE SERPL-SCNC: 102 MMOL/L — SIGNIFICANT CHANGE UP (ref 96–108)
CHLORIDE SERPL-SCNC: 97 MMOL/L — SIGNIFICANT CHANGE UP (ref 96–108)
CO2 SERPL-SCNC: 20 MMOL/L — LOW (ref 22–31)
CO2 SERPL-SCNC: 24 MMOL/L — SIGNIFICANT CHANGE UP (ref 22–31)
CREAT SERPL-MCNC: 0.86 MG/DL — SIGNIFICANT CHANGE UP (ref 0.5–1.3)
CREAT SERPL-MCNC: 0.86 MG/DL — SIGNIFICANT CHANGE UP (ref 0.5–1.3)
CULTURE RESULTS: SIGNIFICANT CHANGE UP
GLUCOSE SERPL-MCNC: 108 MG/DL — HIGH (ref 70–99)
GLUCOSE SERPL-MCNC: 119 MG/DL — HIGH (ref 70–99)
HCT VFR BLD CALC: 31.8 % — LOW (ref 34.5–45)
HGB BLD-MCNC: 10.7 G/DL — LOW (ref 11.5–15.5)
MAGNESIUM SERPL-MCNC: 1.7 MG/DL — SIGNIFICANT CHANGE UP (ref 1.6–2.6)
MAGNESIUM SERPL-MCNC: 1.8 MG/DL — SIGNIFICANT CHANGE UP (ref 1.6–2.6)
MCHC RBC-ENTMCNC: 30.2 PG — SIGNIFICANT CHANGE UP (ref 27–34)
MCHC RBC-ENTMCNC: 33.6 GM/DL — SIGNIFICANT CHANGE UP (ref 32–36)
MCV RBC AUTO: 89.8 FL — SIGNIFICANT CHANGE UP (ref 80–100)
NRBC # BLD: 0 /100 WBCS — SIGNIFICANT CHANGE UP (ref 0–0)
OSMOLALITY UR: 214 MOS/KG — LOW (ref 300–900)
PHOSPHATE SERPL-MCNC: 2 MG/DL — LOW (ref 2.5–4.5)
PHOSPHATE SERPL-MCNC: 2.3 MG/DL — LOW (ref 2.5–4.5)
PLATELET # BLD AUTO: 229 K/UL — SIGNIFICANT CHANGE UP (ref 150–400)
POTASSIUM SERPL-MCNC: 3.1 MMOL/L — LOW (ref 3.5–5.3)
POTASSIUM SERPL-MCNC: 4.6 MMOL/L — SIGNIFICANT CHANGE UP (ref 3.5–5.3)
POTASSIUM SERPL-SCNC: 3.1 MMOL/L — LOW (ref 3.5–5.3)
POTASSIUM SERPL-SCNC: 4.6 MMOL/L — SIGNIFICANT CHANGE UP (ref 3.5–5.3)
RBC # BLD: 3.54 M/UL — LOW (ref 3.8–5.2)
RBC # FLD: 14 % — SIGNIFICANT CHANGE UP (ref 10.3–14.5)
SODIUM SERPL-SCNC: 132 MMOL/L — LOW (ref 135–145)
SODIUM SERPL-SCNC: 133 MMOL/L — LOW (ref 135–145)
SODIUM UR-SCNC: <35 MMOL/L — SIGNIFICANT CHANGE UP
SPECIMEN SOURCE: SIGNIFICANT CHANGE UP
SPECIMEN SOURCE: SIGNIFICANT CHANGE UP
WBC # BLD: 11.18 K/UL — HIGH (ref 3.8–10.5)
WBC # FLD AUTO: 11.18 K/UL — HIGH (ref 3.8–10.5)

## 2020-08-06 PROCEDURE — 99232 SBSQ HOSP IP/OBS MODERATE 35: CPT

## 2020-08-06 RX ORDER — LISINOPRIL 2.5 MG/1
20 TABLET ORAL DAILY
Refills: 0 | Status: DISCONTINUED | OUTPATIENT
Start: 2020-08-06 | End: 2020-08-17

## 2020-08-06 RX ORDER — POTASSIUM CHLORIDE 20 MEQ
40 PACKET (EA) ORAL EVERY 4 HOURS
Refills: 0 | Status: COMPLETED | OUTPATIENT
Start: 2020-08-06 | End: 2020-08-06

## 2020-08-06 RX ORDER — POTASSIUM CHLORIDE 20 MEQ
10 PACKET (EA) ORAL ONCE
Refills: 0 | Status: COMPLETED | OUTPATIENT
Start: 2020-08-06 | End: 2020-08-06

## 2020-08-06 RX ORDER — TAMSULOSIN HYDROCHLORIDE 0.4 MG/1
0.8 CAPSULE ORAL AT BEDTIME
Refills: 0 | Status: DISCONTINUED | OUTPATIENT
Start: 2020-08-06 | End: 2020-08-07

## 2020-08-06 RX ORDER — MAGNESIUM SULFATE 500 MG/ML
1 VIAL (ML) INJECTION ONCE
Refills: 0 | Status: COMPLETED | OUTPATIENT
Start: 2020-08-06 | End: 2020-08-06

## 2020-08-06 RX ADMIN — Medication 62.5 MILLIMOLE(S): at 21:25

## 2020-08-06 RX ADMIN — ERTAPENEM SODIUM 120 MILLIGRAM(S): 1 INJECTION, POWDER, LYOPHILIZED, FOR SOLUTION INTRAMUSCULAR; INTRAVENOUS at 10:29

## 2020-08-06 RX ADMIN — Medication 100 GRAM(S): at 20:04

## 2020-08-06 RX ADMIN — Medication 0.1 MILLIGRAM(S): at 05:44

## 2020-08-06 RX ADMIN — Medication 81 MILLIGRAM(S): at 12:02

## 2020-08-06 RX ADMIN — Medication 50 MILLIEQUIVALENT(S): at 12:02

## 2020-08-06 RX ADMIN — LISINOPRIL 10 MILLIGRAM(S): 2.5 TABLET ORAL at 05:44

## 2020-08-06 RX ADMIN — HEPARIN SODIUM 5000 UNIT(S): 5000 INJECTION INTRAVENOUS; SUBCUTANEOUS at 05:44

## 2020-08-06 RX ADMIN — Medication 40 MILLIEQUIVALENT(S): at 10:24

## 2020-08-06 RX ADMIN — HEPARIN SODIUM 5000 UNIT(S): 5000 INJECTION INTRAVENOUS; SUBCUTANEOUS at 21:27

## 2020-08-06 RX ADMIN — Medication 1 TABLET(S): at 05:44

## 2020-08-06 RX ADMIN — Medication 325 MILLIGRAM(S): at 05:44

## 2020-08-06 RX ADMIN — Medication 0.2 MILLIGRAM(S): at 17:36

## 2020-08-06 RX ADMIN — Medication 40 MILLIEQUIVALENT(S): at 13:35

## 2020-08-06 RX ADMIN — Medication 1 TABLET(S): at 17:36

## 2020-08-06 RX ADMIN — Medication 25 MILLIGRAM(S): at 05:44

## 2020-08-06 RX ADMIN — HEPARIN SODIUM 5000 UNIT(S): 5000 INJECTION INTRAVENOUS; SUBCUTANEOUS at 13:35

## 2020-08-06 RX ADMIN — Medication 325 MILLIGRAM(S): at 17:36

## 2020-08-06 RX ADMIN — DULOXETINE HYDROCHLORIDE 60 MILLIGRAM(S): 30 CAPSULE, DELAYED RELEASE ORAL at 12:02

## 2020-08-06 RX ADMIN — AMIODARONE HYDROCHLORIDE 200 MILLIGRAM(S): 400 TABLET ORAL at 05:44

## 2020-08-06 NOTE — PROGRESS NOTE ADULT - SUBJECTIVE AND OBJECTIVE BOX
Subjective: Patient seen and examined. No new events except as noted.   feels ok     REVIEW OF SYSTEMS:    CONSTITUTIONAL: No weakness, fevers or chills  EYES/ENT: No visual changes;  No vertigo or throat pain   NECK: No pain or stiffness  RESPIRATORY: No cough, wheezing, hemoptysis; No shortness of breath  CARDIOVASCULAR: No chest pain or palpitations  GASTROINTESTINAL: No abdominal or epigastric pain. No nausea, vomiting, or hematemesis; No diarrhea or constipation. No melena or hematochezia.  GENITOURINARY: No dysuria, frequency or hematuria  NEUROLOGICAL: No numbness or weakness  SKIN: No itching, burning, rashes, or lesions   All other review of systems is negative unless indicated above.    MEDICATIONS:  MEDICATIONS  (STANDING):  ALPRAZolam 0.25 milliGRAM(s) Oral daily  aMIOdarone    Tablet 200 milliGRAM(s) Oral daily  aspirin  chewable 81 milliGRAM(s) Oral daily  cloNIDine 0.1 milliGRAM(s) Oral two times a day  diphenoxylate/atropine 1 Tablet(s) Oral two times a day  DULoxetine 60 milliGRAM(s) Oral daily  ertapenem  IVPB 1000 milliGRAM(s) IV Intermittent every 24 hours  ferrous    sulfate 325 milliGRAM(s) Oral two times a day  heparin   Injectable 5000 Unit(s) SubCutaneous every 8 hours  lisinopril 10 milliGRAM(s) Oral daily  metoprolol succinate ER 25 milliGRAM(s) Oral daily  potassium chloride    Tablet ER 40 milliEquivalent(s) Oral every 4 hours  potassium chloride  10 mEq/50 mL IVPB 10 milliEquivalent(s) IV Intermittent once  tamsulosin 0.4 milliGRAM(s) Oral at bedtime      PHYSICAL EXAM:  T(C): 36.6 (08-06-20 @ 05:11), Max: 36.9 (08-05-20 @ 20:31)  HR: 73 (08-06-20 @ 05:11) (69 - 73)  BP: 176/72 (08-06-20 @ 05:11) (138/68 - 176/72)  RR: 18 (08-06-20 @ 05:11) (17 - 18)  SpO2: 98% (08-06-20 @ 05:11) (98% - 99%)  Wt(kg): --  I&O's Summary    05 Aug 2020 07:01  -  06 Aug 2020 07:00  --------------------------------------------------------  IN: 980 mL / OUT: 1600 mL / NET: -620 mL          Appearance: Normal	  HEENT:   Normal oral mucosa, PERRL, EOMI	  Lymphatic: No lymphadenopathy , no edema  Cardiovascular: Normal S1 S2, No JVD, No murmurs , Peripheral pulses palpable 2+ bilaterally  Respiratory: Lungs clear to auscultation, normal effort 	  Gastrointestinal:  Soft, Non-tender, + BS	  Skin: No rashes, No ecchymoses, No cyanosis, warm to touch  Musculoskeletal: Normal range of motion, normal strength  Psychiatry:  Mood & affect appropriate  Ext: No edema      LABS:    CARDIAC MARKERS:      Culture - Blood in AM (08.04.20 @ 08:41)    Gram Stain:   Growth in aerobic bottle: Gram Negative Rods    Specimen Source: .Blood Blood-Peripheral    Culture Results:   Growth in aerobic bottle: Gram Negative Rods                              10.7   11.18 )-----------( 229      ( 06 Aug 2020 06:28 )             31.8     08-06    132<L>  |  97  |  8   ----------------------------<  108<H>  3.1<L>   |  20<L>  |  0.86    Ca    8.5      06 Aug 2020 06:28  Phos  2.3     08-06  Mg     1.8     08-06      proBNP:   Lipid Profile:   HgA1c:   TSH:             TELEMETRY: SR	    ECG:  	  RADIOLOGY:   DIAGNOSTIC TESTING:  [ ] Echocardiogram:  [ ]  Catheterization:  [ ] Stress Test:    OTHER:

## 2020-08-06 NOTE — PROGRESS NOTE ADULT - PROBLEM SELECTOR PLAN 1
Non oliguric DURGA with stable renal function with S cr 0.86 likely pre-renal/dehydration/sepsis  - Urinalysis and urine electrolytes reviewed  - Avoid nephrotoxic agents  - Antibiotics as per renal dose adjustment  - Monitor BMP daily  - Volume status dry and electrolytes noted.  -Continue Flomax 0.4 mg po daily  -Continue ACEI/ARB

## 2020-08-06 NOTE — DIETITIAN INITIAL EVALUATION ADULT. - OTHER INFO
Reason for admission :diarrhea  Diet PTA :omelette, cereal, oatmeal for breakfast, sandwich for lunch, hamburger, rice, chicken, vegetables for dinner. snacks on cookies. lives alone and prepares own meals.   Intake : 25%-pt refused all supplements offered at this time except for Danactive 2 x daily which she is consuming.   Denies nausea/vomit :  Denies difficulty chewing /swallow :  Denies diarrhea/constipation:  Last BM : today  NKFA   IBW +/- 10%= 135pounds  Ht: 67"  Ht taken from  pt  Usual Weight PTA: 132pounds  BMI: 23.7  BMI calculated using wt from flow sheet  BMI calculated using ht from pt  Education Provided : N/A  pressure injury: none  edema: none

## 2020-08-06 NOTE — PROGRESS NOTE ADULT - SUBJECTIVE AND OBJECTIVE BOX
Patient is a 70y old  Female who presents with a chief complaint of diarrhea (06 Aug 2020 14:29)      INTERVAL HPI/OVERNIGHT EVENTS:  T(C): 37.1 (08-06-20 @ 11:59), Max: 37.1 (08-06-20 @ 11:59)  HR: 63 (08-06-20 @ 13:18) (63 - 73)  BP: 130/74 (08-06-20 @ 13:18) (130/74 - 176/72)  RR: 18 (08-06-20 @ 11:59) (17 - 18)  SpO2: 99% (08-06-20 @ 13:18) (98% - 99%)  Wt(kg): --  I&O's Summary    05 Aug 2020 07:01  -  06 Aug 2020 07:00  --------------------------------------------------------  IN: 980 mL / OUT: 1600 mL / NET: -620 mL    06 Aug 2020 07:01  -  06 Aug 2020 16:37  --------------------------------------------------------  IN: 480 mL / OUT: 900 mL / NET: -420 mL        LABS:                        10.7   11.18 )-----------( 229      ( 06 Aug 2020 06:28 )             31.8     08-06    132<L>  |  97  |  8   ----------------------------<  108<H>  3.1<L>   |  20<L>  |  0.86    Ca    8.5      06 Aug 2020 06:28  Phos  2.3     08-06  Mg     1.8     08-06          CAPILLARY BLOOD GLUCOSE                MEDICATIONS  (STANDING):  ALPRAZolam 0.25 milliGRAM(s) Oral daily  aMIOdarone    Tablet 200 milliGRAM(s) Oral daily  aspirin  chewable 81 milliGRAM(s) Oral daily  cloNIDine 0.2 milliGRAM(s) Oral two times a day  diphenoxylate/atropine 1 Tablet(s) Oral two times a day  DULoxetine 60 milliGRAM(s) Oral daily  ertapenem  IVPB 1000 milliGRAM(s) IV Intermittent every 24 hours  ferrous    sulfate 325 milliGRAM(s) Oral two times a day  heparin   Injectable 5000 Unit(s) SubCutaneous every 8 hours  lisinopril 20 milliGRAM(s) Oral daily  metoprolol succinate ER 25 milliGRAM(s) Oral daily  tamsulosin 0.4 milliGRAM(s) Oral at bedtime    MEDICATIONS  (PRN):          PHYSICAL EXAM:  GENERAL: frail  CHEST/LUNG: Clear to percussion bilaterally; No rales, rhonchi, wheezing, or rubs  HEART: Regular rate and rhythm; No murmurs, rubs, or gallops  ABDOMEN: Soft, Nontender, Nondistended; Bowel sounds present  EXTREMITIES:  no edema+    Care Discussed with Consultants/Other Providers [ +] YES  [ ] NO

## 2020-08-06 NOTE — PROGRESS NOTE ADULT - SUBJECTIVE AND OBJECTIVE BOX
INTERVAL HPI/OVERNIGHT EVENTS:    patient seen and examined at bedside  5 bm yesterday  just one today  denies fever, chills, nausea, vomiting, or abdominal pain     MEDICATIONS  (STANDING):  ALPRAZolam 0.25 milliGRAM(s) Oral daily  aMIOdarone    Tablet 200 milliGRAM(s) Oral daily  aspirin  chewable 81 milliGRAM(s) Oral daily  cloNIDine 0.1 milliGRAM(s) Oral two times a day  DULoxetine 60 milliGRAM(s) Oral daily  ferrous    sulfate 325 milliGRAM(s) Oral two times a day  metoprolol succinate ER 25 milliGRAM(s) Oral daily  piperacillin/tazobactam IVPB.. 3.375 Gram(s) IV Intermittent every 8 hours  potassium chloride    Tablet ER 40 milliEquivalent(s) Oral every 4 hours  tamsulosin 0.4 milliGRAM(s) Oral at bedtime    MEDICATIONS  (PRN):      Allergies    No Known Allergies    Intolerances        Review of Systems:    General:  No wt loss, fevers, chills, night sweats,fatigue,   Eyes:  Good vision, no reported pain  ENT:  No sore throat, pain, runny nose, dysphagia  CV:  No pain, palpitatioins, hypo/hypertension  Resp:  No dyspnea, cough, tachypnea, wheezing  GI:  No pain, No nausea, No vomiting, +diarrhea, No constipatiion, No weight loss, No fever, No pruritis, No rectal bleeding, No tarry stools, No dysphagia,  :  No pain, bleeding, incontinence, nocturia  Muscle:  No pain, weakness  Neuro:  No weakness, tingling, memory problems  Psych:  No fatigue, insomnia, mood problems, depression  Endocrine:  No polyuria, polydypsia, cold/heat intolerance  Heme:  No petechiae, ecchymosis, easy bruisability  Skin:  No rash, tattoos, scars, edema      Vital Signs Last 24 Hrs  T(C): 36.4 (04 Aug 2020 13:08), Max: 37.1 (04 Aug 2020 05:02)  T(F): 97.6 (04 Aug 2020 13:08), Max: 98.8 (04 Aug 2020 05:02)  HR: 65 (04 Aug 2020 13:08) (65 - 90)  BP: 171/72 (04 Aug 2020 13:08) (158/68 - 191/84)  BP(mean): --  RR: 18 (04 Aug 2020 13:08) (18 - 18)  SpO2: 96% (04 Aug 2020 13:08) (96% - 100%)    PHYSICAL EXAM:    Constitutional: weak, frail, NAD   HEENT: EOMI, throat clear  Neck: No LAD, supple  Gastrointestinal: BS+, soft, NT/ND  Extremities: No peripheral edema  Neurological: A/O x 3, no focal deficits        LABS:                        12.2   10.93 )-----------( 156      ( 03 Aug 2020 07:28 )             35.2     08-04    132<L>  |  98  |  11  ----------------------------<  129<H>  2.8<LL>   |  20<L>  |  0.89    Ca    8.4      04 Aug 2020 06:57  Phos  1.3     08-04  Mg     1.9     08-04    TPro  6.3  /  Alb  3.0<L>  /  TBili  0.6  /  DBili  x   /  AST  18  /  ALT  16  /  AlkPhos  108  08-03    PT/INR - ( 02 Aug 2020 13:56 )   PT: 13.1 sec;   INR: 1.11 ratio         PTT - ( 02 Aug 2020 13:56 )  PTT:26.1 sec  Urinalysis Basic - ( 03 Aug 2020 00:37 )    Color: Yellow / Appearance: Slightly Turbid / S.015 / pH: x  Gluc: x / Ketone: Trace  / Bili: Negative / Urobili: Negative   Blood: x / Protein: 100 / Nitrite: Negative   Leuk Esterase: Moderate / RBC: 13 /hpf / WBC 36 /HPF   Sq Epi: x / Non Sq Epi: 3 /hpf / Bacteria: Many        RADIOLOGY & ADDITIONAL TESTS:  < from: CT Abdomen and Pelvis w/ Oral Cont and w/ IV Cont (20 @ 18:50) >    EXAM:  CT ABDOMEN AND PELVIS OC IC                            PROCEDURE DATE:  2020            INTERPRETATION:  CLINICAL INFORMATION: History of aortic aneurysm repair. Presents with colitis.    COMPARISON: CT arteriogram chest abdomen pelvisdated 2019.    PROCEDURE:  CT of the Abdomen and Pelvis was performed with intravenous contrast.  Intravenous contrast: 90 ml Omnipaque 350. 10 ml discarded.  Oral contrast: positive contrast was administered.  Sagittal and coronal reformats were performed.    FINDINGS:  LOWER CHEST: Surgical repair thoracoabdominal aorta partially visualized, appears unchanged.    LIVER: Within normal limits.  BILE DUCTS: Normal caliber.  GALLBLADDER: Within normal limits.  SPLEEN: Within normal limits.  PANCREAS: Within normal limits.  ADRENALS: Within normal limits.  KIDNEYS/URETERS: Left renal enlargement with striated nephrogram, perinephric stranding, and urothelial thickening. No hydronephrosis.    BLADDER: Mild bladder wall thickening.  REPRODUCTIVE ORGANS: Adnexa appear unremarkable.    BOWEL: No bowel obstruction. Moderate fluid-filled distention of colon consistent with diarrhea. No mural thickening. Appendix normal.  PERITONEUM: No ascites.  VESSELS: Surgical graft repair suprarenal abdominal aorta unchanged. Maximal diameter of abdominal aorta 3.1 cm. Bypass grafts of the celiac and superior mesenteric arteries are patent. Native renal arteries and AIDAN appear patent.  Lack of enhancement of the right external iliac and common femoralveins.  RETROPERITONEUM/LYMPH NODES: No lymphadenopathy.  ABDOMINAL WALL: Within normal limits.  BONES: Disc degeneration lumbosacral junction.    IMPRESSION:  Left-sided pyelonephritis and possible cystitis.    Interval stability in appearance of surgical graft repair of thoracoabdominal aorta and mesenteric bypass grafts    No CT evidence of colitis.    Failure of enhancement of the right common femoral and external iliac veins. Suggest venous duplex study to assess for DVT.    Examination findings were conveyed to physician's assistant Jorge by Dr. Chacon at 1937 hours on 8/3/2020 with read back.                  JAQUELINE BRADLEY M.D., ATTENDING RADIOLOGIST  This document has been electronically signed. Aug  4 2020  9:23AM                < end of copied text >

## 2020-08-06 NOTE — PROGRESS NOTE ADULT - PROBLEM SELECTOR PLAN 1
- with associated fever and GNR bacteremia   - CT notable for fluid-filled distended colon c/w diarrhea, no evidence of colitis   - GI pcr negative, stool cultures negative  - Cdiff negative   -  lomotil 1 tablet BID   - diet as tolerated

## 2020-08-06 NOTE — PROGRESS NOTE ADULT - SUBJECTIVE AND OBJECTIVE BOX
Kalyan Argueta MD (Nephrology progress note)  [address], [facility],  [id number],  [id number]  TEl: [phone]  Cell: [phone]    Patient is a 70y Female seen and evaluated at bedside. Vital signs, laboratory data, imaging studies, consult notes reviewed done within past 24 hours. Overnight events noted. Patient lying in bed in no distress feels better at present. Interval decrease amount of diarrhea since yesterday now on IV antibiotics with invanz for E.coli bacteremia./pyelonephritis. K level 3.1 with Na level 132 today morning.    Allergies    No Known Allergies    Intolerances        ROS:  CONSTITUTIONAL: No fever or chills.  HEENT: No headache or visual disturbances.  RESPIRATORY: No shortness of breath, cough, hemoptysis or wheezing  CARDIOVASCULAR: No Chest pain, shortness of breath, palpitations, dizziness, syncope, orthopnea, PND or leg swelling.  GASTROINTESTINAL:Mild abdominal cramps and loose stool  GENITOURINARY: NO dysuria  NEUROLOGICAL: No headache, focal limb weakness, tingling or numbness or seizure like activity  SKIN: No skin rash or lesion  MUSCULOSKELETAL: No leg pain, calf pain or swelling    VITALS:    T(C): 36.6 (08-06-20 @ 05:11), Max: 36.9 (08-05-20 @ 20:31)  HR: 73 (08-06-20 @ 05:11) (69 - 73)  BP: 176/72 (08-06-20 @ 05:11) (138/68 - 176/72)  RR: 18 (08-06-20 @ 05:11) (17 - 18)  SpO2: 98% (08-06-20 @ 05:11) (98% - 99%)  CAPILLARY BLOOD GLUCOSE          08-05-20 @ 07:01  -  08-06-20 @ 07:00  --------------------------------------------------------  IN: 980 mL / OUT: 1600 mL / NET: -620 mL      MEDICATIONS  (STANDING):  ALPRAZolam 0.25 milliGRAM(s) Oral daily  aMIOdarone    Tablet 200 milliGRAM(s) Oral daily  aspirin  chewable 81 milliGRAM(s) Oral daily  cloNIDine 0.1 milliGRAM(s) Oral two times a day  diphenoxylate/atropine 1 Tablet(s) Oral two times a day  DULoxetine 60 milliGRAM(s) Oral daily  ertapenem  IVPB 1000 milliGRAM(s) IV Intermittent every 24 hours  ferrous    sulfate 325 milliGRAM(s) Oral two times a day  heparin   Injectable 5000 Unit(s) SubCutaneous every 8 hours  lisinopril 10 milliGRAM(s) Oral daily  metoprolol succinate ER 25 milliGRAM(s) Oral daily  potassium chloride    Tablet ER 40 milliEquivalent(s) Oral every 4 hours  potassium chloride  10 mEq/50 mL IVPB 10 milliEquivalent(s) IV Intermittent once  tamsulosin 0.4 milliGRAM(s) Oral at bedtime    MEDICATIONS  (PRN):      PHYSICAL EXAM:  GENERAL: Alert, awake and oriented x3 in no distress  HEENT: AUBREY, EOMI, neck supple, no JVP, no carotid bruit, oral mucosa moist and pink.  CHEST/LUNG: Bilateral clear breath sounds, no rales, no crepitations, no rhonchi, no wheezing  HEART: Regular rate and rhythm, NICANOR II/VI at LPSB, no gallops, no rub   ABDOMEN: Soft, nontender, non distended, bowel sounds present, no palpable organomegaly  : No flank or supra pubic tenderness.  EXTREMITIES: Peripheral pulses are palpable, no pedal edema  Neurology: AAOx3, no focal neurological deficit  SKIN: No rash or skin lesion  Musculoskeletal: No joint deformity or spinal tenderness.      Vascular ACCESS:     LABS:                        10.7   11.18 )-----------( 229      ( 06 Aug 2020 06:28 )             31.8     08-06    132<L>  |  97  |  8   ----------------------------<  108<H>  3.1<L>   |  20<L>  |  0.86    Ca    8.5      06 Aug 2020 06:28  Phos  3.3     08-05  Mg     2.0     08-05            Sodium, Random Urine: <35 mmol/L (08-06 @ 07:11)  Osmolality, Random Urine: 214 mos/kg (08-06 @ 07:11)        RADIOLOGY & ADDITIONAL STUDIES:  < from: [facility] Duplex Lower Ext Vein Scan, Tyson (08.05.20 @ 16:51) >    EXAM:  DUPLEX SCAN EXT VEINS LOWER BI                            PROCEDURE DATE:  08/05/2020            INTERPRETATION:  CLINICAL INFORMATION: Lower extremity pain, rule out DVT.    COMPARISON: None available.    TECHNIQUE: Duplex sonography of theBILATERAL LOWER extremity veins with color and spectral Doppler, with and without compression.    FINDINGS:    There is normal compressibility of the bilateral common femoral, femoral and popliteal veins.  Doppler examination shows normal spontaneousand phasic flow.    The right and left posterior tibial and peroneal veins are patent and free of thrombus.    The right small saphenous vein, a superficial vein, is thrombosed in the calf.    IMPRESSION:  No evidence of deep venous thrombosis in either lower extremity.    The right small saphenous vein, a superficial vein, is thrombosed in the calf.                    LALITHA TINEO M.D., ATTENDING RADIOLOGIST  This document has been electronically signed. Aug  5 2020  5:29PM                < end of copied text >    Imaging Personally Reviewed:  [x] YES  [ ] NO    Consultant(s) Notes Reviewed:  [x] YES  [ ] NO    Care Discussed with Primary team/ Other Providers [x] YES  [ ] NO

## 2020-08-06 NOTE — DIETITIAN INITIAL EVALUATION ADULT. - PERTINENT MEDS FT
ALPRAZolam 0.25  aMIOdarone    Tablet 200  aspirin  chewable 81  cloNIDine 0.2  diphenoxylate/atropine 1  DULoxetine 60  ertapenem  IVPB 1000  ferrous    sulfate 325  heparin   Injectable 5000  lisinopril 10  metoprolol succinate ER 25  tamsulosin 0.4

## 2020-08-06 NOTE — PROGRESS NOTE ADULT - PROBLEM SELECTOR PLAN 2
Patient with prior history of hypertension with elevated blood pressure with BP ranging from 150 to 160's  Low salt diet  Increase dose of lisinopril to 20 mg po daily  Continue metoprolol ER to 25 mg po daily  Continue clonidine 0.1 mg po bid  Monitor vital signs.

## 2020-08-06 NOTE — PROGRESS NOTE ADULT - SUBJECTIVE AND OBJECTIVE BOX
70y old  Female who presents with a chief complaint of diarrhea (06 Aug 2020 16:37)      Interval history:  Afebrile, diarrhea under control, no cramping.       Allergies:   No Known Allergies      Antimicrobials:  ertapenem  IVPB 1000 milliGRAM(s) IV Intermittent every 24 hours      REVIEW OF SYSTEMS:  No chest pain   No SOB  No N/V  No rash.       Vital Signs Last 24 Hrs  T(C): 37.1 (08-06-20 @ 11:59), Max: 37.1 (08-06-20 @ 11:59)  T(F): 98.7 (08-06-20 @ 11:59), Max: 98.7 (08-06-20 @ 11:59)  HR: 63 (08-06-20 @ 13:18) (63 - 73)  BP: 130/74 (08-06-20 @ 13:18) (130/74 - 176/72)  BP(mean): --  RR: 18 (08-06-20 @ 11:59) (17 - 18)  SpO2: 99% (08-06-20 @ 13:18) (98% - 99%)      PHYSICAL EXAM:  Patient in no acute distress. AAOX3.  No icterus, no oral ulcers.  Cardiovascular: S1S2 normal.  Lungs: Good air entry B/L lung fields.  Gastrointestinal: soft, nontender, nondistended.  Extremities: no edema.  IV sites not inflamed.                           10.7   11.18 )-----------( 229      ( 06 Aug 2020 06:28 )             31.8   08-06    132<L>  |  97  |  8   ----------------------------<  108<H>  3.1<L>   |  20<L>  |  0.86    Ca    8.5      06 Aug 2020 06:28  Phos  2.3     08-06  Mg     1.8     08-06      Culture - Blood (collected 04 Aug 2020 08:41)  Source: .Blood Blood-Peripheral  Gram Stain (05 Aug 2020 17:18):    Growth in aerobic bottle: Gram Negative Rods  Preliminary Report (05 Aug 2020 17:18):    Growth in aerobic bottle: Gram Negative Rods    Culture - Blood (collected 04 Aug 2020 08:41)  Source: .Blood Blood-Venous  Gram Stain (05 Aug 2020 17:57):    Growth in anaerobic bottle: Gram Negative Rods    Growth in aerobic bottle: Gram Negative Rods  Preliminary Report (06 Aug 2020 09:05):    Growth in anaerobic bottle: Escherichia coli ESBL    See previous culture [id number]    Growth in aerobic bottle: Gram Negative Rods    GI PCR Panel, Stool (collected 04 Aug 2020 05:39)  Source: .Stool Feces  Final Report (04 Aug 2020 07:41):    GI PCR Results: NOT detected    *******Please Note:*******    GI panel PCR evaluates for:    Campylobacter, Plesiomonas shigelloides, Salmonella,    Vibrio, Yersinia enterocolitica, Enteroaggregative    Escherichia coli (EAEC), Enteropathogenic E.coli (EPEC),    Enterotoxigenic E. coli (ETEC) lt/st, Shiga-like    toxin-producing E. coli (STEC) stx1/stx2,    Shigella/ Enteroinvasive E. coli (EIEC), Cryptosporidium,    Cyclospora cayetanensis, Entamoeba histolytica,    Giardia lamblia, Adenovirus F 40/41, Astrovirus,    Norovirus GI/GII, Rotavirus A, Sapovirus    Culture - Stool (collected 04 Aug 2020 02:31)  Source: .Stool Feces  Final Report (05 Aug 2020 19:44):    No enteric pathogens isolated.    (Stool culture examined for Salmonella,    Shigella, Campylobacter, Aeromonas, Plesiomonas,    Vibrio, E.coli O157 and Yersinia)

## 2020-08-06 NOTE — DIETITIAN INITIAL EVALUATION ADULT. - ADD RECOMMEND
continue Soft diet. Danactive 2 x daily. obtained and provided preferences including 2 bottles of water with each meal and apple juice with breakfast, diet ginger ale for lunch and dinner, remove orange juice.

## 2020-08-06 NOTE — PROGRESS NOTE ADULT - PROBLEM SELECTOR PLAN 3
Hypokalemia now improved with K level 3.1 likely due to GI losses  - Giving IV KCl and po KCL at present  - Replete K and Mag with goal K>4.0 and<5.0 and Mag>2.0  -Monitor BMP and electrolytes daily    Mild Hyponatremia  with Na level 132 likely hypovolemic/euvolemic hyponatremia  - Urine Na <35, Urine osm 348  - Optimal pain control  - Continue po fluid intake as tolerated  - Free water restriction to <1L/day    Calcium 8.5 with albumin 3.0 and phos level 3.3  Monitor calcium and phos level. Hypokalemia now improved with K level 3.1 likely due to GI losses  - Giving IV KCl and po KCL at present  - Replete K and Mag with goal K>4.0 and<5.0 and Mag>2.0  -Monitor BMP and electrolytes daily    Mild Hyponatremia  with Na level 132 likely euvolemic hyponatremia due to excessive water drinking  - Urine Na <35, Urine osm 214   - Optimal pain control  - Free water restriction to <1 to 1.2 L/day.    Calcium 8.5 with albumin 3.0 and phos level 3.3  Monitor calcium and phos level.

## 2020-08-07 DIAGNOSIS — Z71.89 OTHER SPECIFIED COUNSELING: ICD-10-CM

## 2020-08-07 LAB
ANION GAP SERPL CALC-SCNC: 10 MMOL/L — SIGNIFICANT CHANGE UP (ref 5–17)
ANION GAP SERPL CALC-SCNC: 12 MMOL/L — SIGNIFICANT CHANGE UP (ref 5–17)
BUN SERPL-MCNC: 11 MG/DL — SIGNIFICANT CHANGE UP (ref 7–23)
BUN SERPL-MCNC: 8 MG/DL — SIGNIFICANT CHANGE UP (ref 7–23)
CALCIUM SERPL-MCNC: 8.1 MG/DL — LOW (ref 8.4–10.5)
CALCIUM SERPL-MCNC: 8.6 MG/DL — SIGNIFICANT CHANGE UP (ref 8.4–10.5)
CHLORIDE SERPL-SCNC: 100 MMOL/L — SIGNIFICANT CHANGE UP (ref 96–108)
CHLORIDE SERPL-SCNC: 99 MMOL/L — SIGNIFICANT CHANGE UP (ref 96–108)
CO2 SERPL-SCNC: 22 MMOL/L — SIGNIFICANT CHANGE UP (ref 22–31)
CO2 SERPL-SCNC: 24 MMOL/L — SIGNIFICANT CHANGE UP (ref 22–31)
CREAT SERPL-MCNC: 0.8 MG/DL — SIGNIFICANT CHANGE UP (ref 0.5–1.3)
CREAT SERPL-MCNC: 0.92 MG/DL — SIGNIFICANT CHANGE UP (ref 0.5–1.3)
GLUCOSE SERPL-MCNC: 105 MG/DL — HIGH (ref 70–99)
GLUCOSE SERPL-MCNC: 109 MG/DL — HIGH (ref 70–99)
HCT VFR BLD CALC: 29.7 % — LOW (ref 34.5–45)
HGB BLD-MCNC: 9.9 G/DL — LOW (ref 11.5–15.5)
MAGNESIUM SERPL-MCNC: 1.9 MG/DL — SIGNIFICANT CHANGE UP (ref 1.6–2.6)
MCHC RBC-ENTMCNC: 30.7 PG — SIGNIFICANT CHANGE UP (ref 27–34)
MCHC RBC-ENTMCNC: 33.3 GM/DL — SIGNIFICANT CHANGE UP (ref 32–36)
MCV RBC AUTO: 92.2 FL — SIGNIFICANT CHANGE UP (ref 80–100)
NRBC # BLD: 0 /100 WBCS — SIGNIFICANT CHANGE UP (ref 0–0)
PHOSPHATE SERPL-MCNC: 2.8 MG/DL — SIGNIFICANT CHANGE UP (ref 2.5–4.5)
PLATELET # BLD AUTO: 272 K/UL — SIGNIFICANT CHANGE UP (ref 150–400)
POTASSIUM SERPL-MCNC: 3.9 MMOL/L — SIGNIFICANT CHANGE UP (ref 3.5–5.3)
POTASSIUM SERPL-MCNC: 4.9 MMOL/L — SIGNIFICANT CHANGE UP (ref 3.5–5.3)
POTASSIUM SERPL-SCNC: 3.9 MMOL/L — SIGNIFICANT CHANGE UP (ref 3.5–5.3)
POTASSIUM SERPL-SCNC: 4.9 MMOL/L — SIGNIFICANT CHANGE UP (ref 3.5–5.3)
RBC # BLD: 3.22 M/UL — LOW (ref 3.8–5.2)
RBC # FLD: 14.1 % — SIGNIFICANT CHANGE UP (ref 10.3–14.5)
SODIUM SERPL-SCNC: 133 MMOL/L — LOW (ref 135–145)
SODIUM SERPL-SCNC: 134 MMOL/L — LOW (ref 135–145)
WBC # BLD: 10.83 K/UL — HIGH (ref 3.8–10.5)
WBC # FLD AUTO: 10.83 K/UL — HIGH (ref 3.8–10.5)

## 2020-08-07 PROCEDURE — 99232 SBSQ HOSP IP/OBS MODERATE 35: CPT

## 2020-08-07 RX ORDER — POTASSIUM CHLORIDE 20 MEQ
40 PACKET (EA) ORAL ONCE
Refills: 0 | Status: COMPLETED | OUTPATIENT
Start: 2020-08-07 | End: 2020-08-07

## 2020-08-07 RX ORDER — MAGNESIUM SULFATE 500 MG/ML
1 VIAL (ML) INJECTION ONCE
Refills: 0 | Status: COMPLETED | OUTPATIENT
Start: 2020-08-07 | End: 2020-08-07

## 2020-08-07 RX ADMIN — HEPARIN SODIUM 5000 UNIT(S): 5000 INJECTION INTRAVENOUS; SUBCUTANEOUS at 04:51

## 2020-08-07 RX ADMIN — Medication 325 MILLIGRAM(S): at 04:51

## 2020-08-07 RX ADMIN — HEPARIN SODIUM 5000 UNIT(S): 5000 INJECTION INTRAVENOUS; SUBCUTANEOUS at 21:27

## 2020-08-07 RX ADMIN — Medication 325 MILLIGRAM(S): at 17:52

## 2020-08-07 RX ADMIN — Medication 1 TABLET(S): at 04:50

## 2020-08-07 RX ADMIN — Medication 81 MILLIGRAM(S): at 12:00

## 2020-08-07 RX ADMIN — HEPARIN SODIUM 5000 UNIT(S): 5000 INJECTION INTRAVENOUS; SUBCUTANEOUS at 13:44

## 2020-08-07 RX ADMIN — DULOXETINE HYDROCHLORIDE 60 MILLIGRAM(S): 30 CAPSULE, DELAYED RELEASE ORAL at 12:00

## 2020-08-07 RX ADMIN — Medication 0.2 MILLIGRAM(S): at 17:52

## 2020-08-07 RX ADMIN — AMIODARONE HYDROCHLORIDE 200 MILLIGRAM(S): 400 TABLET ORAL at 04:51

## 2020-08-07 RX ADMIN — ERTAPENEM SODIUM 120 MILLIGRAM(S): 1 INJECTION, POWDER, LYOPHILIZED, FOR SOLUTION INTRAMUSCULAR; INTRAVENOUS at 12:01

## 2020-08-07 RX ADMIN — Medication 0.2 MILLIGRAM(S): at 04:50

## 2020-08-07 RX ADMIN — Medication 1 TABLET(S): at 17:52

## 2020-08-07 RX ADMIN — Medication 100 GRAM(S): at 09:18

## 2020-08-07 RX ADMIN — LISINOPRIL 20 MILLIGRAM(S): 2.5 TABLET ORAL at 04:50

## 2020-08-07 RX ADMIN — Medication 25 MILLIGRAM(S): at 04:50

## 2020-08-07 RX ADMIN — Medication 40 MILLIEQUIVALENT(S): at 09:18

## 2020-08-07 NOTE — CONSULT NOTE ADULT - ATTENDING COMMENTS
Greg Ashford  Pager: [phone]. If no response or past 5 pm call [phone].
Pt seen/examined.  Case discussed with housestaff/PA team.  Agree with above note history, physical and assessment/plan.
Advanced care planning was discussed with patient and family.  Advanced care planning forms were reviewed and discussed as appropriate.  Differential diagnosis and plan of care discussed with patient after the evaluation.   Pain assessed and judicious use of narcotics when appropriate was discussed.  Importance of Fall prevention discussed.  Counseling on Smoking and Alcohol cessation was offered when appropriate.  Counseling on Diet, exercise, and medication compliance was done.

## 2020-08-07 NOTE — PROGRESS NOTE ADULT - PROBLEM SELECTOR PLAN 2
Patient with prior history of hypertension with elevated blood pressure with BP ranging from 140 to 150's  Low salt diet  Continue lisinopril to 20 mg po daily  Continue metoprolol ER to 25 mg po daily  Continue clonidine 0.2 mg po bid  Monitor vital signs.

## 2020-08-07 NOTE — PROGRESS NOTE ADULT - SUBJECTIVE AND OBJECTIVE BOX
Kalyan Argueta MD (Nephrology progress note)  [address], [facility],  [address],  [facility]  TEl: [phone]  Cell: [phone]    Patient is a 70y Female seen and evaluated at bedside. Vital signs, laboratory data, imaging studies, consult notes reviewed done within past 24 hours. Overnight events noted. Interval stable renal function with electrolytes. Patient with significant urinary retention with significant PVR.     Allergies    No Known Allergies    Intolerances        ROS:  CONSTITUTIONAL: No fever or chills.  HEENT: No headache or visual disturbances.  RESPIRATORY: No shortness of breath, cough, hemoptysis or wheezing  CARDIOVASCULAR: No Chest pain, shortness of breath, palpitations, dizziness, syncope, orthopnea, PND or leg swelling.  GASTROINTESTINAL: Denies abdominal pain, nausea, vomiting,  hematemesis or blood per rectum.  GENITOURINARY:m No flank or supra pubic pain  NEUROLOGICAL: No headache, focal limb weakness, tingling or numbness or seizure like activity  SKIN: No skin rash or lesion  MUSCULOSKELETAL: No leg pain, calf pain or swelling    VITALS:    T(C): 36.6 (08-07-20 @ 05:18), Max: 37.1 (08-06-20 @ 11:59)  HR: 56 (08-07-20 @ 05:18) (53 - 64)  BP: 152/69 (08-07-20 @ 05:18) (127/66 - 165/75)  RR: 18 (08-07-20 @ 05:18) (18 - 18)  SpO2: 97% (08-07-20 @ 05:18) (97% - 99%)  CAPILLARY BLOOD GLUCOSE          08-06-20 @ 07:01  -  08-07-20 @ 07:00  --------------------------------------------------------  IN: 830 mL / OUT: 1550 mL / NET: -720 mL      MEDICATIONS  (STANDING):  ALPRAZolam 0.25 milliGRAM(s) Oral daily  aMIOdarone    Tablet 200 milliGRAM(s) Oral daily  aspirin  chewable 81 milliGRAM(s) Oral daily  cloNIDine 0.2 milliGRAM(s) Oral two times a day  diphenoxylate/atropine 1 Tablet(s) Oral two times a day  DULoxetine 60 milliGRAM(s) Oral daily  ertapenem  IVPB 1000 milliGRAM(s) IV Intermittent every 24 hours  ferrous    sulfate 325 milliGRAM(s) Oral two times a day  heparin   Injectable 5000 Unit(s) SubCutaneous every 8 hours  lisinopril 20 milliGRAM(s) Oral daily  metoprolol succinate ER 25 milliGRAM(s) Oral daily  tamsulosin 0.8 milliGRAM(s) Oral at bedtime    MEDICATIONS  (PRN):      PHYSICAL EXAM:  GENERAL: Alert, awake and oriented x3 in no distress  HEENT: AUBREY, EOMI, neck supple, no JVP, no carotid bruit, oral mucosa moist and pale  CHEST/LUNG: Bilateral clear breath sounds, no rales, no crepitations, no rhonchi, no wheezing  HEART: Regular rate and rhythm, NICANOR II/VI at LPSB, no gallops, no rub   ABDOMEN: Soft, nontender, non distended, bowel sounds present, no palpable organomegaly  : No flank or supra pubic tenderness.  EXTREMITIES: Peripheral pulses are palpable, no pedal edema  Neurology: AAOx3, no focal neurological deficit  SKIN: No rash or skin lesion  Musculoskeletal: No joint deformity or spinal tenderness.      Vascular ACCESS:     LABS:                        9.9    10.83 )-----------( 272      ( 07 Aug 2020 07:12 )             29.7     08-07    134<L>  |  100  |  8   ----------------------------<  105<H>  3.9   |  22  |  0.80    Ca    8.1<L>      07 Aug 2020 07:12  Phos  2.8     08-07  Mg     1.9     08-07            Sodium, Random Urine: <35 mmol/L (08-06 @ 07:11)  Osmolality, Random Urine: 214 mos/kg (08-06 @ 07:11)        RADIOLOGY & ADDITIONAL STUDIES:    Imaging Personally Reviewed:  [x] YES  [ ] NO    Consultant(s) Notes Reviewed:  [x] YES  [ ] NO    Care Discussed with Primary team/ Other Providers [x] YES  [ ] NO

## 2020-08-07 NOTE — PROGRESS NOTE ADULT - PROBLEM SELECTOR PLAN 3
Hypokalemia now improved with K level 3.9 and Mag 1.9 likely due to GI losses  - Received IV KCL and po KCL yesterday  - Replete K and Mag with goal K>4.0 and<5.0 and Mag>2.0  -Monitor BMP and electrolytes daily    Mild Hyponatremia  with Na level 134   - Urine Na <35, Urine osm 214   - Optimal pain control  - Free water restriction to <1 to 1.2 L/day.    Calcium 8.1 with albumin 3.0 and phos level 2.8  Monitor calcium and phos level.

## 2020-08-07 NOTE — PROGRESS NOTE ADULT - PROBLEM SELECTOR PLAN 4
Acute pyelonephritis with E.coli bacteremia and gastroenteritis  - Tylenol prn for pain/fever  - Antibiotics adjustment per primary/ID team with renal dose adjustment with IV invanz.

## 2020-08-07 NOTE — PROGRESS NOTE ADULT - PROBLEM SELECTOR PLAN 1
Non oliguric DURGA with stable renal function with S cr 0.86 likely pre-renal/dehydration/sepsis  - Urinalysis and urine electrolytes reviewed  - Avoid nephrotoxic agents  - Antibiotics as per renal dose adjustment  - Monitor BMP daily  - Volume status and electrolytes noted with improvement  - Flomax increased to 0.8 mg po daily for urinary retention  -Continue ACEI/ARB

## 2020-08-07 NOTE — CONSULT NOTE ADULT - SUBJECTIVE AND OBJECTIVE BOX
69 yo Female with hx of thoracic and abdominal aortic aneurysm s/p repair, AV replacement, and HTN presenting with diarrhea x1 day. Found with ESBL ecoli bacteremia, Klebsiella and ESBL ecoli uti with radiographic concern for left pyelonephritis. Here has been having incontinence, voids PVR btwn  and has required CIC. Has has pansensitive ecoli and Kleb in the past   Pt with spinal trauma after aneurysm repair and since her surgery has had to straight cath herself. She states she now does it once a day. Attempts urination with valsalva but is only occ successful. Has incontinent episodes - has a negative impact on quality of life.   Has had UTI before but does not feel sx of LUTS. Denies flank pain.   Has never seen a urologist.   2x vaginal deliveries no stress incontinence     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: s/p repair  Asceding aortic aneurysm repair 4/8/2019  S/P cataract surgery  Arm fracture, left: 2005      MEDICATIONS  (STANDING):  ALPRAZolam 0.25 milliGRAM(s) Oral daily  aMIOdarone    Tablet 200 milliGRAM(s) Oral daily  aspirin  chewable 81 milliGRAM(s) Oral daily  cloNIDine 0.2 milliGRAM(s) Oral two times a day  diphenoxylate/atropine 1 Tablet(s) Oral two times a day  DULoxetine 60 milliGRAM(s) Oral daily  ertapenem  IVPB 1000 milliGRAM(s) IV Intermittent every 24 hours  ferrous    sulfate 325 milliGRAM(s) Oral two times a day  heparin   Injectable 5000 Unit(s) SubCutaneous every 8 hours  lisinopril 20 milliGRAM(s) Oral daily  metoprolol succinate ER 25 milliGRAM(s) Oral daily  tamsulosin 0.8 milliGRAM(s) Oral at bedtime    MEDICATIONS  (PRN):      FAMILY HISTORY:  Family history of skin cancer: mother  Family history of coronary artery bypass graft: with valve disease      Allergies    No Known Allergies    Intolerances        SOCIAL HISTORY:  as above     REVIEW OF SYSTEMS: Otherwise negative as stated in HPI    Physical Exam  Vital signs  T(C): 36.6 (08-07-20 @ 05:18), Max: 37.1 (08-06-20 @ 11:59)  HR: 56 (08-07-20 @ 05:18)  BP: 152/69 (08-07-20 @ 05:18)  SpO2: 97% (08-07-20 @ 05:18)  Wt(kg): --    Output    UOP YELLOW     Gen:  AWAKE ALERT NAD AXOX3    Pulm:  NO RESP DISTRESS  	  CV:  S1S2    GI:  SOFT NT/ND  NO CVAT BL     :  NONPALP NONTENDER BLADDER                         	      LABS:                            9.9    10.83 )-----------( 272      ( 07 Aug 2020 07:12 )             29.7       08-07    134<L>  |  100  |  8   ----------------------------<  105<H>  3.9   |  22  |  0.80    Ca    8.1<L>      07 Aug 2020 07:12  Phos  2.8     08-07  Mg     1.9     08-07    Urine Cx:  Culture - Blood in AM (08.06.20 @ 08:43)    Specimen Source: .Blood Blood-Peripheral    Culture Results:   No growth to date.    Culture - Blood in AM (08.04.20 @ 08:41)    Gram Stain:   Growth in aerobic bottle: Gram Negative Rods    Specimen Source: .Blood Blood-Peripheral    Culture Results:   Growth in aerobic bottle: Escherichia coli ESBL  See previous culture  # [id number]    Culture - Urine (08.03.20 @ 03:31)    -  Piperacillin/Tazobactam: S <=8    -  Piperacillin/Tazobactam: R <=8    -  Tigecycline: S <=2    -  Tigecycline: S <=2    -  Nitrofurantoin: S <=32 Should not be used to treat pyelonephritis    -  Nitrofurantoin: S <=32 Should not be used to treat pyelonephritis    -  Meropenem: S <=1    -  Meropenem: S <=1    -  Levofloxacin: R >4    -  Ciprofloxacin: S <=0.25    -  Ciprofloxacin: R >2    -  Imipenem: S <=1    -  Imipenem: S <=1    -  Ertapenem: S <=0.5    -  Ertapenem: S <=0.5    -  Gentamicin: S <=2    -  Gentamicin: S <=2    -  Trimethoprim/Sulfamethoxazole: S <=0.5/9.5    -  Trimethoprim/Sulfamethoxazole: R >2/38    -  Tobramycin: S <=2    -  Tobramycin: S <=2    -  Levofloxacin: S <=0.5    -  Ceftriaxone: S <=1 Enterobacter, Citrobacter, and Serratia may develop resistance during prolonged therapy    -  Ceftriaxone: R >32 Enterobacter, Citrobacter, and Serratia may develop resistance during prolonged therapy    -  Cefoxitin: S <=8    -  Cefoxitin: S <=8    -  Cefepime: S <=2    -  Cefepime: R >16    -  Ampicillin/Sulbactam: S <=4/2 Enterobacter, Citrobacter, and Serratia may develop resistance during prolonged therapy (3-4 days)    -  Ampicillin/Sulbactam: R >16/8 Enterobacter, Citrobacter, and Serratia may develop resistance during prolonged therapy (3-4 days)    -  Aztreonam: S <=4    -  Aztreonam: R >16    -  Cefazolin: S <=2 (MIC_CL_COM_ENTERIC_CEFAZU) For uncomplicated UTI with K. pneumoniae, E. coli, or P. mirablis: SOFIA <=16 is sensitive and SOFIA >=32 is resistant. This also predicts results for oral agents cefaclor, cefdinir, cefpodoxime, cefprozil, cefuroxime axetil, cephalexin and locarbef for uncomplicated UTI. Note that some isolates may be susceptible to these agents while testing resistant to cefazolin.    -  Cefazolin: R >16 (MIC_CL_COM_ENTERIC_CEFAZU) For uncomplicated UTI with K. pneumoniae, E. coli, or P. mirablis: SOFIA <=16 is sensitive and SOFIA >=32 is resistant. This also predicts results for oral agents cefaclor, cefdinir, cefpodoxime, cefprozil, cefuroxime axetil, cephalexin and locarbef for uncomplicated UTI. Note that some isolates may be susceptible to these agents while testing resistant to cefazolin.    -  Ampicillin: R >16 These ampicillin results predict results for amoxicillin    -  Ampicillin: R >16 These ampicillin results predict results for amoxicillin    -  Amoxicillin/Clavulanic Acid: S <=8/4    -  Amoxicillin/Clavulanic Acid: S <=8/4    -  Amikacin: S <=16    -  Amikacin: S <=16    Specimen Source: .Urine Clean Catch (Midstream)    Culture Results:   >100,000 CFU/ml Escherichia coli ESBL  >100,000 CFU/ml Klebsiella pneumoniae    Organism Identification: Escherichia coli ESBL  Klebsiella pneumoniae    Organism: Escherichia coli ESBL    Organism: Klebsiella pneumoniae    Method Type: SOFIA    Method Type: SOFIA    RADIOLOGY:  < from: CT Abdomen and Pelvis w/ Oral Cont and w/ IV Cont (08.03.20 @ 18:50) >  FINDINGS:  LOWER CHEST: Surgical repair thoracoabdominal aorta partially visualized, appears unchanged.    LIVER: Within normal limits.  BILE DUCTS: Normal caliber.  GALLBLADDER: Within normal limits.  SPLEEN: Within normal limits.  PANCREAS: Within normal limits.  ADRENALS: Within normal limits.  KIDNEYS/URETERS: Left renal enlargement with striated nephrogram, perinephric stranding, and urothelial thickening. No hydronephrosis.    BLADDER: Mild bladder wall thickening.  REPRODUCTIVE ORGANS: Adnexa appear unremarkable.    BOWEL: No bowel obstruction. Moderate fluid-filled distention of colon consistent with diarrhea. No mural thickening. Appendix normal.  PERITONEUM: No ascites.  VESSELS: Surgical graft repair suprarenal abdominal aorta unchanged. Maximal diameter of abdominal aorta 3.1 cm. Bypass grafts of the celiac and superior mesenteric arteries are patent. Native renal arteries and AIDAN appear patent.  Lack of enhancement of the right external iliac and common femoralveins.  RETROPERITONEUM/LYMPH NODES: No lymphadenopathy.  ABDOMINAL WALL: Within normal limits.  BONES: Disc degeneration lumbosacral junction.    IMPRESSION:  Left-sided pyelonephritis and possible cystitis.    Interval stability in appearance of surgical graft repair of thoracoabdominal aorta and mesenteric bypass grafts    No CT evidence of colitis.    Failure of enhancement of the right common femoral and external iliac veins. Suggest venous duplex study to assess for DVT.      < end of copied text >

## 2020-08-07 NOTE — PROGRESS NOTE ADULT - SUBJECTIVE AND OBJECTIVE BOX
Subjective: Patient seen and examined. No new events except as noted.   Bp well controlled       REVIEW OF SYSTEMS:    CONSTITUTIONAL: +weakness, fevers or chills  EYES/ENT: No visual changes;  No vertigo or throat pain   NECK: No pain or stiffness  RESPIRATORY: No cough, wheezing, hemoptysis; No shortness of breath  CARDIOVASCULAR: No chest pain or palpitations  GASTROINTESTINAL: No abdominal or epigastric pain. No nausea, vomiting, or hematemesis; No diarrhea or constipation. No melena or hematochezia.  GENITOURINARY: No dysuria, frequency or hematuria  NEUROLOGICAL: No numbness or weakness  SKIN: No itching, burning, rashes, or lesions   All other review of systems is negative unless indicated above.    MEDICATIONS:  MEDICATIONS  (STANDING):  ALPRAZolam 0.25 milliGRAM(s) Oral daily  aMIOdarone    Tablet 200 milliGRAM(s) Oral daily  aspirin  chewable 81 milliGRAM(s) Oral daily  cloNIDine 0.2 milliGRAM(s) Oral two times a day  diphenoxylate/atropine 1 Tablet(s) Oral two times a day  DULoxetine 60 milliGRAM(s) Oral daily  ertapenem  IVPB 1000 milliGRAM(s) IV Intermittent every 24 hours  ferrous    sulfate 325 milliGRAM(s) Oral two times a day  heparin   Injectable 5000 Unit(s) SubCutaneous every 8 hours  lisinopril 20 milliGRAM(s) Oral daily  metoprolol succinate ER 25 milliGRAM(s) Oral daily  tamsulosin 0.8 milliGRAM(s) Oral at bedtime      PHYSICAL EXAM:  T(C): 36.6 (08-07-20 @ 05:18), Max: 37.1 (08-06-20 @ 11:59)  HR: 56 (08-07-20 @ 05:18) (53 - 64)  BP: 152/69 (08-07-20 @ 05:18) (127/66 - 165/75)  RR: 18 (08-07-20 @ 05:18) (18 - 18)  SpO2: 97% (08-07-20 @ 05:18) (97% - 99%)  Wt(kg): --  I&O's Summary    06 Aug 2020 07:01  -  07 Aug 2020 07:00  --------------------------------------------------------  IN: 830 mL / OUT: 1550 mL / NET: -720 mL          Appearance: Normal	  HEENT:   Normal oral mucosa, PERRL, EOMI	  Lymphatic: No lymphadenopathy , no edema  Cardiovascular: Normal S1 S2, No JVD, No murmurs , Peripheral pulses palpable 2+ bilaterally  Respiratory: Lungs clear to auscultation, normal effort 	  Gastrointestinal:  Soft, Non-tender, + BS	  Skin: No rashes, No ecchymoses, No cyanosis, warm to touch  Musculoskeletal: Normal range of motion, normal strength  Psychiatry:  Mood & affect appropriate  Ext: No edema      LABS:    CARDIAC MARKERS:                                9.9    10.83 )-----------( 272      ( 07 Aug 2020 07:12 )             29.7     08-07    134<L>  |  100  |  8   ----------------------------<  105<H>  3.9   |  22  |  0.80    Ca    8.1<L>      07 Aug 2020 07:12  Phos  2.8     08-07  Mg     1.9     08-07      proBNP:   Lipid Profile:   HgA1c:   TSH:             TELEMETRY: 	SR    ECG:  	  RADIOLOGY:   DIAGNOSTIC TESTING:  [ ] Echocardiogram:  [ ]  Catheterization:  [ ] Stress Test:    OTHER:

## 2020-08-07 NOTE — PROGRESS NOTE ADULT - SUBJECTIVE AND OBJECTIVE BOX
70y old  Female who presents with a chief complaint of diarrhea (07 Aug 2020 11:57)      Interval history:  Afebrile, one BM today, feeling stronger, appetite improving.       Allergies:   No Known Allergies      Antimicrobials:  ertapenem  IVPB 1000 milliGRAM(s) IV Intermittent every 24 hours      REVIEW OF SYSTEMS:  No chest pain   No SOB  No N/V  No rash.       Vital Signs Last 24 Hrs  T(C): 36.5 (08-07-20 @ 12:46), Max: 36.7 (08-06-20 @ 21:15)  T(F): 97.7 (08-07-20 @ 12:46), Max: 98 (08-06-20 @ 21:15)  HR: 62 (08-07-20 @ 17:47) (50 - 62)  BP: 138/62 (08-07-20 @ 17:47) (127/66 - 152/69)  BP(mean): --  RR: 18 (08-07-20 @ 12:46) (18 - 18)  SpO2: 99% (08-07-20 @ 12:46) (97% - 99%)      PHYSICAL EXAM:  Patient in no acute distress. AAOX3. Laying in bed.   No icterus, no oral ulcers.  Cardiovascular: S1S2 normal.  Lungs: + air entry B/L lung fields.  Gastrointestinal: soft, nontender, nondistended.  Extremities: no edema.  IV sites not inflamed.                           9.9    10.83 )-----------( 272      ( 07 Aug 2020 07:12 )             29.7   08-07    133<L>  |  99  |  11  ----------------------------<  109<H>  4.9   |  24  |  0.92    Ca    8.6      07 Aug 2020 17:30  Phos  2.8     08-07  Mg     1.9     08-07      Culture - Blood (collected 06 Aug 2020 08:43)  Source: .Blood Blood-Peripheral  Preliminary Report (07 Aug 2020 09:01):    No growth to date.    Culture - Blood (collected 06 Aug 2020 08:43)  Source: .Blood Blood-Peripheral  Preliminary Report (07 Aug 2020 09:01):    No growth to date.

## 2020-08-07 NOTE — PROGRESS NOTE ADULT - PROBLEM SELECTOR PLAN 1
- with associated fever and GNR bacteremia   - CT notable for fluid-filled distended colon c/w diarrhea, no evidence of colitis   - GI pcr negative, stool cultures negative  - Cdiff negative   - c/w lomotil 1 tablet BID   - diet as tolerated

## 2020-08-07 NOTE — PROGRESS NOTE ADULT - SUBJECTIVE AND OBJECTIVE BOX
Patient is a 70y old  Female who presents with a chief complaint of diarrhea (07 Aug 2020 18:07)      INTERVAL HPI/OVERNIGHT EVENTS:  T(C): 36.5 (08-07-20 @ 12:46), Max: 36.7 (08-06-20 @ 21:15)  HR: 62 (08-07-20 @ 17:47) (50 - 62)  BP: 138/62 (08-07-20 @ 17:47) (127/66 - 152/69)  RR: 18 (08-07-20 @ 12:46) (18 - 18)  SpO2: 99% (08-07-20 @ 12:46) (97% - 99%)  Wt(kg): --  I&O's Summary    06 Aug 2020 07:01  -  07 Aug 2020 07:00  --------------------------------------------------------  IN: 830 mL / OUT: 1550 mL / NET: -720 mL    07 Aug 2020 07:01  -  07 Aug 2020 18:35  --------------------------------------------------------  IN: 480 mL / OUT: 0 mL / NET: 480 mL        LABS:                        9.9    10.83 )-----------( 272      ( 07 Aug 2020 07:12 )             29.7     08-07    133<L>  |  99  |  11  ----------------------------<  109<H>  4.9   |  24  |  0.92    Ca    8.6      07 Aug 2020 17:30  Phos  2.8     08-07  Mg     1.9     08-07          CAPILLARY BLOOD GLUCOSE                MEDICATIONS  (STANDING):  ALPRAZolam 0.25 milliGRAM(s) Oral daily  aMIOdarone    Tablet 200 milliGRAM(s) Oral daily  aspirin  chewable 81 milliGRAM(s) Oral daily  cloNIDine 0.2 milliGRAM(s) Oral two times a day  diphenoxylate/atropine 1 Tablet(s) Oral two times a day  DULoxetine 60 milliGRAM(s) Oral daily  ertapenem  IVPB 1000 milliGRAM(s) IV Intermittent every 24 hours  ferrous    sulfate 325 milliGRAM(s) Oral two times a day  heparin   Injectable 5000 Unit(s) SubCutaneous every 8 hours  lisinopril 20 milliGRAM(s) Oral daily  metoprolol succinate ER 25 milliGRAM(s) Oral daily    MEDICATIONS  (PRN):          PHYSICAL EXAM:  GENERAL: NAD, well-groomed, well-developed  HEAD:  Atraumatic, Normocephalic  CHEST/LUNG: Clear to percussion bilaterally; No rales, rhonchi, wheezing, or rubs  HEART: Regular rate and rhythm; No murmurs, rubs, or gallops  ABDOMEN: Soft, Nontender, Nondistended; Bowel sounds present  EXTREMITIES:  2+ Peripheral Pulses, No clubbing, cyanosis, or edema  LYMPH: No lymphadenopathy noted  SKIN: No rashes or lesions    Care Discussed with Consultants/Other Providers [ ] YES  [ ] NO

## 2020-08-07 NOTE — CONSULT NOTE ADULT - ASSESSMENT
71 yo female with chronic urinary retention managed with Valsalva and CIC at home here with ESBL ecoli bacteremia and ESBL ecoli and Klebsiella UTI. Radiographic evidence of left pyelonephritis without flank pain.    Increase duration of straight cath to q 6 hours standing then no further bladder scanning needed   intermittent urinary retention may be the cause of her freq uti.  appreciate ID input for abx choice and duration  stop flomax as is without benefits in females.  discussed lifestyle modifications with patient to help improve her sudden incontinent episodes   ultimately should folllow up with Dr. Trejo at [facility] for formal urodynamics and teaching new improved CIC techniques 69 yo female with chronic urinary retention managed with Valsalva and CIC at home here with ESBL ecoli bacteremia and ESBL ecoli and Klebsiella UTI. Found to be voiding with elevated residuals  Intermittent urinary retention may be the cause of her freq uti.    - Increase duration of straight cath to q 6 hours standing then no further bladder scanning needed   - Continue antibiotics for ESBL E coli  - No need for continuation of flomax  - Discussed lifestyle modifications with patient to help improve her sudden incontinent episodes   - Ultimately patient should follow up with Dr. Sujatha Trejo at [facility] for formal urodynamics and teaching new improved CIC techniques    [facility]  [address] [place] [address], [address]  [place] [address] [phone] 71 yo female with chronic urinary retention managed with Valsalva and CIC at home here with ESBL ecoli bacteremia and ESBL ecoli and Klebsiella UTI. Found to be voiding with elevated residuals  Intermittent urinary retention may be the cause of her freq uti.    - Increase frequency of straight cath to q 6 hours standing then no further bladder scanning needed   - Continue antibiotics for ESBL E coli  - No need for continuation of flomax  - Discussed lifestyle modifications with patient to help improve her sudden incontinent episodes   - Ultimately patient should follow up with Dr. Sujatha Trejo at [facility] for formal urodynamics and teaching new improved CIC techniques    [facility]  [address] [place] [address], [address]  [place] [address] [phone]

## 2020-08-07 NOTE — PROGRESS NOTE ADULT - SUBJECTIVE AND OBJECTIVE BOX
INTERVAL HPI/OVERNIGHT EVENTS:    patient seen and examined at bedside  one BM this morning, reports stool slowly starting to form   appetite improving   denies fever, chills, nausea, vomiting, or abdominal pain     MEDICATIONS  (STANDING):  ALPRAZolam 0.25 milliGRAM(s) Oral daily  aMIOdarone    Tablet 200 milliGRAM(s) Oral daily  aspirin  chewable 81 milliGRAM(s) Oral daily  cloNIDine 0.1 milliGRAM(s) Oral two times a day  DULoxetine 60 milliGRAM(s) Oral daily  ferrous    sulfate 325 milliGRAM(s) Oral two times a day  metoprolol succinate ER 25 milliGRAM(s) Oral daily  piperacillin/tazobactam IVPB.. 3.375 Gram(s) IV Intermittent every 8 hours  potassium chloride    Tablet ER 40 milliEquivalent(s) Oral every 4 hours  tamsulosin 0.4 milliGRAM(s) Oral at bedtime    MEDICATIONS  (PRN):      Allergies    No Known Allergies    Intolerances        Review of Systems:    General:  No wt loss, fevers, chills, night sweats,fatigue,   Eyes:  Good vision, no reported pain  ENT:  No sore throat, pain, runny nose, dysphagia  CV:  No pain, palpitatioins, hypo/hypertension  Resp:  No dyspnea, cough, tachypnea, wheezing  GI:  No pain, No nausea, No vomiting, +diarrhea - improving, No constipatiion, No weight loss, No fever, No pruritis, No rectal bleeding, No tarry stools, No dysphagia,  :  No pain, bleeding, incontinence, nocturia  Muscle:  No pain, weakness  Neuro:  No weakness, tingling, memory problems  Psych:  No fatigue, insomnia, mood problems, depression  Endocrine:  No polyuria, polydypsia, cold/heat intolerance  Heme:  No petechiae, ecchymosis, easy bruisability  Skin:  No rash, tattoos, scars, edema      Vital Signs Last 24 Hrs  T(C): 36.4 (04 Aug 2020 13:08), Max: 37.1 (04 Aug 2020 05:02)  T(F): 97.6 (04 Aug 2020 13:08), Max: 98.8 (04 Aug 2020 05:02)  HR: 65 (04 Aug 2020 13:08) (65 - 90)  BP: 171/72 (04 Aug 2020 13:08) (158/68 - 191/84)  BP(mean): --  RR: 18 (04 Aug 2020 13:08) (18 - 18)  SpO2: 96% (04 Aug 2020 13:08) (96% - 100%)    PHYSICAL EXAM:    Constitutional: weak, frail, NAD   HEENT: EOMI, throat clear  Neck: No LAD, supple  Gastrointestinal: BS+, soft, NT/ND  Extremities: No peripheral edema  Neurological: A/O x 3, no focal deficits        LABS:                        12.2   10.93 )-----------( 156      ( 03 Aug 2020 07:28 )             35.2     08-04    132<L>  |  98  |  11  ----------------------------<  129<H>  2.8<LL>   |  20<L>  |  0.89    Ca    8.4      04 Aug 2020 06:57  Phos  1.3     08-04  Mg     1.9     08-04    TPro  6.3  /  Alb  3.0<L>  /  TBili  0.6  /  DBili  x   /  AST  18  /  ALT  16  /  AlkPhos  108  08-03    PT/INR - ( 02 Aug 2020 13:56 )   PT: 13.1 sec;   INR: 1.11 ratio         PTT - ( 02 Aug 2020 13:56 )  PTT:26.1 sec  Urinalysis Basic - ( 03 Aug 2020 00:37 )    Color: Yellow / Appearance: Slightly Turbid / S.015 / pH: x  Gluc: x / Ketone: Trace  / Bili: Negative / Urobili: Negative   Blood: x / Protein: 100 / Nitrite: Negative   Leuk Esterase: Moderate / RBC: 13 /hpf / WBC 36 /HPF   Sq Epi: x / Non Sq Epi: 3 /hpf / Bacteria: Many        RADIOLOGY & ADDITIONAL TESTS:  < from: CT Abdomen and Pelvis w/ Oral Cont and w/ IV Cont (20 @ 18:50) >    EXAM:  CT ABDOMEN AND PELVIS OC IC                            PROCEDURE DATE:  2020            INTERPRETATION:  CLINICAL INFORMATION: History of aortic aneurysm repair. Presents with colitis.    COMPARISON: CT arteriogram chest abdomen pelvisdated 2019.    PROCEDURE:  CT of the Abdomen and Pelvis was performed with intravenous contrast.  Intravenous contrast: 90 ml Omnipaque 350. 10 ml discarded.  Oral contrast: positive contrast was administered.  Sagittal and coronal reformats were performed.    FINDINGS:  LOWER CHEST: Surgical repair thoracoabdominal aorta partially visualized, appears unchanged.    LIVER: Within normal limits.  BILE DUCTS: Normal caliber.  GALLBLADDER: Within normal limits.  SPLEEN: Within normal limits.  PANCREAS: Within normal limits.  ADRENALS: Within normal limits.  KIDNEYS/URETERS: Left renal enlargement with striated nephrogram, perinephric stranding, and urothelial thickening. No hydronephrosis.    BLADDER: Mild bladder wall thickening.  REPRODUCTIVE ORGANS: Adnexa appear unremarkable.    BOWEL: No bowel obstruction. Moderate fluid-filled distention of colon consistent with diarrhea. No mural thickening. Appendix normal.  PERITONEUM: No ascites.  VESSELS: Surgical graft repair suprarenal abdominal aorta unchanged. Maximal diameter of abdominal aorta 3.1 cm. Bypass grafts of the celiac and superior mesenteric arteries are patent. Native renal arteries and AIDAN appear patent.  Lack of enhancement of the right external iliac and common femoralveins.  RETROPERITONEUM/LYMPH NODES: No lymphadenopathy.  ABDOMINAL WALL: Within normal limits.  BONES: Disc degeneration lumbosacral junction.    IMPRESSION:  Left-sided pyelonephritis and possible cystitis.    Interval stability in appearance of surgical graft repair of thoracoabdominal aorta and mesenteric bypass grafts    No CT evidence of colitis.    Failure of enhancement of the right common femoral and external iliac veins. Suggest venous duplex study to assess for DVT.    Examination findings were conveyed to physician's assistant Jorge by Dr. Chacon at 1937 hours on 8/3/2020 with read back.                  JAQUELINE BRADLEY M.D., ATTENDING RADIOLOGIST  This document has been electronically signed. Aug  4 2020  9:23AM                < end of copied text >

## 2020-08-08 ENCOUNTER — TRANSCRIPTION ENCOUNTER (OUTPATIENT)
Age: 70
End: 2020-08-08

## 2020-08-08 LAB
ANION GAP SERPL CALC-SCNC: 13 MMOL/L — SIGNIFICANT CHANGE UP (ref 5–17)
BUN SERPL-MCNC: 12 MG/DL — SIGNIFICANT CHANGE UP (ref 7–23)
CALCIUM SERPL-MCNC: 8.1 MG/DL — LOW (ref 8.4–10.5)
CHLORIDE SERPL-SCNC: 100 MMOL/L — SIGNIFICANT CHANGE UP (ref 96–108)
CO2 SERPL-SCNC: 22 MMOL/L — SIGNIFICANT CHANGE UP (ref 22–31)
CORTICOSTEROID BINDING GLOBULIN RESULT: 2 MG/DL — SIGNIFICANT CHANGE UP
CORTIS F/TOTAL MFR SERPL: 63 % — SIGNIFICANT CHANGE UP
CORTIS SERPL-MCNC: 37 UG/DL — HIGH
CORTISOL, FREE RESULT: 23 UG/DL — HIGH
CREAT SERPL-MCNC: 0.88 MG/DL — SIGNIFICANT CHANGE UP (ref 0.5–1.3)
GLUCOSE SERPL-MCNC: 97 MG/DL — SIGNIFICANT CHANGE UP (ref 70–99)
HCT VFR BLD CALC: 30.5 % — LOW (ref 34.5–45)
HGB BLD-MCNC: 10 G/DL — LOW (ref 11.5–15.5)
MCHC RBC-ENTMCNC: 30.8 PG — SIGNIFICANT CHANGE UP (ref 27–34)
MCHC RBC-ENTMCNC: 32.8 GM/DL — SIGNIFICANT CHANGE UP (ref 32–36)
MCV RBC AUTO: 93.8 FL — SIGNIFICANT CHANGE UP (ref 80–100)
NRBC # BLD: 0 /100 WBCS — SIGNIFICANT CHANGE UP (ref 0–0)
PLATELET # BLD AUTO: 320 K/UL — SIGNIFICANT CHANGE UP (ref 150–400)
POTASSIUM SERPL-MCNC: 3.8 MMOL/L — SIGNIFICANT CHANGE UP (ref 3.5–5.3)
POTASSIUM SERPL-SCNC: 3.8 MMOL/L — SIGNIFICANT CHANGE UP (ref 3.5–5.3)
RBC # BLD: 3.25 M/UL — LOW (ref 3.8–5.2)
RBC # FLD: 14 % — SIGNIFICANT CHANGE UP (ref 10.3–14.5)
SODIUM SERPL-SCNC: 135 MMOL/L — SIGNIFICANT CHANGE UP (ref 135–145)
WBC # BLD: 11.65 K/UL — HIGH (ref 3.8–10.5)
WBC # FLD AUTO: 11.65 K/UL — HIGH (ref 3.8–10.5)

## 2020-08-08 RX ORDER — METOPROLOL TARTRATE 50 MG
25 TABLET ORAL DAILY
Refills: 0 | Status: DISCONTINUED | OUTPATIENT
Start: 2020-08-08 | End: 2020-08-16

## 2020-08-08 RX ORDER — POTASSIUM CHLORIDE 20 MEQ
40 PACKET (EA) ORAL ONCE
Refills: 0 | Status: COMPLETED | OUTPATIENT
Start: 2020-08-08 | End: 2020-08-08

## 2020-08-08 RX ADMIN — AMIODARONE HYDROCHLORIDE 200 MILLIGRAM(S): 400 TABLET ORAL at 05:28

## 2020-08-08 RX ADMIN — HEPARIN SODIUM 5000 UNIT(S): 5000 INJECTION INTRAVENOUS; SUBCUTANEOUS at 05:29

## 2020-08-08 RX ADMIN — HEPARIN SODIUM 5000 UNIT(S): 5000 INJECTION INTRAVENOUS; SUBCUTANEOUS at 14:37

## 2020-08-08 RX ADMIN — Medication 0.2 MILLIGRAM(S): at 05:29

## 2020-08-08 RX ADMIN — Medication 1 TABLET(S): at 17:49

## 2020-08-08 RX ADMIN — ERTAPENEM SODIUM 120 MILLIGRAM(S): 1 INJECTION, POWDER, LYOPHILIZED, FOR SOLUTION INTRAMUSCULAR; INTRAVENOUS at 12:20

## 2020-08-08 RX ADMIN — Medication 40 MILLIEQUIVALENT(S): at 12:19

## 2020-08-08 RX ADMIN — DULOXETINE HYDROCHLORIDE 60 MILLIGRAM(S): 30 CAPSULE, DELAYED RELEASE ORAL at 12:20

## 2020-08-08 RX ADMIN — Medication 0.2 MILLIGRAM(S): at 17:48

## 2020-08-08 RX ADMIN — Medication 325 MILLIGRAM(S): at 17:48

## 2020-08-08 RX ADMIN — HEPARIN SODIUM 5000 UNIT(S): 5000 INJECTION INTRAVENOUS; SUBCUTANEOUS at 21:20

## 2020-08-08 RX ADMIN — Medication 325 MILLIGRAM(S): at 05:29

## 2020-08-08 RX ADMIN — Medication 81 MILLIGRAM(S): at 12:20

## 2020-08-08 RX ADMIN — Medication 1 TABLET(S): at 05:29

## 2020-08-08 RX ADMIN — LISINOPRIL 20 MILLIGRAM(S): 2.5 TABLET ORAL at 05:28

## 2020-08-08 NOTE — PROGRESS NOTE ADULT - PROBLEM SELECTOR PLAN 2
Patient with prior history of hypertension with stable blood pressure ranging from 130 to 140's  Low salt diet  Continue lisinopril to 20 mg po daily  Continue metoprolol ER to 25 mg po daily  Continue clonidine 0.2 mg po bid  Monitor vital signs.

## 2020-08-08 NOTE — DISCHARGE NOTE PROVIDER - NSDCFUADDINST_GEN_ALL_CORE_FT
Follow up with Dr. Sujatha Trejo at [facility] for formal urodynamics and teaching new improved clean intermittent catheterization techniques Follow up with Dr. Sujatha Trejo at [facility] for formal urodynamics and teaching new improved clean intermittent catheterization techniques     Follow up with ID for repeat blood cultures in 2 weeks Follow up with Dr. Sujatha Trejo at [facility] for formal urodynamics and teaching new improved clean intermittent catheterization techniques     Follow up with ID for repeat blood cultures in 2 weeks  Have thyroid panel checked in 1 week

## 2020-08-08 NOTE — DISCHARGE NOTE PROVIDER - CARE PROVIDERS DIRECT ADDRESSES
[email or web address]

## 2020-08-08 NOTE — PROGRESS NOTE ADULT - SUBJECTIVE AND OBJECTIVE BOX
INTERVAL HPI/OVERNIGHT EVENTS:    patient seen and examined at bedside  frequency of diarrhea improving; had 3 bms yesterday, however still watery   appetite continues to improve   denies fever, chills, nausea, vomiting, or abdominal pain     MEDICATIONS  (STANDING):  ALPRAZolam 0.25 milliGRAM(s) Oral daily  aMIOdarone    Tablet 200 milliGRAM(s) Oral daily  aspirin  chewable 81 milliGRAM(s) Oral daily  cloNIDine 0.1 milliGRAM(s) Oral two times a day  DULoxetine 60 milliGRAM(s) Oral daily  ferrous    sulfate 325 milliGRAM(s) Oral two times a day  metoprolol succinate ER 25 milliGRAM(s) Oral daily  piperacillin/tazobactam IVPB.. 3.375 Gram(s) IV Intermittent every 8 hours  potassium chloride    Tablet ER 40 milliEquivalent(s) Oral every 4 hours  tamsulosin 0.4 milliGRAM(s) Oral at bedtime    MEDICATIONS  (PRN):      Allergies    No Known Allergies    Intolerances        Review of Systems:    General:  No wt loss, fevers, chills, night sweats,fatigue,   Eyes:  Good vision, no reported pain  ENT:  No sore throat, pain, runny nose, dysphagia  CV:  No pain, palpitatioins, hypo/hypertension  Resp:  No dyspnea, cough, tachypnea, wheezing  GI:  No pain, No nausea, No vomiting, +diarrhea - improving, No constipatiion, No weight loss, No fever, No pruritis, No rectal bleeding, No tarry stools, No dysphagia,  :  No pain, bleeding, incontinence, nocturia  Muscle:  No pain, weakness  Neuro:  No weakness, tingling, memory problems  Psych:  No fatigue, insomnia, mood problems, depression  Endocrine:  No polyuria, polydypsia, cold/heat intolerance  Heme:  No petechiae, ecchymosis, easy bruisability  Skin:  No rash, tattoos, scars, edema      Vital Signs Last 24 Hrs  T(C): 36.4 (04 Aug 2020 13:08), Max: 37.1 (04 Aug 2020 05:02)  T(F): 97.6 (04 Aug 2020 13:08), Max: 98.8 (04 Aug 2020 05:02)  HR: 65 (04 Aug 2020 13:08) (65 - 90)  BP: 171/72 (04 Aug 2020 13:08) (158/68 - 191/84)  BP(mean): --  RR: 18 (04 Aug 2020 13:08) (18 - 18)  SpO2: 96% (04 Aug 2020 13:08) (96% - 100%)    PHYSICAL EXAM:    Constitutional: weak, frail, NAD   HEENT: EOMI, throat clear  Neck: No LAD, supple  Gastrointestinal: BS+, soft, NT/ND  Extremities: No peripheral edema  Neurological: A/O x 3, no focal deficits        LABS:                        12.2   10.93 )-----------( 156      ( 03 Aug 2020 07:28 )             35.2     08-04    132<L>  |  98  |  11  ----------------------------<  129<H>  2.8<LL>   |  20<L>  |  0.89    Ca    8.4      04 Aug 2020 06:57  Phos  1.3     08-04  Mg     1.9     08-04    TPro  6.3  /  Alb  3.0<L>  /  TBili  0.6  /  DBili  x   /  AST  18  /  ALT  16  /  AlkPhos  108  08-03    PT/INR - ( 02 Aug 2020 13:56 )   PT: 13.1 sec;   INR: 1.11 ratio         PTT - ( 02 Aug 2020 13:56 )  PTT:26.1 sec  Urinalysis Basic - ( 03 Aug 2020 00:37 )    Color: Yellow / Appearance: Slightly Turbid / S.015 / pH: x  Gluc: x / Ketone: Trace  / Bili: Negative / Urobili: Negative   Blood: x / Protein: 100 / Nitrite: Negative   Leuk Esterase: Moderate / RBC: 13 /hpf / WBC 36 /HPF   Sq Epi: x / Non Sq Epi: 3 /hpf / Bacteria: Many        RADIOLOGY & ADDITIONAL TESTS:  < from: CT Abdomen and Pelvis w/ Oral Cont and w/ IV Cont (20 @ 18:50) >    EXAM:  CT ABDOMEN AND PELVIS OC IC                            PROCEDURE DATE:  2020            INTERPRETATION:  CLINICAL INFORMATION: History of aortic aneurysm repair. Presents with colitis.    COMPARISON: CT arteriogram chest abdomen pelvisdated 2019.    PROCEDURE:  CT of the Abdomen and Pelvis was performed with intravenous contrast.  Intravenous contrast: 90 ml Omnipaque 350. 10 ml discarded.  Oral contrast: positive contrast was administered.  Sagittal and coronal reformats were performed.    FINDINGS:  LOWER CHEST: Surgical repair thoracoabdominal aorta partially visualized, appears unchanged.    LIVER: Within normal limits.  BILE DUCTS: Normal caliber.  GALLBLADDER: Within normal limits.  SPLEEN: Within normal limits.  PANCREAS: Within normal limits.  ADRENALS: Within normal limits.  KIDNEYS/URETERS: Left renal enlargement with striated nephrogram, perinephric stranding, and urothelial thickening. No hydronephrosis.    BLADDER: Mild bladder wall thickening.  REPRODUCTIVE ORGANS: Adnexa appear unremarkable.    BOWEL: No bowel obstruction. Moderate fluid-filled distention of colon consistent with diarrhea. No mural thickening. Appendix normal.  PERITONEUM: No ascites.  VESSELS: Surgical graft repair suprarenal abdominal aorta unchanged. Maximal diameter of abdominal aorta 3.1 cm. Bypass grafts of the celiac and superior mesenteric arteries are patent. Native renal arteries and AIDAN appear patent.  Lack of enhancement of the right external iliac and common femoralveins.  RETROPERITONEUM/LYMPH NODES: No lymphadenopathy.  ABDOMINAL WALL: Within normal limits.  BONES: Disc degeneration lumbosacral junction.    IMPRESSION:  Left-sided pyelonephritis and possible cystitis.    Interval stability in appearance of surgical graft repair of thoracoabdominal aorta and mesenteric bypass grafts    No CT evidence of colitis.    Failure of enhancement of the right common femoral and external iliac veins. Suggest venous duplex study to assess for DVT.    Examination findings were conveyed to physician's assistant Jorge by Dr. Chacon at 1937 hours on 8/3/2020 with read back.                  JAQUELINE BRADLEY M.D., ATTENDING RADIOLOGIST  This document has been electronically signed. Aug  4 2020  9:23AM                < end of copied text >

## 2020-08-08 NOTE — PROGRESS NOTE ADULT - PROBLEM SELECTOR PLAN 2
- secondary to dehydration in the setting of profuse diarrhea - resolved   - monitor electrolytes, replete prn  - Nephrology following

## 2020-08-08 NOTE — PROGRESS NOTE ADULT - SUBJECTIVE AND OBJECTIVE BOX
Patient is a 70y old  Female who presents with a chief complaint of diarrhea (08 Aug 2020 16:39)      INTERVAL HPI/OVERNIGHT EVENTS:  T(C): 36.7 (08-08-20 @ 12:48), Max: 36.7 (08-07-20 @ 20:43)  HR: 54 (08-08-20 @ 17:50) (50 - 57)  BP: 157/75 (08-08-20 @ 17:50) (129/66 - 157/75)  RR: 18 (08-08-20 @ 17:50) (18 - 18)  SpO2: 97% (08-08-20 @ 12:48) (97% - 99%)  Wt(kg): --  I&O's Summary    07 Aug 2020 07:01  -  08 Aug 2020 07:00  --------------------------------------------------------  IN: 480 mL / OUT: 800 mL / NET: -320 mL    08 Aug 2020 07:01  -  08 Aug 2020 18:32  --------------------------------------------------------  IN: 360 mL / OUT: 1000 mL / NET: -640 mL        LABS:                        10.0   11.65 )-----------( 320      ( 08 Aug 2020 06:19 )             30.5     08-08    135  |  100  |  12  ----------------------------<  97  3.8   |  22  |  0.88    Ca    8.1<L>      08 Aug 2020 06:19  Phos  2.8     08-07  Mg     1.9     08-07          CAPILLARY BLOOD GLUCOSE                MEDICATIONS  (STANDING):  ALPRAZolam 0.25 milliGRAM(s) Oral daily  aMIOdarone    Tablet 200 milliGRAM(s) Oral daily  aspirin  chewable 81 milliGRAM(s) Oral daily  cloNIDine 0.2 milliGRAM(s) Oral two times a day  diphenoxylate/atropine 1 Tablet(s) Oral two times a day  DULoxetine 60 milliGRAM(s) Oral daily  ertapenem  IVPB 1000 milliGRAM(s) IV Intermittent every 24 hours  ferrous    sulfate 325 milliGRAM(s) Oral two times a day  heparin   Injectable 5000 Unit(s) SubCutaneous every 8 hours  lisinopril 20 milliGRAM(s) Oral daily  metoprolol succinate ER 25 milliGRAM(s) Oral daily    MEDICATIONS  (PRN):          PHYSICAL EXAM:  GENERAL: NAD, well-groomed, well-developed  HEAD:  Atraumatic, Normocephalic  CHEST/LUNG: Clear to percussion bilaterally; No rales, rhonchi, wheezing, or rubs  HEART: Regular rate and rhythm; No murmurs, rubs, or gallops  ABDOMEN: Soft, Nontender, Nondistended; Bowel sounds present  EXTREMITIES:  2+ Peripheral Pulses, No clubbing, cyanosis, or edema  LYMPH: No lymphadenopathy noted  SKIN: No rashes or lesions    Care Discussed with Consultants/Other Providers [ ] YES  [ ] NO

## 2020-08-08 NOTE — PROGRESS NOTE ADULT - PROBLEM SELECTOR PLAN 1
Non oliguric DURGA with stable renal function with S cr 0.88 likely pre-renal/dehydration/sepsis  - Urinalysis and urine electrolytes reviewed  - Avoid nephrotoxic agents  - Antibiotics as per renal dose adjustment  - Monitor BMP daily  - Volume status and electrolytes noted with improvement  - Continue FLomax with intermittent self catheterization  -Continue ACEI/ARB

## 2020-08-08 NOTE — DISCHARGE NOTE PROVIDER - NSDCMRMEDTOKEN_GEN_ALL_CORE_FT
acetaminophen 325 mg oral tablet: 2 tab(s) orally every 6 hours, As needed, Mild Pain (1 - 3)  amiodarone 200 mg oral tablet: 1 tab(s) orally once a day  Aspirin Enteric Coated 81 mg oral delayed release tablet: 1 tab(s) orally once a day  DULoxetine 60 mg oral delayed release capsule: 1 cap(s) orally once a day  folic acid 1 mg oral tablet: 1 tab(s) orally once a day  metoprolol succinate 25 mg oral tablet, extended release: 0.5 tab(s) orally every 12 hours amiodarone 200 mg oral tablet: 1 tab(s) orally once a day  Aspirin Enteric Coated 81 mg oral delayed release tablet: 1 tab(s) orally once a day  cloNIDine 0.1 mg oral tablet: 1 tab(s) orally once a day x 5 days and then STOP  DULoxetine 60 mg oral delayed release capsule: 1 cap(s) orally once a day  ferrous sulfate 325 mg (65 mg elemental iron) oral tablet: 1 tab(s) orally 2 times a day  folic acid 1 mg oral tablet: 1 tab(s) orally once a day  hydrALAZINE 10 mg oral tablet: 1 tab(s) orally 2 times a day  lactobacillus acidophilus oral capsule: orally 3 times a day  lisinopril 40 mg oral tablet: 1 tab(s) orally once a day  OLANZapine 2.5 mg oral tablet: 1 tab(s) orally once a day (in the evening) amiodarone 200 mg oral tablet: 1 tab(s) orally once a day  Aspirin Enteric Coated 81 mg oral delayed release tablet: 1 tab(s) orally once a day  cloNIDine 0.1 mg oral tablet: 1 tab(s) orally once a day x 5 days and then STOP  diphenoxylate-atropine 2.5 mg-0.025 mg oral tablet: 1 tab(s) orally 2 times a day  DULoxetine 60 mg oral delayed release capsule: 1 cap(s) orally once a day  ferrous sulfate 325 mg (65 mg elemental iron) oral tablet: 1 tab(s) orally 2 times a day  folic acid 1 mg oral tablet: 1 tab(s) orally once a day  hydrALAZINE 10 mg oral tablet: 1 tab(s) orally 2 times a day  lactobacillus acidophilus oral capsule: orally 3 times a day  lisinopril 40 mg oral tablet: 1 tab(s) orally once a day  OLANZapine 2.5 mg oral tablet: 1 tab(s) orally once a day (in the evening)

## 2020-08-08 NOTE — PROGRESS NOTE ADULT - PROBLEM SELECTOR PLAN 3
Hypokalemia now improved with K level 3.9 and Mag 1.9 likely due to GI losses  - Monitor BMP and mag, phos level  - Replete K and Mag with goal K>4.0 and<5.0 and Mag>2.0    Mild Hyponatremia now improved with Na level 135  - Optimal pain control  - Free water restriction to <1 to 1.5 L/day.    Calcium 8.1 with albumin 3.0 and phos level 2.8  Monitor calcium and phos level.

## 2020-08-08 NOTE — DISCHARGE NOTE PROVIDER - NSDCCPCAREPLAN_GEN_ALL_CORE_FT
PRINCIPAL DISCHARGE DIAGNOSIS  Diagnosis: Bacteremia due to Gram-negative bacteria  Assessment and Plan of Treatment: Bacteremia with pyelonephritis   Please continue with ertapenem till 8/19/20( total 14 days)  repeat blood cx NTD   CT abd/pelvis with pyelo, graft site looks stable.   s/p midline, CBC/CMP once a week while on abx, labs to be followed by rehab physician   will need surveillance cx as outpt, you were advised to have repeat blood cx drawn after 2 weeks of therapy if everything is ok.   Please follow up with ID Dr. De Los Santos for repeat blood culture   Please follow up wiht your primary care physician after rehab        SECONDARY DISCHARGE DIAGNOSES  Diagnosis: Diarrhea  Assessment and Plan of Treatment: Diarrhea    Diagnosis: Thoracoabdominal aortic aneurysm (TAAA) without rupture  Assessment and Plan of Treatment: Thoracoabdominal aortic aneurysm (TAAA) without rupture    Diagnosis: DURGA (acute kidney injury)  Assessment and Plan of Treatment: DURGA (acute kidney injury)    Diagnosis: Hypokalemia due to excessive gastrointestinal loss of potassium  Assessment and Plan of Treatment:     Diagnosis: Hypertension  Assessment and Plan of Treatment: Hypertension PRINCIPAL DISCHARGE DIAGNOSIS  Diagnosis: Diarrheal stools  Assessment and Plan of Treatment: C diff negative. Continue lomotol. Follow up with Gastroenterology      SECONDARY DISCHARGE DIAGNOSES  Diagnosis: Bacteremia due to Gram-negative bacteria  Assessment and Plan of Treatment: Bacteremia with pyelonephritis   Please continue with ertapenem till 8/19/20( total 14 days)  repeat blood cx NTD   CT abd/pelvis with pyelo, graft site looks stable.   s/p midline, CBC/CMP once a week while on abx, labs to be followed by rehab physician   will need surveillance cx as outpt, you were advised to have repeat blood cx drawn after 2 weeks of therapy if everything is ok.   Please follow up with ID Dr. De Los Santos for repeat blood culture   Please follow up wiht your primary care physician after rehab      Diagnosis: Hypertension  Assessment and Plan of Treatment: Follow up with your medical doctor to establish long term blood pressure treatment goals.      Diagnosis: DURGA (acute kidney injury)  Assessment and Plan of Treatment: Avoid taking (NSAIDs) - (ex: Ibuprofen, Advil, Celebrex, Naprosyn)  Avoid taking any nephrotoxic agents (can harm kidneys) - Intravenous contrast for diagnostic testing, combination cold medications.  Have all medications adjusted for your renal function by your Health Care Provider.  Blood pressure control is important.  Take all medication as prescribed.      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 Continue lomotol. Follow up with Gastroenterology      SECONDARY DISCHARGE DIAGNOSES  Diagnosis: Bacteremia due to Gram-negative bacteria  Assessment and Plan of Treatment: Bacteremia with pyelonephritis   completed 14 days of  ertapenem   repeat blood cx NTD   CT abd/pelvis with pyelo, graft site looks stable.   s/p midline, CBC/CMP once a week while on abx, labs to be followed by rehab physician   will need surveillance cx as outpt, you were advised to have repeat blood cx drawn after 2 weeks of therapy if everything is ok.   Please follow up with ID Dr. De Los Santos for repeat blood culture   Please follow up wiht your primary care physician after rehab      Diagnosis: Hypertension  Assessment and Plan of Treatment: Follow up with your medical doctor to establish long term blood pressure treatment goals.      Diagnosis: DURGA (acute kidney injury)  Assessment and Plan of Treatment: Avoid taking (NSAIDs) - (ex: Ibuprofen, Advil, Celebrex, Naprosyn)  Avoid taking any nephrotoxic agents (can harm kidneys) - Intravenous contrast for diagnostic testing, combination cold medications.  Have all medications adjusted for your renal function by your Health Care Provider.  Blood pressure control is important.  Take all medication as prescribed.      Diagnosis: Thoracoabdominal aortic aneurysm (TAAA) without rupture  Assessment and Plan of Treatment: Follow up with PMD and vascular    Diagnosis: Delirium due to another medical condition  Assessment and Plan of Treatment: delirium hope dumont clonidine  wean to OFF  titarte off zyprexa once OFF clonidine    Diagnosis: Hypokalemia due to excessive gastrointestinal loss of potassium  Assessment and Plan of Treatment: Repleted PRINCIPAL DISCHARGE DIAGNOSIS  Diagnosis: Diarrheal stools  Assessment and Plan of Treatment: C diff negative. Continue lomotol. Follow up with Gastroenterology      SECONDARY DISCHARGE DIAGNOSES  Diagnosis: Pyelonephritis  Assessment and Plan of Treatment: you developed pyelonephritis from intermittent retention  Straight cath 2 x daily  Follow up with Urology    Diagnosis: Bacteremia due to Gram-negative bacteria  Assessment and Plan of Treatment: Bacteremia with pyelonephritis   completed 14 days of  ertapenem   repeat blood cx NTD   CT abd/pelvis with pyelo, graft site looks stable.   s/p midline, CBC/CMP once a week while on abx, labs to be followed by rehab physician   will need surveillance cx as outpt, you were advised to have repeat blood cx drawn after 2 weeks of therapy if everything is ok.   Please follow up with ID Dr. De Los Santos for repeat blood culture   Please follow up wiht your primary care physician after rehab      Diagnosis: Hypertension  Assessment and Plan of Treatment: Follow up with your medical doctor to establish long term blood pressure treatment goals.      Diagnosis: DURGA (acute kidney injury)  Assessment and Plan of Treatment: Avoid taking (NSAIDs) - (ex: Ibuprofen, Advil, Celebrex, Naprosyn)  Avoid taking any nephrotoxic agents (can harm kidneys) - Intravenous contrast for diagnostic testing, combination cold medications.  Have all medications adjusted for your renal function by your Health Care Provider.  Blood pressure control is important.  Take all medication as prescribed.      Diagnosis: Thoracoabdominal aortic aneurysm (TAAA) without rupture  Assessment and Plan of Treatment: Follow up with PMD and vascular    Diagnosis: Delirium due to another medical condition  Assessment and Plan of Treatment: delirium hope dumont clonidine  wean to OFF  titarte off zyprexa once OFF clonidine    Diagnosis: Hypokalemia due to excessive gastrointestinal loss of potassium  Assessment and Plan of Treatment: Repleted

## 2020-08-08 NOTE — PROGRESS NOTE ADULT - SUBJECTIVE AND OBJECTIVE BOX
Urology Daily Progress Note    SUBJECTIVE:  Pt seen and examined, and is resting comfortably in bed. No acute events overnight. Pt reports that she has had voiding issues since her AAA last year.  Initially was catheterizing >4x per day, but had been weaned to once a day with residuals of ~150-200cc.  Has never seen a urologist.  Currently being managed by spinal injury        OBJECTIVE:  Vital Signs Last 24 Hrs  T(C): 36.7 (08 Aug 2020 12:48), Max: 36.7 (07 Aug 2020 20:43)  T(F): 98.1 (08 Aug 2020 12:48), Max: 98.1 (07 Aug 2020 23:59)  HR: 50 (08 Aug 2020 12:48) (50 - 62)  BP: 136/66 (08 Aug 2020 12:48) (129/66 - 149/64)  BP(mean): --  RR: 18 (08 Aug 2020 12:48) (18 - 18)  SpO2: 97% (08 Aug 2020 12:48) (97% - 99%)    I&O's Detail    07 Aug 2020 07:01  -  08 Aug 2020 07:00  --------------------------------------------------------  IN:    Oral Fluid: 480 mL  Total IN: 480 mL    OUT:    Voided: 800 mL  Total OUT: 800 mL    Total NET: -320 mL      08 Aug 2020 07:01  -  08 Aug 2020 14:25  --------------------------------------------------------  IN:    Oral Fluid: 240 mL  Total IN: 240 mL    OUT:    Intermittent Catheterization - Urethral: 600 mL  Total OUT: 600 mL    Total NET: -360 mL        Exam:  GEN: A&Ox3, NAD  HEENT: atraumatic, normocephalic  RESP: no increased work of breathing, no use of accessory muscles  GI/ABD: soft, NT, ND  : urine via primafit is clear yellow  EXTREMITIES: warm, pink, well-perfused                        10.0   11.65 )-----------( 320      ( 08 Aug 2020 06:19 )             30.5       08-08    135  |  100  |  12  ----------------------------<  97  3.8   |  22  |  0.88    Ca    8.1<L>      08 Aug 2020 06:19  Phos  2.8     08-07  Mg     1.9     08-07

## 2020-08-08 NOTE — PROGRESS NOTE ADULT - SUBJECTIVE AND OBJECTIVE BOX
Subjective: Patient seen and examined. No new events except as noted.     REVIEW OF SYSTEMS:    CONSTITUTIONAL: No weakness, fevers or chills  EYES/ENT: No visual changes;  No vertigo or throat pain   NECK: No pain or stiffness  RESPIRATORY: No cough, wheezing, hemoptysis; No shortness of breath  CARDIOVASCULAR: No chest pain or palpitations  GASTROINTESTINAL: No abdominal or epigastric pain. No nausea, vomiting, or hematemesis; No diarrhea or constipation. No melena or hematochezia.  GENITOURINARY: No dysuria, frequency or hematuria  NEUROLOGICAL: No numbness or weakness  SKIN: No itching, burning, rashes, or lesions   All other review of systems is negative unless indicated above.    MEDICATIONS:  MEDICATIONS  (STANDING):  ALPRAZolam 0.25 milliGRAM(s) Oral daily  aMIOdarone    Tablet 200 milliGRAM(s) Oral daily  aspirin  chewable 81 milliGRAM(s) Oral daily  cloNIDine 0.2 milliGRAM(s) Oral two times a day  diphenoxylate/atropine 1 Tablet(s) Oral two times a day  DULoxetine 60 milliGRAM(s) Oral daily  ertapenem  IVPB 1000 milliGRAM(s) IV Intermittent every 24 hours  ferrous    sulfate 325 milliGRAM(s) Oral two times a day  heparin   Injectable 5000 Unit(s) SubCutaneous every 8 hours  lisinopril 20 milliGRAM(s) Oral daily  metoprolol succinate ER 25 milliGRAM(s) Oral daily      PHYSICAL EXAM:  T(C): 36.7 (08-08-20 @ 20:58), Max: 36.7 (08-07-20 @ 23:59)  HR: 54 (08-08-20 @ 20:58) (50 - 57)  BP: 132/61 (08-08-20 @ 20:58) (129/66 - 157/75)  RR: 18 (08-08-20 @ 20:58) (18 - 18)  SpO2: 98% (08-08-20 @ 20:58) (97% - 99%)  Wt(kg): --  I&O's Summary    07 Aug 2020 07:01  -  08 Aug 2020 07:00  --------------------------------------------------------  IN: 480 mL / OUT: 800 mL / NET: -320 mL    08 Aug 2020 07:01  -  08 Aug 2020 21:38  --------------------------------------------------------  IN: 360 mL / OUT: 1000 mL / NET: -640 mL          Appearance: Normal	  HEENT:   Normal oral mucosa, PERRL, EOMI	  Lymphatic: No lymphadenopathy , no edema  Cardiovascular: Normal S1 S2, No JVD, No murmurs , Peripheral pulses palpable 2+ bilaterally  Respiratory: Lungs clear to auscultation, normal effort 	  Gastrointestinal:  Soft, Non-tender, + BS	  Skin: No rashes, No ecchymoses, No cyanosis, warm to touch  Musculoskeletal: Normal range of motion, normal strength  Psychiatry:  Mood & affect appropriate  Ext: No edema      LABS:    CARDIAC MARKERS:                                10.0   11.65 )-----------( 320      ( 08 Aug 2020 06:19 )             30.5     08-08    135  |  100  |  12  ----------------------------<  97  3.8   |  22  |  0.88    Ca    8.1<L>      08 Aug 2020 06:19  Phos  2.8     08-07  Mg     1.9     08-07      proBNP:   Lipid Profile:   HgA1c:   TSH:             TELEMETRY: 	    ECG:  	  RADIOLOGY:   DIAGNOSTIC TESTING:  [ ] Echocardiogram:  [ ]  Catheterization:  [ ] Stress Test:    OTHER:

## 2020-08-08 NOTE — DISCHARGE NOTE PROVIDER - CARE PROVIDER_API CALL
Sujatha Trejo  UROLOGY  [address] [place], [place] [address]  Phone: [phone]  Fax: [phone]  Follow Up Time: Sujatha Trejo  UROLOGY  [address] [place] [address]  Phone: [phone]  Fax: [phone]  Follow Up Time:     Greg Ruiz  INFECTIOUS DISEASE  [address] [place] [address] [place] [address]  Phone: [phone]  Fax: [phone]  Follow Up Time:

## 2020-08-08 NOTE — PROGRESS NOTE ADULT - SUBJECTIVE AND OBJECTIVE BOX
Kalyan Argueta MD (Nephrology progress note)  [address], [facility],  [address],  [facility]  TEl: [phone]  Cell: [phone]    Patient is a 70y Female seen and evaluated at bedside. Vital signs, laboratory data, imaging studies, consult notes reviewed done within past 24 hours. Overnight events noted. Patient lying in bed in no distress feels better. Denies any chest pain, SOB, palpitations. Mild diarrhea since yesterday. Interval stable electrolytes and renal function.    Allergies    No Known Allergies    Intolerances        ROS:  CONSTITUTIONAL: No fever or chills.  HEENT: No headache or visual disturbances.  RESPIRATORY: No shortness of breath, cough, hemoptysis or wheezing  CARDIOVASCULAR: No Chest pain, shortness of breath, palpitations, dizziness, syncope, orthopnea, PND or leg swelling.  GASTROINTESTINAL: Mild watery non bloody diarrhea but denies abdominal pain, nausea, vomiting, hematemesis or blood per rectum.  GENITOURINARY: No flank or supra pubic pain  NEUROLOGICAL: No headache, focal limb weakness, tingling or numbness SKIN: No skin rash or lesion  MUSCULOSKELETAL: No leg pain, calf pain or swelling    VITALS:    T(C): 36.6 (08-08-20 @ 04:51), Max: 36.7 (08-07-20 @ 20:43)  HR: 56 (08-08-20 @ 04:51) (50 - 62)  BP: 133/61 (08-08-20 @ 04:51) (129/66 - 138/62)  RR: 18 (08-08-20 @ 04:51) (18 - 18)  SpO2: 99% (08-08-20 @ 04:51) (98% - 99%)  CAPILLARY BLOOD GLUCOSE          08-07-20 @ 07:01  -  08-08-20 @ 07:00  --------------------------------------------------------  IN: 480 mL / OUT: 800 mL / NET: -320 mL      MEDICATIONS  (STANDING):  ALPRAZolam 0.25 milliGRAM(s) Oral daily  aMIOdarone    Tablet 200 milliGRAM(s) Oral daily  aspirin  chewable 81 milliGRAM(s) Oral daily  cloNIDine 0.2 milliGRAM(s) Oral two times a day  diphenoxylate/atropine 1 Tablet(s) Oral two times a day  DULoxetine 60 milliGRAM(s) Oral daily  ertapenem  IVPB 1000 milliGRAM(s) IV Intermittent every 24 hours  ferrous    sulfate 325 milliGRAM(s) Oral two times a day  heparin   Injectable 5000 Unit(s) SubCutaneous every 8 hours  lisinopril 20 milliGRAM(s) Oral daily  metoprolol succinate ER 25 milliGRAM(s) Oral daily    MEDICATIONS  (PRN):      PHYSICAL EXAM:  GENERAL: Alert, awake and oriented x3 in no distress  HEENT: AUBREY, EOMI, neck supple, no JVP, no carotid bruit, oral mucosa moist and pink.  CHEST/LUNG: Bilateral clear breath sounds, no rales, no crepitations, no rhonchi, no wheezing  HEART: Regular rate and rhythm, NICANOR II/VI at LPSB, no gallops, no rub   ABDOMEN: Soft, nontender, non distended, bowel sounds present, no palpable organomegaly  : No flank or supra pubic tenderness.  EXTREMITIES: Peripheral pulses are palpable, no pedal edema  Neurology: AAOx3, no focal neurological deficit  SKIN: No rash or skin lesion  Musculoskeletal: No joint deformity or spinal tenderness.      Vascular ACCESS: None    LABS:                        10.0   11.65 )-----------( 320      ( 08 Aug 2020 06:19 )             30.5     08-08    135  |  100  |  12  ----------------------------<  97  3.8   |  22  |  0.88    Ca    8.1<L>      08 Aug 2020 06:19  Phos  2.8     08-07  Mg     1.9     08-07                  RADIOLOGY & ADDITIONAL STUDIES:    Imaging Personally Reviewed:  [x] YES  [ ] NO    Consultant(s) Notes Reviewed:  [x] YES  [ ] NO    Care Discussed with Primary team/ Other Providers [x] YES  [ ] NO

## 2020-08-08 NOTE — DISCHARGE NOTE PROVIDER - PROVIDER TOKENS
PROVIDER:[TOKEN:[[id number]]] PROVIDER:[TOKEN:[[id number]]],PROVIDER:[TOKEN:[[id number]]]

## 2020-08-09 LAB
ANION GAP SERPL CALC-SCNC: 12 MMOL/L — SIGNIFICANT CHANGE UP (ref 5–17)
BUN SERPL-MCNC: 14 MG/DL — SIGNIFICANT CHANGE UP (ref 7–23)
CALCIUM SERPL-MCNC: 8.4 MG/DL — SIGNIFICANT CHANGE UP (ref 8.4–10.5)
CHLORIDE SERPL-SCNC: 99 MMOL/L — SIGNIFICANT CHANGE UP (ref 96–108)
CO2 SERPL-SCNC: 24 MMOL/L — SIGNIFICANT CHANGE UP (ref 22–31)
CREAT SERPL-MCNC: 0.92 MG/DL — SIGNIFICANT CHANGE UP (ref 0.5–1.3)
GLUCOSE SERPL-MCNC: 93 MG/DL — SIGNIFICANT CHANGE UP (ref 70–99)
POTASSIUM SERPL-MCNC: 3.6 MMOL/L — SIGNIFICANT CHANGE UP (ref 3.5–5.3)
POTASSIUM SERPL-SCNC: 3.6 MMOL/L — SIGNIFICANT CHANGE UP (ref 3.5–5.3)
SODIUM SERPL-SCNC: 135 MMOL/L — SIGNIFICANT CHANGE UP (ref 135–145)

## 2020-08-09 RX ORDER — ALPRAZOLAM 0.25 MG
0.25 TABLET ORAL DAILY
Refills: 0 | Status: DISCONTINUED | OUTPATIENT
Start: 2020-08-09 | End: 2020-08-11

## 2020-08-09 RX ADMIN — ERTAPENEM SODIUM 120 MILLIGRAM(S): 1 INJECTION, POWDER, LYOPHILIZED, FOR SOLUTION INTRAMUSCULAR; INTRAVENOUS at 11:42

## 2020-08-09 RX ADMIN — HEPARIN SODIUM 5000 UNIT(S): 5000 INJECTION INTRAVENOUS; SUBCUTANEOUS at 21:00

## 2020-08-09 RX ADMIN — LISINOPRIL 20 MILLIGRAM(S): 2.5 TABLET ORAL at 05:56

## 2020-08-09 RX ADMIN — Medication 0.2 MILLIGRAM(S): at 17:14

## 2020-08-09 RX ADMIN — Medication 325 MILLIGRAM(S): at 17:14

## 2020-08-09 RX ADMIN — Medication 325 MILLIGRAM(S): at 05:56

## 2020-08-09 RX ADMIN — Medication 1 TABLET(S): at 17:16

## 2020-08-09 RX ADMIN — Medication 81 MILLIGRAM(S): at 11:42

## 2020-08-09 RX ADMIN — HEPARIN SODIUM 5000 UNIT(S): 5000 INJECTION INTRAVENOUS; SUBCUTANEOUS at 05:56

## 2020-08-09 RX ADMIN — HEPARIN SODIUM 5000 UNIT(S): 5000 INJECTION INTRAVENOUS; SUBCUTANEOUS at 14:42

## 2020-08-09 RX ADMIN — Medication 0.2 MILLIGRAM(S): at 05:55

## 2020-08-09 RX ADMIN — AMIODARONE HYDROCHLORIDE 200 MILLIGRAM(S): 400 TABLET ORAL at 05:56

## 2020-08-09 RX ADMIN — DULOXETINE HYDROCHLORIDE 60 MILLIGRAM(S): 30 CAPSULE, DELAYED RELEASE ORAL at 11:42

## 2020-08-09 RX ADMIN — Medication 1 TABLET(S): at 05:55

## 2020-08-09 NOTE — PROGRESS NOTE ADULT - SUBJECTIVE AND OBJECTIVE BOX
Patient is a 70y old  Female who presents with a chief complaint of diarrhea (09 Aug 2020 11:58)      INTERVAL HPI/OVERNIGHT EVENTS:  T(C): 36.8 (08-09-20 @ 17:11), Max: 36.8 (08-09-20 @ 00:26)  HR: 52 (08-09-20 @ 17:11) (51 - 65)  BP: 164/65 (08-09-20 @ 17:11) (128/66 - 164/65)  RR: 18 (08-09-20 @ 17:11) (18 - 18)  SpO2: 98% (08-09-20 @ 17:11) (96% - 100%)  Wt(kg): --  I&O's Summary    08 Aug 2020 07:01  -  09 Aug 2020 07:00  --------------------------------------------------------  IN: 600 mL / OUT: 2400 mL / NET: -1800 mL    09 Aug 2020 07:01  -  09 Aug 2020 18:35  --------------------------------------------------------  IN: 460 mL / OUT: 550 mL / NET: -90 mL        LABS:                        10.0   11.65 )-----------( 320      ( 08 Aug 2020 06:19 )             30.5     08-09    135  |  99  |  14  ----------------------------<  93  3.6   |  24  |  0.92    Ca    8.4      09 Aug 2020 07:18          CAPILLARY BLOOD GLUCOSE                MEDICATIONS  (STANDING):  ALPRAZolam 0.25 milliGRAM(s) Oral daily  aMIOdarone    Tablet 200 milliGRAM(s) Oral daily  aspirin  chewable 81 milliGRAM(s) Oral daily  cloNIDine 0.2 milliGRAM(s) Oral two times a day  diphenoxylate/atropine 1 Tablet(s) Oral two times a day  DULoxetine 60 milliGRAM(s) Oral daily  ertapenem  IVPB 1000 milliGRAM(s) IV Intermittent every 24 hours  ferrous    sulfate 325 milliGRAM(s) Oral two times a day  heparin   Injectable 5000 Unit(s) SubCutaneous every 8 hours  lisinopril 20 milliGRAM(s) Oral daily  metoprolol succinate ER 25 milliGRAM(s) Oral daily    MEDICATIONS  (PRN):          PHYSICAL EXAM:  GENERAL: NAD, well-groomed, well-developed  HEAD:  Atraumatic, Normocephalic  CHEST/LUNG: Clear to percussion bilaterally; No rales, rhonchi, wheezing, or rubs  HEART: Regular rate and rhythm; No murmurs, rubs, or gallops  ABDOMEN: Soft, Nontender, Nondistended; Bowel sounds present  EXTREMITIES:  2+ Peripheral Pulses, No clubbing, cyanosis, or edema  LYMPH: No lymphadenopathy noted  SKIN: No rashes or lesions    Care Discussed with Consultants/Other Providers [ ] YES  [ ] NO

## 2020-08-09 NOTE — PROGRESS NOTE ADULT - SUBJECTIVE AND OBJECTIVE BOX
Kalyan Argueta MD (Nephrology progress note)  [address], [facility],  [address],  [facility]  TEl: [phone]  Cell: [phone]    Patient is a 70y Female seen and evaluated at bedside. Vital signs, laboratory data, imaging studies, consult notes reviewed done within past 24 hours. Overnight events noted. Patient lying in bed in no distress feels better. Interval stable renal function and electrolytes. Patient with intermittent retention of urine requiring straight cath.    Allergies    No Known Allergies    Intolerances        ROS:  CONSTITUTIONAL: No fever or chills.  HEENT: No headache or visual disturbances.  RESPIRATORY: No shortness of breath, cough, hemoptysis or wheezing  CARDIOVASCULAR: No Chest pain, shortness of breath, palpitations, dizziness, syncope, orthopnea, PND or leg swelling.  GASTROINTESTINAL: No abdominal pain, nausea, vomiting, diarrhea, hematemesis or blood per rectum.  GENITOURINARY: No urinary frequency, urgency, gross hematuria, dysuria, flank or supra pubic pain, difficulty passing urine.  NEUROLOGICAL: No headache, focal limb weakness, tingling or numbness or seizure like activity  SKIN: No skin rash or lesion  MUSCULOSKELETAL: No leg pain, calf pain or swelling    VITALS:    T(C): 36.6 (08-09-20 @ 04:18), Max: 36.8 (08-09-20 @ 00:26)  HR: 56 (08-09-20 @ 05:52) (50 - 65)  BP: 147/64 (08-09-20 @ 04:18) (132/61 - 157/75)  RR: 18 (08-09-20 @ 04:18) (18 - 18)  SpO2: 96% (08-09-20 @ 04:18) (96% - 100%)  CAPILLARY BLOOD GLUCOSE          08-08-20 @ 07:01  -  08-09-20 @ 07:00  --------------------------------------------------------  IN: 600 mL / OUT: 2400 mL / NET: -1800 mL    08-09-20 @ 07:01  -  08-09-20 @ 11:59  --------------------------------------------------------  IN: 240 mL / OUT: 300 mL / NET: -60 mL      MEDICATIONS  (STANDING):  ALPRAZolam 0.25 milliGRAM(s) Oral daily  aMIOdarone    Tablet 200 milliGRAM(s) Oral daily  aspirin  chewable 81 milliGRAM(s) Oral daily  cloNIDine 0.2 milliGRAM(s) Oral two times a day  diphenoxylate/atropine 1 Tablet(s) Oral two times a day  DULoxetine 60 milliGRAM(s) Oral daily  ertapenem  IVPB 1000 milliGRAM(s) IV Intermittent every 24 hours  ferrous    sulfate 325 milliGRAM(s) Oral two times a day  heparin   Injectable 5000 Unit(s) SubCutaneous every 8 hours  lisinopril 20 milliGRAM(s) Oral daily  metoprolol succinate ER 25 milliGRAM(s) Oral daily    MEDICATIONS  (PRN):      PHYSICAL EXAM:  GENERAL: Alert, awake and oriented x3 in no distress  HEENT: AUBREY, EOMI, neck supple, no JVP, no carotid bruit, oral mucosa moist and pink.  CHEST/LUNG: Bilateral clear breath sounds, no rales, no crepitations, no rhonchi, no wheezing  HEART: Regular rate and rhythm, NICANOR II/VI at LPSB, no gallops, no rub   ABDOMEN: Soft, nontender, non distended, bowel sounds present, no palpable organomegaly  : No flank or supra pubic tenderness.  EXTREMITIES: Peripheral pulses are palpable, no pedal edema  Neurology: AAOx3, no focal neurological deficit  SKIN: No rash or skin lesion  Musculoskeletal: No joint deformity or spinal tenderness.      Vascular ACCESS:     LABS:                        10.0   11.65 )-----------( 320      ( 08 Aug 2020 06:19 )             30.5     08-09    135  |  99  |  14  ----------------------------<  93  3.6   |  24  |  0.92    Ca    8.4      09 Aug 2020 07:18                  RADIOLOGY & ADDITIONAL STUDIES:  None    Imaging Personally Reviewed:  [x] YES  [ ] NO    Consultant(s) Notes Reviewed:  [x] YES  [ ] NO    Care Discussed with Primary team/ Other Providers [x] YES  [ ] NO

## 2020-08-09 NOTE — PROGRESS NOTE ADULT - PROBLEM SELECTOR PLAN 3
Hypokalemia now improved with K level 3.6 and Mag 1.9 likely due to GI losses  - Monitor BMP and mag, phos level  - Replete K and Mag with goal K>4.0 and<5.0 and Mag>2.0    Mild Hyponatremia now resolved with Na level 135  - Optimal pain control  - Free water restriction to <1 to 1.5 L/day.    Calcium 8.4 with albumin 3.0 and phos level 2.8  Monitor calcium and phos level.

## 2020-08-09 NOTE — PROGRESS NOTE ADULT - PROBLEM SELECTOR PLAN 1
- with associated fever and GNR bacteremia   - improving   - CT notable for fluid-filled distended colon c/w diarrhea, no evidence of colitis   - GI pcr negative, stool cultures negative  - Cdiff negative   - c/w lomotil 1 tablet BID   - diet as tolerated

## 2020-08-09 NOTE — PROGRESS NOTE ADULT - PROBLEM SELECTOR PLAN 4
Acute pyelonephritis with E.coli bacteremia and gastroenteritis  - Diarrhea resolved at present.   - Tylenol prn for pain/fever  - Antibiotics adjustment per primary/ID team with renal dose adjustment with IV Invanz.

## 2020-08-09 NOTE — PROGRESS NOTE ADULT - PROBLEM SELECTOR PLAN 1
Non oliguric DURGA now resolved with stable renal function with S cr 0.8 likely pre-renal/dehydration/sepsis  - Urinalysis and urine electrolytes reviewed  - Avoid nephrotoxic agents  - Antibiotics as per renal dose adjustment  - Monitor BMP daily  - Volume status and electrolytes stable  -Continue ACEI/ARB

## 2020-08-09 NOTE — PROGRESS NOTE ADULT - SUBJECTIVE AND OBJECTIVE BOX
Subjective: Patient seen and examined. No new events except as noted.     REVIEW OF SYSTEMS:    CONSTITUTIONAL: No weakness, fevers or chills  EYES/ENT: No visual changes;  No vertigo or throat pain   NECK: No pain or stiffness  RESPIRATORY: No cough, wheezing, hemoptysis; No shortness of breath  CARDIOVASCULAR: No chest pain or palpitations  GASTROINTESTINAL: No abdominal or epigastric pain. No nausea, vomiting, or hematemesis; No diarrhea or constipation. No melena or hematochezia.  GENITOURINARY: No dysuria, frequency or hematuria  NEUROLOGICAL: No numbness or weakness  SKIN: No itching, burning, rashes, or lesions   All other review of systems is negative unless indicated above.    MEDICATIONS:  MEDICATIONS  (STANDING):  ALPRAZolam 0.25 milliGRAM(s) Oral daily  aMIOdarone    Tablet 200 milliGRAM(s) Oral daily  aspirin  chewable 81 milliGRAM(s) Oral daily  cloNIDine 0.2 milliGRAM(s) Oral two times a day  diphenoxylate/atropine 1 Tablet(s) Oral two times a day  DULoxetine 60 milliGRAM(s) Oral daily  ertapenem  IVPB 1000 milliGRAM(s) IV Intermittent every 24 hours  ferrous    sulfate 325 milliGRAM(s) Oral two times a day  heparin   Injectable 5000 Unit(s) SubCutaneous every 8 hours  lisinopril 20 milliGRAM(s) Oral daily  metoprolol succinate ER 25 milliGRAM(s) Oral daily      PHYSICAL EXAM:  T(C): 36.6 (08-09-20 @ 04:18), Max: 36.8 (08-09-20 @ 00:26)  HR: 56 (08-09-20 @ 05:52) (50 - 65)  BP: 147/64 (08-09-20 @ 04:18) (132/61 - 157/75)  RR: 18 (08-09-20 @ 04:18) (18 - 18)  SpO2: 96% (08-09-20 @ 04:18) (96% - 100%)  Wt(kg): --  I&O's Summary    08 Aug 2020 07:01  -  09 Aug 2020 07:00  --------------------------------------------------------  IN: 600 mL / OUT: 2400 mL / NET: -1800 mL    09 Aug 2020 07:01  -  09 Aug 2020 10:37  --------------------------------------------------------  IN: 0 mL / OUT: 300 mL / NET: -300 mL          Appearance: Normal	  HEENT:   Normal oral mucosa, PERRL, EOMI	  Lymphatic: No lymphadenopathy , no edema  Cardiovascular: Normal S1 S2, No JVD, No murmurs , Peripheral pulses palpable 2+ bilaterally  Respiratory: Lungs clear to auscultation, normal effort 	  Gastrointestinal:  Soft, Non-tender, + BS	  Skin: No rashes, No ecchymoses, No cyanosis, warm to touch  Musculoskeletal: Normal range of motion, normal strength  Psychiatry:  Mood & affect appropriate  Ext: No edema      LABS:    CARDIAC MARKERS:                                10.0   11.65 )-----------( 320      ( 08 Aug 2020 06:19 )             30.5     08-09    135  |  99  |  14  ----------------------------<  93  3.6   |  24  |  0.92    Ca    8.4      09 Aug 2020 07:18      proBNP:   Lipid Profile:   HgA1c:   TSH:             TELEMETRY: 	  SR  ECG:  	  RADIOLOGY:   DIAGNOSTIC TESTING:  [ ] Echocardiogram:  [ ]  Catheterization:  [ ] Stress Test:    OTHER:

## 2020-08-09 NOTE — PROGRESS NOTE ADULT - SUBJECTIVE AND OBJECTIVE BOX
INTERVAL HPI/OVERNIGHT EVENTS:    patient seen and examined at bedside  frequency of diarrhea improving; had 1 bm overnight, slowly starting to form   feeling stronger, appetite continues to improve   denies fever, chills, nausea, vomiting, or abdominal pain     MEDICATIONS  (STANDING):  ALPRAZolam 0.25 milliGRAM(s) Oral daily  aMIOdarone    Tablet 200 milliGRAM(s) Oral daily  aspirin  chewable 81 milliGRAM(s) Oral daily  cloNIDine 0.1 milliGRAM(s) Oral two times a day  DULoxetine 60 milliGRAM(s) Oral daily  ferrous    sulfate 325 milliGRAM(s) Oral two times a day  metoprolol succinate ER 25 milliGRAM(s) Oral daily  piperacillin/tazobactam IVPB.. 3.375 Gram(s) IV Intermittent every 8 hours  potassium chloride    Tablet ER 40 milliEquivalent(s) Oral every 4 hours  tamsulosin 0.4 milliGRAM(s) Oral at bedtime    MEDICATIONS  (PRN):      Allergies    No Known Allergies    Intolerances        Review of Systems:    General:  No wt loss, fevers, chills, night sweats,fatigue,   Eyes:  Good vision, no reported pain  ENT:  No sore throat, pain, runny nose, dysphagia  CV:  No pain, palpitatioins, hypo/hypertension  Resp:  No dyspnea, cough, tachypnea, wheezing  GI:  No pain, No nausea, No vomiting, +diarrhea - improving, No constipatiion, No weight loss, No fever, No pruritis, No rectal bleeding, No tarry stools, No dysphagia,  :  No pain, bleeding, incontinence, nocturia  Muscle:  No pain, weakness  Neuro:  No weakness, tingling, memory problems  Psych:  No fatigue, insomnia, mood problems, depression  Endocrine:  No polyuria, polydypsia, cold/heat intolerance  Heme:  No petechiae, ecchymosis, easy bruisability  Skin:  No rash, tattoos, scars, edema      Vital Signs Last 24 Hrs  T(C): 36.4 (04 Aug 2020 13:08), Max: 37.1 (04 Aug 2020 05:02)  T(F): 97.6 (04 Aug 2020 13:08), Max: 98.8 (04 Aug 2020 05:02)  HR: 65 (04 Aug 2020 13:08) (65 - 90)  BP: 171/72 (04 Aug 2020 13:08) (158/68 - 191/84)  BP(mean): --  RR: 18 (04 Aug 2020 13:08) (18 - 18)  SpO2: 96% (04 Aug 2020 13:08) (96% - 100%)    PHYSICAL EXAM:    Constitutional: weak, frail, NAD   HEENT: EOMI, throat clear  Neck: No LAD, supple  Gastrointestinal: BS+, soft, NT/ND  Extremities: No peripheral edema  Neurological: A/O x 3, no focal deficits        LABS:                        12.2   10.93 )-----------( 156      ( 03 Aug 2020 07:28 )             35.2     08-04    132<L>  |  98  |  11  ----------------------------<  129<H>  2.8<LL>   |  20<L>  |  0.89    Ca    8.4      04 Aug 2020 06:57  Phos  1.3     08-04  Mg     1.9     08-04    TPro  6.3  /  Alb  3.0<L>  /  TBili  0.6  /  DBili  x   /  AST  18  /  ALT  16  /  AlkPhos  108  08-03    PT/INR - ( 02 Aug 2020 13:56 )   PT: 13.1 sec;   INR: 1.11 ratio         PTT - ( 02 Aug 2020 13:56 )  PTT:26.1 sec  Urinalysis Basic - ( 03 Aug 2020 00:37 )    Color: Yellow / Appearance: Slightly Turbid / S.015 / pH: x  Gluc: x / Ketone: Trace  / Bili: Negative / Urobili: Negative   Blood: x / Protein: 100 / Nitrite: Negative   Leuk Esterase: Moderate / RBC: 13 /hpf / WBC 36 /HPF   Sq Epi: x / Non Sq Epi: 3 /hpf / Bacteria: Many        RADIOLOGY & ADDITIONAL TESTS:  < from: CT Abdomen and Pelvis w/ Oral Cont and w/ IV Cont (20 @ 18:50) >    EXAM:  CT ABDOMEN AND PELVIS OC IC                            PROCEDURE DATE:  2020            INTERPRETATION:  CLINICAL INFORMATION: History of aortic aneurysm repair. Presents with colitis.    COMPARISON: CT arteriogram chest abdomen pelvisdated 2019.    PROCEDURE:  CT of the Abdomen and Pelvis was performed with intravenous contrast.  Intravenous contrast: 90 ml Omnipaque 350. 10 ml discarded.  Oral contrast: positive contrast was administered.  Sagittal and coronal reformats were performed.    FINDINGS:  LOWER CHEST: Surgical repair thoracoabdominal aorta partially visualized, appears unchanged.    LIVER: Within normal limits.  BILE DUCTS: Normal caliber.  GALLBLADDER: Within normal limits.  SPLEEN: Within normal limits.  PANCREAS: Within normal limits.  ADRENALS: Within normal limits.  KIDNEYS/URETERS: Left renal enlargement with striated nephrogram, perinephric stranding, and urothelial thickening. No hydronephrosis.    BLADDER: Mild bladder wall thickening.  REPRODUCTIVE ORGANS: Adnexa appear unremarkable.    BOWEL: No bowel obstruction. Moderate fluid-filled distention of colon consistent with diarrhea. No mural thickening. Appendix normal.  PERITONEUM: No ascites.  VESSELS: Surgical graft repair suprarenal abdominal aorta unchanged. Maximal diameter of abdominal aorta 3.1 cm. Bypass grafts of the celiac and superior mesenteric arteries are patent. Native renal arteries and AIDAN appear patent.  Lack of enhancement of the right external iliac and common femoralveins.  RETROPERITONEUM/LYMPH NODES: No lymphadenopathy.  ABDOMINAL WALL: Within normal limits.  BONES: Disc degeneration lumbosacral junction.    IMPRESSION:  Left-sided pyelonephritis and possible cystitis.    Interval stability in appearance of surgical graft repair of thoracoabdominal aorta and mesenteric bypass grafts    No CT evidence of colitis.    Failure of enhancement of the right common femoral and external iliac veins. Suggest venous duplex study to assess for DVT.    Examination findings were conveyed to physician's assistant Jorge by Dr. Chacon at 1937 hours on 8/3/2020 with read back.                  JAQUELINE BRADLEY M.D., ATTENDING RADIOLOGIST  This document has been electronically signed. Aug  4 2020  9:23AM                < end of copied text >

## 2020-08-10 LAB
ANION GAP SERPL CALC-SCNC: 12 MMOL/L — SIGNIFICANT CHANGE UP (ref 5–17)
BUN SERPL-MCNC: 11 MG/DL — SIGNIFICANT CHANGE UP (ref 7–23)
CALCIUM SERPL-MCNC: 9 MG/DL — SIGNIFICANT CHANGE UP (ref 8.4–10.5)
CHLORIDE SERPL-SCNC: 99 MMOL/L — SIGNIFICANT CHANGE UP (ref 96–108)
CO2 SERPL-SCNC: 23 MMOL/L — SIGNIFICANT CHANGE UP (ref 22–31)
CREAT SERPL-MCNC: 0.89 MG/DL — SIGNIFICANT CHANGE UP (ref 0.5–1.3)
GLUCOSE SERPL-MCNC: 100 MG/DL — HIGH (ref 70–99)
HCT VFR BLD CALC: 30.5 % — LOW (ref 34.5–45)
HGB BLD-MCNC: 9.8 G/DL — LOW (ref 11.5–15.5)
MAGNESIUM SERPL-MCNC: 1.9 MG/DL — SIGNIFICANT CHANGE UP (ref 1.6–2.6)
MCHC RBC-ENTMCNC: 30.2 PG — SIGNIFICANT CHANGE UP (ref 27–34)
MCHC RBC-ENTMCNC: 32.1 GM/DL — SIGNIFICANT CHANGE UP (ref 32–36)
MCV RBC AUTO: 94.1 FL — SIGNIFICANT CHANGE UP (ref 80–100)
NRBC # BLD: 0 /100 WBCS — SIGNIFICANT CHANGE UP (ref 0–0)
PHOSPHATE SERPL-MCNC: 2.9 MG/DL — SIGNIFICANT CHANGE UP (ref 2.5–4.5)
PLATELET # BLD AUTO: 406 K/UL — HIGH (ref 150–400)
POTASSIUM SERPL-MCNC: 3.8 MMOL/L — SIGNIFICANT CHANGE UP (ref 3.5–5.3)
POTASSIUM SERPL-SCNC: 3.8 MMOL/L — SIGNIFICANT CHANGE UP (ref 3.5–5.3)
RBC # BLD: 3.24 M/UL — LOW (ref 3.8–5.2)
RBC # FLD: 13.4 % — SIGNIFICANT CHANGE UP (ref 10.3–14.5)
SODIUM SERPL-SCNC: 134 MMOL/L — LOW (ref 135–145)
WBC # BLD: 12.91 K/UL — HIGH (ref 3.8–10.5)
WBC # FLD AUTO: 12.91 K/UL — HIGH (ref 3.8–10.5)

## 2020-08-10 PROCEDURE — 99232 SBSQ HOSP IP/OBS MODERATE 35: CPT

## 2020-08-10 RX ORDER — POTASSIUM CHLORIDE 20 MEQ
40 PACKET (EA) ORAL ONCE
Refills: 0 | Status: COMPLETED | OUTPATIENT
Start: 2020-08-10 | End: 2020-08-10

## 2020-08-10 RX ORDER — LACTOBACILLUS ACIDOPHILUS 100MM CELL
1 CAPSULE ORAL THREE TIMES A DAY
Refills: 0 | Status: DISCONTINUED | OUTPATIENT
Start: 2020-08-10 | End: 2020-08-10

## 2020-08-10 RX ORDER — LACTOBACILLUS ACIDOPHILUS 100MM CELL
1 CAPSULE ORAL THREE TIMES A DAY
Refills: 0 | Status: DISCONTINUED | OUTPATIENT
Start: 2020-08-10 | End: 2020-08-21

## 2020-08-10 RX ADMIN — Medication 81 MILLIGRAM(S): at 12:13

## 2020-08-10 RX ADMIN — Medication 1 TABLET(S): at 21:04

## 2020-08-10 RX ADMIN — HEPARIN SODIUM 5000 UNIT(S): 5000 INJECTION INTRAVENOUS; SUBCUTANEOUS at 14:16

## 2020-08-10 RX ADMIN — Medication 1 TABLET(S): at 05:01

## 2020-08-10 RX ADMIN — Medication 325 MILLIGRAM(S): at 05:01

## 2020-08-10 RX ADMIN — LISINOPRIL 20 MILLIGRAM(S): 2.5 TABLET ORAL at 05:01

## 2020-08-10 RX ADMIN — Medication 325 MILLIGRAM(S): at 17:41

## 2020-08-10 RX ADMIN — Medication 0.2 MILLIGRAM(S): at 17:41

## 2020-08-10 RX ADMIN — Medication 1 TABLET(S): at 16:32

## 2020-08-10 RX ADMIN — HEPARIN SODIUM 5000 UNIT(S): 5000 INJECTION INTRAVENOUS; SUBCUTANEOUS at 05:01

## 2020-08-10 RX ADMIN — Medication 25 MILLIGRAM(S): at 05:01

## 2020-08-10 RX ADMIN — DULOXETINE HYDROCHLORIDE 60 MILLIGRAM(S): 30 CAPSULE, DELAYED RELEASE ORAL at 12:13

## 2020-08-10 RX ADMIN — Medication 1 TABLET(S): at 17:41

## 2020-08-10 RX ADMIN — HEPARIN SODIUM 5000 UNIT(S): 5000 INJECTION INTRAVENOUS; SUBCUTANEOUS at 21:04

## 2020-08-10 RX ADMIN — Medication 0.2 MILLIGRAM(S): at 05:01

## 2020-08-10 RX ADMIN — ERTAPENEM SODIUM 120 MILLIGRAM(S): 1 INJECTION, POWDER, LYOPHILIZED, FOR SOLUTION INTRAMUSCULAR; INTRAVENOUS at 12:11

## 2020-08-10 RX ADMIN — Medication 40 MILLIEQUIVALENT(S): at 05:02

## 2020-08-10 RX ADMIN — AMIODARONE HYDROCHLORIDE 200 MILLIGRAM(S): 400 TABLET ORAL at 05:01

## 2020-08-10 NOTE — PROGRESS NOTE ADULT - PROBLEM SELECTOR PLAN 3
Hypokalemia now improved with K level 3.9 and Mag 1.9 likely due to GI losses  - Monitor BMP and mag, phos level  - Replete K and Mag with goal K>4.0 and<5.0 and Mag>2.0    Mild Hyponatremia now resolved with Na level 134  - Optimal pain control  - Free water restriction to <1 to 1.5 L/day.    Calcium 9 with albumin 3.0 and phos level 2.9  Monitor calcium and phos level.

## 2020-08-10 NOTE — PROGRESS NOTE ADULT - PROBLEM SELECTOR PLAN 2
Patient with prior history of hypertension with stable blood pressure ranging from 150 to 160's  Low salt diet  Can increase lisinopril to 40 mg po daily with holding parameters  Continue metoprolol ER to 25 mg po daily  Continue clonidine 0.2 mg po bid  Monitor vital signs.

## 2020-08-10 NOTE — PROGRESS NOTE ADULT - PROBLEM SELECTOR PLAN 2
- secondary to dehydration in the setting of profuse diarrhea - improved  - monitor electrolytes, replete prn  - Nephrology following

## 2020-08-10 NOTE — PROGRESS NOTE ADULT - PROBLEM SELECTOR PLAN 4
Acute pyelonephritis with E.coli bacteremia and gastroenteritis  - still intermittent diarrhea  - Tylenol prn for pain/fever  - Antibiotics adjustment per primary/ID team with renal dose adjustment with IV Invanz.

## 2020-08-10 NOTE — PROGRESS NOTE ADULT - PROBLEM SELECTOR PLAN 1
- with associated fever and GNR bacteremia   - had been improving, now patient with 3 loose BMs this morning  - will add Bacid TID   - CT notable for fluid-filled distended colon c/w diarrhea, no evidence of colitis   - GI pcr negative, stool cultures negative  - Cdiff negative   - c/w lomotil 1 tablet BID   - diet as tolerated

## 2020-08-10 NOTE — PROGRESS NOTE ADULT - SUBJECTIVE AND OBJECTIVE BOX
Kalyan Argueta MD (Nephrology progress note)  [address], [facility],  [id number],  [id number]  TEl: [phone]  Cell: [phone]    Patient is a 70y Female seen and evaluated at bedside. Vital signs, laboratory data, imaging studies, consult notes reviewed done within past 24 hours. Overnight events noted. Patient lying in bed in no distress still complains of diarrhea and loose watery bowel movements. Interval stable renal function and electrolytes now remains on IV invanz for pyelonephritis aand gastroenteritis.    Allergies    No Known Allergies    Intolerances        ROS:  CONSTITUTIONAL: No fever or chills.  HEENT: No headache or visual disturbances.  RESPIRATORY: No shortness of breath, cough, hemoptysis or wheezing  CARDIOVASCULAR: No Chest pain, shortness of breath, palpitations, dizziness, syncope, orthopnea, PND or leg swelling.  GASTROINTESTINAL: No abdominal pain, nausea, vomiting, hematemesis or blood per rectum.  GENITOURINARY: No urinary frequency, urgency, gross hematuria, dysuria, flank or supra pubic pain  NEUROLOGICAL: No headache, focal limb weakness, tingling or numbness or seizure like activity  SKIN: No skin rash or lesion  MUSCULOSKELETAL: No leg pain, calf pain or swelling    VITALS:    T(C): 36.6 (08-10-20 @ 08:40), Max: 36.9 (08-09-20 @ 21:01)  HR: 52 (08-10-20 @ 08:40) (51 - 60)  BP: 160/68 (08-10-20 @ 08:40) (128/66 - 164/65)  RR: 18 (08-10-20 @ 08:40) (18 - 18)  SpO2: 99% (08-10-20 @ 08:40) (97% - 99%)  CAPILLARY BLOOD GLUCOSE          08-09-20 @ 07:01  -  08-10-20 @ 07:00  --------------------------------------------------------  IN: 510 mL / OUT: 1300 mL / NET: -790 mL      MEDICATIONS  (STANDING):  ALPRAZolam 0.25 milliGRAM(s) Oral daily  aMIOdarone    Tablet 200 milliGRAM(s) Oral daily  aspirin  chewable 81 milliGRAM(s) Oral daily  cloNIDine 0.2 milliGRAM(s) Oral two times a day  diphenoxylate/atropine 1 Tablet(s) Oral two times a day  DULoxetine 60 milliGRAM(s) Oral daily  ertapenem  IVPB 1000 milliGRAM(s) IV Intermittent every 24 hours  ferrous    sulfate 325 milliGRAM(s) Oral two times a day  heparin   Injectable 5000 Unit(s) SubCutaneous every 8 hours  lisinopril 20 milliGRAM(s) Oral daily  metoprolol succinate ER 25 milliGRAM(s) Oral daily    MEDICATIONS  (PRN):      PHYSICAL EXAM:  GENERAL: Alert, awake and oriented x3 in no distress  HEENT: AUBREY, EOMI, neck supple, no JVP, no carotid bruit, oral mucosa moist and pink.  CHEST/LUNG: Bilateral clear breath sounds, no rales, no crepitations, no rhonchi, no wheezing  HEART: Regular rate and rhythm, NICANOR II/VI at LPSB, no gallops, no rub   ABDOMEN: Soft, nontender, non distended, bowel sounds present, no palpable organomegaly  : No flank or supra pubic tenderness.  EXTREMITIES: Peripheral pulses are palpable, no pedal edema  Neurology: AAOx3, no focal neurological deficit  SKIN: No rash or skin lesion  Musculoskeletal: No joint deformity.      Vascular ACCESS:     LABS:                        9.8    12.91 )-----------( 406      ( 10 Aug 2020 07:09 )             30.5     08-10    134<L>  |  99  |  11  ----------------------------<  100<H>  3.8   |  23  |  0.89    Ca    9.0      10 Aug 2020 07:09  Phos  2.9     08-10  Mg     1.9     08-10    RADIOLOGY & ADDITIONAL STUDIES:    Imaging Personally Reviewed:  [x] YES  [ ] NO    Consultant(s) Notes Reviewed:  [x] YES  [ ] NO    Care Discussed with Primary team/ Other Providers [x] YES  [ ] NO

## 2020-08-10 NOTE — PROGRESS NOTE ADULT - SUBJECTIVE AND OBJECTIVE BOX
Patient is a 70y old  Female who presents with a chief complaint of diarrhea (10 Aug 2020 16:11)      INTERVAL HPI/OVERNIGHT EVENTS:  T(C): 36.4 (08-10-20 @ 13:46), Max: 36.9 (08-09-20 @ 21:01)  HR: 54 (08-10-20 @ 16:41) (50 - 60)  BP: 145/55 (08-10-20 @ 16:41) (119/64 - 160/68)  RR: 17 (08-10-20 @ 13:46) (17 - 18)  SpO2: 98% (08-10-20 @ 16:41) (97% - 99%)  Wt(kg): --  I&O's Summary    09 Aug 2020 07:01  -  10 Aug 2020 07:00  --------------------------------------------------------  IN: 510 mL / OUT: 1300 mL / NET: -790 mL    10 Aug 2020 07:01  -  10 Aug 2020 17:50  --------------------------------------------------------  IN: 720 mL / OUT: 475 mL / NET: 245 mL        LABS:                        9.8    12.91 )-----------( 406      ( 10 Aug 2020 07:09 )             30.5     08-10    134<L>  |  99  |  11  ----------------------------<  100<H>  3.8   |  23  |  0.89    Ca    9.0      10 Aug 2020 07:09  Phos  2.9     08-10  Mg     1.9     08-10          CAPILLARY BLOOD GLUCOSE                MEDICATIONS  (STANDING):  ALPRAZolam 0.25 milliGRAM(s) Oral daily  aMIOdarone    Tablet 200 milliGRAM(s) Oral daily  aspirin  chewable 81 milliGRAM(s) Oral daily  cloNIDine 0.2 milliGRAM(s) Oral two times a day  diphenoxylate/atropine 1 Tablet(s) Oral two times a day  DULoxetine 60 milliGRAM(s) Oral daily  ertapenem  IVPB 1000 milliGRAM(s) IV Intermittent every 24 hours  ferrous    sulfate 325 milliGRAM(s) Oral two times a day  heparin   Injectable 5000 Unit(s) SubCutaneous every 8 hours  lactobacillus acidophilus 1 Tablet(s) Oral three times a day  lisinopril 20 milliGRAM(s) Oral daily  metoprolol succinate ER 25 milliGRAM(s) Oral daily    MEDICATIONS  (PRN):          PHYSICAL EXAM:  GENERAL: NAD, well-groomed, well-developed  HEAD:  Atraumatic, Normocephalic  CHEST/LUNG: Clear to percussion bilaterally; No rales, rhonchi, wheezing, or rubs  HEART: Regular rate and rhythm; No murmurs, rubs, or gallops  ABDOMEN: Soft, Nontender, Nondistended; Bowel sounds present  EXTREMITIES:  2+ Peripheral Pulses, No clubbing, cyanosis, or edema  LYMPH: No lymphadenopathy noted  SKIN: No rashes or lesions    Care Discussed with Consultants/Other Providers [ ] YES  [ ] NO

## 2020-08-10 NOTE — PROGRESS NOTE ADULT - SUBJECTIVE AND OBJECTIVE BOX
Subjective: Patient seen and examined. No new events except as noted.   resting comfortably     REVIEW OF SYSTEMS:    CONSTITUTIONAL: + weakness, fevers or chills  EYES/ENT: No visual changes;  No vertigo or throat pain   NECK: No pain or stiffness  RESPIRATORY: No cough, wheezing, hemoptysis; No shortness of breath  CARDIOVASCULAR: No chest pain or palpitations  GASTROINTESTINAL: No abdominal or epigastric pain. No nausea, vomiting, or hematemesis; No diarrhea or constipation. No melena or hematochezia.  GENITOURINARY: No dysuria, frequency or hematuria  NEUROLOGICAL: No numbness or weakness  SKIN: No itching, burning, rashes, or lesions   All other review of systems is negative unless indicated above.    MEDICATIONS:  MEDICATIONS  (STANDING):  ALPRAZolam 0.25 milliGRAM(s) Oral daily  aMIOdarone    Tablet 200 milliGRAM(s) Oral daily  aspirin  chewable 81 milliGRAM(s) Oral daily  cloNIDine 0.2 milliGRAM(s) Oral two times a day  diphenoxylate/atropine 1 Tablet(s) Oral two times a day  DULoxetine 60 milliGRAM(s) Oral daily  ertapenem  IVPB 1000 milliGRAM(s) IV Intermittent every 24 hours  ferrous    sulfate 325 milliGRAM(s) Oral two times a day  heparin   Injectable 5000 Unit(s) SubCutaneous every 8 hours  lisinopril 20 milliGRAM(s) Oral daily  metoprolol succinate ER 25 milliGRAM(s) Oral daily      PHYSICAL EXAM:  T(C): 36.6 (08-10-20 @ 08:40), Max: 36.9 (08-09-20 @ 21:01)  HR: 52 (08-10-20 @ 08:40) (51 - 60)  BP: 160/68 (08-10-20 @ 08:40) (128/66 - 164/65)  RR: 18 (08-10-20 @ 08:40) (18 - 18)  SpO2: 99% (08-10-20 @ 08:40) (97% - 99%)  Wt(kg): --  I&O's Summary    09 Aug 2020 07:01  -  10 Aug 2020 07:00  --------------------------------------------------------  IN: 510 mL / OUT: 1300 mL / NET: -790 mL          Appearance: Normal	  HEENT:   Normal oral mucosa, PERRL, EOMI	  Lymphatic: No lymphadenopathy , no edema  Cardiovascular: Normal S1 S2, No JVD, No murmurs , Peripheral pulses palpable 2+ bilaterally  Respiratory: Lungs clear to auscultation, normal effort 	  Gastrointestinal:  Soft, Non-tender, + BS	  Skin: No rashes, No ecchymoses, No cyanosis, warm to touch  Musculoskeletal: Normal range of motion, normal strength  Psychiatry:  Mood & affect appropriate  Ext: No edema      LABS:    CARDIAC MARKERS:                                9.8    12.91 )-----------( 406      ( 10 Aug 2020 07:09 )             30.5     08-10    134<L>  |  99  |  11  ----------------------------<  100<H>  3.8   |  23  |  0.89    Ca    9.0      10 Aug 2020 07:09  Phos  2.9     08-10  Mg     1.9     08-10      proBNP:   Lipid Profile:   HgA1c:   TSH:             TELEMETRY: 	    ECG:  	  RADIOLOGY:   DIAGNOSTIC TESTING:  [ ] Echocardiogram:  [ ]  Catheterization:  [ ] Stress Test:    OTHER:

## 2020-08-10 NOTE — PROGRESS NOTE ADULT - SUBJECTIVE AND OBJECTIVE BOX
INTERVAL HPI/OVERNIGHT EVENTS:    patient seen and examined at bedside  feeling stronger, appetite continues to improve  diarrhea had been improving, now with 3 loose BMs this morning    denies fever, chills, nausea, vomiting, or abdominal pain     MEDICATIONS  (STANDING):  ALPRAZolam 0.25 milliGRAM(s) Oral daily  aMIOdarone    Tablet 200 milliGRAM(s) Oral daily  aspirin  chewable 81 milliGRAM(s) Oral daily  cloNIDine 0.1 milliGRAM(s) Oral two times a day  DULoxetine 60 milliGRAM(s) Oral daily  ferrous    sulfate 325 milliGRAM(s) Oral two times a day  metoprolol succinate ER 25 milliGRAM(s) Oral daily  piperacillin/tazobactam IVPB.. 3.375 Gram(s) IV Intermittent every 8 hours  potassium chloride    Tablet ER 40 milliEquivalent(s) Oral every 4 hours  tamsulosin 0.4 milliGRAM(s) Oral at bedtime    MEDICATIONS  (PRN):      Allergies    No Known Allergies    Intolerances        Review of Systems:    General:  No wt loss, fevers, chills, night sweats,fatigue,   Eyes:  Good vision, no reported pain  ENT:  No sore throat, pain, runny nose, dysphagia  CV:  No pain, palpitatioins, hypo/hypertension  Resp:  No dyspnea, cough, tachypnea, wheezing  GI:  No pain, No nausea, No vomiting, +diarrhea, No constipatiion, No weight loss, No fever, No pruritis, No rectal bleeding, No tarry stools, No dysphagia,  :  No pain, bleeding, incontinence, nocturia  Muscle:  No pain, weakness  Neuro:  No weakness, tingling, memory problems  Psych:  No fatigue, insomnia, mood problems, depression  Endocrine:  No polyuria, polydypsia, cold/heat intolerance  Heme:  No petechiae, ecchymosis, easy bruisability  Skin:  No rash, tattoos, scars, edema      Vital Signs Last 24 Hrs  T(C): 36.4 (04 Aug 2020 13:08), Max: 37.1 (04 Aug 2020 05:02)  T(F): 97.6 (04 Aug 2020 13:08), Max: 98.8 (04 Aug 2020 05:02)  HR: 65 (04 Aug 2020 13:08) (65 - 90)  BP: 171/72 (04 Aug 2020 13:08) (158/68 - 191/84)  BP(mean): --  RR: 18 (04 Aug 2020 13:08) (18 - 18)  SpO2: 96% (04 Aug 2020 13:08) (96% - 100%)    PHYSICAL EXAM:    Constitutional: NAD   HEENT: EOMI, throat clear  Neck: No LAD, supple  Gastrointestinal: BS+, soft, NT/ND  Extremities: No peripheral edema  Neurological: A/O x 3, no focal deficits        LABS:                        12.2   10.93 )-----------( 156      ( 03 Aug 2020 07:28 )             35.2     08-04    132<L>  |  98  |  11  ----------------------------<  129<H>  2.8<LL>   |  20<L>  |  0.89    Ca    8.4      04 Aug 2020 06:57  Phos  1.3     08-04  Mg     1.9     08-04    TPro  6.3  /  Alb  3.0<L>  /  TBili  0.6  /  DBili  x   /  AST  18  /  ALT  16  /  AlkPhos  108  08-03    PT/INR - ( 02 Aug 2020 13:56 )   PT: 13.1 sec;   INR: 1.11 ratio         PTT - ( 02 Aug 2020 13:56 )  PTT:26.1 sec  Urinalysis Basic - ( 03 Aug 2020 00:37 )    Color: Yellow / Appearance: Slightly Turbid / S.015 / pH: x  Gluc: x / Ketone: Trace  / Bili: Negative / Urobili: Negative   Blood: x / Protein: 100 / Nitrite: Negative   Leuk Esterase: Moderate / RBC: 13 /hpf / WBC 36 /HPF   Sq Epi: x / Non Sq Epi: 3 /hpf / Bacteria: Many        RADIOLOGY & ADDITIONAL TESTS:  < from: CT Abdomen and Pelvis w/ Oral Cont and w/ IV Cont (20 @ 18:50) >    EXAM:  CT ABDOMEN AND PELVIS OC IC                            PROCEDURE DATE:  2020            INTERPRETATION:  CLINICAL INFORMATION: History of aortic aneurysm repair. Presents with colitis.    COMPARISON: CT arteriogram chest abdomen pelvisdated 2019.    PROCEDURE:  CT of the Abdomen and Pelvis was performed with intravenous contrast.  Intravenous contrast: 90 ml Omnipaque 350. 10 ml discarded.  Oral contrast: positive contrast was administered.  Sagittal and coronal reformats were performed.    FINDINGS:  LOWER CHEST: Surgical repair thoracoabdominal aorta partially visualized, appears unchanged.    LIVER: Within normal limits.  BILE DUCTS: Normal caliber.  GALLBLADDER: Within normal limits.  SPLEEN: Within normal limits.  PANCREAS: Within normal limits.  ADRENALS: Within normal limits.  KIDNEYS/URETERS: Left renal enlargement with striated nephrogram, perinephric stranding, and urothelial thickening. No hydronephrosis.    BLADDER: Mild bladder wall thickening.  REPRODUCTIVE ORGANS: Adnexa appear unremarkable.    BOWEL: No bowel obstruction. Moderate fluid-filled distention of colon consistent with diarrhea. No mural thickening. Appendix normal.  PERITONEUM: No ascites.  VESSELS: Surgical graft repair suprarenal abdominal aorta unchanged. Maximal diameter of abdominal aorta 3.1 cm. Bypass grafts of the celiac and superior mesenteric arteries are patent. Native renal arteries and AIDAN appear patent.  Lack of enhancement of the right external iliac and common femoralveins.  RETROPERITONEUM/LYMPH NODES: No lymphadenopathy.  ABDOMINAL WALL: Within normal limits.  BONES: Disc degeneration lumbosacral junction.    IMPRESSION:  Left-sided pyelonephritis and possible cystitis.    Interval stability in appearance of surgical graft repair of thoracoabdominal aorta and mesenteric bypass grafts    No CT evidence of colitis.    Failure of enhancement of the right common femoral and external iliac veins. Suggest venous duplex study to assess for DVT.    Examination findings were conveyed to physician's assistant Jorge by Dr. Chacon at 1937 hours on 8/3/2020 with read back.                  JAQUELINE BRADLEY M.D., ATTENDING RADIOLOGIST  This document has been electronically signed. Aug  4 2020  9:23AM                < end of copied text >

## 2020-08-10 NOTE — PROGRESS NOTE ADULT - SUBJECTIVE AND OBJECTIVE BOX
Follow Up:  bacteremia, pyelonephritis, diarrhea    Interval History: pt afebrile but still has diarrhea, 4 episodes until noon    ROS:      All other systems negative    Constitutional: no fever, no chills  Respiratory:  no SOB, no cough  GI:  no abd pain, no vomiting, + diarrhea  urinary: no dysuria, no hematuria, no flank pain  musculoskeletal:  no joint pain, no joint swelling  skin:  no rash  neurology:  no headache, no seizure      Allergies  No Known Allergies        ANTIMICROBIALS:  ertapenem  IVPB 1000 every 24 hours      OTHER MEDS:  ALPRAZolam 0.25 milliGRAM(s) Oral daily  aMIOdarone    Tablet 200 milliGRAM(s) Oral daily  aspirin  chewable 81 milliGRAM(s) Oral daily  cloNIDine 0.2 milliGRAM(s) Oral two times a day  diphenoxylate/atropine 1 Tablet(s) Oral two times a day  DULoxetine 60 milliGRAM(s) Oral daily  ferrous    sulfate 325 milliGRAM(s) Oral two times a day  heparin   Injectable 5000 Unit(s) SubCutaneous every 8 hours  lactobacillus acidophilus 1 Tablet(s) Oral three times a day  lisinopril 20 milliGRAM(s) Oral daily  metoprolol succinate ER 25 milliGRAM(s) Oral daily      Vital Signs Last 24 Hrs  T(C): 36.4 (10 Aug 2020 13:46), Max: 36.9 (09 Aug 2020 21:01)  T(F): 97.5 (10 Aug 2020 13:46), Max: 98.4 (09 Aug 2020 21:01)  HR: 50 (10 Aug 2020 13:46) (50 - 60)  BP: 119/64 (10 Aug 2020 13:46) (119/64 - 164/65)  BP(mean): --  RR: 17 (10 Aug 2020 13:46) (17 - 18)  SpO2: 99% (10 Aug 2020 13:46) (97% - 99%)    Physical Exam:  General:    NAD,  non toxic  Cardio:     regular S1, S2  Respiratory:    clear b/l,    no wheezing  abd:     soft,   BS +,   no tenderness  :   no CVAT,  no suprapubic tenderness,   no  muñoz  Musculoskeletal:   no joint swelling,   no edema  vascular: no phlebitis  Skin:    no rash  Neurologic:     no focal deficit                            9.8    12.91 )-----------( 406      ( 10 Aug 2020 07:09 )             30.5       08-10    134<L>  |  99  |  11  ----------------------------<  100<H>  3.8   |  23  |  0.89    Ca    9.0      10 Aug 2020 07:09  Phos  2.9     08-10  Mg     1.9     08-10            MICROBIOLOGY:  v  .Blood Blood-Peripheral  08-06-20   No growth to date.  --  --      .Blood Blood-Venous  08-04-20   Growth in aerobic and anaerobic bottles: Escherichia coli ESBL  See previous culture [id number]  --    Growth in anaerobic bottle: Gram Negative Rods  Growth in aerobic bottle: Gram Negative Rods      .Stool Feces  08-04-20   GI PCR Results: NOT detected  *******Please Note:*******  GI panel PCR evaluates for:  Campylobacter, Plesiomonas shigelloides, Salmonella,  Vibrio, Yersinia enterocolitica, Enteroaggregative  Escherichia coli (EAEC), Enteropathogenic E.coli (EPEC),  Enterotoxigenic E. coli (ETEC) lt/st, Shiga-like  toxin-producing E. coli (STEC) stx1/stx2,  Shigella/ Enteroinvasive E. coli (EIEC), Cryptosporidium,  Cyclospora cayetanensis, Entamoeba histolytica,  Giardia lamblia, Adenovirus F 40/41, Astrovirus,  Norovirus GI/GII, Rotavirus A, Sapovirus  --  --      .Stool Feces  08-04-20   No enteric pathogens isolated.  (Stool culture examined for Salmonella,  Shigella, Campylobacter, Aeromonas, Plesiomonas,  Vibrio, E.coli O157 and Yersinia)  --  --      .Urine Clean Catch (Midstream)  08-03-20   >100,000 CFU/ml Escherichia coli ESBL  >100,000 CFU/ml Klebsiella pneumoniae  --  Escherichia coli ESBL  Klebsiella pneumoniae      .Blood Blood-Peripheral  08-02-20   Growth in aerobic and anaerobic bottles: Escherichia coli ESBL  See previous culture [id number]  --  Blood Culture PCR  Escherichia coli ESBL            Clostridium difficile [facility] Toxins A&amp;B, EIA:   Negative (08-04-20 @ 21:49)  Clostridium difficile [facility] Interpretation: Negative for toxigenic C. Difficile.     RADIOLOGY:  Images below independently visualized and reviewed personally, findings as below  < from: [facility] Duplex Lower Ext Vein Scan, Bilat (08.05.20 @ 16:51) >  IMPRESSION:  No evidence of deep venous thrombosis in either lower extremity.    The right small saphenous vein, a superficial vein, is thrombosed in the calf.    < from: CT Abdomen and Pelvis w/ Oral Cont and w/ IV Cont (08.03.20 @ 18:50) >  IMPRESSION:  Left-sided pyelonephritis and possible cystitis.    Interval stability in appearance of surgical graft repair of thoracoabdominal aorta and mesenteric bypass grafts    No CT evidence of colitis.    Failure of enhancement of the right common femoral and external iliac veins. Suggest venous duplex study to assess for DVT.

## 2020-08-11 LAB
ANION GAP SERPL CALC-SCNC: 12 MMOL/L — SIGNIFICANT CHANGE UP (ref 5–17)
BUN SERPL-MCNC: 12 MG/DL — SIGNIFICANT CHANGE UP (ref 7–23)
CALCIUM SERPL-MCNC: 8.8 MG/DL — SIGNIFICANT CHANGE UP (ref 8.4–10.5)
CHLORIDE SERPL-SCNC: 100 MMOL/L — SIGNIFICANT CHANGE UP (ref 96–108)
CO2 SERPL-SCNC: 23 MMOL/L — SIGNIFICANT CHANGE UP (ref 22–31)
CREAT SERPL-MCNC: 0.85 MG/DL — SIGNIFICANT CHANGE UP (ref 0.5–1.3)
CULTURE RESULTS: SIGNIFICANT CHANGE UP
CULTURE RESULTS: SIGNIFICANT CHANGE UP
GLUCOSE SERPL-MCNC: 94 MG/DL — SIGNIFICANT CHANGE UP (ref 70–99)
HCT VFR BLD CALC: 30.1 % — LOW (ref 34.5–45)
HGB BLD-MCNC: 9.9 G/DL — LOW (ref 11.5–15.5)
MCHC RBC-ENTMCNC: 30.8 PG — SIGNIFICANT CHANGE UP (ref 27–34)
MCHC RBC-ENTMCNC: 32.9 GM/DL — SIGNIFICANT CHANGE UP (ref 32–36)
MCV RBC AUTO: 93.8 FL — SIGNIFICANT CHANGE UP (ref 80–100)
NRBC # BLD: 0 /100 WBCS — SIGNIFICANT CHANGE UP (ref 0–0)
PLATELET # BLD AUTO: 397 K/UL — SIGNIFICANT CHANGE UP (ref 150–400)
POTASSIUM SERPL-MCNC: 3.5 MMOL/L — SIGNIFICANT CHANGE UP (ref 3.5–5.3)
POTASSIUM SERPL-SCNC: 3.5 MMOL/L — SIGNIFICANT CHANGE UP (ref 3.5–5.3)
RBC # BLD: 3.21 M/UL — LOW (ref 3.8–5.2)
RBC # FLD: 13.2 % — SIGNIFICANT CHANGE UP (ref 10.3–14.5)
SODIUM SERPL-SCNC: 135 MMOL/L — SIGNIFICANT CHANGE UP (ref 135–145)
SPECIMEN SOURCE: SIGNIFICANT CHANGE UP
SPECIMEN SOURCE: SIGNIFICANT CHANGE UP
WBC # BLD: 10.95 K/UL — HIGH (ref 3.8–10.5)
WBC # FLD AUTO: 10.95 K/UL — HIGH (ref 3.8–10.5)

## 2020-08-11 PROCEDURE — 99232 SBSQ HOSP IP/OBS MODERATE 35: CPT

## 2020-08-11 RX ADMIN — HEPARIN SODIUM 5000 UNIT(S): 5000 INJECTION INTRAVENOUS; SUBCUTANEOUS at 05:22

## 2020-08-11 RX ADMIN — Medication 1 TABLET(S): at 05:25

## 2020-08-11 RX ADMIN — HEPARIN SODIUM 5000 UNIT(S): 5000 INJECTION INTRAVENOUS; SUBCUTANEOUS at 21:05

## 2020-08-11 RX ADMIN — Medication 1 TABLET(S): at 05:22

## 2020-08-11 RX ADMIN — Medication 325 MILLIGRAM(S): at 17:16

## 2020-08-11 RX ADMIN — Medication 1 TABLET(S): at 17:16

## 2020-08-11 RX ADMIN — Medication 1 TABLET(S): at 13:22

## 2020-08-11 RX ADMIN — Medication 0.2 MILLIGRAM(S): at 21:02

## 2020-08-11 RX ADMIN — ERTAPENEM SODIUM 120 MILLIGRAM(S): 1 INJECTION, POWDER, LYOPHILIZED, FOR SOLUTION INTRAMUSCULAR; INTRAVENOUS at 11:23

## 2020-08-11 RX ADMIN — LISINOPRIL 20 MILLIGRAM(S): 2.5 TABLET ORAL at 05:22

## 2020-08-11 RX ADMIN — Medication 1 TABLET(S): at 21:02

## 2020-08-11 RX ADMIN — Medication 325 MILLIGRAM(S): at 05:22

## 2020-08-11 RX ADMIN — DULOXETINE HYDROCHLORIDE 60 MILLIGRAM(S): 30 CAPSULE, DELAYED RELEASE ORAL at 11:23

## 2020-08-11 RX ADMIN — HEPARIN SODIUM 5000 UNIT(S): 5000 INJECTION INTRAVENOUS; SUBCUTANEOUS at 13:22

## 2020-08-11 RX ADMIN — AMIODARONE HYDROCHLORIDE 200 MILLIGRAM(S): 400 TABLET ORAL at 05:22

## 2020-08-11 RX ADMIN — Medication 81 MILLIGRAM(S): at 11:23

## 2020-08-11 RX ADMIN — Medication 0.2 MILLIGRAM(S): at 05:22

## 2020-08-11 NOTE — PROGRESS NOTE ADULT - PROBLEM SELECTOR PLAN 1
Non oliguric DURGA now resolved with stable renal function with S cr 0.8 likely pre-renal/dehydration/ Acute pyelonephritis  - Urinalysis and urine electrolytes reviewed  - Avoid nephrotoxic agents  - Antibiotics as per renal dose adjustment with IV invanz  - Monitor BMP and electrolytes daily  - Volume status and electrolytes stable  -Continue ACEI/ARB

## 2020-08-11 NOTE — PROGRESS NOTE ADULT - PROBLEM SELECTOR PLAN 2
Patient with prior history of hypertension with stable blood pressure ranging from 140 to 150's  Low salt diet  Can increase lisinopril to 40 mg po daily with holding parameters  Continue metoprolol ER to 25 mg po daily  Continue clonidine 0.2 mg po bid  Monitor vital signs.

## 2020-08-11 NOTE — PROGRESS NOTE ADULT - SUBJECTIVE AND OBJECTIVE BOX
Patient is a 70y old  Female who presents with a chief complaint of diarrhea (11 Aug 2020 11:52)      INTERVAL HPI/OVERNIGHT EVENTS:  T(C): 36.4 (08-11-20 @ 12:56), Max: 36.8 (08-10-20 @ 20:34)  HR: 65 (08-11-20 @ 16:25) (50 - 65)  BP: 103/70 (08-11-20 @ 16:25) (103/70 - 147/61)  RR: 16 (08-11-20 @ 16:25) (16 - 18)  SpO2: 97% (08-11-20 @ 16:25) (97% - 99%)  Wt(kg): --  I&O's Summary    10 Aug 2020 07:01  -  11 Aug 2020 07:00  --------------------------------------------------------  IN: 720 mL / OUT: 975 mL / NET: -255 mL    11 Aug 2020 07:01  -  11 Aug 2020 16:37  --------------------------------------------------------  IN: 410 mL / OUT: 600 mL / NET: -190 mL        LABS:                        9.9    10.95 )-----------( 397      ( 11 Aug 2020 06:49 )             30.1     08-11    135  |  100  |  12  ----------------------------<  94  3.5   |  23  |  0.85    Ca    8.8      11 Aug 2020 06:50  Phos  2.9     08-10  Mg     1.9     08-10          CAPILLARY BLOOD GLUCOSE                MEDICATIONS  (STANDING):  aMIOdarone    Tablet 200 milliGRAM(s) Oral daily  aspirin  chewable 81 milliGRAM(s) Oral daily  cloNIDine 0.2 milliGRAM(s) Oral two times a day  diphenoxylate/atropine 1 Tablet(s) Oral two times a day  DULoxetine 60 milliGRAM(s) Oral daily  ertapenem  IVPB 1000 milliGRAM(s) IV Intermittent every 24 hours  ferrous    sulfate 325 milliGRAM(s) Oral two times a day  heparin   Injectable 5000 Unit(s) SubCutaneous every 8 hours  lactobacillus acidophilus 1 Tablet(s) Oral three times a day  lisinopril 20 milliGRAM(s) Oral daily  metoprolol succinate ER 25 milliGRAM(s) Oral daily    MEDICATIONS  (PRN):          PHYSICAL EXAM:  GENERAL: NAD, well-groomed, well-developed  HEAD:  Atraumatic, Normocephalic  CHEST/LUNG: Clear to percussion bilaterally; No rales, rhonchi, wheezing, or rubs  HEART: Regular rate and rhythm; No murmurs, rubs, or gallops  ABDOMEN: Soft, Nontender, Nondistended; Bowel sounds present  EXTREMITIES:  2+ Peripheral Pulses, No clubbing, cyanosis, or edema  LYMPH: No lymphadenopathy noted  SKIN: No rashes or lesions    Care Discussed with Consultants/Other Providers [ ] YES  [ ] NO

## 2020-08-11 NOTE — PROGRESS NOTE ADULT - SUBJECTIVE AND OBJECTIVE BOX
Kalyan Argueta MD (Nephrology progress note)  [address], [facility],  [id number],  [id number]  TEl: [phone]  Cell: [phone]    Patient is a 70y Female seen and evaluated at bedside. Vital signs, laboratory data, imaging studies, consult notes reviewed done within past 24 hours. Overnight events noted. Patient lying in bed in no distress feels better and denies any diarrhea at present and remain on IV invanz. Interval stable renal function and electrolytes.    Allergies    No Known Allergies    Intolerances        ROS:  CONSTITUTIONAL: No fever or chills.  HEENT: No headache or visual disturbances.  RESPIRATORY: No shortness of breath, cough, hemoptysis or wheezing  CARDIOVASCULAR: No Chest pain, shortness of breath, palpitations, dizziness, syncope, orthopnea, PND or leg swelling.  GASTROINTESTINAL: No abdominal pain, nausea, vomiting, diarrhea, hematemesis or blood per rectum.  GENITOURINARY: No urinary frequency, urgency, gross hematuria, dysuria, flank or supra pubic pain, difficulty passing urine.  NEUROLOGICAL: No headache, focal limb weakness, tingling or numbness or seizure like activity  SKIN: No skin rash or lesion  MUSCULOSKELETAL: No leg pain, calf pain or swelling    VITALS:    T(C): 36.5 (08-11-20 @ 04:34), Max: 36.8 (08-10-20 @ 20:34)  HR: 50 (08-11-20 @ 04:34) (50 - 55)  BP: 147/61 (08-11-20 @ 04:34) (119/64 - 147/61)  RR: 18 (08-11-20 @ 04:34) (17 - 18)  SpO2: 99% (08-11-20 @ 04:34) (98% - 99%)  CAPILLARY BLOOD GLUCOSE          08-10-20 @ 07:01  -  08-11-20 @ 07:00  --------------------------------------------------------  IN: 720 mL / OUT: 975 mL / NET: -255 mL      MEDICATIONS  (STANDING):  ALPRAZolam 0.25 milliGRAM(s) Oral daily  aMIOdarone    Tablet 200 milliGRAM(s) Oral daily  aspirin  chewable 81 milliGRAM(s) Oral daily  cloNIDine 0.2 milliGRAM(s) Oral two times a day  diphenoxylate/atropine 1 Tablet(s) Oral two times a day  DULoxetine 60 milliGRAM(s) Oral daily  ertapenem  IVPB 1000 milliGRAM(s) IV Intermittent every 24 hours  ferrous    sulfate 325 milliGRAM(s) Oral two times a day  heparin   Injectable 5000 Unit(s) SubCutaneous every 8 hours  lactobacillus acidophilus 1 Tablet(s) Oral three times a day  lisinopril 20 milliGRAM(s) Oral daily  metoprolol succinate ER 25 milliGRAM(s) Oral daily    MEDICATIONS  (PRN):      PHYSICAL EXAM:  GENERAL: Alert, awake and oriented x3 in no distress  HEENT: AUBREY, EOMI, neck supple, no JVP, no carotid bruit, oral mucosa moist and pink.  CHEST/LUNG: Bilateral clear breath sounds, no rales, no crepitations, no rhonchi, no wheezing  HEART: Regular rate and rhythm, NICANOR II/VI at LPSB, no gallops, no rub   ABDOMEN: Soft, nontender, non distended, bowel sounds present, no palpable organomegaly  : No flank or supra pubic tenderness.  EXTREMITIES: Peripheral pulses are palpable, no pedal edema  Neurology: AAOx3, no focal neurological deficit  SKIN: No rash or skin lesion  Musculoskeletal: No joint deformity or spinal tenderness.      Vascular ACCESS:     LABS:                        9.9    10.95 )-----------( 397      ( 11 Aug 2020 06:49 )             30.1     08-11    135  |  100  |  12  ----------------------------<  94  3.5   |  23  |  0.85    Ca    8.8      11 Aug 2020 06:50  Phos  2.9     08-10  Mg     1.9     08-10      RADIOLOGY & ADDITIONAL STUDIES:  < from: [facility] Duplex Lower Ext Vein ScanTyson (08.05.20 @ 16:51) >    EXAM:  DUPLEX SCAN EXT VEINS LOWER BI                            PROCEDURE DATE:  08/05/2020            INTERPRETATION:  CLINICAL INFORMATION: Lower extremity pain, rule out DVT.    COMPARISON: None available.    TECHNIQUE: Duplex sonography of theBILATERAL LOWER extremity veins with color and spectral Doppler, with and without compression.    FINDINGS:    There is normal compressibility of the bilateral common femoral, femoral and popliteal veins.  Doppler examination shows normal spontaneousand phasic flow.    The right and left posterior tibial and peroneal veins are patent and free of thrombus.    The right small saphenous vein, a superficial vein, is thrombosed in the calf.    IMPRESSION:  No evidence of deep venous thrombosis in either lower extremity.    The right small saphenous vein, a superficial vein, is thrombosed in the calf.                    LALITHA TINEO M.D., ATTENDING RADIOLOGIST  This document has been electronically signed. Aug  5 2020  5:29PM                < end of copied text >    Imaging Personally Reviewed:  [x] YES  [ ] NO    Consultant(s) Notes Reviewed:  [x] YES  [ ] NO    Care Discussed with Primary team/ Other Providers [x] YES  [ ] NO

## 2020-08-11 NOTE — PROGRESS NOTE ADULT - SUBJECTIVE AND OBJECTIVE BOX
70y old  Female who presents with a chief complaint of diarrhea (11 Aug 2020 16:37)      Interval history:  afebrile, feels better, 1 bm today, no pain or cramping.       Allergies:   No Known Allergies    Antimicrobials:  ertapenem  IVPB 1000 milliGRAM(s) IV Intermittent every 24 hours      REVIEW OF SYSTEMS:  No chest pain  No SOB  No N/V  No rash.       Vital Signs Last 24 Hrs  T(C): 36.4 (08-11-20 @ 12:56), Max: 36.8 (08-10-20 @ 20:34)  T(F): 97.6 (08-11-20 @ 12:56), Max: 98.3 (08-10-20 @ 20:34)  HR: 65 (08-11-20 @ 16:25) (50 - 65)  BP: 103/70 (08-11-20 @ 16:25) (103/70 - 147/61)  BP(mean): --  RR: 16 (08-11-20 @ 16:25) (16 - 18)  SpO2: 97% (08-11-20 @ 16:25) (97% - 99%)      PHYSICAL EXAM:  Patient in no acute distress. AAOX3. Laying in bed.   No icterus, no oral ulcers.  Cardiovascular: S1S2 normal.  Lungs: + air entry B/L lung fields.  Gastrointestinal: soft, nontender, nondistended.  Extremities: no edema.  IV sites not inflamed.                           9.9    10.95 )-----------( 397      ( 11 Aug 2020 06:49 )             30.1   08-11    135  |  100  |  12  ----------------------------<  94  3.5   |  23  |  0.85    Ca    8.8      11 Aug 2020 06:50  Phos  2.9     08-10  Mg     1.9     08-10      Culture - Blood in AM (08.06.20 @ 08:43)    Specimen Source: .Blood Blood-Peripheral    Culture Results:   No Growth Final

## 2020-08-11 NOTE — CHART NOTE - NSCHARTNOTEFT_GEN_A_CORE
Nutrition Follow Up Note  Patient seen for: follow up on [facility] and seen as per NP request due to pt not eating well    · Reason for Admission	diarrhea    pt with bacteremia with pyonephritis     Source: NP, EMR, pt    Diet : Soft  Danactive x 2 daily    Patient reports: in agreement to trial snacks-RD obtained preferences and provided. she refused supplements. pt continues with diarrhea (fecal incontinence) which is improving.      PO intake : 30%     Source for PO intake: RD observed lunch tray and pt           Daily Weight in k.1 (), Weight in k (08-10), Weight in k.4 (), Weight in k.9 (), Weight in k.5 (), Weight in k.8 (), Weight in k.9 ()  % Weight Change: 4% loss since previous assess on     Pertinent Medications: MEDICATIONS  (STANDING):  aMIOdarone    Tablet 200 milliGRAM(s) Oral daily  aspirin  chewable 81 milliGRAM(s) Oral daily  cloNIDine 0.2 milliGRAM(s) Oral two times a day  diphenoxylate/atropine 1 Tablet(s) Oral two times a day  DULoxetine 60 milliGRAM(s) Oral daily  ertapenem  IVPB 1000 milliGRAM(s) IV Intermittent every 24 hours  ferrous    sulfate 325 milliGRAM(s) Oral two times a day  heparin   Injectable 5000 Unit(s) SubCutaneous every 8 hours  lactobacillus acidophilus 1 Tablet(s) Oral three times a day  lisinopril 20 milliGRAM(s) Oral daily  metoprolol succinate ER 25 milliGRAM(s) Oral daily    MEDICATIONS  (PRN):    Pertinent Labs:  @ 06:50: Na 135, BUN 12, Cr 0.85, BG 94, K+ 3.5          Skin per nursing documentation: no pressure injury  Edema: none    Estimated Needs:   [x ] no change since previous assessment  [ ] recalculated:     Previous Nutrition Diagnosis:  Inadequate protein-energy intake.   Nutrition Diagnosis is: being addressed with meals and snacks-pt refusing supplements at this time    New Nutrition Diagnosis:  Malnutrition in the context of acute illness  related inadequate protein-energy intake  as evidenced by 30% intake of meals and 4% loss since previous assessment              Recommend/Interventions:  1) change diet to Regular-Soft diet is restricting her from receiving tomatoes and other preferences which may help to improve po intake  2) continue Danactive 2x daily-pt is consuming  3) review and adjust preferences as needed, which may assist in improving po intake  added turkey sandwich for pm snack, cottage cheese and fruit for am snack, banana at breakfast, rice for lunch and dinner.   placed malnutrition sticker and pending verification    Monitoring and Evaluation:     Continue to monitor Nutritional intake, Tolerance to diet prescription, weights, labs, skin integrity    RD remains available upon request and will follow up per protocol    Indy Corwell MA, TIARA, CDN #[id number] Nutrition Follow Up Note  Patient seen for: follow up on [facility] and seen as per NP request due to pt not eating well    · Reason for Admission	diarrhea    pt with bacteremia with pyonephritis     Source: NP, EMR, pt    Diet : Soft  Danactive x 2 daily    Patient reports: in agreement to trial snacks-RD obtained preferences and provided. she refused supplements. pt continues with diarrhea (fecal incontinence) which is improving.      PO intake : 30%     Source for PO intake: RD observed lunch tray and pt           Daily Weight in k.1 (), Weight in k (08-10), Weight in k.4 (), Weight in k.9 (), Weight in k.5 (), Weight in k.8 (), Weight in k.9 ()  % Weight Change: 4% loss since previous assess on     Pertinent Medications: MEDICATIONS  (STANDING):  aMIOdarone    Tablet 200 milliGRAM(s) Oral daily  aspirin  chewable 81 milliGRAM(s) Oral daily  cloNIDine 0.2 milliGRAM(s) Oral two times a day  diphenoxylate/atropine 1 Tablet(s) Oral two times a day  DULoxetine 60 milliGRAM(s) Oral daily  ertapenem  IVPB 1000 milliGRAM(s) IV Intermittent every 24 hours  ferrous    sulfate 325 milliGRAM(s) Oral two times a day  heparin   Injectable 5000 Unit(s) SubCutaneous every 8 hours  lactobacillus acidophilus 1 Tablet(s) Oral three times a day  lisinopril 20 milliGRAM(s) Oral daily  metoprolol succinate ER 25 milliGRAM(s) Oral daily    MEDICATIONS  (PRN):    Pertinent Labs:  @ 06:50: Na 135, BUN 12, Cr 0.85, BG 94, K+ 3.5          Skin per nursing documentation: no pressure injury  Edema: none    Estimated Needs:   [x ] no change since previous assessment  [ ] recalculated:     Previous Nutrition Diagnosis:  Inadequate protein-energy intake.   Nutrition Diagnosis is: being addressed with meals and snacks-pt refusing supplements at this time    New Nutrition Diagnosis:  Malnutrition in the context of acute illness  related inadequate protein-energy intake  as evidenced by 30% intake of meals and 4% loss since previous assessment              Recommend/Interventions:  1) continue Soft diet as it is appropriate while pt is having diarrhea.   2) continue Danactive 2x daily-pt is consuming  3) review and adjust preferences as needed, which may assist in improving po intake  added turkey sandwich for pm snack, cottage cheese and fruit for am snack, banana at breakfast, rice for lunch and dinner.   4) provided Diarrhea Nutrition Therapy education  placed malnutrition sticker    Monitoring and Evaluation:     Continue to monitor Nutritional intake, Tolerance to diet prescription, weights, labs, skin integrity    RD remains available upon request and will follow up per protocol    Indy Crowell MA, TIARA, CDN #[id number] Nutrition Follow Up Note  Patient seen for: follow up on [facility] and seen as per NP request due to pt not eating well    · Reason for Admission	diarrhea    pt with bacteremia with pyonephritis     Source: NP, EMR, pt    Diet : Soft  Danactive x 2 daily    Patient reports: in agreement to trial snacks-RD obtained preferences and provided. she refused supplements. pt continues with diarrhea (fecal incontinence) which is improving.      PO intake : 30%     Source for PO intake: RD observed lunch tray and pt           Daily Weight in k.1 (), Weight in k (08-10), Weight in k.4 (), Weight in k.9 (), Weight in k.5 (), Weight in k.8 (), Weight in k.9 ()  % Weight Change: 4% loss since previous assess on     Pertinent Medications: MEDICATIONS  (STANDING):  aMIOdarone    Tablet 200 milliGRAM(s) Oral daily  aspirin  chewable 81 milliGRAM(s) Oral daily  cloNIDine 0.2 milliGRAM(s) Oral two times a day  diphenoxylate/atropine 1 Tablet(s) Oral two times a day  DULoxetine 60 milliGRAM(s) Oral daily  ertapenem  IVPB 1000 milliGRAM(s) IV Intermittent every 24 hours  ferrous    sulfate 325 milliGRAM(s) Oral two times a day  heparin   Injectable 5000 Unit(s) SubCutaneous every 8 hours  lactobacillus acidophilus 1 Tablet(s) Oral three times a day  lisinopril 20 milliGRAM(s) Oral daily  metoprolol succinate ER 25 milliGRAM(s) Oral daily    MEDICATIONS  (PRN):    Pertinent Labs:  @ 06:50: Na 135, BUN 12, Cr 0.85, BG 94, K+ 3.5          Skin per nursing documentation: no pressure injury  Edema: none    Estimated Needs:   [x ] no change since previous assessment  [ ] recalculated:     Previous Nutrition Diagnosis:  Inadequate protein-energy intake.   Nutrition Diagnosis is: being addressed with meals and snacks-pt refusing supplements at this time    New Nutrition Diagnosis:  Malnutrition in the context of acute illness  related inadequate protein-energy intake  as evidenced by 30% intake of meals and 4% loss since previous assessment              Recommend/Interventions:  1) continue Soft diet as it is appropriate while pt is having diarrhea.   2) continue Danactive 2x daily-pt is consuming  3) review and adjust preferences as needed, which may assist in improving po intake  added turkey sandwich for pm snack, cottage cheese and fruit for am snack, banana at breakfast, rice for lunch and dinner.   4) provided Diarrhea Nutrition Therapy education  placed malnutrition sticker and contacted provider    Monitoring and Evaluation:     Continue to monitor Nutritional intake, Tolerance to diet prescription, weights, labs, skin integrity    RD remains available upon request and will follow up per protocol    Indy Crowell MA, TIARA, CDN #[id number] Nutrition Follow Up Note  Patient seen for: follow up on [facility] and seen as per NP request due to pt not eating well    · Reason for Admission	diarrhea    pt with bacteremia with pyonephritis     Source: NP, EMR, pt    Diet : Soft  Danactive x 2 daily    Patient reports: in agreement to trial snacks-RD obtained preferences and provided. she refused supplements. pt continues with diarrhea (fecal incontinence) which is improving.      PO intake : 30%     Source for PO intake: RD observed lunch tray and pt           Daily Weight in k.1 (), Weight in k (08-10), Weight in k.4 (), Weight in k.9 (), Weight in k.5 (), Weight in k.8 (), Weight in k.9 ()  % Weight Change: 4% loss since previous assess on     Pertinent Medications: MEDICATIONS  (STANDING):  aMIOdarone    Tablet 200 milliGRAM(s) Oral daily  aspirin  chewable 81 milliGRAM(s) Oral daily  cloNIDine 0.2 milliGRAM(s) Oral two times a day  diphenoxylate/atropine 1 Tablet(s) Oral two times a day  DULoxetine 60 milliGRAM(s) Oral daily  ertapenem  IVPB 1000 milliGRAM(s) IV Intermittent every 24 hours  ferrous    sulfate 325 milliGRAM(s) Oral two times a day  heparin   Injectable 5000 Unit(s) SubCutaneous every 8 hours  lactobacillus acidophilus 1 Tablet(s) Oral three times a day  lisinopril 20 milliGRAM(s) Oral daily  metoprolol succinate ER 25 milliGRAM(s) Oral daily    MEDICATIONS  (PRN):    Pertinent Labs:  @ 06:50: Na 135, BUN 12, Cr 0.85, BG 94, K+ 3.5          Skin per nursing documentation: no pressure injury  Edema: none    Estimated Needs:   [x ] no change since previous assessment  [ ] recalculated:     Previous Nutrition Diagnosis:  Inadequate protein-energy intake.   Nutrition Diagnosis is: being addressed with meals and snacks-pt refusing supplements at this time    New Nutrition Diagnosis:  severe malnutrition in the context of acute illness  related inadequate protein-energy intake  as evidenced by 30% intake of meals and 4% loss since previous assessment              Recommend/Interventions:  1) continue Soft diet as it is appropriate while pt is having diarrhea.   2) continue Danactive 2x daily-pt is consuming  3) review and adjust preferences as needed, which may assist in improving po intake  added turkey sandwich for pm snack, cottage cheese and fruit for am snack, banana at breakfast, rice for lunch and dinner.   4) provided Diarrhea Nutrition Therapy education  placed malnutrition sticker and contacted provider    Monitoring and Evaluation:     Continue to monitor Nutritional intake, Tolerance to diet prescription, weights, labs, skin integrity    RD remains available upon request and will follow up per protocol    Indy Crowell MA, RD, CDN #[phone]

## 2020-08-11 NOTE — PROGRESS NOTE ADULT - PROBLEM SELECTOR PLAN 4
Acute pyelonephritis with E.coli bacteremia and gastroenteritis  - Denies any diarrhea, flank pain and requiring intermittent self catheterization  - Tylenol prn for pain/fever  - Urology follow up  - Antibiotics adjustment per primary/ID team with renal dose adjustment with IV Invanz.

## 2020-08-11 NOTE — PROGRESS NOTE ADULT - PROBLEM SELECTOR PLAN 1
- with associated fever and GNR bacteremia   - improving   - cont Bacid TID and lomotil BID  - CT notable for fluid-filled distended colon c/w diarrhea, no evidence of colitis   - GI pcr negative, stool cultures negative  - Cdiff negative   - diet as tolerated

## 2020-08-11 NOTE — PROGRESS NOTE ADULT - SUBJECTIVE AND OBJECTIVE BOX
Subjective: Patient seen and examined. No new events except as noted.     REVIEW OF SYSTEMS:    CONSTITUTIONAL: + weakness, fevers or chills  EYES/ENT: No visual changes;  No vertigo or throat pain   NECK: No pain or stiffness  RESPIRATORY: No cough, wheezing, hemoptysis; No shortness of breath  CARDIOVASCULAR: No chest pain or palpitations  GASTROINTESTINAL: No abdominal or epigastric pain. No nausea, vomiting, or hematemesis; No diarrhea or constipation. No melena or hematochezia.  GENITOURINARY: No dysuria, frequency or hematuria  NEUROLOGICAL: No numbness or weakness  SKIN: No itching, burning, rashes, or lesions   All other review of systems is negative unless indicated above.    MEDICATIONS:  MEDICATIONS  (STANDING):  ALPRAZolam 0.25 milliGRAM(s) Oral daily  aMIOdarone    Tablet 200 milliGRAM(s) Oral daily  aspirin  chewable 81 milliGRAM(s) Oral daily  cloNIDine 0.2 milliGRAM(s) Oral two times a day  diphenoxylate/atropine 1 Tablet(s) Oral two times a day  DULoxetine 60 milliGRAM(s) Oral daily  ertapenem  IVPB 1000 milliGRAM(s) IV Intermittent every 24 hours  ferrous    sulfate 325 milliGRAM(s) Oral two times a day  heparin   Injectable 5000 Unit(s) SubCutaneous every 8 hours  lactobacillus acidophilus 1 Tablet(s) Oral three times a day  lisinopril 20 milliGRAM(s) Oral daily  metoprolol succinate ER 25 milliGRAM(s) Oral daily      PHYSICAL EXAM:  T(C): 36.5 (08-11-20 @ 04:34), Max: 36.8 (08-10-20 @ 20:34)  HR: 50 (08-11-20 @ 04:34) (50 - 55)  BP: 147/61 (08-11-20 @ 04:34) (119/64 - 147/61)  RR: 18 (08-11-20 @ 04:34) (17 - 18)  SpO2: 99% (08-11-20 @ 04:34) (98% - 99%)  Wt(kg): --  I&O's Summary    10 Aug 2020 07:01  -  11 Aug 2020 07:00  --------------------------------------------------------  IN: 720 mL / OUT: 975 mL / NET: -255 mL          Appearance: NAD	  HEENT:   Dry  oral mucosa, PERRL, EOMI	  Lymphatic: No lymphadenopathy , no edema  Cardiovascular: Normal S1 S2, No JVD, No murmurs , Peripheral pulses palpable 2+ bilaterally  Respiratory: Lungs clear to auscultation, normal effort 	  Gastrointestinal:  Soft, Non-tender, + BS	  Skin: No rashes, No ecchymoses, No cyanosis, warm to touch  Musculoskeletal: Normal range of motion, normal strength  Psychiatry:  Mood & affect appropriate  Ext: No edema      LABS:    CARDIAC MARKERS:                                9.9    10.95 )-----------( 397      ( 11 Aug 2020 06:49 )             30.1     08-11    135  |  100  |  12  ----------------------------<  94  3.5   |  23  |  0.85    Ca    8.8      11 Aug 2020 06:50  Phos  2.9     08-10  Mg     1.9     08-10      proBNP:   Lipid Profile:   HgA1c:   TSH:             TELEMETRY: 	    ECG:  	  RADIOLOGY:   DIAGNOSTIC TESTING:  [ ] Echocardiogram:  [ ]  Catheterization:  [ ] Stress Test:    OTHER:

## 2020-08-11 NOTE — PROGRESS NOTE ADULT - PROBLEM SELECTOR PLAN 3
Hypokalemia now resolved with K level 3.9 and Mag 1.9 likely due to GI losses  - Monitor BMP and mag, phos level  - Replete K and Mag with goal K>4.0 and<5.0 and Mag>2.0    Mild Hyponatremia now resolved with Na level 135  - Optimal pain control  - Free water restriction to <1 to 1.5 L/day.

## 2020-08-11 NOTE — CHART NOTE - NSCHARTNOTEFT_GEN_A_CORE
Upon Nutritional Assessment by the Registered Dietitian your patient was determined to meet criteria / has evidence of the following diagnosis/diagnoses:          [ ]  Mild Protein Calorie Malnutrition        [ ]  Moderate Protein Calorie Malnutrition        [x ] Severe Protein Calorie Malnutrition        [ ] Unspecified Protein Calorie Malnutrition        [ ] Underweight / BMI <19        [ ] Morbid Obesity / BMI > 40        (X)Comprehensive nutrition assessment : 30% intake of meals and 4% loss since previous assessment  [ ] Nutrition Focused Physical Exam  [ ] Other:           Nutrition Plan/Recommendations:    1)continue Soft diet as it is appropriate while pt is having diarrhea.   2)continue Danactive 2x daily-pt is consuming  3) review and adjust preferences as needed, which may assist in improving po intake  added turkey sandwich for pm snack, cottage cheese and fruit for am snack, banana at breakfast, rice for lunch and dinner.   4) provided Diarrhea Nutrition Therapy education  placed malnutrition sticker       PROVIDER Section:     By signing this assessment you are acknowledging and agree with the diagnosis/diagnoses assigned by the Registered Dietitian    Comments:

## 2020-08-11 NOTE — PROGRESS NOTE ADULT - SUBJECTIVE AND OBJECTIVE BOX
INTERVAL HPI/OVERNIGHT EVENTS:    patient seen and examined at bedside  feeling stronger, appetite continues to improve  1 bm thus far today and stool starting to form   denies fever, chills, nausea, vomiting, or abdominal pain     MEDICATIONS  (STANDING):  ALPRAZolam 0.25 milliGRAM(s) Oral daily  aMIOdarone    Tablet 200 milliGRAM(s) Oral daily  aspirin  chewable 81 milliGRAM(s) Oral daily  cloNIDine 0.1 milliGRAM(s) Oral two times a day  DULoxetine 60 milliGRAM(s) Oral daily  ferrous    sulfate 325 milliGRAM(s) Oral two times a day  metoprolol succinate ER 25 milliGRAM(s) Oral daily  piperacillin/tazobactam IVPB.. 3.375 Gram(s) IV Intermittent every 8 hours  potassium chloride    Tablet ER 40 milliEquivalent(s) Oral every 4 hours  tamsulosin 0.4 milliGRAM(s) Oral at bedtime    MEDICATIONS  (PRN):      Allergies    No Known Allergies    Intolerances        Review of Systems:    General:  No wt loss, fevers, chills, night sweats,fatigue,   Eyes:  Good vision, no reported pain  ENT:  No sore throat, pain, runny nose, dysphagia  CV:  No pain, palpitatioins, hypo/hypertension  Resp:  No dyspnea, cough, tachypnea, wheezing  GI:  No pain, No nausea, No vomiting, No diarrhea, No constipatiion, No weight loss, No fever, No pruritis, No rectal bleeding, No tarry stools, No dysphagia,  :  No pain, bleeding, incontinence, nocturia  Muscle:  No pain, weakness  Neuro:  No weakness, tingling, memory problems  Psych:  No fatigue, insomnia, mood problems, depression  Endocrine:  No polyuria, polydypsia, cold/heat intolerance  Heme:  No petechiae, ecchymosis, easy bruisability  Skin:  No rash, tattoos, scars, edema      Vital Signs Last 24 Hrs  T(C): 36.4 (04 Aug 2020 13:08), Max: 37.1 (04 Aug 2020 05:02)  T(F): 97.6 (04 Aug 2020 13:08), Max: 98.8 (04 Aug 2020 05:02)  HR: 65 (04 Aug 2020 13:08) (65 - 90)  BP: 171/72 (04 Aug 2020 13:08) (158/68 - 191/84)  BP(mean): --  RR: 18 (04 Aug 2020 13:08) (18 - 18)  SpO2: 96% (04 Aug 2020 13:08) (96% - 100%)    PHYSICAL EXAM:    Constitutional: NAD   HEENT: EOMI, throat clear  Neck: No LAD, supple  Gastrointestinal: BS+, soft, NT/ND  Extremities: No peripheral edema  Neurological: A/O x 3, no focal deficits        LABS:                        12.2   10.93 )-----------( 156      ( 03 Aug 2020 07:28 )             35.2     08-04    132<L>  |  98  |  11  ----------------------------<  129<H>  2.8<LL>   |  20<L>  |  0.89    Ca    8.4      04 Aug 2020 06:57  Phos  1.3     08-04  Mg     1.9     08-04    TPro  6.3  /  Alb  3.0<L>  /  TBili  0.6  /  DBili  x   /  AST  18  /  ALT  16  /  AlkPhos  108  08-03    PT/INR - ( 02 Aug 2020 13:56 )   PT: 13.1 sec;   INR: 1.11 ratio         PTT - ( 02 Aug 2020 13:56 )  PTT:26.1 sec  Urinalysis Basic - ( 03 Aug 2020 00:37 )    Color: Yellow / Appearance: Slightly Turbid / S.015 / pH: x  Gluc: x / Ketone: Trace  / Bili: Negative / Urobili: Negative   Blood: x / Protein: 100 / Nitrite: Negative   Leuk Esterase: Moderate / RBC: 13 /hpf / WBC 36 /HPF   Sq Epi: x / Non Sq Epi: 3 /hpf / Bacteria: Many        RADIOLOGY & ADDITIONAL TESTS:  < from: CT Abdomen and Pelvis w/ Oral Cont and w/ IV Cont (20 @ 18:50) >    EXAM:  CT ABDOMEN AND PELVIS OC IC                            PROCEDURE DATE:  2020            INTERPRETATION:  CLINICAL INFORMATION: History of aortic aneurysm repair. Presents with colitis.    COMPARISON: CT arteriogram chest abdomen pelvisdated 2019.    PROCEDURE:  CT of the Abdomen and Pelvis was performed with intravenous contrast.  Intravenous contrast: 90 ml Omnipaque 350. 10 ml discarded.  Oral contrast: positive contrast was administered.  Sagittal and coronal reformats were performed.    FINDINGS:  LOWER CHEST: Surgical repair thoracoabdominal aorta partially visualized, appears unchanged.    LIVER: Within normal limits.  BILE DUCTS: Normal caliber.  GALLBLADDER: Within normal limits.  SPLEEN: Within normal limits.  PANCREAS: Within normal limits.  ADRENALS: Within normal limits.  KIDNEYS/URETERS: Left renal enlargement with striated nephrogram, perinephric stranding, and urothelial thickening. No hydronephrosis.    BLADDER: Mild bladder wall thickening.  REPRODUCTIVE ORGANS: Adnexa appear unremarkable.    BOWEL: No bowel obstruction. Moderate fluid-filled distention of colon consistent with diarrhea. No mural thickening. Appendix normal.  PERITONEUM: No ascites.  VESSELS: Surgical graft repair suprarenal abdominal aorta unchanged. Maximal diameter of abdominal aorta 3.1 cm. Bypass grafts of the celiac and superior mesenteric arteries are patent. Native renal arteries and AIDAN appear patent.  Lack of enhancement of the right external iliac and common femoralveins.  RETROPERITONEUM/LYMPH NODES: No lymphadenopathy.  ABDOMINAL WALL: Within normal limits.  BONES: Disc degeneration lumbosacral junction.    IMPRESSION:  Left-sided pyelonephritis and possible cystitis.    Interval stability in appearance of surgical graft repair of thoracoabdominal aorta and mesenteric bypass grafts    No CT evidence of colitis.    Failure of enhancement of the right common femoral and external iliac veins. Suggest venous duplex study to assess for DVT.    Examination findings were conveyed to physician's assistant Jorge by Dr. Chacon at 1937 hours on 8/3/2020 with read back.                  JAQUELINE BRADLEY M.D., ATTENDING RADIOLOGIST  This document has been electronically signed. Aug  4 2020  9:23AM                < end of copied text >

## 2020-08-12 LAB
ANION GAP SERPL CALC-SCNC: 9 MMOL/L — SIGNIFICANT CHANGE UP (ref 5–17)
BUN SERPL-MCNC: 13 MG/DL — SIGNIFICANT CHANGE UP (ref 7–23)
CALCIUM SERPL-MCNC: 8.8 MG/DL — SIGNIFICANT CHANGE UP (ref 8.4–10.5)
CHLORIDE SERPL-SCNC: 103 MMOL/L — SIGNIFICANT CHANGE UP (ref 96–108)
CO2 SERPL-SCNC: 24 MMOL/L — SIGNIFICANT CHANGE UP (ref 22–31)
CREAT SERPL-MCNC: 0.92 MG/DL — SIGNIFICANT CHANGE UP (ref 0.5–1.3)
GLUCOSE SERPL-MCNC: 96 MG/DL — SIGNIFICANT CHANGE UP (ref 70–99)
MAGNESIUM SERPL-MCNC: 1.6 MG/DL — SIGNIFICANT CHANGE UP (ref 1.6–2.6)
POTASSIUM SERPL-MCNC: 3.3 MMOL/L — LOW (ref 3.5–5.3)
POTASSIUM SERPL-SCNC: 3.3 MMOL/L — LOW (ref 3.5–5.3)
SARS-COV-2 RNA SPEC QL NAA+PROBE: SIGNIFICANT CHANGE UP
SODIUM SERPL-SCNC: 136 MMOL/L — SIGNIFICANT CHANGE UP (ref 135–145)

## 2020-08-12 PROCEDURE — 99232 SBSQ HOSP IP/OBS MODERATE 35: CPT

## 2020-08-12 PROCEDURE — 99222 1ST HOSP IP/OBS MODERATE 55: CPT

## 2020-08-12 RX ORDER — POTASSIUM CHLORIDE 20 MEQ
10 PACKET (EA) ORAL ONCE
Refills: 0 | Status: COMPLETED | OUTPATIENT
Start: 2020-08-12 | End: 2020-08-12

## 2020-08-12 RX ORDER — MAGNESIUM SULFATE 500 MG/ML
1 VIAL (ML) INJECTION ONCE
Refills: 0 | Status: COMPLETED | OUTPATIENT
Start: 2020-08-12 | End: 2020-08-12

## 2020-08-12 RX ORDER — POTASSIUM CHLORIDE 20 MEQ
40 PACKET (EA) ORAL EVERY 4 HOURS
Refills: 0 | Status: COMPLETED | OUTPATIENT
Start: 2020-08-12 | End: 2020-08-12

## 2020-08-12 RX ADMIN — HEPARIN SODIUM 5000 UNIT(S): 5000 INJECTION INTRAVENOUS; SUBCUTANEOUS at 14:44

## 2020-08-12 RX ADMIN — Medication 40 MILLIEQUIVALENT(S): at 14:51

## 2020-08-12 RX ADMIN — Medication 1 TABLET(S): at 05:49

## 2020-08-12 RX ADMIN — Medication 1 TABLET(S): at 22:25

## 2020-08-12 RX ADMIN — Medication 1 TABLET(S): at 18:11

## 2020-08-12 RX ADMIN — HEPARIN SODIUM 5000 UNIT(S): 5000 INJECTION INTRAVENOUS; SUBCUTANEOUS at 22:26

## 2020-08-12 RX ADMIN — Medication 1 TABLET(S): at 14:45

## 2020-08-12 RX ADMIN — Medication 100 MILLIEQUIVALENT(S): at 09:39

## 2020-08-12 RX ADMIN — LISINOPRIL 20 MILLIGRAM(S): 2.5 TABLET ORAL at 05:49

## 2020-08-12 RX ADMIN — HEPARIN SODIUM 5000 UNIT(S): 5000 INJECTION INTRAVENOUS; SUBCUTANEOUS at 05:51

## 2020-08-12 RX ADMIN — Medication 40 MILLIEQUIVALENT(S): at 09:38

## 2020-08-12 RX ADMIN — Medication 0.2 MILLIGRAM(S): at 05:49

## 2020-08-12 RX ADMIN — Medication 1 TABLET(S): at 05:50

## 2020-08-12 RX ADMIN — Medication 81 MILLIGRAM(S): at 11:29

## 2020-08-12 RX ADMIN — AMIODARONE HYDROCHLORIDE 200 MILLIGRAM(S): 400 TABLET ORAL at 05:53

## 2020-08-12 RX ADMIN — DULOXETINE HYDROCHLORIDE 60 MILLIGRAM(S): 30 CAPSULE, DELAYED RELEASE ORAL at 11:29

## 2020-08-12 RX ADMIN — Medication 325 MILLIGRAM(S): at 18:11

## 2020-08-12 RX ADMIN — Medication 0.2 MILLIGRAM(S): at 18:11

## 2020-08-12 RX ADMIN — Medication 100 GRAM(S): at 09:38

## 2020-08-12 RX ADMIN — Medication 325 MILLIGRAM(S): at 05:50

## 2020-08-12 RX ADMIN — ERTAPENEM SODIUM 120 MILLIGRAM(S): 1 INJECTION, POWDER, LYOPHILIZED, FOR SOLUTION INTRAMUSCULAR; INTRAVENOUS at 11:29

## 2020-08-12 NOTE — PROGRESS NOTE ADULT - SUBJECTIVE AND OBJECTIVE BOX
Patient is a 70y old  Female who presents with a chief complaint of diarrhea (12 Aug 2020 10:20)      INTERVAL HPI/OVERNIGHT EVENTS:  T(C): 36.5 (08-12-20 @ 12:02), Max: 36.7 (08-11-20 @ 20:37)  HR: 53 (08-12-20 @ 12:04) (53 - 55)  BP: 126/62 (08-12-20 @ 12:04) (126/62 - 160/71)  RR: 18 (08-12-20 @ 12:02) (18 - 18)  SpO2: 97% (08-12-20 @ 12:04) (97% - 99%)  Wt(kg): --  I&O's Summary    11 Aug 2020 07:01  -  12 Aug 2020 07:00  --------------------------------------------------------  IN: 650 mL / OUT: 1250 mL / NET: -600 mL    12 Aug 2020 07:01  -  12 Aug 2020 17:17  --------------------------------------------------------  IN: 240 mL / OUT: 600 mL / NET: -360 mL        LABS:                        9.9    10.95 )-----------( 397      ( 11 Aug 2020 06:49 )             30.1     08-12    136  |  103  |  13  ----------------------------<  96  3.3<L>   |  24  |  0.92    Ca    8.8      12 Aug 2020 05:50  Mg     1.6     08-12          CAPILLARY BLOOD GLUCOSE                MEDICATIONS  (STANDING):  aMIOdarone    Tablet 200 milliGRAM(s) Oral daily  aspirin  chewable 81 milliGRAM(s) Oral daily  cloNIDine 0.2 milliGRAM(s) Oral two times a day  diphenoxylate/atropine 1 Tablet(s) Oral two times a day  DULoxetine 60 milliGRAM(s) Oral daily  ertapenem  IVPB 1000 milliGRAM(s) IV Intermittent every 24 hours  ferrous    sulfate 325 milliGRAM(s) Oral two times a day  heparin   Injectable 5000 Unit(s) SubCutaneous every 8 hours  lactobacillus acidophilus 1 Tablet(s) Oral three times a day  lisinopril 20 milliGRAM(s) Oral daily  metoprolol succinate ER 25 milliGRAM(s) Oral daily    MEDICATIONS  (PRN):          PHYSICAL EXAM:  GENERAL: NAD, well-groomed, well-developed  HEAD:  Atraumatic, Normocephalic  CHEST/LUNG: Clear to percussion bilaterally; No rales, rhonchi, wheezing, or rubs  HEART: Regular rate and rhythm; No murmurs, rubs, or gallops  ABDOMEN: Soft, Nontender, Nondistended; Bowel sounds present  EXTREMITIES:  2+ Peripheral Pulses, No clubbing, cyanosis, or edema  LYMPH: No lymphadenopathy noted  SKIN: No rashes or lesions    Care Discussed with Consultants/Other Providers [ ] YES  [ ] NO

## 2020-08-12 NOTE — PROGRESS NOTE ADULT - PROBLEM SELECTOR PLAN 3
Hypokalemia now resolved with K level 3.3 and Mag 1.6 likely due to GI losses  - Monitor BMP and mag, phos level  - Replete K and Mag with goal K>4.0 and<5.0 and Mag>2.0  - Giving IV mag and KCL with po repletion

## 2020-08-12 NOTE — OCCUPATIONAL THERAPY INITIAL EVALUATION ADULT - LIVES WITH, PROFILE
Pt lives alone, was performing all ADLs I PTA, transferring and utilizing rollator for mobility. Pt has a tub transfer bench.  Improving since last hospital admission.

## 2020-08-12 NOTE — PROGRESS NOTE ADULT - SUBJECTIVE AND OBJECTIVE BOX
Kalyan Argueta MD (Nephrology progress note)  [address], [facility],  [address],  [facility]  TEl: [phone]  Cell: [phone]    Patient is a 70y Female seen and evaluated at bedside. Vital signs, laboratory data, imaging studies, consult notes reviewed done within past 24 hours. Overnight events noted. Patient lying in bed in no distress offers no complains at present. Interval stable renal function with mild hypokalemia with K level 3.3.     Allergies    No Known Allergies    Intolerances        ROS:  CONSTITUTIONAL: No fever or chills.  HEENT: No headache or visual disturbances.  RESPIRATORY: No shortness of breath, cough, hemoptysis or wheezing  CARDIOVASCULAR: No Chest pain, shortness of breath, palpitations, dizziness, syncope, orthopnea, PND or leg swelling.  GASTROINTESTINAL: Denies abdominal pain, nausea, vomiting, diarrhea, hematemesis or blood per rectum.  GENITOURINARY: No flank or supra pubic pain  NEUROLOGICAL: No headache, focal limb weakness, tingling or numbness or seizure like activity  SKIN: No skin rash or lesion  MUSCULOSKELETAL: No leg pain, calf pain or swelling    VITALS:    T(C): 36.6 (08-12-20 @ 04:29), Max: 36.7 (08-11-20 @ 20:37)  HR: 53 (08-12-20 @ 04:29) (50 - 65)  BP: 157/65 (08-12-20 @ 04:29) (103/70 - 160/71)  RR: 18 (08-12-20 @ 04:29) (16 - 18)  SpO2: 98% (08-12-20 @ 04:29) (97% - 99%)  CAPILLARY BLOOD GLUCOSE          08-11-20 @ 07:01  -  08-12-20 @ 07:00  --------------------------------------------------------  IN: 650 mL / OUT: 1250 mL / NET: -600 mL      MEDICATIONS  (STANDING):  aMIOdarone    Tablet 200 milliGRAM(s) Oral daily  aspirin  chewable 81 milliGRAM(s) Oral daily  cloNIDine 0.2 milliGRAM(s) Oral two times a day  diphenoxylate/atropine 1 Tablet(s) Oral two times a day  DULoxetine 60 milliGRAM(s) Oral daily  ertapenem  IVPB 1000 milliGRAM(s) IV Intermittent every 24 hours  ferrous    sulfate 325 milliGRAM(s) Oral two times a day  heparin   Injectable 5000 Unit(s) SubCutaneous every 8 hours  lactobacillus acidophilus 1 Tablet(s) Oral three times a day  lisinopril 20 milliGRAM(s) Oral daily  magnesium sulfate  IVPB 1 Gram(s) IV Intermittent once  metoprolol succinate ER 25 milliGRAM(s) Oral daily  potassium chloride    Tablet ER 40 milliEquivalent(s) Oral every 4 hours  potassium chloride  10 mEq/100 mL IVPB 10 milliEquivalent(s) IV Intermittent once    MEDICATIONS  (PRN):      PHYSICAL EXAM:  GENERAL: Alert, awake and oriented x3 in no distress  HEENT: AUBREY, EOMI, neck supple, no JVP, no carotid bruit, oral mucosa moist and pink.  CHEST/LUNG: Bilateral clear breath sounds, no rales, no crepitations, no rhonchi, no wheezing  HEART: Regular rate and rhythm, NICANOR II/VI at LPSB, no gallops, no rub   ABDOMEN: Soft, nontender, non distended, bowel sounds present, no palpable organomegaly  : No flank or supra pubic tenderness.  EXTREMITIES: Peripheral pulses are palpable, no pedal edema  Neurology: AAOx3, no focal neurological deficit  SKIN: No rash or skin lesion  Musculoskeletal: No joint deformity or spinal tenderness.    LABS:                        9.9    10.95 )-----------( 397      ( 11 Aug 2020 06:49 )             30.1     08-12    136  |  103  |  13  ----------------------------<  96  3.3<L>   |  24  |  0.92    Ca    8.8      12 Aug 2020 05:50  Mg     1.6     08-12        RADIOLOGY & ADDITIONAL STUDIES:  None  Imaging Personally Reviewed:  [x] YES  [ ] NO    Consultant(s) Notes Reviewed:  [x] YES  [ ] NO    Care Discussed with Primary team/ Other Providers [x] YES  [ ] NO

## 2020-08-12 NOTE — PROGRESS NOTE ADULT - PROBLEM SELECTOR PLAN 1
Non oliguric DURGA now resolved with stable renal function with S cr 0.8 likely pre-renal/dehydration/ Acute pyelonephritis  - Urinalysis and urine electrolytes reviewed  - Avoid nephrotoxic agents  - Antibiotics as per renal dose adjustment with IV invanz  - Monitor BMP and electrolytes daily  - Volume status and electrolytes noted  - Continue ACEI/ARB

## 2020-08-12 NOTE — PROGRESS NOTE ADULT - PROBLEM SELECTOR PLAN 2
Patient with prior history of hypertension with stable blood pressure ranging from 140 to 150's  Low salt diet  Can increase lisinopril to 40 mg po daily with holding parameters  Continue metoprolol ER to 25 mg po daily  Continue clonidine 0.2 mg po bid  Monitor vital signs  Check PRA, serum aldosterone level for secondary hypertensive disorder. Can be done as out patient.

## 2020-08-12 NOTE — OCCUPATIONAL THERAPY INITIAL EVALUATION ADULT - PRECAUTIONS/LIMITATIONS, REHAB EVAL
Pt states she has since had 5-6 watery BMs and has started to feel weak. She has also noticed mild SOB on exertion. CT Abdomen Pelvis (8/3): Left-sided pyelonephritis and possible cystitis. Interval stability in appearance of surgical graft repair of thoracoabdominal aorta and mesenteric bypass grafts. DVT (8/5): The right small saphenous vein, a superficial vein, is thrombosed in the calf/fall precautions

## 2020-08-12 NOTE — PROGRESS NOTE ADULT - SUBJECTIVE AND OBJECTIVE BOX
INTERVAL HPI/OVERNIGHT EVENTS:    patient seen and examined at bedside  feeling stronger, appetite continues to improve  no diarrhea this morning   denies fever, chills, nausea, vomiting, or abdominal pain     MEDICATIONS  (STANDING):  ALPRAZolam 0.25 milliGRAM(s) Oral daily  aMIOdarone    Tablet 200 milliGRAM(s) Oral daily  aspirin  chewable 81 milliGRAM(s) Oral daily  cloNIDine 0.1 milliGRAM(s) Oral two times a day  DULoxetine 60 milliGRAM(s) Oral daily  ferrous    sulfate 325 milliGRAM(s) Oral two times a day  metoprolol succinate ER 25 milliGRAM(s) Oral daily  piperacillin/tazobactam IVPB.. 3.375 Gram(s) IV Intermittent every 8 hours  potassium chloride    Tablet ER 40 milliEquivalent(s) Oral every 4 hours  tamsulosin 0.4 milliGRAM(s) Oral at bedtime    MEDICATIONS  (PRN):      Allergies    No Known Allergies    Intolerances        Review of Systems:    General:  No wt loss, fevers, chills, night sweats,fatigue,   Eyes:  Good vision, no reported pain  ENT:  No sore throat, pain, runny nose, dysphagia  CV:  No pain, palpitatioins, hypo/hypertension  Resp:  No dyspnea, cough, tachypnea, wheezing  GI:  No pain, No nausea, No vomiting, No diarrhea, No constipatiion, No weight loss, No fever, No pruritis, No rectal bleeding, No tarry stools, No dysphagia,  :  No pain, bleeding, incontinence, nocturia  Muscle:  No pain, weakness  Neuro:  No weakness, tingling, memory problems  Psych:  No fatigue, insomnia, mood problems, depression  Endocrine:  No polyuria, polydypsia, cold/heat intolerance  Heme:  No petechiae, ecchymosis, easy bruisability  Skin:  No rash, tattoos, scars, edema      Vital Signs Last 24 Hrs  T(C): 36.4 (04 Aug 2020 13:08), Max: 37.1 (04 Aug 2020 05:02)  T(F): 97.6 (04 Aug 2020 13:08), Max: 98.8 (04 Aug 2020 05:02)  HR: 65 (04 Aug 2020 13:08) (65 - 90)  BP: 171/72 (04 Aug 2020 13:08) (158/68 - 191/84)  BP(mean): --  RR: 18 (04 Aug 2020 13:08) (18 - 18)  SpO2: 96% (04 Aug 2020 13:08) (96% - 100%)    PHYSICAL EXAM:    Constitutional: NAD   HEENT: EOMI, throat clear  Neck: No LAD, supple  Gastrointestinal: BS+, soft, NT/ND  Extremities: No peripheral edema  Neurological: A/O x 3, no focal deficits        LABS:                        12.2   10.93 )-----------( 156      ( 03 Aug 2020 07:28 )             35.2     08-04    132<L>  |  98  |  11  ----------------------------<  129<H>  2.8<LL>   |  20<L>  |  0.89    Ca    8.4      04 Aug 2020 06:57  Phos  1.3     08-04  Mg     1.9     08-04    TPro  6.3  /  Alb  3.0<L>  /  TBili  0.6  /  DBili  x   /  AST  18  /  ALT  16  /  AlkPhos  108  08-03    PT/INR - ( 02 Aug 2020 13:56 )   PT: 13.1 sec;   INR: 1.11 ratio         PTT - ( 02 Aug 2020 13:56 )  PTT:26.1 sec  Urinalysis Basic - ( 03 Aug 2020 00:37 )    Color: Yellow / Appearance: Slightly Turbid / S.015 / pH: x  Gluc: x / Ketone: Trace  / Bili: Negative / Urobili: Negative   Blood: x / Protein: 100 / Nitrite: Negative   Leuk Esterase: Moderate / RBC: 13 /hpf / WBC 36 /HPF   Sq Epi: x / Non Sq Epi: 3 /hpf / Bacteria: Many        RADIOLOGY & ADDITIONAL TESTS:  < from: CT Abdomen and Pelvis w/ Oral Cont and w/ IV Cont (20 @ 18:50) >    EXAM:  CT ABDOMEN AND PELVIS OC IC                            PROCEDURE DATE:  2020            INTERPRETATION:  CLINICAL INFORMATION: History of aortic aneurysm repair. Presents with colitis.    COMPARISON: CT arteriogram chest abdomen pelvisdated 2019.    PROCEDURE:  CT of the Abdomen and Pelvis was performed with intravenous contrast.  Intravenous contrast: 90 ml Omnipaque 350. 10 ml discarded.  Oral contrast: positive contrast was administered.  Sagittal and coronal reformats were performed.    FINDINGS:  LOWER CHEST: Surgical repair thoracoabdominal aorta partially visualized, appears unchanged.    LIVER: Within normal limits.  BILE DUCTS: Normal caliber.  GALLBLADDER: Within normal limits.  SPLEEN: Within normal limits.  PANCREAS: Within normal limits.  ADRENALS: Within normal limits.  KIDNEYS/URETERS: Left renal enlargement with striated nephrogram, perinephric stranding, and urothelial thickening. No hydronephrosis.    BLADDER: Mild bladder wall thickening.  REPRODUCTIVE ORGANS: Adnexa appear unremarkable.    BOWEL: No bowel obstruction. Moderate fluid-filled distention of colon consistent with diarrhea. No mural thickening. Appendix normal.  PERITONEUM: No ascites.  VESSELS: Surgical graft repair suprarenal abdominal aorta unchanged. Maximal diameter of abdominal aorta 3.1 cm. Bypass grafts of the celiac and superior mesenteric arteries are patent. Native renal arteries and AIDAN appear patent.  Lack of enhancement of the right external iliac and common femoralveins.  RETROPERITONEUM/LYMPH NODES: No lymphadenopathy.  ABDOMINAL WALL: Within normal limits.  BONES: Disc degeneration lumbosacral junction.    IMPRESSION:  Left-sided pyelonephritis and possible cystitis.    Interval stability in appearance of surgical graft repair of thoracoabdominal aorta and mesenteric bypass grafts    No CT evidence of colitis.    Failure of enhancement of the right common femoral and external iliac veins. Suggest venous duplex study to assess for DVT.    Examination findings were conveyed to physician's assistant Jorge by Dr. Chacon at 1937 hours on 8/3/2020 with read back.                  JAQUELINE BRADLEY M.D., ATTENDING RADIOLOGIST  This document has been electronically signed. Aug  4 2020  9:23AM                < end of copied text >

## 2020-08-12 NOTE — PROGRESS NOTE ADULT - SUBJECTIVE AND OBJECTIVE BOX
Subjective: Patient seen and examined. No new events except as noted.     REVIEW OF SYSTEMS:    CONSTITUTIONAL: No weakness, fevers or chills  EYES/ENT: No visual changes;  No vertigo or throat pain   NECK: No pain or stiffness  RESPIRATORY: No cough, wheezing, hemoptysis; No shortness of breath  CARDIOVASCULAR: No chest pain or palpitations  GASTROINTESTINAL: No abdominal or epigastric pain. No nausea, vomiting, or hematemesis; No diarrhea or constipation. No melena or hematochezia.  GENITOURINARY: No dysuria, frequency or hematuria  NEUROLOGICAL: No numbness or weakness  SKIN: No itching, burning, rashes, or lesions   All other review of systems is negative unless indicated above.    MEDICATIONS:  MEDICATIONS  (STANDING):  aMIOdarone    Tablet 200 milliGRAM(s) Oral daily  aspirin  chewable 81 milliGRAM(s) Oral daily  cloNIDine 0.2 milliGRAM(s) Oral two times a day  DULoxetine 60 milliGRAM(s) Oral daily  ertapenem  IVPB 1000 milliGRAM(s) IV Intermittent every 24 hours  ferrous    sulfate 325 milliGRAM(s) Oral two times a day  heparin   Injectable 5000 Unit(s) SubCutaneous every 8 hours  lactobacillus acidophilus 1 Tablet(s) Oral three times a day  lisinopril 20 milliGRAM(s) Oral daily  metoprolol succinate ER 25 milliGRAM(s) Oral daily      PHYSICAL EXAM:  T(C): 36.4 (08-12-20 @ 18:04), Max: 36.7 (08-11-20 @ 20:37)  HR: 58 (08-12-20 @ 18:04) (53 - 58)  BP: 148/62 (08-12-20 @ 18:04) (126/62 - 160/71)  RR: 18 (08-12-20 @ 18:04) (18 - 18)  SpO2: 99% (08-12-20 @ 18:04) (97% - 99%)  Wt(kg): --  I&O's Summary    11 Aug 2020 07:01  -  12 Aug 2020 07:00  --------------------------------------------------------  IN: 650 mL / OUT: 1250 mL / NET: -600 mL    12 Aug 2020 07:01  -  12 Aug 2020 19:08  --------------------------------------------------------  IN: 240 mL / OUT: 600 mL / NET: -360 mL          Appearance: Normal	  HEENT:   Normal oral mucosa, PERRL, EOMI	  Lymphatic: No lymphadenopathy , no edema  Cardiovascular: Normal S1 S2, No JVD, No murmurs , Peripheral pulses palpable 2+ bilaterally  Respiratory: Lungs clear to auscultation, normal effort 	  Gastrointestinal:  Soft, Non-tender, + BS	  Skin: No rashes, No ecchymoses, No cyanosis, warm to touch  Musculoskeletal: Normal range of motion, normal strength  Psychiatry:  Mood & affect appropriate  Ext: No edema      LABS:    CARDIAC MARKERS:                                9.9    10.95 )-----------( 397      ( 11 Aug 2020 06:49 )             30.1     08-12    136  |  103  |  13  ----------------------------<  96  3.3<L>   |  24  |  0.92    Ca    8.8      12 Aug 2020 05:50  Mg     1.6     08-12      proBNP:   Lipid Profile:   HgA1c:   TSH:             TELEMETRY: 	    ECG:  	  RADIOLOGY:   DIAGNOSTIC TESTING:  [ ] Echocardiogram:  [ ]  Catheterization:  [ ] Stress Test:    OTHER:

## 2020-08-12 NOTE — OCCUPATIONAL THERAPY INITIAL EVALUATION ADULT - PERTINENT HX OF CURRENT PROBLEM, REHAB EVAL
70F with hx of thoracic and abdominal aortic aneurysm s/p repair, AV replacement, and HTN presenting with profuse non-bloody watery diarrhea a/w with fevers to 101-102, lightheadedness and nausea/dry heaving.

## 2020-08-12 NOTE — CONSULT NOTE ADULT - SUBJECTIVE AND OBJECTIVE BOX
Patient is a 70y old  Female who presents with a chief complaint of diarrhea (12 Aug 2020 08:24)    Admission HPI:  71 yo F with hx of thoracic and abdominal aortic aneurysm s/p repair, AV replacement, and HTN presenting with diarrhea x1 day. Pt states that her boyfriend came over for dinner last night and after he left she started having profuse non-bloody watery diarrhea a/w with fevers to 101-102, lightheadedness and nausea/dry heaving. Pt states she has since had 5-6 watery BMs and has started to feel weak. She took Tylenol at 10AM. She has also noticed mild SOB on exertion. She denies CP. She has been trying to drink gatorade and has been able to keep it down. (02 Aug 2020 18:13)    interval History:  Patient diagnosed with pyelonephritis. Course complicated by bacteremia, DURGA and hypokalemia.  Patient with functional deficits secondary to hospitalization.  Has been followed by our practice (Dr. Watson Reyes) for spinal cord injury.    REVIEW OF SYSTEMS: No chest pain, shortness of breath, nausea, vomiting or diarhea; other ROS neg     PAST MEDICAL & SURGICAL HISTORY  Intermittent abdominal pain  Thoracoabdominal aortic aneurysm (TAAA) without rupture  Murmur, cardiac  Abdominal hernia  Cataract  Preeclampsia  HTN (hypertension)  S/P aortic valve repair  Thoracoabdominal aortic aneurysm (TAAA) without rupture  S/P cataract surgery  Bilateral cataracts  Arm fracture, left    FUNCTIONAL HISTORY:   Lives alone.  PTA Independent with ADLs and amb w rollator    CURRENT FUNCTIONAL STATUS:  Min A transfers and gait.    FAMILY HISTORY   Family history of skin cancer  Family history of coronary artery bypass graft  Family history of early CAD      RECENT LABS/IMAGING  CBC Full  -  ( 11 Aug 2020 06:49 )  WBC Count : 10.95 K/uL  RBC Count : 3.21 M/uL  Hemoglobin : 9.9 g/dL  Hematocrit : 30.1 %  Platelet Count - Automated : 397 K/uL  Mean Cell Volume : 93.8 fl  Mean Cell Hemoglobin : 30.8 pg  Mean Cell Hemoglobin Concentration : 32.9 gm/dL  Auto Neutrophil # : x  Auto Lymphocyte # : x  Auto Monocyte # : x  Auto Eosinophil # : x  Auto Basophil # : x  Auto Neutrophil % : x  Auto Lymphocyte % : x  Auto Monocyte % : x  Auto Eosinophil % : x  Auto Basophil % : x    08-12    136  |  103  |  13  ----------------------------<  96  3.3<L>   |  24  |  0.92    Ca    8.8      12 Aug 2020 05:50  Mg     1.6     08-12          VITALS  T(C): 36.6 (08-12-20 @ 04:29), Max: 36.7 (08-11-20 @ 20:37)  HR: 53 (08-12-20 @ 04:29) (50 - 65)  BP: 157/65 (08-12-20 @ 04:29) (103/70 - 160/71)  RR: 18 (08-12-20 @ 04:29) (16 - 18)  SpO2: 98% (08-12-20 @ 04:29) (97% - 99%)  Wt(kg): --    ALLERGIES  No Known Allergies      MEDICATIONS   aMIOdarone    Tablet 200 milliGRAM(s) Oral daily  aspirin  chewable 81 milliGRAM(s) Oral daily  cloNIDine 0.2 milliGRAM(s) Oral two times a day  diphenoxylate/atropine 1 Tablet(s) Oral two times a day  DULoxetine 60 milliGRAM(s) Oral daily  ertapenem  IVPB 1000 milliGRAM(s) IV Intermittent every 24 hours  ferrous    sulfate 325 milliGRAM(s) Oral two times a day  heparin   Injectable 5000 Unit(s) SubCutaneous every 8 hours  lactobacillus acidophilus 1 Tablet(s) Oral three times a day  lisinopril 20 milliGRAM(s) Oral daily  metoprolol succinate ER 25 milliGRAM(s) Oral daily  potassium chloride    Tablet ER 40 milliEquivalent(s) Oral every 4 hours      ----------------------------------------------------------------------------------------  PHYSICAL EXAM  Constitutional - NAD, Comfortable  HEENT - NCAT, EOMI  Neck - Supple, No limited ROM  Chest - CTA bilaterally, No wheeze, No rhonchi, No crackles  Cardiovascular - RRR, S1S2, No murmurs  Abdomen - BS+, Soft, NTND  Extremities - No C/C/E, No calf tenderness   Neurologic Exam -                    Cognitive - Awake, Alert, AAO to self, place, date, year, situation     Communication - Fluent, No dysarthria, no aphasia     Cranial Nerves - CN 2-12 intact     Motor - No focal deficits      Sensory - Intact to LT     Reflexes - DTR Intact, No primitive reflexive       Psychiatric - Mood stable, Affect WNL      Impression:    68 yo with functional deficits secondary to diagnosis of debility and incomplete spinal cord injury.     Plan:  PT- ROM, Bed Mob, Transfers, Amb w AD and bracing as needed  OT- ADLs, bracing  Prec- Falls, Cardiac  DVT Prophylaxis- SCDs  Skin- Turn q2 h  Dispo- Patient is a 70y old  Female who presents with a chief complaint of diarrhea (12 Aug 2020 08:24)    Admission HPI:  71 yo F with hx of thoracic and abdominal aortic aneurysm s/p repair, AV replacement, and HTN presenting with diarrhea x1 day. Pt states that her boyfriend came over for dinner last night and after he left she started having profuse non-bloody watery diarrhea a/w with fevers to 101-102, lightheadedness and nausea/dry heaving. Pt states she has since had 5-6 watery BMs and has started to feel weak. She took Tylenol at 10AM. She has also noticed mild SOB on exertion. She denies CP. She has been trying to drink gatorade and has been able to keep it down. (02 Aug 2020 18:13)    interval History:  Patient diagnosed with pyelonephritis. Course complicated by bacteremia, DURGA and hypokalemia.  Patient with functional deficits secondary to hospitalization.  Has been followed by our practice (Dr. Watson Reyes) for spinal cord injury.    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  PTA Independent with ADLs and amb w rollator    CURRENT FUNCTIONAL STATUS:  Min A transfers and gait.    FAMILY HISTORY   Family history of skin cancer  Family history of coronary artery bypass graft  Family history of early CAD      RECENT LABS/IMAGING  CBC Full  -  ( 11 Aug 2020 06:49 )  WBC Count : 10.95 K/uL  RBC Count : 3.21 M/uL  Hemoglobin : 9.9 g/dL  Hematocrit : 30.1 %  Platelet Count - Automated : 397 K/uL  Mean Cell Volume : 93.8 fl  Mean Cell Hemoglobin : 30.8 pg  Mean Cell Hemoglobin Concentration : 32.9 gm/dL  Auto Neutrophil # : x  Auto Lymphocyte # : x  Auto Monocyte # : x  Auto Eosinophil # : x  Auto Basophil # : x  Auto Neutrophil % : x  Auto Lymphocyte % : x  Auto Monocyte % : x  Auto Eosinophil % : x  Auto Basophil % : x    08-12    136  |  103  |  13  ----------------------------<  96  3.3<L>   |  24  |  0.92    Ca    8.8      12 Aug 2020 05:50  Mg     1.6     08-12    VITALS  T(C): 36.6 (08-12-20 @ 04:29), Max: 36.7 (08-11-20 @ 20:37)  HR: 53 (08-12-20 @ 04:29) (50 - 65)  BP: 157/65 (08-12-20 @ 04:29) (103/70 - 160/71)  RR: 18 (08-12-20 @ 04:29) (16 - 18)  SpO2: 98% (08-12-20 @ 04:29) (97% - 99%)  Wt(kg): --    ALLERGIES  No Known Allergies      MEDICATIONS   aMIOdarone    Tablet 200 milliGRAM(s) Oral daily  aspirin  chewable 81 milliGRAM(s) Oral daily  cloNIDine 0.2 milliGRAM(s) Oral two times a day  diphenoxylate/atropine 1 Tablet(s) Oral two times a day  DULoxetine 60 milliGRAM(s) Oral daily  ertapenem  IVPB 1000 milliGRAM(s) IV Intermittent every 24 hours  ferrous    sulfate 325 milliGRAM(s) Oral two times a day  heparin   Injectable 5000 Unit(s) SubCutaneous every 8 hours  lactobacillus acidophilus 1 Tablet(s) Oral three times a day  lisinopril 20 milliGRAM(s) Oral daily  metoprolol succinate ER 25 milliGRAM(s) Oral daily  potassium chloride    Tablet ER 40 milliEquivalent(s) Oral every 4 hours      ----------------------------------------------------------------------------------------  PHYSICAL EXAM  Constitutional - NAD, Comfortable  HEENT - NCAT, EOMI  Neck - Supple, No limited ROM  Chest - CTA bilaterally, No wheeze, No rhonchi, No crackles  Cardiovascular - RRR, S1S2, No murmurs  Abdomen - BS+, Soft, NTND  Extremities - No C/C/E, No calf tenderness   Neurologic Exam -                 AAO x 3  Speech clear and fluent  Motor without focal deficit 5/5 bl UE and LE  MAS 1+ spasticity of bl LEs  3-4 beat clonus bl LEs     Psychiatric - Mood stable, Affect WNL      Impression:    70 yo with functional deficits secondary to diagnosis of debility and incomplete spinal cord injury.     Plan:  PT- ROM, Bed Mob, Transfers, Amb w AD and bracing as needed  OT- ADLs, bracing  Prec- Falls, Cardiac  DVT Prophylaxis- SCDs  Neuropathic pain- consider adding low dose gabapentin if persists  Bladder- has cathed once daily since her spinal cord injury- she is wondering if it is still necessary- advised that at rehab can check PVRs  Bowel- per GI  Skin- Turn q2 h  Dispo- Acute Rehab- can tolerate 3h/d PT/OT/SLP and requires daily physician visits

## 2020-08-12 NOTE — PROGRESS NOTE ADULT - PROBLEM SELECTOR PLAN 4
Acute pyelonephritis with E.coli bacteremia and gastroenteritis  - Tylenol prn for pain/fever  - Urology follow up  - Antibiotics adjustment per primary/ID team with renal dose adjustment with IV Invanz.

## 2020-08-12 NOTE — PROGRESS NOTE ADULT - SUBJECTIVE AND OBJECTIVE BOX
70y old  Female who presents with a chief complaint of diarrhea (12 Aug 2020 17:17)      Interval history:  Afebrile, continues to have multiple BM, no abdominal pain.        Allergies:   No Known Allergies    Antimicrobials:  ertapenem  IVPB 1000 milliGRAM(s) IV Intermittent every 24 hours      REVIEW OF SYSTEMS:  No chest pain  No SOB  No N/V  No dysuria   No rash.       Vital Signs Last 24 Hrs  T(C): 36.4 (08-12-20 @ 18:04), Max: 36.7 (08-11-20 @ 20:37)  T(F): 97.6 (08-12-20 @ 18:04), Max: 98 (08-11-20 @ 20:37)  HR: 58 (08-12-20 @ 18:04) (53 - 58)  BP: 148/62 (08-12-20 @ 18:04) (126/62 - 160/71)  BP(mean): --  RR: 18 (08-12-20 @ 18:04) (18 - 18)  SpO2: 99% (08-12-20 @ 18:04) (97% - 99%)      PHYSICAL EXAM:  Patient in no acute distress. AAOX3. Laying in bed.   No icterus, no oral ulcers.  Cardiovascular: S1S2 normal.  Lungs: + air entry B/L lung fields.  Gastrointestinal: soft, nontender, nondistended.  Extremities: no edema.  IV sites not inflamed.                             9.9    10.95 )-----------( 397      ( 11 Aug 2020 06:49 )             30.1   08-12    136  |  103  |  13  ----------------------------<  96  3.3<L>   |  24  |  0.92    Ca    8.8      12 Aug 2020 05:50  Mg     1.6     08-12

## 2020-08-13 LAB
ANION GAP SERPL CALC-SCNC: 12 MMOL/L — SIGNIFICANT CHANGE UP (ref 5–17)
BUN SERPL-MCNC: 11 MG/DL — SIGNIFICANT CHANGE UP (ref 7–23)
CALCIUM SERPL-MCNC: 8.9 MG/DL — SIGNIFICANT CHANGE UP (ref 8.4–10.5)
CHLORIDE SERPL-SCNC: 103 MMOL/L — SIGNIFICANT CHANGE UP (ref 96–108)
CO2 SERPL-SCNC: 21 MMOL/L — LOW (ref 22–31)
CREAT SERPL-MCNC: 0.87 MG/DL — SIGNIFICANT CHANGE UP (ref 0.5–1.3)
GLUCOSE SERPL-MCNC: 102 MG/DL — HIGH (ref 70–99)
MAGNESIUM SERPL-MCNC: 1.8 MG/DL — SIGNIFICANT CHANGE UP (ref 1.6–2.6)
POTASSIUM SERPL-MCNC: 3.6 MMOL/L — SIGNIFICANT CHANGE UP (ref 3.5–5.3)
POTASSIUM SERPL-SCNC: 3.6 MMOL/L — SIGNIFICANT CHANGE UP (ref 3.5–5.3)
SODIUM SERPL-SCNC: 136 MMOL/L — SIGNIFICANT CHANGE UP (ref 135–145)

## 2020-08-13 PROCEDURE — 99232 SBSQ HOSP IP/OBS MODERATE 35: CPT

## 2020-08-13 RX ORDER — POTASSIUM CHLORIDE 20 MEQ
40 PACKET (EA) ORAL ONCE
Refills: 0 | Status: COMPLETED | OUTPATIENT
Start: 2020-08-13 | End: 2020-08-13

## 2020-08-13 RX ORDER — DIPHENOXYLATE HCL/ATROPINE 2.5-.025MG
1 TABLET ORAL
Refills: 0 | Status: DISCONTINUED | OUTPATIENT
Start: 2020-08-13 | End: 2020-08-20

## 2020-08-13 RX ORDER — MAGNESIUM SULFATE 500 MG/ML
1 VIAL (ML) INJECTION ONCE
Refills: 0 | Status: COMPLETED | OUTPATIENT
Start: 2020-08-13 | End: 2020-08-13

## 2020-08-13 RX ADMIN — Medication 0.2 MILLIGRAM(S): at 17:39

## 2020-08-13 RX ADMIN — Medication 1 TABLET(S): at 14:03

## 2020-08-13 RX ADMIN — LISINOPRIL 20 MILLIGRAM(S): 2.5 TABLET ORAL at 05:32

## 2020-08-13 RX ADMIN — DULOXETINE HYDROCHLORIDE 60 MILLIGRAM(S): 30 CAPSULE, DELAYED RELEASE ORAL at 11:12

## 2020-08-13 RX ADMIN — ERTAPENEM SODIUM 120 MILLIGRAM(S): 1 INJECTION, POWDER, LYOPHILIZED, FOR SOLUTION INTRAMUSCULAR; INTRAVENOUS at 11:11

## 2020-08-13 RX ADMIN — Medication 40 MILLIEQUIVALENT(S): at 11:12

## 2020-08-13 RX ADMIN — Medication 1 TABLET(S): at 05:32

## 2020-08-13 RX ADMIN — AMIODARONE HYDROCHLORIDE 200 MILLIGRAM(S): 400 TABLET ORAL at 05:31

## 2020-08-13 RX ADMIN — HEPARIN SODIUM 5000 UNIT(S): 5000 INJECTION INTRAVENOUS; SUBCUTANEOUS at 14:03

## 2020-08-13 RX ADMIN — HEPARIN SODIUM 5000 UNIT(S): 5000 INJECTION INTRAVENOUS; SUBCUTANEOUS at 21:21

## 2020-08-13 RX ADMIN — Medication 0.2 MILLIGRAM(S): at 05:31

## 2020-08-13 RX ADMIN — Medication 1 TABLET(S): at 17:38

## 2020-08-13 RX ADMIN — Medication 1 TABLET(S): at 21:21

## 2020-08-13 RX ADMIN — Medication 325 MILLIGRAM(S): at 05:31

## 2020-08-13 RX ADMIN — Medication 100 GRAM(S): at 11:11

## 2020-08-13 RX ADMIN — Medication 81 MILLIGRAM(S): at 11:12

## 2020-08-13 RX ADMIN — HEPARIN SODIUM 5000 UNIT(S): 5000 INJECTION INTRAVENOUS; SUBCUTANEOUS at 05:33

## 2020-08-13 RX ADMIN — Medication 325 MILLIGRAM(S): at 17:39

## 2020-08-13 NOTE — PROGRESS NOTE ADULT - PROBLEM SELECTOR PLAN 2
Patient with prior history of hypertension with stable blood pressure ranging from 140 to 150's  Low salt diet  Can increase lisinopril to 40 mg po daily with holding parameters  Continue metoprolol ER to 25 mg po daily  Continue clonidine 0.2 mg po bid  Monitor vital signs

## 2020-08-13 NOTE — PROGRESS NOTE ADULT - PROBLEM SELECTOR PLAN 4
Acute pyelonephritis with E.coli bacteremia and gastroenteritis  - Tylenol prn for pain/fever  - Urology follow up  - Antibiotics adjustment per primary/ID team with renal dose adjustment with IV Invanz.  - Await transfer to rehab

## 2020-08-13 NOTE — PROGRESS NOTE ADULT - PROBLEM SELECTOR PLAN 3
Hypokalemia now resolved with K level 3.6 and Mag 1.8 likely due to GI losses  - Monitor BMP and mag, phos level  - Replete K and Mag with goal K>4.0 and<5.0 and Mag>2.0

## 2020-08-13 NOTE — PROGRESS NOTE ADULT - SUBJECTIVE AND OBJECTIVE BOX
Patient is a 70y old  Female who presents with a chief complaint of diarrhea (13 Aug 2020 15:23)      INTERVAL HPI/OVERNIGHT EVENTS:  T(C): 36.4 (08-13-20 @ 12:15), Max: 36.8 (08-12-20 @ 21:12)  HR: 61 (08-13-20 @ 17:18) (54 - 61)  BP: 131/60 (08-13-20 @ 17:18) (121/70 - 149/63)  RR: 18 (08-13-20 @ 17:18) (18 - 18)  SpO2: 100% (08-13-20 @ 17:18) (96% - 100%)  Wt(kg): --  I&O's Summary    12 Aug 2020 07:01  -  13 Aug 2020 07:00  --------------------------------------------------------  IN: 690 mL / OUT: 1200 mL / NET: -510 mL    13 Aug 2020 07:01  -  13 Aug 2020 20:54  --------------------------------------------------------  IN: 630 mL / OUT: 400 mL / NET: 230 mL        LABS:    08-13    136  |  103  |  11  ----------------------------<  102<H>  3.6   |  21<L>  |  0.87    Ca    8.9      13 Aug 2020 05:27  Mg     1.8     08-13          CAPILLARY BLOOD GLUCOSE                MEDICATIONS  (STANDING):  aMIOdarone    Tablet 200 milliGRAM(s) Oral daily  aspirin  chewable 81 milliGRAM(s) Oral daily  cloNIDine 0.2 milliGRAM(s) Oral two times a day  diphenoxylate/atropine 1 Tablet(s) Oral two times a day  DULoxetine 60 milliGRAM(s) Oral daily  ertapenem  IVPB 1000 milliGRAM(s) IV Intermittent every 24 hours  ferrous    sulfate 325 milliGRAM(s) Oral two times a day  heparin   Injectable 5000 Unit(s) SubCutaneous every 8 hours  lactobacillus acidophilus 1 Tablet(s) Oral three times a day  lisinopril 20 milliGRAM(s) Oral daily  metoprolol succinate ER 25 milliGRAM(s) Oral daily    MEDICATIONS  (PRN):          PHYSICAL EXAM:  GENERAL: NAD, well-groomed, well-developed  HEAD:  Atraumatic, Normocephalic  CHEST/LUNG: Clear to percussion bilaterally; No rales, rhonchi, wheezing, or rubs  HEART: Regular rate and rhythm; No murmurs, rubs, or gallops  ABDOMEN: Soft, Nontender, Nondistended; Bowel sounds present  EXTREMITIES:  2+ Peripheral Pulses, No clubbing, cyanosis, or edema  LYMPH: No lymphadenopathy noted  SKIN: No rashes or lesions    Care Discussed with Consultants/Other Providers [ ] YES  [ ] NO

## 2020-08-13 NOTE — PROGRESS NOTE ADULT - SUBJECTIVE AND OBJECTIVE BOX
Patient is a 70y old  Female who presents with a chief complaint of diarrhea (12 Aug 2020 08:24)  Patient tolerating therapies.  Min A transfers, CG gait.  Diarrhea improved.  No CP, no SOB    MEDICATIONS  (STANDING):  aMIOdarone    Tablet 200 milliGRAM(s) Oral daily  aspirin  chewable 81 milliGRAM(s) Oral daily  cloNIDine 0.2 milliGRAM(s) Oral two times a day  diphenoxylate/atropine 1 Tablet(s) Oral two times a day  DULoxetine 60 milliGRAM(s) Oral daily  ertapenem  IVPB 1000 milliGRAM(s) IV Intermittent every 24 hours  ferrous    sulfate 325 milliGRAM(s) Oral two times a day  heparin   Injectable 5000 Unit(s) SubCutaneous every 8 hours  lactobacillus acidophilus 1 Tablet(s) Oral three times a day  lisinopril 20 milliGRAM(s) Oral daily  metoprolol succinate ER 25 milliGRAM(s) Oral daily    Vital Signs Last 24 Hrs  T(C): 36.4 (13 Aug 2020 12:15), Max: 36.8 (12 Aug 2020 21:12)  T(F): 97.5 (13 Aug 2020 12:15), Max: 98.2 (12 Aug 2020 21:12)  HR: 57 (13 Aug 2020 12:15) (54 - 58)  BP: 134/67 (13 Aug 2020 12:15) (121/70 - 149/63)  BP(mean): --  RR: 18 (13 Aug 2020 12:15) (18 - 18)  SpO2: 100% (13 Aug 2020 12:15) (96% - 100%)    PHYSICAL EXAM  Constitutional - NAD, Comfortable  HEENT - NCAT, EOMI  Extremities - No C/C/E, No calf tenderness   Neurologic Exam -                 AAO x 3  Speech clear and fluent  Motor without focal deficit 5/5 bl UE and LE  MAS 1+ spasticity of bl LEs  3-4 beat clonus bl LEs     Psychiatric - Mood stable, Affect WNL    08-13    136  |  103  |  11  ----------------------------<  102<H>  3.6   |  21<L>  |  0.87    Ca    8.9      13 Aug 2020 05:27  Mg     1.8     08-13    Impression:    70 yo with functional deficits secondary to diagnosis of debility and incomplete spinal cord injury.     Plan:  PT- ROM, Bed Mob, Transfers, Amb w AD and bracing as needed  OT- ADLs, bracing  Prec- Falls, Cardiac  Dispo- Acute Rehab- can tolerate 3h/d PT/OT/SLP and requires daily physician visits

## 2020-08-13 NOTE — PROGRESS NOTE ADULT - SUBJECTIVE AND OBJECTIVE BOX
Subjective: Patient seen and examined. No new events except as noted.     REVIEW OF SYSTEMS:    CONSTITUTIONAL:+ weakness, fevers or chills  EYES/ENT: No visual changes;  No vertigo or throat pain   NECK: No pain or stiffness  RESPIRATORY: No cough, wheezing, hemoptysis; No shortness of breath  CARDIOVASCULAR: No chest pain or palpitations  GASTROINTESTINAL: No abdominal or epigastric pain. No nausea, vomiting, or hematemesis; No diarrhea or constipation. No melena or hematochezia.  GENITOURINARY: No dysuria, frequency or hematuria  NEUROLOGICAL: No numbness or weakness  SKIN: No itching, burning, rashes, or lesions   All other review of systems is negative unless indicated above.    MEDICATIONS:  MEDICATIONS  (STANDING):  aMIOdarone    Tablet 200 milliGRAM(s) Oral daily  aspirin  chewable 81 milliGRAM(s) Oral daily  cloNIDine 0.2 milliGRAM(s) Oral two times a day  diphenoxylate/atropine 1 Tablet(s) Oral two times a day  DULoxetine 60 milliGRAM(s) Oral daily  ertapenem  IVPB 1000 milliGRAM(s) IV Intermittent every 24 hours  ferrous    sulfate 325 milliGRAM(s) Oral two times a day  heparin   Injectable 5000 Unit(s) SubCutaneous every 8 hours  lactobacillus acidophilus 1 Tablet(s) Oral three times a day  lisinopril 20 milliGRAM(s) Oral daily  metoprolol succinate ER 25 milliGRAM(s) Oral daily      PHYSICAL EXAM:  T(C): 36.5 (08-13-20 @ 04:12), Max: 36.8 (08-12-20 @ 21:12)  HR: 54 (08-13-20 @ 04:12) (53 - 58)  BP: 149/63 (08-13-20 @ 04:12) (121/70 - 149/63)  RR: 18 (08-13-20 @ 04:12) (18 - 18)  SpO2: 96% (08-13-20 @ 04:12) (96% - 99%)  Wt(kg): --  I&O's Summary    12 Aug 2020 07:01  -  13 Aug 2020 07:00  --------------------------------------------------------  IN: 690 mL / OUT: 1200 mL / NET: -510 mL          Appearance: NAD	  HEENT:  Dry  oral mucosa, PERRL, EOMI	  Lymphatic: No lymphadenopathy , no edema  Cardiovascular: Normal S1 S2, No JVD, No murmurs , Peripheral pulses palpable 2+ bilaterally  Respiratory: Lungs clear to auscultation, normal effort 	  Gastrointestinal:  Soft, Non-tender, + BS	  Skin: No rashes, No ecchymoses, No cyanosis, warm to touch  Musculoskeletal: Normal range of motion, normal strength  Psychiatry:  Mood & affect appropriate  Ext: No edema      LABS:    CARDIAC MARKERS:            08-13    136  |  103  |  11  ----------------------------<  102<H>  3.6   |  21<L>  |  0.87    Ca    8.9      13 Aug 2020 05:27  Mg     1.8     08-13      proBNP:   Lipid Profile:   HgA1c:   TSH:             TELEMETRY: SR	    ECG:  	  RADIOLOGY:   DIAGNOSTIC TESTING:  [ ] Echocardiogram:  [ ]  Catheterization:  [ ] Stress Test:    OTHER:

## 2020-08-13 NOTE — PROGRESS NOTE ADULT - SUBJECTIVE AND OBJECTIVE BOX
INTERVAL HPI/OVERNIGHT EVENTS:    patient seen and examined at bedside  denies fever, chills, nausea, vomiting, or abdominal pain     MEDICATIONS  (STANDING):  ALPRAZolam 0.25 milliGRAM(s) Oral daily  aMIOdarone    Tablet 200 milliGRAM(s) Oral daily  aspirin  chewable 81 milliGRAM(s) Oral daily  cloNIDine 0.1 milliGRAM(s) Oral two times a day  DULoxetine 60 milliGRAM(s) Oral daily  ferrous    sulfate 325 milliGRAM(s) Oral two times a day  metoprolol succinate ER 25 milliGRAM(s) Oral daily  piperacillin/tazobactam IVPB.. 3.375 Gram(s) IV Intermittent every 8 hours  potassium chloride    Tablet ER 40 milliEquivalent(s) Oral every 4 hours  tamsulosin 0.4 milliGRAM(s) Oral at bedtime    MEDICATIONS  (PRN):      Allergies    No Known Allergies    Intolerances        Review of Systems:    General:  No wt loss, fevers, chills, night sweats,fatigue,   Eyes:  Good vision, no reported pain  ENT:  No sore throat, pain, runny nose, dysphagia  CV:  No pain, palpitatioins, hypo/hypertension  Resp:  No dyspnea, cough, tachypnea, wheezing  GI:  No pain, No nausea, No vomiting, No diarrhea, No constipatiion, No weight loss, No fever, No pruritis, No rectal bleeding, No tarry stools, No dysphagia,  :  No pain, bleeding, incontinence, nocturia  Muscle:  No pain, weakness  Neuro:  No weakness, tingling, memory problems  Psych:  No fatigue, insomnia, mood problems, depression  Endocrine:  No polyuria, polydypsia, cold/heat intolerance  Heme:  No petechiae, ecchymosis, easy bruisability  Skin:  No rash, tattoos, scars, edema      Vital Signs Last 24 Hrs  T(C): 36.4 (04 Aug 2020 13:08), Max: 37.1 (04 Aug 2020 05:02)  T(F): 97.6 (04 Aug 2020 13:08), Max: 98.8 (04 Aug 2020 05:02)  HR: 65 (04 Aug 2020 13:08) (65 - 90)  BP: 171/72 (04 Aug 2020 13:08) (158/68 - 191/84)  BP(mean): --  RR: 18 (04 Aug 2020 13:08) (18 - 18)  SpO2: 96% (04 Aug 2020 13:08) (96% - 100%)    PHYSICAL EXAM:    Constitutional: NAD   HEENT: EOMI, throat clear  Neck: No LAD, supple  Gastrointestinal: BS+, soft, NT/ND  Extremities: No peripheral edema  Neurological: A/O x 3, no focal deficits        LABS:                        12.2   10.93 )-----------( 156      ( 03 Aug 2020 07:28 )             35.2     08-04    132<L>  |  98  |  11  ----------------------------<  129<H>  2.8<LL>   |  20<L>  |  0.89    Ca    8.4      04 Aug 2020 06:57  Phos  1.3     08-04  Mg     1.9     08-04    TPro  6.3  /  Alb  3.0<L>  /  TBili  0.6  /  DBili  x   /  AST  18  /  ALT  16  /  AlkPhos  108  08-03    PT/INR - ( 02 Aug 2020 13:56 )   PT: 13.1 sec;   INR: 1.11 ratio         PTT - ( 02 Aug 2020 13:56 )  PTT:26.1 sec  Urinalysis Basic - ( 03 Aug 2020 00:37 )    Color: Yellow / Appearance: Slightly Turbid / S.015 / pH: x  Gluc: x / Ketone: Trace  / Bili: Negative / Urobili: Negative   Blood: x / Protein: 100 / Nitrite: Negative   Leuk Esterase: Moderate / RBC: 13 /hpf / WBC 36 /HPF   Sq Epi: x / Non Sq Epi: 3 /hpf / Bacteria: Many        RADIOLOGY & ADDITIONAL TESTS:  < from: CT Abdomen and Pelvis w/ Oral Cont and w/ IV Cont (20 @ 18:50) >    EXAM:  CT ABDOMEN AND PELVIS OC IC                            PROCEDURE DATE:  2020            INTERPRETATION:  CLINICAL INFORMATION: History of aortic aneurysm repair. Presents with colitis.    COMPARISON: CT arteriogram chest abdomen pelvisdated 2019.    PROCEDURE:  CT of the Abdomen and Pelvis was performed with intravenous contrast.  Intravenous contrast: 90 ml Omnipaque 350. 10 ml discarded.  Oral contrast: positive contrast was administered.  Sagittal and coronal reformats were performed.    FINDINGS:  LOWER CHEST: Surgical repair thoracoabdominal aorta partially visualized, appears unchanged.    LIVER: Within normal limits.  BILE DUCTS: Normal caliber.  GALLBLADDER: Within normal limits.  SPLEEN: Within normal limits.  PANCREAS: Within normal limits.  ADRENALS: Within normal limits.  KIDNEYS/URETERS: Left renal enlargement with striated nephrogram, perinephric stranding, and urothelial thickening. No hydronephrosis.    BLADDER: Mild bladder wall thickening.  REPRODUCTIVE ORGANS: Adnexa appear unremarkable.    BOWEL: No bowel obstruction. Moderate fluid-filled distention of colon consistent with diarrhea. No mural thickening. Appendix normal.  PERITONEUM: No ascites.  VESSELS: Surgical graft repair suprarenal abdominal aorta unchanged. Maximal diameter of abdominal aorta 3.1 cm. Bypass grafts of the celiac and superior mesenteric arteries are patent. Native renal arteries and AIDAN appear patent.  Lack of enhancement of the right external iliac and common femoralveins.  RETROPERITONEUM/LYMPH NODES: No lymphadenopathy.  ABDOMINAL WALL: Within normal limits.  BONES: Disc degeneration lumbosacral junction.    IMPRESSION:  Left-sided pyelonephritis and possible cystitis.    Interval stability in appearance of surgical graft repair of thoracoabdominal aorta and mesenteric bypass grafts    No CT evidence of colitis.    Failure of enhancement of the right common femoral and external iliac veins. Suggest venous duplex study to assess for DVT.    Examination findings were conveyed to physician's assistant Jorge by Dr. Chacon at 1937 hours on 8/3/2020 with read back.                  JAQUELINE BRADLEY M.D., ATTENDING RADIOLOGIST  This document has been electronically signed. Aug  4 2020  9:23AM                < end of copied text >

## 2020-08-13 NOTE — PROGRESS NOTE ADULT - SUBJECTIVE AND OBJECTIVE BOX
Kalyan Argueta MD (Nephrology progress note)  [address], [facility],  [id number],  [facility]  TEl: [phone]  Cell: [phone]    Patient is a 70y Female seen and evaluated at bedside. Vital signs, laboratory data, imaging studies, consult notes reviewed done within past 24 hours. Overnight events noted. Patient lying in bed in no distress still complains of mild diarrhea. Interval stable renal function with electrolytes.    Allergies    No Known Allergies    Intolerances        ROS:  CONSTITUTIONAL: No fever or chills.  HEENT: No headache or visual disturbances.  RESPIRATORY: No shortness of breath, cough, hemoptysis or wheezing  CARDIOVASCULAR: No Chest pain, shortness of breath, palpitations, dizziness, syncope, orthopnea, PND or leg swelling.  GASTROINTESTINAL: Mild diarrhea, No abdominal pain, nausea, vomiting, hematemesis or blood per rectum.  GENITOURINARY: No urinary frequency, urgency, gross hematuria, dysuria, flank or supra pubic pain, difficulty passing urine.  NEUROLOGICAL: No headache, focal limb weakness, tingling or numbness or seizure like activity  SKIN: No skin rash or lesion  MUSCULOSKELETAL: No leg pain, calf pain or swelling    VITALS:    T(C): 36.5 (08-13-20 @ 04:12), Max: 36.8 (08-12-20 @ 21:12)  HR: 54 (08-13-20 @ 04:12) (53 - 58)  BP: 149/63 (08-13-20 @ 04:12) (121/70 - 149/63)  RR: 18 (08-13-20 @ 04:12) (18 - 18)  SpO2: 96% (08-13-20 @ 04:12) (96% - 99%)  CAPILLARY BLOOD GLUCOSE          08-12-20 @ 07:01  -  08-13-20 @ 07:00  --------------------------------------------------------  IN: 690 mL / OUT: 1200 mL / NET: -510 mL      MEDICATIONS  (STANDING):  aMIOdarone    Tablet 200 milliGRAM(s) Oral daily  aspirin  chewable 81 milliGRAM(s) Oral daily  cloNIDine 0.2 milliGRAM(s) Oral two times a day  diphenoxylate/atropine 1 Tablet(s) Oral two times a day  DULoxetine 60 milliGRAM(s) Oral daily  ertapenem  IVPB 1000 milliGRAM(s) IV Intermittent every 24 hours  ferrous    sulfate 325 milliGRAM(s) Oral two times a day  heparin   Injectable 5000 Unit(s) SubCutaneous every 8 hours  lactobacillus acidophilus 1 Tablet(s) Oral three times a day  lisinopril 20 milliGRAM(s) Oral daily  metoprolol succinate ER 25 milliGRAM(s) Oral daily    MEDICATIONS  (PRN):      PHYSICAL EXAM:  GENERAL: Alert, awake and oriented x3 in no distress  HEENT: AUBREY, EOMI, neck supple, no JVP, no carotid bruit, oral mucosa moist and pink.  CHEST/LUNG: Bilateral clear breath sounds, no rales, no crepitations, no rhonchi, no wheezing  HEART: Regular rate and rhythm, NICANOR II/VI at LPSB, no gallops, no rub   ABDOMEN: Soft, nontender, non distended, bowel sounds present, no palpable organomegaly  : No flank or supra pubic tenderness.  EXTREMITIES: Peripheral pulses are palpable, no pedal edema  Neurology: AAOx3, no focal neurological deficit  SKIN: No rash or skin lesion  Musculoskeletal: No joint deformity or spinal tenderness.      LABS:    08-13    136  |  103  |  11  ----------------------------<  102<H>  3.6   |  21<L>  |  0.87    Ca    8.9      13 Aug 2020 05:27  Mg     1.8     08-13        RADIOLOGY & ADDITIONAL STUDIES:  None  Imaging Personally Reviewed:  [x] YES  [ ] NO    Consultant(s) Notes Reviewed:  [x] YES  [ ] NO    Care Discussed with Primary team/ Other Providers [x] YES  [ ] NO

## 2020-08-14 DIAGNOSIS — F43.20 ADJUSTMENT DISORDER, UNSPECIFIED: ICD-10-CM

## 2020-08-14 LAB
ANION GAP SERPL CALC-SCNC: 10 MMOL/L — SIGNIFICANT CHANGE UP (ref 5–17)
BUN SERPL-MCNC: 10 MG/DL — SIGNIFICANT CHANGE UP (ref 7–23)
C DIFF GDH STL QL: NEGATIVE — SIGNIFICANT CHANGE UP
C DIFF GDH STL QL: SIGNIFICANT CHANGE UP
CALCIUM SERPL-MCNC: 8.9 MG/DL — SIGNIFICANT CHANGE UP (ref 8.4–10.5)
CHLORIDE SERPL-SCNC: 102 MMOL/L — SIGNIFICANT CHANGE UP (ref 96–108)
CO2 SERPL-SCNC: 23 MMOL/L — SIGNIFICANT CHANGE UP (ref 22–31)
CREAT SERPL-MCNC: 0.82 MG/DL — SIGNIFICANT CHANGE UP (ref 0.5–1.3)
GLUCOSE SERPL-MCNC: 100 MG/DL — HIGH (ref 70–99)
MAGNESIUM SERPL-MCNC: 1.9 MG/DL — SIGNIFICANT CHANGE UP (ref 1.6–2.6)
POTASSIUM SERPL-MCNC: 3.3 MMOL/L — LOW (ref 3.5–5.3)
POTASSIUM SERPL-SCNC: 3.3 MMOL/L — LOW (ref 3.5–5.3)
SODIUM SERPL-SCNC: 135 MMOL/L — SIGNIFICANT CHANGE UP (ref 135–145)

## 2020-08-14 PROCEDURE — 90792 PSYCH DIAG EVAL W/MED SRVCS: CPT

## 2020-08-14 PROCEDURE — 93010 ELECTROCARDIOGRAM REPORT: CPT

## 2020-08-14 RX ORDER — POTASSIUM CHLORIDE 20 MEQ
10 PACKET (EA) ORAL ONCE
Refills: 0 | Status: DISCONTINUED | OUTPATIENT
Start: 2020-08-14 | End: 2020-08-14

## 2020-08-14 RX ORDER — QUETIAPINE FUMARATE 200 MG/1
12.5 TABLET, FILM COATED ORAL AT BEDTIME
Refills: 0 | Status: DISCONTINUED | OUTPATIENT
Start: 2020-08-14 | End: 2020-08-15

## 2020-08-14 RX ORDER — MAGNESIUM SULFATE 500 MG/ML
1 VIAL (ML) INJECTION ONCE
Refills: 0 | Status: COMPLETED | OUTPATIENT
Start: 2020-08-14 | End: 2020-08-14

## 2020-08-14 RX ORDER — POTASSIUM CHLORIDE 20 MEQ
40 PACKET (EA) ORAL EVERY 4 HOURS
Refills: 0 | Status: COMPLETED | OUTPATIENT
Start: 2020-08-14 | End: 2020-08-14

## 2020-08-14 RX ADMIN — Medication 325 MILLIGRAM(S): at 17:54

## 2020-08-14 RX ADMIN — Medication 1 TABLET(S): at 06:01

## 2020-08-14 RX ADMIN — Medication 0.2 MILLIGRAM(S): at 17:54

## 2020-08-14 RX ADMIN — HEPARIN SODIUM 5000 UNIT(S): 5000 INJECTION INTRAVENOUS; SUBCUTANEOUS at 06:01

## 2020-08-14 RX ADMIN — QUETIAPINE FUMARATE 12.5 MILLIGRAM(S): 200 TABLET, FILM COATED ORAL at 21:24

## 2020-08-14 RX ADMIN — Medication 81 MILLIGRAM(S): at 13:27

## 2020-08-14 RX ADMIN — AMIODARONE HYDROCHLORIDE 200 MILLIGRAM(S): 400 TABLET ORAL at 06:02

## 2020-08-14 RX ADMIN — HEPARIN SODIUM 5000 UNIT(S): 5000 INJECTION INTRAVENOUS; SUBCUTANEOUS at 13:27

## 2020-08-14 RX ADMIN — Medication 325 MILLIGRAM(S): at 06:01

## 2020-08-14 RX ADMIN — Medication 0.2 MILLIGRAM(S): at 06:01

## 2020-08-14 RX ADMIN — ERTAPENEM SODIUM 120 MILLIGRAM(S): 1 INJECTION, POWDER, LYOPHILIZED, FOR SOLUTION INTRAMUSCULAR; INTRAVENOUS at 13:41

## 2020-08-14 RX ADMIN — Medication 25 MILLIGRAM(S): at 06:01

## 2020-08-14 RX ADMIN — Medication 40 MILLIEQUIVALENT(S): at 10:04

## 2020-08-14 RX ADMIN — Medication 1 TABLET(S): at 17:55

## 2020-08-14 RX ADMIN — Medication 1 TABLET(S): at 13:26

## 2020-08-14 RX ADMIN — LISINOPRIL 20 MILLIGRAM(S): 2.5 TABLET ORAL at 06:01

## 2020-08-14 RX ADMIN — Medication 100 GRAM(S): at 10:04

## 2020-08-14 RX ADMIN — Medication 1 TABLET(S): at 21:24

## 2020-08-14 RX ADMIN — HEPARIN SODIUM 5000 UNIT(S): 5000 INJECTION INTRAVENOUS; SUBCUTANEOUS at 21:24

## 2020-08-14 RX ADMIN — DULOXETINE HYDROCHLORIDE 60 MILLIGRAM(S): 30 CAPSULE, DELAYED RELEASE ORAL at 13:26

## 2020-08-14 RX ADMIN — Medication 40 MILLIEQUIVALENT(S): at 13:26

## 2020-08-14 NOTE — BEHAVIORAL HEALTH ASSESSMENT NOTE - NSBHCONSULTMEDAGITATION_PSY_A_CORE FT
recheck ekg and if qtc <500ms, consider Seroquel 12.5mg po prn q12 for agitation/anxiety recheck ekg and if qtc <500ms, consider Seroquel 12.5mg po prn qhs for agitation/anxiety

## 2020-08-14 NOTE — BEHAVIORAL HEALTH ASSESSMENT NOTE - NSBHCHARTREVIEWVS_PSY_A_CORE FT
Vital Signs Last 24 Hrs  T(C): 36.6 (14 Aug 2020 12:15), Max: 36.6 (14 Aug 2020 12:15)  T(F): 97.8 (14 Aug 2020 12:15), Max: 97.8 (14 Aug 2020 12:15)  HR: 51 (14 Aug 2020 12:15) (51 - 66)  BP: 135/64 (14 Aug 2020 12:15) (131/60 - 158/70)  BP(mean): --  RR: 18 (14 Aug 2020 12:15) (18 - 18)  SpO2: 99% (14 Aug 2020 12:15) (99% - 100%)

## 2020-08-14 NOTE — BEHAVIORAL HEALTH ASSESSMENT NOTE - CASE SUMMARY
Pt is a 69 y/o  female, +boyfriend, with 2 adult sons and several grandchildren, lives alone in a co-op in [place], retired  for an insurance company, with no PPHx, no inpt psychiatric hospitalizations, no SA/SIB, no substance abuse, with PMHx of thoracic and abdominal aortic aneurysm s/p repair, debility and incomplete spinal cord injury, AV replacement, and HTN presenting with diarrhea x1 day, found to have acute gastritis, bacteremia.  Psychiatry consulted to assess for vivid dreams, delirium.  I agree that pt is alert and oriented and free of any symptoms now.  She is distressed by her vivid dreams and agrees to try a small dose of Seroquel of 12.5mg at bedtime if needed.   She continues Cymbalta 60mg daily and may take Melatonin 3mg po qhs to help with sleep.

## 2020-08-14 NOTE — BEHAVIORAL HEALTH ASSESSMENT NOTE - SUMMARY
69 y/o  female, +boyfriend, with 2 adult sons and several grandchildren, lives alone in a co-op in [facility] [employer], retired  for an insurance company, with no PPHx, no inpt psychiatric hospitalizations, no SA/SIB, no substance abuse, with PMHx of thoracic and abdominal aortic aneurysm s/p repair, debility and incomplete spinal cord injury, AV replacement, and HTN presenting with diarrhea x1 day, found to have acute gastritis, bacteremia.  Psychiatry consulted to assess for vivid dreams, delirium. 69 y/o  female, +boyfriend, with 2 adult sons and several grandchildren, lives alone in a co-op in [place], retired  for an insurance company, with no PPHx, no inpt psychiatric hospitalizations, no SA/SIB, no substance abuse, with PMHx of thoracic and abdominal aortic aneurysm s/p repair, debility and incomplete spinal cord injury, AV replacement, and HTN presenting with diarrhea x1 day, found to have acute gastritis, bacteremia.  Psychiatry consulted to assess for vivid dreams, delirium.  Patient seen and evaluated, AAOx3, reports events prior to and during hospitalization.  States for the past 3 nights has been having vivid dreams, however denies having any visual or auditory disturbances when fully awake.  States having similar but not as intense during a previous hospitalization.  Denies feeling depressed or anxious.   Tearful, wants to be discharged to rehab soon so she can get physically stronger to gain back more independency in her functioning s/p spinal cord injury.  Denies SI/HI, AVH.  Reports poor sleep, appetite is fair.  At this time, patient's dreams unlikely to be caused by her current medications.  Would continue her Cymbalta - Recheck EKG and f/u for prolonged qtc and if still prolonged, consult cardiology.  Consider starting melatonin for sleep. 71 y/o  female, +boyfriend, with 2 adult sons and several grandchildren, lives alone in a co-op in [place], retired  for an insurance company, with no PPHx, no inpt psychiatric hospitalizations, no SA/SIB, no substance abuse, with PMHx of thoracic and abdominal aortic aneurysm s/p repair, debility and incomplete spinal cord injury, AV replacement, and HTN presenting with diarrhea x1 day, found to have acute gastritis, bacteremia.  Psychiatry consulted to assess for vivid dreams, delirium.  Patient seen and evaluated, AAOx3, reports events prior to and during hospitalization.  States for the past 3 nights has been having vivid dreams, however denies having any visual or auditory disturbances when fully awake.  States having similar but not as intense during a previous hospitalization.  Denies feeling depressed or anxious.   Tearful, wants to be discharged to rehab soon so she can get physically stronger to gain back more independency in her functioning s/p spinal cord injury.  Denies SI/HI, AVH.  Reports poor sleep, appetite is fair.  At this time, patient's dreams unlikely to be caused by her current medications.  Would continue her Cymbalta - Recheck EKG and f/u for prolonged qtc and if still prolonged, consult cardiology.  If qtc <500ms, consider using Seroquel 12.5mg po q12 prn for anxiety/agitation.  Consider starting melatonin for sleep. Pt is a 69 y/o  female, +boyfriend, with 2 adult sons and several grandchildren, lives alone in a co-op in [place], retired  for an insurance company, with no PPHx, no inpt psychiatric hospitalizations, no SA/SIB, no substance abuse, with PMHx of thoracic and abdominal aortic aneurysm s/p repair, debility and incomplete spinal cord injury, AV replacement, and HTN presenting with diarrhea x1 day, found to have acute gastritis, bacteremia.  Psychiatry consulted to assess for vivid dreams, delirium.  Patient seen and evaluated, AAOx3, reports events prior to and during hospitalization.  States for the past 3 nights has been having vivid dreams, however denies having any visual or auditory disturbances when fully awake.  States having similar but not as intense during a previous hospitalization.  Denies feeling depressed or anxious.   Tearful, wants to be discharged to rehab soon so she can get physically stronger to gain back more independency in her functioning s/p spinal cord injury.  Denies SI/HI, AVH.  Reports poor sleep, appetite is fair.  At this time, patient's dreams unlikely to be caused by her current medications.  Would continue her Cymbalta - Recheck EKG and f/u for prolonged qtc and if still prolonged, consult cardiology.  If qtc <500ms, consider using Seroquel 12.5mg po q12 prn for anxiety/agitation.  Consider starting melatonin for sleep. Pt is a 71 y/o  female, +boyfriend, with 2 adult sons and several grandchildren, lives alone in a co-op in [place], retired  for an insurance company, with no PPHx, no inpt psychiatric hospitalizations, no SA/SIB, no substance abuse, with PMHx of thoracic and abdominal aortic aneurysm s/p repair, debility and incomplete spinal cord injury, AV replacement, and HTN presenting with diarrhea x1 day, found to have acute gastritis, bacteremia.  Psychiatry consulted to assess for vivid dreams, delirium.  Patient seen and evaluated, AAOx3, reports events prior to and during hospitalization.  States for the past 3 nights has been having vivid dreams, however denies having any visual or auditory disturbances when fully awake.  States having similar but not as intense during a previous hospitalization.  Denies feeling depressed or anxious.   Tearful, wants to be discharged to rehab soon so she can get physically stronger to gain back more independency in her functioning s/p spinal cord injury.  Denies SI/HI, AVH.  Reports poor sleep, appetite is fair.  At this time, patient's dreams unlikely to be caused by her current medications.  Would continue her Cymbalta - Recheck EKG and f/u for prolonged qtc and if still prolonged, consult cardiology.  If qtc <500ms, consider using Seroquel 12.5mg po qhs prn for anxiety/agitation.  Consider starting melatonin for sleep.

## 2020-08-14 NOTE — BEHAVIORAL HEALTH ASSESSMENT NOTE - NSBHCHARTREVIEWINVESTIGATE_PSY_A_CORE FT
< from: 12 Lead ECG (08.02.20 @ 14:19) >      Ventricular Rate 78 BPM    Atrial Rate 78 BPM    P-R Interval 162 ms    QRS Duration 126 ms    Q-T Interval 460 ms    QTC Calculation(Bezet) 524 ms    P Axis 72 degrees    R Axis 71 degrees    T Axis 91 degrees    Diagnosis Line NORMAL SINUS RHYTHM  BIATRIAL ENLARGEMENT  NON-SPECIFIC INTRA-VENTRICULAR CONDUCTION BLOCK  POSSIBLE INFERIOR INFARCT , AGE UNDETERMINED  ST & T WAVE ABNORMALITY, CONSIDER ANTERIOR ISCHEMIA  ABNORMAL ECG  WHEN COMPARED WITH ECG OF 28-SEP-2019 12:53,THE RATE IS HIGHER NOW AND THERE ARE NEW ST AND T WAVE ABNORMALITIES  Confirmed by MD SHERIE, [facility] ([id number]) on 8/4/2020 6:41:10 AM    < end of copied text >

## 2020-08-14 NOTE — PROGRESS NOTE ADULT - PROBLEM SELECTOR PLAN 3
Hypokalemia now resolved with K level 3.3 and Mag 1.8 likely due to GI losses  - Monitor BMP and mag, phos level  - Replete K and Mag with goal K>4.0 and<5.0 and Mag>2.0  - Receiving IV kCL and po Kcl with IV 1 gm of mag

## 2020-08-14 NOTE — CHART NOTE - NSCHARTNOTEFT_GEN_A_CORE
Nutrition Follow Up Note  Patient seen for: malnutrition follow up on 3 DSU    · Reason for Admission	diarrhea      Source: pt, EMR    Diet : Soft  Danactive x 2 daily    Patient reports: feeling better and intake is improving, RD adjusted preferences as pt requested.      PO intake : 50%     Source for PO intake: pt          Daily Weight in k.4 (), Weight in k.2 (), Weight in k.8 (), Weight in k.1 (), Weight in k (08-10), Weight in k.4 (), Weight in k.9 ()  % Weight Change: 3% gain since previous assess on     Pertinent Medications: MEDICATIONS  (STANDING):  aMIOdarone    Tablet 200 milliGRAM(s) Oral daily  aspirin  chewable 81 milliGRAM(s) Oral daily  cloNIDine 0.2 milliGRAM(s) Oral two times a day  diphenoxylate/atropine 1 Tablet(s) Oral two times a day  DULoxetine 60 milliGRAM(s) Oral daily  ertapenem  IVPB 1000 milliGRAM(s) IV Intermittent every 24 hours  ferrous    sulfate 325 milliGRAM(s) Oral two times a day  heparin   Injectable 5000 Unit(s) SubCutaneous every 8 hours  lactobacillus acidophilus 1 Tablet(s) Oral three times a day  lisinopril 20 milliGRAM(s) Oral daily  metoprolol succinate ER 25 milliGRAM(s) Oral daily    MEDICATIONS  (PRN):    Pertinent Labs:  @ 06:38: Na 135, BUN 10, Cr 0.82, <H>, K+ 3.3<L>, Mg 1.9          Skin per nursing documentation: no pressure injury  Edema: none    Estimated Needs:   [x ] no change since previous assessment  [ ] recalculated:     Previous Nutrition Diagnosis:  Inadequate protein-energy intake. severe Malnutrition   Nutrition Diagnosis is: being addressed with meals and snacks-pt refusing supplements at this time          Recommend/ Interventions:   1) continue Soft diet as it is appropriate while pt is having diarrhea.   2) continue Danactive 2x daily-pt is consuming  3) review and adjust preferences as needed, which may assist in improving po intake  removed apple juice and provided orange juice for breakfast, removed rice at lunch and providing only at dinner. serving melon and cottage cheese a breakfast to assure that pt has for a snack in between breakfast and lunch.         Monitoring and Evaluation:     Continue to monitor Nutritional intake, Tolerance to diet prescription, weights, labs, skin integrity    RD remains available upon request and will follow up per protocol  Indy Crowell MA, ITARA, CDN #[phone]

## 2020-08-14 NOTE — BEHAVIORAL HEALTH ASSESSMENT NOTE - RISK ASSESSMENT
Low Acute Suicide Risk Risk factors: acute/chronic medical issues, acute/chronic cognitive impairments, not receiving treatment    Protective factors: no current SIIP/HIIP, no h/o SA/SIB, no h/o psych admissions, no access to weapons, no active substance abuse, engaged in work or school, dependent children, domiciled, social supports, positive therapeutic relationship    Overall, pt is a low risk of harm to self/others and does not require psychiatric admission for safety and stabilization.

## 2020-08-14 NOTE — PROGRESS NOTE ADULT - SUBJECTIVE AND OBJECTIVE BOX
INTERVAL HPI/OVERNIGHT EVENTS:    patient seen and examined at bedside  1 soft bm overnight   tolerating diet   denies fever, chills, nausea, vomiting, or abdominal pain     MEDICATIONS  (STANDING):  ALPRAZolam 0.25 milliGRAM(s) Oral daily  aMIOdarone    Tablet 200 milliGRAM(s) Oral daily  aspirin  chewable 81 milliGRAM(s) Oral daily  cloNIDine 0.1 milliGRAM(s) Oral two times a day  DULoxetine 60 milliGRAM(s) Oral daily  ferrous    sulfate 325 milliGRAM(s) Oral two times a day  metoprolol succinate ER 25 milliGRAM(s) Oral daily  piperacillin/tazobactam IVPB.. 3.375 Gram(s) IV Intermittent every 8 hours  potassium chloride    Tablet ER 40 milliEquivalent(s) Oral every 4 hours  tamsulosin 0.4 milliGRAM(s) Oral at bedtime    MEDICATIONS  (PRN):      Allergies    No Known Allergies    Intolerances        Review of Systems:    General:  No wt loss, fevers, chills, night sweats,fatigue,   Eyes:  Good vision, no reported pain  ENT:  No sore throat, pain, runny nose, dysphagia  CV:  No pain, palpitatioins, hypo/hypertension  Resp:  No dyspnea, cough, tachypnea, wheezing  GI:  No pain, No nausea, No vomiting, No diarrhea, No constipatiion, No weight loss, No fever, No pruritis, No rectal bleeding, No tarry stools, No dysphagia,  :  No pain, bleeding, incontinence, nocturia  Muscle:  No pain, weakness  Neuro:  No weakness, tingling, memory problems  Psych:  No fatigue, insomnia, mood problems, depression  Endocrine:  No polyuria, polydypsia, cold/heat intolerance  Heme:  No petechiae, ecchymosis, easy bruisability  Skin:  No rash, tattoos, scars, edema      Vital Signs Last 24 Hrs  T(C): 36.4 (04 Aug 2020 13:08), Max: 37.1 (04 Aug 2020 05:02)  T(F): 97.6 (04 Aug 2020 13:08), Max: 98.8 (04 Aug 2020 05:02)  HR: 65 (04 Aug 2020 13:08) (65 - 90)  BP: 171/72 (04 Aug 2020 13:08) (158/68 - 191/84)  BP(mean): --  RR: 18 (04 Aug 2020 13:08) (18 - 18)  SpO2: 96% (04 Aug 2020 13:08) (96% - 100%)    PHYSICAL EXAM:    Constitutional: NAD   HEENT: EOMI, throat clear  Neck: No LAD, supple  Gastrointestinal: BS+, soft, NT/ND  Extremities: No peripheral edema  Neurological: A/O x 3, no focal deficits        LABS:                        12.2   10.93 )-----------( 156      ( 03 Aug 2020 07:28 )             35.2     08-04    132<L>  |  98  |  11  ----------------------------<  129<H>  2.8<LL>   |  20<L>  |  0.89    Ca    8.4      04 Aug 2020 06:57  Phos  1.3     08-04  Mg     1.9     08-04    TPro  6.3  /  Alb  3.0<L>  /  TBili  0.6  /  DBili  x   /  AST  18  /  ALT  16  /  AlkPhos  108  08-03    PT/INR - ( 02 Aug 2020 13:56 )   PT: 13.1 sec;   INR: 1.11 ratio         PTT - ( 02 Aug 2020 13:56 )  PTT:26.1 sec  Urinalysis Basic - ( 03 Aug 2020 00:37 )    Color: Yellow / Appearance: Slightly Turbid / S.015 / pH: x  Gluc: x / Ketone: Trace  / Bili: Negative / Urobili: Negative   Blood: x / Protein: 100 / Nitrite: Negative   Leuk Esterase: Moderate / RBC: 13 /hpf / WBC 36 /HPF   Sq Epi: x / Non Sq Epi: 3 /hpf / Bacteria: Many        RADIOLOGY & ADDITIONAL TESTS:  < from: CT Abdomen and Pelvis w/ Oral Cont and w/ IV Cont (20 @ 18:50) >    EXAM:  CT ABDOMEN AND PELVIS OC IC                            PROCEDURE DATE:  2020            INTERPRETATION:  CLINICAL INFORMATION: History of aortic aneurysm repair. Presents with colitis.    COMPARISON: CT arteriogram chest abdomen pelvisdated 2019.    PROCEDURE:  CT of the Abdomen and Pelvis was performed with intravenous contrast.  Intravenous contrast: 90 ml Omnipaque 350. 10 ml discarded.  Oral contrast: positive contrast was administered.  Sagittal and coronal reformats were performed.    FINDINGS:  LOWER CHEST: Surgical repair thoracoabdominal aorta partially visualized, appears unchanged.    LIVER: Within normal limits.  BILE DUCTS: Normal caliber.  GALLBLADDER: Within normal limits.  SPLEEN: Within normal limits.  PANCREAS: Within normal limits.  ADRENALS: Within normal limits.  KIDNEYS/URETERS: Left renal enlargement with striated nephrogram, perinephric stranding, and urothelial thickening. No hydronephrosis.    BLADDER: Mild bladder wall thickening.  REPRODUCTIVE ORGANS: Adnexa appear unremarkable.    BOWEL: No bowel obstruction. Moderate fluid-filled distention of colon consistent with diarrhea. No mural thickening. Appendix normal.  PERITONEUM: No ascites.  VESSELS: Surgical graft repair suprarenal abdominal aorta unchanged. Maximal diameter of abdominal aorta 3.1 cm. Bypass grafts of the celiac and superior mesenteric arteries are patent. Native renal arteries and AIDAN appear patent.  Lack of enhancement of the right external iliac and common femoralveins.  RETROPERITONEUM/LYMPH NODES: No lymphadenopathy.  ABDOMINAL WALL: Within normal limits.  BONES: Disc degeneration lumbosacral junction.    IMPRESSION:  Left-sided pyelonephritis and possible cystitis.    Interval stability in appearance of surgical graft repair of thoracoabdominal aorta and mesenteric bypass grafts    No CT evidence of colitis.    Failure of enhancement of the right common femoral and external iliac veins. Suggest venous duplex study to assess for DVT.    Examination findings were conveyed to physician's assistant Jorge by Dr. Chacon at 1937 hours on 8/3/2020 with read back.                  JAQUELINE BRADLEY M.D., ATTENDING RADIOLOGIST  This document has been electronically signed. Aug  4 2020  9:23AM                < end of copied text >

## 2020-08-14 NOTE — PROGRESS NOTE ADULT - SUBJECTIVE AND OBJECTIVE BOX
Patient is a 70y old  Female who presents with a chief complaint of diarrhea (14 Aug 2020 11:15)      INTERVAL HPI/OVERNIGHT EVENTS:  T(C): 36.6 (08-14-20 @ 12:15), Max: 36.6 (08-14-20 @ 12:15)  HR: 51 (08-14-20 @ 12:15) (51 - 66)  BP: 135/64 (08-14-20 @ 12:15) (131/60 - 158/70)  RR: 18 (08-14-20 @ 12:15) (18 - 18)  SpO2: 99% (08-14-20 @ 12:15) (99% - 100%)  Wt(kg): --  I&O's Summary    13 Aug 2020 07:01  -  14 Aug 2020 07:00  --------------------------------------------------------  IN: 750 mL / OUT: 1100 mL / NET: -350 mL    14 Aug 2020 07:01  -  14 Aug 2020 15:51  --------------------------------------------------------  IN: 480 mL / OUT: 0 mL / NET: 480 mL        LABS:    08-14    135  |  102  |  10  ----------------------------<  100<H>  3.3<L>   |  23  |  0.82    Ca    8.9      14 Aug 2020 06:38  Mg     1.9     08-14          CAPILLARY BLOOD GLUCOSE                MEDICATIONS  (STANDING):  aMIOdarone    Tablet 200 milliGRAM(s) Oral daily  aspirin  chewable 81 milliGRAM(s) Oral daily  cloNIDine 0.2 milliGRAM(s) Oral two times a day  diphenoxylate/atropine 1 Tablet(s) Oral two times a day  DULoxetine 60 milliGRAM(s) Oral daily  ertapenem  IVPB 1000 milliGRAM(s) IV Intermittent every 24 hours  ferrous    sulfate 325 milliGRAM(s) Oral two times a day  heparin   Injectable 5000 Unit(s) SubCutaneous every 8 hours  lactobacillus acidophilus 1 Tablet(s) Oral three times a day  lisinopril 20 milliGRAM(s) Oral daily  metoprolol succinate ER 25 milliGRAM(s) Oral daily    MEDICATIONS  (PRN):          PHYSICAL EXAM:  GENERAL: frail  CHEST/LUNG: Clear to percussion bilaterally; No rales, rhonchi, wheezing, or rubs  HEART: Regular rate and rhythm; No murmurs, rubs, or gallops  ABDOMEN: Soft, Nontender, Nondistended; Bowel sounds present  EXTREMITIES:  2+ Peripheral Pulses, No clubbing, cyanosis, or edema  LYMPH: No lymphadenopathy noted  SKIN: No rashes or lesions    Care Discussed with Consultants/Other Providers [x ] YES  [ ] NO

## 2020-08-14 NOTE — BEHAVIORAL HEALTH ASSESSMENT NOTE - DESCRIPTION
thoracic and abdominal aortic aneurysm s/p repair, debility and incomplete spinal cord injury, AV replacement, and HTN

## 2020-08-14 NOTE — BEHAVIORAL HEALTH ASSESSMENT NOTE - HPI (INCLUDE ILLNESS QUALITY, SEVERITY, DURATION, TIMING, CONTEXT, MODIFYING FACTORS, ASSOCIATED SIGNS AND SYMPTOMS)
69 y/o  female, +boyfriend, with 2 adult sons and several grandchildren, lives alone in a co-op in [place], retired  for an insurance company, with no PPHx, no inpt psychiatric hospitalizations, no SA/SIB, no substance abuse, with PMHx of thoracic and abdominal aortic aneurysm s/p repair, debility and incomplete spinal cord injury, AV replacement, and HTN presenting with diarrhea x1 day, found to have acute gastritis, bacteremia.  Psychiatry consulted to assess for vivid dreams, delirium.      Patient seen and evaluated, awake and alert oriented x 3, able to state who the current [place] President is, able to spell THINK backwards.  Patient able to state recent events leading up to this hospitalization.  States that for the past 3 nights has been having vivid dream, states when asleep had dreamt that she went to her room mate in the room to find that she was holding a basket full of puppies.  She reports upon opening her eyes, states that this was not real.  Also states another night where she was asleep and had dreamt that her friend whom she has not spoken to in several months was in the room and upon opening her eyes and awakening states that friend was not in the room.  She reports a similar type of presentation on a previous hospitalization, however not as intense.  Reports while awake, has never had any visual or auditory disturbances.  States that these dreams do not cause her intolerable anxiety, knows that these dreams are not reality, states she wants to know if her medications may be causing these dreams.  She denies feeling depressed or down, at times is tearful and states that she just wants to get better so she can go to rehab and subsequently go home and return to her regular functioning.  Denies feeling anxious or panic attacks.  Denies SI/HI, AVH.  Denies use of illicit substances or alcohol.  Discussed her previous medical issues- aortic aneurysm and spinal cord injury last year subsequently causing her debility and lack of sensations from her waist down.  States she wants to go to rehab so she can get stronger, talks about how active she was hiking and doing yoga prior to her spinal cord injury.  States she has a close relationship with her kids, has a group of supportive friends, also has a supportive boyfriend.      Per nursing staff at bedside, patient not confused, is oriented.  They state that patient's room mate reports patient talks to herself at night. 69 y/o  female, +boyfriend, with 2 adult sons and several grandchildren, lives alone in a co-op in [place], retired  for an insurance company, with no PPHx, no inpt psychiatric hospitalizations, no SA/SIB, no substance abuse, with PMHx of thoracic and abdominal aortic aneurysm s/p repair, debility and incomplete spinal cord injury, AV replacement, and HTN presenting with diarrhea x1 day, found to have acute gastritis, bacteremia.  Psychiatry consulted to assess for vivid dreams, delirium.      Patient seen and evaluated, awake and alert oriented x 3, able to state who the current US President is, able to spell THINK backwards.  Patient able to state recent events leading up to this hospitalization.  States that for the past 3 nights has been having vivid dream, states when asleep had dreamt that she went to her room mate in the room to find that she was holding a basket full of puppies.  She reports upon opening her eyes, states that this was not real.  Also states another night where she was asleep and had dreamt that her friend whom she has not spoken to in several months was in the room and upon opening her eyes and awakening states that friend was not in the room.  She reports a similar type of presentation on a previous hospitalization, however not as intense.  Reports while awake, has never had any visual or auditory disturbances.  States that these dreams do not cause her intolerable anxiety, knows that these dreams are not reality, states she wants to know if her medications may be causing these dreams.  She denies feeling depressed or down, at times is tearful and states that she just wants to get better so she can go to rehab and subsequently go home and return to her regular functioning.  Denies feeling anxious or panic attacks.  Denies SI/HI, AVH.  Denies use of illicit substances or alcohol.  Discussed her previous medical issues- aortic aneurysm and spinal cord injury last year subsequently causing her debility and lack of sensations from her waist down.  States she wants to go to rehab so she can get stronger, talks about how active she was hiking and doing yoga prior to her spinal cord injury.  States she has a close relationship with her kids, has a group of supportive friends, also has a supportive boyfriend.  States that she has never seen a psychiatrist, states is on Cymbalta since January 2020 for pain sx related to her spinal cord injury prescribed by her spinal cord specialist; had previously been on Neurontin.      Per nursing staff at bedside, patient not confused, is oriented.  They state that patient's room mate reports patient talks to herself at night. 69 y/o  female, +boyfriend, with 2 adult sons and several grandchildren, lives alone in a co-op in [place], retired  for an insurance company, with no PPHx, no inpt psychiatric hospitalizations, no SA/SIB, no substance abuse, with PMHx of thoracic and abdominal aortic aneurysm s/p repair, debility and incomplete spinal cord injury, AV replacement, and HTN presenting with diarrhea x1 day, found to have acute gastritis, bacteremia.  Psychiatry consulted to assess for vivid dreams, delirium.      Patient seen and evaluated, awake and alert oriented x 3, able to state who the current US President is, able to spell THINK backwards.  Patient able to state recent events leading up to this hospitalization.  States that for the past 3 nights has been having vivid dream, states when asleep had dreamt that she went to her room mate in the room to find that she was holding a basket full of puppies.  She reports upon opening her eyes, states that this was not real.  Also states another night where she was asleep and had dreamt that her friend whom she has not spoken to in several months was in the room and upon opening her eyes and awakening states that friend was not in the room.  She reports a similar type of presentation on a previous hospitalization, however not as intense.  Reports while awake, has never had any visual or auditory disturbances.  States that these dreams do not cause her intolerable anxiety, knows that these dreams are not reality, states she wants to know if her medications may be causing these dreams.  She denies feeling depressed or down, at times is tearful and states that she just wants to get better so she can go to rehab and subsequently go home and return to her regular functioning.  Denies feeling anxious or panic attacks.  Denies SI/HI, AVH.  Denies use of illicit substances or alcohol.  Discussed her previous medical issues- aortic aneurysm and spinal cord injury last year subsequently causing her debility and lack of sensations from her waist down.  States she wants to go to rehab so she can get stronger, talks about how active she was hiking and doing yoga prior to her spinal cord injury.  States she has a close relationship with her kids, has a group of supportive friends, also has a supportive boyfriend.  States that she has never seen a psychiatrist, states is on Cymbalta since January 2020 for pain sx related to her spinal cord injury prescribed by her spinal cord specialist; had previously been on Neurontin.      Per nursing staff at bedside, patient not confused, is oriented.  They state that patient's room mate reports patient talks to herself at night.    Istop reference [id number] Pt is a 69 y/o  female, +boyfriend, with 2 adult sons and several grandchildren, lives alone in a co-op in [place], retired  for an insurance company, with no PPHx, no inpt psychiatric hospitalizations, no SA/SIB, no substance abuse, with PMHx of thoracic and abdominal aortic aneurysm s/p repair, debility and incomplete spinal cord injury, AV replacement, and HTN presenting with diarrhea x1 day, found to have acute gastritis, bacteremia.  Psychiatry consulted to assess for vivid dreams, delirium.      Patient seen and evaluated, awake and alert oriented x 3, able to state who the current US President is, able to spell THINK backwards.  Patient able to state recent events leading up to this hospitalization.  States that for the past 3 nights has been having vivid dream, states when asleep had dreamt that she went to her room mate in the room to find that she was holding a basket full of puppies.  She reports upon opening her eyes, states that this was not real.  Also states another night where she was asleep and had dreamt that her friend whom she has not spoken to in several months was in the room and upon opening her eyes and awakening states that friend was not in the room.  She reports a similar type of presentation on a previous hospitalization, however not as intense.  Reports while awake, has never had any visual or auditory disturbances.  States that these dreams do not cause her intolerable anxiety, knows that these dreams are not reality, states she wants to know if her medications may be causing these dreams.  She denies feeling depressed or down, at times is tearful and states that she just wants to get better so she can go to rehab and subsequently go home and return to her regular functioning.  Denies feeling anxious or panic attacks.  Denies SI/HI, AVH.  Denies use of illicit substances or alcohol.  Discussed her previous medical issues- aortic aneurysm and spinal cord injury last year subsequently causing her debility and lack of sensations from her waist down.  States she wants to go to rehab so she can get stronger, talks about how active she was hiking and doing yoga prior to her spinal cord injury.  States she has a close relationship with her kids, has a group of supportive friends, also has a supportive boyfriend.  States that she has never seen a psychiatrist, states is on Cymbalta since January 2020 for pain sx related to her spinal cord injury prescribed by her spinal cord specialist; had previously been on Neurontin.    Per nursing staff at bedside, patient not confused, is oriented.  They state that patient's room mate reports patient talks to herself at night.    Istop reference [id number]

## 2020-08-14 NOTE — BEHAVIORAL HEALTH ASSESSMENT NOTE - NSBHCONSULTRECOMMENDOTHER_PSY_A_CORE FT
1. Check: TSH, B12, folate, RPR.  Recheck EKG and f/u for prolonged qtc and if still prolonged consult cardiology     2. Minimize use of benzos, opioids, anticholinergics, or other deliriogenic agents when possible.  Maintain sleep wake cycle.  Provide frequent reorientation and redirection.  Family member at bedside if possible. Assess for need for glasses and hearing aid (if applicable).    3. Pt does not meet criteria for psychiatric hospitalization.  Recommend outpt psych f/u at [facility] after d/c- [phone].   [facility] Psych will follow.

## 2020-08-14 NOTE — BEHAVIORAL HEALTH ASSESSMENT NOTE - SUICIDE PROTECTIVE FACTORS
Has future plans/Ability to cope with stress/Responsibility to family and others/Identifies reasons for living/Supportive social network of family or friends

## 2020-08-14 NOTE — BEHAVIORAL HEALTH ASSESSMENT NOTE - NSBHCHARTREVIEWLAB_PSY_A_CORE FT
08-14    135  |  102  |  10  ----------------------------<  100<H>  3.3<L>   |  23  |  0.82    Ca    8.9      14 Aug 2020 06:38  Mg     1.9     08-14

## 2020-08-14 NOTE — BEHAVIORAL HEALTH ASSESSMENT NOTE - DIFFERENTIAL
delirium vs dementia vs adjustment d/o vs anxiety d/o delirium vs dementia vs adjustment d/o vs anxiety d/o vs adjustment d/o

## 2020-08-14 NOTE — PROGRESS NOTE ADULT - SUBJECTIVE AND OBJECTIVE BOX
Kalyan Argueta MD (Nephrology progress note)  [address] [facility],  [address],  [id number]  TEl: [phone]  Cell: [phone]    Patient is a 70y Female seen and evaluated at bedside. Vital signs, laboratory data, imaging studies, consult notes reviewed done within past 24 hours. Overnight events noted. Patient lying in bed in no distress feels better. Interval stable renal function with mild hypokalemia.    Allergies    No Known Allergies    Intolerances        ROS:  CONSTITUTIONAL: No fever or chills.  HEENT: No headache or visual disturbances.  RESPIRATORY: No shortness of breath, cough, hemoptysis or wheezing  CARDIOVASCULAR: No Chest pain, shortness of breath, palpitations, dizziness, syncope, orthopnea, PND or leg swelling.  GASTROINTESTINAL: Mild diarrhea but denies abdominal pain, nausea, vomiting, hematemesis or blood per rectum.  GENITOURINARY: No urinary frequency, urgency, gross hematuria, dysuria, flank or supra pubic pain, difficulty passing urine.  NEUROLOGICAL: No headache, focal limb weakness, tingling or numbness or seizure like activity  SKIN: No skin rash or lesion  MUSCULOSKELETAL: No leg pain, calf pain or swelling    VITALS:    T(C): 36.4 (08-14-20 @ 04:30), Max: 36.4 (08-13-20 @ 12:15)  HR: 66 (08-14-20 @ 04:30) (55 - 66)  BP: 158/70 (08-14-20 @ 04:30) (131/60 - 158/70)  RR: 18 (08-14-20 @ 04:30) (18 - 18)  SpO2: 100% (08-14-20 @ 04:30) (100% - 100%)  CAPILLARY BLOOD GLUCOSE          08-13-20 @ 07:01  -  08-14-20 @ 07:00  --------------------------------------------------------  IN: 750 mL / OUT: 1100 mL / NET: -350 mL      MEDICATIONS  (STANDING):  aMIOdarone    Tablet 200 milliGRAM(s) Oral daily  aspirin  chewable 81 milliGRAM(s) Oral daily  cloNIDine 0.2 milliGRAM(s) Oral two times a day  diphenoxylate/atropine 1 Tablet(s) Oral two times a day  DULoxetine 60 milliGRAM(s) Oral daily  ertapenem  IVPB 1000 milliGRAM(s) IV Intermittent every 24 hours  ferrous    sulfate 325 milliGRAM(s) Oral two times a day  heparin   Injectable 5000 Unit(s) SubCutaneous every 8 hours  lactobacillus acidophilus 1 Tablet(s) Oral three times a day  lisinopril 20 milliGRAM(s) Oral daily  metoprolol succinate ER 25 milliGRAM(s) Oral daily  potassium chloride    Tablet ER 40 milliEquivalent(s) Oral every 4 hours  potassium chloride  10 mEq/50 mL IVPB 10 milliEquivalent(s) IV Intermittent once    MEDICATIONS  (PRN):      PHYSICAL EXAM:  GENERAL: Alert, awake and oriented x3 in no distress  HEENT: AUBREY, EOMI, neck supple, no JVP, no carotid bruit, oral mucosa moist and pink.  CHEST/LUNG: Bilateral clear breath sounds, no rales, no crepitations, no rhonchi, no wheezing  HEART: Regular rate and rhythm, NICANOR II/VI at LPSB, no gallops, no rub   ABDOMEN: Soft, nontender, non distended, bowel sounds present, no palpable organomegaly  : No flank or supra pubic tenderness.  EXTREMITIES: Peripheral pulses are palpable, no pedal edema  Neurology: AAOx3, no focal neurological deficit  SKIN: No rash or skin lesion  Musculoskeletal: No joint deformity or spinal tenderness.      LABS:    08-14    135  |  102  |  10  ----------------------------<  100<H>  3.3<L>   |  23  |  0.82    Ca    8.9      14 Aug 2020 06:38  Mg     1.9     08-14    RADIOLOGY & ADDITIONAL STUDIES:  None  Imaging Personally Reviewed:  [x] YES  [ ] NO    Consultant(s) Notes Reviewed:  [x] YES  [ ] NO    Care Discussed with Primary team/ Other Providers [x] YES  [ ] NO

## 2020-08-15 DIAGNOSIS — F05 DELIRIUM DUE TO KNOWN PHYSIOLOGICAL CONDITION: ICD-10-CM

## 2020-08-15 DIAGNOSIS — F29 UNSPECIFIED PSYCHOSIS NOT DUE TO A SUBSTANCE OR KNOWN PHYSIOLOGICAL CONDITION: ICD-10-CM

## 2020-08-15 LAB
ANION GAP SERPL CALC-SCNC: 10 MMOL/L — SIGNIFICANT CHANGE UP (ref 5–17)
BUN SERPL-MCNC: 10 MG/DL — SIGNIFICANT CHANGE UP (ref 7–23)
CALCIUM SERPL-MCNC: 8.6 MG/DL — SIGNIFICANT CHANGE UP (ref 8.4–10.5)
CHLORIDE SERPL-SCNC: 105 MMOL/L — SIGNIFICANT CHANGE UP (ref 96–108)
CO2 SERPL-SCNC: 21 MMOL/L — LOW (ref 22–31)
CREAT SERPL-MCNC: 0.8 MG/DL — SIGNIFICANT CHANGE UP (ref 0.5–1.3)
GLUCOSE SERPL-MCNC: 95 MG/DL — SIGNIFICANT CHANGE UP (ref 70–99)
HCT VFR BLD CALC: 28.3 % — LOW (ref 34.5–45)
HGB BLD-MCNC: 9.3 G/DL — LOW (ref 11.5–15.5)
MCHC RBC-ENTMCNC: 30.8 PG — SIGNIFICANT CHANGE UP (ref 27–34)
MCHC RBC-ENTMCNC: 32.9 GM/DL — SIGNIFICANT CHANGE UP (ref 32–36)
MCV RBC AUTO: 93.7 FL — SIGNIFICANT CHANGE UP (ref 80–100)
NRBC # BLD: 0 /100 WBCS — SIGNIFICANT CHANGE UP (ref 0–0)
PLATELET # BLD AUTO: 375 K/UL — SIGNIFICANT CHANGE UP (ref 150–400)
POTASSIUM SERPL-MCNC: 3.8 MMOL/L — SIGNIFICANT CHANGE UP (ref 3.5–5.3)
POTASSIUM SERPL-SCNC: 3.8 MMOL/L — SIGNIFICANT CHANGE UP (ref 3.5–5.3)
RBC # BLD: 3.02 M/UL — LOW (ref 3.8–5.2)
RBC # FLD: 13.5 % — SIGNIFICANT CHANGE UP (ref 10.3–14.5)
SODIUM SERPL-SCNC: 136 MMOL/L — SIGNIFICANT CHANGE UP (ref 135–145)
WBC # BLD: 9.1 K/UL — SIGNIFICANT CHANGE UP (ref 3.8–10.5)
WBC # FLD AUTO: 9.1 K/UL — SIGNIFICANT CHANGE UP (ref 3.8–10.5)

## 2020-08-15 PROCEDURE — 70450 CT HEAD/BRAIN W/O DYE: CPT | Mod: 26

## 2020-08-15 PROCEDURE — 99231 SBSQ HOSP IP/OBS SF/LOW 25: CPT

## 2020-08-15 RX ORDER — POTASSIUM CHLORIDE 20 MEQ
40 PACKET (EA) ORAL ONCE
Refills: 0 | Status: COMPLETED | OUTPATIENT
Start: 2020-08-15 | End: 2020-08-15

## 2020-08-15 RX ADMIN — Medication 81 MILLIGRAM(S): at 12:58

## 2020-08-15 RX ADMIN — AMIODARONE HYDROCHLORIDE 200 MILLIGRAM(S): 400 TABLET ORAL at 05:21

## 2020-08-15 RX ADMIN — Medication 0.2 MILLIGRAM(S): at 17:50

## 2020-08-15 RX ADMIN — HEPARIN SODIUM 5000 UNIT(S): 5000 INJECTION INTRAVENOUS; SUBCUTANEOUS at 21:27

## 2020-08-15 RX ADMIN — HEPARIN SODIUM 5000 UNIT(S): 5000 INJECTION INTRAVENOUS; SUBCUTANEOUS at 05:22

## 2020-08-15 RX ADMIN — Medication 1 TABLET(S): at 05:22

## 2020-08-15 RX ADMIN — Medication 1 TABLET(S): at 17:49

## 2020-08-15 RX ADMIN — Medication 0.2 MILLIGRAM(S): at 05:22

## 2020-08-15 RX ADMIN — HEPARIN SODIUM 5000 UNIT(S): 5000 INJECTION INTRAVENOUS; SUBCUTANEOUS at 12:59

## 2020-08-15 RX ADMIN — ERTAPENEM SODIUM 120 MILLIGRAM(S): 1 INJECTION, POWDER, LYOPHILIZED, FOR SOLUTION INTRAMUSCULAR; INTRAVENOUS at 12:57

## 2020-08-15 RX ADMIN — Medication 325 MILLIGRAM(S): at 05:22

## 2020-08-15 RX ADMIN — Medication 40 MILLIEQUIVALENT(S): at 12:58

## 2020-08-15 RX ADMIN — Medication 1 TABLET(S): at 21:27

## 2020-08-15 RX ADMIN — Medication 325 MILLIGRAM(S): at 17:50

## 2020-08-15 RX ADMIN — Medication 1 TABLET(S): at 12:58

## 2020-08-15 RX ADMIN — LISINOPRIL 20 MILLIGRAM(S): 2.5 TABLET ORAL at 05:21

## 2020-08-15 RX ADMIN — Medication 25 MILLIGRAM(S): at 05:22

## 2020-08-15 NOTE — PROGRESS NOTE ADULT - PROBLEM SELECTOR PLAN 2
Patient with prior history of hypertension with stable blood pressure ranging from 140 to 150's  Low salt diet  Continue Lisinopril   Continue metoprolol ER to 25 mg po daily  Continue clonidine 0.2 mg po bid with holding parameters  Monitor vital signs

## 2020-08-15 NOTE — PROGRESS NOTE ADULT - PROBLEM SELECTOR PLAN 3
Hypokalemia now resolved with K level 3.8 and Mag 1.9 likely due to GI losses  - Monitor BMP and mag, phos level  - Replete K and Mag with goal K>4.0 and<5.0 and Mag>2.0

## 2020-08-15 NOTE — PROGRESS NOTE ADULT - PROBLEM SELECTOR PLAN 1
Acute pyelonephritis with E.coli bacteremia and gastroenteritis  - Tylenol prn for pain/fever  - Antibiotics adjustment per primary/ID team with renal dose adjustment with IV Invanz through right arm PICC line  - Await transfer to rehab

## 2020-08-15 NOTE — PROGRESS NOTE ADULT - SUBJECTIVE AND OBJECTIVE BOX
Patient is a 70y old  Female who presents with a chief complaint of diarrhea (15 Aug 2020 13:14)      INTERVAL HPI/OVERNIGHT EVENTS:  T(C): 36.6 (08-15-20 @ 12:51), Max: 36.7 (08-14-20 @ 21:36)  HR: 63 (08-15-20 @ 12:51) (50 - 71)  BP: 131/70 (08-15-20 @ 12:51) (131/70 - 155/65)  RR: 18 (08-15-20 @ 12:51) (18 - 18)  SpO2: 99% (08-15-20 @ 12:51) (98% - 99%)  Wt(kg): --  I&O's Summary    14 Aug 2020 07:01  -  15 Aug 2020 07:00  --------------------------------------------------------  IN: 480 mL / OUT: 700 mL / NET: -220 mL    15 Aug 2020 07:01  -  15 Aug 2020 18:32  --------------------------------------------------------  IN: 480 mL / OUT: 0 mL / NET: 480 mL        LABS:                        9.3    9.10  )-----------( 375      ( 15 Aug 2020 06:51 )             28.3     08-15    136  |  105  |  10  ----------------------------<  95  3.8   |  21<L>  |  0.80    Ca    8.6      15 Aug 2020 06:46  Mg     1.9     08-14          CAPILLARY BLOOD GLUCOSE                MEDICATIONS  (STANDING):  aMIOdarone    Tablet 200 milliGRAM(s) Oral daily  aspirin  chewable 81 milliGRAM(s) Oral daily  cloNIDine 0.2 milliGRAM(s) Oral two times a day  diphenoxylate/atropine 1 Tablet(s) Oral two times a day  ertapenem  IVPB 1000 milliGRAM(s) IV Intermittent every 24 hours  ferrous    sulfate 325 milliGRAM(s) Oral two times a day  heparin   Injectable 5000 Unit(s) SubCutaneous every 8 hours  lactobacillus acidophilus 1 Tablet(s) Oral three times a day  lisinopril 20 milliGRAM(s) Oral daily  metoprolol succinate ER 25 milliGRAM(s) Oral daily    MEDICATIONS  (PRN):          PHYSICAL EXAM:  GENERAL: NAD, well-groomed, well-developed  HEAD:  Atraumatic, Normocephalic  CHEST/LUNG: Clear to percussion bilaterally; No rales, rhonchi, wheezing, or rubs  HEART: Regular rate and rhythm; No murmurs, rubs, or gallops  ABDOMEN: Soft, Nontender, Nondistended; Bowel sounds present  EXTREMITIES:  2+ Peripheral Pulses, No clubbing, cyanosis, or edema  LYMPH: No lymphadenopathy noted  SKIN: No rashes or lesions    Care Discussed with Consultants/Other Providers [ ] YES  [ ] NO

## 2020-08-15 NOTE — PROGRESS NOTE ADULT - SUBJECTIVE AND OBJECTIVE BOX
INTERVAL HPI/OVERNIGHT EVENTS:    patient seen and examined at bedside  tolerating diet   denies fever, chills, nausea, vomiting, or abdominal pain     MEDICATIONS  (STANDING):  ALPRAZolam 0.25 milliGRAM(s) Oral daily  aMIOdarone    Tablet 200 milliGRAM(s) Oral daily  aspirin  chewable 81 milliGRAM(s) Oral daily  cloNIDine 0.1 milliGRAM(s) Oral two times a day  DULoxetine 60 milliGRAM(s) Oral daily  ferrous    sulfate 325 milliGRAM(s) Oral two times a day  metoprolol succinate ER 25 milliGRAM(s) Oral daily  piperacillin/tazobactam IVPB.. 3.375 Gram(s) IV Intermittent every 8 hours  potassium chloride    Tablet ER 40 milliEquivalent(s) Oral every 4 hours  tamsulosin 0.4 milliGRAM(s) Oral at bedtime    MEDICATIONS  (PRN):      Allergies    No Known Allergies    Intolerances        Review of Systems:    General:  No wt loss, fevers, chills, night sweats,fatigue,   Eyes:  Good vision, no reported pain  ENT:  No sore throat, pain, runny nose, dysphagia  CV:  No pain, palpitatioins, hypo/hypertension  Resp:  No dyspnea, cough, tachypnea, wheezing  GI:  No pain, No nausea, No vomiting, No diarrhea, No constipatiion, No weight loss, No fever, No pruritis, No rectal bleeding, No tarry stools, No dysphagia,  :  No pain, bleeding, incontinence, nocturia  Muscle:  No pain, weakness  Neuro:  No weakness, tingling, memory problems  Psych:  No fatigue, insomnia, mood problems, depression  Endocrine:  No polyuria, polydypsia, cold/heat intolerance  Heme:  No petechiae, ecchymosis, easy bruisability  Skin:  No rash, tattoos, scars, edema      Vital Signs Last 24 Hrs  T(C): 36.4 (04 Aug 2020 13:08), Max: 37.1 (04 Aug 2020 05:02)  T(F): 97.6 (04 Aug 2020 13:08), Max: 98.8 (04 Aug 2020 05:02)  HR: 65 (04 Aug 2020 13:08) (65 - 90)  BP: 171/72 (04 Aug 2020 13:08) (158/68 - 191/84)  BP(mean): --  RR: 18 (04 Aug 2020 13:08) (18 - 18)  SpO2: 96% (04 Aug 2020 13:08) (96% - 100%)    PHYSICAL EXAM:    Constitutional: NAD   HEENT: EOMI, throat clear  Neck: No LAD, supple  Gastrointestinal: BS+, soft, NT/ND  Extremities: No peripheral edema  Neurological: A/O x 3, no focal deficits        LABS:                        12.2   10.93 )-----------( 156      ( 03 Aug 2020 07:28 )             35.2     08-04    132<L>  |  98  |  11  ----------------------------<  129<H>  2.8<LL>   |  20<L>  |  0.89    Ca    8.4      04 Aug 2020 06:57  Phos  1.3     08-04  Mg     1.9     08-04    TPro  6.3  /  Alb  3.0<L>  /  TBili  0.6  /  DBili  x   /  AST  18  /  ALT  16  /  AlkPhos  108  08-03    PT/INR - ( 02 Aug 2020 13:56 )   PT: 13.1 sec;   INR: 1.11 ratio         PTT - ( 02 Aug 2020 13:56 )  PTT:26.1 sec  Urinalysis Basic - ( 03 Aug 2020 00:37 )    Color: Yellow / Appearance: Slightly Turbid / S.015 / pH: x  Gluc: x / Ketone: Trace  / Bili: Negative / Urobili: Negative   Blood: x / Protein: 100 / Nitrite: Negative   Leuk Esterase: Moderate / RBC: 13 /hpf / WBC 36 /HPF   Sq Epi: x / Non Sq Epi: 3 /hpf / Bacteria: Many        RADIOLOGY & ADDITIONAL TESTS:  < from: CT Abdomen and Pelvis w/ Oral Cont and w/ IV Cont (20 @ 18:50) >    EXAM:  CT ABDOMEN AND PELVIS OC IC                            PROCEDURE DATE:  2020            INTERPRETATION:  CLINICAL INFORMATION: History of aortic aneurysm repair. Presents with colitis.    COMPARISON: CT arteriogram chest abdomen pelvisdated 2019.    PROCEDURE:  CT of the Abdomen and Pelvis was performed with intravenous contrast.  Intravenous contrast: 90 ml Omnipaque 350. 10 ml discarded.  Oral contrast: positive contrast was administered.  Sagittal and coronal reformats were performed.    FINDINGS:  LOWER CHEST: Surgical repair thoracoabdominal aorta partially visualized, appears unchanged.    LIVER: Within normal limits.  BILE DUCTS: Normal caliber.  GALLBLADDER: Within normal limits.  SPLEEN: Within normal limits.  PANCREAS: Within normal limits.  ADRENALS: Within normal limits.  KIDNEYS/URETERS: Left renal enlargement with striated nephrogram, perinephric stranding, and urothelial thickening. No hydronephrosis.    BLADDER: Mild bladder wall thickening.  REPRODUCTIVE ORGANS: Adnexa appear unremarkable.    BOWEL: No bowel obstruction. Moderate fluid-filled distention of colon consistent with diarrhea. No mural thickening. Appendix normal.  PERITONEUM: No ascites.  VESSELS: Surgical graft repair suprarenal abdominal aorta unchanged. Maximal diameter of abdominal aorta 3.1 cm. Bypass grafts of the celiac and superior mesenteric arteries are patent. Native renal arteries and AIDAN appear patent.  Lack of enhancement of the right external iliac and common femoralveins.  RETROPERITONEUM/LYMPH NODES: No lymphadenopathy.  ABDOMINAL WALL: Within normal limits.  BONES: Disc degeneration lumbosacral junction.    IMPRESSION:  Left-sided pyelonephritis and possible cystitis.    Interval stability in appearance of surgical graft repair of thoracoabdominal aorta and mesenteric bypass grafts    No CT evidence of colitis.    Failure of enhancement of the right common femoral and external iliac veins. Suggest venous duplex study to assess for DVT.    Examination findings were conveyed to physician's assistant Jorge by Dr. Chacon at 1937 hours on 8/3/2020 with read back.                  JAQUELINE BRADLEY M.D., ATTENDING RADIOLOGIST  This document has been electronically signed. Aug  4 2020  9:23AM                < end of copied text >

## 2020-08-15 NOTE — PROGRESS NOTE BEHAVIORAL HEALTH - NSBHFUPINTERVALHXFT_PSY_A_CORE
Was asked to reassess patient as she had a vivid dream last night.  Dream was concerning her family and she states she believes she was sleeping throughout the occurrence.  She states she work up frightened and a rn helped calm her anxiety.  She denies dreams or hallucinations during the day.  She denies any bouts of confusion and np and primary rn not aware of changes in mental status.   She states symptoms started 3 days ago.  She denies having these symptoms before.  She states she is being treated for an infection, but states there has been new medications added to her regimen since hospitalization.   She is not ware of any changes in vision.  She denies any new neurologic compliants.     She denies any overt delusions, manic sxs.  She denies si/hi. Was asked to reassess patient as she had a vivid dream last night.  Dream was concerning her family and she states she believes she was sleeping throughout the occurrence.  She states she work up frightened and a rn helped calm her anxiety.  She denies dreams or hallucinations during the day.  She denies any bouts of confusion and np and primary rn not aware of changes in mental status.   She states symptoms started 3 days ago.  She denies having these symptoms before.  She states she is being treated for an infection, but states there has been new medications added to her regimen since her hospitalization.   She is not ware of any changes in vision.  She denies any new neurologic compliants.     She denies any overt delusions, manic sxs.  She denies si/hi.  She was given a dose of seroquel last night which was ineffective

## 2020-08-15 NOTE — PROGRESS NOTE ADULT - SUBJECTIVE AND OBJECTIVE BOX
Subjective: Patient seen and examined. No new events except as noted.     REVIEW OF SYSTEMS:    CONSTITUTIONAL: + weakness, fevers or chills  EYES/ENT: No visual changes;  No vertigo or throat pain   NECK: No pain or stiffness  RESPIRATORY: No cough, wheezing, hemoptysis; No shortness of breath  CARDIOVASCULAR: No chest pain or palpitations  GASTROINTESTINAL: No abdominal or epigastric pain. No nausea, vomiting, or hematemesis; No diarrhea or constipation. No melena or hematochezia.  GENITOURINARY: No dysuria, frequency or hematuria  NEUROLOGICAL: No numbness or weakness  SKIN: No itching, burning, rashes, or lesions   All other review of systems is negative unless indicated above.    MEDICATIONS:  MEDICATIONS  (STANDING):  aMIOdarone    Tablet 200 milliGRAM(s) Oral daily  aspirin  chewable 81 milliGRAM(s) Oral daily  cloNIDine 0.2 milliGRAM(s) Oral two times a day  diphenoxylate/atropine 1 Tablet(s) Oral two times a day  ertapenem  IVPB 1000 milliGRAM(s) IV Intermittent every 24 hours  ferrous    sulfate 325 milliGRAM(s) Oral two times a day  heparin   Injectable 5000 Unit(s) SubCutaneous every 8 hours  lactobacillus acidophilus 1 Tablet(s) Oral three times a day  lisinopril 20 milliGRAM(s) Oral daily  metoprolol succinate ER 25 milliGRAM(s) Oral daily      PHYSICAL EXAM:  T(C): 36.7 (08-15-20 @ 20:40), Max: 36.7 (08-15-20 @ 04:42)  HR: 50 (08-15-20 @ 20:40) (50 - 71)  BP: 148/65 (08-15-20 @ 20:40) (131/70 - 149/57)  RR: 18 (08-15-20 @ 20:40) (18 - 18)  SpO2: 100% (08-15-20 @ 20:40) (98% - 100%)  Wt(kg): --  I&O's Summary    14 Aug 2020 07:01  -  15 Aug 2020 07:00  --------------------------------------------------------  IN: 480 mL / OUT: 700 mL / NET: -220 mL    15 Aug 2020 07:01  -  15 Aug 2020 22:18  --------------------------------------------------------  IN: 480 mL / OUT: 0 mL / NET: 480 mL          Appearance: Normal	  HEENT:   Normal oral mucosa, PERRL, EOMI	  Lymphatic: No lymphadenopathy , no edema  Cardiovascular: Normal S1 S2, No JVD, No murmurs , Peripheral pulses palpable 2+ bilaterally  Respiratory: Lungs clear to auscultation, normal effort 	  Gastrointestinal:  Soft, Non-tender, + BS	  Skin: No rashes, No ecchymoses, No cyanosis, warm to touch  Musculoskeletal: Normal range of motion, normal strength  Psychiatry:  Mood & affect appropriate  Ext: No edema      LABS:    CARDIAC MARKERS:                                9.3    9.10  )-----------( 375      ( 15 Aug 2020 06:51 )             28.3     08-15    136  |  105  |  10  ----------------------------<  95  3.8   |  21<L>  |  0.80    Ca    8.6      15 Aug 2020 06:46  Mg     1.9     08-14      proBNP:   Lipid Profile:   HgA1c:   TSH:             TELEMETRY: 	    ECG:  	  RADIOLOGY:   DIAGNOSTIC TESTING:  [ ] Echocardiogram:  [ ]  Catheterization:  [ ] Stress Test:    OTHER:

## 2020-08-15 NOTE — CHART NOTE - NSCHARTNOTEFT_GEN_A_CORE
Pt expressed concern regarding am confusion and vivid dreams. Pt states she feels frightened and is concerned that she has never experienced symptoms similar to this in the past. Discussed with psychiatry, who will come see the patient today. Recommended head CT to rule out pathology. Discussed with sister, Stephy, who is requesting patient come off seroquel and cymbalta. Pt agrees and is willing to come off the medication to see if the dreams stop. Support offered.

## 2020-08-15 NOTE — PROGRESS NOTE ADULT - SUBJECTIVE AND OBJECTIVE BOX
Kalyan Argueta MD (Nephrology progress note)  [address], [facility],  [address],  [id number]  TEl: [phone]  Cell: [phone]    Patient is a 70y Female seen and evaluated at bedside. Vital signs, laboratory data, imaging studies, consult notes reviewed done within past 24 hours. Overnight events noted. Patient lying in bed in no distress offers no complains and feels well. Denies chest pain, SOB, abdominal pain, diarrhea. Interval stable renal function and electrolytes today morning.    Allergies    No Known Allergies    Intolerances        ROS:  CONSTITUTIONAL: No fever or chills.  HEENT: No headache or visual disturbances.  RESPIRATORY: No shortness of breath, cough, hemoptysis or wheezing  CARDIOVASCULAR: No Chest pain, shortness of breath, palpitations, dizziness, syncope, orthopnea, PND or leg swelling.  GASTROINTESTINAL: No abdominal pain, nausea, vomiting, diarrhea, hematemesis or blood per rectum.  GENITOURINARY: No urinary frequency, urgency, gross hematuria, dysuria, flank or supra pubic pain, difficulty passing urine.  NEUROLOGICAL: No headache, focal limb weakness, tingling or numbness or seizure like activity  SKIN: No skin rash or lesion  MUSCULOSKELETAL: No leg pain, calf pain or swelling    VITALS:    T(C): 36.7 (08-15-20 @ 04:42), Max: 36.7 (08-14-20 @ 21:36)  HR: 71 (08-15-20 @ 04:42) (50 - 71)  BP: 149/57 (08-15-20 @ 04:42) (135/64 - 155/65)  RR: 18 (08-15-20 @ 04:42) (18 - 18)  SpO2: 98% (08-15-20 @ 04:42) (98% - 99%)  CAPILLARY BLOOD GLUCOSE          08-14-20 @ 07:01  -  08-15-20 @ 07:00  --------------------------------------------------------  IN: 480 mL / OUT: 700 mL / NET: -220 mL      MEDICATIONS  (STANDING):  aMIOdarone    Tablet 200 milliGRAM(s) Oral daily  aspirin  chewable 81 milliGRAM(s) Oral daily  cloNIDine 0.2 milliGRAM(s) Oral two times a day  diphenoxylate/atropine 1 Tablet(s) Oral two times a day  DULoxetine 60 milliGRAM(s) Oral daily  ertapenem  IVPB 1000 milliGRAM(s) IV Intermittent every 24 hours  ferrous    sulfate 325 milliGRAM(s) Oral two times a day  heparin   Injectable 5000 Unit(s) SubCutaneous every 8 hours  lactobacillus acidophilus 1 Tablet(s) Oral three times a day  lisinopril 20 milliGRAM(s) Oral daily  metoprolol succinate ER 25 milliGRAM(s) Oral daily  QUEtiapine 12.5 milliGRAM(s) Oral at bedtime    MEDICATIONS  (PRN):      PHYSICAL EXAM:  GENERAL: Alert, awake and oriented x3 in no distress  HEENT: AUBREY, EOMI, neck supple, no JVP, no carotid bruit, oral mucosa moist and pink.  CHEST/LUNG: Bilateral clear breath sounds, no rales, no crepitations, no rhonchi, no wheezing  HEART: Regular rate and rhythm, NICANOR II/VI at LPSB, no gallops, no rub   ABDOMEN: Soft, nontender, non distended, bowel sounds present, no palpable organomegaly  : No flank or supra pubic tenderness.  EXTREMITIES: Peripheral pulses are palpable, no pedal edema  Neurology: AAOx3, no focal neurological deficit  SKIN: No rash or skin lesion  Musculoskeletal: No joint deformity or spinal tenderness.      Vascular ACCESS:     LABS:                        9.3    9.10  )-----------( 375      ( 15 Aug 2020 06:51 )             28.3     08-15    136  |  105  |  10  ----------------------------<  95  3.8   |  21<L>  |  0.80    Ca    8.6      15 Aug 2020 06:46  Mg     1.9     08-14                  RADIOLOGY & ADDITIONAL STUDIES:  None    Imaging Personally Reviewed:  [x] YES  [ ] NO    Consultant(s) Notes Reviewed:  [x] YES  [ ] NO    Care Discussed with Primary team/ Other Providers [x] YES  [ ] NO

## 2020-08-16 LAB
ANION GAP SERPL CALC-SCNC: 11 MMOL/L — SIGNIFICANT CHANGE UP (ref 5–17)
BUN SERPL-MCNC: 12 MG/DL — SIGNIFICANT CHANGE UP (ref 7–23)
CALCIUM SERPL-MCNC: 8.8 MG/DL — SIGNIFICANT CHANGE UP (ref 8.4–10.5)
CHLORIDE SERPL-SCNC: 104 MMOL/L — SIGNIFICANT CHANGE UP (ref 96–108)
CO2 SERPL-SCNC: 22 MMOL/L — SIGNIFICANT CHANGE UP (ref 22–31)
CREAT SERPL-MCNC: 0.83 MG/DL — SIGNIFICANT CHANGE UP (ref 0.5–1.3)
FOLATE SERPL-MCNC: 12 NG/ML — SIGNIFICANT CHANGE UP
GLUCOSE SERPL-MCNC: 86 MG/DL — SIGNIFICANT CHANGE UP (ref 70–99)
POTASSIUM SERPL-MCNC: 3.8 MMOL/L — SIGNIFICANT CHANGE UP (ref 3.5–5.3)
POTASSIUM SERPL-SCNC: 3.8 MMOL/L — SIGNIFICANT CHANGE UP (ref 3.5–5.3)
SARS-COV-2 RNA SPEC QL NAA+PROBE: SIGNIFICANT CHANGE UP
SODIUM SERPL-SCNC: 137 MMOL/L — SIGNIFICANT CHANGE UP (ref 135–145)
VIT B12 SERPL-MCNC: 936 PG/ML — SIGNIFICANT CHANGE UP (ref 232–1245)

## 2020-08-16 RX ADMIN — Medication 325 MILLIGRAM(S): at 17:42

## 2020-08-16 RX ADMIN — HEPARIN SODIUM 5000 UNIT(S): 5000 INJECTION INTRAVENOUS; SUBCUTANEOUS at 05:15

## 2020-08-16 RX ADMIN — Medication 0.2 MILLIGRAM(S): at 05:15

## 2020-08-16 RX ADMIN — LISINOPRIL 20 MILLIGRAM(S): 2.5 TABLET ORAL at 05:15

## 2020-08-16 RX ADMIN — ERTAPENEM SODIUM 120 MILLIGRAM(S): 1 INJECTION, POWDER, LYOPHILIZED, FOR SOLUTION INTRAMUSCULAR; INTRAVENOUS at 13:00

## 2020-08-16 RX ADMIN — Medication 1 TABLET(S): at 17:43

## 2020-08-16 RX ADMIN — Medication 1 TABLET(S): at 21:11

## 2020-08-16 RX ADMIN — Medication 325 MILLIGRAM(S): at 05:15

## 2020-08-16 RX ADMIN — Medication 1 TABLET(S): at 13:01

## 2020-08-16 RX ADMIN — HEPARIN SODIUM 5000 UNIT(S): 5000 INJECTION INTRAVENOUS; SUBCUTANEOUS at 13:01

## 2020-08-16 RX ADMIN — Medication 1 TABLET(S): at 05:15

## 2020-08-16 RX ADMIN — Medication 81 MILLIGRAM(S): at 13:01

## 2020-08-16 RX ADMIN — AMIODARONE HYDROCHLORIDE 200 MILLIGRAM(S): 400 TABLET ORAL at 05:15

## 2020-08-16 RX ADMIN — Medication 0.2 MILLIGRAM(S): at 17:42

## 2020-08-16 RX ADMIN — HEPARIN SODIUM 5000 UNIT(S): 5000 INJECTION INTRAVENOUS; SUBCUTANEOUS at 21:11

## 2020-08-16 NOTE — PROGRESS NOTE ADULT - PROBLEM SELECTOR PLAN 1
Acute pyelonephritis with E.coli bacteremia and gastroenteritis  - No diarrhea  - Tylenol prn for pain/fever  - Antibiotics adjustment per primary/ID team with renal dose adjustment with IV Invanz through right arm PICC line  - Await transfer to rehab

## 2020-08-16 NOTE — PROGRESS NOTE ADULT - PROBLEM SELECTOR PLAN 1
- with associated fever and GNR bacteremia   - cont to monitor  - cont Bacid TID and lomotil BID  - CT notable for fluid-filled distended colon c/w diarrhea, no evidence of colitis   - GI pcr negative, stool cultures negative  - Cdiff negative   - diet as tolerated

## 2020-08-16 NOTE — PROGRESS NOTE ADULT - SUBJECTIVE AND OBJECTIVE BOX
Subjective: Patient seen and examined. No new events except as noted.   resting comfortably   has been bradycardic       REVIEW OF SYSTEMS:    CONSTITUTIONAL: No weakness, fevers or chills  EYES/ENT: No visual changes;  No vertigo or throat pain   NECK: No pain or stiffness  RESPIRATORY: No cough, wheezing, hemoptysis; No shortness of breath  CARDIOVASCULAR: No chest pain or palpitations  GASTROINTESTINAL: No abdominal or epigastric pain. No nausea, vomiting, or hematemesis; No diarrhea or constipation. No melena or hematochezia.  GENITOURINARY: No dysuria, frequency or hematuria  NEUROLOGICAL: No numbness or weakness  SKIN: No itching, burning, rashes, or lesions   All other review of systems is negative unless indicated above.    MEDICATIONS:  MEDICATIONS  (STANDING):  aMIOdarone    Tablet 200 milliGRAM(s) Oral daily  aspirin  chewable 81 milliGRAM(s) Oral daily  cloNIDine 0.2 milliGRAM(s) Oral two times a day  diphenoxylate/atropine 1 Tablet(s) Oral two times a day  ertapenem  IVPB 1000 milliGRAM(s) IV Intermittent every 24 hours  ferrous    sulfate 325 milliGRAM(s) Oral two times a day  heparin   Injectable 5000 Unit(s) SubCutaneous every 8 hours  lactobacillus acidophilus 1 Tablet(s) Oral three times a day  lisinopril 20 milliGRAM(s) Oral daily  metoprolol succinate ER 25 milliGRAM(s) Oral daily      PHYSICAL EXAM:  T(C): 36.8 (08-16-20 @ 04:52), Max: 36.8 (08-16-20 @ 04:52)  HR: 53 (08-16-20 @ 09:22) (50 - 63)  BP: 143/62 (08-16-20 @ 09:22) (131/70 - 180/64)  RR: 18 (08-16-20 @ 04:52) (18 - 18)  SpO2: 99% (08-16-20 @ 04:52) (99% - 100%)  Wt(kg): --  I&O's Summary    15 Aug 2020 07:01  -  16 Aug 2020 07:00  --------------------------------------------------------  IN: 480 mL / OUT: 350 mL / NET: 130 mL          Appearance: Normal	  HEENT:   Normal oral mucosa, PERRL, EOMI	  Lymphatic: No lymphadenopathy , no edema  Cardiovascular: Normal S1 S2, No JVD, No murmurs , Peripheral pulses palpable 2+ bilaterally  Respiratory: Lungs clear to auscultation, normal effort 	  Gastrointestinal:  Soft, Non-tender, + BS	  Skin: No rashes, No ecchymoses, No cyanosis, warm to touch  Musculoskeletal: Normal range of motion, normal strength  Psychiatry:  Mood & affect appropriate  Ext: No edema      LABS:    CARDIAC MARKERS:                                9.3    9.10  )-----------( 375      ( 15 Aug 2020 06:51 )             28.3     08-16    137  |  104  |  12  ----------------------------<  86  3.8   |  22  |  0.83    Ca    8.8      16 Aug 2020 07:14      proBNP:   Lipid Profile:   HgA1c:   TSH:             TELEMETRY: 	  SR, SB  ECG:  	  RADIOLOGY:   DIAGNOSTIC TESTING:  [ ] Echocardiogram:  [ ]  Catheterization:  [ ] Stress Test:    OTHER:

## 2020-08-16 NOTE — PROGRESS NOTE ADULT - PROBLEM SELECTOR PLAN 3
Hypokalemia now resolved with K level 3.8 and Mag 1.9 likely due to GI losses  - Monitor BMP and electrolytes daily  - Replete K and Mag with goal K>4.0 and<5.0 and Mag>2.0

## 2020-08-16 NOTE — PROGRESS NOTE ADULT - SUBJECTIVE AND OBJECTIVE BOX
Patient is a 70y old  Female who presents with a chief complaint of diarrhea (16 Aug 2020 12:30)      INTERVAL HPI/OVERNIGHT EVENTS:  T(C): 37 (08-16-20 @ 13:00), Max: 37 (08-16-20 @ 13:00)  HR: 55 (08-16-20 @ 13:00) (50 - 55)  BP: 140/60 (08-16-20 @ 13:00) (140/60 - 180/64)  RR: 18 (08-16-20 @ 13:00) (18 - 18)  SpO2: 98% (08-16-20 @ 13:00) (98% - 100%)  Wt(kg): --  I&O's Summary    15 Aug 2020 07:01  -  16 Aug 2020 07:00  --------------------------------------------------------  IN: 480 mL / OUT: 350 mL / NET: 130 mL    16 Aug 2020 07:01  -  16 Aug 2020 17:26  --------------------------------------------------------  IN: 480 mL / OUT: 200 mL / NET: 280 mL        LABS:                        9.3    9.10  )-----------( 375      ( 15 Aug 2020 06:51 )             28.3     08-16    137  |  104  |  12  ----------------------------<  86  3.8   |  22  |  0.83    Ca    8.8      16 Aug 2020 07:14          CAPILLARY BLOOD GLUCOSE                MEDICATIONS  (STANDING):  aMIOdarone    Tablet 200 milliGRAM(s) Oral daily  aspirin  chewable 81 milliGRAM(s) Oral daily  cloNIDine 0.2 milliGRAM(s) Oral two times a day  diphenoxylate/atropine 1 Tablet(s) Oral two times a day  ertapenem  IVPB 1000 milliGRAM(s) IV Intermittent every 24 hours  ferrous    sulfate 325 milliGRAM(s) Oral two times a day  heparin   Injectable 5000 Unit(s) SubCutaneous every 8 hours  lactobacillus acidophilus 1 Tablet(s) Oral three times a day  lisinopril 20 milliGRAM(s) Oral daily    MEDICATIONS  (PRN):          PHYSICAL EXAM:  GENERAL: NAD, well-groomed, well-developed  HEAD:  Atraumatic, Normocephalic  CHEST/LUNG: Clear to percussion bilaterally; No rales, rhonchi, wheezing, or rubs  HEART: Regular rate and rhythm; No murmurs, rubs, or gallops  ABDOMEN: Soft, Nontender, Nondistended; Bowel sounds present  EXTREMITIES:  2+ Peripheral Pulses, No clubbing, cyanosis, or edema  LYMPH: No lymphadenopathy noted  SKIN: No rashes or lesions    Care Discussed with Consultants/Other Providers [ ] YES  [ ] NO

## 2020-08-16 NOTE — PROGRESS NOTE ADULT - PROBLEM SELECTOR PLAN 2
Patient with prior history of hypertension with stable blood pressure ranging from 140 to 150's  Low salt diet  Continue current BP medication with holding parameters  Monitor vital signs

## 2020-08-16 NOTE — PROGRESS NOTE ADULT - SUBJECTIVE AND OBJECTIVE BOX
INTERVAL HPI/OVERNIGHT EVENTS:    patient seen and examined at bedside  multiple bm yesterday    MEDICATIONS  (STANDING):  ALPRAZolam 0.25 milliGRAM(s) Oral daily  aMIOdarone    Tablet 200 milliGRAM(s) Oral daily  aspirin  chewable 81 milliGRAM(s) Oral daily  cloNIDine 0.1 milliGRAM(s) Oral two times a day  DULoxetine 60 milliGRAM(s) Oral daily  ferrous    sulfate 325 milliGRAM(s) Oral two times a day  metoprolol succinate ER 25 milliGRAM(s) Oral daily  piperacillin/tazobactam IVPB.. 3.375 Gram(s) IV Intermittent every 8 hours  potassium chloride    Tablet ER 40 milliEquivalent(s) Oral every 4 hours  tamsulosin 0.4 milliGRAM(s) Oral at bedtime    MEDICATIONS  (PRN):      Allergies    No Known Allergies    Intolerances        Review of Systems:    General:  No wt loss, fevers, chills, night sweats,fatigue,   Eyes:  Good vision, no reported pain  ENT:  No sore throat, pain, runny nose, dysphagia  CV:  No pain, palpitatioins, hypo/hypertension  Resp:  No dyspnea, cough, tachypnea, wheezing  GI:  No pain, No nausea, No vomiting, No diarrhea, No constipatiion, No weight loss, No fever, No pruritis, No rectal bleeding, No tarry stools, No dysphagia,  :  No pain, bleeding, incontinence, nocturia  Muscle:  No pain, weakness  Neuro:  No weakness, tingling, memory problems  Psych:  No fatigue, insomnia, mood problems, depression  Endocrine:  No polyuria, polydypsia, cold/heat intolerance  Heme:  No petechiae, ecchymosis, easy bruisability  Skin:  No rash, tattoos, scars, edema      Vital Signs Last 24 Hrs  T(C): 36.4 (04 Aug 2020 13:08), Max: 37.1 (04 Aug 2020 05:02)  T(F): 97.6 (04 Aug 2020 13:08), Max: 98.8 (04 Aug 2020 05:02)  HR: 65 (04 Aug 2020 13:08) (65 - 90)  BP: 171/72 (04 Aug 2020 13:08) (158/68 - 191/84)  BP(mean): --  RR: 18 (04 Aug 2020 13:08) (18 - 18)  SpO2: 96% (04 Aug 2020 13:08) (96% - 100%)    PHYSICAL EXAM:    Constitutional: NAD   HEENT: EOMI, throat clear  Neck: No LAD, supple  Gastrointestinal: BS+, soft, NT/ND  Extremities: No peripheral edema  Neurological: A/O x 3, no focal deficits        LABS:                        12.2   10.93 )-----------( 156      ( 03 Aug 2020 07:28 )             35.2     08-04    132<L>  |  98  |  11  ----------------------------<  129<H>  2.8<LL>   |  20<L>  |  0.89    Ca    8.4      04 Aug 2020 06:57  Phos  1.3     08-04  Mg     1.9     08-04    TPro  6.3  /  Alb  3.0<L>  /  TBili  0.6  /  DBili  x   /  AST  18  /  ALT  16  /  AlkPhos  108  08-03    PT/INR - ( 02 Aug 2020 13:56 )   PT: 13.1 sec;   INR: 1.11 ratio         PTT - ( 02 Aug 2020 13:56 )  PTT:26.1 sec  Urinalysis Basic - ( 03 Aug 2020 00:37 )    Color: Yellow / Appearance: Slightly Turbid / S.015 / pH: x  Gluc: x / Ketone: Trace  / Bili: Negative / Urobili: Negative   Blood: x / Protein: 100 / Nitrite: Negative   Leuk Esterase: Moderate / RBC: 13 /hpf / WBC 36 /HPF   Sq Epi: x / Non Sq Epi: 3 /hpf / Bacteria: Many        RADIOLOGY & ADDITIONAL TESTS:  < from: CT Abdomen and Pelvis w/ Oral Cont and w/ IV Cont (20 @ 18:50) >    EXAM:  CT ABDOMEN AND PELVIS OC IC                            PROCEDURE DATE:  2020            INTERPRETATION:  CLINICAL INFORMATION: History of aortic aneurysm repair. Presents with colitis.    COMPARISON: CT arteriogram chest abdomen pelvisdated 2019.    PROCEDURE:  CT of the Abdomen and Pelvis was performed with intravenous contrast.  Intravenous contrast: 90 ml Omnipaque 350. 10 ml discarded.  Oral contrast: positive contrast was administered.  Sagittal and coronal reformats were performed.    FINDINGS:  LOWER CHEST: Surgical repair thoracoabdominal aorta partially visualized, appears unchanged.    LIVER: Within normal limits.  BILE DUCTS: Normal caliber.  GALLBLADDER: Within normal limits.  SPLEEN: Within normal limits.  PANCREAS: Within normal limits.  ADRENALS: Within normal limits.  KIDNEYS/URETERS: Left renal enlargement with striated nephrogram, perinephric stranding, and urothelial thickening. No hydronephrosis.    BLADDER: Mild bladder wall thickening.  REPRODUCTIVE ORGANS: Adnexa appear unremarkable.    BOWEL: No bowel obstruction. Moderate fluid-filled distention of colon consistent with diarrhea. No mural thickening. Appendix normal.  PERITONEUM: No ascites.  VESSELS: Surgical graft repair suprarenal abdominal aorta unchanged. Maximal diameter of abdominal aorta 3.1 cm. Bypass grafts of the celiac and superior mesenteric arteries are patent. Native renal arteries and AIDAN appear patent.  Lack of enhancement of the right external iliac and common femoralveins.  RETROPERITONEUM/LYMPH NODES: No lymphadenopathy.  ABDOMINAL WALL: Within normal limits.  BONES: Disc degeneration lumbosacral junction.    IMPRESSION:  Left-sided pyelonephritis and possible cystitis.    Interval stability in appearance of surgical graft repair of thoracoabdominal aorta and mesenteric bypass grafts    No CT evidence of colitis.    Failure of enhancement of the right common femoral and external iliac veins. Suggest venous duplex study to assess for DVT.    Examination findings were conveyed to physician's assistant Jorge by Dr. Chacon at 1937 hours on 8/3/2020 with read back.                  JAQUELINE BRADLEY M.D., ATTENDING RADIOLOGIST  This document has been electronically signed. Aug  4 2020  9:23AM                < end of copied text >

## 2020-08-17 PROCEDURE — 99232 SBSQ HOSP IP/OBS MODERATE 35: CPT

## 2020-08-17 RX ORDER — HYDRALAZINE HCL 50 MG
10 TABLET ORAL ONCE
Refills: 0 | Status: COMPLETED | OUTPATIENT
Start: 2020-08-17 | End: 2020-08-17

## 2020-08-17 RX ORDER — HALOPERIDOL DECANOATE 100 MG/ML
1 INJECTION INTRAMUSCULAR ONCE
Refills: 0 | Status: COMPLETED | OUTPATIENT
Start: 2020-08-17 | End: 2020-08-17

## 2020-08-17 RX ORDER — OLANZAPINE 15 MG/1
2.5 TABLET, FILM COATED ORAL EVERY 6 HOURS
Refills: 0 | Status: DISCONTINUED | OUTPATIENT
Start: 2020-08-17 | End: 2020-08-21

## 2020-08-17 RX ORDER — OLANZAPINE 15 MG/1
2.5 TABLET, FILM COATED ORAL
Refills: 0 | Status: DISCONTINUED | OUTPATIENT
Start: 2020-08-17 | End: 2020-08-21

## 2020-08-17 RX ORDER — LISINOPRIL 2.5 MG/1
40 TABLET ORAL DAILY
Refills: 0 | Status: DISCONTINUED | OUTPATIENT
Start: 2020-08-17 | End: 2020-08-21

## 2020-08-17 RX ORDER — HYDRALAZINE HCL 50 MG
10 TABLET ORAL
Refills: 0 | Status: DISCONTINUED | OUTPATIENT
Start: 2020-08-17 | End: 2020-08-21

## 2020-08-17 RX ADMIN — HALOPERIDOL DECANOATE 1 MILLIGRAM(S): 100 INJECTION INTRAMUSCULAR at 01:12

## 2020-08-17 RX ADMIN — HALOPERIDOL DECANOATE 1 MILLIGRAM(S): 100 INJECTION INTRAMUSCULAR at 01:33

## 2020-08-17 RX ADMIN — HEPARIN SODIUM 5000 UNIT(S): 5000 INJECTION INTRAVENOUS; SUBCUTANEOUS at 21:23

## 2020-08-17 RX ADMIN — Medication 325 MILLIGRAM(S): at 17:12

## 2020-08-17 RX ADMIN — Medication 1 TABLET(S): at 13:21

## 2020-08-17 RX ADMIN — Medication 10 MILLIGRAM(S): at 05:41

## 2020-08-17 RX ADMIN — Medication 10 MILLIGRAM(S): at 18:44

## 2020-08-17 RX ADMIN — OLANZAPINE 2.5 MILLIGRAM(S): 15 TABLET, FILM COATED ORAL at 17:13

## 2020-08-17 RX ADMIN — Medication 1 TABLET(S): at 17:13

## 2020-08-17 RX ADMIN — HEPARIN SODIUM 5000 UNIT(S): 5000 INJECTION INTRAVENOUS; SUBCUTANEOUS at 13:21

## 2020-08-17 RX ADMIN — Medication 81 MILLIGRAM(S): at 13:21

## 2020-08-17 RX ADMIN — ERTAPENEM SODIUM 120 MILLIGRAM(S): 1 INJECTION, POWDER, LYOPHILIZED, FOR SOLUTION INTRAMUSCULAR; INTRAVENOUS at 11:07

## 2020-08-17 RX ADMIN — Medication 0.2 MILLIGRAM(S): at 17:12

## 2020-08-17 RX ADMIN — Medication 1 TABLET(S): at 21:23

## 2020-08-17 NOTE — PROGRESS NOTE ADULT - PROBLEM SELECTOR PLAN 4
Detail Level: Zone Patient with acute psychosis and agitation   IV haldol  Plan per primary/Psych team. Detail Level: Generalized

## 2020-08-17 NOTE — CHART NOTE - NSCHARTNOTEFT_GEN_A_CORE
Medicine PA Note  Event Note    Name: KENNETH SPRING  MRN: [id number]  Age: 70y  Gender: Female    CC: Extreme agitation    Patient with extreme agitation and HTN overnight requiring 2mg Haldol and a Code Grey was called.    OSCAR GreenbergC  Department of Medicine  [facility]

## 2020-08-17 NOTE — PROGRESS NOTE BEHAVIORAL HEALTH - NSBHCHARTREVIEWLAB_PSY_A_CORE FT
9.3    9.10  )-----------( 375      ( 15 Aug 2020 06:51 )             28.3   08-15    136  |  105  |  10  ----------------------------<  95  3.8   |  21<L>  |  0.80    Ca    8.6      15 Aug 2020 06:46  Mg     1.9     08-14
08-16    137  |  104  |  12  ----------------------------<  86  3.8   |  22  |  0.83    Ca    8.8      16 Aug 2020 07:14

## 2020-08-17 NOTE — PROVIDER CONTACT NOTE (OTHER) - ASSESSMENT
Patient was extremely agitated and confused with episodes of auditory and visual hallucinations during Q4 Vitals with HTN overnight. Pt was screaming, getting out of the bed, and combative.

## 2020-08-17 NOTE — PROGRESS NOTE BEHAVIORAL HEALTH - CASE SUMMARY
Pt is a 69 y/o  woman, with a boyfriend and 2 adult sons and several grandchildren, lives alone in a co-op in [place], retired  for an insurance company, with no PPHx, no inpt psychiatric hospitalizations, no SA/SIB, no substance abuse, with PMHx of thoracic and abdominal aortic aneurysm s/p repair, debility and incomplete spinal cord injury, AV replacement, and HTN presenting with diarrhea x1 day, found to have acute gastritis, bacteremia.  Psychiatry consulted to assess for vivid dreams, delirium.    Patient seen and re evaluated today, per team was acutely agitated and combative last night, not re directable, refusing to take PO medications for agitation and was medicated with Haldol 2mg.  On evaluation, patient is calm, paranoid stating that we are the "enemy" believes that she has been taken to an  "undisclosed" place and that we are trying to harvest her organs.  Reports last night was hallucinating see children in her room, thinks that she fell down.  On physical exam, patient is rigid, tremulous.  I agree with the plan for standing Zyprexa 2.5mg po q1800 with PRN Zyprexa for acute agitation.

## 2020-08-17 NOTE — PROGRESS NOTE ADULT - SUBJECTIVE AND OBJECTIVE BOX
Subjective: Patient seen and examined. No new events except as noted.   hypertensive this am     REVIEW OF SYSTEMS:    CONSTITUTIONAL:+ weakness, fevers or chills  EYES/ENT: No visual changes;  No vertigo or throat pain   NECK: No pain or stiffness  RESPIRATORY: No cough, wheezing, hemoptysis; No shortness of breath  CARDIOVASCULAR: No chest pain or palpitations  GASTROINTESTINAL: No abdominal or epigastric pain. No nausea, vomiting, or hematemesis; No diarrhea or constipation. No melena or hematochezia.  GENITOURINARY: No dysuria, frequency or hematuria  NEUROLOGICAL: No numbness or weakness  SKIN: No itching, burning, rashes, or lesions   All other review of systems is negative unless indicated above.    MEDICATIONS:  MEDICATIONS  (STANDING):  aMIOdarone    Tablet 200 milliGRAM(s) Oral daily  aspirin  chewable 81 milliGRAM(s) Oral daily  cloNIDine 0.2 milliGRAM(s) Oral two times a day  diphenoxylate/atropine 1 Tablet(s) Oral two times a day  ertapenem  IVPB 1000 milliGRAM(s) IV Intermittent every 24 hours  ferrous    sulfate 325 milliGRAM(s) Oral two times a day  heparin   Injectable 5000 Unit(s) SubCutaneous every 8 hours  lactobacillus acidophilus 1 Tablet(s) Oral three times a day  lisinopril 20 milliGRAM(s) Oral daily      PHYSICAL EXAM:  T(C): 36.5 (08-17-20 @ 04:45), Max: 37 (08-16-20 @ 13:00)  HR: 73 (08-17-20 @ 04:45) (55 - 73)  BP: 215/80 (08-17-20 @ 05:30) (140/60 - 218/81)  RR: 18 (08-17-20 @ 04:45) (18 - 18)  SpO2: 98% (08-17-20 @ 04:45) (98% - 98%)  Wt(kg): --  I&O's Summary    16 Aug 2020 07:01  -  17 Aug 2020 07:00  --------------------------------------------------------  IN: 840 mL / OUT: 450 mL / NET: 390 mL          Appearance: NAD	  HEENT: Dry oral mucosa, PERRL, EOMI	  Lymphatic: No lymphadenopathy , no edema  Cardiovascular: Normal S1 S2, No JVD, No murmurs , Peripheral pulses palpable 2+ bilaterally  Respiratory: Lungs clear to auscultation, normal effort 	  Gastrointestinal:  Soft, Non-tender, + BS	  Skin: No rashes, No ecchymoses, No cyanosis, warm to touch  Musculoskeletal: Normal range of motion, normal strength  Psychiatry:  Mood & affect appropriate  Ext: No edema      LABS:    CARDIAC MARKERS:            08-16    137  |  104  |  12  ----------------------------<  86  3.8   |  22  |  0.83    Ca    8.8      16 Aug 2020 07:14      proBNP:   Lipid Profile:   HgA1c:   TSH:             TELEMETRY: SR	    ECG:  	  RADIOLOGY:   DIAGNOSTIC TESTING:  [ ] Echocardiogram:  [ ]  Catheterization:  [ ] Stress Test:    OTHER:

## 2020-08-17 NOTE — PROGRESS NOTE ADULT - SUBJECTIVE AND OBJECTIVE BOX
INTERVAL HPI/OVERNIGHT EVENTS:    patient seen earlier this morning  overnight events noted  bm x 2 today     MEDICATIONS  (STANDING):  ALPRAZolam 0.25 milliGRAM(s) Oral daily  aMIOdarone    Tablet 200 milliGRAM(s) Oral daily  aspirin  chewable 81 milliGRAM(s) Oral daily  cloNIDine 0.1 milliGRAM(s) Oral two times a day  DULoxetine 60 milliGRAM(s) Oral daily  ferrous    sulfate 325 milliGRAM(s) Oral two times a day  metoprolol succinate ER 25 milliGRAM(s) Oral daily  piperacillin/tazobactam IVPB.. 3.375 Gram(s) IV Intermittent every 8 hours  potassium chloride    Tablet ER 40 milliEquivalent(s) Oral every 4 hours  tamsulosin 0.4 milliGRAM(s) Oral at bedtime    MEDICATIONS  (PRN):      Allergies    No Known Allergies    Intolerances        Review of Systems: unable to obtain           Vital Signs Last 24 Hrs  T(C): 36.4 (04 Aug 2020 13:08), Max: 37.1 (04 Aug 2020 05:02)  T(F): 97.6 (04 Aug 2020 13:08), Max: 98.8 (04 Aug 2020 05:02)  HR: 65 (04 Aug 2020 13:08) (65 - 90)  BP: 171/72 (04 Aug 2020 13:08) (158/68 - 191/84)  BP(mean): --  RR: 18 (04 Aug 2020 13:08) (18 - 18)  SpO2: 96% (04 Aug 2020 13:08) (96% - 100%)    PHYSICAL EXAM: unable to perform as patient refused upon eval             LABS:                        12.2   10.93 )-----------( 156      ( 03 Aug 2020 07:28 )             35.2     08-04    132<L>  |  98  |  11  ----------------------------<  129<H>  2.8<LL>   |  20<L>  |  0.89    Ca    8.4      04 Aug 2020 06:57  Phos  1.3     08-04  Mg     1.9     08-04    TPro  6.3  /  Alb  3.0<L>  /  TBili  0.6  /  DBili  x   /  AST  18  /  ALT  16  /  AlkPhos  108  08-03    PT/INR - ( 02 Aug 2020 13:56 )   PT: 13.1 sec;   INR: 1.11 ratio         PTT - ( 02 Aug 2020 13:56 )  PTT:26.1 sec  Urinalysis Basic - ( 03 Aug 2020 00:37 )    Color: Yellow / Appearance: Slightly Turbid / S.015 / pH: x  Gluc: x / Ketone: Trace  / Bili: Negative / Urobili: Negative   Blood: x / Protein: 100 / Nitrite: Negative   Leuk Esterase: Moderate / RBC: 13 /hpf / WBC 36 /HPF   Sq Epi: x / Non Sq Epi: 3 /hpf / Bacteria: Many        RADIOLOGY & ADDITIONAL TESTS:  < from: CT Abdomen and Pelvis w/ Oral Cont and w/ IV Cont (20 @ 18:50) >    EXAM:  CT ABDOMEN AND PELVIS OC IC                            PROCEDURE DATE:  2020            INTERPRETATION:  CLINICAL INFORMATION: History of aortic aneurysm repair. Presents with colitis.    COMPARISON: CT arteriogram chest abdomen pelvisdated 2019.    PROCEDURE:  CT of the Abdomen and Pelvis was performed with intravenous contrast.  Intravenous contrast: 90 ml Omnipaque 350. 10 ml discarded.  Oral contrast: positive contrast was administered.  Sagittal and coronal reformats were performed.    FINDINGS:  LOWER CHEST: Surgical repair thoracoabdominal aorta partially visualized, appears unchanged.    LIVER: Within normal limits.  BILE DUCTS: Normal caliber.  GALLBLADDER: Within normal limits.  SPLEEN: Within normal limits.  PANCREAS: Within normal limits.  ADRENALS: Within normal limits.  KIDNEYS/URETERS: Left renal enlargement with striated nephrogram, perinephric stranding, and urothelial thickening. No hydronephrosis.    BLADDER: Mild bladder wall thickening.  REPRODUCTIVE ORGANS: Adnexa appear unremarkable.    BOWEL: No bowel obstruction. Moderate fluid-filled distention of colon consistent with diarrhea. No mural thickening. Appendix normal.  PERITONEUM: No ascites.  VESSELS: Surgical graft repair suprarenal abdominal aorta unchanged. Maximal diameter of abdominal aorta 3.1 cm. Bypass grafts of the celiac and superior mesenteric arteries are patent. Native renal arteries and AIDAN appear patent.  Lack of enhancement of the right external iliac and common femoralveins.  RETROPERITONEUM/LYMPH NODES: No lymphadenopathy.  ABDOMINAL WALL: Within normal limits.  BONES: Disc degeneration lumbosacral junction.    IMPRESSION:  Left-sided pyelonephritis and possible cystitis.    Interval stability in appearance of surgical graft repair of thoracoabdominal aorta and mesenteric bypass grafts    No CT evidence of colitis.    Failure of enhancement of the right common femoral and external iliac veins. Suggest venous duplex study to assess for DVT.    Examination findings were conveyed to physician's assistant Jorge by Dr. Chacon at 1937 hours on 8/3/2020 with read back.                  JAQUELINE BRADLEY M.D., ATTENDING RADIOLOGIST  This document has been electronically signed. Aug  4 2020  9:23AM                < end of copied text >

## 2020-08-17 NOTE — PROGRESS NOTE ADULT - PROBLEM SELECTOR PLAN 3
Hypokalemia now resolved with K level 3.8 and Mag 1.9 likely due to GI losses  - No new labs avaialble  - Monitor BMP and electrolytes daily  - Replete K and Mag with goal K>4.0 and<5.0 and Mag>2.0

## 2020-08-17 NOTE — PROGRESS NOTE ADULT - PROBLEM SELECTOR PLAN 2
Hypertensive this am. Will watch to see if it persists. if it does, then increase Lisinopril to 40   Clonidine to 0.2 bid for now  DCed toprol given persistent bradycardia.

## 2020-08-17 NOTE — PROGRESS NOTE ADULT - PROBLEM SELECTOR PLAN 2
Patient with prior history of hypertension with elevated blood pressue likely due to refusal of medications and agitation  Consider clondine patch and/or IV medications with metoprolol/hydralazine  Continue current BP medication with holding parameters  Monitor vital signs

## 2020-08-17 NOTE — PROGRESS NOTE BEHAVIORAL HEALTH - NSBHFUPINTERVALHXFT_PSY_A_CORE
Patient seen and evaluated, awake and alert, oriented to person, place ([place]) then later states she doesn't believe she is at [place] states she is in an "undisclosed place", slightly off on the day but states it's August 2020.  Reports last night that she was having VH, states seeing a little boy and a girl running around her bed and then reports she left her room with her meal tray and fell down.  Patient is paranoid, reporting that the staff here had taken her to where she currently is now "an undisclosed area", states that we are trying to harvest her organs.  Patient reports having a HA, on physical exam is rigid and tremulous.  Per primary team, patient agitated overnight, combative refusing to take PO medications for agitation and was essentially given Haldol 2mg which had good effect, was sedated overnight and into the morning, however on evaluation is arousable, conversant.

## 2020-08-17 NOTE — PROGRESS NOTE ADULT - SUBJECTIVE AND OBJECTIVE BOX
Patient is a 70y old  Female who presents with a chief complaint of diarrhea (17 Aug 2020 12:37)      INTERVAL HPI/OVERNIGHT EVENTS:  T(C): 36.4 (08-17-20 @ 17:11), Max: 37 (08-17-20 @ 12:57)  HR: 99 (08-17-20 @ 17:11) (56 - 99)  BP: 180/93 (08-17-20 @ 17:11) (164/67 - 218/81)  RR: 17 (08-17-20 @ 12:57) (17 - 18)  SpO2: 97% (08-17-20 @ 12:57) (97% - 98%)  Wt(kg): --  I&O's Summary    16 Aug 2020 07:01  -  17 Aug 2020 07:00  --------------------------------------------------------  IN: 840 mL / OUT: 450 mL / NET: 390 mL    17 Aug 2020 07:01  -  17 Aug 2020 18:38  --------------------------------------------------------  IN: 80 mL / OUT: 0 mL / NET: 80 mL        LABS:    08-16    137  |  104  |  12  ----------------------------<  86  3.8   |  22  |  0.83    Ca    8.8      16 Aug 2020 07:14          CAPILLARY BLOOD GLUCOSE                MEDICATIONS  (STANDING):  aMIOdarone    Tablet 200 milliGRAM(s) Oral daily  aspirin  chewable 81 milliGRAM(s) Oral daily  cloNIDine 0.1 milliGRAM(s) Oral two times a day  diphenoxylate/atropine 1 Tablet(s) Oral two times a day  ertapenem  IVPB 1000 milliGRAM(s) IV Intermittent every 24 hours  ferrous    sulfate 325 milliGRAM(s) Oral two times a day  heparin   Injectable 5000 Unit(s) SubCutaneous every 8 hours  hydrALAZINE 10 milliGRAM(s) Oral two times a day  lactobacillus acidophilus 1 Tablet(s) Oral three times a day  lisinopril 40 milliGRAM(s) Oral daily  OLANZapine 2.5 milliGRAM(s) Oral <User Schedule>    MEDICATIONS  (PRN):  OLANZapine 2.5 milliGRAM(s) Oral every 6 hours PRN Paranoia/ Agitation          PHYSICAL EXAM:  GENERAL: NAD, well-groomed, well-developed  HEAD:  Atraumatic, Normocephalic  CHEST/LUNG: Clear to percussion bilaterally; No rales, rhonchi, wheezing, or rubs  HEART: Regular rate and rhythm; No murmurs, rubs, or gallops  ABDOMEN: Soft, Nontender, Nondistended; Bowel sounds present  EXTREMITIES:  2+ Peripheral Pulses, No clubbing, cyanosis, or edema  LYMPH: No lymphadenopathy noted  SKIN: No rashes or lesions    Care Discussed with Consultants/Other Providers [ ] YES  [ ] NO

## 2020-08-17 NOTE — PROVIDER CONTACT NOTE (OTHER) - ACTION/TREATMENT ORDERED:
PA ordered 2mg of haldol IV, Pt refused morning medications. PA notified and aware, morning B/P 215/80 Manual. PA ordered 10mg IV Hydralazine

## 2020-08-17 NOTE — PROGRESS NOTE ADULT - SUBJECTIVE AND OBJECTIVE BOX
Kalyan Argueta MD (Nephrology progress note)  [address] [facility],  [address],  [facility]  TEl: [phone]  Cell: [phone]    Patient is a 70y Female seen and evaluated at bedside. Vital signs, laboratory data, imaging studies, consult notes reviewed done within past 24 hours. Overnight events noted. Patient remains agitated overnight requiring haldol. No new labs available.    Allergies    No Known Allergies    Intolerances        ROS:  Limited. Patient refused to discuss at present and wants to go home.    VITALS:    T(C): 36.5 (08-17-20 @ 04:45), Max: 37 (08-16-20 @ 13:00)  HR: 73 (08-17-20 @ 04:45) (53 - 73)  BP: 215/80 (08-17-20 @ 05:30) (140/60 - 218/81)  RR: 18 (08-17-20 @ 04:45) (18 - 18)  SpO2: 98% (08-17-20 @ 04:45) (98% - 98%)  CAPILLARY BLOOD GLUCOSE          08-16-20 @ 07:01  -  08-17-20 @ 07:00  --------------------------------------------------------  IN: 840 mL / OUT: 450 mL / NET: 390 mL      MEDICATIONS  (STANDING):  aMIOdarone    Tablet 200 milliGRAM(s) Oral daily  aspirin  chewable 81 milliGRAM(s) Oral daily  cloNIDine 0.2 milliGRAM(s) Oral two times a day  diphenoxylate/atropine 1 Tablet(s) Oral two times a day  ertapenem  IVPB 1000 milliGRAM(s) IV Intermittent every 24 hours  ferrous    sulfate 325 milliGRAM(s) Oral two times a day  heparin   Injectable 5000 Unit(s) SubCutaneous every 8 hours  lactobacillus acidophilus 1 Tablet(s) Oral three times a day  lisinopril 20 milliGRAM(s) Oral daily    MEDICATIONS  (PRN):      PHYSICAL EXAM:  Patient refused to be examined at present and wants to sleep and go home.      Vascular ACCESS:     LABS:    08-16    137  |  104  |  12  ----------------------------<  86  3.8   |  22  |  0.83    Ca    8.8      16 Aug 2020 07:14      RADIOLOGY & ADDITIONAL STUDIES:  None  Imaging Personally Reviewed:  [x] YES  [ ] NO    Consultant(s) Notes Reviewed:  [x] YES  [ ] NO    Care Discussed with Primary team/ Other Providers [x] YES  [ ] NO

## 2020-08-18 PROCEDURE — 99232 SBSQ HOSP IP/OBS MODERATE 35: CPT

## 2020-08-18 RX ADMIN — AMIODARONE HYDROCHLORIDE 200 MILLIGRAM(S): 400 TABLET ORAL at 05:39

## 2020-08-18 RX ADMIN — Medication 10 MILLIGRAM(S): at 05:40

## 2020-08-18 RX ADMIN — Medication 325 MILLIGRAM(S): at 05:39

## 2020-08-18 RX ADMIN — Medication 1 TABLET(S): at 13:39

## 2020-08-18 RX ADMIN — Medication 1 TABLET(S): at 21:03

## 2020-08-18 RX ADMIN — Medication 1 TABLET(S): at 18:25

## 2020-08-18 RX ADMIN — Medication 0.1 MILLIGRAM(S): at 05:40

## 2020-08-18 RX ADMIN — Medication 1 TABLET(S): at 05:40

## 2020-08-18 RX ADMIN — Medication 0.1 MILLIGRAM(S): at 18:25

## 2020-08-18 RX ADMIN — Medication 325 MILLIGRAM(S): at 18:23

## 2020-08-18 RX ADMIN — Medication 1 TABLET(S): at 05:39

## 2020-08-18 RX ADMIN — LISINOPRIL 40 MILLIGRAM(S): 2.5 TABLET ORAL at 05:39

## 2020-08-18 RX ADMIN — OLANZAPINE 2.5 MILLIGRAM(S): 15 TABLET, FILM COATED ORAL at 18:38

## 2020-08-18 RX ADMIN — HEPARIN SODIUM 5000 UNIT(S): 5000 INJECTION INTRAVENOUS; SUBCUTANEOUS at 05:39

## 2020-08-18 RX ADMIN — ERTAPENEM SODIUM 120 MILLIGRAM(S): 1 INJECTION, POWDER, LYOPHILIZED, FOR SOLUTION INTRAMUSCULAR; INTRAVENOUS at 11:46

## 2020-08-18 RX ADMIN — HEPARIN SODIUM 5000 UNIT(S): 5000 INJECTION INTRAVENOUS; SUBCUTANEOUS at 13:39

## 2020-08-18 RX ADMIN — Medication 81 MILLIGRAM(S): at 11:40

## 2020-08-18 RX ADMIN — HEPARIN SODIUM 5000 UNIT(S): 5000 INJECTION INTRAVENOUS; SUBCUTANEOUS at 21:03

## 2020-08-18 NOTE — PROGRESS NOTE ADULT - SUBJECTIVE AND OBJECTIVE BOX
Kalyan Argueta MD (Nephrology progress note)  [address] [facility],  [address],  [facility]  TEl: [phone]  Cell: [phone]    Patient is a 70y Female seen and evaluated at bedside. Vital signs, laboratory data, imaging studies, consult notes reviewed done within past 24 hours. Overnight events noted. Patient lying in bed alert and awake denies any chest pain, SOB, palpitations or confusion. No new labs available    Allergies    No Known Allergies    Intolerances        ROS:  CONSTITUTIONAL: No fever or chills.  HEENT: No headache or visual disturbances.  RESPIRATORY: No shortness of breath, cough, hemoptysis or wheezing  CARDIOVASCULAR: No Chest pain, shortness of breath, palpitations, dizziness, syncope, orthopnea, PND or leg swelling.  GASTROINTESTINAL: No abdominal pain, nausea, vomiting, diarrhea, hematemesis or blood per rectum.  GENITOURINARY: No urinary frequency, urgency, gross hematuria, dysuria, flank or supra pubic pain, difficulty passing urine.  NEUROLOGICAL: No headache, focal limb weakness, tingling or numbness or seizure like activity  SKIN: No skin rash or lesion  MUSCULOSKELETAL: No leg pain, calf pain or swelling    VITALS:    T(C): 36.8 (08-18-20 @ 20:14), Max: 36.9 (08-18-20 @ 04:55)  HR: 66 (08-18-20 @ 20:14) (63 - 93)  BP: 101/62 (08-18-20 @ 20:14) (94/62 - 121/56)  RR: 18 (08-18-20 @ 20:14) (17 - 18)  SpO2: 98% (08-18-20 @ 20:14) (97% - 98%)  CAPILLARY BLOOD GLUCOSE          08-17-20 @ 07:01  -  08-18-20 @ 07:00  --------------------------------------------------------  IN: 480 mL / OUT: 1300 mL / NET: -820 mL    08-18-20 @ 07:01  -  08-18-20 @ 21:56  --------------------------------------------------------  IN: 300 mL / OUT: 500 mL / NET: -200 mL      MEDICATIONS  (STANDING):  aMIOdarone    Tablet 200 milliGRAM(s) Oral daily  aspirin  chewable 81 milliGRAM(s) Oral daily  cloNIDine 0.1 milliGRAM(s) Oral two times a day  diphenoxylate/atropine 1 Tablet(s) Oral two times a day  ertapenem  IVPB 1000 milliGRAM(s) IV Intermittent every 24 hours  ferrous    sulfate 325 milliGRAM(s) Oral two times a day  heparin   Injectable 5000 Unit(s) SubCutaneous every 8 hours  hydrALAZINE 10 milliGRAM(s) Oral two times a day  lactobacillus acidophilus 1 Tablet(s) Oral three times a day  lisinopril 40 milliGRAM(s) Oral daily  OLANZapine 2.5 milliGRAM(s) Oral <User Schedule>    MEDICATIONS  (PRN):  OLANZapine 2.5 milliGRAM(s) Oral every 6 hours PRN Paranoia/ Agitation      PHYSICAL EXAM:  GENERAL: Alert, awake and oriented x3 in no distress  HEENT: AUBREY, EOMI, neck supple, no JVP, no carotid bruit, oral mucosa moist and pink.  CHEST/LUNG: Bilateral clear breath sounds, no rales, no crepitations, no rhonchi, no wheezing  HEART: Regular rate and rhythm, NICANOR II/VI at LPSB, no gallops, no rub   ABDOMEN: Soft, nontender, non distended, bowel sounds present, no palpable organomegaly  : No flank or supra pubic tenderness.  EXTREMITIES: Peripheral pulses are palpable, no pedal edema  Neurology: AAOx3, no focal neurological deficit  SKIN: No rash or skin lesion  Musculoskeletal: No joint deformity or spinal tenderness.      Vascular ACCESS:     LABS:                      RADIOLOGY & ADDITIONAL STUDIES:    Imaging Personally Reviewed:  [x] YES  [ ] NO    Consultant(s) Notes Reviewed:  [x] YES  [ ] NO    Care Discussed with Primary team/ Other Providers [x] YES  [ ] NO

## 2020-08-18 NOTE — PROGRESS NOTE ADULT - PROBLEM SELECTOR PLAN 1
Acute pyelonephritis with E.coli bacteremia and gastroenteritis  - Tylenol prn for pain/fever  - Antibiotics adjustment per primary/ID team with renal dose adjustment with IV Invanz through right arm PICC line  - IV/po hydration

## 2020-08-18 NOTE — PROGRESS NOTE ADULT - PROBLEM SELECTOR PLAN 2
Patient with prior history of hypertension with now low blood pressure likely medications  Consider taper off clonidine likely causing confusion/hallucinations  Continue current BP medication with holding parameters  Monitor vital signs with maintenance of MAP>65-70 mm Hg

## 2020-08-18 NOTE — PROGRESS NOTE ADULT - SUBJECTIVE AND OBJECTIVE BOX
Subjective: Patient seen and examined. No new events except as noted.   BP has been labile at times     REVIEW OF SYSTEMS:    CONSTITUTIONAL: +weakness, fevers or chills  EYES/ENT: No visual changes;  No vertigo or throat pain   NECK: No pain or stiffness  RESPIRATORY: No cough, wheezing, hemoptysis; No shortness of breath  CARDIOVASCULAR: No chest pain or palpitations  GASTROINTESTINAL: No abdominal or epigastric pain. No nausea, vomiting, or hematemesis; No diarrhea or constipation. No melena or hematochezia.  GENITOURINARY: No dysuria, frequency or hematuria  NEUROLOGICAL: No numbness or weakness  SKIN: No itching, burning, rashes, or lesions   All other review of systems is negative unless indicated above.    MEDICATIONS:  MEDICATIONS  (STANDING):  aMIOdarone    Tablet 200 milliGRAM(s) Oral daily  aspirin  chewable 81 milliGRAM(s) Oral daily  cloNIDine 0.1 milliGRAM(s) Oral two times a day  diphenoxylate/atropine 1 Tablet(s) Oral two times a day  ertapenem  IVPB 1000 milliGRAM(s) IV Intermittent every 24 hours  ferrous    sulfate 325 milliGRAM(s) Oral two times a day  heparin   Injectable 5000 Unit(s) SubCutaneous every 8 hours  hydrALAZINE 10 milliGRAM(s) Oral two times a day  lactobacillus acidophilus 1 Tablet(s) Oral three times a day  lisinopril 40 milliGRAM(s) Oral daily  OLANZapine 2.5 milliGRAM(s) Oral <User Schedule>      PHYSICAL EXAM:  T(C): 36.9 (08-18-20 @ 04:55), Max: 37 (08-17-20 @ 12:57)  HR: 93 (08-18-20 @ 04:55) (82 - 99)  BP: 121/56 (08-18-20 @ 04:55) (121/56 - 196/82)  RR: 17 (08-18-20 @ 04:55) (17 - 17)  SpO2: 97% (08-18-20 @ 04:55) (97% - 97%)  Wt(kg): --  I&O's Summary    17 Aug 2020 07:01  -  18 Aug 2020 07:00  --------------------------------------------------------  IN: 480 mL / OUT: 1300 mL / NET: -820 mL    18 Aug 2020 07:01  -  18 Aug 2020 10:59  --------------------------------------------------------  IN: 180 mL / OUT: 0 mL / NET: 180 mL          Appearance: Normal	  HEENT:   Normal oral mucosa, PERRL, EOMI	  Lymphatic: No lymphadenopathy , no edema  Cardiovascular: Normal S1 S2, No JVD, No murmurs , Peripheral pulses palpable 2+ bilaterally  Respiratory: Lungs clear to auscultation, normal effort 	  Gastrointestinal:  Soft, Non-tender, + BS	  Skin: No rashes, No ecchymoses, No cyanosis, warm to touch  Musculoskeletal: Normal range of motion, normal strength  Psychiatry:  Mood & affect appropriate  Ext: No edema      LABS:    CARDIAC MARKERS:                  proBNP:   Lipid Profile:   HgA1c:   TSH:             TELEMETRY: 	    ECG:  	  RADIOLOGY:   DIAGNOSTIC TESTING:  [ ] Echocardiogram:  [ ]  Catheterization:  [ ] Stress Test:    OTHER:

## 2020-08-18 NOTE — PROGRESS NOTE ADULT - SUBJECTIVE AND OBJECTIVE BOX
Patient is a 70y old  Female who presents with a chief complaint of diarrhea (18 Aug 2020 14:49)      INTERVAL HPI/OVERNIGHT EVENTS:  T(C): 36.7 (08-18-20 @ 11:35), Max: 36.9 (08-18-20 @ 04:55)  HR: 63 (08-18-20 @ 12:44) (63 - 99)  BP: 95/52 (08-18-20 @ 12:44) (94/62 - 180/93)  RR: 18 (08-18-20 @ 11:35) (17 - 18)  SpO2: 98% (08-18-20 @ 12:44) (97% - 98%)  Wt(kg): --  I&O's Summary    17 Aug 2020 07:01  -  18 Aug 2020 07:00  --------------------------------------------------------  IN: 480 mL / OUT: 1300 mL / NET: -820 mL    18 Aug 2020 07:01  -  18 Aug 2020 16:57  --------------------------------------------------------  IN: 300 mL / OUT: 0 mL / NET: 300 mL        LABS:              CAPILLARY BLOOD GLUCOSE                MEDICATIONS  (STANDING):  aMIOdarone    Tablet 200 milliGRAM(s) Oral daily  aspirin  chewable 81 milliGRAM(s) Oral daily  cloNIDine 0.1 milliGRAM(s) Oral two times a day  diphenoxylate/atropine 1 Tablet(s) Oral two times a day  ertapenem  IVPB 1000 milliGRAM(s) IV Intermittent every 24 hours  ferrous    sulfate 325 milliGRAM(s) Oral two times a day  heparin   Injectable 5000 Unit(s) SubCutaneous every 8 hours  hydrALAZINE 10 milliGRAM(s) Oral two times a day  lactobacillus acidophilus 1 Tablet(s) Oral three times a day  lisinopril 40 milliGRAM(s) Oral daily  OLANZapine 2.5 milliGRAM(s) Oral <User Schedule>    MEDICATIONS  (PRN):  OLANZapine 2.5 milliGRAM(s) Oral every 6 hours PRN Paranoia/ Agitation          PHYSICAL EXAM:  GENERAL: aware but confused   CHEST/LUNG: Clear to percussion bilaterally; No rales, rhonchi, wheezing, or rubs  HEART: Regular rate and rhythm; No murmurs, rubs, or gallops  ABDOMEN: Soft, Nontender, Nondistended; Bowel sounds present  EXTREMITIES:  2+ Peripheral Pulses, No clubbing, cyanosis, or edema  LYMPH: No lymphadenopathy noted  SKIN: No rashes or lesions    Care Discussed with Consultants/Other Providers [x ] YES  [ ] NO

## 2020-08-18 NOTE — PROGRESS NOTE ADULT - SUBJECTIVE AND OBJECTIVE BOX
no new complaints, diarrhea improved, voiding, reports decreased appetite    REVIEW OF SYSTEMS  Constitutional - No fever,  No fatigue  HEENT - No vertigo, No neck pain  Neurological - No headaches, No memory loss, No loss of strength, No numbness, No tremors  Skin - No rashes, No lesions   Musculoskeletal - No joint pain, No joint swelling, No muscle pain  Psychiatric - +hallucinations/agitation 8/17    FUNCTIONAL PROGRESS  8/18 PT    Bed Mobility  Bed Mobility Training Rolling/Turning: independent  Bed Mobility Training Scooting: independent  Bed Mobility Training Sit-to-Supine: minimum assist (75% patient effort);  verbal cues;  nonverbal cues (demo/gestures);  1 person assist  Bed Mobility Training Supine-to-Sit: minimum assist (75% patient effort);  verbal cues;  nonverbal cues (demo/gestures);  1 person assist  Bed Mobility Training Limitations: decreased flexibility;  decreased strength;  impaired balance    Sit-Stand Transfer Training  Transfer Training Sit-to-Stand Transfer: minimum assist (75% patient effort);  verbal cues;  1 person assist;  nonverbal cues (demo/gestures);  full weight-bearing   rolling walker  Transfer Training Stand-to-Sit Transfer: minimum assist (75% patient effort);  1 person assist;  nonverbal cues (demo/gestures);  verbal cues;  full weight-bearing   rolling walker  Sit-to-Stand Transfer Training Transfer Safety Analysis: decreased balance;  decreased weight-shifting ability;  decreased flexibility;  decreased strength;  impaired balance;  impaired sensory feedback;  rolling walker    Gait Training  Gait Training: contact guard;  verbal cues;  nonverbal cues (demo/gestures);  1 person assist;  full weight-bearing   rolling walker;  20 feet  Gait Analysis: 3-point gait   decreased jeane;  decreased step length;  decreased stride length;  decreased strength;  impaired balance;  20 ft x 1;  rolling walker  Gait Number of Times:: x 1  Type of Rest Type of Rest: standing  Duration of Rest Duration of Rest: 10 sec     Therapeutic Exercise  Therapeutic Exercise Detail: Performed AROM: heelslides, hip abd, gluteal sets,hand pumps, punchign with UE s, and ankle pumps all x 10reps AROM . Performed sitting therex: hip flexions, knee extensions, ankle pumps, all x 10reps. Pt also tolerated 5 min static standing at RW to be cleaned and diaper donned for amb.         VITALS  T(C): 36.7 (08-18-20 @ 11:35), Max: 36.9 (08-18-20 @ 04:55)  HR: 63 (08-18-20 @ 12:44) (63 - 99)  BP: 95/52 (08-18-20 @ 12:44) (94/62 - 180/93)  RR: 18 (08-18-20 @ 11:35) (17 - 18)  SpO2: 98% (08-18-20 @ 12:44) (97% - 98%)  Wt(kg): --    MEDICATIONS   aMIOdarone    Tablet 200 milliGRAM(s) daily  aspirin  chewable 81 milliGRAM(s) daily  cloNIDine 0.1 milliGRAM(s) two times a day  diphenoxylate/atropine 1 Tablet(s) two times a day  ertapenem  IVPB 1000 milliGRAM(s) every 24 hours  ferrous    sulfate 325 milliGRAM(s) two times a day  heparin   Injectable 5000 Unit(s) every 8 hours  hydrALAZINE 10 milliGRAM(s) two times a day  lactobacillus acidophilus 1 Tablet(s) three times a day  lisinopril 40 milliGRAM(s) daily  OLANZapine 2.5 milliGRAM(s) <User Schedule>  OLANZapine 2.5 milliGRAM(s) every 6 hours PRN      RECENT LABS - Reviewed    Complete Blood Count in AM (08.15.20 @ 06:51)    WBC Count: 9.10 K/uL        PHYSICAL EXAM  Constitutional - NAD, Comfortable, sitting in chair, eating lunch, son at bedside  HEENT - NCAT, EOMI  Extremities - No C/C/E, No calf tenderness   Neurologic Exam -                 AAO x 3  Speech clear and fluent  Motor without focal deficit 5/5 bl UE and LE  MAS 1+ spasticity of bl LEs  3-4 beat clonus bl LEs     Psychiatric - Mood stable, Affect WNL, very calm, described recent hallucinations of her mother    Impression:    70 yo with functional deficits secondary to diagnosis of debility and incomplete spinal cord injury.   diarrhea/dehydration improving, on bacid, lomotil  hypertension on clonidine  hallucinations/agitation 8/17, seen by behavioral health, started on zyprexa  Plan:  PT- ROM, Bed Mob, Transfers, Amb w AD and bracing as needed  OT- ADLs, bracing  Prec- Falls, Cardiac  Dispo- Acute Rehab- can tolerate 3h/d PT/OT/SLP and requires daily physician visits

## 2020-08-18 NOTE — PROGRESS NOTE ADULT - PROBLEM SELECTOR PLAN 1
- with associated fever and GNR bacteremia - improving  - cont to monitor  - cont Bacid TID and lomotil BID  - CT notable for fluid-filled distended colon c/w diarrhea, no evidence of colitis   - GI pcr negative, stool cultures negative  - Cdiff negative   - diet as tolerated

## 2020-08-18 NOTE — PROGRESS NOTE ADULT - PROBLEM SELECTOR PLAN 4
Patient with acute psychosis and agitation likely drug induced  now with improvement  Taper off clonidine   Plan per primary/Psych team.

## 2020-08-18 NOTE — PROGRESS NOTE ADULT - SUBJECTIVE AND OBJECTIVE BOX
INTERVAL HPI/OVERNIGHT EVENTS:    patient seen earlier today  overnight events noted  +bm x 1 this morning; diarrhea improving     MEDICATIONS  (STANDING):  aMIOdarone    Tablet 200 milliGRAM(s) Oral daily  aspirin  chewable 81 milliGRAM(s) Oral daily  cloNIDine 0.1 milliGRAM(s) Oral two times a day  diphenoxylate/atropine 1 Tablet(s) Oral two times a day  ertapenem  IVPB 1000 milliGRAM(s) IV Intermittent every 24 hours  ferrous    sulfate 325 milliGRAM(s) Oral two times a day  heparin   Injectable 5000 Unit(s) SubCutaneous every 8 hours  hydrALAZINE 10 milliGRAM(s) Oral two times a day  lactobacillus acidophilus 1 Tablet(s) Oral three times a day  lisinopril 40 milliGRAM(s) Oral daily  OLANZapine 2.5 milliGRAM(s) Oral <User Schedule>    MEDICATIONS  (PRN):  OLANZapine 2.5 milliGRAM(s) Oral every 6 hours PRN Paranoia/ Agitation      Allergies    No Known Allergies    Intolerances        Review of Systems: unable to obtain in entirety         Vital Signs Last 24 Hrs  T(C): 36.7 (18 Aug 2020 11:35), Max: 37 (17 Aug 2020 12:57)  T(F): 98 (18 Aug 2020 11:35), Max: 98.6 (17 Aug 2020 12:57)  HR: 63 (18 Aug 2020 12:44) (63 - 99)  BP: 95/52 (18 Aug 2020 12:44) (94/62 - 196/82)  BP(mean): --  RR: 18 (18 Aug 2020 11:35) (17 - 18)  SpO2: 98% (18 Aug 2020 12:44) (97% - 98%)    PHYSICAL EXAM:    Constitutional: NAD  HEENT: EOMI, throat clear  Neck: No LAD, supple  Gastrointestinal: BS+, soft, NT/ND  Extremities: No peripheral edema  Neurological: awake, confused       LABS:                RADIOLOGY & ADDITIONAL TESTS:

## 2020-08-18 NOTE — PROGRESS NOTE ADULT - PROBLEM SELECTOR PLAN 3
Hypokalemia now resolved with K level 3.8 and Mag 1.9 likely due to GI losses  - No new labs available  - Monitor BMP and electrolytes daily  - Replete K and Mag with goal K>4.0 and<5.0 and Mag>2.0

## 2020-08-19 LAB — SARS-COV-2 RNA SPEC QL NAA+PROBE: SIGNIFICANT CHANGE UP

## 2020-08-19 PROCEDURE — 99232 SBSQ HOSP IP/OBS MODERATE 35: CPT

## 2020-08-19 RX ADMIN — Medication 1 TABLET(S): at 05:57

## 2020-08-19 RX ADMIN — Medication 325 MILLIGRAM(S): at 17:14

## 2020-08-19 RX ADMIN — Medication 1 TABLET(S): at 13:10

## 2020-08-19 RX ADMIN — AMIODARONE HYDROCHLORIDE 200 MILLIGRAM(S): 400 TABLET ORAL at 05:58

## 2020-08-19 RX ADMIN — Medication 1 TABLET(S): at 21:07

## 2020-08-19 RX ADMIN — ERTAPENEM SODIUM 120 MILLIGRAM(S): 1 INJECTION, POWDER, LYOPHILIZED, FOR SOLUTION INTRAMUSCULAR; INTRAVENOUS at 13:10

## 2020-08-19 RX ADMIN — HEPARIN SODIUM 5000 UNIT(S): 5000 INJECTION INTRAVENOUS; SUBCUTANEOUS at 05:57

## 2020-08-19 RX ADMIN — Medication 10 MILLIGRAM(S): at 05:57

## 2020-08-19 RX ADMIN — HEPARIN SODIUM 5000 UNIT(S): 5000 INJECTION INTRAVENOUS; SUBCUTANEOUS at 13:10

## 2020-08-19 RX ADMIN — Medication 0.1 MILLIGRAM(S): at 17:14

## 2020-08-19 RX ADMIN — Medication 81 MILLIGRAM(S): at 13:10

## 2020-08-19 RX ADMIN — Medication 325 MILLIGRAM(S): at 05:57

## 2020-08-19 RX ADMIN — Medication 0.1 MILLIGRAM(S): at 05:57

## 2020-08-19 RX ADMIN — HEPARIN SODIUM 5000 UNIT(S): 5000 INJECTION INTRAVENOUS; SUBCUTANEOUS at 21:07

## 2020-08-19 RX ADMIN — Medication 1 TABLET(S): at 17:14

## 2020-08-19 RX ADMIN — OLANZAPINE 2.5 MILLIGRAM(S): 15 TABLET, FILM COATED ORAL at 17:14

## 2020-08-19 RX ADMIN — LISINOPRIL 40 MILLIGRAM(S): 2.5 TABLET ORAL at 05:57

## 2020-08-19 NOTE — PROGRESS NOTE ADULT - SUBJECTIVE AND OBJECTIVE BOX
Subjective: Patient seen and examined. No new events except as noted.     REVIEW OF SYSTEMS:    CONSTITUTIONAL: No weakness, fevers or chills  EYES/ENT: No visual changes;  No vertigo or throat pain   NECK: No pain or stiffness  RESPIRATORY: No cough, wheezing, hemoptysis; No shortness of breath  CARDIOVASCULAR: No chest pain or palpitations  GASTROINTESTINAL: No abdominal or epigastric pain. No nausea, vomiting, or hematemesis; No diarrhea or constipation. No melena or hematochezia.  GENITOURINARY: No dysuria, frequency or hematuria  NEUROLOGICAL: No numbness or weakness  SKIN: No itching, burning, rashes, or lesions   All other review of systems is negative unless indicated above.    MEDICATIONS:  MEDICATIONS  (STANDING):  aMIOdarone    Tablet 200 milliGRAM(s) Oral daily  aspirin  chewable 81 milliGRAM(s) Oral daily  cloNIDine 0.1 milliGRAM(s) Oral two times a day  diphenoxylate/atropine 1 Tablet(s) Oral two times a day  ertapenem  IVPB 1000 milliGRAM(s) IV Intermittent every 24 hours  ferrous    sulfate 325 milliGRAM(s) Oral two times a day  heparin   Injectable 5000 Unit(s) SubCutaneous every 8 hours  hydrALAZINE 10 milliGRAM(s) Oral two times a day  lactobacillus acidophilus 1 Tablet(s) Oral three times a day  lisinopril 40 milliGRAM(s) Oral daily  OLANZapine 2.5 milliGRAM(s) Oral <User Schedule>      PHYSICAL EXAM:  T(C): 36.8 (08-19-20 @ 05:39), Max: 36.8 (08-18-20 @ 20:14)  HR: 76 (08-19-20 @ 05:39) (63 - 81)  BP: 168/75 (08-19-20 @ 05:39) (94/62 - 168/75)  RR: 18 (08-19-20 @ 05:39) (18 - 18)  SpO2: 98% (08-19-20 @ 05:39) (98% - 98%)  Wt(kg): --  I&O's Summary    18 Aug 2020 07:01  -  19 Aug 2020 07:00  --------------------------------------------------------  IN: 300 mL / OUT: 950 mL / NET: -650 mL    19 Aug 2020 07:01  -  19 Aug 2020 11:21  --------------------------------------------------------  IN: 360 mL / OUT: 0 mL / NET: 360 mL          Appearance: Normal	  HEENT:   Normal oral mucosa, PERRL, EOMI	  Lymphatic: No lymphadenopathy , no edema  Cardiovascular: Normal S1 S2, No JVD, No murmurs , Peripheral pulses palpable 2+ bilaterally  Respiratory: Lungs clear to auscultation, normal effort 	  Gastrointestinal:  Soft, Non-tender, + BS	  Skin: No rashes, No ecchymoses, No cyanosis, warm to touch  Musculoskeletal: Normal range of motion, normal strength  Psychiatry:  Mood & affect appropriate  Ext: No edema      LABS:    CARDIAC MARKERS:                  proBNP:   Lipid Profile:   HgA1c:   TSH:             TELEMETRY: 	    ECG:  	  RADIOLOGY:   DIAGNOSTIC TESTING:  [ ] Echocardiogram:  [ ]  Catheterization:  [ ] Stress Test:    OTHER:

## 2020-08-19 NOTE — CONSULT NOTE ADULT - SUBJECTIVE AND OBJECTIVE BOX
[facility] [place] [facility]        Patient is a 70y old  Female who presents with a chief complaint of diarrhea (19 Aug 2020 18:42)    Excerpt from H&P,"  71 yo F with hx of thoracic and abdominal aortic aneurysm s/p repair, AV replacement, and HTN presenting with diarrhea x1 day. Pt states that her boyfriend came over for dinner last night and after he left she started having profuse non-bloody watery diarrhea a/w with fevers to 101-102, lightheadedness and nausea/dry heaving. Pt states she has since had 5-6 watery BMs and has started to feel weak. She took Tylenol at 10AM. She has also noticed mild SOB on exertion. She denies CP. She has been trying to drink gatorade and has been able to keep it down.  Hallucinations this past Sunday into Monday, reports was hallucinating seeing her mother in a crowd, also was hallucinating thought she was taking her meal tray and fell down, but is no longer having any perceptual disturbances since then.  Denies having any hallucinations since then.        *****PAST MEDICAL / Surgical  HISTORY:  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: s/p repair  Asceding aortic aneurysm repair 4/8/2019  S/P cataract surgery  Arm fracture, left: 2005           *****FAMILY HISTORY:  FAMILY HISTORY:  Family history of skin cancer: mother  Family history of coronary artery bypass graft: with valve disease           *****SOCIAL HISTORY:  Alcohol: None  Smoking: None         *****ALLERGIES:   Allergies    No Known Allergies    Intolerances             *****MEDICATIONS: current medication reviewed and documented.   MEDICATIONS  (STANDING):  aMIOdarone    Tablet 200 milliGRAM(s) Oral daily  aspirin  chewable 81 milliGRAM(s) Oral daily  cloNIDine 0.1 milliGRAM(s) Oral two times a day  diphenoxylate/atropine 1 Tablet(s) Oral two times a day  ferrous    sulfate 325 milliGRAM(s) Oral two times a day  heparin   Injectable 5000 Unit(s) SubCutaneous every 8 hours  hydrALAZINE 10 milliGRAM(s) Oral two times a day  lactobacillus acidophilus 1 Tablet(s) Oral three times a day  lisinopril 40 milliGRAM(s) Oral daily  OLANZapine 2.5 milliGRAM(s) Oral <User Schedule>    MEDICATIONS  (PRN):  OLANZapine 2.5 milliGRAM(s) Oral every 6 hours PRN Paranoia/ Agitation           *****REVIEW OF SYSTEM:  GEN: no fever, no chills, no pain  < from: CT Head No Cont (08.15.20 @ 17:08) >  FINDINGS:  VENTRICLES AND SULCI: Age-appropriate involutional changes..  INTRA-AXIAL: Microvascular ischemic changes involving the periventricular and subcortical white matter as seen on the prior study. No intracranial hemorrhage. No mass or shift.  EXTRA-AXIAL:  No mass or collection is seen.  VISUALIZED SINUSES:  Clear.  VISUALIZED MASTOIDS:  Clear.  CALVARIUM:  Normal.  MISCELLANEOUS:  Scleral banding left globe. Bilateral cataract surgery    IMPRESSION:  Involutional changes and microvascular ischemic change as seen on the prior study. No interval change compared with 8/14/2019.      < end of copied text >  RESP: no SOB, no cough, no sputum  CVS: no chest pain, no palpitations, no edema  GI: no abdominal pain, no nausea, no vomiting, no constipation, no diarrhea  : no dysurea, no frequency, no hematurea  Neuro: no headache, no dizziness  PSYCH: no anxiety, no depression  Derm : no itching, no rash         *****VITAL SIGNS:  T(C): 36.8 (08-20-20 @ 04:47), Max: 36.8 (08-19-20 @ 20:00)  HR: 74 (08-20-20 @ 04:47) (56 - 74)  BP: 158/69 (08-20-20 @ 04:47) (95/55 - 158/69)  RR: 18 (08-20-20 @ 04:47) (18 - 18)  SpO2: 100% (08-20-20 @ 04:47) (98% - 100%)  Wt(kg): --    08-19 @ 07:01  -  08-20 @ 07:00  --------------------------------------------------------  IN: 960 mL / OUT: 1400 mL / NET: -440 mL             *****PHYSICAL EXAM:   Alert oriented x2    Attention comprehension are fair. Able to name, repeat,  without any difficulty.   Able to follow 1 step commands.     EOMI fundi not visualized,  blinks to threat b/l   No facial asymmetry   Tongue is midline   Palate elevates symmetrically   Moving all 4 ext symmetrically     Reflexes are symmetric throughout   sensation is grossly symmetric  Gait : not assessed.  B/L down going toes               *****LAB AND IMAGING:                             [All pertinent recent Imaging reports reviewed]         *****A S S E S S M E N T   A N D   P L A N :   71 yo F with hx of thoracic and abdominal aortic aneurysm s/p repair, AV replacement, and HTN presenting with diarrhea x1 day. Pt states that her boyfriend came over for dinner last night and after he left she started having profuse non-bloody watery diarrhea a/w with fevers to 101-102, lightheadedness and nausea/dry heaving. Pt states she has since had 5-6 watery BMs and has started to feel weak. She took Tylenol at 10AM. She has also noticed mild SOB on exertion. She denies CP. She has been trying to drink gatorade and has been able to keep it down.  Hallucinations this past Sunday into Monday, reports was hallucinating seeing her mother in a crowd, also was hallucinating thought she was taking her meal tray and fell down, but is no longer having any perceptual disturbances since then.  Denies having any hallucinations since then.      Problem/Recommendations 1: confusion likely related to delirium   tsh low repeat  thyroid panel ? related hyperthyroid   thiamine 100 daily    will continue to monitor.  ct head no acute process.      Problem/Recommendations 2:   htn   goal < 140   titrate meds.      ___________________________  Will follow with you.  Thank you,  Agata Stubbs MD  Diplomate of the American Board of Neurology and Psychiatry.  Diplomate of the American Board of Vascular Neurology.   [facility] ([facility]), [facility]   Ph: [phone]    Differential diagnosis and plan of care discussed with patient after the evaluation.   Advanced care planning options discussed.   Pain assessed and judicious use of narcotics when appropriate was discussed.  Importance of Fall prevention discussed.  Counseling on Smoking and Alcohol cessation was offered when appropriate.  Counseling on Diet, exercise, and medication compliance was done.   83 minutes spent on the total encounter;  more than 50 % of the visit was spent on counseling  and or coordinating care by the attending physician.    Thank you for allowing me to participate in the care of this ivon patient. Please do not hesitate to call me if you have any questions.     This and subsequent notes were partially created using voice recognition software and will  inherently be subject to errors including those of syntax and sound alike substitutions which may escape proofreading. In such instances original meaning may be extrapolated by contextual derivation.

## 2020-08-19 NOTE — PROGRESS NOTE BEHAVIORAL HEALTH - NSBHFUPINTERVALHXFT_PSY_A_CORE
Patient seen and evaluated awake and alert oriented x 3, reports was having hallucinations this past Sunday into Monday, reports was hallucinating seeing her mother in a crowd, also was hallucinating thought she was taking her meal tray and fell down.  Denies having any hallucinations since that time, denies having any adverse SE from Zyprexa.  Denies paranoia. Denies SI, reports is looking forward to discharge to rehab, wants to get physically stronger, talks about how she was very active, hiking, doing yoga, reports that she hopes to one day to return doing those activities.  States she is sleeping well. Patient seen and evaluated awake and alert oriented x 3, reports was having hallucinations this past Sunday into Monday, reports was hallucinating seeing her mother in a crowd, also was hallucinating thought she was taking her meal tray and fell down, but is no longer having any perceptual disturbances since then.  Denies having any hallucinations since that time, denies having any adverse SE from Zyprexa.  Denies paranoia. Denies SI, reports is looking forward to discharge to rehab, wants to get physically stronger, talks about how she was very active, hiking, doing yoga, reports that she hopes to one day to return doing those activities.  States she is sleeping well.

## 2020-08-19 NOTE — PROGRESS NOTE ADULT - PROBLEM SELECTOR PLAN 2
- secondary to dehydration in the setting of profuse diarrhea - stable   - monitor electrolytes, replete prn  - Nephrology following

## 2020-08-19 NOTE — CHART NOTE - NSCHARTNOTEFT_GEN_A_CORE
Nutrition Follow Up Note  Patient seen for: malnutrition follow up on [facility]    Reason for Admission	diarrhea    Bacteremia due to Gram-negative bacteria.  Plan: - Bcx growing E.coli 2/2 pyelonephritis   - ID following   - Abx as per ID.    Source: pt, EMR    Diet : SOFT    BM's-fecal incontinence    Patient reports: did not like the french toast she received for breakfast, she ate other items which added up to ~50% of the tray. she is consuming the Danactive that she is receiving 2 x daily. she is not eating the cottage cheese she is receiving to hold for am snack-RD will remove in preferences. she is consuming the melon. she is decreasing the amount of water she is consuming and requesting that bottled water be decreased to 1 with each meal-RD will adjust. pt reports plan to be discharged to rehab today.     PO intake : 60-70%     Source for PO intake: pt          Daily Weight in k.8 (-), Weight in k.9 (-17), Weight in k (-16), Weight in k.1 (-15), Weight in k.4 (-), Weight in k.2 (-13)  % Weight Change: 8% loss since previous assess-weight on  is not usual for pt weight compared to day before and the day after, therefore the difference in the weight is questionable.     Pertinent Medications: MEDICATIONS  (STANDING):  aMIOdarone    Tablet 200 milliGRAM(s) Oral daily  aspirin  chewable 81 milliGRAM(s) Oral daily  cloNIDine 0.1 milliGRAM(s) Oral two times a day  diphenoxylate/atropine 1 Tablet(s) Oral two times a day  ertapenem  IVPB 1000 milliGRAM(s) IV Intermittent every 24 hours  ferrous    sulfate 325 milliGRAM(s) Oral two times a day  heparin   Injectable 5000 Unit(s) SubCutaneous every 8 hours  hydrALAZINE 10 milliGRAM(s) Oral two times a day  lactobacillus acidophilus 1 Tablet(s) Oral three times a day  lisinopril 40 milliGRAM(s) Oral daily  OLANZapine 2.5 milliGRAM(s) Oral <User Schedule>    MEDICATIONS  (PRN):  OLANZapine 2.5 milliGRAM(s) Oral every 6 hours PRN Paranoia/ Agitation    Pertinent Labs: nutrition related labs are WNL        Skin per nursing documentation: no pressure injury  Edema: none    Estimated Needs:   [ x] no change since previous assessment  [ ] recalculated:     Previous Nutrition Diagnosis: Previous Nutrition Diagnosis:  Inadequate protein-energy intake. severe Malnutrition   Nutrition Diagnosis is: being addressed with meals, snacks and supplements                Recommend/Interventions:   1) continue Soft diet as it is appropriate while pt is having loose stools  2) continue Danactive 2x daily-pt is consuming  3) review and adjust preferences as needed, which may assist in improving po intake  removed cottage cheese a breakfast, decreased bottled water to 1 with meals.     Monitoring and Evaluation:     Continue to monitor Nutritional intake, Tolerance to diet prescription, weights, labs, skin integrity    RD remains available upon request and will follow up per protocol  Indy Crowell MA, RD, CDN #[phone]

## 2020-08-19 NOTE — PROGRESS NOTE BEHAVIORAL HEALTH - NSBHCHARTREVIEWINVESTIGATE_PSY_A_CORE FT
< from: 12 Lead ECG (08.14.20 @ 16:12) >      Ventricular Rate 49 BPM    Atrial Rate 49 BPM    P-R Interval 180 ms    QRS Duration 102 ms    Q-T Interval 468 ms    QTC Calculation(Bezet) 422 ms    P Axis 76 degrees    R Axis 49 degrees    T Axis 36 degrees    Diagnosis Line MARKED SINUS BRADYCARDIA  ABNORMAL ECG  WHEN COMPARED WITH ECG OF 02-AUG-2020 14:19,  SIGNIFICANT CHANGES HAVE OCCURRED  Confirmed by MD COX JONATHAN ([id number]) on 8/15/2020 9:15:01 AM    < end of copied text >
< from: 12 Lead ECG (08.14.20 @ 16:12) >      Ventricular Rate 49 BPM    Atrial Rate 49 BPM    P-R Interval 180 ms    QRS Duration 102 ms    Q-T Interval 468 ms    QTC Calculation(Bezet) 422 ms    P Axis 76 degrees    R Axis 49 degrees    T Axis 36 degrees    Diagnosis Line MARKED SINUS BRADYCARDIA  ABNORMAL ECG  WHEN COMPARED WITH ECG OF 02-AUG-2020 14:19,  SIGNIFICANT CHANGES HAVE OCCURRED  Confirmed by MD COX JONATHAN ([id number]) on 8/15/2020 9:15:01 AM    < end of copied text >

## 2020-08-19 NOTE — PROGRESS NOTE ADULT - PROBLEM SELECTOR PLAN 1
- with associated fever and GNR bacteremia - frequency improved  - cont to monitor  - cont Bacid TID and lomotil BID  - CT notable for fluid-filled distended colon c/w diarrhea, no evidence of colitis   - GI pcr negative, stool cultures negative  - Cdiff negative   - diet as tolerated

## 2020-08-19 NOTE — PROGRESS NOTE BEHAVIORAL HEALTH - NSBHCONSULTOBSREASON_PSY_A_CORE FT
not suicidal or homicidal or danger to self or others

## 2020-08-19 NOTE — PROGRESS NOTE BEHAVIORAL HEALTH - RISK ASSESSMENT
Risk factors: acute/chronic medical issues, acute/chronic cognitive impairments, recent agitation, hallucinations, not receiving treatment    Protective factors: no current SIIP/HIIP, no h/o SA/SIB, no h/o psych admissions, no access to weapons, no active substance abuse, engaged in work or school, dependent children, domiciled, social supports, positive therapeutic relationship    Overall, pt is a low risk of harm to self, does not meet criteria for psychiatric hospitalization
Risk factors: acute/chronic medical issues, acute/chronic cognitive impairments, recent agitation, hallucinations, not receiving treatment    Protective factors: no current SIIP/HIIP, no h/o SA/SIB, no h/o psych admissions, no access to weapons, no active substance abuse, engaged in work or school, dependent children, domiciled, social supports, positive therapeutic relationship    Overall, pt is a low risk of harm to self, does not meet criteria for psychiatric hospitalization

## 2020-08-19 NOTE — PROGRESS NOTE BEHAVIORAL HEALTH - NSBHCONSULTFOLLOWAFTERCARE_PSY_A_CORE FT
per primary team  [phone]
will be given at discharge
will be given at discharge

## 2020-08-19 NOTE — PROGRESS NOTE ADULT - SUBJECTIVE AND OBJECTIVE BOX
Patient is a 70y old  Female who presents with a chief complaint of diarrhea (19 Aug 2020 11:55)      INTERVAL HPI/OVERNIGHT EVENTS:  T(C): 36.6 (08-19-20 @ 12:15), Max: 36.8 (08-18-20 @ 20:14)  HR: 73 (08-19-20 @ 17:13) (60 - 76)  BP: 110/57 (08-19-20 @ 17:13) (101/62 - 168/75)  RR: 18 (08-19-20 @ 12:15) (18 - 18)  SpO2: 99% (08-19-20 @ 12:15) (98% - 99%)  Wt(kg): --  I&O's Summary    18 Aug 2020 07:01  -  19 Aug 2020 07:00  --------------------------------------------------------  IN: 300 mL / OUT: 950 mL / NET: -650 mL    19 Aug 2020 07:01  -  19 Aug 2020 18:42  --------------------------------------------------------  IN: 720 mL / OUT: 0 mL / NET: 720 mL        LABS:              CAPILLARY BLOOD GLUCOSE                MEDICATIONS  (STANDING):  aMIOdarone    Tablet 200 milliGRAM(s) Oral daily  aspirin  chewable 81 milliGRAM(s) Oral daily  cloNIDine 0.1 milliGRAM(s) Oral two times a day  diphenoxylate/atropine 1 Tablet(s) Oral two times a day  ertapenem  IVPB 1000 milliGRAM(s) IV Intermittent every 24 hours  ferrous    sulfate 325 milliGRAM(s) Oral two times a day  heparin   Injectable 5000 Unit(s) SubCutaneous every 8 hours  hydrALAZINE 10 milliGRAM(s) Oral two times a day  lactobacillus acidophilus 1 Tablet(s) Oral three times a day  lisinopril 40 milliGRAM(s) Oral daily  OLANZapine 2.5 milliGRAM(s) Oral <User Schedule>    MEDICATIONS  (PRN):  OLANZapine 2.5 milliGRAM(s) Oral every 6 hours PRN Paranoia/ Agitation          PHYSICAL EXAM:  GENERAL: NAD, well-groomed, well-developed  HEAD:  Atraumatic, Normocephalic  CHEST/LUNG: Clear to percussion bilaterally; No rales, rhonchi, wheezing, or rubs  HEART: Regular rate and rhythm; No murmurs, rubs, or gallops  ABDOMEN: Soft, Nontender, Nondistended; Bowel sounds present  EXTREMITIES:  2+ Peripheral Pulses, No clubbing, cyanosis, or edema  LYMPH: No lymphadenopathy noted  SKIN: No rashes or lesions    Care Discussed with Consultants/Other Providers [ ] YES  [ ] NO

## 2020-08-19 NOTE — PROGRESS NOTE ADULT - SUBJECTIVE AND OBJECTIVE BOX
Kalyan Argueta MD (Nephrology progress note)  [address], [facility],  [id number],  [id number]  TEl: [phone]  Cell: [phone]    Patient is a 70y Female seen and evaluated at bedside. Vital signs, laboratory data, imaging studies, consult notes reviewed done within past 24 hours. Overnight events noted. Patient lying in bed in no distress complains of generalized weakness but denies any chest pain, SOB, diarrhea. No new labs    Allergies    No Known Allergies    Intolerances        ROS:  CONSTITUTIONAL: Generalized weakness but denies fever or chills.  HEENT: No headache or visual disturbances.  RESPIRATORY: No shortness of breath, cough, hemoptysis or wheezing  CARDIOVASCULAR: No Chest pain, shortness of breath, palpitations, dizziness, syncope, orthopnea, PND or leg swelling.  GASTROINTESTINAL: No abdominal pain, nausea, vomiting, diarrhea, hematemesis or blood per rectum.  GENITOURINARY: No urinary frequency, urgency, gross hematuria, dysuria, flank or supra pubic pain, difficulty passing urine.  NEUROLOGICAL: No headache, focal limb weakness, tingling or numbness or seizure like activity  SKIN: No skin rash or lesion  MUSCULOSKELETAL: No leg pain, calf pain or swelling    VITALS:    T(C): 36.8 (08-19-20 @ 05:39), Max: 36.8 (08-18-20 @ 20:14)  HR: 76 (08-19-20 @ 05:39) (63 - 81)  BP: 168/75 (08-19-20 @ 05:39) (94/62 - 168/75)  RR: 18 (08-19-20 @ 05:39) (18 - 18)  SpO2: 98% (08-19-20 @ 05:39) (98% - 98%)  CAPILLARY BLOOD GLUCOSE          08-18-20 @ 07:01  -  08-19-20 @ 07:00  --------------------------------------------------------  IN: 300 mL / OUT: 950 mL / NET: -650 mL      MEDICATIONS  (STANDING):  aMIOdarone    Tablet 200 milliGRAM(s) Oral daily  aspirin  chewable 81 milliGRAM(s) Oral daily  cloNIDine 0.1 milliGRAM(s) Oral two times a day  diphenoxylate/atropine 1 Tablet(s) Oral two times a day  ertapenem  IVPB 1000 milliGRAM(s) IV Intermittent every 24 hours  ferrous    sulfate 325 milliGRAM(s) Oral two times a day  heparin   Injectable 5000 Unit(s) SubCutaneous every 8 hours  hydrALAZINE 10 milliGRAM(s) Oral two times a day  lactobacillus acidophilus 1 Tablet(s) Oral three times a day  lisinopril 40 milliGRAM(s) Oral daily  OLANZapine 2.5 milliGRAM(s) Oral <User Schedule>    MEDICATIONS  (PRN):  OLANZapine 2.5 milliGRAM(s) Oral every 6 hours PRN Paranoia/ Agitation      PHYSICAL EXAM:  GENERAL: Alert, awake and oriented x3 in no distress  HEENT: AUBREY, EOMI, neck supple, no JVP, no carotid bruit, oral mucosa moist and pink.  CHEST/LUNG: Bilateral clear breath sounds, no rales, no crepitations, no rhonchi, no wheezing  HEART: Regular rate and rhythm, NICANOR II/VI at LPSB, no gallops, no rub   ABDOMEN: Soft, nontender, non distended, bowel sounds present, no palpable organomegaly  : No flank or supra pubic tenderness.  EXTREMITIES: Peripheral pulses are palpable, no pedal edema, RIght arm PICC line present.  Neurology: AAOx3, no focal neurological deficit  SKIN: No rash or skin lesion  Musculoskeletal: No joint deformity or spinal tenderness.      Vascular ACCESS:     LABS:                      RADIOLOGY & ADDITIONAL STUDIES:  None  Imaging Personally Reviewed:  [x] YES  [ ] NO    Consultant(s) Notes Reviewed:  [x] YES  [ ] NO    Care Discussed with Primary team/ Other Providers [x] YES  [ ] NO

## 2020-08-19 NOTE — PROGRESS NOTE ADULT - SUBJECTIVE AND OBJECTIVE BOX
INTERVAL HPI/OVERNIGHT EVENTS:    patient seen and examined  frequency of diarrhea improved  tolerating diet  calm, alert, awake today     MEDICATIONS  (STANDING):  aMIOdarone    Tablet 200 milliGRAM(s) Oral daily  aspirin  chewable 81 milliGRAM(s) Oral daily  cloNIDine 0.1 milliGRAM(s) Oral two times a day  diphenoxylate/atropine 1 Tablet(s) Oral two times a day  ertapenem  IVPB 1000 milliGRAM(s) IV Intermittent every 24 hours  ferrous    sulfate 325 milliGRAM(s) Oral two times a day  heparin   Injectable 5000 Unit(s) SubCutaneous every 8 hours  hydrALAZINE 10 milliGRAM(s) Oral two times a day  lactobacillus acidophilus 1 Tablet(s) Oral three times a day  lisinopril 40 milliGRAM(s) Oral daily  OLANZapine 2.5 milliGRAM(s) Oral <User Schedule>    MEDICATIONS  (PRN):  OLANZapine 2.5 milliGRAM(s) Oral every 6 hours PRN Paranoia/ Agitation      Allergies    No Known Allergies    Intolerances        Review of Systems:    General:  No wt loss, fevers, chills, night sweats,fatigue,   Eyes:  Good vision, no reported pain  ENT:  No sore throat, pain, runny nose, dysphagia  CV:  No pain, palpitatioins, hypo/hypertension  Resp:  No dyspnea, cough, tachypnea, wheezing  GI:  No pain, No nausea, No vomiting, No diarrhea, No constipatiion, No weight loss, No fever, No pruritis, No rectal bleeding, No tarry stools, No dysphagia,  :  No pain, bleeding, incontinence, nocturia  Muscle:  No pain, weakness  Neuro:  No weakness, tingling, memory problems  Psych:  No fatigue, insomnia, mood problems, depression  Endocrine:  No polyuria, polydypsia, cold/heat intolerance  Heme:  No petechiae, ecchymosis, easy bruisability  Skin:  No rash, tattoos, scars, edema      Vital Signs Last 24 Hrs  T(C): 36.8 (19 Aug 2020 05:39), Max: 36.8 (18 Aug 2020 20:14)  T(F): 98.3 (19 Aug 2020 05:39), Max: 98.3 (19 Aug 2020 05:39)  HR: 76 (19 Aug 2020 05:39) (63 - 81)  BP: 168/75 (19 Aug 2020 05:39) (95/52 - 168/75)  BP(mean): --  RR: 18 (19 Aug 2020 05:39) (18 - 18)  SpO2: 98% (19 Aug 2020 05:39) (98% - 98%)    PHYSICAL EXAM:    Constitutional: NAD  HEENT: EOMI, throat clear  Neck: No LAD, supple  Gastrointestinal: BS+, soft, NT/ND  Extremities: No peripheral edema  Neurological: A/O x 3, no focal deficits      LABS:                RADIOLOGY & ADDITIONAL TESTS:

## 2020-08-19 NOTE — PROGRESS NOTE BEHAVIORAL HEALTH - CASE SUMMARY
Pt is a 71 y/o  female, +boyfriend, with 2 adult sons and several grandchildren, lives alone in a co-op in [place], retired  for an insurance company, with no PPHx, no inpt psychiatric hospitalizations, no SA/SIB, no substance abuse, with PMHx of thoracic and abdominal aortic aneurysm s/p repair, debility and incomplete spinal cord injury, AV replacement, and HTN presenting with diarrhea x1 day, found to have acute gastritis, bacteremia.  Psychiatry consulted to assess for vivid dreams, delirium.    Patient seen and re evaluated today, delirium improved, calm cooperative, AOx3, reports was hallucinating Sunday into Monday, reports this was very distressing to her.  Denies having hallucination since then, no paranoia reported.  Denies adverse side effects from Zyprexa.  Denies SI, reports is sleeping well.  Would continue standing dose of Zyprexa 2.5mg q1800 for delirium. 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  Psychiatry consulted to assess for vivid dreams, delirium.    Patient seen and re evaluated today, delirium improved, calm cooperative, AOx3, reports was hallucinating Sunday into Monday, reports this was very distressing to her.  Denies having hallucination since then, no paranoia reported.  Denies adverse side effects from Zyprexa.  Denies SI, reports is sleeping well.  I agree with continuing the standing dose of Zyprexa 2.5mg q1800.

## 2020-08-19 NOTE — PROGRESS NOTE BEHAVIORAL HEALTH - NSBHCHARTREVIEWIMAGING_PSY_A_CORE FT
< from: CT Head No Cont (08.15.20 @ 17:08) >      EXAM:  CT BRAIN                            PROCEDURE DATE:  08/15/2020            INTERPRETATION:  INDICATIONS:  HTN reoccurring hallucinations    TECHNIQUE:  Serial axial images were obtained from the skull base to the vertex without intravenous contrast.    COMPARISON EXAMINATION: 8/14/2019    FINDINGS:  VENTRICLES AND SULCI: Age-appropriate involutional changes..  INTRA-AXIAL: Microvascular ischemic changes involving the periventricular and subcortical white matter as seen on the prior study. No intracranial hemorrhage. No mass or shift.  EXTRA-AXIAL:  No mass or collection is seen.  VISUALIZED SINUSES:  Clear.  VISUALIZED MASTOIDS:  Clear.  CALVARIUM:  Normal.  MISCELLANEOUS:  Scleral banding left globe. Bilateral cataract surgery    IMPRESSION:  Involutional changes and microvascular ischemic change as seen on the prior study. No interval change compared with 8/14/2019.    < end of copied text >
< from: CT Head No Cont (08.15.20 @ 17:08) >      EXAM:  CT BRAIN                            PROCEDURE DATE:  08/15/2020            INTERPRETATION:  INDICATIONS:  HTN reoccurring hallucinations    TECHNIQUE:  Serial axial images were obtained from the skull base to the vertex without intravenous contrast.    COMPARISON EXAMINATION: 8/14/2019    FINDINGS:  VENTRICLES AND SULCI: Age-appropriate involutional changes..  INTRA-AXIAL: Microvascular ischemic changes involving the periventricular and subcortical white matter as seen on the prior study. No intracranial hemorrhage. No mass or shift.  EXTRA-AXIAL:  No mass or collection is seen.  VISUALIZED SINUSES:  Clear.  VISUALIZED MASTOIDS:  Clear.  CALVARIUM:  Normal.  MISCELLANEOUS:  Scleral banding left globe. Bilateral cataract surgery    IMPRESSION:  Involutional changes and microvascular ischemic change as seen on the prior study. No interval change compared with 8/14/2019.      < end of copied text >

## 2020-08-19 NOTE — PROGRESS NOTE BEHAVIORAL HEALTH - NSBHCHARTREVIEWVS_PSY_A_CORE FT
Vital Signs Last 24 Hrs  T(C): 36.6 (19 Aug 2020 12:15), Max: 36.8 (18 Aug 2020 20:14)  T(F): 97.9 (19 Aug 2020 12:15), Max: 98.3 (19 Aug 2020 05:39)  HR: 60 (19 Aug 2020 12:15) (60 - 81)  BP: 107/60 (19 Aug 2020 12:15) (101/56 - 168/75)  BP(mean): --  RR: 18 (19 Aug 2020 12:15) (18 - 18)  SpO2: 99% (19 Aug 2020 12:15) (98% - 99%)
ICU Vital Signs Last 24 Hrs  T(C): 36.7 (15 Aug 2020 04:42), Max: 36.7 (14 Aug 2020 21:36)  T(F): 98 (15 Aug 2020 04:42), Max: 98 (14 Aug 2020 21:36)  HR: 71 (15 Aug 2020 04:42) (50 - 71)  BP: 149/57 (15 Aug 2020 04:42) (135/64 - 155/65)  BP(mean): --  ABP: --  ABP(mean): --  RR: 18 (15 Aug 2020 04:42) (18 - 18)  SpO2: 98% (15 Aug 2020 04:42) (98% - 99%)
Vital Signs Last 24 Hrs  T(C): 37 (17 Aug 2020 12:57), Max: 37 (17 Aug 2020 12:57)  T(F): 98.6 (17 Aug 2020 12:57), Max: 98.6 (17 Aug 2020 12:57)  HR: 83 (17 Aug 2020 12:57) (56 - 83)  BP: 196/82 (17 Aug 2020 12:57) (164/67 - 218/81)  BP(mean): --  RR: 17 (17 Aug 2020 12:57) (17 - 18)  SpO2: 97% (17 Aug 2020 12:57) (97% - 98%)

## 2020-08-19 NOTE — PROGRESS NOTE BEHAVIORAL HEALTH - NSBHFUPINTERVALCCFT_PSY_A_CORE
"I had another bad dream"
"You guys are the enemy."
"I feel really bad, I was really mean to the people here."

## 2020-08-19 NOTE — PROGRESS NOTE BEHAVIORAL HEALTH - SUMMARY
Pt is a 71 y/o  female, +boyfriend, with 2 adult sons and several grandchildren, lives alone in a co-op in [place], retired  for an insurance company, with no PPHx, no inpt psychiatric hospitalizations, no SA/SIB, no substance abuse, with PMHx of thoracic and abdominal aortic aneurysm s/p repair, debility and incomplete spinal cord injury, AV replacement, and HTN presenting with diarrhea x1 day, found to have acute gastritis, bacteremia.  Psychiatry consulted to assess for vivid dreams, delirium.    Patient seen and re evaluated today, delirium improved, calm cooperative, AOx3, reports was hallucinating Sunday into Monday, reports this was very distressing to her.  Denies having hallucination since then, no paranoia reported.  Denies adverse side effects from Zyprexa.  Denies SI, reports is sleeping well.  Would continue standing dose of Zyprexa 2.5mg q1800 for delirium.
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  Psychiatry consulted to assess for vivid dreams, delirium.  most likely cause of vivid dreams is infection and if true, sxs would likely resolve as infection clears.  However, new medications could also be a cause as well and the risk/benefits/alternatives of those meds will have to be considered to see whether they should be continued or not
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  Psychiatry consulted to assess for vivid dreams, delirium.    Patient seen and re evaluated today, per team was acutely agitated and combative last night, not re directable, refusing to take PO medications for agitation and was medicated with Haldol 2mg.  On evaluation, patient is calm, paranoid stating that we are the "enemy" believes that she has been taken to an  "undisclosed" place and that we are trying to harvest her organs.  Reports last night was hallucinating see children in her room, thinks that she fell down.  On physical exam, patient is rigid, tremulous.  At this time would consider starting patient on standing Zyprexa 2.5mg po q1800 with PRN Zyprexa for acute agitation.

## 2020-08-19 NOTE — PROGRESS NOTE BEHAVIORAL HEALTH - NSBHCONSULTMEDS_PSY_A_CORE FT
1) continue Zyprexa 2.5mg po q1800 with PRN Zyprexa 2.5mg po/im prn q6 for acute agitation- monitor qtc <500ms on ekg  2) Clonidine, metoprolol, amiodarone could all cause vivid dreams.  per record it looks like patient has been on the same dose of metoprolol and amiodarone as an outpatient compared to now, which makes it lower on the differential diagnosis of causing the issue.  Clonidine is new and can cause vivid dreams.  Lisinopril is also new and could cause hallucination, but not vivid dreams.  Less likely lisinopril as the hallucinations would occur throughout the day and not just night  3) rule out other cause of delirium that could also be contributing  4) limit use of anticholinergics, hypnotics, sedatives, opioids as they could worsen confusion and increase symptoms
1) check b12 and folate  2) If sxs do not subside can consider head ct  3) Can d/c seroquel standing for now  4) Clonidine, metoprolol, amiodarone could all cause vivid dreams.  per record it looks like patient has been on the same dose of metoprolol and amiodarone as an outpatient compared to now, which makes it lower on the differential diagnosis of causing the issue.  Clonidine is new and can cause vivid dreams.  Lisinopril is also new and could cause hallucination, but not vivid dreams.  Less likely lisinopril as the hallucinations would occur throughout the day and not just night  5) rule out other cause of delirium that could also be contributing  6) limit use of antichlorinergics, hypnotics, sedatives, opoids as they could worsen confusion and increase symptoms  7)  Can use seroquel 12.5 q6hrs prn for severe anxiety/ agitation
1) start standing Zyprexa 2.5mg po q1800 with PRN Zyprexa 2.5mg po/im prn q6 for acute agitation- monitor qtc <500ms on ekg  2) Clonidine, metoprolol, amiodarone could all cause vivid dreams.  per record it looks like patient has been on the same dose of metoprolol and amiodarone as an outpatient compared to now, which makes it lower on the differential diagnosis of causing the issue.  Clonidine is new and can cause vivid dreams.  Lisinopril is also new and could cause hallucination, but not vivid dreams.  Less likely lisinopril as the hallucinations would occur throughout the day and not just night  3) rule out other cause of delirium that could also be contributing  4) limit use of anticholinergics, hypnotics, sedatives, opioids as they could worsen confusion and increase symptoms

## 2020-08-20 RX ORDER — ACETAMINOPHEN 500 MG
650 TABLET ORAL ONCE
Refills: 0 | Status: COMPLETED | OUTPATIENT
Start: 2020-08-20 | End: 2020-08-20

## 2020-08-20 RX ADMIN — LISINOPRIL 40 MILLIGRAM(S): 2.5 TABLET ORAL at 05:03

## 2020-08-20 RX ADMIN — Medication 10 MILLIGRAM(S): at 05:03

## 2020-08-20 RX ADMIN — Medication 325 MILLIGRAM(S): at 05:03

## 2020-08-20 RX ADMIN — AMIODARONE HYDROCHLORIDE 200 MILLIGRAM(S): 400 TABLET ORAL at 05:02

## 2020-08-20 RX ADMIN — Medication 0.1 MILLIGRAM(S): at 17:27

## 2020-08-20 RX ADMIN — Medication 325 MILLIGRAM(S): at 17:28

## 2020-08-20 RX ADMIN — Medication 1 TABLET(S): at 14:02

## 2020-08-20 RX ADMIN — Medication 650 MILLIGRAM(S): at 05:47

## 2020-08-20 RX ADMIN — HEPARIN SODIUM 5000 UNIT(S): 5000 INJECTION INTRAVENOUS; SUBCUTANEOUS at 21:08

## 2020-08-20 RX ADMIN — Medication 10 MILLIGRAM(S): at 17:28

## 2020-08-20 RX ADMIN — HEPARIN SODIUM 5000 UNIT(S): 5000 INJECTION INTRAVENOUS; SUBCUTANEOUS at 05:03

## 2020-08-20 RX ADMIN — Medication 81 MILLIGRAM(S): at 12:45

## 2020-08-20 RX ADMIN — Medication 0.1 MILLIGRAM(S): at 05:02

## 2020-08-20 RX ADMIN — HEPARIN SODIUM 5000 UNIT(S): 5000 INJECTION INTRAVENOUS; SUBCUTANEOUS at 14:02

## 2020-08-20 RX ADMIN — Medication 650 MILLIGRAM(S): at 06:00

## 2020-08-20 RX ADMIN — OLANZAPINE 2.5 MILLIGRAM(S): 15 TABLET, FILM COATED ORAL at 17:28

## 2020-08-20 RX ADMIN — Medication 1 TABLET(S): at 17:31

## 2020-08-20 RX ADMIN — Medication 1 TABLET(S): at 05:03

## 2020-08-20 RX ADMIN — Medication 1 TABLET(S): at 05:02

## 2020-08-20 RX ADMIN — Medication 1 TABLET(S): at 21:08

## 2020-08-20 NOTE — PROGRESS NOTE ADULT - SUBJECTIVE AND OBJECTIVE BOX
Subjective: Patient seen and examined. No new events except as noted.   resting comfortably     REVIEW OF SYSTEMS:    CONSTITUTIONAL: + weakness, fevers or chills  EYES/ENT: No visual changes;  No vertigo or throat pain   NECK: No pain or stiffness  RESPIRATORY: No cough, wheezing, hemoptysis; No shortness of breath  CARDIOVASCULAR: No chest pain or palpitations  GASTROINTESTINAL: No abdominal or epigastric pain. No nausea, vomiting, or hematemesis; No diarrhea or constipation. No melena or hematochezia.  GENITOURINARY: No dysuria, frequency or hematuria  NEUROLOGICAL: No numbness or weakness  SKIN: No itching, burning, rashes, or lesions   All other review of systems is negative unless indicated above.    MEDICATIONS:  MEDICATIONS  (STANDING):  aMIOdarone    Tablet 200 milliGRAM(s) Oral daily  aspirin  chewable 81 milliGRAM(s) Oral daily  cloNIDine 0.1 milliGRAM(s) Oral two times a day  diphenoxylate/atropine 1 Tablet(s) Oral two times a day  ferrous    sulfate 325 milliGRAM(s) Oral two times a day  heparin   Injectable 5000 Unit(s) SubCutaneous every 8 hours  hydrALAZINE 10 milliGRAM(s) Oral two times a day  lactobacillus acidophilus 1 Tablet(s) Oral three times a day  lisinopril 40 milliGRAM(s) Oral daily  OLANZapine 2.5 milliGRAM(s) Oral <User Schedule>      PHYSICAL EXAM:  T(C): 36.8 (08-20-20 @ 04:47), Max: 36.8 (08-19-20 @ 20:00)  HR: 74 (08-20-20 @ 04:47) (56 - 74)  BP: 158/69 (08-20-20 @ 04:47) (95/55 - 158/69)  RR: 18 (08-20-20 @ 04:47) (18 - 18)  SpO2: 100% (08-20-20 @ 04:47) (98% - 100%)  Wt(kg): --  I&O's Summary    19 Aug 2020 07:01  -  20 Aug 2020 07:00  --------------------------------------------------------  IN: 960 mL / OUT: 1400 mL / NET: -440 mL          Appearance: NAD	  HEENT:   Dry oral mucosa, PERRL, EOMI	  Lymphatic: No lymphadenopathy , no edema  Cardiovascular: Normal S1 S2, No JVD, No murmurs , Peripheral pulses palpable 2+ bilaterally  Respiratory: Lungs clear to auscultation, normal effort 	  Gastrointestinal:  Soft, Non-tender, + BS	  Skin: No rashes, No ecchymoses, No cyanosis, warm to touch  Musculoskeletal: Normal range of motion, normal strength  Psychiatry:  Mood & affect appropriate  Ext: No edema      LABS:    CARDIAC MARKERS:                  proBNP:   Lipid Profile:   HgA1c:   TSH:             TELEMETRY: SR    ECG:  	  RADIOLOGY:   DIAGNOSTIC TESTING:  [ ] Echocardiogram:  [ ]  Catheterization:  [ ] Stress Test:    OTHER:

## 2020-08-20 NOTE — PROGRESS NOTE ADULT - PROBLEM SELECTOR PLAN 2
Lisinopril to 40   added hydralazine 10 bid   Clonidine to 0.1 bid   DCed toprol given persistent bradycardia. HR now stable

## 2020-08-20 NOTE — PROGRESS NOTE ADULT - SUBJECTIVE AND OBJECTIVE BOX
INTERVAL HPI/OVERNIGHT EVENTS:    patient seen and examined  tolerating diet      MEDICATIONS  (STANDING):  aMIOdarone    Tablet 200 milliGRAM(s) Oral daily  aspirin  chewable 81 milliGRAM(s) Oral daily  cloNIDine 0.1 milliGRAM(s) Oral two times a day  diphenoxylate/atropine 1 Tablet(s) Oral two times a day  ertapenem  IVPB 1000 milliGRAM(s) IV Intermittent every 24 hours  ferrous    sulfate 325 milliGRAM(s) Oral two times a day  heparin   Injectable 5000 Unit(s) SubCutaneous every 8 hours  hydrALAZINE 10 milliGRAM(s) Oral two times a day  lactobacillus acidophilus 1 Tablet(s) Oral three times a day  lisinopril 40 milliGRAM(s) Oral daily  OLANZapine 2.5 milliGRAM(s) Oral <User Schedule>    MEDICATIONS  (PRN):  OLANZapine 2.5 milliGRAM(s) Oral every 6 hours PRN Paranoia/ Agitation      Allergies    No Known Allergies    Intolerances        Review of Systems:    General:  No wt loss, fevers, chills, night sweats,fatigue,   Eyes:  Good vision, no reported pain  ENT:  No sore throat, pain, runny nose, dysphagia  CV:  No pain, palpitatioins, hypo/hypertension  Resp:  No dyspnea, cough, tachypnea, wheezing  GI:  No pain, No nausea, No vomiting, No diarrhea, No constipatiion, No weight loss, No fever, No pruritis, No rectal bleeding, No tarry stools, No dysphagia,  :  No pain, bleeding, incontinence, nocturia  Muscle:  No pain, weakness  Neuro:  No weakness, tingling, memory problems  Psych:  No fatigue, insomnia, mood problems, depression  Endocrine:  No polyuria, polydypsia, cold/heat intolerance  Heme:  No petechiae, ecchymosis, easy bruisability  Skin:  No rash, tattoos, scars, edema      Vital Signs Last 24 Hrs  T(C): 36.8 (19 Aug 2020 05:39), Max: 36.8 (18 Aug 2020 20:14)  T(F): 98.3 (19 Aug 2020 05:39), Max: 98.3 (19 Aug 2020 05:39)  HR: 76 (19 Aug 2020 05:39) (63 - 81)  BP: 168/75 (19 Aug 2020 05:39) (95/52 - 168/75)  BP(mean): --  RR: 18 (19 Aug 2020 05:39) (18 - 18)  SpO2: 98% (19 Aug 2020 05:39) (98% - 98%)    PHYSICAL EXAM:    Constitutional: NAD  HEENT: EOMI, throat clear  Neck: No LAD, supple  Gastrointestinal: BS+, soft, NT/ND  Extremities: No peripheral edema  Neurological: A/O x 3, no focal deficits      LABS:                RADIOLOGY & ADDITIONAL TESTS:

## 2020-08-20 NOTE — PROGRESS NOTE ADULT - SUBJECTIVE AND OBJECTIVE BOX
[facility]      Seen earlier today, and examined.  - Today, patient is without complaints.           *****MEDICATIONS: Current medication reviewed and documented.    MEDICATIONS  (STANDING):  aMIOdarone    Tablet 200 milliGRAM(s) Oral daily  aspirin  chewable 81 milliGRAM(s) Oral daily  cloNIDine 0.1 milliGRAM(s) Oral daily  ferrous    sulfate 325 milliGRAM(s) Oral two times a day  heparin   Injectable 5000 Unit(s) SubCutaneous every 8 hours  hydrALAZINE 10 milliGRAM(s) Oral two times a day  lactobacillus acidophilus 1 Tablet(s) Oral three times a day  lisinopril 40 milliGRAM(s) Oral daily  OLANZapine 2.5 milliGRAM(s) Oral <User Schedule>    MEDICATIONS  (PRN):  OLANZapine 2.5 milliGRAM(s) Oral every 6 hours PRN Paranoia/ Agitation          ***** VITAL SIGNS:  T(F): 97.3 (08-20-20 @ 12:41), Max: 98.2 (08-20-20 @ 04:47)  HR: 70 (08-20-20 @ 12:41) (70 - 74)  BP: 94/56 (08-20-20 @ 12:41) (94/56 - 158/69)  RR: 19 (08-20-20 @ 12:41) (18 - 19)  SpO2: 100% (08-20-20 @ 12:41) (100% - 100%)  Wt(kg): --  ,   I&O's Summary    19 Aug 2020 07:01  -  20 Aug 2020 07:00  --------------------------------------------------------  IN: 960 mL / OUT: 1400 mL / NET: -440 mL    20 Aug 2020 07:01  -  20 Aug 2020 20:04  --------------------------------------------------------  IN: 840 mL / OUT: 0 mL / NET: 840 mL            *****PHYSICAL EXAM:   Alert oriented x2    Attention comprehension are fair. Able to name, repeat,  without any difficulty.   Able to follow 1 step commands.     EOMI fundi not visualized,  blinks to threat b/l   No facial asymmetry   Tongue is midline   Palate elevates symmetrically   Moving all 4 ext symmetrically     Reflexes are symmetric throughout   sensation is grossly symmetric  Gait : not assessed.           *****LAB AND IMAGING:                                         [All pertinent recent Imaging/Reports reviewed]           *****A S S E S S M E N T   A N D   P L A N :       69 yo F with hx of thoracic and abdominal aortic aneurysm s/p repair, AV replacement, and HTN presenting with diarrhea x1 day. Pt states that her boyfriend came over for dinner last night and after he left she started having profuse non-bloody watery diarrhea a/w with fevers to 101-102, lightheadedness and nausea/dry heaving. Pt states she has since had 5-6 watery BMs and has started to feel weak. She took Tylenol at 10AM. She has also noticed mild SOB on exertion. She denies CP. She has been trying to drink gatorade and has been able to keep it down.  Hallucinations this past Sunday into Monday, reports was hallucinating seeing her mother in a crowd, also was hallucinating thought she was taking her meal tray and fell down, but is no longer having any perceptual disturbances since then.  Denies having any hallucinations since then.      Problem/Recommendations 1: confusion likely related to delirium   tsh low repeat  thyroid panel ? related hyperthyroid   thiamine 100 daily    will continue to monitor.  ct head no acute process.      Problem/Recommendations 2:   htn   goal < 140   titrate meds.     Thank you for allowing me to participate in the care of this patient. Please do not hesitate to call me if you have any  questions.        ________________  Agata Stubbs MD  [facility] (PN)[facility]  [phone]      33 minutes spent on total encounter; more than 50 % of the visit was  spent counseling about plan of care, compliance to diet/exercise and medication regimen and or  coordinating care by the attending physician.      It is advised that stroke patients follow up with AMIRA Kaiser @ [phone] in 1- 2 weeks.   Others please follow up with Dr. Michael Nissenbaum [phone]

## 2020-08-20 NOTE — PROGRESS NOTE ADULT - SUBJECTIVE AND OBJECTIVE BOX
Kalyan Argueta MD (Nephrology progress note)  [address], [facility],  [address],  [facility]  TEl: [phone]  Cell: [phone]    Patient is a 70y Female seen and evaluated at bedside. Vital signs, laboratory data, imaging studies, consult notes reviewed done within past 24 hours. Overnight events noted. Patient lying in bed feels better and offers no complains. No new labs available.    Allergies    No Known Allergies    Intolerances        ROS:  CONSTITUTIONAL: No fever or chills.  HEENT: No headache or visual disturbances.  RESPIRATORY: No shortness of breath, cough, hemoptysis or wheezing  CARDIOVASCULAR: No Chest pain, shortness of breath, palpitations, dizziness, syncope, orthopnea, PND or leg swelling.  GASTROINTESTINAL: No abdominal pain, nausea, vomiting, diarrhea, hematemesis or blood per rectum.  GENITOURINARY: No urinary frequency, urgency, gross hematuria, dysuria, flank or supra pubic pain, difficulty passing urine.  NEUROLOGICAL: No headache, focal limb weakness, tingling or numbness or seizure like activity  SKIN: No skin rash or lesion  MUSCULOSKELETAL: No leg pain, calf pain or swelling    VITALS:    T(C): 36.8 (08-20-20 @ 04:47), Max: 36.8 (08-19-20 @ 20:00)  HR: 74 (08-20-20 @ 04:47) (56 - 74)  BP: 158/69 (08-20-20 @ 04:47) (95/55 - 158/69)  RR: 18 (08-20-20 @ 04:47) (18 - 18)  SpO2: 100% (08-20-20 @ 04:47) (98% - 100%)  CAPILLARY BLOOD GLUCOSE          08-19-20 @ 07:01  -  08-20-20 @ 07:00  --------------------------------------------------------  IN: 960 mL / OUT: 1400 mL / NET: -440 mL      MEDICATIONS  (STANDING):  aMIOdarone    Tablet 200 milliGRAM(s) Oral daily  aspirin  chewable 81 milliGRAM(s) Oral daily  cloNIDine 0.1 milliGRAM(s) Oral two times a day  diphenoxylate/atropine 1 Tablet(s) Oral two times a day  ferrous    sulfate 325 milliGRAM(s) Oral two times a day  heparin   Injectable 5000 Unit(s) SubCutaneous every 8 hours  hydrALAZINE 10 milliGRAM(s) Oral two times a day  lactobacillus acidophilus 1 Tablet(s) Oral three times a day  lisinopril 40 milliGRAM(s) Oral daily  OLANZapine 2.5 milliGRAM(s) Oral <User Schedule>    MEDICATIONS  (PRN):  OLANZapine 2.5 milliGRAM(s) Oral every 6 hours PRN Paranoia/ Agitation      PHYSICAL EXAM:  GENERAL: Alert, awake and oriented x3 in no distress  HEENT: AUBREY, EOMI, neck supple, no JVP, no carotid bruit, oral mucosa moist and pink.  CHEST/LUNG: Bilateral clear breath sounds, no rales, no crepitations, no rhonchi, no wheezing  HEART: Regular rate and rhythm, NICANOR II/VI at LPSB, no gallops, no rub   ABDOMEN: Soft, nontender, non distended, bowel sounds present, no palpable organomegaly  : No flank or supra pubic tenderness.  EXTREMITIES: Peripheral pulses are palpable, no pedal edema  Neurology: AAOx3, no focal neurological deficit  SKIN: No rash or skin lesion  Musculoskeletal: No joint deformity or spinal tenderness.      Vascular ACCESS:     LABS:    RADIOLOGY & ADDITIONAL STUDIES:  None  Imaging Personally Reviewed:  [x] YES  [ ] NO    Consultant(s) Notes Reviewed:  [x] YES  [ ] NO    Care Discussed with Primary team/ Other Providers [x] YES  [ ] NO

## 2020-08-20 NOTE — PROGRESS NOTE ADULT - PROBLEM SELECTOR PLAN 2
Patient with prior history of hypertension with blood pressure ranging from 140 to 160s  Consider taper off clonidine likely causing confusion/hallucinations  Continue current BP medication with holding parameters with goal BP<140 mm hg  Monitor vital signs with maintenance of MAP>65-70 mm Hg

## 2020-08-20 NOTE — PROGRESS NOTE ADULT - PROBLEM SELECTOR PLAN 4
Patient with acute psychosis and agitation likely drug induced  now with improvement  Consider Taper off clonidine   Plan per primary/Psych team.

## 2020-08-20 NOTE — PROGRESS NOTE ADULT - PROBLEM SELECTOR PLAN 1
Acute pyelonephritis with E.coli bacteremia and gastroenteritis  - Tylenol prn for pain/fever  - Off IV antibiotics at present.  - IV/po hydration

## 2020-08-20 NOTE — PROGRESS NOTE ADULT - SUBJECTIVE AND OBJECTIVE BOX
Patient is a 70y old  Female who presents with a chief complaint of diarrhea (20 Aug 2020 15:45)      INTERVAL HPI/OVERNIGHT EVENTS:  T(C): 36.3 (08-20-20 @ 12:41), Max: 36.8 (08-19-20 @ 20:00)  HR: 70 (08-20-20 @ 12:41) (56 - 74)  BP: 94/56 (08-20-20 @ 12:41) (94/56 - 158/69)  RR: 19 (08-20-20 @ 12:41) (18 - 19)  SpO2: 100% (08-20-20 @ 12:41) (98% - 100%)  Wt(kg): --  I&O's Summary    19 Aug 2020 07:01  -  20 Aug 2020 07:00  --------------------------------------------------------  IN: 960 mL / OUT: 1400 mL / NET: -440 mL    20 Aug 2020 07:01  -  20 Aug 2020 17:27  --------------------------------------------------------  IN: 840 mL / OUT: 0 mL / NET: 840 mL        LABS:              CAPILLARY BLOOD GLUCOSE                MEDICATIONS  (STANDING):  aMIOdarone    Tablet 200 milliGRAM(s) Oral daily  aspirin  chewable 81 milliGRAM(s) Oral daily  cloNIDine 0.1 milliGRAM(s) Oral daily  diphenoxylate/atropine 1 Tablet(s) Oral two times a day  ferrous    sulfate 325 milliGRAM(s) Oral two times a day  heparin   Injectable 5000 Unit(s) SubCutaneous every 8 hours  hydrALAZINE 10 milliGRAM(s) Oral two times a day  lactobacillus acidophilus 1 Tablet(s) Oral three times a day  lisinopril 40 milliGRAM(s) Oral daily  OLANZapine 2.5 milliGRAM(s) Oral <User Schedule>    MEDICATIONS  (PRN):  OLANZapine 2.5 milliGRAM(s) Oral every 6 hours PRN Paranoia/ Agitation          PHYSICAL EXAM:  GENERAL: NAD, well-groomed, well-developed  HEAD:  Atraumatic, Normocephalic  CHEST/LUNG: Clear to percussion bilaterally; No rales, rhonchi, wheezing, or rubs  HEART: Regular rate and rhythm; No murmurs, rubs, or gallops  ABDOMEN: Soft, Nontender, Nondistended; Bowel sounds present  EXTREMITIES:  2+ Peripheral Pulses, No clubbing, cyanosis, or edema  LYMPH: No lymphadenopathy noted  SKIN: No rashes or lesions    Care Discussed with Consultants/Other Providers [ ] YES  [ ] NO

## 2020-08-21 ENCOUNTER — TRANSCRIPTION ENCOUNTER (OUTPATIENT)
Age: 70
End: 2020-08-21

## 2020-08-21 VITALS
RESPIRATION RATE: 17 BRPM | SYSTOLIC BLOOD PRESSURE: 149 MMHG | TEMPERATURE: 98 F | HEART RATE: 76 BPM | DIASTOLIC BLOOD PRESSURE: 69 MMHG | OXYGEN SATURATION: 99 %

## 2020-08-21 PROCEDURE — 82330 ASSAY OF CALCIUM: CPT

## 2020-08-21 PROCEDURE — 84300 ASSAY OF URINE SODIUM: CPT

## 2020-08-21 PROCEDURE — 93970 EXTREMITY STUDY: CPT

## 2020-08-21 PROCEDURE — 82746 ASSAY OF FOLIC ACID SERUM: CPT

## 2020-08-21 PROCEDURE — 82607 VITAMIN B-12: CPT

## 2020-08-21 PROCEDURE — 87045 FECES CULTURE AEROBIC BACT: CPT

## 2020-08-21 PROCEDURE — 84132 ASSAY OF SERUM POTASSIUM: CPT

## 2020-08-21 PROCEDURE — 83935 ASSAY OF URINE OSMOLALITY: CPT

## 2020-08-21 PROCEDURE — 97116 GAIT TRAINING THERAPY: CPT

## 2020-08-21 PROCEDURE — 97535 SELF CARE MNGMENT TRAINING: CPT

## 2020-08-21 PROCEDURE — 87507 IADNA-DNA/RNA PROBE TQ 12-25: CPT

## 2020-08-21 PROCEDURE — 82947 ASSAY GLUCOSE BLOOD QUANT: CPT

## 2020-08-21 PROCEDURE — 87086 URINE CULTURE/COLONY COUNT: CPT

## 2020-08-21 PROCEDURE — 84156 ASSAY OF PROTEIN URINE: CPT

## 2020-08-21 PROCEDURE — 74177 CT ABD & PELVIS W/CONTRAST: CPT

## 2020-08-21 PROCEDURE — 82803 BLOOD GASES ANY COMBINATION: CPT

## 2020-08-21 PROCEDURE — 82435 ASSAY OF BLOOD CHLORIDE: CPT

## 2020-08-21 PROCEDURE — 81001 URINALYSIS AUTO W/SCOPE: CPT

## 2020-08-21 PROCEDURE — 86769 SARS-COV-2 COVID-19 ANTIBODY: CPT

## 2020-08-21 PROCEDURE — 87324 CLOSTRIDIUM AG IA: CPT

## 2020-08-21 PROCEDURE — 87150 DNA/RNA AMPLIFIED PROBE: CPT

## 2020-08-21 PROCEDURE — 85610 PROTHROMBIN TIME: CPT

## 2020-08-21 PROCEDURE — 87046 STOOL CULTR AEROBIC BACT EA: CPT

## 2020-08-21 PROCEDURE — 83735 ASSAY OF MAGNESIUM: CPT

## 2020-08-21 PROCEDURE — 99285 EMERGENCY DEPT VISIT HI MDM: CPT | Mod: 25

## 2020-08-21 PROCEDURE — 80048 BASIC METABOLIC PNL TOTAL CA: CPT

## 2020-08-21 PROCEDURE — 85014 HEMATOCRIT: CPT

## 2020-08-21 PROCEDURE — 85027 COMPLETE CBC AUTOMATED: CPT

## 2020-08-21 PROCEDURE — 84443 ASSAY THYROID STIM HORMONE: CPT

## 2020-08-21 PROCEDURE — 97110 THERAPEUTIC EXERCISES: CPT

## 2020-08-21 PROCEDURE — 83605 ASSAY OF LACTIC ACID: CPT

## 2020-08-21 PROCEDURE — 96374 THER/PROPH/DIAG INJ IV PUSH: CPT

## 2020-08-21 PROCEDURE — 84295 ASSAY OF SERUM SODIUM: CPT

## 2020-08-21 PROCEDURE — 36569 INSJ PICC 5 YR+ W/O IMAGING: CPT

## 2020-08-21 PROCEDURE — 84540 ASSAY OF URINE/UREA-N: CPT

## 2020-08-21 PROCEDURE — 87186 SC STD MICRODIL/AGAR DIL: CPT

## 2020-08-21 PROCEDURE — 87040 BLOOD CULTURE FOR BACTERIA: CPT

## 2020-08-21 PROCEDURE — 82010 KETONE BODYS QUAN: CPT

## 2020-08-21 PROCEDURE — 82570 ASSAY OF URINE CREATININE: CPT

## 2020-08-21 PROCEDURE — 84550 ASSAY OF BLOOD/URIC ACID: CPT

## 2020-08-21 PROCEDURE — 70450 CT HEAD/BRAIN W/O DYE: CPT

## 2020-08-21 PROCEDURE — 97530 THERAPEUTIC ACTIVITIES: CPT

## 2020-08-21 PROCEDURE — 93005 ELECTROCARDIOGRAM TRACING: CPT

## 2020-08-21 PROCEDURE — 97161 PT EVAL LOW COMPLEX 20 MIN: CPT

## 2020-08-21 PROCEDURE — 97165 OT EVAL LOW COMPLEX 30 MIN: CPT

## 2020-08-21 PROCEDURE — 87449 NOS EACH ORGANISM AG IA: CPT

## 2020-08-21 PROCEDURE — 71045 X-RAY EXAM CHEST 1 VIEW: CPT

## 2020-08-21 PROCEDURE — 85730 THROMBOPLASTIN TIME PARTIAL: CPT

## 2020-08-21 PROCEDURE — 84100 ASSAY OF PHOSPHORUS: CPT

## 2020-08-21 PROCEDURE — 80053 COMPREHEN METABOLIC PANEL: CPT

## 2020-08-21 PROCEDURE — C1751: CPT

## 2020-08-21 PROCEDURE — 82533 TOTAL CORTISOL: CPT

## 2020-08-21 PROCEDURE — U0003: CPT

## 2020-08-21 RX ORDER — LACTOBACILLUS ACIDOPHILUS 100MM CELL
0 CAPSULE ORAL
Qty: 0 | Refills: 0 | DISCHARGE
Start: 2020-08-21

## 2020-08-21 RX ORDER — DIPHENOXYLATE HCL/ATROPINE 2.5-.025MG
1 TABLET ORAL
Refills: 0 | Status: DISCONTINUED | OUTPATIENT
Start: 2020-08-21 | End: 2020-08-21

## 2020-08-21 RX ORDER — DIPHENOXYLATE HCL/ATROPINE 2.5-.025MG
1 TABLET ORAL
Qty: 0 | Refills: 0 | DISCHARGE
Start: 2020-08-21

## 2020-08-21 RX ORDER — HYDRALAZINE HCL 50 MG
1 TABLET ORAL
Qty: 0 | Refills: 0 | DISCHARGE
Start: 2020-08-21

## 2020-08-21 RX ORDER — OLANZAPINE 15 MG/1
1 TABLET, FILM COATED ORAL
Qty: 0 | Refills: 0 | DISCHARGE
Start: 2020-08-21

## 2020-08-21 RX ORDER — LISINOPRIL 2.5 MG/1
1 TABLET ORAL
Qty: 0 | Refills: 0 | DISCHARGE
Start: 2020-08-21

## 2020-08-21 RX ORDER — FERROUS SULFATE 325(65) MG
1 TABLET ORAL
Qty: 0 | Refills: 0 | DISCHARGE
Start: 2020-08-21

## 2020-08-21 RX ADMIN — HEPARIN SODIUM 5000 UNIT(S): 5000 INJECTION INTRAVENOUS; SUBCUTANEOUS at 05:36

## 2020-08-21 RX ADMIN — LISINOPRIL 40 MILLIGRAM(S): 2.5 TABLET ORAL at 05:36

## 2020-08-21 RX ADMIN — Medication 10 MILLIGRAM(S): at 05:36

## 2020-08-21 RX ADMIN — AMIODARONE HYDROCHLORIDE 200 MILLIGRAM(S): 400 TABLET ORAL at 05:36

## 2020-08-21 RX ADMIN — Medication 0.1 MILLIGRAM(S): at 05:36

## 2020-08-21 RX ADMIN — Medication 325 MILLIGRAM(S): at 05:36

## 2020-08-21 RX ADMIN — Medication 81 MILLIGRAM(S): at 12:30

## 2020-08-21 RX ADMIN — Medication 1 TABLET(S): at 05:36

## 2020-08-21 NOTE — PROGRESS NOTE ADULT - SUBJECTIVE AND OBJECTIVE BOX
Kalyan Argueta MD (Nephrology progress note)  [address], [facility],  [address],  [facility]  TEl: [phone]  Cell: [phone]    Patient is a 70y Female seen and evaluated at bedside. Vital signs, laboratory data, imaging studies, consult notes reviewed done within past 24 hours. Overnight events noted. Patient lying in bed in no distress offers no complains and feels well. She denies abdominal pain, diarrhea, urinary complains. No new labs available.    Allergies    No Known Allergies    Intolerances        ROS:  CONSTITUTIONAL: No fever or chills.  HEENT: No headache or visual disturbances.  RESPIRATORY: No shortness of breath, cough, hemoptysis or wheezing  CARDIOVASCULAR: No Chest pain, shortness of breath, palpitations, dizziness, syncope, orthopnea, PND or leg swelling.  GASTROINTESTINAL: Mild abdominal discomfort but denies nausea, vomiting, diarrhea, hematemesis or blood per rectum.  GENITOURINARY: No urinary frequency, urgency, gross hematuria, dysuria, flank or supra pubic pain, difficulty passing urine.  NEUROLOGICAL: No headache, focal limb weakness, tingling or numbness or seizure like activity  SKIN: No skin rash or lesion  MUSCULOSKELETAL: No leg pain, calf pain or swelling    VITALS:    T(C): 36.7 (08-21-20 @ 04:29), Max: 36.7 (08-20-20 @ 20:23)  HR: 98 (08-21-20 @ 04:29) (60 - 98)  BP: 168/77 (08-21-20 @ 04:29) (94/56 - 168/77)  RR: 18 (08-21-20 @ 04:29) (18 - 19)  SpO2: 98% (08-21-20 @ 04:29) (98% - 100%)  CAPILLARY BLOOD GLUCOSE          08-20-20 @ 07:01  -  08-21-20 @ 07:00  --------------------------------------------------------  IN: 1080 mL / OUT: 1000 mL / NET: 80 mL      MEDICATIONS  (STANDING):  aMIOdarone    Tablet 200 milliGRAM(s) Oral daily  aspirin  chewable 81 milliGRAM(s) Oral daily  cloNIDine 0.1 milliGRAM(s) Oral daily  ferrous    sulfate 325 milliGRAM(s) Oral two times a day  heparin   Injectable 5000 Unit(s) SubCutaneous every 8 hours  hydrALAZINE 10 milliGRAM(s) Oral two times a day  lactobacillus acidophilus 1 Tablet(s) Oral three times a day  lisinopril 40 milliGRAM(s) Oral daily  OLANZapine 2.5 milliGRAM(s) Oral <User Schedule>    MEDICATIONS  (PRN):  OLANZapine 2.5 milliGRAM(s) Oral every 6 hours PRN Paranoia/ Agitation      PHYSICAL EXAM:  GENERAL: Alert, awake and oriented x3 in no distress  HEENT: AUBREY, EOMI, neck supple, no JVP, no carotid bruit, oral mucosa moist and pink.  CHEST/LUNG: Bilateral clear breath sounds, no rales, no crepitations, no rhonchi, no wheezing  HEART: Regular rate and rhythm, NICANOR II/VI at LPSB, no gallops, no rub   ABDOMEN: Soft, nontender, non distended, bowel sounds present, no palpable organomegaly  : No flank or supra pubic tenderness.  EXTREMITIES: Peripheral pulses are palpable, no pedal edema  Neurology: AAOx3, no focal neurological deficit  SKIN: No rash or skin lesion  Musculoskeletal: No joint deformity or spinal tenderness.      Vascular ACCESS:     LABS:    None  RADIOLOGY & ADDITIONAL STUDIES:    Imaging Personally Reviewed:  [x] YES  [ ] NO    Consultant(s) Notes Reviewed:  [x] YES  [ ] NO    Care Discussed with Primary team/ Other Providers [x] YES  [ ] NO

## 2020-08-21 NOTE — PROGRESS NOTE ADULT - PROBLEM SELECTOR PROBLEM 1
Metabolic acidosis with increased anion gap and accumulation of organic acids
Acute pyelonephritis
Diarrhea
Acute pyelonephritis
DURGA (acute kidney injury)
Diarrhea
Metabolic acidosis with increased anion gap and accumulation of organic acids
DURGA (acute kidney injury)
Metabolic acidosis with increased anion gap and accumulation of organic acids
Acute pyelonephritis
Diarrhea
DURGA (acute kidney injury)

## 2020-08-21 NOTE — DISCHARGE NOTE NURSING/CASE MANAGEMENT/SOCIAL WORK - NSDCPEWEB_GEN_ALL_CORE
[facility] for Tobacco Control website --- [email or web address] website --- [email or web address]

## 2020-08-21 NOTE — PROGRESS NOTE ADULT - PROVIDER SPECIALTY LIST ADULT
Cardiology
Gastroenterology
Hospitalist
Infectious Disease
Nephrology
Rehab Medicine
Rehab Medicine
Urology
Cardiology
Neurology
Neurology
Hospitalist
Infectious Disease
Gastroenterology
Infectious Disease
Nephrology
Gastroenterology
Gastroenterology
Nephrology
CCU
Gastroenterology
Nephrology

## 2020-08-21 NOTE — PROGRESS NOTE ADULT - PROBLEM SELECTOR PLAN 1
Acute pyelonephritis with E.coli bacteremia and gastroenteritis now resolved  - Monitor BMP daily  - Tylenol prn for pain/fever  - Off IV antibiotics at present.  - IV/po hydration as tolerated  - Intermittent bladder scan to assess retention.

## 2020-08-21 NOTE — DISCHARGE NOTE NURSING/CASE MANAGEMENT/SOCIAL WORK - PATIENT PORTAL LINK FT
You can access the FollowMyHealth Patient Portal offered by [facility] by registering at the following website: [email or web address]. By joining [facility]’s FollowMyHealth portal, you will also be able to view your health information using other applications (apps) compatible with our system.

## 2020-08-21 NOTE — PROGRESS NOTE ADULT - ATTENDING COMMENTS
Greg Ashford  Pager: [phone]. If no response or past 5 pm call [phone].
Greg Ashford  Pager: [phone]. If no response or past 5 pm call [phone].
Greg Ashford  Pager: [phone]. If no response or past 5 pm call [phone].     Please call ID service for questions over weekend at [phone].
Greg Ashford  Pager: [phone]. If no response or past 5 pm call [phone].
Greg Ashford  Pager: [phone]. If no response or past 5 pm call [phone].    Will sign off, please call with questions.
Greg Ashfrod  Pager: [phone]. If no response or past 5 pm call [phone].
Pt seen/examined.  Case discussed with housestaff/PA team.  Agree with above note history, physical and assessment/plan.
Advanced care planning was discussed with patient and family.  Advanced care planning forms were reviewed and discussed as appropriate.  Differential diagnosis and plan of care discussed with patient after the evaluation.   Pain assessed and judicious use of narcotics when appropriate was discussed.  Importance of Fall prevention discussed.  Counseling on Smoking and Alcohol cessation was offered when appropriate.  Counseling on Diet, exercise, and medication compliance was done.

## 2020-08-21 NOTE — PROGRESS NOTE ADULT - PROBLEM SELECTOR PLAN 2
Patient with prior history of hypertension with blood pressure ranging from 110 to 160s  Consider taper off clonidine  Continue current BP medication with holding parameters with goal BP<140 mm hg  Monitor vital signs with maintenance of MAP>65-70 mm Hg

## 2020-08-21 NOTE — PROGRESS NOTE ADULT - PROBLEM SELECTOR PROBLEM 4
Metabolic acidosis with increased anion gap and accumulation of organic acids
ACP (advance care planning)
ACP (advance care planning)
Bacteremia due to Gram-negative bacteria
ACP (advance care planning)
Acute pyelonephritis
Bacteremia due to Gram-negative bacteria
Metabolic acidosis with increased anion gap and accumulation of organic acids
Psychic disease
Bacteremia due to Gram-negative bacteria
Metabolic acidosis with increased anion gap and accumulation of organic acids
Psychic disease
Psychic disease

## 2020-08-21 NOTE — PROGRESS NOTE ADULT - ASSESSMENT
69 yo F with hx of thoracic and abdominal aortic aneurysm s/p repair, AV replacement, and HTN presenting with diarrhea x1 day.    overall febrile at home, leucocytosis, lactic acidosis, DURGA, due to ESBL E coli bacteremia potential sources urinary   Resolved DURGA, clinically improved.   persistent bacteremia       Plan:   c/w ertapenem based on cx   repeat blood cx in lab   current cx still positive   CT abd/pelvis with pyelo, graft site looks stable.   trend CBC for leucocytosis, persists but trending down   diarrhea improved, c diff negative   Will eventually need midline, not ready yet   will need surveillance cx
69 yo F with hx of thoracic and abdominal aortic aneurysm s/p repair, AV replacement, and HTN presenting with diarrhea x1 day. Pt states that her boyfriend came over for dinner last night and after he left she started having profuse non-bloody watery diarrhea a/w with fevers to 101-102, lightheadedness and nausea/dry heaving.
71 yo F with hx of thoracic and abdominal aortic aneurysm s/p repair, AV replacement, and HTN presenting with diarrhea x1 day.    overall febrile at home, leucocytosis, lactic acidosis, DURGA, due to ESBL E coli bacteremia potential sources urinary   Resolved DURGA, clinically improved.       Plan:   switched cefepime to ertapenem based on cx   repeat blood cx in am   current cx still positive   CT abd/pelvis with pyelo, graft site looks stable.   trend CBC for leucocytosis, persists   diarrhea improved, c diff negative   Will eventually need midline, not ready yet
71 yo F with hx of thoracic and abdominal aortic aneurysm s/p repair, AV replacement, and HTN presenting with diarrhea. GI consulted
Problem/Plan - 1:  ·  Problem: Hypokalemia due to excessive gastrointestinal loss of potassium.  Plan: replete as required  monitor CMP   IVF    Problem/Plan - 2:  ·  Problem: bacteremia with pyonephritis .  Plan: with diarrhea and fever  CT abdomen reviewed  antibiotics as per ID   ID fu appreciated     Problem/Plan - 3:  ·  Problem: HTN (hypertension).  Plan: cw current  meds.     Problem/Plan - 4:·  Problem: AMS, hallucinations    psych fu    taper clonidine   dc planning to subacute rehab , acute rehab denied by insurance    case dw pt and her sister
69 yo F with hx of thoracic and abdominal aortic aneurysm s/p repair, AV replacement, and HTN presenting with diarrhea x1 day and found to be bacteremic, hypokalemic and hypertensive.
69 yo F with hx of thoracic and abdominal aortic aneurysm s/p repair, AV replacement, and HTN presenting with diarrhea x1 day.    overall febrile at home, leucocytosis, lactic acidosis, DURGA, due to ESBL E coli bacteremia potential sources urinary   Resolved DURGA, clinically improved.   persistent bacteremia       Plan:   c/w ertapenem based on cx   repeat blood cx NTD   CT abd/pelvis with pyelo, graft site looks stable.   trend CBC for leucocytosis, persists but trending down   diarrhea improved, c diff negative   Will need midline, if blood cx negative by Monday, can place midline.   will need surveillance cx
69 yo F with hx of thoracic and abdominal aortic aneurysm s/p repair, AV replacement, and HTN presenting with diarrhea x1 day. Pt states that her boyfriend came over for dinner last night and after he left she started having profuse non-bloody watery diarrhea a/w with fevers to 101-102, lightheadedness and nausea/dry heaving.
69 yo F with hx of thoracic and abdominal aortic aneurysm s/p repair, AV replacement, and HTN presenting with diarrhea x1 day. Pt states that her boyfriend came over for dinner last night and after he left she started having profuse non-bloody watery diarrhea a/w with fevers to 101-102, lightheadedness and nausea/dry heaving. Pt states she has since had 5-6 watery BMs and has started to feel weak. She took Tylenol at 10AM. She has also noticed mild SOB on exertion. She denies CP. She has been trying to drink gatorade and has been able to keep it down.    Problem/Plan - 1:  ·  Problem: Hypokalemia due to excessive gastrointestinal loss of potassium.  Plan: replete as required  monitor CMP   IVF      Problem/Plan - 2:  ·  Problem: Intermittent abdominal pain.  Plan: with diarrhea and fever  started zosyn  check CT abdomen , cr improved     Problem/Plan - 3:  ·  Problem: HTN (hypertension).  Plan: cw home meds.     Problem/Plan - 4:  ·  Problem: Thoracoabdominal aortic aneurysm (TAAA) without rupture.  Plan: sp repair  outpt fu.
69 yo F with hx of thoracic and abdominal aortic aneurysm s/p repair, AV replacement, and HTN presenting with diarrhea x1 day. Pt states that her boyfriend came over for dinner last night and after he left she started having profuse non-bloody watery diarrhea a/w with fevers to 101-102, lightheadedness and nausea/dry heaving. Pt states she has since had 5-6 watery BMs and has started to feel weak. She took Tylenol at 10AM. She has also noticed mild SOB on exertion. She denies CP. She has been trying to drink gatorade and has been able to keep it down.    Problem/Plan - 1:  ·  Problem: Hypokalemia due to excessive gastrointestinal loss of potassium.  Plan: replete as required  monitor CMP   IVF      Problem/Plan - 2:  ·  Problem: bacteremia with pyonephritis .  Plan: with diarrhea and fever  CT abdomen reviewed  antibiotics as per ID   ID fu appreciated     Problem/Plan - 3:  ·  Problem: HTN (hypertension).  Plan: cw home meds.     Problem/Plan - 4:  ·  Problem: Thoracoabdominal aortic aneurysm (TAAA) without rupture.  Plan: sp repair  outpt fu.   graft stable
69 yo F with hx of thoracic and abdominal aortic aneurysm s/p repair, AV replacement, and HTN presenting with diarrhea. GI consulted
69 yo female with chronic urinary retention managed with Valsalva and CIC at home here with ESBL ecoli bacteremia and ESBL ecoli and Klebsiella UTI. Found to be voiding with elevated residuals  Intermittent urinary retention may be the cause of her freq uti.    - Please straight cath q6 hours   - Continue antibiotics for ESBL E coli  - No need for continuation of Flomax  - Ultimately patient should follow up with Dr. Sujatha Trejo at [facility] for formal urodynamics and teaching new improved CIC techniques    [facility]  [address] [place] [address], [address] [address] [phone]
70  F with HTN, thoracic and abdominal aortic aneurysm s/p repair, AV replacement,  p/w fever and diarrhea x1 day.  leucocytosis, lactic acidosis, DURGA, due to ESBL E coli bacteremia   abd/pelvis CT: L pyelonephritis and cystitis, Interval stability in appearance of surgical graft repair of thoracoabdominal aorta and mesenteric bypass grafts, colitis  urine cx with klebsiella and ESBL E-coli  C-diff negative, GI PCR negative  Resolved DURGA, clinically improved but st ill has diarrhea  persistent bacteremia 8/2 and 8/4, repeat negative 8/6    ESBL E-coli bacteremia, pyelonephritis and cystitis  diarrhea with negative C-diff and GI PCR, ?reactive to pyelo, no colitis on CT, ?non infectious   has h/o AAA repair and graft     * can place a midline  * c/w ertapenem 1 q d  * f/u with GI regarding diarrhea        Chiquis Ortega MD  Pager [phone]  After 5pm and on weekends call [phone]
71 yo F with hx of thoracic and abdominal aortic aneurysm s/p repair, AV replacement, and HTN presenting with diarrhea x1 day and found to be bacteremic, hypokalemic and hypertensive.
71 yo F with hx of thoracic and abdominal aortic aneurysm s/p repair, AV replacement, and HTN presenting with diarrhea x1 day.    overall febrile at home, leucocytosis, lactic acidosis, DURGA, due to E coli bacteremia potential sources either urinary given pt might be retaining in setting of decreased frequency of catheterization or colitis/enteritis due to diarrhea.   Resolved DURGA, clinically improved.       Plan:   Switched zosyn to cefepime   follow up final urine cx, prelim with E coli   follow up final sensitivity of e coli in blood   repeat blood cx in lab   CT abd/pelvis with pyelo, graft site looks stable.   trend CBC for leucocytosis, almost resolved  given persistent diarrhea, check c diff
71 yo F with hx of thoracic and abdominal aortic aneurysm s/p repair, AV replacement, and HTN presenting with diarrhea x1 day.    overall febrile at home, leucocytosis, lactic acidosis, DURGA, due to ESBL E coli bacteremia potential sources urinary   Resolved DURGA, clinically improved.       Plan:   c/w ertapenem based on cx   repeat blood cx NTD   will need 14 days therapy given sustained bacteremia and presence of recent graft until 8/19/20   CT abd/pelvis with pyelo, graft site looks stable.   trend CBC for leucocytosis, persists but trending down   diarrhea improved, c diff negative   Will need midline, can place midline given negative cx  will need surveillance cx as outpt
71 yo F with hx of thoracic and abdominal aortic aneurysm s/p repair, AV replacement, and HTN presenting with diarrhea x1 day.    overall febrile at home, leucocytosis, lactic acidosis, DURGA, due to ESBL E coli bacteremia potential sources urinary   Resolved DURGA, clinically improved. continues to have diarrhea.       Plan:   c/w ertapenem based on cx   repeat blood cx NTD   will need 14 days therapy given sustained bacteremia and presence of recent graft until 8/19/20   CT abd/pelvis with pyelo, graft site looks stable.   trend CBC for leucocytosis, almost resolved   diarrhea persists, c diff negative, GI PCR negative, pt on lomotil and bacid. GI following   Discussed with pt role of repeat CT to ensure no interim colitis, she refuses another CT adamantly,    s/p midline, CBC/CMP once a week while on abx, labs to be followed by rehab physician   will need surveillance cx as outpt, pt advised to have repeat blood cx drawn after 2 weeks of therapy if everything is ok. Pt offered to come to my office for repeat blood cx.
71 yo F with hx of thoracic and abdominal aortic aneurysm s/p repair, AV replacement, and HTN presenting with diarrhea x1 day. Pt states that her boyfriend came over for dinner last night and after he left she started having profuse non-bloody watery diarrhea a/w with fevers to 101-102, lightheadedness and nausea/dry heaving.
71 yo F with hx of thoracic and abdominal aortic aneurysm s/p repair, AV replacement, and HTN presenting with diarrhea x1 day. Pt states that her boyfriend came over for dinner last night and after he left she started having profuse non-bloody watery diarrhea a/w with fevers to 101-102, lightheadedness and nausea/dry heaving. Pt states she has since had 5-6 watery BMs and has started to feel weak. She took Tylenol at 10AM. She has also noticed mild SOB on exertion. She denies CP. She has been trying to drink gatorade and has been able to keep it down.    Problem/Plan - 1:  ·  Problem: Hypokalemia due to excessive gastrointestinal loss of potassium.  Plan: replete as required  monitor CMP   IVF      Problem/Plan - 2:  ·  Problem: bacteremia with pyonephritis .  Plan: with diarrhea and fever  CT abdomen reviewed  cw cefepime  ID fu appreciated     Problem/Plan - 3:  ·  Problem: HTN (hypertension).  Plan: cw home meds.     Problem/Plan - 4:  ·  Problem: Thoracoabdominal aortic aneurysm (TAAA) without rupture.  Plan: sp repair  outpt fu.   graft stable
71 yo F with hx of thoracic and abdominal aortic aneurysm s/p repair, AV replacement, and HTN presenting with diarrhea. GI consulted
Problem/Plan - 1:  ·  Problem: Hypokalemia due to excessive gastrointestinal loss of potassium.  Plan: replete as required  monitor CMP   IVF    Problem/Plan - 2:  ·  Problem: bacteremia with pyonephritis .  Plan: with diarrhea and fever  CT abdomen reviewed  antibiotics as per ID   ID fu appreciated     Problem/Plan - 3:  ·  Problem: HTN (hypertension).  Plan: cw current  meds.     Problem/Plan - 4:·  Problem: AMS, hallucinations    psych fu    dc clonidine , family thinks thats causing it     dc planning once AMS improves
Problem/Plan - 1:  ·  Problem: Hypokalemia due to excessive gastrointestinal loss of potassium.  Plan: replete as required  monitor CMP   IVF    Problem/Plan - 2:  ·  Problem: bacteremia with pyonephritis .  Plan: with diarrhea and fever  CT abdomen reviewed  antibiotics as per ID   ID fu appreciated     Problem/Plan - 3:  ·  Problem: HTN (hypertension).  Plan: cw current  meds.     Problem/Plan - 4:·  Problem: AMS, hallucinations    psych fu    taper clonidine     dc planning to acute rehab
Problem/Plan - 1:  ·  Problem: Hypokalemia due to excessive gastrointestinal loss of potassium.  Plan: replete as required  monitor CMP   IVF    Problem/Plan - 2:  ·  Problem: bacteremia with pyonephritis .  Plan: with diarrhea and fever  CT abdomen reviewed  antibiotics as per ID   ID fu appreciated     Problem/Plan - 3:  ·  Problem: HTN (hypertension).  Plan: cw home meds.     Problem/Plan - 4:  ·  Problem: Thoracoabdominal aortic aneurysm (TAAA) without rupture.  Plan: sp repair  outpt fu.   graft stable
Problem/Plan - 1:  ·  Problem: Hypokalemia due to excessive gastrointestinal loss of potassium.  Plan: replete as required  monitor CMP   IVF    Problem/Plan - 2:  ·  Problem: bacteremia with pyonephritis .  Plan: with diarrhea and fever  CT abdomen reviewed  antibiotics as per ID   ID fu appreciated     Problem/Plan - 3:  ·  Problem: HTN (hypertension).  Plan: cw home meds.     Problem/Plan - 4:  ·  Problem: Thoracoabdominal aortic aneurysm (TAAA) without rupture.  Plan: sp repair  outpt fu.   graft stable  pt with severe protein calorie malnutrition
Problem/Plan - 1:  ·  Problem: Hypokalemia due to excessive gastrointestinal loss of potassium.  Plan: replete as required  monitor CMP   IVF    Problem/Plan - 2:  ·  Problem: bacteremia with pyonephritis .  Plan: with diarrhea and fever  CT abdomen reviewed  antibiotics as per ID   ID fu appreciated     Problem/Plan - 3:  ·  Problem: HTN (hypertension).  Plan: cw home meds.     Problem/Plan - 4:  ·  Problem: Thoracoabdominal aortic aneurysm (TAAA) without rupture.  Plan: sp repair  outpt fu.   graft stable  pt with severe protein calorie malnutrition
Problem/Plan - 1:  ·  Problem: Hypokalemia due to excessive gastrointestinal loss of potassium.  Plan: replete as required  monitor CMP   IVF    Problem/Plan - 2:  ·  Problem: bacteremia with pyonephritis .  Plan: with diarrhea and fever  CT abdomen reviewed  antibiotics as per ID   ID fu appreciated     Problem/Plan - 3:  ·  Problem: HTN (hypertension).  Plan: cw home meds.     Problem/Plan - 4:Problem/Plan - 1:  ·  Problem: Hypokalemia due to excessive gastrointestinal loss of potassium.  Plan: replete as required  monitor CMP   IVF    Problem/Plan - 2:  ·  Problem: bacteremia with pyonephritis .  Plan: with diarrhea and fever  CT abdomen reviewed  antibiotics as per ID   ID fu appreciated     Problem/Plan - 3:  ·  Problem: HTN (hypertension).  Plan: cw home meds.     Problem/Plan - 4:·  Problem: Thoracoabdominal aortic aneurysm (TAAA) without rupture.  Plan: sp repair  outpt fu.   graft stable  pt with severe protein calorie malnutrition
Problem/Plan - 1:  ·  Problem: Hypokalemia due to excessive gastrointestinal loss of potassium.  Plan: replete as required  monitor CMP   IVF    Problem/Plan - 2:  ·  Problem: bacteremia with pyonephritis .  Plan: with diarrhea and fever  CT abdomen reviewed  antibiotics as per ID   ID fu appreciated     Problem/Plan - 3:  ·  Problem: HTN (hypertension).  Plan: cw home meds.     Problem/Plan - 4:·  Problem: AMS, hallucinations plan check CT head  psych fu
Problem/Plan - 1:  ·  Problem: Hypokalemia due to excessive gastrointestinal loss of potassium.  Plan: replete as required  monitor CMP   IVF    Problem/Plan - 2:  ·  Problem: bacteremia with pyonephritis .  Plan: with diarrhea and fever  CT abdomen reviewed  antibiotics as per ID   ID fu appreciated     Problem/Plan - 3:  ·  Problem: HTN (hypertension).  Plan: cw home meds.     Problem/Plan - 4:·  Problem: AMS, hallucinations plan check CT head  psych fu
Problem/Plan - 1:  ·  Problem: Hypokalemia due to excessive gastrointestinal loss of potassium.  Plan: replete as required  monitor CMP   IVF    Problem/Plan - 2:  ·  Problem: bacteremia with pyonephritis .  Plan: with diarrhea and fever  CT abdomen reviewed  antibiotics as per ID   ID fu appreciated     Problem/Plan - 3:  ·  Problem: HTN (hypertension).  Plan: cw home meds.     Problem/Plan - 4:·  Problem: AMS, hallucinations plan psych fu    dc clonidine , family thinks thats causing it
Problem/Plan - 1:  ·  Problem: Hypokalemia due to excessive gastrointestinal loss of potassium.  Plan: replete as required  monitor CMP   IVF    Problem/Plan - 2:  ·  Problem: bacteremia with pyonephritis .  Plan: with diarrhea and fever  CT abdomen reviewed  antibiotics as per ID   ID fu appreciated     Problem/Plan - 3:  ·  Problem: HTN (hypertension).  Plan: cw home meds.   Problem/Plan - 4:  ·  Problem: Thoracoabdominal aortic aneurysm (TAAA) without rupture.  Plan: sp repair  outpt fu.   graft stable
Problem/Plan - 1:  ·  Problem: Hypokalemia due to excessive gastrointestinal loss of potassium.  Plan: replete as required  monitor CMP   IVF    Problem/Plan - 2:  ·  Problem: bacteremia with pyonephritis .  Plan: with diarrhea and fever  CT abdomen reviewed  antibiotics as per ID   ID fu appreciated     Problem/Plan - 3:  ·  Problem: HTN (hypertension).  Plan: cw home meds.   Problem/Plan - 4:  ·  Problem: Thoracoabdominal aortic aneurysm (TAAA) without rupture.  Plan: sp repair  outpt fu.   graft stable    pt with severe protein calorie malnutrition
69 yo F with hx of thoracic and abdominal aortic aneurysm s/p repair, AV replacement, and HTN presenting with diarrhea x1 day and found to be bacteremic, hypokalemic and hypertensive.
69 yo F with hx of thoracic and abdominal aortic aneurysm s/p repair, AV replacement, and HTN presenting with diarrhea x1 day. Pt states that her boyfriend came over for dinner last night and after he left she started having profuse non-bloody watery diarrhea a/w with fevers to 101-102, lightheadedness and nausea/dry heaving.
69 yo F with hx of thoracic and abdominal aortic aneurysm s/p repair, AV replacement, and HTN presenting with diarrhea x1 day. Pt states that her boyfriend came over for dinner last night and after he left she started having profuse non-bloody watery diarrhea a/w with fevers to 101-102, lightheadedness and nausea/dry heaving.
69 yo F with hx of thoracic and abdominal aortic aneurysm s/p repair, AV replacement, and HTN presenting with diarrhea. GI consulted
71 yo F with hx of thoracic and abdominal aortic aneurysm s/p repair, AV replacement, and HTN presenting with diarrhea x1 day. Pt states that her boyfriend came over for dinner last night and after he left she started having profuse non-bloody watery diarrhea a/w with fevers to 101-102, lightheadedness and nausea/dry heaving.
69 yo F with hx of thoracic and abdominal aortic aneurysm s/p repair, AV replacement, and HTN presenting with diarrhea x1 day and found to be bacteremic, hypokalemic and hypertensive.

## 2020-08-21 NOTE — PROGRESS NOTE ADULT - PROBLEM SELECTOR PROBLEM 5
ACP (advance care planning)
Gastroenteritis, acute
ACP (advance care planning)
Gastroenteritis, acute
Gastroenteritis, acute

## 2020-08-21 NOTE — PROGRESS NOTE ADULT - SUBJECTIVE AND OBJECTIVE BOX
[facility]      Seen earlier today, and examined.  - Today, patient is without complaints.           *****MEDICATIONS: Current medication reviewed and documented.    MEDICATIONS  (STANDING):  aMIOdarone    Tablet 200 milliGRAM(s) Oral daily  aspirin  chewable 81 milliGRAM(s) Oral daily  cloNIDine 0.1 milliGRAM(s) Oral daily  diphenoxylate/atropine 1 Tablet(s) Oral two times a day  ferrous    sulfate 325 milliGRAM(s) Oral two times a day  heparin   Injectable 5000 Unit(s) SubCutaneous every 8 hours  hydrALAZINE 10 milliGRAM(s) Oral two times a day  lactobacillus acidophilus 1 Tablet(s) Oral three times a day  lisinopril 40 milliGRAM(s) Oral daily  OLANZapine 2.5 milliGRAM(s) Oral <User Schedule>    MEDICATIONS  (PRN):  OLANZapine 2.5 milliGRAM(s) Oral every 6 hours PRN Paranoia/ Agitation          ***** VITAL SIGNS:  T(F): 98.4 (08-21-20 @ 13:41), Max: 98.4 (08-21-20 @ 13:41)  HR: 76 (08-21-20 @ 13:41) (60 - 98)  BP: 149/69 (08-21-20 @ 13:41) (109/58 - 168/77)  RR: 17 (08-21-20 @ 13:41) (17 - 18)  SpO2: 99% (08-21-20 @ 13:41) (98% - 100%)  Wt(kg): --  ,   I&O's Summary    20 Aug 2020 07:01  -  21 Aug 2020 07:00  --------------------------------------------------------  IN: 1080 mL / OUT: 1000 mL / NET: 80 mL    21 Aug 2020 07:01  -  21 Aug 2020 14:00  --------------------------------------------------------  IN: 360 mL / OUT: 0 mL / NET: 360 mL             *****PHYSICAL EXAM: Alert oriented x2    Attention comprehension are fair. Able to name, repeat,  without any difficulty.   Able to follow 1 step commands.     EOMI fundi not visualized,  blinks to threat b/l   No facial asymmetry   Tongue is midline   Palate elevates symmetrically   Moving all 4 ext symmetrically     Reflexes are symmetric throughout   sensation is grossly symmetric  Gait : not assessed.         *****LAB AND IMAGING:                                         [All pertinent recent Imaging/Reports reviewed]           *****A S S E S S M E N T   A N D   P L A N :         69 yo F with hx of thoracic and abdominal aortic aneurysm s/p repair, AV replacement, and HTN presenting with diarrhea x1 day. Pt states that her boyfriend came over for dinner last night and after he left she started having profuse non-bloody watery diarrhea a/w with fevers to 101-102, lightheadedness and nausea/dry heaving. Pt states she has since had 5-6 watery BMs and has started to feel weak. She took Tylenol at 10AM. She has also noticed mild SOB on exertion. She denies CP. She has been trying to drink gatorade and has been able to keep it down.  Hallucinations this past Sunday into Monday, reports was hallucinating seeing her mother in a crowd, also was hallucinating thought she was taking her meal tray and fell down, but is no longer having any perceptual disturbances since then.  Denies having any hallucinations since then.      Problem/Recommendations 1: confusion likely related to delirium   tsh low repeat  thyroid panel ? related hyperthyroid   thiamine 100 daily    will continue to monitor.  ct head no acute process.      Problem/Recommendations 2:   htn   goal < 140   titrate meds.   Thank you for allowing me to participate in the care of this patient. Please do not hesitate to call me if you have any  questions.        ________________  Agata Stubbs MD  [facility]  [phone]      33 minutes spent on total encounter; more than 50 % of the visit was  spent counseling about plan of care, compliance to diet/exercise and medication regimen and or  coordinating care by the attending physician.      It is advised that stroke patients follow up with [facility]IR[facility] @ [phone] in 1- 2 weeks.   Others please follow up with Dr. Michael Nissenbaum [phone]

## 2020-08-21 NOTE — PROGRESS NOTE ADULT - REASON FOR ADMISSION
diarrhea

## 2020-08-21 NOTE — DISCHARGE NOTE NURSING/CASE MANAGEMENT/SOCIAL WORK - NSDCPEEMAIL_GEN_ALL_CORE
[facility] email [email or web address]

## 2020-08-21 NOTE — PHARMACOTHERAPY INTERVENTION NOTE - COMMENTS
recommend to renew lomotil - order has auto 
KENNETH SPRING, 70y Female with blood cultures positive for E. coli, was placed on zosyn and macrobid.    Recommendation(s):  1) Patient does not require combination of macrobid and zosyn for E. coli bacteremia (macrobid only effective for uncomplicated  infections), suggest D/C macrobid.  2) F/u sensitivities of bcx to narrow abx therapy.    With kind regards,  Gideon Watkins, PharmD  Infectious Diseases Clinical Pharmacist  [phone]  .

## 2020-08-21 NOTE — PROGRESS NOTE ADULT - PROBLEM SELECTOR PROBLEM 3
Bacteremia due to Gram-negative bacteria
Bacteremia due to Gram-negative bacteria
Thoracoabdominal aortic aneurysm (TAAA) without rupture
Bacteremia due to Gram-negative bacteria
Electrolyte abnormality
Bacteremia due to Gram-negative bacteria
Electrolyte abnormality
Thoracoabdominal aortic aneurysm (TAAA) without rupture
Electrolyte abnormality
Thoracoabdominal aortic aneurysm (TAAA) without rupture
Bacteremia due to Gram-negative bacteria
Electrolyte abnormality
Electrolyte abnormality

## 2020-08-21 NOTE — PROGRESS NOTE ADULT - NSHPATTENDINGPLANDISCUSS_GEN_ALL_CORE
Primary team/Patient
medicine np
Primary team/Patient

## 2020-08-21 NOTE — PROGRESS NOTE ADULT - PROBLEM SELECTOR PLAN 4
Patient with acute psychosis and agitation likely drug induced  now with improvement  Consider Taper off clonidine   Plan per primary/Psych team.  Await transfer to rehab 147

## 2020-08-21 NOTE — PROGRESS NOTE ADULT - SUBJECTIVE AND OBJECTIVE BOX
Subjective: Patient seen and examined. No new events except as noted.     REVIEW OF SYSTEMS:    CONSTITUTIONAL:+ weakness, fevers or chills  EYES/ENT: No visual changes;  No vertigo or throat pain   NECK: No pain or stiffness  RESPIRATORY: No cough, wheezing, hemoptysis; No shortness of breath  CARDIOVASCULAR: No chest pain or palpitations  GASTROINTESTINAL: No abdominal or epigastric pain. No nausea, vomiting, or hematemesis; No diarrhea or constipation. No melena or hematochezia.  GENITOURINARY: No dysuria, frequency or hematuria  NEUROLOGICAL: No numbness or weakness  SKIN: No itching, burning, rashes, or lesions   All other review of systems is negative unless indicated above.    MEDICATIONS:  MEDICATIONS  (STANDING):  aMIOdarone    Tablet 200 milliGRAM(s) Oral daily  aspirin  chewable 81 milliGRAM(s) Oral daily  cloNIDine 0.1 milliGRAM(s) Oral daily  ferrous    sulfate 325 milliGRAM(s) Oral two times a day  heparin   Injectable 5000 Unit(s) SubCutaneous every 8 hours  hydrALAZINE 10 milliGRAM(s) Oral two times a day  lactobacillus acidophilus 1 Tablet(s) Oral three times a day  lisinopril 40 milliGRAM(s) Oral daily  OLANZapine 2.5 milliGRAM(s) Oral <User Schedule>      PHYSICAL EXAM:  T(C): 36.7 (08-21-20 @ 04:29), Max: 36.7 (08-20-20 @ 20:23)  HR: 98 (08-21-20 @ 04:29) (60 - 98)  BP: 168/77 (08-21-20 @ 04:29) (94/56 - 168/77)  RR: 18 (08-21-20 @ 04:29) (18 - 19)  SpO2: 98% (08-21-20 @ 04:29) (98% - 100%)  Wt(kg): --  I&O's Summary    20 Aug 2020 07:01  -  21 Aug 2020 07:00  --------------------------------------------------------  IN: 1080 mL / OUT: 1000 mL / NET: 80 mL          Appearance: NAD	  HEENT:   Dry oral mucosa, PERRL, EOMI	  Lymphatic: No lymphadenopathy , no edema  Cardiovascular: Normal S1 S2, No JVD, No murmurs , Peripheral pulses palpable 2+ bilaterally  Respiratory: Lungs clear to auscultation, normal effort 	  Gastrointestinal:  Soft, Non-tender, + BS	  Skin: No rashes, No ecchymoses, No cyanosis, warm to touch  Musculoskeletal: Normal range of motion, normal strength  Psychiatry:  Mood & affect appropriate  Ext: No edema      LABS:    CARDIAC MARKERS:                  proBNP:   Lipid Profile:   HgA1c:   TSH:             TELEMETRY: 	  SR  ECG:  	  RADIOLOGY:   DIAGNOSTIC TESTING:  [ ] Echocardiogram:  [ ]  Catheterization:  [ ] Stress Test:    OTHER:

## 2020-08-21 NOTE — PROGRESS NOTE ADULT - PROBLEM SELECTOR PROBLEM 2
Electrolyte abnormality
Hypertension
Electrolyte abnormality
HTN (hypertension)
Electrolyte abnormality
HTN (hypertension)
Hypertension
HTN (hypertension)
Hypertension
Electrolyte abnormality
HTN (hypertension)
Electrolyte abnormality
HTN (hypertension)

## 2020-09-01 ENCOUNTER — EMERGENCY (EMERGENCY)
Facility: HOSPITAL | Age: 70
LOS: 1 days | Discharge: HOME | End: 2020-09-01
Attending: INTERNAL MEDICINE | Admitting: INTERNAL MEDICINE
Payer: MEDICARE

## 2020-09-01 VITALS
OXYGEN SATURATION: 99 % | SYSTOLIC BLOOD PRESSURE: 170 MMHG | RESPIRATION RATE: 16 BRPM | HEART RATE: 90 BPM | DIASTOLIC BLOOD PRESSURE: 70 MMHG

## 2020-09-01 VITALS
SYSTOLIC BLOOD PRESSURE: 108 MMHG | DIASTOLIC BLOOD PRESSURE: 64 MMHG | HEART RATE: 95 BPM | HEIGHT: 67 IN | OXYGEN SATURATION: 99 % | WEIGHT: 130.07 LBS | RESPIRATION RATE: 17 BRPM | TEMPERATURE: 99 F

## 2020-09-01 DIAGNOSIS — Z98.890 OTHER SPECIFIED POSTPROCEDURAL STATES: Chronic | ICD-10-CM

## 2020-09-01 DIAGNOSIS — Z98.49 CATARACT EXTRACTION STATUS, UNSPECIFIED EYE: Chronic | ICD-10-CM

## 2020-09-01 DIAGNOSIS — S42.302A UNSPECIFIED FRACTURE OF SHAFT OF HUMERUS, LEFT ARM, INITIAL ENCOUNTER FOR CLOSED FRACTURE: Chronic | ICD-10-CM

## 2020-09-01 DIAGNOSIS — I71.6 THORACOABDOMINAL AORTIC ANEURYSM, WITHOUT RUPTURE: Chronic | ICD-10-CM

## 2020-09-01 DIAGNOSIS — S01.01XA LACERATION WITHOUT FOREIGN BODY OF SCALP, INITIAL ENCOUNTER: ICD-10-CM

## 2020-09-01 LAB
ALBUMIN SERPL ELPH-MCNC: 3.1 G/DL — LOW (ref 3.3–5)
ALP SERPL-CCNC: 69 U/L — SIGNIFICANT CHANGE UP (ref 40–120)
ALT FLD-CCNC: 32 U/L — SIGNIFICANT CHANGE UP (ref 10–45)
ANION GAP SERPL CALC-SCNC: 8 MMOL/L — SIGNIFICANT CHANGE UP (ref 5–17)
AST SERPL-CCNC: 20 U/L — SIGNIFICANT CHANGE UP (ref 10–40)
BASOPHILS # BLD AUTO: 0.06 K/UL — SIGNIFICANT CHANGE UP (ref 0–0.2)
BASOPHILS NFR BLD AUTO: 0.7 % — SIGNIFICANT CHANGE UP (ref 0–2)
BILIRUB SERPL-MCNC: 0.4 MG/DL — SIGNIFICANT CHANGE UP (ref 0.2–1.2)
BUN SERPL-MCNC: 19 MG/DL — SIGNIFICANT CHANGE UP (ref 7–23)
CALCIUM SERPL-MCNC: 8.4 MG/DL — SIGNIFICANT CHANGE UP (ref 8.4–10.5)
CHLORIDE SERPL-SCNC: 106 MMOL/L — SIGNIFICANT CHANGE UP (ref 96–108)
CO2 SERPL-SCNC: 25 MMOL/L — SIGNIFICANT CHANGE UP (ref 22–31)
CREAT SERPL-MCNC: 1.36 MG/DL — HIGH (ref 0.5–1.3)
EOSINOPHIL # BLD AUTO: 0.17 K/UL — SIGNIFICANT CHANGE UP (ref 0–0.5)
EOSINOPHIL NFR BLD AUTO: 1.9 % — SIGNIFICANT CHANGE UP (ref 0–6)
GLUCOSE SERPL-MCNC: 97 MG/DL — SIGNIFICANT CHANGE UP (ref 70–99)
HCT VFR BLD CALC: 31.7 % — LOW (ref 34.5–45)
HGB BLD-MCNC: 10.3 G/DL — LOW (ref 11.5–15.5)
IMM GRANULOCYTES NFR BLD AUTO: 0.6 % — SIGNIFICANT CHANGE UP (ref 0–1.5)
LIDOCAIN IGE QN: 50 U/L — LOW (ref 73–393)
LYMPHOCYTES # BLD AUTO: 1.52 K/UL — SIGNIFICANT CHANGE UP (ref 1–3.3)
LYMPHOCYTES # BLD AUTO: 16.8 % — SIGNIFICANT CHANGE UP (ref 13–44)
MCHC RBC-ENTMCNC: 31 PG — SIGNIFICANT CHANGE UP (ref 27–34)
MCHC RBC-ENTMCNC: 32.5 GM/DL — SIGNIFICANT CHANGE UP (ref 32–36)
MCV RBC AUTO: 95.5 FL — SIGNIFICANT CHANGE UP (ref 80–100)
MONOCYTES # BLD AUTO: 0.73 K/UL — SIGNIFICANT CHANGE UP (ref 0–0.9)
MONOCYTES NFR BLD AUTO: 8.1 % — SIGNIFICANT CHANGE UP (ref 2–14)
NEUTROPHILS # BLD AUTO: 6.52 K/UL — SIGNIFICANT CHANGE UP (ref 1.8–7.4)
NEUTROPHILS NFR BLD AUTO: 71.9 % — SIGNIFICANT CHANGE UP (ref 43–77)
NRBC # BLD: 0 /100 WBCS — SIGNIFICANT CHANGE UP (ref 0–0)
PLATELET # BLD AUTO: 310 K/UL — SIGNIFICANT CHANGE UP (ref 150–400)
POTASSIUM SERPL-MCNC: 3.4 MMOL/L — LOW (ref 3.5–5.3)
POTASSIUM SERPL-SCNC: 3.4 MMOL/L — LOW (ref 3.5–5.3)
PROT SERPL-MCNC: 6.3 G/DL — SIGNIFICANT CHANGE UP (ref 6–8.3)
RBC # BLD: 3.32 M/UL — LOW (ref 3.8–5.2)
RBC # FLD: 14.5 % — SIGNIFICANT CHANGE UP (ref 10.3–14.5)
SODIUM SERPL-SCNC: 139 MMOL/L — SIGNIFICANT CHANGE UP (ref 135–145)
TROPONIN I SERPL-MCNC: <.017 NG/ML — LOW (ref 0.02–0.06)
WBC # BLD: 9.05 K/UL — SIGNIFICANT CHANGE UP (ref 3.8–10.5)
WBC # FLD AUTO: 9.05 K/UL — SIGNIFICANT CHANGE UP (ref 3.8–10.5)

## 2020-09-01 PROCEDURE — 93010 ELECTROCARDIOGRAM REPORT: CPT

## 2020-09-01 PROCEDURE — 99285 EMERGENCY DEPT VISIT HI MDM: CPT | Mod: 25

## 2020-09-01 PROCEDURE — 36415 COLL VENOUS BLD VENIPUNCTURE: CPT

## 2020-09-01 PROCEDURE — 12002 RPR S/N/AX/GEN/TRNK2.6-7.5CM: CPT

## 2020-09-01 PROCEDURE — 83690 ASSAY OF LIPASE: CPT

## 2020-09-01 PROCEDURE — 85027 COMPLETE CBC AUTOMATED: CPT

## 2020-09-01 PROCEDURE — U0003: CPT

## 2020-09-01 PROCEDURE — 70450 CT HEAD/BRAIN W/O DYE: CPT | Mod: 26

## 2020-09-01 PROCEDURE — 84484 ASSAY OF TROPONIN QUANT: CPT

## 2020-09-01 PROCEDURE — 80053 COMPREHEN METABOLIC PANEL: CPT

## 2020-09-01 PROCEDURE — 96360 HYDRATION IV INFUSION INIT: CPT | Mod: XU

## 2020-09-01 PROCEDURE — 86769 SARS-COV-2 COVID-19 ANTIBODY: CPT

## 2020-09-01 PROCEDURE — 90715 TDAP VACCINE 7 YRS/> IM: CPT

## 2020-09-01 PROCEDURE — 70450 CT HEAD/BRAIN W/O DYE: CPT

## 2020-09-01 PROCEDURE — 99284 EMERGENCY DEPT VISIT MOD MDM: CPT | Mod: 25

## 2020-09-01 PROCEDURE — 93005 ELECTROCARDIOGRAM TRACING: CPT

## 2020-09-01 RX ORDER — TETANUS TOXOID, REDUCED DIPHTHERIA TOXOID AND ACELLULAR PERTUSSIS VACCINE, ADSORBED 5; 2.5; 8; 8; 2.5 [IU]/.5ML; [IU]/.5ML; UG/.5ML; UG/.5ML; UG/.5ML
0.5 SUSPENSION INTRAMUSCULAR ONCE
Refills: 0 | Status: COMPLETED | OUTPATIENT
Start: 2020-09-01 | End: 2020-09-01

## 2020-09-01 RX ORDER — LISINOPRIL 2.5 MG/1
20 TABLET ORAL DAILY
Refills: 0 | Status: DISCONTINUED | OUTPATIENT
Start: 2020-09-01 | End: 2020-09-05

## 2020-09-01 RX ORDER — SODIUM CHLORIDE 9 MG/ML
1850 INJECTION INTRAMUSCULAR; INTRAVENOUS; SUBCUTANEOUS ONCE
Refills: 0 | Status: COMPLETED | OUTPATIENT
Start: 2020-09-01 | End: 2020-09-01

## 2020-09-01 RX ORDER — HYDRALAZINE HCL 50 MG
10 TABLET ORAL ONCE
Refills: 0 | Status: COMPLETED | OUTPATIENT
Start: 2020-09-01 | End: 2020-09-01

## 2020-09-01 RX ADMIN — LISINOPRIL 20 MILLIGRAM(S): 2.5 TABLET ORAL at 16:49

## 2020-09-01 RX ADMIN — TETANUS TOXOID, REDUCED DIPHTHERIA TOXOID AND ACELLULAR PERTUSSIS VACCINE, ADSORBED 0.5 MILLILITER(S): 5; 2.5; 8; 8; 2.5 SUSPENSION INTRAMUSCULAR at 12:28

## 2020-09-01 RX ADMIN — Medication 10 MILLIGRAM(S): at 15:56

## 2020-09-01 RX ADMIN — SODIUM CHLORIDE 1850 MILLILITER(S): 9 INJECTION INTRAMUSCULAR; INTRAVENOUS; SUBCUTANEOUS at 12:30

## 2020-09-01 RX ADMIN — SODIUM CHLORIDE 1850 MILLILITER(S): 9 INJECTION INTRAMUSCULAR; INTRAVENOUS; SUBCUTANEOUS at 11:29

## 2020-09-01 NOTE — ED ADULT NURSE NOTE - NSIMPLEMENTINTERV_GEN_ALL_ED
Implemented All Fall Risk Interventions:  Harned to call system. Call bell, personal items and telephone within reach. Instruct patient to call for assistance. Room bathroom lighting operational. Non-slip footwear when patient is off stretcher. Physically safe environment: no spills, clutter or unnecessary equipment. Stretcher in lowest position, wheels locked, appropriate side rails in place. Provide visual cue, wrist band, yellow gown, etc. Monitor gait and stability. Monitor for mental status changes and reorient to person, place, and time. Review medications for side effects contributing to fall risk. Reinforce activity limits and safety measures with patient and family.

## 2020-09-01 NOTE — ED PROVIDER NOTE - ATTENDING CONTRIBUTION TO CARE
69 y/o F with h/o HTN, AAA repair 8/2019, urinary retention (self caths), chronic RLE weakness from previous spinal cord injury pw diarrhea x3-4 weeks in which she was admitted to [facility] treated for sepsis but cause of diarrhea not found. States diarrhea severely worsened last night, awoke with generalized weakness, walked to the bathroom with her walker, felt her knees buckle and fell hitting the back of her head on the door knob. Denies LOC, headache, chest pain, shortness of breath, visual changes, dizziness, abdominal pain, nausea, vomiting, fever, chills. Appears well, no distress. Will obtain basic labs with Trop, EKG, CT head, administer fluids, give TDAP, repair lac, and give strict head return precautions.  **Update: CT head negative. Slight bump in creatinine- 1800 ml fluid given. Lac repaired. Patient to follow up with PMD and Gastro. Strict ED return precautions discussed. Patient understands and agrees.  Dr. Bowers:  I have reviewed and discussed with the PA/ resident the case specifics, including the history, physical assessment, evaluation, conclusion, laboratory results, and medical plan. I agree with the contents, and conclusions. I have personally examined, and interviewed the patient.

## 2020-09-01 NOTE — ED ADULT NURSE NOTE - OBJECTIVE STATEMENT
Pt arrives to ER sent from [facility] for mechanical fall. Pt states she was using her walker and her knees gave out causing her to fall and strike her head on the floor. Pt with a 3cm laceration to occipital area. Pt also reports diarrhea x 1 month. Pt denies chest pain, denies sob, neg fever/chills.

## 2020-09-01 NOTE — ED PROVIDER NOTE - CARE PROVIDER_API CALL
GABY POPE  GASTROENTEROLOGY  [address], [place] [address]  Phone: [phone]  Fax: [phone]  Follow Up Time:

## 2020-09-01 NOTE — ED ADULT NURSE REASSESSMENT NOTE - NS ED NURSE REASSESS COMMENT FT1
[facility] advised of BP of 170/70 and will accept patient back to facility. Pt will also receive lisinopril prior to leaving ER.

## 2020-09-01 NOTE — ED PROVIDER NOTE - PATIENT PORTAL LINK FT
You can access the FollowMyHealth Patient Portal offered by [facility] by registering at the following website: [email or web address]. By joining [facility]’s FollowMyHealth portal, you will also be able to view your health information using other applications (apps) compatible with our system.

## 2020-09-01 NOTE — ED PROVIDER NOTE - CARE PLAN
Principal Discharge DX:	Head trauma, initial encounter  Secondary Diagnosis:	Laceration of scalp without foreign body, initial encounter  Secondary Diagnosis:	Diarrhea, unspecified type

## 2020-09-01 NOTE — ED PROVIDER NOTE - OBJECTIVE STATEMENT
69 y/o F with h/o HTN, AAA repair 8/2019, urinary retention (self caths), chronic RLE weakness from previous spinal cord injury pw diarrhea x3-4 weeks in which she was admitted to [facility] treated for sepsis but cause of diarrhea not found. States diarrhea severely worsened last night, awoke with generalized weakness, walked to the bathroom with her walker, felt her knees buckle and fell hitting the back of her head on the door knob. Denies LOC, headache, chest pain, shortness of breath, visual changes, dizziness, abdominal pain, nausea, vomiting, fever, chills. Appears well, no distress. Last Tdap- unknown.   PMD- Flory Lucas

## 2020-09-01 NOTE — ED PROVIDER NOTE - CHPI ED SYMPTOMS NEG
no vomiting/no burning urination/no nausea/no chills/no blood in stool/no dysuria/no fever/no hematuria

## 2020-09-01 NOTE — ED PROVIDER NOTE - CLINICAL SUMMARY MEDICAL DECISION MAKING FREE TEXT BOX
69 y/o F with h/o HTN, AAA repair 8/2019, urinary retention (self caths), chronic RLE weakness from previous spinal cord injury pw diarrhea x3-4 weeks in which she was admitted to [facility] treated for sepsis but cause of diarrhea not found. States diarrhea severely worsened last night, awoke with generalized weakness, walked to the bathroom with her walker, felt her knees buckle and fell hitting the back of her head on the door knob. Denies LOC, headache, chest pain, shortness of breath, visual changes, dizziness, abdominal pain, nausea, vomiting, fever, chills. Appears well, no distress. Will obtain basic labs with Trop, EKG, CT head, administer fluids, give TDAP, repair lac, and give strict head return precautions 71 y/o F with h/o HTN, AAA repair 8/2019, urinary retention (self caths), chronic RLE weakness from previous spinal cord injury pw diarrhea x3-4 weeks in which she was admitted to [facility] treated for sepsis but cause of diarrhea not found. States diarrhea severely worsened last night, awoke with generalized weakness, walked to the bathroom with her walker, felt her knees buckle and fell hitting the back of her head on the door knob. Denies LOC, headache, chest pain, shortness of breath, visual changes, dizziness, abdominal pain, nausea, vomiting, fever, chills. Appears well, no distress. Will obtain basic labs with Trop, EKG, CT head, administer fluids, give TDAP, repair lac, and give strict head return precautions.  **Update: CT head negative. Slight bump in creatinine- 1800 ml fluid given. Lac repaired. Patient to follow up with PMD and Gastro. Strict ED return precautions discussed. Patient understands and agrees.

## 2020-09-01 NOTE — ED PROVIDER NOTE - NSFOLLOWUPINSTRUCTIONS_ED_ALL_ED_FT
Follow up with your Gastroenterologist within the week- referral above or you can call: Find a Physician helpline ([phone]) for assistance   Stay well hydrated.   Follow up with your PMD within 48-72 hours for wound check.   Keep staples covered and dry for 24 hours then clean with soap and water daily, apply bacitracin and cover.    Return to the emergency department for staple removal in 10 days.   Any increased pain, surrounding redness, streaking (red lines), pus, drainage, swelling, fever, chills OR ANY NEW CONCERNING symptoms return to the emergency department.   You received a Tdap (tetanus, diphtheria and pertussis) shot today.    Laceration    WHAT YOU NEED TO KNOW:    A laceration is an injury to the skin and the soft tissue underneath it. Lacerations can happen anywhere on the body.    DISCHARGE INSTRUCTIONS:    Seek care immediately if:     You have heavy bleeding or bleeding that does not stop after 10 minutes of holding firm, direct pressure over the wound.      Your wound opens up.    Call your doctor if:     You have a fever or chills.      Your laceration is red, warm, or swollen.      You have red streaks on your skin coming from your wound.      You have white or yellow drainage from the wound that smells bad.      You have pain that gets worse, even after treatment.      You have questions or concerns about your condition or care.    Medicines: You may need any of the following:     Prescription pain medicine may be given. Ask your healthcare provider how to take this medicine safely. Some prescription pain medicines contain acetaminophen. Do not take other medicines that contain acetaminophen without talking to your healthcare provider. Too much acetaminophen may cause liver damage. Prescription pain medicine may cause constipation. Ask your healthcare provider how to prevent or treat constipation.       Antibiotics help treat or prevent a bacterial infection.      Take your medicine as directed. Contact your healthcare provider if you think your medicine is not helping or if you have side effects. Tell him or her if you are allergic to any medicine. Keep a list of the medicines, vitamins, and herbs you take. Include the amounts, and when and why you take them. Bring the list or the pill bottles to follow-up visits. Carry your medicine list with you in case of an emergency.    Care for your wound as directed:     Do not get your wound wet until your healthcare provider says it is okay. Do not soak your wound in water. Do not go swimming until your healthcare provider says it is okay. Carefully wash the wound with soap and water. Gently pat the area dry or allow it to air dry.      Change your bandages when they get wet, dirty, or after washing. Apply new, clean bandages as directed. Do not apply elastic bandages or tape too tight. Do not put powders or lotions over your incision.      Apply antibiotic ointment as directed. Your healthcare provider may give you antibiotic ointment to put over your wound if you have stitches. If you have strips of tape over your incision, let them dry up and fall off on their own. If they do not fall off within 14 days, gently remove them. If you have glue over your wound, do not remove or pick at it. If your glue comes off, do not replace it with glue that you have at home.      Check your wound every day for signs of infection, such as swelling, redness, or pus.    Self-care:     Apply ice on your wound for 15 to 20 minutes every hour or as directed. Use an ice pack, or put crushed ice in a plastic bag. Cover it with a towel. Ice helps prevent tissue damage and decreases swelling and pain.      Use a splint as directed. A splint will decrease movement and stress on your wound. It may help it heal faster. A splint may be used for lacerations over joints or areas of your body that bend. Ask your healthcare provider how to apply and remove a splint.      Decrease scarring of your wound by applying ointments as directed. Do not apply ointments until your healthcare provider says it is okay. You may need to wait until your wound is healed. Ask which ointment to buy and how often to use it. After your wound is healed, use sunscreen over the area when you are out in the sun. You should do this for at least 6 months to 1 year after your injury.    Follow up with your doctor as directed: You will need to return in 3 to 14 days to have stitches or staples removed. Write down your questions so you remember to ask them during your visits        Diarrhea, Adult    Diarrhea is when you pass loose and watery poop (stool) often. Diarrhea can make you feel weak and cause you to lose water in your body (get dehydrated). Losing water in your body can cause you to:  Feel tired and thirsty.Have a dry mouth.Go pee (urinate) less often.Diarrhea often lasts 2–3 days. However, it can last longer if it is a sign of something more serious. It is important to treat your diarrhea as told by your doctor.  Follow these instructions at home:  Eating and drinking         Follow these instructions as told by your doctor:  Take an ORS (oral rehydration solution). This is a drink that helps you replace fluids and minerals your body lost. It is sold at pharmacies and stores.Drink plenty of fluids, such as:  Water.Ice chips.Diluted fruit juice.Low-calorie sports drinks.Milk, if you want.Avoid drinking fluids that have a lot of sugar or caffeine in them.Eat bland, easy-to-digest foods in small amounts as you are able. These foods include:  Bananas.Applesauce.Rice.Low-fat (lean) meats.Toast.Crackers.Avoid alcohol.Avoid spicy or fatty foods.Medicines     Take over-the-counter and prescription medicines only as told by your doctor.If you were prescribed an antibiotic medicine, take it as told by your doctor. Do not stop using the antibiotic even if you start to feel better.General instructions        Wash your hands often using soap and water. If soap and water are not available, use a hand . Others in your home should wash their hands as well. Hands should be washed:  After using the toilet or changing a diaper.Before preparing, cooking, or serving food.While caring for a sick person.While visiting someone in a hospital.Drink enough fluid to keep your pee (urine) pale yellow.Rest at home while you get better.Watch your condition for any changes.Take a warm bath to help with any burning or pain from having diarrhea.Keep all follow-up visits as told by your doctor. This is important.Contact a doctor if:  You have a fever.Your diarrhea gets worse.You have new symptoms.You cannot keep fluids down.You feel light-headed or dizzy.You have a headache.You have muscle cramps.Get help right away if:  You have chest pain.You feel very weak or you pass out (faint).You have bloody or black poop or poop that looks like tar.You have very bad pain, cramping, or bloating in your belly (abdomen).You have trouble breathing or you are breathing very quickly.Your heart is beating very quickly.Your skin feels cold and clammy.You feel confused.You have signs of losing too much water in your body, such as:  Dark pee, very little pee, or no pee.Cracked lips.Dry mouth.Sunken eyes.Sleepiness.Weakness.Summary  Diarrhea is when you pass loose and watery poop (stool) often.Diarrhea can make you feel weak and cause you to lose water in your body (get dehydrated).Take an ORS (oral rehydration solution). This is a drink that is sold at pharmacies and stores.Eat bland, easy-to-digest foods in small amounts as you are able.Contact a doctor if your condition gets worse. Get help right away if you have signs that you have

## 2020-09-01 NOTE — ED PROVIDER NOTE - PROGRESS NOTE DETAILS
PA Tiberio- CT head negative. Slight bump in creatinine- 1800 ml fluid given. Lac repaired. Patient to follow up with PMD and Gastro. Strict ED return precautions discussed. Patient understands and agrees. PA Tiberio- rapid COVID negative

## 2020-09-02 LAB
SARS-COV-2 IGG SERPL QL IA: POSITIVE
SARS-COV-2 IGM SERPL IA-ACNC: 2.58 INDEX — HIGH
SARS-COV-2 RNA SPEC QL NAA+PROBE: SIGNIFICANT CHANGE UP

## 2020-09-08 NOTE — PROVIDER CONTACT NOTE (OTHER) - RECOMMENDATIONS
Continue drops:  1. Latanoprost one drop in each eye in the evening  2. Cosopt one drop in each eye 2 times daily   Return in 6 months for dilated complete     
Code Alex

## 2020-09-09 ENCOUNTER — INPATIENT (INPATIENT)
Facility: HOSPITAL | Age: 70
LOS: 4 days | Discharge: HOME HEALTH | End: 2020-09-14
Attending: INTERNAL MEDICINE | Admitting: INTERNAL MEDICINE
Payer: MEDICARE

## 2020-09-09 VITALS
SYSTOLIC BLOOD PRESSURE: 124 MMHG | OXYGEN SATURATION: 100 % | TEMPERATURE: 98 F | RESPIRATION RATE: 16 BRPM | DIASTOLIC BLOOD PRESSURE: 62 MMHG | HEIGHT: 67 IN | HEART RATE: 87 BPM

## 2020-09-09 DIAGNOSIS — R09.89 OTHER SPECIFIED SYMPTOMS AND SIGNS INVOLVING THE CIRCULATORY AND RESPIRATORY SYSTEMS: ICD-10-CM

## 2020-09-09 DIAGNOSIS — N39.0 URINARY TRACT INFECTION, SITE NOT SPECIFIED: ICD-10-CM

## 2020-09-09 DIAGNOSIS — I71.6 THORACOABDOMINAL AORTIC ANEURYSM, WITHOUT RUPTURE: ICD-10-CM

## 2020-09-09 DIAGNOSIS — Z98.49 CATARACT EXTRACTION STATUS, UNSPECIFIED EYE: Chronic | ICD-10-CM

## 2020-09-09 DIAGNOSIS — R19.7 DIARRHEA, UNSPECIFIED: ICD-10-CM

## 2020-09-09 DIAGNOSIS — Z71.89 OTHER SPECIFIED COUNSELING: ICD-10-CM

## 2020-09-09 DIAGNOSIS — S42.302A UNSPECIFIED FRACTURE OF SHAFT OF HUMERUS, LEFT ARM, INITIAL ENCOUNTER FOR CLOSED FRACTURE: Chronic | ICD-10-CM

## 2020-09-09 DIAGNOSIS — I48.91 UNSPECIFIED ATRIAL FIBRILLATION: ICD-10-CM

## 2020-09-09 DIAGNOSIS — I10 ESSENTIAL (PRIMARY) HYPERTENSION: ICD-10-CM

## 2020-09-09 DIAGNOSIS — I71.6 THORACOABDOMINAL AORTIC ANEURYSM, WITHOUT RUPTURE: Chronic | ICD-10-CM

## 2020-09-09 DIAGNOSIS — Z98.890 OTHER SPECIFIED POSTPROCEDURAL STATES: Chronic | ICD-10-CM

## 2020-09-09 DIAGNOSIS — I16.0 HYPERTENSIVE URGENCY: ICD-10-CM

## 2020-09-09 LAB
ALBUMIN SERPL ELPH-MCNC: 3.9 G/DL — SIGNIFICANT CHANGE UP (ref 3.3–5)
ALP SERPL-CCNC: 75 U/L — SIGNIFICANT CHANGE UP (ref 40–120)
ALT FLD-CCNC: 22 U/L — SIGNIFICANT CHANGE UP (ref 4–33)
ANION GAP SERPL CALC-SCNC: 11 MMO/L — SIGNIFICANT CHANGE UP (ref 7–14)
APPEARANCE UR: CLEAR — SIGNIFICANT CHANGE UP
AST SERPL-CCNC: 23 U/L — SIGNIFICANT CHANGE UP (ref 4–32)
BACTERIA # UR AUTO: NEGATIVE — SIGNIFICANT CHANGE UP
BASE EXCESS BLDV CALC-SCNC: 0.5 MMOL/L — SIGNIFICANT CHANGE UP
BASOPHILS # BLD AUTO: 0.03 K/UL — SIGNIFICANT CHANGE UP (ref 0–0.2)
BASOPHILS NFR BLD AUTO: 0.3 % — SIGNIFICANT CHANGE UP (ref 0–2)
BILIRUB SERPL-MCNC: 0.5 MG/DL — SIGNIFICANT CHANGE UP (ref 0.2–1.2)
BILIRUB UR-MCNC: NEGATIVE — SIGNIFICANT CHANGE UP
BLOOD GAS VENOUS - CREATININE: 1.19 MG/DL — SIGNIFICANT CHANGE UP (ref 0.5–1.3)
BLOOD UR QL VISUAL: NEGATIVE — SIGNIFICANT CHANGE UP
BUN SERPL-MCNC: 20 MG/DL — SIGNIFICANT CHANGE UP (ref 7–23)
CALCIUM SERPL-MCNC: 8.8 MG/DL — SIGNIFICANT CHANGE UP (ref 8.4–10.5)
CHLORIDE BLDV-SCNC: 107 MMOL/L — SIGNIFICANT CHANGE UP (ref 96–108)
CHLORIDE SERPL-SCNC: 102 MMOL/L — SIGNIFICANT CHANGE UP (ref 98–107)
CO2 SERPL-SCNC: 24 MMOL/L — SIGNIFICANT CHANGE UP (ref 22–31)
COLOR SPEC: SIGNIFICANT CHANGE UP
CREAT SERPL-MCNC: 1.11 MG/DL — SIGNIFICANT CHANGE UP (ref 0.5–1.3)
EOSINOPHIL # BLD AUTO: 0.16 K/UL — SIGNIFICANT CHANGE UP (ref 0–0.5)
EOSINOPHIL NFR BLD AUTO: 1.8 % — SIGNIFICANT CHANGE UP (ref 0–6)
GAS PNL BLDV: 135 MMOL/L — LOW (ref 136–146)
GLUCOSE BLDV-MCNC: 84 MG/DL — SIGNIFICANT CHANGE UP (ref 70–99)
GLUCOSE SERPL-MCNC: 92 MG/DL — SIGNIFICANT CHANGE UP (ref 70–99)
GLUCOSE UR-MCNC: NEGATIVE — SIGNIFICANT CHANGE UP
HCO3 BLDV-SCNC: 23 MMOL/L — SIGNIFICANT CHANGE UP (ref 20–27)
HCT VFR BLD CALC: 33.9 % — LOW (ref 34.5–45)
HCT VFR BLDV CALC: 33.5 % — LOW (ref 34.5–45)
HGB BLD-MCNC: 10.5 G/DL — LOW (ref 11.5–15.5)
HGB BLDV-MCNC: 10.9 G/DL — LOW (ref 11.5–15.5)
HIV COMBO RESULT: SIGNIFICANT CHANGE UP
HIV1+2 AB SPEC QL: SIGNIFICANT CHANGE UP
HYALINE CASTS # UR AUTO: NEGATIVE — SIGNIFICANT CHANGE UP
IMM GRANULOCYTES NFR BLD AUTO: 0.7 % — SIGNIFICANT CHANGE UP (ref 0–1.5)
KETONES UR-MCNC: NEGATIVE — SIGNIFICANT CHANGE UP
LACTATE BLDV-MCNC: 1.6 MMOL/L — SIGNIFICANT CHANGE UP (ref 0.5–2)
LEUKOCYTE ESTERASE UR-ACNC: SIGNIFICANT CHANGE UP
LYMPHOCYTES # BLD AUTO: 1.05 K/UL — SIGNIFICANT CHANGE UP (ref 1–3.3)
LYMPHOCYTES # BLD AUTO: 11.8 % — LOW (ref 13–44)
MAGNESIUM SERPL-MCNC: 1.7 MG/DL — SIGNIFICANT CHANGE UP (ref 1.6–2.6)
MCHC RBC-ENTMCNC: 30.1 PG — SIGNIFICANT CHANGE UP (ref 27–34)
MCHC RBC-ENTMCNC: 31 % — LOW (ref 32–36)
MCV RBC AUTO: 97.1 FL — SIGNIFICANT CHANGE UP (ref 80–100)
MONOCYTES # BLD AUTO: 0.48 K/UL — SIGNIFICANT CHANGE UP (ref 0–0.9)
MONOCYTES NFR BLD AUTO: 5.4 % — SIGNIFICANT CHANGE UP (ref 2–14)
NEUTROPHILS # BLD AUTO: 7.12 K/UL — SIGNIFICANT CHANGE UP (ref 1.8–7.4)
NEUTROPHILS NFR BLD AUTO: 80 % — HIGH (ref 43–77)
NITRITE UR-MCNC: NEGATIVE — SIGNIFICANT CHANGE UP
NRBC # FLD: 0 K/UL — SIGNIFICANT CHANGE UP (ref 0–0)
OB PNL STL: POSITIVE — SIGNIFICANT CHANGE UP
PCO2 BLDV: 44 MMHG — SIGNIFICANT CHANGE UP (ref 41–51)
PH BLDV: 7.37 PH — SIGNIFICANT CHANGE UP (ref 7.32–7.43)
PH UR: 6 — SIGNIFICANT CHANGE UP (ref 5–8)
PHOSPHATE SERPL-MCNC: 2.9 MG/DL — SIGNIFICANT CHANGE UP (ref 2.5–4.5)
PLATELET # BLD AUTO: 275 K/UL — SIGNIFICANT CHANGE UP (ref 150–400)
PMV BLD: 8.9 FL — SIGNIFICANT CHANGE UP (ref 7–13)
PO2 BLDV: < 24 MMHG — LOW (ref 35–40)
POTASSIUM BLDV-SCNC: 3.9 MMOL/L — SIGNIFICANT CHANGE UP (ref 3.4–4.5)
POTASSIUM SERPL-MCNC: 4 MMOL/L — SIGNIFICANT CHANGE UP (ref 3.5–5.3)
POTASSIUM SERPL-SCNC: 4 MMOL/L — SIGNIFICANT CHANGE UP (ref 3.5–5.3)
PROT SERPL-MCNC: 6.5 G/DL — SIGNIFICANT CHANGE UP (ref 6–8.3)
PROT UR-MCNC: 20 — SIGNIFICANT CHANGE UP
RBC # BLD: 3.49 M/UL — LOW (ref 3.8–5.2)
RBC # FLD: 14.6 % — HIGH (ref 10.3–14.5)
RBC CASTS # UR COMP ASSIST: SIGNIFICANT CHANGE UP (ref 0–?)
SAO2 % BLDV: 16.4 % — LOW (ref 60–85)
SARS-COV-2 RNA SPEC QL NAA+PROBE: SIGNIFICANT CHANGE UP
SODIUM SERPL-SCNC: 137 MMOL/L — SIGNIFICANT CHANGE UP (ref 135–145)
SP GR SPEC: 1.01 — SIGNIFICANT CHANGE UP (ref 1–1.04)
SQUAMOUS # UR AUTO: SIGNIFICANT CHANGE UP
TSH SERPL-MCNC: 1.14 UIU/ML — SIGNIFICANT CHANGE UP (ref 0.27–4.2)
UROBILINOGEN FLD QL: NORMAL — SIGNIFICANT CHANGE UP
WBC # BLD: 8.9 K/UL — SIGNIFICANT CHANGE UP (ref 3.8–10.5)
WBC # FLD AUTO: 8.9 K/UL — SIGNIFICANT CHANGE UP (ref 3.8–10.5)
WBC UR QL: HIGH (ref 0–?)

## 2020-09-09 PROCEDURE — 99223 1ST HOSP IP/OBS HIGH 75: CPT

## 2020-09-09 PROCEDURE — 99285 EMERGENCY DEPT VISIT HI MDM: CPT

## 2020-09-09 RX ORDER — LISINOPRIL 2.5 MG/1
40 TABLET ORAL ONCE
Refills: 0 | Status: COMPLETED | OUTPATIENT
Start: 2020-09-09 | End: 2020-09-09

## 2020-09-09 RX ORDER — SODIUM CHLORIDE 9 MG/ML
1000 INJECTION INTRAMUSCULAR; INTRAVENOUS; SUBCUTANEOUS ONCE
Refills: 0 | Status: COMPLETED | OUTPATIENT
Start: 2020-09-09 | End: 2020-09-09

## 2020-09-09 RX ORDER — HYDRALAZINE HCL 50 MG
10 TABLET ORAL ONCE
Refills: 0 | Status: COMPLETED | OUTPATIENT
Start: 2020-09-09 | End: 2020-09-09

## 2020-09-09 RX ORDER — LACTOBACILLUS ACIDOPHILUS 100MM CELL
1 CAPSULE ORAL
Refills: 0 | Status: DISCONTINUED | OUTPATIENT
Start: 2020-09-09 | End: 2020-09-14

## 2020-09-09 RX ORDER — POTASSIUM CHLORIDE 20 MEQ
1 PACKET (EA) ORAL
Qty: 0 | Refills: 0 | DISCHARGE

## 2020-09-09 RX ORDER — ASPIRIN/CALCIUM CARB/MAGNESIUM 324 MG
81 TABLET ORAL DAILY
Refills: 0 | Status: DISCONTINUED | OUTPATIENT
Start: 2020-09-09 | End: 2020-09-14

## 2020-09-09 RX ORDER — DULOXETINE HYDROCHLORIDE 30 MG/1
1 CAPSULE, DELAYED RELEASE ORAL
Qty: 0 | Refills: 0 | DISCHARGE

## 2020-09-09 RX ORDER — HYDRALAZINE HCL 50 MG
10 TABLET ORAL
Refills: 0 | Status: DISCONTINUED | OUTPATIENT
Start: 2020-09-09 | End: 2020-09-14

## 2020-09-09 RX ORDER — MAGNESIUM HYDROXIDE 400 MG/1
30 TABLET, CHEWABLE ORAL
Qty: 0 | Refills: 0 | DISCHARGE

## 2020-09-09 RX ORDER — ONDANSETRON 8 MG/1
4 TABLET, FILM COATED ORAL ONCE
Refills: 0 | Status: COMPLETED | OUTPATIENT
Start: 2020-09-09 | End: 2020-09-09

## 2020-09-09 RX ORDER — DIPHENOXYLATE HCL/ATROPINE 2.5-.025MG
1 TABLET ORAL
Refills: 0 | Status: DISCONTINUED | OUTPATIENT
Start: 2020-09-09 | End: 2020-09-10

## 2020-09-09 RX ORDER — LISINOPRIL 2.5 MG/1
40 TABLET ORAL DAILY
Refills: 0 | Status: DISCONTINUED | OUTPATIENT
Start: 2020-09-09 | End: 2020-09-14

## 2020-09-09 RX ORDER — AMIODARONE HYDROCHLORIDE 400 MG/1
200 TABLET ORAL DAILY
Refills: 0 | Status: DISCONTINUED | OUTPATIENT
Start: 2020-09-09 | End: 2020-09-14

## 2020-09-09 RX ORDER — DULOXETINE HYDROCHLORIDE 30 MG/1
60 CAPSULE, DELAYED RELEASE ORAL DAILY
Refills: 0 | Status: DISCONTINUED | OUTPATIENT
Start: 2020-09-09 | End: 2020-09-14

## 2020-09-09 RX ORDER — OLANZAPINE 15 MG/1
2.5 TABLET, FILM COATED ORAL DAILY
Refills: 0 | Status: DISCONTINUED | OUTPATIENT
Start: 2020-09-09 | End: 2020-09-14

## 2020-09-09 RX ADMIN — Medication 1 TABLET(S): at 19:59

## 2020-09-09 RX ADMIN — LISINOPRIL 40 MILLIGRAM(S): 2.5 TABLET ORAL at 15:18

## 2020-09-09 RX ADMIN — Medication 10 MILLIGRAM(S): at 19:59

## 2020-09-09 RX ADMIN — SODIUM CHLORIDE 1000 MILLILITER(S): 9 INJECTION INTRAMUSCULAR; INTRAVENOUS; SUBCUTANEOUS at 14:30

## 2020-09-09 RX ADMIN — Medication 10 MILLIGRAM(S): at 15:18

## 2020-09-09 RX ADMIN — Medication 81 MILLIGRAM(S): at 19:59

## 2020-09-09 RX ADMIN — ONDANSETRON 4 MILLIGRAM(S): 8 TABLET, FILM COATED ORAL at 17:19

## 2020-09-09 RX ADMIN — DULOXETINE HYDROCHLORIDE 60 MILLIGRAM(S): 30 CAPSULE, DELAYED RELEASE ORAL at 19:59

## 2020-09-09 RX ADMIN — AMIODARONE HYDROCHLORIDE 200 MILLIGRAM(S): 400 TABLET ORAL at 19:59

## 2020-09-09 NOTE — ED ADULT NURSE NOTE - ED STAT RN HANDOFF DETAILS
Patient admitted to [facility], face to face report give to SALVADOR Hawkins. Patient transferred to [facility] AOx4, breathing with ease on room air.

## 2020-09-09 NOTE — H&P ADULT - NSHPLABSRESULTS_GEN_ALL_CORE
( @ 13:40)                      10.5  8.90 )-----------( 275                 33.9    Neutrophils = 7.12 (80.0%)  Lymphocytes = 1.05 (11.8%)  Eosinophils = 0.16 (1.8%)  Basophils = 0.03 (0.3%)  Monocytes = 0.48 (5.4%)  Bands = --%        137  |  102  |  20  ----------------------------<  92  4.0   |  24  |  1.11    Ca    8.8      09 Sep 2020 13:40  Phos  2.9       Mg     1.7         TPro  6.5  /  Alb  3.9  /  TBili  0.5  /  DBili  x   /  AST  23  /  ALT  22  /  AlkPhos  75      Venous Blood Gas:   @ 13:50  7.37/44/< 24/23/16.4  VBG Lactate: 1.6    Urinalysis Basic - ( 09 Sep 2020 13:40 )    Color: LIGHT YELLOW / Appearance: CLEAR / S.014 / pH: 6.0  Gluc: NEGATIVE / Ketone: NEGATIVE  / Bili: NEGATIVE / Urobili: NORMAL   Blood: NEGATIVE / Protein: 20 / Nitrite: NEGATIVE   Leuk Esterase: TRACE / RBC: 3-5 / WBC 6-10   Sq Epi: OCC / Non Sq Epi: x / Bacteria: NEGATIVE

## 2020-09-09 NOTE — ED PROVIDER NOTE - FAMILY HISTORY
Family history of skin cancer, mother     Mother  Still living? Unknown  Family history of coronary artery bypass graft, Age at diagnosis: Age Unknown

## 2020-09-09 NOTE — ED PROVIDER NOTE - NS ED ROS FT
Gen: No fever, chills, night sweats. Decreased appetite  Eyes: No changes in vision   ENT: No ear pain, congestion, sore throat  Resp: No cough or trouble breathing  Cardiovascular: No chest pain or palpitation  Gastroenteric: +nausea, vomiting, diarrhea    :  No change in urine output, dysuria or hematuria   MS: No joint or muscle pain  Skin: No rashes, lacerations, wounds or abrasions   Neuro: No headache; no abnormal movements

## 2020-09-09 NOTE — ED PROVIDER NOTE - OBJECTIVE STATEMENT
71 y/o F with h/o HTN, AAA repair 8/2019, urinary retention (self caths), chronic RLE weakness from previous spinal cord injury pw diarrhea x3-4 weeks in which she was admitted to [facility] treated for sepsis but cause of diarrhea not found. She is presenting today due to persistence of the diarrhea, up to 5 BMs per day. Diarrhea is NB, watery, accompanied by abdominal pain and dry heaves. States she went to see her GI doctor 7 days ago, was prescribed antibiotic for UTI (Amox-Clav) which she started taking yesterday. Vomited yesterday x4 NBNB. Reports feeling really worn and weak and not making it to the bathroom multiple times for the diarrhea. Was nauseous at home but currently not nauseous or in any pain. 71 y/o F with h/o HTN, AAA repair 8/2019, urinary retention (self caths), chronic RLE weakness from previous spinal cord injury pw diarrhea x3-4 weeks in which she was admitted to [facility] treated for sepsis but cause of diarrhea not found. She is presenting today due to persistence of the diarrhea, up to 5 BMs per day. Diarrhea is NB, watery, accompanied by abdominal pain and dry heaves. States she went to see her GI doctor 7 days ago, was prescribed antibiotic for UTI (Amox-Clav) which she started taking yesterday. Vomited yesterday x4 NBNB. Reports feeling really worn and weak and not making it to the bathroom multiple times for the diarrhea. Was nauseous at home but currently not nauseous or in any pain. No F/C/NS, chest pain, SOB or palpations. States she only has abdominal pain diffusely prior to the episode of diarrhea, reports she is otherwise not in pain.

## 2020-09-09 NOTE — H&P ADULT - PROBLEM SELECTOR PLAN 5
- Outpatient UTI treatment with augmentin. Patient currently denying symptoms.  - Will hold off on further abx for now.  - F/u UCx.

## 2020-09-09 NOTE — ED PROVIDER NOTE - PHYSICAL EXAMINATION
CONSTITUTIONAL: NAD. Awake and conversive, cooperative with exam, anxious   EYES: EOMI, conjunctiva and sclera clear  ENMT: MMM, no pharyngeal injection or exudates   NECK: Supple, no palpable masses, no JVD  RESPIRATORY: Normal respiratory effort, CTAB, no wheezes, rales, or rhonchi  CARDIOVASCULAR: RRR, normal S1 and S2, no MRG, distal pulses 2+ bilaterally, capillary refill < 2 seconds, no peripheral edema  ABDOMEN: Soft, non-distended, diffuse TTP, +BS, no rebound/guarding  MUSCULOSKELETAL: No clubbing or cyanosis of digits, no joint swelling or tenderness  NEURO: AO to person, place, and time; following commands, moving all extremities spontaneously  PSYCH: appropriate affect   SKIN: No rashes; no palpable lesions

## 2020-09-09 NOTE — ED PROCEDURE NOTE - ATTENDING CONTRIBUTION TO CARE
Gondominic: I have seen and examined the patient face to face.  I was present during the above procedure and  have reviewed and addended the procedure note.

## 2020-09-09 NOTE — ED ADULT NURSE NOTE - OBJECTIVE STATEMENT
70 year old female, AOX4, breathing with ease on room, reports having diarrhea x 8 days s/p nursing home admission, stool brown in color, no diarrhea today. Patient with skin intact. ambulates with walker at baseline, fell at nursing home 1.5 weeks ago while ambulating to bathroom with walker and legs gave out, staples removed by MD. Patient self-catheterization at baseline due to hx of spinal cord injury.

## 2020-09-09 NOTE — ED ADULT TRIAGE NOTE - CHIEF COMPLAINT QUOTE
Pt states that she has been having diarrhea for more than an month, pt was released from [facility] for same 8 days ago, pt states that "they never resolved the problem".  Past medical history: HTN, AAA, CAD

## 2020-09-09 NOTE — CONSULT NOTE ADULT - ASSESSMENT
69yo female with h/o thoracic and abdominal aortic aneurysm s/p repair, AV replacement, and HTN presenting with acute on chronic diarrhea

## 2020-09-09 NOTE — ED PROVIDER NOTE - CLINICAL SUMMARY MEDICAL DECISION MAKING FREE TEXT BOX
69 y/o F with h/o HTN, AAA repair 8/2019, urinary retention (self caths), chronic RLE weakness from previous spinal cord injury pw diarrhea x3-4 weeks. Pt has multiple risk factors for C. diff infxn (although this was negative when tested a few wks prior). Was on multiple abx, has been in the hospital/other nursing home facility, is elderly.   1. Will obtain stool culture and c diff toxin.   2. Will obtain UA, given pt has not been able to tolerate PO at home and has a diagnosed UTI (per patient - GI doc diagnosed her with this last week), will likely require admission for IV abx.   3. Will give IVF, hold zofran/pain meds for now since pt reports she is not nauseous or in pain at the moment. Could consider obtaining CT scan of her abdomen given chronicity of the diarrhea and negative C diff toxin previously - could consider diagnoses such as VIPoma or other etiology.

## 2020-09-09 NOTE — ED ADULT NURSE NOTE - CHIEF COMPLAINT QUOTE
Pt states that she has been having diarrhea for more than an month, pt was released from [facility] for same 8 days ago, pt states that "they never resolved the problem".  Past medical history: HTN, AAA, CAD

## 2020-09-09 NOTE — CONSULT NOTE ADULT - SUBJECTIVE AND OBJECTIVE BOX
Chief Complaint:  Patient is a 70y old  Female who presents with a chief complaint of   Intermittent abdominal pain  Thoracoabdominal aortic aneurysm (TAAA) without rupture  Murmur, cardiac  Abdominal hernia  Cataract  Preeclampsia  HTN (hypertension)  S/P aortic valve repair  Thoracoabdominal aortic aneurysm (TAAA) without rupture  S/P cataract surgery  Bilateral cataracts  Arm fracture, left     HPI:   69yo female seen in the ER spot #7 with h/o thoracic and abdominal aortic aneurysm s/p repair, AV replacement, and HTN presenting with acute on chronic diarrhea. She was recently in [facility] with similar episodes of diarrhea with no acute etiologies. She is now with c/o recurrent diarrhea that continues between 5-6 times a day, nonbloody and watery most of the time and other times "soft but loose". She endorses at times soiling her clothing as she is unable to make it to the bathroom in time. She was see in our office about a week ago for diarrhea and started on imodium and also given abx for UTI. She is without any abdominal pain prior or during her bowel movements, describes the sensation as rapidly coming on and "maybe a small amount of cramping sometimes but usually not". Her last colonoscopy was 4 years ago as outpatient with [facility], where she reportedly had removal of benign polyp.    No Known Allergies      hydrALAZINE 10 milliGRAM(s) Oral Once  lactobacillus acidophilus 1 Tablet(s) Oral three times a day with meals  lisinopril 40 milliGRAM(s) Oral once        FAMILY HISTORY:  Family history of skin cancer: mother  Family history of coronary artery bypass graft (Mother): with valve disease        Review of Systems:    General:  No wt loss, fevers, chills, night sweats, fatigue  Eyes:  Good vision, no reported pain  ENT:  No sore throat, pain, runny nose, dysphagia  CV:  No pain, palpitations, no lightheadedness  Resp:  No dyspnea, cough, tachypnea, wheezing  GI: (+) diarrhea; denies n/v/c, abdominal pain, melena or brbpr   :  No pain, bleeding, incontinence, nocturia  Muscle:  No pain, weakness  Neuro:  No weakness, tingling, memory problems  Psych:  No fatigue, insomnia, mood problems, depression  Endocrine:  No polyuria, polydypsia, cold/heat intolerance  Heme:  No petechiae, ecchymosis, easy bruisability  Skin:  No rash, tattoos, scars, edema    Relevant Family History:   n/c    Relevant Social History: n/c      Physical Exam:    Vital Signs:  Vital Signs Last 24 Hrs  T(C): 36.7 (09 Sep 2020 14:34), Max: 36.8 (09 Sep 2020 12:24)  T(F): 98 (09 Sep 2020 14:34), Max: 98.2 (09 Sep 2020 12:24)  HR: 84 (09 Sep 2020 14:34) (84 - 87)  BP: 193/84 (09 Sep 2020 14:34) (124/62 - 193/84)  BP(mean): --  RR: 16 (09 Sep 2020 14:34) (16 - 16)  SpO2: 100% (09 Sep 2020 14:34) (100% - 100%)  Daily Height in cm: 170.18 (09 Sep 2020 12:24)    Daily     General:  Appears stated age, well-groomed, nad  HEENT:  NC/AT,  conjunctivae clear and pink, no thyromegaly, nodules, adenopathy, no JVD  Chest:  Full & symmetric excursion, no increased effort, breath sounds clear  Cardiovascular:  Regular rhythm, S1, S2, no murmur/rub/S3/S4, no abdominal bruit, no edema  Abdomen:  Soft, non-tender, non-distended, normoactive bowel sounds,  no masses ,no hepatosplenomeagaly, no signs of chronic liver disease  Extremities:  no cyanosis,clubbing or edema  Skin:  No rash/erythema/ecchymoses/petechiae/wounds/abscess/warm/dry  Neuro/Psych:  A&Ox3  , no asterixis, no tremor, no encephalopathy    Laboratory:                            10.5   8.90  )-----------( 275      ( 09 Sep 2020 13:40 )             33.9                   Imaging:

## 2020-09-09 NOTE — ED PROVIDER NOTE - NOTES
Inquired re need for colonoscopy inpatient; Dr. Martinez states he will obtain a colonoscopy during this admission

## 2020-09-09 NOTE — H&P ADULT - NSHPREVIEWOFSYSTEMS_GEN_ALL_CORE
General:  No wt loss, fevers, chills, night sweats, fatigue  Eyes:  Good vision, no reported pain  ENT:  No sore throat, pain, runny nose, dysphagia  CV:  No pain, palpitations, no lightheadedness  Resp:  No dyspnea, cough, tachypnea, wheezing  GI: (+) diarrhea; denies n/v/c, abdominal pain, melena or brbpr   :  No pain, bleeding, incontinence, nocturia  Muscle:  No pain, weakness  Neuro:  No weakness, tingling, memory problems  Psych:  No fatigue, insomnia, mood problems, depression  Endocrine:  No polyuria, polydypsia, cold/heat intolerance  Heme:  No petechiae, ecchymosis, easy bruisability  Skin:  No rash, tattoos, scars, edema

## 2020-09-09 NOTE — ED ADULT NURSE NOTE - NSIMPLEMENTINTERV_GEN_ALL_ED
Implemented All Fall with Harm Risk Interventions:  [place] to call system. Call bell, personal items and telephone within reach. Instruct patient to call for assistance. Room bathroom lighting operational. Non-slip footwear when patient is off stretcher. Physically safe environment: no spills, clutter or unnecessary equipment. Stretcher in lowest position, wheels locked, appropriate side rails in place. Provide visual cue, wrist band, yellow gown, etc. Monitor gait and stability. Monitor for mental status changes and reorient to person, place, and time. Review medications for side effects contributing to fall risk. Reinforce activity limits and safety measures with patient and family. Provide visual clues: red socks.

## 2020-09-09 NOTE — CONSULT NOTE ADULT - PROBLEM SELECTOR RECOMMENDATION 9
-unclear etiology   -bacid tid  -r/o c.diff given recent abx use  -send GI PCR and Ova/Parasites with next bm   -check ASCA/ANCA  -monitor stool consistency/frequency daily   -replete electrolytes prn   -low fiber diet today; clear liquid diet thursday  -plan for colonoscopy on Friday schedule depending

## 2020-09-09 NOTE — ED PROVIDER NOTE - PROGRESS NOTE DETAILS
Resident: Ольга Melolakisha - Pt denies pain, has not had an episode of diarrhea since coming here, states her diarrhea is usually after she eats (although had an episode despite fasting last night). No nausea currently. Resident: Ольга Crain - BP elevated - , will give patient's home med. Pt did not take her home medications this morning 2/2 to N/V giving what she takes at home - lisinopril 40 mg and hydralazine 10 mg Resident: Ольга Crain - Pt reassessed, SBP in 190s at the moment, pt denies any headache, chest pain, palpitations or SOB. Has not had a BM since coming here. Resident: Ольга Crain - Spoke to Dr. [place] - will admit

## 2020-09-09 NOTE — H&P ADULT - HISTORY OF PRESENT ILLNESS
69yo F h/o thoracic and abdominal aortic aneurysm s/p repair, AV replacement, afib, anxiety and HTN presenting with acute on chronic diarrhea. She was recently in [facility] with similar episodes of diarrhea with no acute etiologies identified. She is now with c/o recurrent diarrhea that continues between 5-6 times a day, nonbloody and watery most of the time and other times "soft but loose". She endorses at times soiling her clothing as she is unable to make it to the bathroom in time. She was see in office about a week ago for diarrhea and started on imodium and also given abx Augmentin for UTI. She is without any abdominal pain prior or during her bowel movements, describes the sensation as rapidly coming on and "maybe a small amount of cramping sometimes but usually not". Her last colonoscopy was 4 years ago, where she reportedly had removal of benign polyp.

## 2020-09-09 NOTE — H&P ADULT - PROBLEM SELECTOR PLAN 3
- S/p AAA repair with subsequent residual lower extremity paresthesia. Symptoms currently at baseline.  - Continue to monitor. Outpatient f/u.  - Fall precautions.

## 2020-09-09 NOTE — ED PROVIDER NOTE - CHIEF COMPLAINT
"10/18/2018    Nathalie Santiago  New Patient Consult    Chief Complaint   Patient presents with    Sarcoidosis    Cough     dry    Shortness of Breath       HPI: 2006 had breast cancer, had pet scan in [place], lung tissue and lymph nodes- had vats bx. Dx sarcoid.  [facility] a [facility].  Had been on qvar and prednisone 5/d.  Would taper prednisone 4/d , 3/d ppt exacerbation which would clear up with 20/d for 1+ wks.  Last taper 2016.    Sinuses ok with flonase.     pos for asthma.        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    CVA (cerebral vascular accident)- Confirmed by neurology 9/6/2016    Degenerative joint disease of cervical and lumbar spine 8/1/2014    Depression     Diabetes mellitus     Diabetes mellitus, type II     History of malignant phylloides tumor of breast 2/15/2013    Hyperlipidemia     Hyperopia with presbyopia 2/18/2013    Hypertension     Hypertension     Hypothyroidism     Immunosuppressed status 9/3/2015    Migraine     Mitral valve regurgitation     Nuclear sclerosis 2/18/2013    Obesity     JOSE LUIS (obstructive sleep apnea)     Ovarian cancer     Pneumonia     Polyneuropathy     PVD (posterior vitreous detachment) 2/18/2013    Restless leg syndrome     Ruptured disk     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 insufficiency          Past Surgical History:   Procedure Laterality Date    APPENDECTOMY      BREAST SURGERY      CHOLECYSTECTOMY      CYSTOCELE REPAIR      ESOPHAGOGASTRODUODENOSCOPY (EGD) N/A 12/22/2016    Performed by S[facility] ENDO    FRACTURE SURGERY Right     foot    HYSTERECTOMY      SAADIA/BSO    MASTECTOMY Bilateral     b/l mastectomy    OOPHORECTOMY      PLEURA BIOPSY      RECTAL SURGERY      rectocele      TONSILLECTOMY       Social History     Tobacco Use    Smoking status: Never Smoker    Smokeless tobacco: Never Used   Substance Use Topics    Alcohol use: No    Drug use: No     Comment: prescription Oycodone     Family History   Problem Relation Age of Onset    Heart disease Mother     Stroke Mother     Hyperlipidemia Mother     Cancer Father         small cell lung cancer    Hypertension Sister     Cataracts Sister     Thyroid disease Sister     Hyperlipidemia Sister     Heart disease Sister         CAD, stents in place    Hypertension Brother     Cataracts Brother     Hyperlipidemia Brother     Heart disease Brother         CAD    Hypertension Daughter     Hyperlipidemia Daughter     Anuerysm Daughter         splenic    Hyperlipidemia Sister     Hypertension Sister     Diabetes Sister     Cancer Sister         thyroid cancer    Hypertension Brother     Hyperlipidemia Brother     Hypertension Brother     Hyperlipidemia Brother     Amblyopia Neg Hx     Blindness Neg Hx     Glaucoma Neg Hx     Macular degeneration Neg Hx     Retinal detachment Neg Hx     Strabismus Neg Hx     Breast cancer Neg Hx     Colon cancer Neg Hx     Ovarian cancer Neg Hx      Review of patient's allergies indicates:   Allergen Reactions    Keflex [cephalexin] Anaphylaxis    Penicillins Anaphylaxis    Vasotec [enalapril maleate] Other (See Comments)     Causes her to pass out    Ciprofloxacin (bulk)      Bones ache, myalgia    Wellbutrin [bupropion hcl] Other  "(See Comments)     Loss of memory      Zithromax [azithromycin]      Patient states medication does not work on her-not effective    Codeine Itching and Nausea Only       Performance Status:The patient's activity level is functions out of house.      Review of Systems:  a review of eleven systems covering constitutional, Eye, HEENT, Psych, Respiratory, Cardiac, GI, , Musculoskeletal, Endocrine, Dermatologic was negative except for pertinent findings as listed ABOVE and below: pertinent positive as above, rest is good       Exam:Comprehensive exam done. BP (!) 140/70 (BP Location: Right arm, Patient Position: Sitting)   Pulse 66   Ht 5' 6" (1.676 m)   Wt 88.2 kg (194 lb 7.1 oz)   LMP  (LMP Unknown)   SpO2 (!) 92% Comment: on room air  BMI 31.38 kg/m²   Exam included Vitals as listed, and patient's appearance and affect and alertness and mood, oral exam for yeast and hygiene and pharynx lesions and Mallapatti (M) score, neck with inspection for jvd and masses and thyroid abnormalities and lymph nodes (supraclavicular and infraclavicular nodes and axillary also examined and noted if abn), chest exam included symmetry and effort and fremitus and percussion and auscultation, cardiac exam included rhythm and gallops and murmur and rubs and jvd and edema, abdominal exam for mass and hepatosplenomegaly and tenderness and hernias and bowel sounds, Musculoskeletal exam with muscle tone and posture and mobility/gait and  strength, and skin for rashes and cyanosis and pallor and turgor, extremity for clubbing.  Findings were normal except for pertinent findings listed below:  M3, chest is symmetric, no distress, normal percussion, normal fremitus and good normal breath sounds  Min post base rales    Radiographs (ct chest and cxr) reviewed: view by direct vision  12/2015 ct no sign dz    Labs reviewed   eos sl up        PFT results reviewed   DATE OF STUDY:  12/14/2015     The patient is of Dr. Steen " Candal.     The effort appears adequate.  The inspiratory portion of the flow volume loop is   normal.  The FVC is normal.  FEV1 is normal.  It measures 2.33 liters.    FEV1/FVC is normal.  FEF 25-75 is normal.  After administering an appropriate   bronchodilator, no significant change is appreciated.  Lung volumes demonstrate   a normal TLC and RV.  Diffusion was normal.     IMPRESSION:  Pulmonary function studies are normal.        HES/SN  dd: 12/23/2015 08:20:38 (CST)  td: 12/23/2015 09:12:25 (CST)  Doc ID   #[id number]  Job ID #[id number]    Plan:  Clinical impression is apparently straight forward and impression with management as below.    Nathalie was seen today for sarcoidosis, cough and shortness of breath.    Diagnoses and all orders for this visit:    Sarcoidosis of lung  -     predniSONE (DELTASONE) 5 MG tablet; Take 2 tablets (10 mg total) by mouth once daily. Take 2 daily or as directed. for 10 days  -     X-Ray Chest PA And Lateral; Future  -     Spirometry with/without bronchodilator; Future    Current chronic use of systemic steroids  -     X-Ray Chest PA And Lateral; Future  -     Spirometry with/without bronchodilator; Future    Obstructive sleep apnea syndrome  -     CPAP/BIPAP SUPPLIES    Asthma exacerbation, non-allergic, severe persistent  -     fluticasone-vilanterol (BREO ELLIPTA) 200-25 mcg/dose DsDv diskus inhaler; Inhale 1 puff into the lungs once daily. Controller  -     montelukast (SINGULAIR) 10 mg tablet; Take 1 tablet (10 mg total) by mouth every evening.  -     predniSONE (DELTASONE) 5 MG tablet; Take 2 tablets (10 mg total) by mouth once daily. Take 2 daily or as directed. for 10 days  -     albuterol-ipratropium (DUO-NEB) 2.5 mg-0.5 mg/3 mL nebulizer solution; INHALE THE CONTENTS OF 1 VIAL VIA NEBULIZER EVERY 6 HOURS AS NEEDED FOR SHORTNESS OF BREATH  OR  WHEEZING  -     albuterol (PROVENTIL/VENTOLIN HFA) 90 mcg/actuation inhaler; Inhale 2 puffs into the lungs every 4 (four) hours as  needed for Wheezing. This is a rescue inhaler medication and should be used as needed  -     X-Ray Chest PA And Lateral; Future  -     Spirometry with/without bronchodilator; Future        Follow-up in about 6 months (around 4/18/2019), or if symptoms worsen or fail to improve.    Discussed with patient above for education the following:      Patient Instructions   Chronic steroids   Would try to use as little as able   5 mg daily - minimize best, add little more asthma therapy, do peak flow/pft monitoring with taper?  Do cxr with flare?-- studies ordered.     Try 7.5 every other ?      Try 2.5 alt 5 ?     Decrease monthly as able?     Min dose 2.5 every other day?    Use cpap    Boost prednsione as needed.  20/d?    Use singulair nightly - sinus and asthma benefit  Use breo daily.  Call if any issues.    Breathing test was normal 12/15 and ct chest good 1/2016

## 2020-09-09 NOTE — H&P ADULT - PROBLEM SELECTOR PLAN 1
- Acute on chronic in nature. Admitted for further investigation and symptom control.  - R/o c. diff.  - Check cultures and GI PCR  - Send markers per GI requests.  - C/w low fiber diet today. Switch to CLD in the am.  -

## 2020-09-09 NOTE — ED PROVIDER NOTE - ATTENDING CONTRIBUTION TO CARE
Raymond: I have seen and examined the patient face to face, have reviewed and addended the HPI, PE and a/p as necessary.     69 y/o F with HTN, AAA repair 8/2019, urinary retention (self caths), chronic RLE weakness from previous spinal cord injury a/w diarrhea x 4 weeks. Pt reports extensive work up on last admission to [facility] for sepsis.  Reports having increasing diarrhea 5x/day with loose stools.  Noted to have dry heaves.  Saw GI doc ([facility]), noted possible UTI now s/p augmentin with minimal relief.  Noted to have 4x NBNB vomiting.    Noted to have no fevers, no chills, no chest pain, no shortness of breath, no palpitations.      GEN - NAD; well appearing; A+O x3; non-toxic appearing; CARD -s1s2, RRR, no M,G,R; PULM - CTA b/l, symmetric breath sounds; ABD -  +BS, ND, NT, soft, no guarding, no rebound, no masses; BACK - no CVA tenderness, Normal  spine; EXT - symmetric pulses, 2+ dp, capillary refill < 2 seconds, no cyanosis, no edema; NEURO - no focal neuro deficits, no slurred speech    69 y/o F with HTN, AAA repair 8/2019, urinary retention (self caths), chronic RLE weakness from previous spinal cord injury a/w diarrhea x 4 weeks.   - Concern for possible infectious causes, however given length of symptoms possible inflammatory; may require admission for further evaluation; send stool pcr, cbc cmp, no tenderness on exam, c. diff; fecal occult.

## 2020-09-09 NOTE — H&P ADULT - ASSESSMENT
69yo F h/o thoracic and abdominal aortic aneurysm s/p repair, AV replacement, afib, anxiety and HTN presenting with acute on chronic diarrhea.

## 2020-09-09 NOTE — H&P ADULT - NSHPPHYSICALEXAM_GEN_ALL_CORE
T(C): 36.9 (09-09-20 @ 16:31), Max: 36.9 (09-09-20 @ 15:29)  HR: 90 (09-09-20 @ 16:31) (80 - 90)  BP: 191/51 (09-09-20 @ 16:31) (124/62 - 219/82)  RR: 17 (09-09-20 @ 16:31) (16 - 17)  SpO2: 99% (09-09-20 @ 16:31) (99% - 100%)  Wt(kg): --  GENERAL: NAD, well-developed  HEAD:  Atraumatic, Normocephalic  EYES: EOMI, PERRLA, conjunctiva and sclera clear  NECK: Supple, No JVD  CHEST/LUNG: Clear to auscultation bilaterally; No wheeze  HEART: Regular rate and rhythm; No murmurs, rubs, or gallops  ABDOMEN: Soft, Nontender, Nondistended; Bowel sounds present  EXTREMITIES:  2+ Peripheral Pulses, No clubbing, cyanosis, or edema  PSYCH: AAOx3  NEUROLOGY: non-focal  SKIN: No rashes or lesions

## 2020-09-09 NOTE — H&P ADULT - PROBLEM SELECTOR PLAN 4
- Hx of afib documented on previous H&P. Currently rate controlled bp stable.  - C/w outpatient ASA 81mg daily. C/w amiodarone for rate/rhythm control.

## 2020-09-10 LAB
ANION GAP SERPL CALC-SCNC: 10 MMO/L — SIGNIFICANT CHANGE UP (ref 7–14)
BUN SERPL-MCNC: 15 MG/DL — SIGNIFICANT CHANGE UP (ref 7–23)
C DIFF TOX GENS STL QL NAA+PROBE: DETECTED — SIGNIFICANT CHANGE UP
CALCIUM SERPL-MCNC: 8.4 MG/DL — SIGNIFICANT CHANGE UP (ref 8.4–10.5)
CHLORIDE SERPL-SCNC: 106 MMOL/L — SIGNIFICANT CHANGE UP (ref 98–107)
CO2 SERPL-SCNC: 23 MMOL/L — SIGNIFICANT CHANGE UP (ref 22–31)
CREAT SERPL-MCNC: 1.07 MG/DL — SIGNIFICANT CHANGE UP (ref 0.5–1.3)
CULTURE RESULTS: NO GROWTH — SIGNIFICANT CHANGE UP
CULTURE RESULTS: SIGNIFICANT CHANGE UP
GLUCOSE SERPL-MCNC: 91 MG/DL — SIGNIFICANT CHANGE UP (ref 70–99)
HCT VFR BLD CALC: 29.5 % — LOW (ref 34.5–45)
HGB BLD-MCNC: 9.4 G/DL — LOW (ref 11.5–15.5)
MAGNESIUM SERPL-MCNC: 1.8 MG/DL — SIGNIFICANT CHANGE UP (ref 1.6–2.6)
MCHC RBC-ENTMCNC: 30.9 PG — SIGNIFICANT CHANGE UP (ref 27–34)
MCHC RBC-ENTMCNC: 31.9 % — LOW (ref 32–36)
MCV RBC AUTO: 97 FL — SIGNIFICANT CHANGE UP (ref 80–100)
NRBC # FLD: 0 K/UL — SIGNIFICANT CHANGE UP (ref 0–0)
PHOSPHATE SERPL-MCNC: 2.9 MG/DL — SIGNIFICANT CHANGE UP (ref 2.5–4.5)
PLATELET # BLD AUTO: 192 K/UL — SIGNIFICANT CHANGE UP (ref 150–400)
PMV BLD: 8.7 FL — SIGNIFICANT CHANGE UP (ref 7–13)
POTASSIUM SERPL-MCNC: 3.8 MMOL/L — SIGNIFICANT CHANGE UP (ref 3.5–5.3)
POTASSIUM SERPL-SCNC: 3.8 MMOL/L — SIGNIFICANT CHANGE UP (ref 3.5–5.3)
RBC # BLD: 3.04 M/UL — LOW (ref 3.8–5.2)
RBC # FLD: 14.5 % — SIGNIFICANT CHANGE UP (ref 10.3–14.5)
SARS-COV-2 IGG SERPL QL IA: POSITIVE
SARS-COV-2 IGM SERPL IA-ACNC: 2.19 INDEX — HIGH
SODIUM SERPL-SCNC: 139 MMOL/L — SIGNIFICANT CHANGE UP (ref 135–145)
SPECIMEN SOURCE: SIGNIFICANT CHANGE UP
WBC # BLD: 6.27 K/UL — SIGNIFICANT CHANGE UP (ref 3.8–10.5)
WBC # FLD AUTO: 6.27 K/UL — SIGNIFICANT CHANGE UP (ref 3.8–10.5)

## 2020-09-10 PROCEDURE — 99222 1ST HOSP IP/OBS MODERATE 55: CPT | Mod: GC

## 2020-09-10 RX ORDER — SOD SULF/SODIUM/NAHCO3/KCL/PEG
1 SOLUTION, RECONSTITUTED, ORAL ORAL EVERY 4 HOURS
Refills: 0 | Status: COMPLETED | OUTPATIENT
Start: 2020-09-10 | End: 2020-09-10

## 2020-09-10 RX ADMIN — DULOXETINE HYDROCHLORIDE 60 MILLIGRAM(S): 30 CAPSULE, DELAYED RELEASE ORAL at 11:43

## 2020-09-10 RX ADMIN — Medication 1 TABLET(S): at 17:38

## 2020-09-10 RX ADMIN — OLANZAPINE 2.5 MILLIGRAM(S): 15 TABLET, FILM COATED ORAL at 11:43

## 2020-09-10 RX ADMIN — Medication 10 MILLIGRAM(S): at 05:49

## 2020-09-10 RX ADMIN — Medication 81 MILLIGRAM(S): at 11:42

## 2020-09-10 RX ADMIN — Medication 1 TABLET(S): at 11:42

## 2020-09-10 RX ADMIN — Medication 1 TABLET(S): at 08:19

## 2020-09-10 RX ADMIN — Medication 10 MILLIGRAM(S): at 17:38

## 2020-09-10 RX ADMIN — Medication 10 MILLIGRAM(S): at 21:16

## 2020-09-10 RX ADMIN — AMIODARONE HYDROCHLORIDE 200 MILLIGRAM(S): 400 TABLET ORAL at 05:49

## 2020-09-10 RX ADMIN — Medication 1 TABLET(S): at 05:49

## 2020-09-10 RX ADMIN — LISINOPRIL 40 MILLIGRAM(S): 2.5 TABLET ORAL at 05:49

## 2020-09-10 RX ADMIN — Medication 1 LITER(S): at 17:38

## 2020-09-10 RX ADMIN — Medication 10 MILLIGRAM(S): at 17:37

## 2020-09-10 RX ADMIN — Medication 1 LITER(S): at 21:16

## 2020-09-10 NOTE — CONSULT NOTE ADULT - ASSESSMENT
1) Recent ESBL bacteremia  Pt has prosthetic valve and anuerysm repain    Check surveillance blood cultures    2) Diarrhea  Afebrile with a normal white blood cell count  Had diarrhea during last admission  GI pcr on 9/9 was negative  [place] studies negative last admission    Can check C diff - but doubt it explains this presentation    Consider CT a/p  GI eval- ? colonoscopy    Observe off antimicrobials 67 year old with  h/o aortic aneurysm repair with a prosthetic aortic valve now presents with 4 weeks of diarrhea     1) Recent ESBL bacteremia  Pt has prosthetic valve and anuerysm repair    Check surveillance blood cultures    2) Diarrhea  Afebrile with a normal white blood cell count  Had diarrhea during last admission  GI pcr on 9/9 was negative  [place] studies negative last admission    Can check C diff - but doubt it explains this presentation    Consider CT a/p  GI eval- ? colonoscopy    Observe off antimicrobials

## 2020-09-10 NOTE — PROGRESS NOTE ADULT - PROBLEM SELECTOR PLAN 1
-unclear etiology; r/o microscopic colitis given prolonged course  -bacid tid  -GI PCR negative; fu c. diff   -monitor stool consistency/frequency daily   -replete electrolytes prn   -clear diet today, bowel prep ordered  -NPO p MN for Colonoscopy

## 2020-09-10 NOTE — CONSULT NOTE ADULT - SUBJECTIVE AND OBJECTIVE BOX
HPI:  71yo F h/o  ·	thoracic and abdominal aortic aneurysm s/p repair  ·	AV replacement  ·	afib  ·	anxiety  ·	HTN       presenting with acute on chronic diarrhea. She was recently in [facility] with similar episodes of diarrhea with no acute etiologies identified. She is now with c/o recurrent diarrhea that continues between 5-6 times a day, nonbloody and watery most of the time and other times "soft but loose". She endorses at times soiling her clothing as she is unable to make it to the bathroom in time. She was see in office about a week ago for diarrhea and started on imodium and also given abx Augmentin for UTI. She is without any abdominal pain prior or during her bowel movements, describes the sensation as rapidly coming on and "maybe a small amount of cramping sometimes but usually not". Her last colonoscopy was 4 years ago, where she reportedly had removal of benign polyp. (09 Sep 2020 18:09)    Recent treatment for UTI with augmentin                PAST MEDICAL & SURGICAL HISTORY:  Intermittent abdominal pain: periumbilical  Thoracoabdominal aortic aneurysm (TAAA) without rupture  Murmur, cardiac  Cataract: bilateral  Preeclampsia  HTN (hypertension): controlled  S/P aortic valve repair: 20 with bioprostetic valve  Thoracoabdominal aortic aneurysm (TAAA) without rupture: s/p repair  Asceding aortic aneurysm repair 2019  S/P cataract surgery  Arm fracture, left:       Allergies    No Known Allergies    Intolerances        ANTIMICROBIALS:      OTHER MEDS:  aMIOdarone    Tablet 200 milliGRAM(s) Oral daily  aspirin enteric coated 81 milliGRAM(s) Oral daily  diphenoxylate/atropine 1 Tablet(s) Oral two times a day  DULoxetine 60 milliGRAM(s) Oral daily  hydrALAZINE 10 milliGRAM(s) Oral two times a day  lactobacillus acidophilus 1 Tablet(s) Oral three times a day with meals  lisinopril 40 milliGRAM(s) Oral daily  OLANZapine 2.5 milliGRAM(s) Oral daily      SOCIAL HISTORY:    FAMILY HISTORY:  Family history of skin cancer: mother  Family history of coronary artery bypass graft: with valve disease      REVIEW OF SYSTEMS  [  ] ROS unobtainable because:    [  ] All other systems negative except as noted below:	    Constitutional:  [ ] fever [ ] chills  [ ] weight loss  [ ] weakness  Skin:  [ ] rash [ ] phlebitis	  Eyes: [ ] icterus [ ] pain  [ ] discharge	  ENMT: [ ] sore throat  [ ] thrush [ ] ulcers [ ] exudates  Respiratory: [ ] dyspnea [ ] hemoptysis [ ] cough [ ] sputum	  Cardiovascular:  [ ] chest pain [ ] palpitations [ ] edema	  Gastrointestinal:  [ ] nausea [ ] vomiting [ ] diarrhea [ ] constipation [ ] pain	  Genitourinary:  [ ] dysuria [ ] frequency [ ] hematuria [ ] discharge [ ] flank pain  [ ] incontinence  Musculoskeletal:  [ ] myalgias [ ] arthralgias [ ] arthritis  [ ] back pain  Neurological:  [ ] headache [ ] seizures  [ ] confusion/altered mental status  Psychiatric:  [ ] anxiety [ ] depression	  Hematology/Lymphatics:  [ ] lymphadenopathy  Endocrine:  [ ] adrenal [ ] thyroid  Allergic/Immunologic:	 [ ] transplant [ ] seasonal    PHYSICAL EXAM:  General: [ ] non-toxic  HEAD/EYES: [ ] PERRL [ ] white sclera [ ] icterus  ENT:  [ ] normal [ ] supple [ ] thrush [ ] pharyngeal exudate  Cardiovascular:   [ ] murmur [ ] normal [ ] PPM/AICD  Respiratory:  [ ] clear to ausculation bilaterally  GI:  [ ] soft, non-tender, normal bowel sounds  :  [ ] muñoz [ ] no CVA tenderness   Musculoskeletal:  [ ] no synovitis  Neurologic:  [ ] non-focal exam   Skin:  [ ] no rash  Lymph: [ ] no lymphadenopathy  Psychiatric:  [ ] appropriate affect [ ] alert & oriented  Lines:  [ ] no phlebitis [ ] central line          Drug Dosing Weight  Height (cm): 170.2 (09 Sep 2020 22:30)  Weight (kg): 59.9 (09 Sep 2020 22:30)  BMI (kg/m2): 20.7 (09 Sep 2020 22:30)  BSA (m2): 1.7 (09 Sep 2020 22:30)    Vital Signs Last 24 Hrs  T(F): 98.3 (09-10-20 @ 05:47), Max: 98.5 (20 @ 20:01)    Vital Signs Last 24 Hrs  HR: 72 (09-10-20 @ 05:47) (72 - 96)  BP: 141/74 (09-10-20 @ 05:47) (122/60 - 219/82)  RR: 17 (09-10-20 @ 05:47)  SpO2: 99% (09-10-20 @ 05:47) (99% - 100%)  Wt(kg): --                          9.4    6.27  )-----------( 192      ( 10 Sep 2020 06:21 )             29.5       09-10    139  |  106  |  15  ----------------------------<  91  3.8   |  23  |  1.07    Ca    8.4      10 Sep 2020 06:21  Phos  2.9     09-10  Mg     1.8     09-10    TPro  6.5  /  Alb  3.9  /  TBili  0.5  /  DBili  x   /  AST  23  /  ALT  22  /  AlkPhos  75  09-09      Urinalysis Basic - ( 09 Sep 2020 13:40 )    Color: LIGHT YELLOW / Appearance: CLEAR / S.014 / pH: 6.0  Gluc: NEGATIVE / Ketone: NEGATIVE  / Bili: NEGATIVE / Urobili: NORMAL   Blood: NEGATIVE / Protein: 20 / Nitrite: NEGATIVE   Leuk Esterase: TRACE / RBC: 3-5 / WBC 6-10   Sq Epi: OCC / Non Sq Epi: x / Bacteria: NEGATIVE        MICROBIOLOGY:    RADIOLOGY: HPI:  71yo F h/o  ·	thoracic and abdominal aortic aneurysm s/p repair  ·	AV replacement  ·	afib  ·	anxiety  ·	HTN       presenting with acute on chronic diarrhea. She was recently in [facility] with similar episodes of diarrhea with no acute etiologies identified. She is now with c/o recurrent diarrhea that continues between 5-6 times a day, nonbloody and watery most of the time and other times "soft but loose". She endorses at times soiling her clothing as she is unable to make it to the bathroom in time. She was see in office about a week ago for diarrhea and started on imodium.    States she has had diarrhea for one month.  Recenty 5-6 episodes a day, but only one episode so far today.  Denies associated fever.  Denies abd pain.  Denies seeing blood in her stool.    Recently given abx Augmentin for UTI. She is without any abdominal pain prior or during her bowel movements, describes the sensation as rapidly coming on and "maybe a small amount of cramping sometimes but usually not". Her last colonoscopy was 4 years ago, where she reportedly had removal of benign polyp. (09 Sep 2020 18:09)      PAST MEDICAL & SURGICAL HISTORY:  Intermittent abdominal pain: periumbilical  Thoracoabdominal aortic aneurysm (TAAA) without rupture  Murmur, cardiac  Cataract: bilateral  Preeclampsia  HTN (hypertension): controlled  S/P aortic valve repair: 20 with bioprostetic valve  Thoracoabdominal aortic aneurysm (TAAA) without rupture: s/p repair  Asceding aortic aneurysm repair 2019  S/P cataract surgery  Arm fracture, left:       Allergies    No Known Allergies    Intolerances        ANTIMICROBIALS:      OTHER MEDS:  aMIOdarone    Tablet 200 milliGRAM(s) Oral daily  aspirin enteric coated 81 milliGRAM(s) Oral daily  diphenoxylate/atropine 1 Tablet(s) Oral two times a day  DULoxetine 60 milliGRAM(s) Oral daily  hydrALAZINE 10 milliGRAM(s) Oral two times a day  lactobacillus acidophilus 1 Tablet(s) Oral three times a day with meals  lisinopril 40 milliGRAM(s) Oral daily  OLANZapine 2.5 milliGRAM(s) Oral daily      SOCIAL HISTORYWidow, lives alone  No tobacco  born/lives in [place]-- no recent travel:  No pets  Retired    FAMILY HISTORY:  Family history of skin cancer: mother  Family history of coronary artery bypass graft: with valve disease      REVIEW OF SYSTEMS  [  ] ROS unobtainable because:    [x ] All other systems negative except as noted below:	    Constitutional:  [ ] fever [ ] chills  [ ] weight loss  [ ] weakness  Skin:  [ ] rash [ ] phlebitis	  Eyes: [ ] icterus [ ] pain  [ ] discharge	  ENMT: [ ] sore throat  [ ] thrush [ ] ulcers [ ] exudates  Respiratory: [ ] dyspnea [ ] hemoptysis [ ] cough [ ] sputum	  Cardiovascular:  [ ] chest pain [ ] palpitations [ ] edema	  Gastrointestinal:  [ ] nausea [ ] vomiting [ x] diarrhea [ ] constipation [ ] pain	  Genitourinary:  [ ] dysuria [ ] frequency [ ] hematuria [ ] discharge [ ] flank pain  [ ] incontinence  Musculoskeletal:  [ ] myalgias [ ] arthralgias [ ] arthritis  [ ] back pain  Neurological:  [ ] headache [ ] seizures  [ ] confusion/altered mental status  Psychiatric:  [ ] anxiety [ ] depression	  Hematology/Lymphatics:  [ ] lymphadenopathy  Endocrine:  [ ] adrenal [ ] thyroid  Allergic/Immunologic:	 [ ] transplant [ ] seasonal    PHYSICAL EXAM:  General: [x ] non-toxic  HEAD/EYES: [ ] PERRL [ x] white sclera [ ] icterus  ENT:  [ ] normal [ x] supple [ ] thrush [ ] pharyngeal exudate  Cardiovascular:   [ ] murmur [ x] normal [ ] PPM/AICD  Respiratory:  x ] clear to ausculation bilaterally  GI:  [ ] soft, non-tender, normal bowel sounds  :  [ ] muñoz x[ ] no CVA tenderness   Musculoskeletal:  [ ] no synovitis  Neurologic:  [ ] non-focal exam   Skin:  [ x] no rash  Lymph: [ ] no lymphadenopathy  Psychiatric:  [ ] appropriate affect [ x] alert & oriented  Lines:  [x ] no phlebitis [ ] central line          Drug Dosing Weight  Height (cm): 170.2 (09 Sep 2020 22:30)  Weight (kg): 59.9 (09 Sep 2020 22:30)  BMI (kg/m2): 20.7 (09 Sep 2020 22:30)  BSA (m2): 1.7 (09 Sep 2020 22:30)    Vital Signs Last 24 Hrs  T(F): 98.3 (09-10-20 @ 05:47), Max: 98.5 (20 @ 20:01)    Vital Signs Last 24 Hrs  HR: 72 (09-10-20 @ 05:47) (72 - 96)  BP: 141/74 (09-10-20 @ 05:47) (122/60 - 219/82)  RR: 17 (09-10-20 @ 05:47)  SpO2: 99% (09-10-20 @ 05:47) (99% - 100%)  Wt(kg): --                          9.4    6.27  )-----------( 192      ( 10 Sep 2020 06:21 )             29.5       09-10    139  |  106  |  15  ----------------------------<  91  3.8   |  23  |  1.07    Ca    8.4      10 Sep 2020 06:21  Phos  2.9     09-10  Mg     1.8     09-10    TPro  6.5  /  Alb  3.9  /  TBili  0.5  /  DBili  x   /  AST  23  /  ALT  22  /  AlkPhos  75  09-09      Urinalysis Basic - ( 09 Sep 2020 13:40 )    Color: LIGHT YELLOW / Appearance: CLEAR / S.014 / pH: 6.0  Gluc: NEGATIVE / Ketone: NEGATIVE  / Bili: NEGATIVE / Urobili: NORMAL   Blood: NEGATIVE / Protein: 20 / Nitrite: NEGATIVE   Leuk Esterase: TRACE / RBC: 3-5 / WBC 6-10   Sq Epi: OCC / Non Sq Epi: x / Bacteria: NEGATIVE        MICROBIOLOGY:    RADIOLOGY:

## 2020-09-10 NOTE — PROGRESS NOTE ADULT - PROBLEM SELECTOR PLAN 3
Advanced care planning was discussed with patient and family.  Advanced care planning forms were reviewed and discussed.  Risks, benefits and alternatives of gastroenterologic procedures were discussed in detail and all questions were answered. 30 minutes spent.

## 2020-09-11 DIAGNOSIS — A04.72 ENTEROCOLITIS DUE TO CLOSTRIDIUM DIFFICILE, NOT SPECIFIED AS RECURRENT: ICD-10-CM

## 2020-09-11 LAB
ANION GAP SERPL CALC-SCNC: 13 MMO/L — SIGNIFICANT CHANGE UP (ref 7–14)
BUN SERPL-MCNC: 10 MG/DL — SIGNIFICANT CHANGE UP (ref 7–23)
CALCIUM SERPL-MCNC: 8.7 MG/DL — SIGNIFICANT CHANGE UP (ref 8.4–10.5)
CHLORIDE SERPL-SCNC: 104 MMOL/L — SIGNIFICANT CHANGE UP (ref 98–107)
CO2 SERPL-SCNC: 20 MMOL/L — LOW (ref 22–31)
CREAT SERPL-MCNC: 1.12 MG/DL — SIGNIFICANT CHANGE UP (ref 0.5–1.3)
CULTURE RESULTS: SIGNIFICANT CHANGE UP
GLUCOSE SERPL-MCNC: 103 MG/DL — HIGH (ref 70–99)
HCT VFR BLD CALC: 31.8 % — LOW (ref 34.5–45)
HGB BLD-MCNC: 10.3 G/DL — LOW (ref 11.5–15.5)
MAGNESIUM SERPL-MCNC: 1.7 MG/DL — SIGNIFICANT CHANGE UP (ref 1.6–2.6)
MCHC RBC-ENTMCNC: 30.9 PG — SIGNIFICANT CHANGE UP (ref 27–34)
MCHC RBC-ENTMCNC: 32.4 % — SIGNIFICANT CHANGE UP (ref 32–36)
MCV RBC AUTO: 95.5 FL — SIGNIFICANT CHANGE UP (ref 80–100)
NRBC # FLD: 0 K/UL — SIGNIFICANT CHANGE UP (ref 0–0)
PHOSPHATE SERPL-MCNC: 3.7 MG/DL — SIGNIFICANT CHANGE UP (ref 2.5–4.5)
PLATELET # BLD AUTO: 241 K/UL — SIGNIFICANT CHANGE UP (ref 150–400)
PMV BLD: 8.9 FL — SIGNIFICANT CHANGE UP (ref 7–13)
POTASSIUM SERPL-MCNC: 3.2 MMOL/L — LOW (ref 3.5–5.3)
POTASSIUM SERPL-SCNC: 3.2 MMOL/L — LOW (ref 3.5–5.3)
RBC # BLD: 3.33 M/UL — LOW (ref 3.8–5.2)
RBC # FLD: 14.5 % — SIGNIFICANT CHANGE UP (ref 10.3–14.5)
SODIUM SERPL-SCNC: 137 MMOL/L — SIGNIFICANT CHANGE UP (ref 135–145)
SPECIMEN SOURCE: SIGNIFICANT CHANGE UP
WBC # BLD: 10.64 K/UL — HIGH (ref 3.8–10.5)
WBC # FLD AUTO: 10.64 K/UL — HIGH (ref 3.8–10.5)

## 2020-09-11 PROCEDURE — 99221 1ST HOSP IP/OBS SF/LOW 40: CPT

## 2020-09-11 PROCEDURE — 99232 SBSQ HOSP IP/OBS MODERATE 35: CPT

## 2020-09-11 RX ORDER — POTASSIUM CHLORIDE 20 MEQ
10 PACKET (EA) ORAL
Refills: 0 | Status: COMPLETED | OUTPATIENT
Start: 2020-09-11 | End: 2020-09-11

## 2020-09-11 RX ORDER — VANCOMYCIN HCL 1 G
125 VIAL (EA) INTRAVENOUS EVERY 6 HOURS
Refills: 0 | Status: DISCONTINUED | OUTPATIENT
Start: 2020-09-11 | End: 2020-09-14

## 2020-09-11 RX ADMIN — Medication 1 TABLET(S): at 11:33

## 2020-09-11 RX ADMIN — Medication 100 MILLIEQUIVALENT(S): at 09:03

## 2020-09-11 RX ADMIN — Medication 81 MILLIGRAM(S): at 11:33

## 2020-09-11 RX ADMIN — DULOXETINE HYDROCHLORIDE 60 MILLIGRAM(S): 30 CAPSULE, DELAYED RELEASE ORAL at 11:33

## 2020-09-11 RX ADMIN — AMIODARONE HYDROCHLORIDE 200 MILLIGRAM(S): 400 TABLET ORAL at 05:03

## 2020-09-11 RX ADMIN — OLANZAPINE 2.5 MILLIGRAM(S): 15 TABLET, FILM COATED ORAL at 11:33

## 2020-09-11 RX ADMIN — Medication 10 MILLIGRAM(S): at 17:18

## 2020-09-11 RX ADMIN — Medication 125 MILLIGRAM(S): at 17:18

## 2020-09-11 RX ADMIN — Medication 100 MILLIEQUIVALENT(S): at 07:40

## 2020-09-11 RX ADMIN — Medication 100 MILLIEQUIVALENT(S): at 10:09

## 2020-09-11 RX ADMIN — Medication 10 MILLIGRAM(S): at 05:03

## 2020-09-11 RX ADMIN — LISINOPRIL 40 MILLIGRAM(S): 2.5 TABLET ORAL at 05:03

## 2020-09-11 RX ADMIN — Medication 1 TABLET(S): at 17:18

## 2020-09-11 NOTE — CONSULT NOTE ADULT - SUBJECTIVE AND OBJECTIVE BOX
Patient is a 70y old  Female who presents with a chief complaint of diarrhea (10 Sep 2020 15:33)      HPI:  71yo F h/o thoracic and abdominal aortic aneurysm s/p repair, AV replacement, afib, anxiety and HTN presenting with acute on chronic diarrhea. She was recently in [facility] with similar episodes of diarrhea with no acute etiologies identified. She is now with c/o recurrent diarrhea that continues between 5-6 times a day, nonbloody and watery most of the time and other times "soft but loose". She endorses at times soiling her clothing as she is unable to make it to the bathroom in time. She was see in office about a week ago for diarrhea and started on imodium and also given abx Augmentin for UTI. She is without any abdominal pain prior or during her bowel movements, describes the sensation as rapidly coming on and "maybe a small amount of cramping sometimes but usually not". Her last colonoscopy was 4 years ago, where she reportedly had removal of benign polyp. (09 Sep 2020 18:09)    Patient recently in [facility] after hospital stay at [facility], went home and was ambulating with rollator however readmitted due to worsening weakness. Patient was home for 4 days prior to coming to [facility]    REVIEW OF SYSTEMS  Constitutional - No fever, No weight loss, No fatigue  HEENT - No eye pain, No visual disturbances, No difficulty hearing, No tinnitus, No vertigo, No neck pain  Respiratory - No cough, No wheezing, No shortness of breath  Cardiovascular - No chest pain, No palpitations  Gastrointestinal - No abdominal pain, No nausea, No vomiting, + diarrhea, No constipation  Genitourinary - No dysuria, No frequency, No hematuria, No incontinence  Neurological - No headaches, No memory loss, + loss of strength, + numbness, No tremors  Skin - No itching, No rashes, No lesions   Endocrine - No temperature intolerance  Musculoskeletal - No joint pain, No joint swelling, No muscle pain  Psychiatric - No depression, No anxiety    PAST MEDICAL & SURGICAL HISTORY  Intermittent abdominal pain  Thoracoabdominal aortic aneurysm (TAAA) without rupture  Murmur, cardiac  Abdominal hernia  Cataract  Preeclampsia  HTN (hypertension)  S/P aortic valve repair  Thoracoabdominal aortic aneurysm (TAAA) without rupture  S/P cataract surgery  Bilateral cataracts  Arm fracture, left      SOCIAL HISTORY  Smoking - Denied  EtOH - Denied   Drugs - Denied    FUNCTIONAL HISTORY  Lives in coop without stairs  Independent with rollator,   boyfriend, sister in law and son help with shopping, laundry  uses cane at times    CURRENT FUNCTIONAL STATUS  hand held assist    FAMILY HISTORY   Family history of skin cancer  Family history of coronary artery bypass graft  Family history of early CAD      RECENT LABS/IMAGING  CBC Full  -  ( 11 Sep 2020 05:45 )  WBC Count : 10.64 K/uL  RBC Count : 3.33 M/uL  Hemoglobin : 10.3 g/dL  Hematocrit : 31.8 %  Platelet Count - Automated : 241 K/uL  Mean Cell Volume : 95.5 fL  Mean Cell Hemoglobin : 30.9 pg  Mean Cell Hemoglobin Concentration : 32.4 %  Auto Neutrophil # : x  Auto Lymphocyte # : x  Auto Monocyte # : x  Auto Eosinophil # : x  Auto Basophil # : x  Auto Neutrophil % : x  Auto Lymphocyte % : x  Auto Monocyte % : x  Auto Eosinophil % : x  Auto Basophil % : x        137  |  104  |  10  ----------------------------<  103<H>  3.2<L>   |  20<L>  |  1.12    Ca    8.7      11 Sep 2020 05:45  Phos  3.7       Mg     1.7         TPro  6.5  /  Alb  3.9  /  TBili  0.5  /  DBili  x   /  AST  23  /  ALT  22  /  AlkPhos  75      Urinalysis Basic - ( 09 Sep 2020 13:40 )    Color: LIGHT YELLOW / Appearance: CLEAR / S.014 / pH: 6.0  Gluc: NEGATIVE / Ketone: NEGATIVE  / Bili: NEGATIVE / Urobili: NORMAL   Blood: NEGATIVE / Protein: 20 / Nitrite: NEGATIVE   Leuk Esterase: TRACE / RBC: 3-5 / WBC 6-10   Sq Epi: OCC / Non Sq Epi: x / Bacteria: NEGATIVE        VITALS  T(C): 36.8 (20 @ 04:58), Max: 36.8 (20 @ 04:58)  HR: 99 (20 @ 04:58) (69 - 99)  BP: 155/90 (20 @ 04:58) (126/67 - 178/94)  RR: 17 (20 @ 04:58) (17 - 17)  SpO2: 100% (09-11-20 @ 04:58) (98% - 100%)  Wt(kg): --    ALLERGIES  No Known Allergies      MEDICATIONS   aMIOdarone    Tablet 200 milliGRAM(s) Oral daily  aspirin enteric coated 81 milliGRAM(s) Oral daily  DULoxetine 60 milliGRAM(s) Oral daily  hydrALAZINE 10 milliGRAM(s) Oral two times a day  lactobacillus acidophilus 1 Tablet(s) Oral three times a day with meals  lisinopril 40 milliGRAM(s) Oral daily  OLANZapine 2.5 milliGRAM(s) Oral daily      ----------------------------------------------------------------------------------------  PHYSICAL EXAM  Constitutional - NAD, Comfortable  HEENT - NCAT, EOMI  - patient had staples removed yesterday from left side of scalp (due to prior fall in rehab)  Neck - Supple, No limited ROM  Chest - no respiratory distress  Cardiovascular - S1S2  Abdomen -  Soft, NTND  Extremities - No C/C/E, No calf tenderness   Neurologic Exam -                    Cognitive - Awake, Alert, AAO to self, place, date, year, situation     Communication - Fluent, No dysarthria     Cranial Nerves - CN 2-12 intact     Motor -                     LEFT    UE - ShAB 5/5, EF 5/5, EE 5/5, WE 5/5,  5/5                    RIGHT UE - ShAB 5/5, EF 5/5, EE 5/5, WE 5/5,  5/5                    LEFT    LE - HF 4+/5, KE 4+/5, DF 4+/5, PF 4+/5                    RIGHT LE - HF 4/5, KE 4/5, DF 4/5, PF 4/5        Sensory - decreased to LT b/l LE     Reflexes - + hyperreflexive b/l patella      Balance - WNL Static  Psychiatric - Mood stable, Affect WNL  ----------------------------------------------------------------------------------------  ASSESSMENT/PLAN 69 yo f pmh recent hospitalization at [facility] (sepsis from uti) and rehab stay at [facility], readmitted to [facility] from home due to worsening diarrhea, found to have c. diff.     planned for colonoscopy today  Pain - denies  diet: clear liquid  urination: patient reports good bladder control, asks for bedpan when needed   diarrhea- 4-5x/day, now improving now 1-2 x so far today (also received prep for colonoscopy)  K supplemented for K 3.2  Rehab - goal is for discharge home when medically cleared with assistance from family, with outpatient therapy  please request bedside PT for bed mobility, transfers, ambulation with AD, ADLS, also to prevent deconditioning while inpatient. Patient is a 70y old  Female who presents with a chief complaint of diarrhea (10 Sep 2020 15:33)      HPI:  71yo F h/o thoracic and abdominal aortic aneurysm s/p repair, AV replacement, afib, anxiety and HTN presenting with acute on chronic diarrhea. She was recently in [facility] with similar episodes of diarrhea with no acute etiologies identified. She is now with c/o recurrent diarrhea that continues between 5-6 times a day, nonbloody and watery most of the time and other times "soft but loose". She endorses at times soiling her clothing as she is unable to make it to the bathroom in time. She was see in office about a week ago for diarrhea and started on imodium and also given abx Augmentin for UTI. She is without any abdominal pain prior or during her bowel movements, describes the sensation as rapidly coming on and "maybe a small amount of cramping sometimes but usually not". Her last colonoscopy was 4 years ago, where she reportedly had removal of benign polyp. (09 Sep 2020 18:09)    Patient recently in [facility] after hospital stay at [facility], went home and was ambulating with rollator however readmitted due to worsening weakness. Patient was home for 4 days prior to coming to [facility].    Patient found to have c diff, to start vanco treatment, bacid per GI    REVIEW OF SYSTEMS  Constitutional - No fever, No weight loss, No fatigue  HEENT - No eye pain, No visual disturbances, No difficulty hearing, No tinnitus, No vertigo, No neck pain  Respiratory - No cough, No wheezing, No shortness of breath  Cardiovascular - No chest pain, No palpitations  Gastrointestinal - No abdominal pain, No nausea, No vomiting, + diarrhea, No constipation  Genitourinary - No dysuria, No frequency, No hematuria, No incontinence  Neurological - No headaches, No memory loss, + loss of strength, + numbness, No tremors  Skin - No itching, No rashes, No lesions   Endocrine - No temperature intolerance  Musculoskeletal - No joint pain, No joint swelling, No muscle pain  Psychiatric - No depression, No anxiety    PAST MEDICAL & SURGICAL HISTORY  Intermittent abdominal pain  Thoracoabdominal aortic aneurysm (TAAA) without rupture  Murmur, cardiac  Abdominal hernia  Cataract  Preeclampsia  HTN (hypertension)  S/P aortic valve repair  Thoracoabdominal aortic aneurysm (TAAA) without rupture  S/P cataract surgery  Bilateral cataracts  Arm fracture, left      SOCIAL HISTORY  Smoking - Denied  EtOH - Denied   Drugs - Denied    FUNCTIONAL HISTORY  Lives in coop without stairs  Independent with rollator,   boyfriend, sister in law and son help with shopping, laundry  uses cane at times    CURRENT FUNCTIONAL STATUS  hand held assist    FAMILY HISTORY   Family history of skin cancer  Family history of coronary artery bypass graft  Family history of early CAD      RECENT LABS/IMAGING  CBC Full  -  ( 11 Sep 2020 05:45 )  WBC Count : 10.64 K/uL  RBC Count : 3.33 M/uL  Hemoglobin : 10.3 g/dL  Hematocrit : 31.8 %  Platelet Count - Automated : 241 K/uL  Mean Cell Volume : 95.5 fL  Mean Cell Hemoglobin : 30.9 pg  Mean Cell Hemoglobin Concentration : 32.4 %  Auto Neutrophil # : x  Auto Lymphocyte # : x  Auto Monocyte # : x  Auto Eosinophil # : x  Auto Basophil # : x  Auto Neutrophil % : x  Auto Lymphocyte % : x  Auto Monocyte % : x  Auto Eosinophil % : x  Auto Basophil % : x        137  |  104  |  10  ----------------------------<  103<H>  3.2<L>   |  20<L>  |  1.12    Ca    8.7      11 Sep 2020 05:45  Phos  3.7       Mg     1.7         TPro  6.5  /  Alb  3.9  /  TBili  0.5  /  DBili  x   /  AST  23  /  ALT  22  /  AlkPhos  75      Urinalysis Basic - ( 09 Sep 2020 13:40 )    Color: LIGHT YELLOW / Appearance: CLEAR / S.014 / pH: 6.0  Gluc: NEGATIVE / Ketone: NEGATIVE  / Bili: NEGATIVE / Urobili: NORMAL   Blood: NEGATIVE / Protein: 20 / Nitrite: NEGATIVE   Leuk Esterase: TRACE / RBC: 3-5 / WBC 6-10   Sq Epi: OCC / Non Sq Epi: x / Bacteria: NEGATIVE        VITALS  T(C): 36.8 (20 @ 04:58), Max: 36.8 (20 @ 04:58)  HR: 99 (20 @ 04:58) (69 - 99)  BP: 155/90 (20 @ 04:58) (126/67 - 178/94)  RR: 17 (09-11-20 @ 04:58) (17 - 17)  SpO2: 100% (20 @ 04:58) (98% - 100%)  Wt(kg): --    ALLERGIES  No Known Allergies      MEDICATIONS   aMIOdarone    Tablet 200 milliGRAM(s) Oral daily  aspirin enteric coated 81 milliGRAM(s) Oral daily  DULoxetine 60 milliGRAM(s) Oral daily  hydrALAZINE 10 milliGRAM(s) Oral two times a day  lactobacillus acidophilus 1 Tablet(s) Oral three times a day with meals  lisinopril 40 milliGRAM(s) Oral daily  OLANZapine 2.5 milliGRAM(s) Oral daily      ----------------------------------------------------------------------------------------  PHYSICAL EXAM  Constitutional - NAD, Comfortable  HEENT - NCAT, EOMI  - patient had staples removed yesterday from left side of scalp (due to prior fall in rehab)  Neck - Supple, No limited ROM  Chest - no respiratory distress  Cardiovascular - S1S2  Abdomen -  Soft, NTND  Extremities - No C/C/E, No calf tenderness   Neurologic Exam -                    Cognitive - Awake, Alert, AAO to self, place, date, year, situation     Communication - Fluent, No dysarthria     Cranial Nerves - CN 2-12 intact     Motor -                     LEFT    UE - ShAB 5/5, EF 5/5, EE 5/5, WE 5/5,  5/5                    RIGHT UE - ShAB 5/5, EF 5/5, EE 5/5, WE 5/5,  5/5                    LEFT    LE - HF 4+/5, KE 4+/5, DF 4+/5, PF 4+/5                    RIGHT LE - HF 4/5, KE 4/5, DF 4/5, PF 4/5        Sensory - decreased to LT b/l LE     Reflexes - + hyperreflexive b/l patella      Balance - WNL Static  Psychiatric - Mood stable, Affect WNL  ----------------------------------------------------------------------------------------  ASSESSMENT/PLAN 69 yo f pmh recent hospitalization at [facility] (sepsis from uti) and rehab stay at [facility], readmitted to [facility] from home due to worsening diarrhea, found to have c. diff.   starting vanco for c diff  planned for colonoscopy today  Pain - denies  diet: clear liquid  urination: patient reports good bladder control, asks for bedpan when needed   diarrhea- 4-5x/day, now improving now 1-2 x so far today (also received prep for colonoscopy)  K supplemented for K 3.2  Rehab - goal is for discharge home when medically cleared with assistance from family, with outpatient therapy  please request bedside PT for bed mobility, transfers, ambulation with AD, ADLS, also to prevent deconditioning while inpatient.

## 2020-09-11 NOTE — PROGRESS NOTE ADULT - PROBLEM SELECTOR PLAN 1
-daily labs   -start Vancomycin 125mg PO Q6H   -bacid tid   -replete electrolytes prn   -monitor frequency   -low fiber diet -daily labs   -start Vancomycin 125mg PO Q6H x 14 days  -bacid tid   -replete electrolytes prn   -monitor frequency   -low fiber diet

## 2020-09-12 LAB
ANION GAP SERPL CALC-SCNC: 11 MMO/L — SIGNIFICANT CHANGE UP (ref 7–14)
ANION GAP SERPL CALC-SCNC: 13 MMO/L — SIGNIFICANT CHANGE UP (ref 7–14)
BASOPHILS # BLD AUTO: 0.04 K/UL — SIGNIFICANT CHANGE UP (ref 0–0.2)
BASOPHILS NFR BLD AUTO: 0.6 % — SIGNIFICANT CHANGE UP (ref 0–2)
BUN SERPL-MCNC: 15 MG/DL — SIGNIFICANT CHANGE UP (ref 7–23)
BUN SERPL-MCNC: 15 MG/DL — SIGNIFICANT CHANGE UP (ref 7–23)
CALCIUM SERPL-MCNC: 8.7 MG/DL — SIGNIFICANT CHANGE UP (ref 8.4–10.5)
CALCIUM SERPL-MCNC: 8.7 MG/DL — SIGNIFICANT CHANGE UP (ref 8.4–10.5)
CHLORIDE SERPL-SCNC: 104 MMOL/L — SIGNIFICANT CHANGE UP (ref 98–107)
CHLORIDE SERPL-SCNC: 106 MMOL/L — SIGNIFICANT CHANGE UP (ref 98–107)
CO2 SERPL-SCNC: 19 MMOL/L — LOW (ref 22–31)
CO2 SERPL-SCNC: 22 MMOL/L — SIGNIFICANT CHANGE UP (ref 22–31)
CREAT SERPL-MCNC: 1.1 MG/DL — SIGNIFICANT CHANGE UP (ref 0.5–1.3)
CREAT SERPL-MCNC: 1.13 MG/DL — SIGNIFICANT CHANGE UP (ref 0.5–1.3)
EOSINOPHIL # BLD AUTO: 0.24 K/UL — SIGNIFICANT CHANGE UP (ref 0–0.5)
EOSINOPHIL NFR BLD AUTO: 3.4 % — SIGNIFICANT CHANGE UP (ref 0–6)
GLUCOSE SERPL-MCNC: 83 MG/DL — SIGNIFICANT CHANGE UP (ref 70–99)
GLUCOSE SERPL-MCNC: 97 MG/DL — SIGNIFICANT CHANGE UP (ref 70–99)
HCT VFR BLD CALC: 31.7 % — LOW (ref 34.5–45)
HGB BLD-MCNC: 10.2 G/DL — LOW (ref 11.5–15.5)
IMM GRANULOCYTES NFR BLD AUTO: 0.4 % — SIGNIFICANT CHANGE UP (ref 0–1.5)
LYMPHOCYTES # BLD AUTO: 1.83 K/UL — SIGNIFICANT CHANGE UP (ref 1–3.3)
LYMPHOCYTES # BLD AUTO: 26 % — SIGNIFICANT CHANGE UP (ref 13–44)
MAGNESIUM SERPL-MCNC: 1.8 MG/DL — SIGNIFICANT CHANGE UP (ref 1.6–2.6)
MAGNESIUM SERPL-MCNC: 1.9 MG/DL — SIGNIFICANT CHANGE UP (ref 1.6–2.6)
MCHC RBC-ENTMCNC: 30.4 PG — SIGNIFICANT CHANGE UP (ref 27–34)
MCHC RBC-ENTMCNC: 32.2 % — SIGNIFICANT CHANGE UP (ref 32–36)
MCV RBC AUTO: 94.3 FL — SIGNIFICANT CHANGE UP (ref 80–100)
MONOCYTES # BLD AUTO: 0.62 K/UL — SIGNIFICANT CHANGE UP (ref 0–0.9)
MONOCYTES NFR BLD AUTO: 8.8 % — SIGNIFICANT CHANGE UP (ref 2–14)
NEUTROPHILS # BLD AUTO: 4.28 K/UL — SIGNIFICANT CHANGE UP (ref 1.8–7.4)
NEUTROPHILS NFR BLD AUTO: 60.8 % — SIGNIFICANT CHANGE UP (ref 43–77)
NRBC # FLD: 0 K/UL — SIGNIFICANT CHANGE UP (ref 0–0)
PHOSPHATE SERPL-MCNC: 3.1 MG/DL — SIGNIFICANT CHANGE UP (ref 2.5–4.5)
PHOSPHATE SERPL-MCNC: 3.2 MG/DL — SIGNIFICANT CHANGE UP (ref 2.5–4.5)
PLATELET # BLD AUTO: 246 K/UL — SIGNIFICANT CHANGE UP (ref 150–400)
PMV BLD: 8.8 FL — SIGNIFICANT CHANGE UP (ref 7–13)
POTASSIUM SERPL-MCNC: 2.9 MMOL/L — CRITICAL LOW (ref 3.5–5.3)
POTASSIUM SERPL-MCNC: 3.7 MMOL/L — SIGNIFICANT CHANGE UP (ref 3.5–5.3)
POTASSIUM SERPL-SCNC: 2.9 MMOL/L — CRITICAL LOW (ref 3.5–5.3)
POTASSIUM SERPL-SCNC: 3.7 MMOL/L — SIGNIFICANT CHANGE UP (ref 3.5–5.3)
RBC # BLD: 3.36 M/UL — LOW (ref 3.8–5.2)
RBC # FLD: 14.2 % — SIGNIFICANT CHANGE UP (ref 10.3–14.5)
SODIUM SERPL-SCNC: 137 MMOL/L — SIGNIFICANT CHANGE UP (ref 135–145)
SODIUM SERPL-SCNC: 138 MMOL/L — SIGNIFICANT CHANGE UP (ref 135–145)
WBC # BLD: 7.04 K/UL — SIGNIFICANT CHANGE UP (ref 3.8–10.5)
WBC # FLD AUTO: 7.04 K/UL — SIGNIFICANT CHANGE UP (ref 3.8–10.5)

## 2020-09-12 RX ORDER — POTASSIUM CHLORIDE 20 MEQ
40 PACKET (EA) ORAL ONCE
Refills: 0 | Status: COMPLETED | OUTPATIENT
Start: 2020-09-12 | End: 2020-09-12

## 2020-09-12 RX ADMIN — Medication 125 MILLIGRAM(S): at 00:00

## 2020-09-12 RX ADMIN — Medication 81 MILLIGRAM(S): at 12:47

## 2020-09-12 RX ADMIN — Medication 125 MILLIGRAM(S): at 12:48

## 2020-09-12 RX ADMIN — LISINOPRIL 40 MILLIGRAM(S): 2.5 TABLET ORAL at 06:00

## 2020-09-12 RX ADMIN — Medication 1 TABLET(S): at 12:47

## 2020-09-12 RX ADMIN — Medication 1 TABLET(S): at 17:12

## 2020-09-12 RX ADMIN — DULOXETINE HYDROCHLORIDE 60 MILLIGRAM(S): 30 CAPSULE, DELAYED RELEASE ORAL at 12:48

## 2020-09-12 RX ADMIN — Medication 10 MILLIGRAM(S): at 06:00

## 2020-09-12 RX ADMIN — Medication 10 MILLIGRAM(S): at 17:12

## 2020-09-12 RX ADMIN — Medication 125 MILLIGRAM(S): at 17:12

## 2020-09-12 RX ADMIN — Medication 1 TABLET(S): at 08:20

## 2020-09-12 RX ADMIN — AMIODARONE HYDROCHLORIDE 200 MILLIGRAM(S): 400 TABLET ORAL at 06:00

## 2020-09-12 RX ADMIN — OLANZAPINE 2.5 MILLIGRAM(S): 15 TABLET, FILM COATED ORAL at 12:47

## 2020-09-12 RX ADMIN — Medication 125 MILLIGRAM(S): at 06:00

## 2020-09-12 RX ADMIN — Medication 40 MILLIEQUIVALENT(S): at 09:25

## 2020-09-12 NOTE — PHYSICAL THERAPY INITIAL EVALUATION ADULT - PLANNED THERAPY INTERVENTIONS, PT EVAL
Patient left supine in bed in NAD, call bell in reach, all lines intact./strengthening/transfer training/gait training/bed mobility training/balance training

## 2020-09-12 NOTE — PHYSICAL THERAPY INITIAL EVALUATION ADULT - PERTINENT HX OF CURRENT PROBLEM, REHAB EVAL
70 year old female with history of thoracic and abdominal aortic aneurysm s/p repair, AV replacement, afib, anxiety and HTN presenting with acute on chronic diarrhea. She was recently in [facility] with similar episodes of diarrhea with no acute etiologies identified. She is now with c/o recurrent diarrhea that continues between 5-6 times a day. Pt found to be +cdiff.

## 2020-09-12 NOTE — PHYSICAL THERAPY INITIAL EVALUATION ADULT - ACTIVE RANGE OF MOTION EXAMINATION, REHAB EVAL
except right df/pf 0-3 degrees/bilateral upper extremity Active ROM was WFL (within functional limits)/bilateral  lower extremity Active ROM was WFL (within functional limits)

## 2020-09-12 NOTE — PHYSICAL THERAPY INITIAL EVALUATION ADULT - GENERAL OBSERVATIONS, REHAB EVAL
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

## 2020-09-12 NOTE — PROGRESS NOTE ADULT - PROBLEM SELECTOR PLAN 1
-daily labs   -complete Vancomycin 125mg PO Q6H x 14 days     -replete electrolytes prn   -monitor frequency   -low fiber diet

## 2020-09-13 LAB
ANION GAP SERPL CALC-SCNC: 12 MMO/L — SIGNIFICANT CHANGE UP (ref 7–14)
BASOPHILS # BLD AUTO: 0.05 K/UL — SIGNIFICANT CHANGE UP (ref 0–0.2)
BASOPHILS NFR BLD AUTO: 0.7 % — SIGNIFICANT CHANGE UP (ref 0–2)
BUN SERPL-MCNC: 13 MG/DL — SIGNIFICANT CHANGE UP (ref 7–23)
CALCIUM SERPL-MCNC: 8.8 MG/DL — SIGNIFICANT CHANGE UP (ref 8.4–10.5)
CHLORIDE SERPL-SCNC: 106 MMOL/L — SIGNIFICANT CHANGE UP (ref 98–107)
CO2 SERPL-SCNC: 22 MMOL/L — SIGNIFICANT CHANGE UP (ref 22–31)
CREAT SERPL-MCNC: 1.07 MG/DL — SIGNIFICANT CHANGE UP (ref 0.5–1.3)
EOSINOPHIL # BLD AUTO: 0.33 K/UL — SIGNIFICANT CHANGE UP (ref 0–0.5)
EOSINOPHIL NFR BLD AUTO: 4.6 % — SIGNIFICANT CHANGE UP (ref 0–6)
GLUCOSE SERPL-MCNC: 89 MG/DL — SIGNIFICANT CHANGE UP (ref 70–99)
HCT VFR BLD CALC: 34.2 % — LOW (ref 34.5–45)
HGB BLD-MCNC: 11.1 G/DL — LOW (ref 11.5–15.5)
IMM GRANULOCYTES NFR BLD AUTO: 0.8 % — SIGNIFICANT CHANGE UP (ref 0–1.5)
LYMPHOCYTES # BLD AUTO: 1.78 K/UL — SIGNIFICANT CHANGE UP (ref 1–3.3)
LYMPHOCYTES # BLD AUTO: 24.7 % — SIGNIFICANT CHANGE UP (ref 13–44)
MAGNESIUM SERPL-MCNC: 1.8 MG/DL — SIGNIFICANT CHANGE UP (ref 1.6–2.6)
MCHC RBC-ENTMCNC: 30.7 PG — SIGNIFICANT CHANGE UP (ref 27–34)
MCHC RBC-ENTMCNC: 32.5 % — SIGNIFICANT CHANGE UP (ref 32–36)
MCV RBC AUTO: 94.7 FL — SIGNIFICANT CHANGE UP (ref 80–100)
MONOCYTES # BLD AUTO: 0.47 K/UL — SIGNIFICANT CHANGE UP (ref 0–0.9)
MONOCYTES NFR BLD AUTO: 6.5 % — SIGNIFICANT CHANGE UP (ref 2–14)
NEUTROPHILS # BLD AUTO: 4.51 K/UL — SIGNIFICANT CHANGE UP (ref 1.8–7.4)
NEUTROPHILS NFR BLD AUTO: 62.7 % — SIGNIFICANT CHANGE UP (ref 43–77)
NRBC # FLD: 0 K/UL — SIGNIFICANT CHANGE UP (ref 0–0)
PHOSPHATE SERPL-MCNC: 2.7 MG/DL — SIGNIFICANT CHANGE UP (ref 2.5–4.5)
PLATELET # BLD AUTO: 255 K/UL — SIGNIFICANT CHANGE UP (ref 150–400)
PMV BLD: 8.8 FL — SIGNIFICANT CHANGE UP (ref 7–13)
POTASSIUM SERPL-MCNC: 3.1 MMOL/L — LOW (ref 3.5–5.3)
POTASSIUM SERPL-SCNC: 3.1 MMOL/L — LOW (ref 3.5–5.3)
RBC # BLD: 3.61 M/UL — LOW (ref 3.8–5.2)
RBC # FLD: 14.6 % — HIGH (ref 10.3–14.5)
SODIUM SERPL-SCNC: 140 MMOL/L — SIGNIFICANT CHANGE UP (ref 135–145)
WBC # BLD: 7.2 K/UL — SIGNIFICANT CHANGE UP (ref 3.8–10.5)
WBC # FLD AUTO: 7.2 K/UL — SIGNIFICANT CHANGE UP (ref 3.8–10.5)

## 2020-09-13 RX ORDER — POTASSIUM CHLORIDE 20 MEQ
40 PACKET (EA) ORAL EVERY 4 HOURS
Refills: 0 | Status: COMPLETED | OUTPATIENT
Start: 2020-09-13 | End: 2020-09-13

## 2020-09-13 RX ORDER — MAGNESIUM OXIDE 400 MG ORAL TABLET 241.3 MG
400 TABLET ORAL
Refills: 0 | Status: DISCONTINUED | OUTPATIENT
Start: 2020-09-13 | End: 2020-09-14

## 2020-09-13 RX ADMIN — Medication 1 TABLET(S): at 07:52

## 2020-09-13 RX ADMIN — Medication 1 TABLET(S): at 17:27

## 2020-09-13 RX ADMIN — Medication 125 MILLIGRAM(S): at 00:13

## 2020-09-13 RX ADMIN — Medication 10 MILLIGRAM(S): at 17:26

## 2020-09-13 RX ADMIN — Medication 1 TABLET(S): at 12:10

## 2020-09-13 RX ADMIN — Medication 125 MILLIGRAM(S): at 17:26

## 2020-09-13 RX ADMIN — Medication 125 MILLIGRAM(S): at 12:10

## 2020-09-13 RX ADMIN — DULOXETINE HYDROCHLORIDE 60 MILLIGRAM(S): 30 CAPSULE, DELAYED RELEASE ORAL at 12:11

## 2020-09-13 RX ADMIN — Medication 81 MILLIGRAM(S): at 12:09

## 2020-09-13 RX ADMIN — OLANZAPINE 2.5 MILLIGRAM(S): 15 TABLET, FILM COATED ORAL at 12:10

## 2020-09-13 RX ADMIN — LISINOPRIL 40 MILLIGRAM(S): 2.5 TABLET ORAL at 05:39

## 2020-09-13 RX ADMIN — Medication 125 MILLIGRAM(S): at 05:40

## 2020-09-13 RX ADMIN — MAGNESIUM OXIDE 400 MG ORAL TABLET 400 MILLIGRAM(S): 241.3 TABLET ORAL at 17:27

## 2020-09-13 RX ADMIN — MAGNESIUM OXIDE 400 MG ORAL TABLET 400 MILLIGRAM(S): 241.3 TABLET ORAL at 12:10

## 2020-09-13 RX ADMIN — Medication 40 MILLIEQUIVALENT(S): at 12:10

## 2020-09-13 RX ADMIN — Medication 40 MILLIEQUIVALENT(S): at 17:27

## 2020-09-13 RX ADMIN — Medication 10 MILLIGRAM(S): at 05:40

## 2020-09-13 RX ADMIN — AMIODARONE HYDROCHLORIDE 200 MILLIGRAM(S): 400 TABLET ORAL at 05:39

## 2020-09-13 RX ADMIN — Medication 125 MILLIGRAM(S): at 23:03

## 2020-09-14 ENCOUNTER — TRANSCRIPTION ENCOUNTER (OUTPATIENT)
Age: 70
End: 2020-09-14

## 2020-09-14 VITALS
SYSTOLIC BLOOD PRESSURE: 127 MMHG | HEART RATE: 82 BPM | DIASTOLIC BLOOD PRESSURE: 70 MMHG | RESPIRATION RATE: 17 BRPM | TEMPERATURE: 98 F | OXYGEN SATURATION: 98 %

## 2020-09-14 LAB
ANION GAP SERPL CALC-SCNC: 8 MMO/L — SIGNIFICANT CHANGE UP (ref 7–14)
BASOPHILS # BLD AUTO: 0.06 K/UL — SIGNIFICANT CHANGE UP (ref 0–0.2)
BASOPHILS NFR BLD AUTO: 0.9 % — SIGNIFICANT CHANGE UP (ref 0–2)
BUN SERPL-MCNC: 10 MG/DL — SIGNIFICANT CHANGE UP (ref 7–23)
CALCIUM SERPL-MCNC: 8.9 MG/DL — SIGNIFICANT CHANGE UP (ref 8.4–10.5)
CHLORIDE SERPL-SCNC: 107 MMOL/L — SIGNIFICANT CHANGE UP (ref 98–107)
CO2 SERPL-SCNC: 24 MMOL/L — SIGNIFICANT CHANGE UP (ref 22–31)
CREAT SERPL-MCNC: 1.05 MG/DL — SIGNIFICANT CHANGE UP (ref 0.5–1.3)
EOSINOPHIL # BLD AUTO: 0.32 K/UL — SIGNIFICANT CHANGE UP (ref 0–0.5)
EOSINOPHIL NFR BLD AUTO: 4.5 % — SIGNIFICANT CHANGE UP (ref 0–6)
GLUCOSE SERPL-MCNC: 96 MG/DL — SIGNIFICANT CHANGE UP (ref 70–99)
HCT VFR BLD CALC: 33.2 % — LOW (ref 34.5–45)
HGB BLD-MCNC: 10.7 G/DL — LOW (ref 11.5–15.5)
IMM GRANULOCYTES NFR BLD AUTO: 1 % — SIGNIFICANT CHANGE UP (ref 0–1.5)
LYMPHOCYTES # BLD AUTO: 1.57 K/UL — SIGNIFICANT CHANGE UP (ref 1–3.3)
LYMPHOCYTES # BLD AUTO: 22.3 % — SIGNIFICANT CHANGE UP (ref 13–44)
MAGNESIUM SERPL-MCNC: 2 MG/DL — SIGNIFICANT CHANGE UP (ref 1.6–2.6)
MCHC RBC-ENTMCNC: 30.8 PG — SIGNIFICANT CHANGE UP (ref 27–34)
MCHC RBC-ENTMCNC: 32.2 % — SIGNIFICANT CHANGE UP (ref 32–36)
MCV RBC AUTO: 95.7 FL — SIGNIFICANT CHANGE UP (ref 80–100)
MONOCYTES # BLD AUTO: 0.52 K/UL — SIGNIFICANT CHANGE UP (ref 0–0.9)
MONOCYTES NFR BLD AUTO: 7.4 % — SIGNIFICANT CHANGE UP (ref 2–14)
NEUTROPHILS # BLD AUTO: 4.51 K/UL — SIGNIFICANT CHANGE UP (ref 1.8–7.4)
NEUTROPHILS NFR BLD AUTO: 63.9 % — SIGNIFICANT CHANGE UP (ref 43–77)
NRBC # FLD: 0 K/UL — SIGNIFICANT CHANGE UP (ref 0–0)
PHOSPHATE SERPL-MCNC: 2.7 MG/DL — SIGNIFICANT CHANGE UP (ref 2.5–4.5)
PLATELET # BLD AUTO: 253 K/UL — SIGNIFICANT CHANGE UP (ref 150–400)
PMV BLD: 8.7 FL — SIGNIFICANT CHANGE UP (ref 7–13)
POTASSIUM SERPL-MCNC: 4.5 MMOL/L — SIGNIFICANT CHANGE UP (ref 3.5–5.3)
POTASSIUM SERPL-SCNC: 4.5 MMOL/L — SIGNIFICANT CHANGE UP (ref 3.5–5.3)
RBC # BLD: 3.47 M/UL — LOW (ref 3.8–5.2)
RBC # FLD: 14.7 % — HIGH (ref 10.3–14.5)
SODIUM SERPL-SCNC: 139 MMOL/L — SIGNIFICANT CHANGE UP (ref 135–145)
WBC # BLD: 7.05 K/UL — SIGNIFICANT CHANGE UP (ref 3.8–10.5)
WBC # FLD AUTO: 7.05 K/UL — SIGNIFICANT CHANGE UP (ref 3.8–10.5)

## 2020-09-14 PROCEDURE — 99232 SBSQ HOSP IP/OBS MODERATE 35: CPT

## 2020-09-14 RX ORDER — VANCOMYCIN HCL 1 G
1 VIAL (EA) INTRAVENOUS
Qty: 40 | Refills: 0
Start: 2020-09-14 | End: 2020-09-23

## 2020-09-14 RX ORDER — VANCOMYCIN HCL 1 G
5 VIAL (EA) INTRAVENOUS
Qty: 100 | Refills: 0
Start: 2020-09-14 | End: 2020-09-23

## 2020-09-14 RX ORDER — VANCOMYCIN HCL 1 G
1 VIAL (EA) INTRAVENOUS
Qty: 0 | Refills: 0 | DISCHARGE

## 2020-09-14 RX ORDER — SODIUM CHLORIDE 9 MG/ML
1000 INJECTION, SOLUTION INTRAVENOUS
Refills: 0 | Status: DISCONTINUED | OUTPATIENT
Start: 2020-09-14 | End: 2020-09-14

## 2020-09-14 RX ORDER — LACTOBACILLUS ACIDOPHILUS 100MM CELL
1 CAPSULE ORAL
Qty: 0 | Refills: 0 | DISCHARGE
Start: 2020-09-14

## 2020-09-14 RX ADMIN — MAGNESIUM OXIDE 400 MG ORAL TABLET 400 MILLIGRAM(S): 241.3 TABLET ORAL at 12:53

## 2020-09-14 RX ADMIN — DULOXETINE HYDROCHLORIDE 60 MILLIGRAM(S): 30 CAPSULE, DELAYED RELEASE ORAL at 12:52

## 2020-09-14 RX ADMIN — Medication 10 MILLIGRAM(S): at 05:31

## 2020-09-14 RX ADMIN — OLANZAPINE 2.5 MILLIGRAM(S): 15 TABLET, FILM COATED ORAL at 12:53

## 2020-09-14 RX ADMIN — Medication 81 MILLIGRAM(S): at 12:53

## 2020-09-14 RX ADMIN — Medication 1 TABLET(S): at 12:53

## 2020-09-14 RX ADMIN — Medication 1 TABLET(S): at 08:03

## 2020-09-14 RX ADMIN — Medication 125 MILLIGRAM(S): at 05:31

## 2020-09-14 RX ADMIN — MAGNESIUM OXIDE 400 MG ORAL TABLET 400 MILLIGRAM(S): 241.3 TABLET ORAL at 08:04

## 2020-09-14 RX ADMIN — AMIODARONE HYDROCHLORIDE 200 MILLIGRAM(S): 400 TABLET ORAL at 05:31

## 2020-09-14 RX ADMIN — Medication 125 MILLIGRAM(S): at 12:53

## 2020-09-14 RX ADMIN — LISINOPRIL 40 MILLIGRAM(S): 2.5 TABLET ORAL at 05:31

## 2020-09-14 NOTE — DISCHARGE NOTE NURSING/CASE MANAGEMENT/SOCIAL WORK - NSSCTYPOFSERV_GEN_ALL_CORE
RN/PT services  Nurse to visit on day after discharge. Nurse to call prior to visit to arrange. Physical therapy to follow.

## 2020-09-14 NOTE — DISCHARGE NOTE PROVIDER - PROVIDER TOKENS
PROVIDER:[TOKEN:[[id number]]] PROVIDER:[TOKEN:[[id number]]],PROVIDER:[TOKEN:[[id number]]]

## 2020-09-14 NOTE — DISCHARGE NOTE NURSING/CASE MANAGEMENT/SOCIAL WORK - NSDCVIVACCINE_GEN_ALL_CORE_FT
Influenza , 2019/10/26 13:38 , Ashlie Rasmussen (RN)  Tdap , 2020/9/1 12:28 , Tereza Mcclellan (RN)

## 2020-09-14 NOTE — DISCHARGE NOTE PROVIDER - NSDCMRMEDTOKEN_GEN_ALL_CORE_FT
amiodarone 200 mg oral tablet: 1 tab(s) orally once a day  Aspirin Enteric Coated 81 mg oral delayed release tablet: 1 tab(s) orally once a day  diphenoxylate-atropine 2.5 mg-0.025 mg oral tablet: 1 tab(s) orally 2 times a day  DULoxetine 60 mg oral delayed release capsule: 1 cap(s) orally once a day  ferrous sulfate 325 mg (65 mg elemental iron) oral tablet: 1 tab(s) orally 2 times a day  folic acid 1 mg oral tablet: 1 tab(s) orally once a day  hydrALAZINE 10 mg oral tablet: 1 tab(s) orally 2 times a day  lisinopril 40 mg oral tablet: 1 tab(s) orally once a day  OLANZapine 2.5 mg oral tablet: 1 tab(s) orally once a day (in the evening)   amiodarone 200 mg oral tablet: 1 tab(s) orally once a day  Aspirin Enteric Coated 81 mg oral delayed release tablet: 1 tab(s) orally once a day  DULoxetine 60 mg oral delayed release capsule: 1 cap(s) orally once a day  ferrous sulfate 325 mg (65 mg elemental iron) oral tablet: 1 tab(s) orally 2 times a day  folic acid 1 mg oral tablet: 1 tab(s) orally once a day  hydrALAZINE 10 mg oral tablet: 1 tab(s) orally 2 times a day  lactobacillus acidophilus oral capsule: 1 tab(s) orally 3 times a day  lisinopril 40 mg oral tablet: 1 tab(s) orally once a day  OLANZapine 2.5 mg oral tablet: 1 tab(s) orally once a day (in the evening)  vancomycin 125 mg oral capsule: 1 cap(s) orally every 6 hours x 10 days   FINISH all of medication

## 2020-09-14 NOTE — DISCHARGE NOTE PROVIDER - CARE PROVIDER_API CALL
Oscar Alcazar)  Gastroenterology; Internal Medicine  [address] [place] [id number]  Phone: [phone]  Fax: [phone]  Follow Up Time:    Oscar Alcazar)  Gastroenterology; Internal Medicine  [address] [place] [address]  Phone: [phone]  Fax: [phone]  Follow Up Time:     Srinath Corral  CARDIOVASCULAR DISEASE  [facility] [place] [id number]  Phone: [phone]  Fax: [phone]  Follow Up Time:

## 2020-09-14 NOTE — PROGRESS NOTE ADULT - ASSESSMENT
67 year old with  h/o aortic aneurysm repair with a prosthetic aortic valve now presents with 4 weeks of diarrhea     1) Recent ESBL bacteremia  Pt has prosthetic valve and anuerysm repair    surveillance blood cultures are without growth      2) Diarrhea  Afebrile with a normal white blood cell count  Had diarrhea during last admission  GI pcr on 9/9 was negative  Tomeka studies negative last admission      C diff positive- may expalin acute exacerbation in diarrhea but does not explain symptoms per the last month  Advise against immodium (she was taking as outpt)  Eventual colonoscopy in future as outpt    Po vanco for 14 days.    
67 year old with  h/o aortic aneurysm repair with a prosthetic aortic valve now presents with 4 weeks of diarrhea     1) Recent ESBL bacteremia  Pt has prosthetic valve and anuerysm repair    Check surveillance blood cultures    2) Diarrhea  Afebrile with a normal white blood cell count  Had diarrhea during last admission  GI pcr on 9/9 was negative  Tomeka studies negative last admission      C diff positive- may expalin acute exacerbation in diarrhea but does not explain symptoms per the last month  Advise against immodium (she was taking as outpt)  Eventual colonoscopy in future as outpt    Po vanco for 14 days.
69yo F h/o thoracic and abdominal aortic aneurysm s/p repair, AV replacement, afib, anxiety and HTN presenting with acute on chronic diarrhea.    Problem/Plan - 1:  ·  Problem: Diarrhea.  Plan: - Acute on chronic in nature. Admitted for further investigation and symptom control.  - Check cultures and GI PCR  - Send markers per GI requests.  - C/w low fiber diet today. Switch to CLD in the am.  -. colonoscopy as per GI     Problem/Plan - 2:  ·  Problem: Hypertensive urgency.  Plan: - Resume home bp meds.     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  Plan: - S/p AAA repair with subsequent residual lower extremity paresthesia. Symptoms currently at baseline.  - Continue to monitor. Outpatient f/u.  - Fall precautions.     Problem/Plan - 4:  ·  Problem: Afib.  Plan: - Hx of afib documented on previous H&P. Currently rate controlled bp stable.  - C/w outpatient ASA 81mg daily. C/w amiodarone for rate/rhythm control.     Problem/Plan - 5:  ·  Problem: UTI (urinary tract infection).  Plan: - Outpatient UTI treatment with augmentin. Patient currently denying symptoms.  - Will hold off on further abx for now.  - F/u UCx.
69yo F h/o thoracic and abdominal aortic aneurysm s/p repair, AV replacement, afib, anxiety and HTN presenting with acute on chronic diarrhea.    Problem/Plan - 1:  ·  Problem: Diarrhea.  Plan: - Acute on chronic in nature. Admitted for further investigation and symptom control.  - GI fu   - management as per GI     Problem/Plan - 2:  ·  Problem: Hypertensive urgency.  Plan: - Resume home bp meds.     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  Plan: - S/p AAA repair with subsequent residual lower extremity paresthesia. Symptoms currently at baseline.  - Continue to monitor. Outpatient f/u.  - Fall precautions.     Problem/Plan - 4:  ·  Problem: Afib.  Plan: - cw rate control     Problem/Plan - 5:  ·  Problem: sepsis Plan management as per ID
69yo F h/o thoracic and abdominal aortic aneurysm s/p repair, AV replacement, afib, anxiety and HTN presenting with acute on chronic diarrhea.    Problem/Plan - 1:  ·  Problem: c diff Diarrhea.  Plan: - Acute on chronic in nature. cw po vanco   - GI fu   - management as per GI     Problem/Plan - 2:  ·  Problem: Hypertensive urgency.  Plan: - Resume home bp meds.     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  Plan: - S/p AAA repair with subsequent residual lower extremity paresthesia. Symptoms currently at baseline.  - Continue to monitor. Outpatient f/u.  - Fall precautions.     Problem/Plan - 4:  ·  Problem: Afib.  Plan: - cw rate control     Problem/Plan - 5:  ·  Problem: sepsis Plan management as per ID
69yo female with h/o thoracic and abdominal aortic aneurysm s/p repair, AV replacement, and HTN presenting with acute on chronic diarrhea
71yo female with h/o thoracic and abdominal aortic aneurysm s/p repair, AV replacement, and HTN presenting with acute on chronic diarrhea
71yo female with h/o thoracic and abdominal aortic aneurysm s/p repair, AV replacement, and HTN presenting with acute on chronic diarrhea
r70yo F h/o thoracic and abdominal aortic aneurysm s/p repair, AV replacement, afib, anxiety and HTN presenting with acute on chronic diarrhea.    Problem/Plan - 1:  ·  Problem: Diarrhea.  Plan: - Acute on chronic in nature. Admitted for further investigation and symptom control.  - GI fu   - management as per GI     Problem/Plan - 2:  ·  Problem: Hypertensive urgency.  Plan: - Resume home bp meds.     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  Plan: - S/p AAA repair with subsequent residual lower extremity paresthesia. Symptoms currently at baseline.  - Continue to monitor. Outpatient f/u.  - Fall precautions.   Problem/Plan - 4:  ·  Problem: Afib.  Plan: - cw rate control     Problem/Plan - 5:  ·  Problem: sepsis Plan management as per ID

## 2020-09-14 NOTE — DISCHARGE NOTE PROVIDER - HOSPITAL COURSE
69yo F h/o thoracic and abdominal aortic aneurysm s/p repair, AV replacement, afib, anxiety and HTN presenting with acute on chronic diarrhea. She was recently in [facility] with similar episodes of diarrhea with no acute etiologies identified. She is now with c/o recurrent diarrhea that continues between 5-6 times a day, nonbloody and watery most of the time and other times "soft but loose". She endorses at times soiling her clothing as she is unable to make it to the bathroom in time. She was see in office about a week ago for diarrhea and started on imodium and also given abx Augmentin for UTI. She is without any abdominal pain prior or during her bowel movements, describes the sensation as rapidly coming on and "maybe a small amount of cramping sometimes but usually not". Her last colonoscopy was 4 years ago, where she reportedly had removal of benign polyp. Yes 69yo F h/o thoracic and abdominal aortic aneurysm s/p repair, AV replacement, afib, anxiety and HTN presenting with acute on chronic diarrhea. She was recently in [facility] with similar episodes of diarrhea with no acute etiologies identified. She is now with c/o recurrent diarrhea that continues between 5-6 times a day, nonbloody and watery most of the time and other times "soft but loose". She endorses at times soiling her clothing as she is unable to make it to the bathroom in time. She was see in office about a week ago for diarrhea and started on imodium and also given abx Augmentin for UTI. She is without any abdominal pain prior or during her bowel movements, describes the sensation as rapidly coming on and "maybe a small amount of cramping sometimes but usually not". Her last colonoscopy was 4 years ago, where she reportedly had removal of benign polyp.  Pt found to have + c-diff and treated with po vanco 14 day course with improvement of symtpoms only 1 soft stool in 24 hours   BP initially elevated now better controlled on home medications FU with MD   Continue duloxetine for anxiety   AAA follow up with surgery - outpatient PT for deconditioning when improved   Pt discussed with Dr. PONCHO [place] and seen by MD and cleared for dc home   DIspo home with home PT

## 2020-09-14 NOTE — PROGRESS NOTE ADULT - SUBJECTIVE AND OBJECTIVE BOX
Follow Up:      Inverval History/ROS:Patient is a 70y old  Female who presents with a chief complaint of diarrhea (14 Sep 2020 10:42)    5 BM on satuday  3 yesterday  One today.  No abd pain  No fever    Allergies    No Known Allergies    Intolerances        ANTIMICROBIALS:  vancomycin    Solution 125 every 6 hours      OTHER MEDS:  aMIOdarone    Tablet 200 milliGRAM(s) Oral daily  aspirin enteric coated 81 milliGRAM(s) Oral daily  DULoxetine 60 milliGRAM(s) Oral daily  hydrALAZINE 10 milliGRAM(s) Oral two times a day  lactobacillus acidophilus 1 Tablet(s) Oral three times a day with meals  lisinopril 40 milliGRAM(s) Oral daily  magnesium oxide 400 milliGRAM(s) Oral three times a day with meals  OLANZapine 2.5 milliGRAM(s) Oral daily      Vital Signs Last 24 Hrs  T(C): 36.2 (14 Sep 2020 05:29), Max: 36.8 (13 Sep 2020 12:21)  T(F): 97.2 (14 Sep 2020 05:29), Max: 98.3 (13 Sep 2020 12:21)  HR: 77 (14 Sep 2020 05:29) (74 - 80)  BP: 127/69 (14 Sep 2020 05:29) (124/67 - 159/72)  BP(mean): --  RR: 17 (14 Sep 2020 05:29) (16 - 17)  SpO2: 100% (14 Sep 2020 05:29) (99% - 100%)    PHYSICAL EXAM:  General: [x ] non-toxic  HEAD/EYES: [ ] PERRL [ ] white sclera [ ] icterus  ENT:  [ ] normal [x ] supple [ ] thrush [ ] pharyngeal exudate  Cardiovascular:   [ ] murmur [ x] normal [ ] PPM/AICD  Respiratory:  [ x] clear to ausculation bilaterally  GI:  [x ] soft, non-tender, normal bowel sounds  :  [ ] muñoz [ x] no CVA tenderness   Musculoskeletal:  [ ] no synovitis  Neurologic:  [ ] non-focal exam   Skin:  [x ] no rash  Lymph: [ ] no lymphadenopathy  Psychiatric:  [ ] appropriate affect [x ] alert & oriented  Lines:  [x ] no phlebitis [ ] central line                                10.7   7.05  )-----------( 253      ( 14 Sep 2020 06:30 )             33.2       09-14    139  |  107  |  10  ----------------------------<  96  4.5   |  24  |  1.05    Ca    8.9      14 Sep 2020 06:30  Phos  2.7     09-14  Mg     2.0     09-14            MICROBIOLOGY:Culture Results:   No growth to date. (09-12-20 @ 09:59)  Culture Results:   No growth to date. (09-12-20 @ 09:59)  Culture Results:   No Protozoa seen by trichrome stain  No Helminths or Protozoa seen in formalin concentrate  performed by iodine stain  (routine O+P not evaluated for Microsporidia,  Cryptosporidia, Cyclospora, or Isospora.)  One negative sample does not necessarily rule  out the presence of a parasitic infection.  Rare WBC's (09-10-20 @ 02:06)  Culture Results:   No enteric pathogens isolated.  (Stool culture examined for Salmonella,  Shigella, Campylobacter, Aeromonas, Plesiomonas,  Vibrio, E.coli O157 and Yersinia) (09-09-20 @ 13:43)  Culture Results:   GI PCR Results: NOT detected  *******Please Note:*******  GI panel PCR evaluates for:  Campylobacter, Plesiomonas shigelloides, Salmonella,  Vibrio, Yersinia enterocolitica, Enteroaggregative  Escherichia coli (EAEC), Enteropathogenic E.coli (EPEC),  Enterotoxigenic E. coli (ETEC) lt/st, Shiga-like  toxin-producing E. coli (STEC) stx1/stx2,  Shigella/ Enteroinvasive E. coli (EIEC), Cryptosporidium,  Cyclospora cayetanensis, Entamoeba histolytica,  Giardia lamblia, Adenovirus F 40/41, Astrovirus,  Norovirus GI/GII, Rotavirus A, Sapovirus (09-09-20 @ 13:43)  Culture Results:   No growth (09-09-20 @ 13:19)      RADIOLOGY:    
Follow Up:      Inverval History/ROS:Patient is a 70y old  Female who presents with a chief complaint of diarrhea (11 Sep 2020 10:50)    Still has watery BM  but better    Allergies    No Known Allergies    Intolerances        ANTIMICROBIALS:  vancomycin    Solution 125 every 6 hours      OTHER MEDS:  aMIOdarone    Tablet 200 milliGRAM(s) Oral daily  aspirin enteric coated 81 milliGRAM(s) Oral daily  DULoxetine 60 milliGRAM(s) Oral daily  hydrALAZINE 10 milliGRAM(s) Oral two times a day  lactobacillus acidophilus 1 Tablet(s) Oral three times a day with meals  lisinopril 40 milliGRAM(s) Oral daily  OLANZapine 2.5 milliGRAM(s) Oral daily      Vital Signs Last 24 Hrs  T(C): 36.8 (11 Sep 2020 12:30), Max: 36.8 (11 Sep 2020 04:58)  T(F): 98.3 (11 Sep 2020 12:30), Max: 98.3 (11 Sep 2020 12:30)  HR: 89 (11 Sep 2020 13:00) (72 - 99)  BP: 141/79 (11 Sep 2020 13:00) (131/68 - 178/94)  BP(mean): --  RR: 17 (11 Sep 2020 12:30) (17 - 17)  SpO2: 100% (11 Sep 2020 12:30) (100% - 100%)    PHYSICAL EXAM:  General: [x ] non-toxic  HEAD/EYES: [ ] PERRL [x ] white sclera [ ] icterus  ENT:  [ ] normal [ ] supple [ ] thrush [ ] pharyngeal exudate  Cardiovascular:   [ ] murmur [x ] normal [ ] PPM/AICD  Respiratory:  [x ] clear to ausculation bilaterally  GI:  [ x] soft, non-tender, normal bowel sounds  :  [ ] muñoz [ ] no CVA tenderness   Musculoskeletal:  [ ] no synovitis  Neurologic:  [ ] non-focal exam   Skin:  [ x] no rash  Lymph: [ ] no lymphadenopathy  Psychiatric:  [ ] appropriate affect [x ] alert & oriented  Lines:  [x ] no phlebitis [ ] central line                                10.3   10.64 )-----------( 241      ( 11 Sep 2020 05:45 )             31.8       09-11    137  |  104  |  10  ----------------------------<  103<H>  3.2<L>   |  20<L>  |  1.12    Ca    8.7      11 Sep 2020 05:45  Phos  3.7     09-11  Mg     1.7     09-11            MICROBIOLOGY:Culture Results:   No Protozoa seen by trichrome stain  No Helminths or Protozoa seen in formalin concentrate  performed by iodine stain  (routine O+P not evaluated for Microsporidia,  Cryptosporidia, Cyclospora, or Isospora.)  One negative sample does not necessarily rule  out the presence of a parasitic infection.  Rare WBC's (09-10-20 @ 02:06)  Culture Results:   No enteric pathogens isolated.  (Stool culture examined for Salmonella,  Shigella, Campylobacter, Aeromonas, Plesiomonas,  Vibrio, E.coli O157 and Yersinia) (09-09-20 @ 13:43)  Culture Results:   GI PCR Results: NOT detected  *******Please Note:*******  GI panel PCR evaluates for:  Campylobacter, Plesiomonas shigelloides, Salmonella,  Vibrio, Yersinia enterocolitica, Enteroaggregative  Escherichia coli (EAEC), Enteropathogenic E.coli (EPEC),  Enterotoxigenic E. coli (ETEC) lt/st, Shiga-like  toxin-producing E. coli (STEC) stx1/stx2,  Shigella/ Enteroinvasive E. coli (EIEC), Cryptosporidium,  Cyclospora cayetanensis, Entamoeba histolytica,  Giardia lamblia, Adenovirus F 40/41, Astrovirus,  Norovirus GI/GII, Rotavirus A, Sapovirus (09-09-20 @ 13:43)  Culture Results:   No growth (09-09-20 @ 13:19)      RADIOLOGY:
INTERVAL HPI/OVERNIGHT EVENTS:    (+) diarrhea   without abdominal complaints    MEDICATIONS  (STANDING):  aMIOdarone    Tablet 200 milliGRAM(s) Oral daily  aspirin enteric coated 81 milliGRAM(s) Oral daily  DULoxetine 60 milliGRAM(s) Oral daily  hydrALAZINE 10 milliGRAM(s) Oral two times a day  lactobacillus acidophilus 1 Tablet(s) Oral three times a day with meals  lisinopril 40 milliGRAM(s) Oral daily  OLANZapine 2.5 milliGRAM(s) Oral daily    MEDICATIONS  (PRN):      Allergies    No Known Allergies    Intolerances        Review of Systems:    General:  No wt loss, fevers, chills, night sweats, fatigue   Eyes:  Good vision, no reported pain  ENT:  No sore throat, pain, runny nose, dysphagia  CV:  No pain, palpitations, hypo/hypertension  Resp:  No dyspnea, cough, tachypnea, wheezing  GI:  No pain, No nausea, No vomiting, +diarrhea, No constipation, No weight loss, No fever, No pruritis, No rectal bleeding, No melena, No dysphagia  :  No pain, bleeding, incontinence, nocturia  Muscle:  No pain, weakness  Neuro:  No weakness, tingling, memory problems  Psych:  No fatigue, insomnia, mood problems, depression  Endocrine:  No polyuria, polydypsia, cold/heat intolerance  Heme:  No petechiae, ecchymosis, easy bruisability  Skin:  No rash, tattoos, scars, edema      Vital Signs Last 24 Hrs  T(C): 36.8 (11 Sep 2020 04:58), Max: 36.8 (11 Sep 2020 04:58)  T(F): 98.2 (11 Sep 2020 04:58), Max: 98.2 (11 Sep 2020 04:58)  HR: 99 (11 Sep 2020 04:58) (69 - 99)  BP: 155/90 (11 Sep 2020 04:58) (126/67 - 178/94)  BP(mean): --  RR: 17 (11 Sep 2020 04:58) (17 - 17)  SpO2: 100% (11 Sep 2020 04:58) (98% - 100%)    PHYSICAL EXAM:    Constitutional: NAD  HEENT: EOMI, throat clear  Neck: No LAD, supple  Respiratory: CTA and P  Cardiovascular: S1 and S2, RRR, no M  Gastrointestinal: BS+, soft, NT/ND, neg HSM,  Extremities: No peripheral edema, neg clubbing, cyanosis  Vascular: 2+ peripheral pulses  Neurological: A/O x 3, no focal deficits  Psychiatric: Normal mood, normal affect  Skin: No rashes      LABS:                        10.3   10.64 )-----------( 241      ( 11 Sep 2020 05:45 )             31.8     09-11    137  |  104  |  10  ----------------------------<  103<H>  3.2<L>   |  20<L>  |  1.12    Ca    8.7      11 Sep 2020 05:45  Phos  3.7     -  Mg     1.7         TPro  6.5  /  Alb  3.9  /  TBili  0.5  /  DBili  x   /  AST  23  /  ALT  22  /  AlkPhos  75  09-      Urinalysis Basic - ( 09 Sep 2020 13:40 )    Color: LIGHT YELLOW / Appearance: CLEAR / S.014 / pH: 6.0  Gluc: NEGATIVE / Ketone: NEGATIVE  / Bili: NEGATIVE / Urobili: NORMAL   Blood: NEGATIVE / Protein: 20 / Nitrite: NEGATIVE   Leuk Esterase: TRACE / RBC: 3-5 / WBC 6-10   Sq Epi: OCC / Non Sq Epi: x / Bacteria: NEGATIVE        RADIOLOGY & ADDITIONAL TESTS:
INTERVAL HPI/OVERNIGHT EVENTS:    (+) diarrhea x 1 this am   no abd pain    MEDICATIONS  (STANDING):  aMIOdarone    Tablet 200 milliGRAM(s) Oral daily  aspirin enteric coated 81 milliGRAM(s) Oral daily  DULoxetine 60 milliGRAM(s) Oral daily  hydrALAZINE 10 milliGRAM(s) Oral two times a day  lactobacillus acidophilus 1 Tablet(s) Oral three times a day with meals  lisinopril 40 milliGRAM(s) Oral daily  magnesium oxide 400 milliGRAM(s) Oral three times a day with meals  OLANZapine 2.5 milliGRAM(s) Oral daily  vancomycin    Solution 125 milliGRAM(s) Oral every 6 hours    MEDICATIONS  (PRN):      Allergies    No Known Allergies    Intolerances        Review of Systems:    General:  No wt loss, fevers, chills, night sweats, fatigue   Eyes:  Good vision, no reported pain  ENT:  No sore throat, pain, runny nose, dysphagia  CV:  No pain, palpitations, hypo/hypertension  Resp:  No dyspnea, cough, tachypnea, wheezing  GI:  No pain, No nausea, No vomiting, +diarrhea, No constipation, No weight loss, No fever, No pruritis, No rectal bleeding, No melena, No dysphagia  :  No pain, bleeding, incontinence, nocturia  Muscle:  No pain, weakness  Neuro:  No weakness, tingling, memory problems  Psych:  No fatigue, insomnia, mood problems, depression  Endocrine:  No polyuria, polydypsia, cold/heat intolerance  Heme:  No petechiae, ecchymosis, easy bruisability  Skin:  No rash, tattoos, scars, edema      Vital Signs Last 24 Hrs  T(C): 36.2 (14 Sep 2020 05:29), Max: 36.8 (13 Sep 2020 12:21)  T(F): 97.2 (14 Sep 2020 05:29), Max: 98.3 (13 Sep 2020 12:21)  HR: 77 (14 Sep 2020 05:29) (74 - 80)  BP: 127/69 (14 Sep 2020 05:29) (124/67 - 159/72)  BP(mean): --  RR: 17 (14 Sep 2020 05:29) (16 - 17)  SpO2: 100% (14 Sep 2020 05:29) (99% - 100%)    PHYSICAL EXAM:    Constitutional: NAD  HEENT: EOMI, throat clear  Neck: No LAD, supple  Respiratory: CTA and P  Cardiovascular: S1 and S2, RRR, no M  Gastrointestinal: BS+, soft, NT/ND, neg HSM,  Extremities: No peripheral edema, neg clubbing, cyanosis  Vascular: 2+ peripheral pulses  Neurological: A/O x 3, no focal deficits  Psychiatric: Normal mood, normal affect  Skin: No rashes      LABS:                        10.7   7.05  )-----------( 253      ( 14 Sep 2020 06:30 )             33.2     09-14    139  |  107  |  10  ----------------------------<  96  4.5   |  24  |  1.05    Ca    8.9      14 Sep 2020 06:30  Phos  2.7     09-14  Mg     2.0     09-14            RADIOLOGY & ADDITIONAL TESTS:  
INTERVAL HPI/OVERNIGHT EVENTS:    Only one episode of loose stool this am   MEDICATIONS  (STANDING):  aMIOdarone    Tablet 200 milliGRAM(s) Oral daily  aspirin enteric coated 81 milliGRAM(s) Oral daily  DULoxetine 60 milliGRAM(s) Oral daily  hydrALAZINE 10 milliGRAM(s) Oral two times a day  lactobacillus acidophilus 1 Tablet(s) Oral three times a day with meals  lisinopril 40 milliGRAM(s) Oral daily  OLANZapine 2.5 milliGRAM(s) Oral daily    MEDICATIONS  (PRN):      Allergies    No Known Allergies    Intolerances        Review of Systems:    General:  No wt loss, fevers, chills, night sweats, fatigue   Eyes:  Good vision, no reported pain  ENT:  No sore throat, pain, runny nose, dysphagia  CV:  No pain, palpitations, hypo/hypertension  Resp:  No dyspnea, cough, tachypnea, wheezing  GI:  No pain, No nausea, No vomiting, +diarrhea, No constipation, No weight loss, No fever, No pruritis, No rectal bleeding, No melena, No dysphagia  :  No pain, bleeding, incontinence, nocturia  Muscle:  No pain, weakness  Neuro:  No weakness, tingling, memory problems  Psych:  No fatigue, insomnia, mood problems, depression  Endocrine:  No polyuria, polydypsia, cold/heat intolerance  Heme:  No petechiae, ecchymosis, easy bruisability  Skin:  No rash, tattoos, scars, edema      Vital Signs Last 24 Hrs  T(C): 36.8 (11 Sep 2020 04:58), Max: 36.8 (11 Sep 2020 04:58)  T(F): 98.2 (11 Sep 2020 04:58), Max: 98.2 (11 Sep 2020 04:58)  HR: 99 (11 Sep 2020 04:58) (69 - 99)  BP: 155/90 (11 Sep 2020 04:58) (126/67 - 178/94)  BP(mean): --  RR: 17 (11 Sep 2020 04:58) (17 - 17)  SpO2: 100% (11 Sep 2020 04:58) (98% - 100%)    PHYSICAL EXAM:    Constitutional: NAD  HEENT: EOMI, throat clear  Neck: No LAD, supple  Respiratory: CTA and P  Cardiovascular: S1 and S2, RRR, no M  Gastrointestinal: BS+, soft, NT/ND, neg HSM,  Extremities: No peripheral edema, neg clubbing, cyanosis  Vascular: 2+ peripheral pulses  Neurological: A/O x 3, no focal deficits  Psychiatric: Normal mood, normal affect  Skin: No rashes      LABS:                        10.3   10.64 )-----------( 241      ( 11 Sep 2020 05:45 )             31.8     09-11    137  |  104  |  10  ----------------------------<  103<H>  3.2<L>   |  20<L>  |  1.12    Ca    8.7      11 Sep 2020 05:45  Phos  3.7     -  Mg     1.7         TPro  6.5  /  Alb  3.9  /  TBili  0.5  /  DBili  x   /  AST  23  /  ALT  22  /  AlkPhos  75  09-      Urinalysis Basic - ( 09 Sep 2020 13:40 )    Color: LIGHT YELLOW / Appearance: CLEAR / S.014 / pH: 6.0  Gluc: NEGATIVE / Ketone: NEGATIVE  / Bili: NEGATIVE / Urobili: NORMAL   Blood: NEGATIVE / Protein: 20 / Nitrite: NEGATIVE   Leuk Esterase: TRACE / RBC: 3-5 / WBC 6-10   Sq Epi: OCC / Non Sq Epi: x / Bacteria: NEGATIVE        RADIOLOGY & ADDITIONAL TESTS:  
INTERVAL HPI/OVERNIGHT EVENTS:    Only one episode of loose stool this am   MEDICATIONS  (STANDING):  aMIOdarone    Tablet 200 milliGRAM(s) Oral daily  aspirin enteric coated 81 milliGRAM(s) Oral daily  DULoxetine 60 milliGRAM(s) Oral daily  hydrALAZINE 10 milliGRAM(s) Oral two times a day  lactobacillus acidophilus 1 Tablet(s) Oral three times a day with meals  lisinopril 40 milliGRAM(s) Oral daily  OLANZapine 2.5 milliGRAM(s) Oral daily    MEDICATIONS  (PRN):      Allergies    No Known Allergies    Intolerances        Review of Systems:    General:  No wt loss, fevers, chills, night sweats, fatigue   Eyes:  Good vision, no reported pain  ENT:  No sore throat, pain, runny nose, dysphagia  CV:  No pain, palpitations, hypo/hypertension  Resp:  No dyspnea, cough, tachypnea, wheezing  GI:  No pain, No nausea, No vomiting, +diarrhea, No constipation, No weight loss, No fever, No pruritis, No rectal bleeding, No melena, No dysphagia  :  No pain, bleeding, incontinence, nocturia  Muscle:  No pain, weakness  Neuro:  No weakness, tingling, memory problems  Psych:  No fatigue, insomnia, mood problems, depression  Endocrine:  No polyuria, polydypsia, cold/heat intolerance  Heme:  No petechiae, ecchymosis, easy bruisability  Skin:  No rash, tattoos, scars, edema      Vital Signs Last 24 Hrs  T(C): 36.8 (11 Sep 2020 04:58), Max: 36.8 (11 Sep 2020 04:58)  T(F): 98.2 (11 Sep 2020 04:58), Max: 98.2 (11 Sep 2020 04:58)  HR: 99 (11 Sep 2020 04:58) (69 - 99)  BP: 155/90 (11 Sep 2020 04:58) (126/67 - 178/94)  BP(mean): --  RR: 17 (11 Sep 2020 04:58) (17 - 17)  SpO2: 100% (11 Sep 2020 04:58) (98% - 100%)    PHYSICAL EXAM:    Constitutional: NAD  HEENT: EOMI, throat clear  Neck: No LAD, supple  Respiratory: CTA and P  Cardiovascular: S1 and S2, RRR, no M  Gastrointestinal: BS+, soft, NT/ND, neg HSM,  Extremities: No peripheral edema, neg clubbing, cyanosis  Vascular: 2+ peripheral pulses  Neurological: A/O x 3, no focal deficits  Psychiatric: Normal mood, normal affect  Skin: No rashes      LABS:                        10.3   10.64 )-----------( 241      ( 11 Sep 2020 05:45 )             31.8     09-11    137  |  104  |  10  ----------------------------<  103<H>  3.2<L>   |  20<L>  |  1.12    Ca    8.7      11 Sep 2020 05:45  Phos  3.7     -  Mg     1.7         TPro  6.5  /  Alb  3.9  /  TBili  0.5  /  DBili  x   /  AST  23  /  ALT  22  /  AlkPhos  75  09-      Urinalysis Basic - ( 09 Sep 2020 13:40 )    Color: LIGHT YELLOW / Appearance: CLEAR / S.014 / pH: 6.0  Gluc: NEGATIVE / Ketone: NEGATIVE  / Bili: NEGATIVE / Urobili: NORMAL   Blood: NEGATIVE / Protein: 20 / Nitrite: NEGATIVE   Leuk Esterase: TRACE / RBC: 3-5 / WBC 6-10   Sq Epi: OCC / Non Sq Epi: x / Bacteria: NEGATIVE        RADIOLOGY & ADDITIONAL TESTS:  
INTERVAL HPI/OVERNIGHT EVENTS:    endorses frustrated over her long term diarrhea  without abd pain   agreeable to colonoscopy tomorrow     MEDICATIONS  (STANDING):  aMIOdarone    Tablet 200 milliGRAM(s) Oral daily  aspirin enteric coated 81 milliGRAM(s) Oral daily  bisacodyl 10 milliGRAM(s) Oral every 4 hours  DULoxetine 60 milliGRAM(s) Oral daily  hydrALAZINE 10 milliGRAM(s) Oral two times a day  lactobacillus acidophilus 1 Tablet(s) Oral three times a day with meals  lisinopril 40 milliGRAM(s) Oral daily  OLANZapine 2.5 milliGRAM(s) Oral daily  polyethylene glycol/electrolyte Solution 1 Liter(s) Oral every 4 hours    MEDICATIONS  (PRN):      Allergies    No Known Allergies    Intolerances        Review of Systems:    General:  No wt loss, fevers, chills, night sweats, fatigue   Eyes:  Good vision, no reported pain  ENT:  No sore throat, pain, runny nose, dysphagia  CV:  No pain, palpitations, hypo/hypertension  Resp:  No dyspnea, cough, tachypnea, wheezing  GI:  No pain, No nausea, No vomiting, +diarrhea, No constipation, No weight loss, No fever, No pruritis, No rectal bleeding, No melena, No dysphagia  :  No pain, bleeding, incontinence, nocturia  Muscle:  No pain, weakness  Neuro:  No weakness, tingling, memory problems  Psych:  No fatigue, insomnia, mood problems, depression  Endocrine:  No polyuria, polydypsia, cold/heat intolerance  Heme:  No petechiae, ecchymosis, easy bruisability  Skin:  No rash, tattoos, scars, edema      Vital Signs Last 24 Hrs  T(C): 36.7 (10 Sep 2020 11:34), Max: 36.9 (09 Sep 2020 15:29)  T(F): 98.1 (10 Sep 2020 11:34), Max: 98.5 (09 Sep 2020 20:01)  HR: 69 (10 Sep 2020 11:34) (69 - 96)  BP: 126/67 (10 Sep 2020 11:34) (122/60 - 219/82)  BP(mean): --  RR: 17 (10 Sep 2020 11:34) (16 - 20)  SpO2: 98% (10 Sep 2020 11:34) (98% - 100%)    PHYSICAL EXAM:    Constitutional: NAD  HEENT: EOMI, throat clear  Neck: No LAD, supple  Respiratory: CTA and P  Cardiovascular: S1 and S2, RRR, no M  Gastrointestinal: BS+, soft, NT/ND, neg HSM,  Extremities: No peripheral edema, neg clubbing, cyanosis  Vascular: 2+ peripheral pulses  Neurological: A/O x 3, no focal deficits  Psychiatric: Normal mood, normal affect  Skin: No rashes      LABS:                        9.4    6.27  )-----------( 192      ( 10 Sep 2020 06:21 )             29.5     09-10    139  |  106  |  15  ----------------------------<  91  3.8   |  23  |  1.07    Ca    8.4      10 Sep 2020 06:21  Phos  2.9     -10  Mg     1.8     -10    TPro  6.5  /  Alb  3.9  /  TBili  0.5  /  DBili  x   /  AST  23  /  ALT  22  /  AlkPhos  75  09-09      Urinalysis Basic - ( 09 Sep 2020 13:40 )    Color: LIGHT YELLOW / Appearance: CLEAR / S.014 / pH: 6.0  Gluc: NEGATIVE / Ketone: NEGATIVE  / Bili: NEGATIVE / Urobili: NORMAL   Blood: NEGATIVE / Protein: 20 / Nitrite: NEGATIVE   Leuk Esterase: TRACE / RBC: 3-5 / WBC 6-10   Sq Epi: OCC / Non Sq Epi: x / Bacteria: NEGATIVE        RADIOLOGY & ADDITIONAL TESTS:
Patient is a 70y old  Female who presents with a chief complaint of diarrhea (10 Sep 2020 13:23)      INTERVAL HPI/OVERNIGHT EVENTS:  T(C): 36.7 (09-10-20 @ 11:34), Max: 36.9 (20 @ 16:31)  HR: 69 (09-10-20 @ 11:34) (69 - 96)  BP: 126/67 (09-10-20 @ 11:34) (122/60 - 199/83)  RR: 17 (09-10-20 @ 11:34) (17 - 20)  SpO2: 98% (09-10-20 @ 11:34) (98% - 100%)  Wt(kg): --  I&O's Summary    09 Sep 2020 07:  -  10 Sep 2020 07:00  --------------------------------------------------------  IN: 472 mL / OUT: 200 mL / NET: 272 mL    10 Sep 2020 07:01  -  10 Sep 2020 15:34  --------------------------------------------------------  IN: 500 mL / OUT: 850 mL / NET: -350 mL        LABS:                        9.4    6.27  )-----------( 192      ( 10 Sep 2020 06:21 )             29.5     10    139  |  106  |  15  ----------------------------<  91  3.8   |  23  |  1.07    Ca    8.4      10 Sep 2020 06:21  Phos  2.9     10  Mg     1.8     09-10    TPro  6.5  /  Alb  3.9  /  TBili  0.5  /  DBili  x   /  AST  23  /  ALT  22  /  AlkPhos  75  09-09      Urinalysis Basic - ( 09 Sep 2020 13:40 )    Color: LIGHT YELLOW / Appearance: CLEAR / S.014 / pH: 6.0  Gluc: NEGATIVE / Ketone: NEGATIVE  / Bili: NEGATIVE / Urobili: NORMAL   Blood: NEGATIVE / Protein: 20 / Nitrite: NEGATIVE   Leuk Esterase: TRACE / RBC: 3-5 / WBC 6-10   Sq Epi: OCC / Non Sq Epi: x / Bacteria: NEGATIVE      CAPILLARY BLOOD GLUCOSE            Urinalysis Basic - ( 09 Sep 2020 13:40 )    Color: LIGHT YELLOW / Appearance: CLEAR / S.014 / pH: 6.0  Gluc: NEGATIVE / Ketone: NEGATIVE  / Bili: NEGATIVE / Urobili: NORMAL   Blood: NEGATIVE / Protein: 20 / Nitrite: NEGATIVE   Leuk Esterase: TRACE / RBC: 3-5 / WBC 6-10   Sq Epi: OCC / Non Sq Epi: x / Bacteria: NEGATIVE        MEDICATIONS  (STANDING):  aMIOdarone    Tablet 200 milliGRAM(s) Oral daily  aspirin enteric coated 81 milliGRAM(s) Oral daily  bisacodyl 10 milliGRAM(s) Oral every 4 hours  DULoxetine 60 milliGRAM(s) Oral daily  hydrALAZINE 10 milliGRAM(s) Oral two times a day  lactobacillus acidophilus 1 Tablet(s) Oral three times a day with meals  lisinopril 40 milliGRAM(s) Oral daily  OLANZapine 2.5 milliGRAM(s) Oral daily  polyethylene glycol/electrolyte Solution 1 Liter(s) Oral every 4 hours    MEDICATIONS  (PRN):          PHYSICAL EXAM:  GENERAL: NAD, well-groomed, well-developed  HEAD:  Atraumatic, Normocephalic  CHEST/LUNG: Clear to percussion bilaterally; No rales, rhonchi, wheezing, or rubs  HEART: Regular rate and rhythm; No murmurs, rubs, or gallops  ABDOMEN: Soft, Nontender, Nondistended; Bowel sounds present  EXTREMITIES:  2+ Peripheral Pulses, No clubbing, cyanosis, or edema  LYMPH: No lymphadenopathy noted  SKIN: No rashes or lesions    Care Discussed with Consultants/Other Providers [ ] YES  [ ] NO
Patient is a 70y old  Female who presents with a chief complaint of diarrhea (11 Sep 2020 16:33)      INTERVAL HPI/OVERNIGHT EVENTS:  T(C): 36.8 (09-11-20 @ 12:30), Max: 36.8 (09-11-20 @ 04:58)  HR: 88 (09-11-20 @ 17:17) (88 - 99)  BP: 139/79 (09-11-20 @ 17:17) (139/79 - 178/94)  RR: 17 (09-11-20 @ 12:30) (17 - 17)  SpO2: 100% (09-11-20 @ 12:30) (100% - 100%)  Wt(kg): --  I&O's Summary    10 Sep 2020 07:01  -  11 Sep 2020 07:00  --------------------------------------------------------  IN: 1950 mL / OUT: 2150 mL / NET: -200 mL    11 Sep 2020 07:01  -  11 Sep 2020 18:03  --------------------------------------------------------  IN: 1050 mL / OUT: 1000 mL / NET: 50 mL        LABS:                        10.3   10.64 )-----------( 241      ( 11 Sep 2020 05:45 )             31.8     09-11    137  |  104  |  10  ----------------------------<  103<H>  3.2<L>   |  20<L>  |  1.12    Ca    8.7      11 Sep 2020 05:45  Phos  3.7     09-11  Mg     1.7     09-11          CAPILLARY BLOOD GLUCOSE                MEDICATIONS  (STANDING):  aMIOdarone    Tablet 200 milliGRAM(s) Oral daily  aspirin enteric coated 81 milliGRAM(s) Oral daily  DULoxetine 60 milliGRAM(s) Oral daily  hydrALAZINE 10 milliGRAM(s) Oral two times a day  lactobacillus acidophilus 1 Tablet(s) Oral three times a day with meals  lisinopril 40 milliGRAM(s) Oral daily  OLANZapine 2.5 milliGRAM(s) Oral daily  vancomycin    Solution 125 milliGRAM(s) Oral every 6 hours    MEDICATIONS  (PRN):          PHYSICAL EXAM:  GENERAL: NAD, well-groomed, well-developed  HEAD:  Atraumatic, Normocephalic  CHEST/LUNG: Clear to percussion bilaterally; No rales, rhonchi, wheezing, or rubs  HEART: Regular rate and rhythm; No murmurs, rubs, or gallops  ABDOMEN: Soft, Nontender, Nondistended; Bowel sounds present  EXTREMITIES:  2+ Peripheral Pulses, No clubbing, cyanosis, or edema  LYMPH: No lymphadenopathy noted  SKIN: No rashes or lesions    Care Discussed with Consultants/Other Providers [ ] YES  [ ] NO
Patient is a 70y old  Female who presents with a chief complaint of diarrhea (11 Sep 2020 17:57)      INTERVAL HPI/OVERNIGHT EVENTS:  T(C): 37 (09-12-20 @ 12:49), Max: 37 (09-12-20 @ 12:49)  HR: 80 (09-12-20 @ 12:49) (80 - 88)  BP: 137/71 (09-12-20 @ 12:49) (137/60 - 142/81)  RR: 17 (09-12-20 @ 12:49) (17 - 17)  SpO2: 100% (09-12-20 @ 12:49) (98% - 100%)  Wt(kg): --  I&O's Summary    11 Sep 2020 07:01  -  12 Sep 2020 07:00  --------------------------------------------------------  IN: 1350 mL / OUT: 1550 mL / NET: -200 mL    12 Sep 2020 07:01  -  12 Sep 2020 16:42  --------------------------------------------------------  IN: 480 mL / OUT: 0 mL / NET: 480 mL        LABS:                        10.2   7.04  )-----------( 246      ( 12 Sep 2020 04:30 )             31.7     09-12    137  |  104  |  15  ----------------------------<  83  3.7   |  22  |  1.10    Ca    8.7      12 Sep 2020 15:44  Phos  3.2     09-12  Mg     1.8     09-12          CAPILLARY BLOOD GLUCOSE                MEDICATIONS  (STANDING):  aMIOdarone    Tablet 200 milliGRAM(s) Oral daily  aspirin enteric coated 81 milliGRAM(s) Oral daily  DULoxetine 60 milliGRAM(s) Oral daily  hydrALAZINE 10 milliGRAM(s) Oral two times a day  lactobacillus acidophilus 1 Tablet(s) Oral three times a day with meals  lisinopril 40 milliGRAM(s) Oral daily  OLANZapine 2.5 milliGRAM(s) Oral daily  vancomycin    Solution 125 milliGRAM(s) Oral every 6 hours    MEDICATIONS  (PRN):          PHYSICAL EXAM:  GENERAL: NAD, well-groomed, well-developed  HEAD:  Atraumatic, Normocephalic  CHEST/LUNG: Clear to percussion bilaterally; No rales, rhonchi, wheezing, or rubs  HEART: Regular rate and rhythm; No murmurs, rubs, or gallops  ABDOMEN: Soft, Nontender, Nondistended; Bowel sounds present  EXTREMITIES:  2+ Peripheral Pulses, No clubbing, cyanosis, or edema  LYMPH: No lymphadenopathy noted  SKIN: No rashes or lesions    Care Discussed with Consultants/Other Providers [x ] YES  [ ] NO
Patient is a 70y old  Female who presents with a chief complaint of diarrhea (12 Sep 2020 22:31)      INTERVAL HPI/OVERNIGHT EVENTS:  T(C): 36.8 (09-13-20 @ 12:21), Max: 36.8 (09-13-20 @ 12:21)  HR: 77 (09-13-20 @ 12:21) (75 - 81)  BP: 143/68 (09-13-20 @ 12:21) (143/68 - 176/83)  RR: 17 (09-13-20 @ 12:21) (17 - 17)  SpO2: 99% (09-13-20 @ 12:21) (99% - 100%)  Wt(kg): --  I&O's Summary    12 Sep 2020 07:01  -  13 Sep 2020 07:00  --------------------------------------------------------  IN: 1570 mL / OUT: 1150 mL / NET: 420 mL    13 Sep 2020 07:01  -  13 Sep 2020 17:02  --------------------------------------------------------  IN: 480 mL / OUT: 400 mL / NET: 80 mL        LABS:                        11.1   7.20  )-----------( 255      ( 13 Sep 2020 05:45 )             34.2     09-13    140  |  106  |  13  ----------------------------<  89  3.1<L>   |  22  |  1.07    Ca    8.8      13 Sep 2020 05:45  Phos  2.7     09-13  Mg     1.8     09-13          CAPILLARY BLOOD GLUCOSE                MEDICATIONS  (STANDING):  aMIOdarone    Tablet 200 milliGRAM(s) Oral daily  aspirin enteric coated 81 milliGRAM(s) Oral daily  DULoxetine 60 milliGRAM(s) Oral daily  hydrALAZINE 10 milliGRAM(s) Oral two times a day  lactobacillus acidophilus 1 Tablet(s) Oral three times a day with meals  lisinopril 40 milliGRAM(s) Oral daily  magnesium oxide 400 milliGRAM(s) Oral three times a day with meals  OLANZapine 2.5 milliGRAM(s) Oral daily  potassium chloride    Tablet ER 40 milliEquivalent(s) Oral every 4 hours  vancomycin    Solution 125 milliGRAM(s) Oral every 6 hours    MEDICATIONS  (PRN):          PHYSICAL EXAM:  GENERAL: NAD, well-groomed, well-developed  HEAD:  Atraumatic, Normocephalic  CHEST/LUNG: Clear to percussion bilaterally; No rales, rhonchi, wheezing, or rubs  HEART: Regular rate and rhythm; No murmurs, rubs, or gallops  ABDOMEN: Soft, Nontender, Nondistended; Bowel sounds present  EXTREMITIES:  2+ Peripheral Pulses, No clubbing, cyanosis, or edema  LYMPH: No lymphadenopathy noted  SKIN: No rashes or lesions    Care Discussed with Consultants/Other Providers [ ] YES  [ ] NO

## 2020-09-14 NOTE — DISCHARGE NOTE PROVIDER - NSDCCPCAREPLAN_GEN_ALL_CORE_FT
PRINCIPAL DISCHARGE DIAGNOSIS  Diagnosis: Clostridium difficile diarrhea  Assessment and Plan of Treatment: Clostridium difficile diarrhea  Continue to take vanocmycin liquid by mouth FINISH all of medication for a total of 14 days   Follow up with GI MD in two weeks - call to make an appointment   You should have a follow up colonoscpy to be discussed with your GI MD   Do not take any medications over the counter or prescribed to stop the diarrhea   Call GI doctor if symptoms return before your follow up appointment   Drink plenty of fluids   Coal foods      SECONDARY DISCHARGE DIAGNOSES  Diagnosis: S/P AAA repair  Assessment and Plan of Treatment: Follow up with rebecca MONROE   Home PT / Home care eval for weakness       Diagnosis: Anxiety  Assessment and Plan of Treatment: Continue duloxetine    Diagnosis: Afib  Assessment and Plan of Treatment: Afib  Continue aspirirn   continue amiodorone   Follow up with cardiologist for outpatient monitoring    Diagnosis: HTN (hypertension)  Assessment and Plan of Treatment: Continue blood pressure medication regimen as directed. Monitor for any visual changes, headaches or dizziness.  Monitor blood pressure regularly.  Follow up with your PCP for further management for high blood pressure. (hypertension)  Continue blood pressure medication regimen as directed. Monitor for any visual changes, headaches or dizziness.  Monitor blood pressure regularly.  Follow up with your PCP for further management for high blood pressure.

## 2020-09-14 NOTE — PROGRESS NOTE ADULT - PROVIDER SPECIALTY LIST ADULT
Gastroenterology
Hospitalist
Infectious Disease
Infectious Disease

## 2020-09-14 NOTE — DISCHARGE NOTE PROVIDER - CARE PROVIDERS DIRECT ADDRESSES
[email or web address]

## 2020-09-14 NOTE — DISCHARGE NOTE NURSING/CASE MANAGEMENT/SOCIAL WORK - PATIENT PORTAL LINK FT
You can access the FollowMyHealth Patient Portal offered by [facility] by registering at the following website: [email or web address]. By joining [employer]’s FollowMyHealth portal, you will also be able to view your health information using other applications (apps) compatible with our system.

## 2020-11-02 ENCOUNTER — APPOINTMENT (OUTPATIENT)
Dept: PHYSICAL MEDICINE AND REHAB | Facility: CLINIC | Age: 70
End: 2020-11-02
Payer: MEDICARE

## 2020-11-02 VITALS
TEMPERATURE: 97 F | SYSTOLIC BLOOD PRESSURE: 183 MMHG | HEIGHT: 65 IN | DIASTOLIC BLOOD PRESSURE: 76 MMHG | WEIGHT: 130 LBS | HEART RATE: 80 BPM | BODY MASS INDEX: 21.66 KG/M2 | OXYGEN SATURATION: 98 %

## 2020-11-02 PROCEDURE — 99214 OFFICE O/P EST MOD 30 MIN: CPT

## 2020-11-02 PROCEDURE — 99072 ADDL SUPL MATRL&STAF TM PHE: CPT

## 2020-11-03 NOTE — HISTORY OF PRESENT ILLNESS
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 She has an excellent recovery and was doing well until she developed C. diff colitis about two months ago. This resulted in acute hospitalization at [facility] (GI-Dr. Smith) and subacute placement at the [facility]. She is now home and has improved. She is still bothered by the presence of muddy stool that sometimes results in bowel incontinence. She has seen Dr. Smith, no new treatments were added. She was reassured that improvement would occur, but would take time. During her illness, she lost weight and endurance. She feels not yet back to baseline.

## 2020-11-03 NOTE — PHYSICAL EXAM
[Normal] : Oriented to person, place, and time, insight and judgement were intact and the affect was normal [de-identified] : Not cachexic, or frail. [de-identified] : Ambulates with rolling walker and right leg excessive swing due to hamstring weakness.  [de-identified] : Has functional power in all key myotomes of legs. Tone is benign. Right leg is weaker than left in particular in the hip abductors, knee flexors and hip extensors which are less than 3/5 power.

## 2020-11-03 NOTE — REASON FOR VISIT
[Follow-Up] : a follow-up visit [FreeTextEntry1] : incomplete paraplegia complicated by recent C. diff colitis

## 2020-11-03 NOTE — REVIEW OF SYSTEMS
[Negative] : Heme/Lymph [FreeTextEntry2] : see HPI [FreeTextEntry7] : see HPI [FreeTextEntry8] : Has had multiple UTIs, apparently resulting in multiple courses of antibiotics.  [FreeTextEntry9] : Ambulates with walker in home and community. [de-identified] : see HPI

## 2020-11-03 NOTE — ASSESSMENT
[FreeTextEntry1] : 70 woman with incomplete paraplegia, s/p C. diff colitis with relative deconditioning.\par Rec:\par 1. D/C Olanzapine. Appears was begun in hospital in setting of delerium.\par 2. If no issue with discontinuation of olanzapine, consider reduce duloxetine to 30 mg per day. She feels likely not helping neuropathic sensations, which are not painful, but more an annoyance.\par 3. GI follow up. Consider addition of Questran to improve stool bulk and, therefore, control.\par 4. When patient ready, resume outpt PT at [facility] with [facility].\par 5. Driving evaluation and training. Form completed for [employer].

## 2020-12-21 PROBLEM — Z87.440 HISTORY OF URINARY TRACT INFECTION: Status: RESOLVED | Noted: 2019-03-12 | Resolved: 2020-12-21

## 2021-02-06 ENCOUNTER — RESULT REVIEW (OUTPATIENT)
Age: 71
End: 2021-02-06

## 2021-02-06 ENCOUNTER — APPOINTMENT (OUTPATIENT)
Dept: CT IMAGING | Facility: IMAGING CENTER | Age: 71
End: 2021-02-06
Payer: MEDICARE

## 2021-02-06 ENCOUNTER — OUTPATIENT (OUTPATIENT)
Dept: OUTPATIENT SERVICES | Facility: HOSPITAL | Age: 71
LOS: 1 days | End: 2021-02-06
Payer: MEDICARE

## 2021-02-06 DIAGNOSIS — Z98.49 CATARACT EXTRACTION STATUS, UNSPECIFIED EYE: Chronic | ICD-10-CM

## 2021-02-06 DIAGNOSIS — I71.6 THORACOABDOMINAL AORTIC ANEURYSM, WITHOUT RUPTURE: Chronic | ICD-10-CM

## 2021-02-06 DIAGNOSIS — S42.302A UNSPECIFIED FRACTURE OF SHAFT OF HUMERUS, LEFT ARM, INITIAL ENCOUNTER FOR CLOSED FRACTURE: Chronic | ICD-10-CM

## 2021-02-06 DIAGNOSIS — Z98.890 OTHER SPECIFIED POSTPROCEDURAL STATES: Chronic | ICD-10-CM

## 2021-02-06 DIAGNOSIS — Z00.8 ENCOUNTER FOR OTHER GENERAL EXAMINATION: ICD-10-CM

## 2021-02-06 DIAGNOSIS — Z98.890 OTHER SPECIFIED POSTPROCEDURAL STATES: ICD-10-CM

## 2021-02-06 PROCEDURE — 74174 CTA ABD&PLVS W/CONTRAST: CPT

## 2021-02-06 PROCEDURE — 71275 CT ANGIOGRAPHY CHEST: CPT | Mod: 26

## 2021-02-06 PROCEDURE — 74174 CTA ABD&PLVS W/CONTRAST: CPT | Mod: 26

## 2021-02-06 PROCEDURE — 82565 ASSAY OF CREATININE: CPT

## 2021-02-06 PROCEDURE — 71275 CT ANGIOGRAPHY CHEST: CPT

## 2021-02-10 ENCOUNTER — APPOINTMENT (OUTPATIENT)
Dept: CARDIOTHORACIC SURGERY | Facility: CLINIC | Age: 71
End: 2021-02-10
Payer: MEDICARE

## 2021-02-17 ENCOUNTER — APPOINTMENT (OUTPATIENT)
Dept: CARDIOTHORACIC SURGERY | Facility: CLINIC | Age: 71
End: 2021-02-17
Payer: MEDICARE

## 2021-02-24 ENCOUNTER — APPOINTMENT (OUTPATIENT)
Dept: CARDIOTHORACIC SURGERY | Facility: CLINIC | Age: 71
End: 2021-02-24
Payer: MEDICARE

## 2021-02-24 PROCEDURE — 99213 OFFICE O/P EST LOW 20 MIN: CPT | Mod: 95

## 2021-02-25 RX ORDER — METOPROLOL TARTRATE 25 MG/1
25 TABLET, FILM COATED ORAL TWICE DAILY
Refills: 0 | Status: DISCONTINUED | COMMUNITY
Start: 2019-11-19 | End: 2021-02-25

## 2021-02-25 RX ORDER — AMIODARONE HYDROCHLORIDE 200 MG/1
200 TABLET ORAL DAILY
Refills: 0 | Status: DISCONTINUED | COMMUNITY
Start: 2019-10-29 | End: 2021-02-25

## 2021-02-25 NOTE — REASON FOR VISIT
[Home] : at home, [unfilled] , at the time of the visit. [Medical Office: ([facility])___] : at the medical office located in  [Verbal consent obtained from patient] : the patient, [unfilled]

## 2021-02-26 NOTE — ASSESSMENT
[FreeTextEntry1] : 71 year old female with hx of "adrian-aorta" status post aortic valve replacement, ascending aorta and transverse arch replacement, descending thoracic aortic stent graft with partial coverage of the left subclavian artery with a frozen elephant trunk utilizing a 37 x 150 mm [place] TAG prosthesis on 4/8/19, now status post Thoracoabdominal aneurysm open repair with Decron graft and celiac SMA bypass, reimplantation of lumbar artery on 8/29/19 presents for a follow up visit with repeat diagnostic imaging. \par \par \par I have reviewed the patient's medical records, diagnostic images during the time of this office consultation and have made the following recommendation. The surgical repair is intact and stable.\par \par Plan\par \par - [facility] in [facility] one year with [facility]A. \par - Follow up with cardiologist and PCP.\par - Blood pressure management.\par

## 2021-02-26 NOTE — PROCEDURE
[FreeTextEntry1] : \par Patient was advised to view the educational video prior to this visit regarding aortic pathology, risk factors, surgical procedures, and lifestyle modifications. Video can be retrieved at [email or web address].\par

## 2021-02-26 NOTE — HISTORY OF PRESENT ILLNESS
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 Postop course includes LE weakness secondary to possible cervical myelopathy and neurogenic bowel and bladder. The patient presents for a follow up visit with repeat diagnostic imaging. \par \par CTA 2/6/21:\par Status post complex aortic repair as detailed above without change in appearance of the ascending aortic graft, endovascular stent and common trunk graft within the upper abdomen when compared with December 7, 2019.\par The suprarenal aorta has decreased in size from prior now measuring 3.3 cm. The infrarenal aorta measures 1.9 cm. No intramural hematoma or aortic dissection is noted.\par \par Patient had a few recent hospitalizations for UTI/bacteremia, falls, and C diff. infection. She is ambulating with a walker.  She also had covid infection with mild symptoms 3/2020. Otherwise she is well,  denies fever, chills, fatigue, headache, blurred vision, dizziness, syncope, chest pain, palpitations, shortness of breath at rest, orthopnea, paroxysmal nocturnal dyspnea, nausea, vomiting, abdominal pain, back pain, BRBPR. \par \par Home BP WNL

## 2021-02-26 NOTE — REVIEW OF SYSTEMS
[Diarrhea] : diarrhea [Difficulty Walking] : difficulty walking [Joint Pain] : joint pain [Feeling Tired] : not feeling tired [Lower Ext Edema] : no lower extremity edema [Easy Bleeding] : no tendency for easy bleeding [Easy Bruising] : no tendency for easy bruising [de-identified] : numbness to bilateral feet

## 2021-02-26 NOTE — END OF VISIT
[FreeTextEntry3] : \par I, MARY KAY BARNESU , am scribing for and in the presence of CADE JAIMES the following sections: History of present illness, past Medical/family/surgical/family/social history, review of systems,  and disposition.\par \par I personally performed the services described in the documentation, reviewed the documentation recorded by the scribe in my presence and it accurately and completely records my words and actions.\par \par

## 2021-02-26 NOTE — DATA REVIEWED
[FreeTextEntry1] : CTA chest abdomen pelvis 12/7/19: postoperative changes with decrease in caliber of the descending aorta and upper abdominal aorta. Descending aorta 5.1cm (prev 7.5cm), ascending aorta 3cm, arch 3.6cm, aortic root 3.2cm.

## 2021-02-26 NOTE — CONSULT LETTER
[Dear  ___] : Dear  [unfilled], [FreeTextEntry2] : Srinath Corral MD\par [address] [place] [address]\[place] [address]\par  [FreeTextEntry1] : I had the pleasure of seeing your patient, KENNETH SPRING, in my office today. \par \par We take a multidisciplinary team approach to patient care and consider you, the referring physician, an extension of our team. We will maintain an open line of communication with you throughout your patient's treatment course.  \par \par As you recall, she is a 71 year old female status post aortic valve replacement, ascending aorta and transverse arch replacement, descending thoracic aortic stent graft with partial coverage of the left subclavian artery with a frozen elephant trunk utilizing a 37 x 150 mm [place] TAG prosthesis on 4/8/19, now status post Thoracoabdominal aneurysm open repair with Decron graft and celiac SMA bypass, reimplantation of lumbar artery on 8/29/19. The patient presents to the office today for a routine follow up visit with repeat diagnostic imaging. I have enclosed a copy for your records.\par \par The surgical repair is intact and stable. Therefore, I have recommended that the patient will follow up in the [facility] in one year with a CTA to monitor her surgical repair. My office will assist the patient with her upcoming appointment and I will update you on her progress at that time.\par \par I have discussed with the patient that we will continue to monitor her aortic pathology closely at the [facility] the [facility], that encompasses the entire health care system and is one of the largest in the [place] at this point.\par \par I appreciate the opportunity to care for your patient at the [facility] for [facility] based at [facility]. If there are any questions or concerns, please call me directly at [phone]. \par \par Please see my note below. \par \par \par Sincerely, \par \par \par \par \par \par Junior Briseno M.D.\par Professor of Cardiovascular and Thoracic Surgery\par Minimally Invasive Valve Surgeon\par Director of Aortic Surgery, [facility]\par Cell: [phone]\par Email: [email or web address] \par \par [facility]:\par [address], [place] [address]\par Office: [phone]\par Fax: [phone]\par \par [facility]:\par Department of Cardiovascular and Thoracic Surgery\par [address] [place] [address], [address]\par Office: [phone]\par Fax: [phone]\par \par Practice Manager: Ms. Indy Griffith\par Email: [email or web address]\par Phone: [phone]\par

## 2021-08-10 ENCOUNTER — APPOINTMENT (OUTPATIENT)
Dept: PHYSICAL MEDICINE AND REHAB | Facility: CLINIC | Age: 71
End: 2021-08-10
Payer: MEDICARE

## 2021-08-10 VITALS
DIASTOLIC BLOOD PRESSURE: 87 MMHG | OXYGEN SATURATION: 100 % | HEART RATE: 82 BPM | TEMPERATURE: 97.5 F | SYSTOLIC BLOOD PRESSURE: 205 MMHG

## 2021-08-10 DIAGNOSIS — A04.72 ENTEROCOLITIS DUE TO CLOSTRIDIUM DIFFICILE, NOT SPECIFIED AS RECURRENT: ICD-10-CM

## 2021-08-10 PROCEDURE — 99214 OFFICE O/P EST MOD 30 MIN: CPT

## 2021-08-10 RX ORDER — CHOLESTYRAMINE 4 G/9G
4 POWDER, FOR SUSPENSION ORAL DAILY
Refills: 0 | Status: ACTIVE | COMMUNITY
Start: 2021-08-10

## 2021-08-10 RX ORDER — OLANZAPINE 2.5 MG/1
2.5 TABLET, FILM COATED ORAL
Qty: 90 | Refills: 0 | Status: DISCONTINUED | COMMUNITY
Start: 2020-10-02 | End: 2021-08-10

## 2021-08-15 PROBLEM — A04.72 CLOSTRIDIUM DIFFICILE COLITIS: Status: ACTIVE | Noted: 2020-11-03

## 2021-08-15 NOTE — ASSESSMENT
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 \par Rec:\par 1. Not ready to resume outpt PT yet, though recommended. As back pain most bothersome now, arranged for consult with Dr. Jean for osteopathic eval. \par 2. Patient to monitor BP and contact cardiologist. \par 3. Discussed driving with her and her son and reaffirmed info on adapted  training form. \par \par Add: Flory called back and reported acceptable BPs at home. \par

## 2021-08-15 NOTE — HISTORY OF PRESENT ILLNESS
[FreeTextEntry1] : 71 woman with history of thoracoabdominal aortic aneurysm complicted by a cord infarct. She is s/p a repair with prosthetic aortic valve. Has been recovering slowly from C. diff colitis with improving bowel picture and no recent diarrhea. She has chronic at level and below level neuropathic pain, but more recently has been bothered by low back pain of a different quality. This seems more muscular as it is in the back, does not radiate and is affected by position. The chronic pain, belt like in the trunk, and numb in the legs are reasonably well controlled with duloxetine. She remains ambulatory with a walker, and is beginning to take steps without a device.

## 2021-08-15 NOTE — REVIEW OF SYSTEMS
[Negative] : Psychiatric [FreeTextEntry5] : see HPI [FreeTextEntry7] : see HPI [FreeTextEntry8] : Reasonable urinary control.  [FreeTextEntry9] : see HPI [de-identified] : see HPI

## 2021-08-15 NOTE — PHYSICAL EXAM
[Normal] : Oriented to person, place, and time, insight and judgement were intact and the affect was normal [de-identified] : N [de-identified] : Normal work of breathing.  [de-identified] : Minimial discomfort with deep palpation of lumbar paraspinals. HIp flexors seem shortened. Amb indep with walker. Without walker has very short steps and little knee flexion.  [de-identified] : Functional power all key mm of lower limbs.

## 2021-09-29 ENCOUNTER — APPOINTMENT (OUTPATIENT)
Dept: PHYSICAL MEDICINE AND REHAB | Facility: CLINIC | Age: 71
End: 2021-09-29
Payer: MEDICARE

## 2021-09-29 VITALS — TEMPERATURE: 96.8 F

## 2021-09-29 DIAGNOSIS — M54.5 LOW BACK PAIN: ICD-10-CM

## 2021-09-29 PROCEDURE — 99213 OFFICE O/P EST LOW 20 MIN: CPT | Mod: 25

## 2021-09-29 PROCEDURE — 98928 OSTEOPATH MANJ 7-8 REGIONS: CPT

## 2021-09-29 NOTE — PHYSICAL EXAM
[FreeTextEntry1] : Gen: Patient is A&O x 3, NAD\par HEENT: EOMI, hearing grossly normal\par Resp: regular, non - labored\par CV: pulses regular\par Skin: no rashes, erythema\par Lymph: no clubbing, cyanosis, edema, or palpable lymphadenopathy\par Inspection: no instability or misalignment\par ROM: full throughout\par Palpation:TTP bilateral lumbar paraspinals, left periscapular region, right cervical paraspinals \par Sensation: intact to light touch\par Reflexes: 3+ in b/l LE\par Strength: 4/5 throughout\par Special tests: -straight leg raise\par Gait: ambualtes with rolling walker \par \par Osteopathic Structural Exam:\par Cranial: OA rotated right\par Cervical: Right C5, C6 tenderpoints\par Thoracic: T2-T4 NRRSBL\par Rib: Left rib 1 tenderpoint \par Upper extremity: Left scapula myofascial restriction \par Lumbar: Bilateral posterior L4/L5 tenderpoints \par Lower Extremity: Left lower extremity restricted in internal rotation \par \par \par \par \par
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
## 2021-09-29 NOTE — ASSESSMENT
[FreeTextEntry1] : 71 year old female with history of incomplete paraplegia presenting for evaluation.  \par \par #Low back pain/Neck pain/Myofascial pain:\par -Spinal Cord Rehabilitation Medicine and thoracic surgery notes reviewed \par -CT angio abdomen/pelvis reviewed\par -Discussed risks and benefits of OMT\par -Osteopathic structural exam demonstrated somatic dysfunction and the patient agreed to osteopathic manipulation.\par \par 1. Somatic dysfunction cranial\par -OMT performed with myofascial release\par 2. Somatic dysfunction cervical\par --OMT performed with counterstrain \par 3. Somatic dysfunction upper extremity\par --OMT performed with myofascial release\par 4. Somatic dysfunction rib\par --OMT performed with counterstrain \par 5. Somatic dysfunction thoracic \par --OMT performed with myofascial release\par 6. Somatic dysfunction lumbar \par --OMT performed with counterstrain \par 7. Somatic dysfunction lower extremity \par --OMT performed with myofascial release\par \par Patient tolerated treatment well.\par \par Follow up in 2 weeks.  \par \par  \par \par

## 2021-10-20 ENCOUNTER — APPOINTMENT (OUTPATIENT)
Dept: PHYSICAL MEDICINE AND REHAB | Facility: CLINIC | Age: 71
End: 2021-10-20
Payer: MEDICARE

## 2021-10-20 VITALS — TEMPERATURE: 95 F

## 2021-10-20 PROCEDURE — 98929 OSTEOPATH MANJ 9-10 REGIONS: CPT
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
## 2021-10-20 NOTE — PHYSICAL EXAM
[FreeTextEntry1] : Gen: Patient is A&O x 3, NAD\par HEENT: EOMI, hearing grossly normal\par Resp: regular, non - labored\par CV: pulses regular\par Skin: no rashes, erythema\par Lymph: no clubbing, cyanosis, edema, or palpable lymphadenopathy\par Inspection: no instability or misalignment\par ROM: full throughout\par Palpation:TTP bilateral lumbar paraspinals, left periscapular region, right cervical paraspinals \par Sensation: intact to light touch\par Reflexes: 3+ in b/l LE\par Strength: 4/5 throughout\par Special tests: -straight leg raise\par Gait: ambualtes with rolling walker \par \par Osteopathic Structural Exam:\par Cranial: OA rotated right\par Cervical: Right C3, C6 tenderpoints\par Thoracic: T1-T4 NRLSBR\par Rib: Posterior rib 1,2  tenderpoint left\par Upper extremity: Left scapula myofascial restriction, left anterior GH joint  \par Lumbar: Bilateral posterior L4/L5 tenderpoints \par Lower Extremity: Left lower extremity restricted in internal rotation \par Pelvis: left medial innominate\par Sacrum: Left sacral torsion \par \par \par \par \par

## 2021-10-20 NOTE — ASSESSMENT
[FreeTextEntry1] : 71 year old female with history of incomplete paraplegia presenting for evaluation.  \par \par #Low back pain/Neck pain/Myofascial pain:\par -Discussed risks and benefits of OMT\par -Osteopathic structural exam demonstrated somatic dysfunction and the patient agreed to osteopathic manipulation.\par \par 1. Somatic dysfunction cranial\par -OMT performed with myofascial release\par 2. Somatic dysfunction cervical\par --OMT performed with counterstrain \par 3. Somatic dysfunction upper extremity\par --OMT performed with myofascial release\par 4. Somatic dysfunction rib\par --OMT performed with counterstrain \par 5. Somatic dysfunction thoracic \par --OMT performed with myofascial release\par 6. Somatic dysfunction lumbar \par --OMT performed with counterstrain \par 7. Somatic dysfunction lower extremity \par --OMT performed with myofascial release\par 8. Somatic dysfunction pelvis\par --OMT performed with myofascial release\par 9. Somatic dysfunction sacral \par --OMT performed with myofascial release\par \par \par Patient tolerated treatment well.\par \par Follow up in 2 weeks.  \par \par  \par \par

## 2021-11-10 ENCOUNTER — APPOINTMENT (OUTPATIENT)
Dept: PHYSICAL MEDICINE AND REHAB | Facility: CLINIC | Age: 71
End: 2021-11-10

## 2022-01-05 NOTE — PHYSICAL THERAPY INITIAL EVALUATION ADULT - PERTINENT HX OF CURRENT PROBLEM, REHAB EVAL
69 y/o F with the PMH of thoracic and abdominal aortic aneurysm s/p repair, AV replacement, and HTN presenting with diarrhea x1 day, lightheadedness and nausea/dry heaving, found to be bacteremic, hypokalemic and hypertensive. CT A&P w/ Left-sided pyelonephritis and possible cystitis. Interval stability in appearance of surgical graft repair of thoracoabdominal aorta and mesenteric bypass grafts. There is 1 Wet Read(s) to document. There are no Wet Read(s) to document.

## 2022-01-27 ENCOUNTER — APPOINTMENT (OUTPATIENT)
Dept: PHYSICAL MEDICINE AND REHAB | Facility: CLINIC | Age: 72
End: 2022-01-27
Payer: MEDICARE

## 2022-01-27 VITALS
SYSTOLIC BLOOD PRESSURE: 148 MMHG | BODY MASS INDEX: 21.66 KG/M2 | HEART RATE: 78 BPM | DIASTOLIC BLOOD PRESSURE: 72 MMHG | TEMPERATURE: 97.1 F | WEIGHT: 130 LBS | HEIGHT: 65 IN

## 2022-01-27 PROCEDURE — 98929 OSTEOPATH MANJ 9-10 REGIONS: CPT

## 2022-01-27 NOTE — ASSESSMENT
[FreeTextEntry1] : 71 year old female with history of incomplete paraplegia presenting for evaluation.  \par \par #Low back pain/Neck pain/Myofascial pain:\par -Discussed risks and benefits of OMT\par -OMT continues to improve her symptoms\par -Osteopathic structural exam demonstrated somatic dysfunction and the patient agreed to osteopathic manipulation.\par \par 1. Somatic dysfunction cranial\par -OMT performed with cranio-sacral therapy \par 2. Somatic dysfunction cervical\par --OMT performed with counterstrain \par 3. Somatic dysfunction upper extremity\par --OMT performed with myofascial release\par 4. Somatic dysfunction rib\par --OMT performed with counterstrain \par 5. Somatic dysfunction thoracic \par --OMT performed with myofascial release\par 6. Somatic dysfunction lumbar \par --OMT performed with counterstrain and myofascial release \par 7. Somatic dysfunction lower extremity \par --OMT performed with myofascial release\par 8. Somatic dysfunction pelvis\par --OMT performed with myofascial release\par 9. Somatic dysfunction sacral \par --OMT performed with cranio-sacral therapy \par \par \par Patient tolerated treatment well.\par \par Follow up in 3-4 weeks.  \par \par  \par \par
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
## 2022-02-03 ENCOUNTER — OUTPATIENT (OUTPATIENT)
Dept: OUTPATIENT SERVICES | Facility: HOSPITAL | Age: 72
LOS: 1 days | End: 2022-02-03
Payer: MEDICARE

## 2022-02-03 ENCOUNTER — APPOINTMENT (OUTPATIENT)
Dept: CT IMAGING | Facility: CLINIC | Age: 72
End: 2022-02-03
Payer: MEDICARE

## 2022-02-03 DIAGNOSIS — Z98.49 CATARACT EXTRACTION STATUS, UNSPECIFIED EYE: Chronic | ICD-10-CM

## 2022-02-03 DIAGNOSIS — Z98.890 OTHER SPECIFIED POSTPROCEDURAL STATES: ICD-10-CM

## 2022-02-03 DIAGNOSIS — I71.6 THORACOABDOMINAL AORTIC ANEURYSM, WITHOUT RUPTURE: Chronic | ICD-10-CM

## 2022-02-03 DIAGNOSIS — Z98.890 OTHER SPECIFIED POSTPROCEDURAL STATES: Chronic | ICD-10-CM

## 2022-02-03 DIAGNOSIS — Z95.3 PRESENCE OF XENOGENIC HEART VALVE: ICD-10-CM

## 2022-02-03 DIAGNOSIS — S42.302A UNSPECIFIED FRACTURE OF SHAFT OF HUMERUS, LEFT ARM, INITIAL ENCOUNTER FOR CLOSED FRACTURE: Chronic | ICD-10-CM

## 2022-02-03 PROCEDURE — 82565 ASSAY OF CREATININE: CPT

## 2022-02-03 PROCEDURE — 74174 CTA ABD&PLVS W/CONTRAST: CPT

## 2022-02-03 PROCEDURE — 71275 CT ANGIOGRAPHY CHEST: CPT

## 2022-02-03 PROCEDURE — 71275 CT ANGIOGRAPHY CHEST: CPT | Mod: 26

## 2022-02-03 PROCEDURE — 74174 CTA ABD&PLVS W/CONTRAST: CPT | Mod: 26

## 2022-02-04 ENCOUNTER — APPOINTMENT (OUTPATIENT)
Dept: CT IMAGING | Facility: CLINIC | Age: 72
End: 2022-02-04

## 2022-02-09 ENCOUNTER — APPOINTMENT (OUTPATIENT)
Dept: CARDIOTHORACIC SURGERY | Facility: CLINIC | Age: 72
End: 2022-02-09
Payer: MEDICARE

## 2022-02-09 PROCEDURE — 99442: CPT

## 2022-02-11 NOTE — ASSESSMENT
[FreeTextEntry1] : 71 year old female with hx of "adrian-aorta" status post aortic valve replacement, ascending aorta and transverse arch replacement, descending thoracic aortic stent graft with partial coverage of the left subclavian artery with a frozen elephant trunk utilizing a 37 x 150 mm [place] TAG prosthesis on 4/8/19, now status post Thoracoabdominal aneurysm open repair with Decron graft and celiac SMA bypass, reimplantation of lumbar artery on 8/29/19 presents for a follow up visit with repeat diagnostic imaging. \par \par 2/6/22 CTA C/A/P revealed Status post complex aortic repair as detailed above without change in appearance of the ascending aortic graft, endovascular stent and common trunk graft within the upper abdomen when compared with December 7, 2019.\par - The suprarenal aorta has decreased in size from prior now measuring 3.3 cm. The infrarenal aorta measures 1.9 cm. No intramural hematoma or aortic dissection is noted.\par \par  reviewed the patient's medical records, diagnostic images during the time of this office consultation and have made the following recommendation. The surgical repair is intact and stable.\par \par Plan\par \par - [facility] with CTA C/A/P . \par - Follow up with cardiologist and PCP.\par - Blood pressure management.\par

## 2022-02-11 NOTE — END OF VISIT
[FreeTextEntry3] : Scribe Attestation:\par I personally scribed for CADE JAIMES on Feb 9 2022  9:30AM . \par \par I personally performed the services described in the documentation, reviewed the documentation recorded by the scribe in my presence and it accurately and completely records my words and actions.\par \par \par \par \par Physician Attestation:\par Documented by LAURA JEFFERSON acting as a scribe for CADE JAIMES 02/09/2022 . \par                 All medical record entries made by the Scribe were at my, CADE JAIMES , direction and personally dictated by me on 02/09/2022 . I have reviewed the chart and agree that the record accurately reflects my personal performance of the history, physical exam, assessment and plan\par

## 2022-02-11 NOTE — CONSULT LETTER
[Dear  ___] : Dear  [unfilled], [FreeTextEntry2] : Srinath Corral MD\par [address] [place] [address]\[place] [address]\par  [FreeTextEntry1] : I had the pleasure of seeing your patient, KENNETH SPRING, in my office today. \par \par We take a multidisciplinary team approach to patient care and consider you, the referring physician, an extension of our team. We will maintain an open line of communication with you throughout your patient's treatment course.  \par \par As you recall, she is a 71 year old female status post aortic valve replacement, ascending aorta and transverse arch replacement, descending thoracic aortic stent graft with partial coverage of the left subclavian artery with a frozen elephant trunk utilizing a 37 x 150 mm [place] TAG prosthesis on 4/8/19, now status post Thoracoabdominal aneurysm open repair with Decron graft and celiac SMA bypass, reimplantation of lumbar artery on 8/29/19. The patient presents to the office today for a routine follow up visit with repeat diagnostic imaging. I have enclosed a copy for your records.\par \par The surgical repair is intact and stable. Therefore, I have recommended that the patient will follow up in the [facility] in 2 years  with a CTA C/A/P  to monitor her surgical repair. My office will assist the patient with her upcoming appointment and I will update you on her progress at that time.\par \par I have discussed with the patient that we will continue to monitor her aortic pathology closely at the [facility] for the [facility], that encompasses the entire health care system and is one of the largest in the [place] at this point.\par \par I appreciate the opportunity to care for your patient at the [facility] based at [facility]. If there are any questions or concerns, please call me directly at [phone]. \par \par Please see my note below. \par \par \par Sincerely, \par \par \par \par \par \par Junior Briseno M.D.\par Professor of Cardiovascular and Thoracic Surgery\par Minimally Invasive Valve Surgeon\par Director of Aortic Surgery, [facility]\par Cell: [phone]\par Email: [email or web address] \par \par [facility]:\par [address] Floor, [place] [address]\par Office: [phone]\par Fax: [phone]\par \par [facility]:\par Department of Cardiovascular and Thoracic Surgery\par [address] [place] [address], [address]\par Office: [phone]\par Fax: [phone]\par \par Practice Manager: Ms. Indy Griffith\par Email: [email or web address]\par Phone: [phone]\par

## 2022-02-11 NOTE — PROCEDURE
[FreeTextEntry1] : \par Patient was advised to view the educational video prior to this visit regarding aortic pathology, risk factors, surgical procedures, and lifestyle modifications. Video can be retrieved at [email or web address].\par

## 2022-02-11 NOTE — DATA REVIEWED
[FreeTextEntry1] : CTA 2/6/21:\par Status post complex aortic repair as detailed above without change in appearance of the ascending aortic graft, endovascular stent and common trunk graft within the upper abdomen when compared with December 7, 2019.\par The suprarenal aorta has decreased in size from prior now measuring 3.3 cm. The infrarenal aorta measures 1.9 cm. No intramural hematoma or aortic dissection is noted.\par \par \par CTA chest abdomen pelvis 12/7/19: postoperative changes with decrease in caliber of the descending aorta and upper abdominal aorta. Descending aorta 5.1cm (prev 7.5cm), ascending aorta 3cm, arch 3.6cm, aortic root 3.2cm.

## 2022-02-11 NOTE — HISTORY OF PRESENT ILLNESS
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 Postop course includes LE weakness secondary to possible cervical myelopathy and neurogenic bowel and bladder. The patient presents for a Telephonic one year follow up visit with repeat diagnostic imaging. \par \par This visit, she reports that her blood pressure was high and her cardiologist added amlodipine 10 mg daily . She also reports low back pain , numbness to bilateral legs . She uses walker . She had diarrhoea and in continence of urine. She was GI and diagnosed her with microscopic colitis. FOr her incontinence of urine, she self catheterize herself. She also moved to [place] and switched the cardiologist .\par \par Denies recent hospitalization, ER visits, or surgeries. He denies fever, chills, fatigue, headache, blurred vision, dizziness, syncope, chest pain, palpitations, shortness of breath, orthopnea, paroxysmal nocturnal dyspnea, nausea, vomiting, abdominal pain, headache, visual disturbances, CVA, PE, DVT, D/C, hematochezia, melena, dysuria, hematuria,  BRBPR or swelling to legs.\par \par Home BP: 160/85 mm hg \par \par \par \par 2/6/22 CTA C/A/P revealed Status post complex aortic repair as detailed above without change in appearance of the ascending aortic graft, endovascular stent and common trunk graft within the upper abdomen when compared with December 7, 2019.\par - The suprarenal aorta has decreased in size from prior now measuring 3.3 cm. The infrarenal aorta measures 1.9 cm. No intramural hematoma or aortic dissection is noted.\par \par

## 2022-02-17 ENCOUNTER — APPOINTMENT (OUTPATIENT)
Dept: PHYSICAL MEDICINE AND REHAB | Facility: CLINIC | Age: 72
End: 2022-02-17
Payer: MEDICARE

## 2022-02-17 PROCEDURE — 98929 OSTEOPATH MANJ 9-10 REGIONS: CPT

## 2022-02-17 PROCEDURE — 99213 OFFICE O/P EST LOW 20 MIN: CPT | Mod: 25

## 2022-02-17 NOTE — PHYSICAL EXAM
[FreeTextEntry1] : Gen: Patient is A&O x 3, NAD\par HEENT: EOMI, hearing grossly normal\par Resp: regular, non - labored\par CV: pulses regular\par Skin: no rashes, erythema\par Lymph: no clubbing, cyanosis, edema, or palpable lymphadenopathy\par Inspection: no instability or misalignment\par ROM: full throughout\par Palpation:TTP left PSIS, bilateral periscapular region, right cervical paraspinals \par Sensation: intact to light touch\par Reflexes: 3+ in b/l LE\par Strength: 4/5 throughout\par Special tests: -straight leg raise\par Gait: ambulates with rolling walker \par \par Osteopathic Structural Exam:\par Cranial: Right sidebending rotation  \par Cervical: Right C2, C5 tenderpoints\par Thoracic: T1-T2 NRLSBR\par Rib: Posterior rib 2, 4  tenderpoint left\par Upper extremity: B/L scapula myofascial restriction, left anterior GH joint  \par Lumbar: Left L5 posterior tenderpoint  \par Lower Extremity: Left lower extremity restricted in internal rotation \par Pelvis: left medial innominate\par Sacrum: Left on right sacral torsion \par \par \par \par \par

## 2022-02-17 NOTE — DATA REVIEWED
[FreeTextEntry1] : CT Angio abdomen/pelvis February 2022: Stable postoperative changes of thoracoabdominal aorta.  No endoleak or aortic dissection.  No aggressive osseous lesions in musculoskeletal system.

## 2022-02-17 NOTE — ASSESSMENT
[FreeTextEntry1] : 71 year old female with history of incomplete paraplegia presenting for evaluation.  \par \par #Impaired mobility:\par -CT Abdomen/pelvis reviewed\par -Thoracic surgery notes reviewed, continue surveillance every other year\par -Start physical therapy for gait and mobility training \par \par #Low back pain/Neck pain/Myofascial pain:\par -Discussed risks and benefits of OMT\par -OMT continues to improve her symptoms\par -Osteopathic structural exam demonstrated somatic dysfunction and the patient agreed to osteopathic manipulation.\par \par 1. Somatic dysfunction cranial\par -OMT performed with cranio-sacral therapy \par 2. Somatic dysfunction cervical\par --OMT performed with counterstrain \par 3. Somatic dysfunction upper extremity\par --OMT performed with myofascial release\par 4. Somatic dysfunction rib\par --OMT performed with counterstrain \par 5. Somatic dysfunction thoracic \par --OMT performed with myofascial release\par 6. Somatic dysfunction lumbar \par --OMT performed with counterstrain \par 7. Somatic dysfunction lower extremity \par --OMT performed with myofascial release\par 8. Somatic dysfunction pelvis\par --OMT performed with myofascial release\par 9. Somatic dysfunction sacral \par --OMT performed with cranio-sacral therapy \par \par \par Patient tolerated treatment well.\par \par Follow up in 3-4 weeks.  \par \par  \par \par
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
## 2022-03-10 ENCOUNTER — APPOINTMENT (OUTPATIENT)
Dept: PHYSICAL MEDICINE AND REHAB | Facility: CLINIC | Age: 72
End: 2022-03-10
Payer: MEDICARE

## 2022-03-10 VITALS
SYSTOLIC BLOOD PRESSURE: 122 MMHG | BODY MASS INDEX: 21.66 KG/M2 | TEMPERATURE: 97.2 F | DIASTOLIC BLOOD PRESSURE: 67 MMHG | HEART RATE: 74 BPM | WEIGHT: 130 LBS | HEIGHT: 65 IN

## 2022-03-10 PROCEDURE — 99213 OFFICE O/P EST LOW 20 MIN: CPT | Mod: 25

## 2022-03-10 PROCEDURE — 98929 OSTEOPATH MANJ 9-10 REGIONS: CPT

## 2022-03-10 NOTE — ASSESSMENT
[FreeTextEntry1] : 72 year old female with history of incomplete paraplegia presenting for evaluation.  \par \par #Spasticity\par -She reports noticing more episodes of spasms.  On exam mild increase in tone.\par -Discussed will monitor during PT.  If tone is interfering with function, can consider initiating baclofen.  \par \par #Low back pain/Neck pain/Myofascial pain:\par -OMT continues to improve symptoms. \par -Discussed risks and benefits of OMT\par -Osteopathic structural exam demonstrated somatic dysfunction and the patient agreed to osteopathic manipulation.\par \par 1. Somatic dysfunction cranial\par -OMT performed with cranio-sacral therapy \par 2. Somatic dysfunction cervical\par --OMT performed with counterstrain \par 3. Somatic dysfunction upper extremity\par --OMT performed with myofascial release and counterstrain \par 4. Somatic dysfunction rib\par --OMT performed with counterstrain \par 5. Somatic dysfunction thoracic \par --OMT performed with myofascial release\par 6. Somatic dysfunction lumbar \par --OMT performed with counterstrain \par 7. Somatic dysfunction lower extremity \par --OMT performed with myofascial release\par 8. Somatic dysfunction pelvis\par --OMT performed with myofascial release\par 9. Somatic dysfunction sacral \par --OMT performed with cranio-sacral therapy \par \par \par Patient tolerated treatment well.\par \par Follow up in 3-4 weeks.  \par \par  \par \par
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
## 2022-03-10 NOTE — PHYSICAL EXAM
[FreeTextEntry1] : Gen: Patient is A&O x 3, NAD\par HEENT: EOMI, hearing grossly normal\par Resp: regular, non - labored\par CV: pulses regular\par Skin: no rashes, erythema\par Lymph: no clubbing, cyanosis, edema, or palpable lymphadenopathy\par Inspection: no instability or misalignment\par ROM: full throughout\par Palpation:TTP left PSIS, bilateral periscapular region, right cervical paraspinals \par Sensation: intact to light touch\par Reflexes: 3+ in b/l LE\par Strength: 4/5 throughout\par Special tests: -straight leg raise\par Gait: ambulates with rolling walker \par Tone: MAS 1 in LLE \par \par Osteopathic Structural Exam:\par Cranial: Right sidebending rotation  \par Cervical: Right C2, Left C4 tenderpoints\par Thoracic: T1-T3 NRLSBR\par Rib: Posterior rib 3 tenderpoint left\par Upper extremity:  left anterior GH joint, left trapezius tenderpoint \par Lumbar: Left L4, L5 posterior tenderpoint  \par Lower Extremity: Left lower extremity restricted in internal rotation \par Pelvis: left medial innominate\par Sacrum: Left on right sacral torsion \par \par \par \par \par

## 2022-03-14 ENCOUNTER — APPOINTMENT (OUTPATIENT)
Dept: FAMILY MEDICINE | Facility: CLINIC | Age: 72
End: 2022-03-14
Payer: MEDICARE

## 2022-03-14 VITALS
DIASTOLIC BLOOD PRESSURE: 80 MMHG | HEART RATE: 83 BPM | BODY MASS INDEX: 21.66 KG/M2 | OXYGEN SATURATION: 98 % | SYSTOLIC BLOOD PRESSURE: 172 MMHG | HEIGHT: 65 IN | RESPIRATION RATE: 14 BRPM | WEIGHT: 130 LBS | TEMPERATURE: 98 F

## 2022-03-14 DIAGNOSIS — I71.6 THORACOABDOMINAL AORTIC ANEURYSM, W/OUT RUPTURE: ICD-10-CM

## 2022-03-14 DIAGNOSIS — I10 ESSENTIAL (PRIMARY) HYPERTENSION: ICD-10-CM

## 2022-03-14 DIAGNOSIS — Z76.89 PERSONS ENCOUNTERING HEALTH SERVICES IN OTHER SPECIFIED CIRCUMSTANCES: ICD-10-CM

## 2022-03-14 PROCEDURE — 96160 PT-FOCUSED HLTH RISK ASSMT: CPT | Mod: 59

## 2022-03-14 PROCEDURE — G0444 DEPRESSION SCREEN ANNUAL: CPT | Mod: 59

## 2022-03-14 PROCEDURE — 99204 OFFICE O/P NEW MOD 45 MIN: CPT | Mod: 25

## 2022-03-14 RX ORDER — LISINOPRIL 40 MG/1
40 TABLET ORAL DAILY
Qty: 90 | Refills: 1 | Status: ACTIVE | COMMUNITY
Start: 2021-02-25 | End: 1900-01-01

## 2022-03-14 RX ORDER — BUDESONIDE 3 MG/1
3 CAPSULE, COATED PELLETS ORAL DAILY
Refills: 0 | Status: DISCONTINUED | COMMUNITY
Start: 2021-08-10 | End: 2022-03-14

## 2022-03-14 RX ORDER — AMLODIPINE BESYLATE 10 MG/1
10 TABLET ORAL DAILY
Qty: 90 | Refills: 0 | Status: ACTIVE | COMMUNITY
Start: 1900-01-01 | End: 1900-01-01

## 2022-03-14 RX ORDER — MULTIVIT-MIN/FOLIC/VIT K/LYCOP 400-300MCG
500 TABLET ORAL DAILY
Qty: 90 | Refills: 0 | Status: DISCONTINUED | COMMUNITY
Start: 2019-11-14 | End: 2022-03-14

## 2022-03-18 NOTE — BRIEF OPERATIVE NOTE - ELECTIVE PROCEDURE
Last Office Visit   10/4/2021  Upcoming: Visit date not found    Last Refill  ALPRAZolam (XANAX) 0.25 MG tablet 30 tablet 0 2/18/2022     Sig: TAKE 1 TABLET BY MOUTH THREE TIMES A DAY AS NEEDED FOR ANXIETY    Sent to pharmacy as: ALPRAZolam 0.25 MG Oral Tablet (XANAX)    Class: Eprescribe    Notes to Pharmacy: Not to exceed 5 additional fills before 07/16/2022    E-Prescribing Status: Receipt confirmed by pharmacy (2/18/2022  1:06 PM CST)      cyclobenzaprine (FLEXERIL) 10 MG tablet 30 tablet 0 2/18/2022     Sig: TAKE 1 TABLET BY MOUTH THREE TIMES A DAY AS NEEDED FOR MUSCLE SPASMS    Sent to pharmacy as: Cyclobenzaprine HCl 10 MG Oral Tablet (FLEXERIL)    Class: Eprescribe    E-Prescribing Status: Receipt confirmed by pharmacy (2/18/2022  1:06 PM CST)        diazePAM (VALIUM) 5 MG tablet 30 tablet 0 2/18/2022     Sig: TAKE 1 TABLET BY MOUTH EVERY DAY AS NEEDED FOR ANXIETY    Sent to pharmacy as: diazePAM 5 MG Oral Tablet (VALIUM)    Class: Eprescribe    Notes to Pharmacy: Not to exceed 5 additional fills before 07/17/2022    E-Prescribing Status: Receipt confirmed by pharmacy (2/18/2022  1:06 PM CST)        No Protocol  Medication has been pended for provider review.    Yes

## 2022-04-11 ENCOUNTER — RX RENEWAL (OUTPATIENT)
Age: 72
End: 2022-04-11

## 2022-04-11 RX ORDER — HYDRALAZINE HYDROCHLORIDE 10 MG/1
10 TABLET ORAL
Qty: 270 | Refills: 0 | Status: ACTIVE | COMMUNITY
Start: 2021-02-25 | End: 1900-01-01

## 2022-04-12 ENCOUNTER — APPOINTMENT (OUTPATIENT)
Dept: PHYSICAL MEDICINE AND REHAB | Facility: CLINIC | Age: 72
End: 2022-04-12
Payer: MEDICARE

## 2022-04-12 PROCEDURE — 98929 OSTEOPATH MANJ 9-10 REGIONS: CPT

## 2022-04-12 NOTE — ASSESSMENT
[FreeTextEntry1] : 72 year old female with history of incomplete paraplegia presenting for evaluation.  \par \par #Low back pain/Neck pain/Myofascial pain:\par -OMT continues to improve symptoms. \par -Discussed risks and benefits of OMT\par -Osteopathic structural exam demonstrated somatic dysfunction and the patient agreed to osteopathic manipulation.\par \par 1. Somatic dysfunction cranial\par -OMT performed with cranio-sacral therapy \par 2. Somatic dysfunction cervical\par --OMT performed with counterstrain \par 3. Somatic dysfunction upper extremity\par --OMT performed with myofascial release and counterstrain \par 4. Somatic dysfunction rib\par --OMT performed with counterstrain \par 5. Somatic dysfunction thoracic \par --OMT performed with myofascial release\par 6. Somatic dysfunction lumbar \par --OMT performed with counterstrain and myofascial release \par 7. Somatic dysfunction lower extremity \par --OMT performed with myofascial release\par 8. Somatic dysfunction pelvis\par --OMT performed with myofascial release\par 9. Somatic dysfunction sacral \par --OMT performed with cranio-sacral therapy \par \par \par Patient tolerated treatment well.\par \par Follow up in 3-4 weeks.  \par \par  \par \par
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
## 2022-05-10 ENCOUNTER — APPOINTMENT (OUTPATIENT)
Dept: PHYSICAL MEDICINE AND REHAB | Facility: CLINIC | Age: 72
End: 2022-05-10
Payer: MEDICARE

## 2022-05-10 DIAGNOSIS — R25.2 CRAMP AND SPASM: ICD-10-CM

## 2022-05-10 DIAGNOSIS — M99.03 SEGMENTAL AND SOMATIC DYSFUNCTION OF LUMBAR REGION: ICD-10-CM

## 2022-05-10 DIAGNOSIS — Z74.09 OTHER REDUCED MOBILITY: ICD-10-CM

## 2022-05-10 DIAGNOSIS — M99.04 SEGMENTAL AND SOMATIC DYSFUNCTION OF SACRAL REGION: ICD-10-CM

## 2022-05-10 DIAGNOSIS — M99.02 SEGMENTAL AND SOMATIC DYSFUNCTION OF THORACIC REGION: ICD-10-CM

## 2022-05-10 DIAGNOSIS — M99.05 SEGMENTAL AND SOMATIC DYSFUNCTION OF PELVIC REGION: ICD-10-CM

## 2022-05-10 DIAGNOSIS — M99.06 SEGMENTAL AND SOMATIC DYSFUNCTION OF LOWER EXTREMITY: ICD-10-CM

## 2022-05-10 DIAGNOSIS — M99.07 SEGMENTAL AND SOMATIC DYSFUNCTION OF UPPER EXTREMITY: ICD-10-CM

## 2022-05-10 DIAGNOSIS — M99.00 SEGMENTAL AND SOMATIC DYSFUNCTION OF HEAD REGION: ICD-10-CM

## 2022-05-10 DIAGNOSIS — M79.18 MYALGIA, OTHER SITE: ICD-10-CM

## 2022-05-10 DIAGNOSIS — M99.01 SEGMENTAL AND SOMATIC DYSFUNCTION OF CERVICAL REGION: ICD-10-CM

## 2022-05-10 DIAGNOSIS — M99.08 SEGMENTAL AND SOMATIC DYSFUNCTION OF RIB CAGE: ICD-10-CM

## 2022-05-10 DIAGNOSIS — Z78.9 OTHER REDUCED MOBILITY: ICD-10-CM

## 2022-05-10 PROCEDURE — 99213 OFFICE O/P EST LOW 20 MIN: CPT | Mod: 25

## 2022-05-10 PROCEDURE — 98929 OSTEOPATH MANJ 9-10 REGIONS: CPT

## 2022-05-10 NOTE — PHYSICAL EXAM
[FreeTextEntry1] : Gen: Patient is A&O x 3, NAD\par HEENT: EOMI, hearing grossly normal\par Resp: regular, non - labored\par CV: pulses regular\par Skin: no rashes, erythema\par Lymph: no clubbing, cyanosis, edema, or palpable lymphadenopathy\par Inspection: no instability or misalignment\par ROM: full throughout\par Palpation:TTP left QL, bilateral periscapular region, left cervical paraspinals \par Sensation: intact to light touch\par Reflexes: 3+ in b/l LE\par Strength: 4/5 throughout\par Special tests: -straight leg raise\par Gait: ambulates with rolling walker \par Tone: MAS 1 in LLE and RLE  \par \par Osteopathic Structural Exam:\par Cranial: Right torsion    \par Cervical: Left C3, C5 tenderpoint, Right C4 tenderpoint  \par Thoracic: T1-T3 NRLSBR\par Rib: Left posterior rib 2 tenderpoint \par Upper extremity:  left anterior GH joint, left trapezius tenderpoint, left levator scapula tenderpoint  \par Lumbar: Left L3, L4 posterior tenderpoint  \par Lower Extremity: Left lower extremity restricted in internal rotation \par Pelvis: left medial innominate\par Sacrum: Sacral flexion \par \par \par \par \par

## 2022-05-10 NOTE — ASSESSMENT
[FreeTextEntry1] : 72 year old female with history of incomplete paraplegia presenting for evaluation.  \par \par #Spasticity/Impaired mobility:\par -Continue PT\par -Continue ambulation with RW \par -Case discussed with ChristinePT, while she is demonstrating some spasticity, may be benefiting her functional mobility.  Therefore will defer spasticity medication for now, continue to monitor.  \par \par #Low back pain/Neck pain/Myofascial pain:\par -OMT continues to improve symptoms. \par -Discussed risks and benefits of OMT\par -Osteopathic structural exam demonstrated somatic dysfunction and the patient agreed to osteopathic manipulation.\par \par 1. Somatic dysfunction cranial\par -OMT performed with cranio-sacral therapy \par 2. Somatic dysfunction cervical\par --OMT performed with counterstrain \par 3. Somatic dysfunction upper extremity\par --OMT performed with myofascial release and counterstrain \par 4. Somatic dysfunction rib\par --OMT performed with counterstrain \par 5. Somatic dysfunction thoracic \par --OMT performed with myofascial release\par 6. Somatic dysfunction lumbar \par --OMT performed with counterstrain \par 7. Somatic dysfunction lower extremity \par --OMT performed with myofascial release\par 8. Somatic dysfunction pelvis\par --OMT performed with myofascial release\par 9. Somatic dysfunction sacral \par --OMT performed with cranio-sacral therapy \par \par \par Patient tolerated treatment well.\par \par Follow up in 3-4 weeks.  \par \par  \par \par

## 2022-05-10 NOTE — REVIEW OF SYSTEMS
[Negative] : Heme/Lymph [FreeTextEntry9] : +Low back pain, +Neck pain, +Impaired mobility  [de-identified] : +Spasticity
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
## 2022-08-25 NOTE — PATIENT PROFILE ADULT - NSPROGENSOURCEINFO_GEN_A_NUR
Informed patient after reaching out to multiple vendors to get her a bipap machine a copy of her sleep study was required and we did not have one on file. Patient stated she has never had a sleep study done and she is willing to be referred to sleep medicine to start the process. A new referral has been entered for the patient and she voiced her understanding that we would not be able to proceed with her Bipap order until she gets her sleep study done.    patient

## 2022-09-30 NOTE — ASSESSMENT
Progress Note - Infectious Disease   Jie Sy 79 y o  female MRN: [id number]  Unit/Bed#: [facility] 317-01 Encounter: [id number]      Impression/Plan:  1  Sepsis, recurrent  Isolated fever along with elevation in white count 9/23   Noted to have eosinophils on differential   Likely source is 2   Urine cultures polymicrobial, with degree of resistance noted  No other appreciable source   Blood cultures negative   Recent RUQ U/S and CT with cholelithiasis but no clinical evidence of cholecystitis   White count remains mildly elevated again with eosinophilia   Fevers  improved   C diff PCR testing negative   Upper extremity Doppler with right forearm superficial thrombophlebitis   CT imaging of the abdomen with contrast with some incidental findings but otherwise no overt pathology   COVID testing negative   Repeat blood cultures with recurrent fever NGTD   Completed antibiotic course yesterday  Continue oral vancomycin prophylaxis through 10/2, last doses ordered  Recommend additional workup of eosinophilia, r/o medication side effect  Follow-up pending blood cultures while admitted  Continue to trend fever curve/vitals  Monitor labs while admitted  Monitor for recurrence of urinary symptoms  Additional supportive care as per primary  Will need PCP follow-up for additional findings on CT     2  Urinary tract infection   UA with some pyuria and patient reporting dysuria   Urine cultures noted to be polymicrobial with some degree of resistance   Suspect partial treatment leading to declining white count but continued fever   Fevers improved    Antibiotic course completed  Monitor for current symptoms  Continue to trend fever curve/WBC  Follow-up pending blood cultures while admitted     3  Abnormal abdominal imaging   Patient noted to have some abnormal changes to the gallbladder on imaging   Recent ultrasound unremarkable   Alkaline phosphatase borderline   Patient only localizes discomfort near the PEG tube, [FreeTextEntry1] : 69-year-old female with episodic right paraumbilical pain and history of a bulge but negative physical exam. Symptoms suggestive of muscle spasm versus possible spigelian hernia. adjusted by GI   Repeat CT unremarkable  Monitor abdominal exam  Antibiotics as above     4  Recent C diff   Repeat PCR testing negative  Complete C diff prophylaxis as above      5  Progressing anemia and recent GI bleed   Hemoglobin downtrended this weekend   Reportedly had some increased stool output   Continue to monitor hemoglobin and stool output   GI evaluations noted      6  Elevated serum creatinine with eosinophilia   Unclear etiology  Nephrology evaluation appreciated  Fluid hydration as per primary  Continue enteral feeding  Consider review of medications as above  Repeat chemistry tomorrow     Above plan discussed in detail with patient and with primary service  Given completion of IV antibiotic course, ID consult service will sign off at this time  Please call if questions      Antibiotics:  Off systemic antibiotic day 1  PO vancomycin 8    Subjective:  Patient seen at bedside earlier today and she denied having any nausea, vomiting, chest pain or shortness of breath  She continues to have discomfort around her PEG tube site  Unfortunately she is intermittently refusing PT and labs  Objective:  Vitals:  Temp:  [97 9 °F (36 6 °C)-98 4 °F (36 9 °C)] 98 4 °F (36 9 °C)  HR:  [67-84] 67  Resp:  [14] 14  BP: (103-106)/(44-53) 103/44  SpO2:  [93 %] 93 %  Temp (24hrs), Av 2 °F (36 8 °C), Min:97 9 °F (36 6 °C), Max:98 4 °F (36 9 °C)  Current: Temperature: 98 4 °F (36 9 °C)    Physical Exam:   General Appearance:  Alert, interactive, nontoxic, no acute distress  Chronically ill-appearing   Throat: Oropharynx moist without lesions  Lungs:   Clear to auscultation bilaterally; no wheezes, rhonchi or rales; respirations unlabored on room air   Heart:  RRR; no murmur, rub or gallop appreciated   Abdomen:   Soft, non-tender, non-distended, positive bowel sounds  Peg tube site unremarkable  Extremities: No clubbing, cyanosis or edema   Skin: No new rashes or lesions   No new draining wounds noted  Labs, Imaging, & Other studies:   All pertinent labs and imaging studies were personally reviewed  Results from last 7 days   Lab Units 09/30/22  1657 09/29/22  1139 09/28/22  1512   WBC Thousand/uL 15 34* 13 08* 13 42*   HEMOGLOBIN g/dL 7 8* 7 5* 7 8*   PLATELETS Thousands/uL 576* 516* 492*     Results from last 7 days   Lab Units 09/30/22  1357 09/29/22  1139 09/28/22  1512 09/28/22  1043 09/27/22  0909 09/26/22  1025   POTASSIUM mmol/L 4 6   < > 4 7 5 7*   < > 5 2   CHLORIDE mmol/L 104   < > 102 102   < > 101   CO2 mmol/L 19*   < > 24 24   < > 24   BUN mg/dL 19   < > 23 23   < > 24   CREATININE mg/dL 1 61*   < > 1 57* 1 49*   < > 1 30   EGFR ml/min/1 73sq m 30   < > 31 33   < > 39   CALCIUM mg/dL 8 1*   < > 8 1* 8 8   < > 7 7*   AST U/L  --   --  30 52*  --  129*   ALT U/L  --   --  42 47  --  83*   ALK PHOS U/L  --   --  144* 156*  --  206*    < > = values in this interval not displayed  Results from last 7 days   Lab Units 09/26/22  1221 09/25/22  1642   BLOOD CULTURE  No Growth After 4 Days  No Growth After 4 Days    --    C DIFF TOXIN B BY PCR   --  Negative

## 2022-10-13 PROBLEM — M99.00 CRANIAL SOMATIC DYSFUNCTION: Status: ACTIVE | Noted: 2021-09-29

## 2022-11-03 NOTE — PATIENT PROFILE ADULT - OVER THE PAST TWO WEEKS HAVE YOU FELT DOWN, DEPRESSED OR HOPELESS?
Home medications reviewed with wife and son in room. This writer also called pharmacy to verify dosage on medications. Perfect serve message sent to Dr. Prosper Bautista to advise of completion of medications. no

## 2022-11-10 NOTE — REVIEW OF SYSTEMS
Procedure:  EXCISION BIOPSY LIP (N/A Mouth)    Relevant Problems   ENDO   (+) Hypothyroidism      GI/HEPATIC   (+) Dysphagia   (+) Gastric ulcer   (+) Gastroesophageal reflux disease      HEMATOLOGY   (+) Iron deficiency anemia secondary to inadequate dietary iron intake      NEURO/PSYCH   (+) Depression with anxiety   (+) History of irregular menstrual bleeding   (+) Nonintractable epilepsy without status epilepticus (HCC)      PULMONARY   (+) GARIMA (obstructive sleep apnea)        Physical Exam    Airway    Mallampati score: II  TM Distance: >3 FB  Neck ROM: full     Dental   No notable dental hx     Cardiovascular  Cardiovascular exam normal    Pulmonary  Pulmonary exam normal     Other Findings        Anesthesia Plan  ASA Score- 2     Anesthesia Type- IV sedation with anesthesia with ASA Monitors  Additional Monitors:   Airway Plan:           Plan Factors-Exercise tolerance (METS): >4 METS  Chart reviewed  Imaging results reviewed  Existing labs reviewed  Patient summary reviewed  Patient is not a current smoker  Induction-     Postoperative Plan-     Informed Consent- Anesthetic plan and risks discussed with patient  I personally reviewed this patient with the CRNA  Discussed and agreed on the Anesthesia Plan with the CRNA  Susie Mccarty [Muscle Weakness] : muscle weakness [Negative] : Heme/Lymph [Chest Pain] : no chest pain [Leg Claudication] : no intermittent leg claudication [Joint Pain] : no joint pain [Joint Stiffness] : no joint stiffness [Muscle Pain] : no muscle pain [Skin Rash] : no skin rash [Skin Wound] : no skin wound [FreeTextEntry3] : Legally blind left eye.  [FreeTextEntry5] : See HPI, recent imaging of aortic graft reveals graft to be intact.  [FreeTextEntry7] : see HPI [FreeTextEntry8] : see HPI [de-identified] : see HPI [de-identified] : Appropriately emotional related to events, but goal directed and not overwhelmed.

## 2022-12-08 NOTE — DISCHARGE NOTE PROVIDER - PROVIDER RX CONTACT NUMBER
I have cleaned out your ear. If your ear isn't feeling better by tomorrow, try some Flonase nasal spray.   
[phone]

## 2022-12-15 RX ORDER — DULOXETINE HYDROCHLORIDE 60 MG/1
60 CAPSULE, DELAYED RELEASE PELLETS ORAL
Qty: 30 | Refills: 5 | Status: ACTIVE | COMMUNITY
Start: 2019-12-16 | End: 1900-01-01

## 2023-01-19 NOTE — DISCHARGE NOTE PROVIDER - NSDCHHBASESERVICE_GEN_ALL_CORE
Requested medication(s) are due for refill today: Yes  Patient has already received a courtesy refill: No  Other reason request has been forwarded to provider:  This refill cannot be delegated
Physical therapy/Nursing

## 2023-02-09 NOTE — ED ADULT TRIAGE NOTE - BSA (M2)
Thankfully your imaging today was without intracranial injury and abdominal concerns. If umbilical hernia remains bothersome, please consider elective hernia repair. This can be discussed with your primary provider and a referral can be made. Please increase your water/fluid intake to help with constipation. Miralax sent to pharmacy, take one capful daily for one week for constipation. This can be purchased at most drug stores or grocery stores if you wish to continue taking.   1.68

## 2023-02-13 ENCOUNTER — APPOINTMENT (OUTPATIENT)
Dept: PHYSICAL MEDICINE AND REHAB | Facility: CLINIC | Age: 73
End: 2023-02-13
Payer: MEDICARE

## 2023-02-13 VITALS
HEART RATE: 106 BPM | HEIGHT: 65 IN | DIASTOLIC BLOOD PRESSURE: 75 MMHG | TEMPERATURE: 98.3 F | WEIGHT: 130 LBS | SYSTOLIC BLOOD PRESSURE: 148 MMHG | BODY MASS INDEX: 21.66 KG/M2

## 2023-02-13 DIAGNOSIS — G82.22 PARAPLEGIA, INCOMPLETE: ICD-10-CM

## 2023-02-13 DIAGNOSIS — M79.2 NEURALGIA AND NEURITIS, UNSPECIFIED: ICD-10-CM

## 2023-02-13 DIAGNOSIS — N31.9 NEUROMUSCULAR DYSFUNCTION OF BLADDER, UNSPECIFIED: ICD-10-CM

## 2023-02-13 PROCEDURE — 99214 OFFICE O/P EST MOD 30 MIN: CPT

## 2023-02-14 PROBLEM — N31.9 NEUROGENIC BLADDER: Status: ACTIVE | Noted: 2019-11-04

## 2023-02-14 PROBLEM — M79.2 NEUROPATHIC PAIN: Status: ACTIVE | Noted: 2019-11-18

## 2023-02-14 PROBLEM — G82.22 INCOMPLETE PARAPLEGIA: Status: ACTIVE | Noted: 2020-02-24

## 2023-02-14 NOTE — PHYSICAL EXAM
[Normal] : Normal skin color and pigmentation, normal turgor and no rash [de-identified] : NAD, no conversational dyspnea, good chest expansion and excursion [de-identified] : warm and well perfused  [de-identified] : Amb with Rollator walker. Knees with reduced flexion in gait suggesting a mildly spastic component.  [de-identified] : 3+ BL patellar reflexes, crossed reflex RLE responds to L patellar stimulus. Non sustained ankle clonus bilat. During MMT of the right leg, cocontraction is noted.  [4] : L4 ankle dorsiflexors  4/5 [5] : S1 ankle flexors 5/5

## 2023-02-14 NOTE — END OF VISIT
[] : Resident [FreeTextEntry3] : I was physically present for key portion of the history and physical exam as documented by Dr. Garcia. I directed medical management.

## 2023-02-14 NOTE — REASON FOR VISIT
[Follow-Up] : a follow-up visit [FreeTextEntry1] : incontinence but also requiring straight cath in setting of incomplete paraplegia.

## 2023-02-14 NOTE — ASSESSMENT
[FreeTextEntry1] : 71 yo female with incomplete paraplegia after infarct due to thoracic aortic aneurysm with bandlike at level neuropathic pain and urinary incontinence. \par Rec:\par 1. Bladder situation not optimized. Would benefit from UDS though history suggests at least some component of overactive bladder. Will facilitate. \par 2. Continue duloxetine for now. Pain not disabling.\par 3. Add back fiber hs to bulk stool. \par 4. Follow up 2 mos. \par

## 2023-02-14 NOTE — HISTORY OF PRESENT ILLNESS
[FreeTextEntry1] : 72 year old woman with history of thoracoabdominal aortic aneurysm complicated by cord infarct. S/p prosthetic aortic valve. At last visit patient was recovering from Cdiff after antibiotics for recurring UTIs. Since last visit patient saw GI specialist with endoscopy colonoscopy revealing "mild colitis. Patient states she saw a urologist about 1 year ago for incontinence.  He recommended straight caths and she has been using SC for about 1 year but still experiences urinary incontinence, worse at night, and wakes 3x nightly to use the restroom and straight cath. She feels a "good amount" comes out despite close timing (11pm, 2am, 5am). She also caths once during the day with less incontinence, but also urgency. She takes no meds for her bladder.  While she previously had frequent UTIs, these have subsided to a large degree. Quality of life is significantly adversely affected.  Patient reports she follows with cardiologist and thoracic surgeon. With respect to pain, she continues to experience at level belt like discomfort with less pain in legs. \par \par \par \par

## 2023-02-14 NOTE — REVIEW OF SYSTEMS
[Fever] : no fever [Chills] : no chills [Negative] : Heme/Lymph [FreeTextEntry5] :  Last CTA appointment was one year ago and surgeon felt a 2 year follow-up was appropriate given her progress.  [FreeTextEntry7] : Had problematic C. diff resolved. Benefitted from cholestyramine which she tried to taper off, but without success. She has resumed in on a prn basis. Remarks that in past she had benefitted from fiber, but is not currently taking it. [FreeTextEntry8] : see HPI [FreeTextEntry9] : Ambulates with walker. No recent falls. [de-identified] : eczema [de-identified] : Some annoying spasms at night with observed clonic activity in legs.

## 2023-04-10 ENCOUNTER — APPOINTMENT (OUTPATIENT)
Dept: PHYSICAL MEDICINE AND REHAB | Facility: CLINIC | Age: 73
End: 2023-04-10

## 2023-04-21 ENCOUNTER — APPOINTMENT (OUTPATIENT)
Dept: UROLOGY | Facility: CLINIC | Age: 73
End: 2023-04-21
Payer: MEDICARE

## 2023-04-21 DIAGNOSIS — K59.00 CONSTIPATION, UNSPECIFIED: ICD-10-CM

## 2023-04-21 DIAGNOSIS — R33.9 RETENTION OF URINE, UNSPECIFIED: ICD-10-CM

## 2023-04-21 DIAGNOSIS — N39.3 STRESS INCONTINENCE (FEMALE) (MALE): ICD-10-CM

## 2023-04-21 PROCEDURE — 99204 OFFICE O/P NEW MOD 45 MIN: CPT | Mod: 95

## 2023-04-21 RX ORDER — VIBEGRON 75 MG/1
75 TABLET, FILM COATED ORAL
Qty: 90 | Refills: 3 | Status: ACTIVE | COMMUNITY
Start: 2023-04-21 | End: 1900-01-01
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
## 2023-04-21 NOTE — ASSESSMENT
[FreeTextEntry1] : Counseled the patient on constipation and how it can affect the urinary tract. We discussed increasing water intake, daily fruits and vegetables, and sources of fiber.  We recommended 4 servings of whole fruit per day, excluding dried fruit or juices.  We also recommended supplementing soluble fiber intake with gummy fiber #2/day.\par \par - not diabetic - goal of 4 fruit\par - increase vegetables\par -  increase fiber from whole grains\par - add gummy fiber  #2 / day\par - goal 1 bm soft normal daily \par - increase water  1 cup of water / 3 hours  -- counseled pt not to restrict water\par \par We discussed reduction of bladder irritants such as caffeine/alcohol. \par goal 2 cups regular coffee now, 1 cup decaf \par \par Urodynamics warranted - cystoscopy as well hx of smoking\par telehealth - unable to get consent \par can transfer with walker \par prefers afternoon \par \par drug-naive for bladder medications \par Patient was prescribed Gemtesa 75mg. We discussed the side effects including headache, flushing, hypertension, rhinorrhea, and palpitations. Patient also understood that the medication would be costly if insurance did not approve it.\par \par \par bladder diary \par no urinal at home \par will document 3 x days\par cic  post void\par goal of cic amounts 500 ml or less, consider increasing frequency CIC from 3/day \par \par \par RTO UDS cysto \par \par \par we discussed bladder botox also as a future option \par \par

## 2023-06-08 ENCOUNTER — APPOINTMENT (OUTPATIENT)
Dept: UROLOGY | Facility: CLINIC | Age: 73
End: 2023-06-08
Payer: MEDICARE

## 2023-06-08 ENCOUNTER — OUTPATIENT (OUTPATIENT)
Dept: OUTPATIENT SERVICES | Facility: HOSPITAL | Age: 73
LOS: 1 days | End: 2023-06-08
Payer: MEDICARE

## 2023-06-08 VITALS
DIASTOLIC BLOOD PRESSURE: 76 MMHG | HEART RATE: 86 BPM | TEMPERATURE: 98.3 F | OXYGEN SATURATION: 100 % | SYSTOLIC BLOOD PRESSURE: 160 MMHG

## 2023-06-08 DIAGNOSIS — N39.41 URGE INCONTINENCE: ICD-10-CM

## 2023-06-08 DIAGNOSIS — N95.8 OTHER SPECIFIED MENOPAUSAL AND PERIMENOPAUSAL DISORDERS: ICD-10-CM

## 2023-06-08 DIAGNOSIS — I71.6 THORACOABDOMINAL AORTIC ANEURYSM, WITHOUT RUPTURE: Chronic | ICD-10-CM

## 2023-06-08 DIAGNOSIS — N32.81 OVERACTIVE BLADDER: ICD-10-CM

## 2023-06-08 DIAGNOSIS — Z78.9 OTHER SPECIFIED HEALTH STATUS: ICD-10-CM

## 2023-06-08 DIAGNOSIS — R35.0 FREQUENCY OF MICTURITION: ICD-10-CM

## 2023-06-08 DIAGNOSIS — Z98.890 OTHER SPECIFIED POSTPROCEDURAL STATES: Chronic | ICD-10-CM

## 2023-06-08 DIAGNOSIS — Z98.49 CATARACT EXTRACTION STATUS, UNSPECIFIED EYE: Chronic | ICD-10-CM

## 2023-06-08 DIAGNOSIS — S42.302A UNSPECIFIED FRACTURE OF SHAFT OF HUMERUS, LEFT ARM, INITIAL ENCOUNTER FOR CLOSED FRACTURE: Chronic | ICD-10-CM

## 2023-06-08 DIAGNOSIS — N32.89 OTHER SPECIFIED DISORDERS OF BLADDER: ICD-10-CM

## 2023-06-08 PROCEDURE — 52000 CYSTOURETHROSCOPY: CPT

## 2023-06-08 PROCEDURE — 51784 ANAL/URINARY MUSCLE STUDY: CPT | Mod: 26

## 2023-06-08 PROCEDURE — 51784 ANAL/URINARY MUSCLE STUDY: CPT

## 2023-06-08 PROCEDURE — 51728 CYSTOMETROGRAM W/VP: CPT

## 2023-06-08 PROCEDURE — 51741 ELECTRO-UROFLOWMETRY FIRST: CPT

## 2023-06-08 PROCEDURE — 51741 ELECTRO-UROFLOWMETRY FIRST: CPT | Mod: 26

## 2023-06-08 PROCEDURE — 51797 INTRAABDOMINAL PRESSURE TEST: CPT

## 2023-06-08 PROCEDURE — 51797 INTRAABDOMINAL PRESSURE TEST: CPT | Mod: 26

## 2023-06-08 PROCEDURE — 51728 CYSTOMETROGRAM W/VP: CPT | Mod: 26

## 2023-06-09 DIAGNOSIS — N32.89 OTHER SPECIFIED DISORDERS OF BLADDER: ICD-10-CM

## 2023-06-09 DIAGNOSIS — N32.81 OVERACTIVE BLADDER: ICD-10-CM

## 2023-06-09 DIAGNOSIS — Z78.9 OTHER SPECIFIED HEALTH STATUS: ICD-10-CM

## 2023-06-09 DIAGNOSIS — N95.8 OTHER SPECIFIED MENOPAUSAL AND PERIMENOPAUSAL DISORDERS: ICD-10-CM

## 2023-06-09 DIAGNOSIS — N39.41 URGE INCONTINENCE: ICD-10-CM

## 2023-06-12 RX ORDER — ESTRADIOL 0.1 MG/G
0.1 CREAM VAGINAL
Qty: 42.5 | Refills: 11 | Status: ACTIVE | COMMUNITY
Start: 2023-06-08 | End: 1900-01-01

## 2023-09-28 NOTE — ED ADULT TRIAGE NOTE - CADM TRG TX PRIOR TO ARRIVAL
Subjective   Vielka Pratt is a 52 y.o. female.       History of Present Illness   Patient is followed with right-sided thyroid nodule.  FNA was benign.  Ultrasound subsequently showed stability.  Also has chronic rhinitis.  Was given Astelin which seemed to help.  For some reason this has been deleted from her medication list.  Says she is not having any new symptoms as far as her throat or neck.  Plan was for her to have a clinical exam today with repeat ultrasound in 2024.      The following portions of the patient's history were reviewed and updated as appropriate: allergies, current medications, past family history, past medical history, past social history, past surgical history and problem list.     reports that she has been smoking cigarettes. She has a 19.50 pack-year smoking history. She has never used smokeless tobacco. She reports that she does not currently use alcohol. She reports that she does not use drugs.   Patient is a tobacco user and has been counseled for use of tobacco products      Review of Systems        Objective   Physical Exam  Ears: External ears no deformity, canals no discharge, tympanic membranes intact clear and mobile bilaterally.  Nares boggy mucosa no discharge or purulence  Oral cavity no masses or lesions  Pharynx no erythema exudate mass or ulcer  Neck no adenopathy or mass.  Specifically the thyroid is nonpalpable         Assessment and Plan   Diagnoses and all orders for this visit:    1. Thyroid nodule (Primary)  -     US Thyroid    2. Chronic rhinitis    Other orders  -     azelastine (ASTELIN) 0.1 % nasal spray; 2 sprays into the nostril(s) as directed by provider 2 (Two) Times a Day. Use in each nostril as directed  Dispense: 30 mL; Refill: 11           Plan: Refill Astelin.  With her clinical exam stable will order an ultrasound for 1 year and see her back after that is obtained.  Call for problems.      
bleeding control

## 2024-01-03 NOTE — PROGRESS NOTE ADULT - PROBLEM SELECTOR PLAN 1
- with associated fever and GNR bacteremia   - improving   - cont Bacid TID and lomotil BID  - CT notable for fluid-filled distended colon c/w diarrhea, no evidence of colitis   - GI pcr negative, stool cultures negative  - Cdiff negative   - diet as tolerated Plan: Recheck at next visit. Pt instructed to contact office if not resolving or if new issues occur. Detail Level: Simple

## 2024-01-30 NOTE — ED PROVIDER NOTE - NS ED ATTENDING STATEMENT MOD
Attempted to call patient with no answer. Left message on voicemail instructing patient to return call at earliest convenience with callback number provided.   I have personally seen and examined this patient.  I have fully participated in the care of this patient. I have reviewed all pertinent clinical information, including history, physical exam, plan and the Resident’s note and agree except as noted.

## 2024-02-02 NOTE — PROGRESS NOTE BEHAVIORAL HEALTH - NSBHCONSFOLLOWNEEDS_PSY_A_CORE
Attending Only
no psychiatric contraindications to discharge

## 2024-04-11 ENCOUNTER — APPOINTMENT (OUTPATIENT)
Dept: CT IMAGING | Facility: CLINIC | Age: 74
End: 2024-04-11
Payer: MEDICARE

## 2024-04-11 ENCOUNTER — APPOINTMENT (OUTPATIENT)
Dept: CT IMAGING | Facility: CLINIC | Age: 74
End: 2024-04-11

## 2024-04-11 ENCOUNTER — OUTPATIENT (OUTPATIENT)
Dept: OUTPATIENT SERVICES | Facility: HOSPITAL | Age: 74
LOS: 1 days | End: 2024-04-11
Payer: MEDICARE

## 2024-04-11 DIAGNOSIS — Z98.890 OTHER SPECIFIED POSTPROCEDURAL STATES: ICD-10-CM

## 2024-04-11 DIAGNOSIS — I71.9 AORTIC ANEURYSM OF UNSPECIFIED SITE, WITHOUT RUPTURE: ICD-10-CM

## 2024-04-11 DIAGNOSIS — S42.302A UNSPECIFIED FRACTURE OF SHAFT OF HUMERUS, LEFT ARM, INITIAL ENCOUNTER FOR CLOSED FRACTURE: Chronic | ICD-10-CM

## 2024-04-11 DIAGNOSIS — Z98.49 CATARACT EXTRACTION STATUS, UNSPECIFIED EYE: Chronic | ICD-10-CM

## 2024-04-11 DIAGNOSIS — I71.6 THORACOABDOMINAL AORTIC ANEURYSM, WITHOUT RUPTURE: Chronic | ICD-10-CM

## 2024-04-11 PROCEDURE — 71275 CT ANGIOGRAPHY CHEST: CPT

## 2024-04-11 PROCEDURE — 74174 CTA ABD&PLVS W/CONTRAST: CPT | Mod: 26

## 2024-04-11 PROCEDURE — 71275 CT ANGIOGRAPHY CHEST: CPT | Mod: 26

## 2024-04-11 PROCEDURE — 74174 CTA ABD&PLVS W/CONTRAST: CPT

## 2024-05-08 ENCOUNTER — APPOINTMENT (OUTPATIENT)
Dept: CARDIOTHORACIC SURGERY | Facility: CLINIC | Age: 74
End: 2024-05-08
Payer: MEDICARE

## 2024-05-08 DIAGNOSIS — Z95.3 PRESENCE OF XENOGENIC HEART VALVE: ICD-10-CM

## 2024-05-08 DIAGNOSIS — Z98.890 OTHER SPECIFIED POSTPROCEDURAL STATES: ICD-10-CM

## 2024-05-08 DIAGNOSIS — I71.9 AORTIC ANEURYSM OF UNSPECIFIED SITE, W/OUT RUPTURE: ICD-10-CM

## 2024-05-08 DIAGNOSIS — Z86.79 OTHER SPECIFIED POSTPROCEDURAL STATES: ICD-10-CM

## 2024-05-08 DIAGNOSIS — Z95.828 PRESENCE OF OTHER VASCULAR IMPLANTS AND GRAFTS: ICD-10-CM

## 2024-05-08 PROCEDURE — 99422 OL DIG E/M SVC 11-20 MIN: CPT

## 2024-05-12 NOTE — END OF VISIT
[FreeTextEntry3] : Written by Rei Osorio NP, acting as a scribe for Dr. Briseno. The documentation recorded by the scribe accurately reflects the service I personally performed and the decisions made by me. Signature Cade Briseno MD. I, CADE Valladares personally performed the evaluation and management (E/M) services for this established patient who presents today with (a) new problem(s)/exacerbation of (an) existing condition(s).  That E/M includes conducting the clinically appropriate interval history &/or exam, assessing all new/exacerbated conditions, and establishing a new plan of care.  Today, my JEANNETTE, was here to observe &/or participate in the visit & follow plan of care established by me.

## 2024-05-12 NOTE — HISTORY OF PRESENT ILLNESS
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 Postop course includes LE weakness secondary to possible cervical myelopathy and neurogenic bowel and bladder. The patient presents for a Telephonic one year follow up visit with repeat diagnostic imaging.  Seen last year with elevated BP, self cathing for urinary incontinence.  She was to follow up in 1 year with repeat imaging.   CAP CTA 4/11/2024 revealed: Status post replacement of aortic valve and ascending aorta, endovascular stent graft repair of the arch to middescending thoracic aorta and thoracoabdominal aneurysm repair with graft to the suprarenal aorta. There is a reimplanted celiac and SMA bypass graft anastomosed to the left lateral aspect of the descending thoracic aorta. No aortic dissection. No evidence of endoleak. Stable penetrating ulcer arising from the posterior aspect of the mid descending thoracic aorta (6: 120). The branches of the abdominal aorta are patent.  Presents today via telephone.  Reports she is doing well. BP has been borderline and has been getting medication adjustments with her PCP Dr. Sean Walters.  Denies chest pain, back pain, SOB, swelling, weight gain, palpitations, cough, fever or chills.  PCP:  Dr. Walters

## 2024-05-12 NOTE — ASSESSMENT
[FreeTextEntry1] : I have reviewed the patient's medical records, diagnostic images during the time of this office consultation and have made the following recommendation. CTA  from 4/11/2024 reviewed at time of phone call with pt.  Review of the imaging shows his aortic pathology has remained stable and does not require surgical intervention at this time.    Plan:  - Follow up in [facility]  2 years with repeat imaging - Continue medication regimen per PCP - Follow up with cardiologist and PCP Dr. Walters - Blood pressure management reviewed and discussed at length as it pertains to aneurysm - Discussed signs and symptoms that warrant emergency medical attention. - Call with any questions or concerns

## 2024-05-23 NOTE — PROGRESS NOTE ADULT - SUBJECTIVE AND OBJECTIVE BOX
[de-identified] : No vpi sx's Voice back to normal Initially lost weight but then came back Sleep is quiet  Subjective: Patient seen and examined. No new events except as noted.     REVIEW OF SYSTEMS:    CONSTITUTIONAL:+ weakness, fevers or chills  EYES/ENT: No visual changes;  No vertigo or throat pain   NECK: No pain or stiffness  RESPIRATORY: No cough, wheezing, hemoptysis; No shortness of breath  CARDIOVASCULAR: No chest pain or palpitations  GASTROINTESTINAL: No abdominal or epigastric pain. No nausea, vomiting, or hematemesis; No diarrhea or constipation. No melena or hematochezia.  GENITOURINARY: No dysuria, frequency or hematuria  NEUROLOGICAL: No numbness or weakness  SKIN: No itching, burning, rashes, or lesions   All other review of systems is negative unless indicated above.    MEDICATIONS:  MEDICATIONS  (STANDING):  aMIOdarone    Tablet 200 milliGRAM(s) Oral daily  aspirin  chewable 81 milliGRAM(s) Oral daily  cloNIDine 0.2 milliGRAM(s) Oral two times a day  diphenoxylate/atropine 1 Tablet(s) Oral two times a day  DULoxetine 60 milliGRAM(s) Oral daily  ertapenem  IVPB 1000 milliGRAM(s) IV Intermittent every 24 hours  ferrous    sulfate 325 milliGRAM(s) Oral two times a day  heparin   Injectable 5000 Unit(s) SubCutaneous every 8 hours  lactobacillus acidophilus 1 Tablet(s) Oral three times a day  lisinopril 20 milliGRAM(s) Oral daily  metoprolol succinate ER 25 milliGRAM(s) Oral daily  potassium chloride    Tablet ER 40 milliEquivalent(s) Oral every 4 hours  potassium chloride  10 mEq/50 mL IVPB 10 milliEquivalent(s) IV Intermittent once      PHYSICAL EXAM:  T(C): 36.4 (08-14-20 @ 04:30), Max: 36.4 (08-13-20 @ 12:15)  HR: 66 (08-14-20 @ 04:30) (55 - 66)  BP: 158/70 (08-14-20 @ 04:30) (131/60 - 158/70)  RR: 18 (08-14-20 @ 04:30) (18 - 18)  SpO2: 100% (08-14-20 @ 04:30) (100% - 100%)  Wt(kg): --  I&O's Summary    13 Aug 2020 07:01  -  14 Aug 2020 07:00  --------------------------------------------------------  IN: 750 mL / OUT: 1100 mL / NET: -350 mL          Appearance: Normal	  HEENT:   Normal oral mucosa, PERRL, EOMI	  Lymphatic: No lymphadenopathy , no edema  Cardiovascular: Normal S1 S2, No JVD, No murmurs , Peripheral pulses palpable 2+ bilaterally  Respiratory: Lungs clear to auscultation, normal effort 	  Gastrointestinal:  Soft, Non-tender, + BS	  Skin: No rashes, No ecchymoses, No cyanosis, warm to touch  Musculoskeletal: Normal range of motion, normal strength  Psychiatry:  Mood & affect appropriate  Ext: No edema      LABS:    CARDIAC MARKERS:            08-14    135  |  102  |  10  ----------------------------<  100<H>  3.3<L>   |  23  |  0.82    Ca    8.9      14 Aug 2020 06:38  Mg     1.9     08-14      proBNP:   Lipid Profile:   HgA1c:   TSH:             TELEMETRY: 	    ECG:  	  RADIOLOGY:   DIAGNOSTIC TESTING:  [ ] Echocardiogram:  [ ]  Catheterization:  [ ] Stress Test:    OTHER:

## 2024-07-11 NOTE — ED PROVIDER NOTE - NS ED MD DISPO ISOLATION TYPES
Detail Level: Detailed
Add 1585x Cpt? (Do Not Bill If You Billed For The Procedure Placing The Sutures. This Is An Add-On Code That Must Be Billed With An E/M Visit Code): No
Contact

## 2024-07-14 NOTE — H&P PST ADULT - NSICDXPASTSURGICALHX_GEN_ALL_CORE_FT
---
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

## 2024-08-12 NOTE — PROGRESS NOTE BEHAVIORAL HEALTH - NS ED BHA MED ROS ENT MOUTH
[de-identified] : This is a 43yo female presenting with complaint of right knee pain. Patient reports in April she had a fall and has been having knee pain since. Pain is felt at inside of knee, worsened with bending and when she tries to walk on treadmill. She has tried a brace which has not helped her pain. 
No complaints
No complaints
no
No complaints

## 2024-09-23 NOTE — DISCHARGE NOTE PROVIDER - NSDCQMSTAIRS_GEN_ALL_CORE
Re: Missing Information    Dear  and office staff, we are missing information from your office on patient Roni Terrell, MRN: [id number], : 1954. We are unable to optimize your patient for surgery without the order being completed.    Please fax the following missing items as noted below:    [x] Labs within past 90 days from the surgical date.  [x] EKGTracing within the past 6 months read and signed addressing any abnormalities, per anesthesiologist request. Thank you.  [] Cardiac Clearance  [] Pulmonary Clearance  [x] History and Physical per hospital by-laws NP/PA’s H&P’s must be co-signed by MD. Must be within 30 days of surgical date.  [] Chest X-ray within the past 6 months.   [] Other. Please address and advise patient when to hold blood thinners and GLP-1 & SGLP-2 medications, if applicable.      Please fax back as soon as possible to [phone].  DO NOT USE THIS FORM AS AN ORDER. If you have any questions, please contact the [facility] Preadmitting Department at [phone] as soon as possible to avoid any delay in service for your patient.    [facility] Preadmitting Department  (P) [phone]  (F) [phone]      [facility] Order Requirements state:  ALL ORDERS MUST BE DATED, LEGIBLE AND CONTAIN:  Full Patient Legal Name (as appears on ’s license)  Patient’s Date of Birth (as appears on ’s license)  Pre-admission testing as per anesthesia guidelines    All documentation (ie. History and Physical, labs) MUST contain name and date of birth on EACH page.    Thank you for helping us provide you and your patient with the best possible experience!  
Yes

## 2024-12-11 NOTE — PHYSICAL THERAPY INITIAL EVALUATION ADULT - PERTINENT HX OF CURRENT PROBLEM, REHAB EVAL
Continue wellbutrin XL 150mg daily and Effexor XR 150mg daily   Scheduled with psychiatry in February, does not wish to make any medication changes prior to that      
Controlled on lisinopril 10 mg daily  
Improved with Flomax  Continue current regimen  
69 yr old female with H/O Thoraco abdominal Aortic aneurysm, Aortic Stenosis/ regurgitation S/P AVR (T), Ascending & hemiarch replacement 4/9/19 now admitted for enlarging thoracoabdominal aortic aneurysm.: Pt now s/p Thoracoabdominal aneurysm open repair with Decron graft and celiac SMA bypass, reimplantation of lumbar artery.

## 2025-02-18 NOTE — H&P PST ADULT - NSICDXPASTSURGICALHX_GEN_ALL_CORE_FT
normal gait and station , no tenderness or deformities present PAST SURGICAL HISTORY:  Arm fracture, left     Bilateral cataracts PAST SURGICAL HISTORY:  Arm fracture, left 2005    Bilateral cataracts 2010 PAST SURGICAL HISTORY:  Arm fracture, left 2005    S/P cataract surgery

## 2025-05-10 NOTE — DISCHARGE NOTE PROVIDER - HOSPITAL COURSE
69 yo F with hx of thoracic and abdominal aortic aneurysm s/p repair, AV replacement, and HTN presenting with diarrhea x1 day. Pt states that her boyfriend came over for dinner last night and after he left she started having profuse non-bloody watery diarrhea a/w with fevers to 101-102, lightheadedness and nausea/dry heaving. Pt states she has since had 5-6 watery BMs and has started to feel weak. She took Tylenol at 10AM. She has also noticed mild SOB on exertion. She denies CP. She has been trying to drink gatorade and has been able to keep it down.    CT of a/p showed Left-sided pyelonephritis and possible cystitis, Interval stability in appearance of surgical graft repair of thoracoabdominal aorta and mesenteric bypass grafts, No CT evidence of colitis, Failure of enhancement of the right common femoral and external iliac veins. Suggest venous duplex study to assess for DVT.     leg doppler showed No evidence of deep venous thrombosis in either lower extremity.     The right small saphenous vein, a superficial vein, is thrombosed in the calf.    C- diff negative, GI PCR negative. Diarrhea with associated fever and GNR bacteremia,now improved and tolerating diet well    ID evaluation for bacteremia - c/w ertapenem based on cx, repeat blood cx NTD. Total 14 days therapy given sustained bacteremia and presence of recent graft until 8/19/20     s/p midline, CBC/CMP once a week while on abx, labs to be followed by rehab physician     will need surveillance cx as outpt, pt advised to have repeat blood cx drawn after 2 weeks of therapy if everything is ok. pt to follow up with ID                                        Problem/Plan - 1:    ·  Problem: Hypokalemia due to excessive gastrointestinal loss of potassium.  Plan: replete as required    monitor CMP     IVF         Problem/Plan - 2:    ·  Problem: bacteremia with pyonephritis .  Plan: with diarrhea and fever    CT abdomen reviewed    antibiotics as per ID     ID fu appreciated          Problem/Plan - 3:    ·  Problem: HTN (hypertension).  Plan: cw home meds.          Problem/Plan - 4:    ·  Problem: Thoracoabdominal aortic aneurysm (TAAA) without rupture.  Plan: sp repair    outpt fu.     graft stable    pt with severe protein calorie malnutrition 71 yo F with hx of thoracic and abdominal aortic aneurysm s/p repair, AV replacement, and HTN presenting with diarrhea x1 day. Pt states that her boyfriend came over for dinner last night and after he left she started having profuse non-bloody watery diarrhea a/w with fevers to 101-102, lightheadedness and nausea/dry heaving. Pt states she has since had 5-6 watery BMs and has started to feel weak. She took Tylenol at 10AM. She has also noticed mild SOB on exertion. She denies CP. She has been trying to drink gatorade and has been able to keep it down.    CT of a/p showed Left-sided pyelonephritis and possible cystitis, Interval stability in appearance of surgical graft repair of thoracoabdominal aorta and mesenteric bypass grafts, No CT evidence of colitis, Failure of enhancement of the right common femoral and external iliac veins. Suggest venous duplex study to assess for DVT.     leg doppler showed No evidence of deep venous thrombosis in either lower extremity.     The right small saphenous vein, a superficial vein, is thrombosed in the calf.    C- diff negative, GI PCR negative. Diarrhea with associated fever and GNR bacteremia,now improved and tolerating diet well    ID evaluation for bacteremia - c/w ertapenem based on cx, repeat blood cx NTD. Total 14 days therapy given sustained bacteremia and presence of recent graft until 8/19/20     s/p midline, CBC/CMP once a week while on abx, labs to be followed by rehab physician     will need surveillance cx as outpt, pt advised to have repeat blood cx drawn after 2 weeks of therapy if everything is ok. pt to follow up with ID                        71 yo F with hx of thoracic and abdominal aortic aneurysm s/p repair, AV replacement, and HTN presenting with diarrhea x1 day and found to be bacteremic, hypokalemic and hypertensive. Pt was seen by GI and ID.  Diarrhea improved on bacid and lomotil.  She was positive for Bacteremia 2/2 gram negative bacteria.  She will complete her 14 day course of Invanz on 8/19/2020.  ID would like pt to have surveillance blood cultures sent in 2 weeks and sent to the attention of [facility] Infectious Disease Department at [phone].   Pt's was hypertensive during hospitalization. She is presently on clonidine of which is being tapered to off with Hydralazine that will gradually be increased as needed.   Toprol was discontinued 2/2 persistent bradycardia.   Pt was seen by Behavioral Health for hallucination/agitation 8/17.  She was started on Zyprexa 2.5 mg daily.  Pt is scheduled for discharge to Acute Rehab for PT/OT. 69 yo F with hx of thoracic and abdominal aortic aneurysm s/p repair, AV replacement, and HTN presenting with diarrhea x1 day. Pt states that her boyfriend came over for dinner last night and after he left she started having profuse non-bloody watery diarrhea a/w with fevers to 101-102, lightheadedness and nausea/dry heaving. Pt states she has since had 5-6 watery BMs and has started to feel weak. She took Tylenol at 10AM. She has also noticed mild SOB on exertion. She denies CP. She has been trying to drink gatorade and has been able to keep it down. CT of a/p showed Left-sided pyelonephritis and possible cystitis, Interval stability in appearance of surgical graft repair of thoracoabdominal aorta and mesenteric bypass grafts, No CT evidence of colitis, Failure of enhancement of the right common femoral and external iliac veins. Suggest venous duplex study to assess for DVT.     leg doppler showed No evidence of deep venous thrombosis in either lower extremity.  The right small saphenous vein, a superficial vein, is thrombosed in the calf. C- diff negative, GI PCR negative. Diarrhea with associated fever and GNR bacteremia,now improved and tolerating diet well.  ID evaluation for bacteremia - c/w ertapenem based on cx, repeat blood cx NTD. Total 14 days therapy given sustained bacteremia and presence of recent graft until 8/19/20 . s/p midline, CBC/CMP once a week while on abx, labs to be followed by rehab physician . will need surveillance cx as outpt, pt advised to have repeat blood cx drawn after 2 weeks of therapy if everything is ok. pt to follow up with ID. She is presently on clonidine of which is being tapered to off with Hydralazine that will gradually be increased as needed.   Toprol was discontinued 2/2 persistent bradycardia.   Pt was seen by Behavioral Health for hallucination/agitation 8/17.  She was started on Zyprexa 2.5 mg daily.  Pt is scheduled for discharge to Acute Rehab for PT/OT. Greater than or equal to 3 Risk Factors or History of Arthero Diseas

## 2025-07-30 NOTE — ED PROVIDER NOTE - NEUROLOGICAL, MLM
Last infused 3/18, scheduled 9/2 (24 weeks); she asked that we check her auth a few weeks prior, as infusion center had to be changed last time. Labs today. 
Alert and oriented, no focal deficits, no motor or sensory deficits.

## 2025-09-15 ENCOUNTER — NON-APPOINTMENT (OUTPATIENT)
Age: 75
End: 2025-09-15

## 2025-09-17 ENCOUNTER — APPOINTMENT (OUTPATIENT)
Dept: UROLOGY | Facility: CLINIC | Age: 75
End: 2025-09-17
Payer: MEDICARE

## 2025-09-17 VITALS
DIASTOLIC BLOOD PRESSURE: 75 MMHG | WEIGHT: 130 LBS | HEIGHT: 65 IN | BODY MASS INDEX: 21.66 KG/M2 | SYSTOLIC BLOOD PRESSURE: 163 MMHG | OXYGEN SATURATION: 98 % | HEART RATE: 97 BPM

## 2025-09-17 DIAGNOSIS — N31.9 NEUROMUSCULAR DYSFUNCTION OF BLADDER, UNSPECIFIED: ICD-10-CM

## 2025-09-17 DIAGNOSIS — R32 UNSPECIFIED URINARY INCONTINENCE: ICD-10-CM

## 2025-09-17 PROCEDURE — 99214 OFFICE O/P EST MOD 30 MIN: CPT

## 2025-09-17 RX ORDER — TROSPIUM CHLORIDE 20 MG/1
20 TABLET, FILM COATED ORAL
Qty: 30 | Refills: 1 | Status: ACTIVE | COMMUNITY
Start: 2025-09-17 | End: 1900-01-01

## 2025-09-18 ENCOUNTER — APPOINTMENT (OUTPATIENT)
Dept: UROLOGY | Facility: CLINIC | Age: 75
End: 2025-09-18